# Patient Record
Sex: MALE | Race: WHITE | NOT HISPANIC OR LATINO | Employment: OTHER | URBAN - METROPOLITAN AREA
[De-identification: names, ages, dates, MRNs, and addresses within clinical notes are randomized per-mention and may not be internally consistent; named-entity substitution may affect disease eponyms.]

---

## 2017-03-31 ENCOUNTER — CONVERSION ENCOUNTER (OUTPATIENT)
Dept: FAMILY MEDICINE CLINIC | Facility: CLINIC | Age: 72
End: 2017-03-31

## 2017-11-01 ENCOUNTER — CONVERSION ENCOUNTER (OUTPATIENT)
Dept: FAMILY MEDICINE CLINIC | Facility: CLINIC | Age: 72
End: 2017-11-01

## 2017-11-01 ENCOUNTER — HOSPITAL ENCOUNTER (OUTPATIENT)
Dept: GENERAL RADIOLOGY | Facility: HOSPITAL | Age: 72
Discharge: HOME | End: 2017-11-01
Attending: FAMILY MEDICINE | Admitting: FAMILY MEDICINE

## 2017-11-01 ENCOUNTER — HOSPITAL ENCOUNTER (OUTPATIENT)
Dept: FAMILY MEDICINE CLINIC | Facility: CLINIC | Age: 72
Setting detail: SPECIMEN
Discharge: HOME | End: 2017-11-01
Attending: FAMILY MEDICINE | Admitting: FAMILY MEDICINE

## 2017-11-01 LAB
ALBUMIN SERPL-MCNC: 4.5 G/DL (ref 3.5–4.8)
ALBUMIN/GLOB SERPL: 1.7 {RATIO} (ref 1–1.7)
ALP SERPL-CCNC: 66 IU/L (ref 32–91)
ALT SERPL-CCNC: 20 IU/L (ref 17–63)
ANION GAP SERPL CALC-SCNC: 11.8 MMOL/L (ref 10–20)
AST SERPL-CCNC: 24 IU/L (ref 15–41)
BASOPHILS # BLD AUTO: 0 10*3/UL (ref 0–0.2)
BASOPHILS NFR BLD AUTO: 0 % (ref 0–2)
BILIRUB SERPL-MCNC: 0.8 MG/DL (ref 0.3–1.2)
BILIRUB UR QL STRIP: NEGATIVE MG/DL
BUN SERPL-MCNC: 21 MG/DL (ref 8–20)
BUN/CREAT SERPL: 17.5 (ref 6.2–20.3)
CALCIUM SERPL-MCNC: 9.2 MG/DL (ref 8.9–10.3)
CASTS URNS QL MICRO: ABNORMAL /[LPF]
CHLORIDE SERPL-SCNC: 103 MMOL/L (ref 101–111)
CHOLEST SERPL-MCNC: 185 MG/DL
CHOLEST/HDLC SERPL: 4.3 {RATIO}
COLOR UR: YELLOW
CONV BACTERIA IN URINE MICRO: NEGATIVE
CONV CLARITY OF URINE: ABNORMAL
CONV CO2: 27 MMOL/L (ref 22–32)
CONV HYALINE CASTS IN URINE MICRO: 2 /[LPF] (ref 0–5)
CONV LDL CHOLESTEROL DIRECT: 123 MG/DL (ref 0–100)
CONV PROTEIN IN URINE BY AUTOMATED TEST STRIP: NEGATIVE MG/DL
CONV SMALL ROUND CELLS: ABNORMAL /[HPF]
CONV TOTAL PROTEIN: 7.1 G/DL (ref 6.1–7.9)
CONV UROBILINOGEN IN URINE BY AUTOMATED TEST STRIP: 1 MG/DL
CREAT UR-MCNC: 1.2 MG/DL (ref 0.7–1.2)
CRP SERPL-MCNC: 0.24 MG/DL (ref 0–0.7)
CULTURE INDICATED?: ABNORMAL
DIFFERENTIAL METHOD BLD: (no result)
EOSINOPHIL # BLD AUTO: 0.1 10*3/UL (ref 0–0.3)
EOSINOPHIL # BLD AUTO: 2 % (ref 0–3)
ERYTHROCYTE [DISTWIDTH] IN BLOOD BY AUTOMATED COUNT: 13.9 % (ref 11.5–14.5)
ERYTHROCYTE [SEDIMENTATION RATE] IN BLOOD BY WESTERGREN METHOD: 6 MM/HR (ref 0–20)
GLOBULIN UR ELPH-MCNC: 2.6 G/DL (ref 2.5–3.8)
GLUCOSE SERPL-MCNC: 106 MG/DL (ref 65–99)
GLUCOSE UR QL: NEGATIVE MG/DL
HCT VFR BLD AUTO: 49.5 % (ref 40–54)
HDLC SERPL-MCNC: 43 MG/DL
HGB BLD-MCNC: 17.1 G/DL (ref 14–18)
HGB UR QL STRIP: NEGATIVE
KETONES UR QL STRIP: NEGATIVE MG/DL
LDLC/HDLC SERPL: 2.8 {RATIO}
LEUKOCYTE ESTERASE UR QL STRIP: NEGATIVE
LIPID INTERPRETATION: ABNORMAL
LYMPHOCYTES # BLD AUTO: 1.4 10*3/UL (ref 0.8–4.8)
LYMPHOCYTES NFR BLD AUTO: 21 % (ref 18–42)
MCH RBC QN AUTO: 30.9 PG (ref 26–32)
MCHC RBC AUTO-ENTMCNC: 34.6 G/DL (ref 32–36)
MCV RBC AUTO: 89.2 FL (ref 80–94)
MONOCYTES # BLD AUTO: 0.5 10*3/UL (ref 0.1–1.3)
MONOCYTES NFR BLD AUTO: 7 % (ref 2–11)
NEUTROPHILS # BLD AUTO: 4.7 10*3/UL (ref 2.3–8.6)
NEUTROPHILS NFR BLD AUTO: 70 % (ref 50–75)
NITRITE UR QL STRIP: NEGATIVE
NRBC BLD AUTO-RTO: 0 /100{WBCS}
NRBC/RBC NFR BLD MANUAL: 0 10*3/UL
PH UR STRIP.AUTO: 6.5 [PH] (ref 4.5–8)
PLATELET # BLD AUTO: 139 10*3/UL (ref 150–450)
PMV BLD AUTO: 7 FL (ref 7.4–10.4)
POTASSIUM SERPL-SCNC: 4.8 MMOL/L (ref 3.6–5.1)
PSA SERPL-MCNC: 2.11 NG/ML (ref 0–4)
RBC # BLD AUTO: 5.56 10*6/UL (ref 4.6–6)
RBC #/AREA URNS HPF: 2 /[HPF] (ref 0–3)
SODIUM SERPL-SCNC: 137 MMOL/L (ref 136–144)
SP GR UR: 1.02 (ref 1–1.03)
SPERM URNS QL MICRO: ABNORMAL /[HPF]
SQUAMOUS SPT QL MICRO: 1 /[HPF] (ref 0–5)
T4 FREE SERPL-MCNC: 0.9 NG/DL (ref 0.58–1.64)
TESTOST SERPL-MCNC: 4.83 NG/ML (ref 1.7–7.8)
TRIGL SERPL-MCNC: 174 MG/DL
TSH SERPL-ACNC: 1.87 UIU/ML (ref 0.34–5.6)
UNIDENT CRYS URNS QL MICRO: ABNORMAL /[HPF]
VIT B12 SERPL-MCNC: 436 PG/ML (ref 180–914)
VLDLC SERPL CALC-MCNC: 18.8 MG/DL
WBC # BLD AUTO: 6.7 10*3/UL (ref 4.5–11.5)
WBC #/AREA URNS HPF: 1 /[HPF] (ref 0–5)
YEAST SPEC QL WET PREP: ABNORMAL /[HPF]

## 2017-11-03 LAB
ANA SER QL IA: NORMAL

## 2017-11-13 ENCOUNTER — HOSPITAL ENCOUNTER (OUTPATIENT)
Dept: PHYSICAL THERAPY | Facility: HOSPITAL | Age: 72
Setting detail: RECURRING SERIES
Discharge: HOME | End: 2018-02-09
Attending: FAMILY MEDICINE | Admitting: FAMILY MEDICINE

## 2017-11-17 ENCOUNTER — HOSPITAL ENCOUNTER (OUTPATIENT)
Dept: GENERAL RADIOLOGY | Facility: HOSPITAL | Age: 72
Discharge: HOME | End: 2017-11-17
Attending: FAMILY MEDICINE | Admitting: FAMILY MEDICINE

## 2017-11-17 ENCOUNTER — CONVERSION ENCOUNTER (OUTPATIENT)
Dept: FAMILY MEDICINE CLINIC | Facility: CLINIC | Age: 72
End: 2017-11-17

## 2018-03-07 ENCOUNTER — OFFICE (AMBULATORY)
Dept: URBAN - METROPOLITAN AREA PATHOLOGY 4 | Facility: PATHOLOGY | Age: 73
End: 2018-03-07
Payer: COMMERCIAL

## 2018-03-07 ENCOUNTER — ON CAMPUS - OUTPATIENT (AMBULATORY)
Dept: URBAN - METROPOLITAN AREA HOSPITAL 2 | Facility: HOSPITAL | Age: 73
End: 2018-03-07
Payer: COMMERCIAL

## 2018-03-07 ENCOUNTER — HOSPITAL ENCOUNTER (OUTPATIENT)
Dept: OTHER | Facility: HOSPITAL | Age: 73
Setting detail: SPECIMEN
Discharge: HOME | End: 2018-03-07
Attending: INTERNAL MEDICINE | Admitting: INTERNAL MEDICINE

## 2018-03-07 VITALS
RESPIRATION RATE: 18 BRPM | HEART RATE: 67 BPM | WEIGHT: 228 LBS | DIASTOLIC BLOOD PRESSURE: 74 MMHG | DIASTOLIC BLOOD PRESSURE: 77 MMHG | OXYGEN SATURATION: 94 % | HEART RATE: 72 BPM | OXYGEN SATURATION: 97 % | SYSTOLIC BLOOD PRESSURE: 122 MMHG | RESPIRATION RATE: 16 BRPM | SYSTOLIC BLOOD PRESSURE: 139 MMHG | HEART RATE: 61 BPM | OXYGEN SATURATION: 99 % | DIASTOLIC BLOOD PRESSURE: 69 MMHG | DIASTOLIC BLOOD PRESSURE: 88 MMHG | HEART RATE: 78 BPM | TEMPERATURE: 97.1 F | SYSTOLIC BLOOD PRESSURE: 109 MMHG | SYSTOLIC BLOOD PRESSURE: 130 MMHG | DIASTOLIC BLOOD PRESSURE: 87 MMHG | HEART RATE: 66 BPM | OXYGEN SATURATION: 98 % | SYSTOLIC BLOOD PRESSURE: 151 MMHG | SYSTOLIC BLOOD PRESSURE: 124 MMHG | HEART RATE: 69 BPM | DIASTOLIC BLOOD PRESSURE: 67 MMHG | HEART RATE: 65 BPM | OXYGEN SATURATION: 95 % | SYSTOLIC BLOOD PRESSURE: 106 MMHG | SYSTOLIC BLOOD PRESSURE: 112 MMHG | DIASTOLIC BLOOD PRESSURE: 70 MMHG | HEART RATE: 73 BPM | RESPIRATION RATE: 15 BRPM | DIASTOLIC BLOOD PRESSURE: 68 MMHG | DIASTOLIC BLOOD PRESSURE: 63 MMHG | HEIGHT: 73 IN | SYSTOLIC BLOOD PRESSURE: 114 MMHG

## 2018-03-07 DIAGNOSIS — D12.3 BENIGN NEOPLASM OF TRANSVERSE COLON: ICD-10-CM

## 2018-03-07 DIAGNOSIS — K64.1 SECOND DEGREE HEMORRHOIDS: ICD-10-CM

## 2018-03-07 DIAGNOSIS — Z86.010 PERSONAL HISTORY OF COLONIC POLYPS: ICD-10-CM

## 2018-03-07 DIAGNOSIS — D12.5 BENIGN NEOPLASM OF SIGMOID COLON: ICD-10-CM

## 2018-03-07 LAB
GI HISTOLOGY: A. UNSPECIFIED: (no result)
GI HISTOLOGY: B. UNSPECIFIED: (no result)
GI HISTOLOGY: PDF REPORT: (no result)

## 2018-03-07 PROCEDURE — 45385 COLONOSCOPY W/LESION REMOVAL: CPT | Mod: PT | Performed by: INTERNAL MEDICINE

## 2018-03-07 PROCEDURE — 88305 TISSUE EXAM BY PATHOLOGIST: CPT | Mod: 26 | Performed by: INTERNAL MEDICINE

## 2018-03-07 RX ADMIN — PROPOFOL: 10 INJECTION, EMULSION INTRAVENOUS at 13:17

## 2018-05-23 ENCOUNTER — HOSPITAL ENCOUNTER (OUTPATIENT)
Dept: FAMILY MEDICINE CLINIC | Facility: CLINIC | Age: 73
Setting detail: SPECIMEN
Discharge: HOME | End: 2018-05-23
Attending: FAMILY MEDICINE | Admitting: FAMILY MEDICINE

## 2018-06-14 ENCOUNTER — CONVERSION ENCOUNTER (OUTPATIENT)
Dept: FAMILY MEDICINE CLINIC | Facility: CLINIC | Age: 73
End: 2018-06-14

## 2018-06-21 ENCOUNTER — CONVERSION ENCOUNTER (OUTPATIENT)
Dept: FAMILY MEDICINE CLINIC | Facility: CLINIC | Age: 73
End: 2018-06-21

## 2018-11-14 ENCOUNTER — HOSPITAL ENCOUNTER (OUTPATIENT)
Dept: FAMILY MEDICINE CLINIC | Facility: CLINIC | Age: 73
Setting detail: SPECIMEN
Discharge: HOME | End: 2018-11-14
Attending: FAMILY MEDICINE | Admitting: FAMILY MEDICINE

## 2018-11-14 LAB
ALBUMIN SERPL-MCNC: 3.7 G/DL (ref 3.5–4.8)
ALBUMIN/GLOB SERPL: 1.5 {RATIO} (ref 1–1.7)
ALP SERPL-CCNC: 70 IU/L (ref 32–91)
ALT SERPL-CCNC: 15 IU/L (ref 17–63)
ANION GAP SERPL CALC-SCNC: 9.9 MMOL/L (ref 10–20)
AST SERPL-CCNC: 20 IU/L (ref 15–41)
BASOPHILS # BLD AUTO: 0 10*3/UL (ref 0–0.2)
BASOPHILS NFR BLD AUTO: 0 % (ref 0–2)
BILIRUB SERPL-MCNC: 0.6 MG/DL (ref 0.3–1.2)
BILIRUB UR QL STRIP: NEGATIVE MG/DL
BUN SERPL-MCNC: 19 MG/DL (ref 8–20)
BUN/CREAT SERPL: 15.8 (ref 6.2–20.3)
CALCIUM SERPL-MCNC: 8.9 MG/DL (ref 8.9–10.3)
CASTS URNS QL MICRO: NORMAL /[LPF]
CHLORIDE SERPL-SCNC: 104 MMOL/L (ref 101–111)
CHOLEST SERPL-MCNC: 153 MG/DL
CHOLEST/HDLC SERPL: 3.9 {RATIO}
COLOR UR: YELLOW
CONV BACTERIA IN URINE MICRO: NEGATIVE
CONV CLARITY OF URINE: CLEAR
CONV CO2: 27 MMOL/L (ref 22–32)
CONV HYALINE CASTS IN URINE MICRO: 2 /[LPF] (ref 0–5)
CONV LDL CHOLESTEROL DIRECT: 93 MG/DL (ref 0–100)
CONV PROTEIN IN URINE BY AUTOMATED TEST STRIP: NEGATIVE MG/DL
CONV SMALL ROUND CELLS: NORMAL /[HPF]
CONV TOTAL PROTEIN: 6.1 G/DL (ref 6.1–7.9)
CONV UROBILINOGEN IN URINE BY AUTOMATED TEST STRIP: 0.2 MG/DL
CREAT UR-MCNC: 1.2 MG/DL (ref 0.7–1.2)
CULTURE INDICATED?: NORMAL
DIFFERENTIAL METHOD BLD: (no result)
EOSINOPHIL # BLD AUTO: 0.1 10*3/UL (ref 0–0.3)
EOSINOPHIL # BLD AUTO: 3 % (ref 0–3)
ERYTHROCYTE [DISTWIDTH] IN BLOOD BY AUTOMATED COUNT: 13.5 % (ref 11.5–14.5)
GLOBULIN UR ELPH-MCNC: 2.4 G/DL (ref 2.5–3.8)
GLUCOSE SERPL-MCNC: 114 MG/DL (ref 65–99)
GLUCOSE UR QL: NEGATIVE MG/DL
HCT VFR BLD AUTO: 46.3 % (ref 40–54)
HDLC SERPL-MCNC: 39 MG/DL
HGB BLD-MCNC: 15.9 G/DL (ref 14–18)
HGB UR QL STRIP: NEGATIVE
KETONES UR QL STRIP: NEGATIVE MG/DL
LDLC/HDLC SERPL: 2.4 {RATIO}
LEUKOCYTE ESTERASE UR QL STRIP: NEGATIVE
LIPID INTERPRETATION: ABNORMAL
LYMPHOCYTES # BLD AUTO: 1.2 10*3/UL (ref 0.8–4.8)
LYMPHOCYTES NFR BLD AUTO: 22 % (ref 18–42)
MCH RBC QN AUTO: 30.6 PG (ref 26–32)
MCHC RBC AUTO-ENTMCNC: 34.5 G/DL (ref 32–36)
MCV RBC AUTO: 88.8 FL (ref 80–94)
MONOCYTES # BLD AUTO: 0.4 10*3/UL (ref 0.1–1.3)
MONOCYTES NFR BLD AUTO: 7 % (ref 2–11)
NEUTROPHILS # BLD AUTO: 3.7 10*3/UL (ref 2.3–8.6)
NEUTROPHILS NFR BLD AUTO: 68 % (ref 50–75)
NITRITE UR QL STRIP: NEGATIVE
NRBC BLD AUTO-RTO: 0 /100{WBCS}
NRBC/RBC NFR BLD MANUAL: 0 10*3/UL
PH UR STRIP.AUTO: 5.5 [PH] (ref 4.5–8)
PLATELET # BLD AUTO: 125 10*3/UL (ref 150–450)
PMV BLD AUTO: 7.2 FL (ref 7.4–10.4)
POTASSIUM SERPL-SCNC: 3.9 MMOL/L (ref 3.6–5.1)
RBC # BLD AUTO: 5.21 10*6/UL (ref 4.6–6)
RBC #/AREA URNS HPF: 1 /[HPF] (ref 0–3)
SODIUM SERPL-SCNC: 137 MMOL/L (ref 136–144)
SP GR UR: 1.02 (ref 1–1.03)
SPERM URNS QL MICRO: NORMAL /[HPF]
SQUAMOUS SPT QL MICRO: 0 /[HPF] (ref 0–5)
TRIGL SERPL-MCNC: 141 MG/DL
UNIDENT CRYS URNS QL MICRO: NORMAL /[HPF]
VLDLC SERPL CALC-MCNC: 20.6 MG/DL
WBC # BLD AUTO: 5.4 10*3/UL (ref 4.5–11.5)
WBC #/AREA URNS HPF: 1 /[HPF] (ref 0–5)
YEAST SPEC QL WET PREP: NORMAL /[HPF]

## 2018-11-21 ENCOUNTER — CONVERSION ENCOUNTER (OUTPATIENT)
Dept: FAMILY MEDICINE CLINIC | Facility: CLINIC | Age: 73
End: 2018-11-21

## 2019-06-04 VITALS
SYSTOLIC BLOOD PRESSURE: 120 MMHG | DIASTOLIC BLOOD PRESSURE: 70 MMHG | HEIGHT: 73 IN | WEIGHT: 230 LBS | BODY MASS INDEX: 29.03 KG/M2 | HEART RATE: 80 BPM | HEART RATE: 80 BPM | WEIGHT: 239 LBS | HEART RATE: 79 BPM | RESPIRATION RATE: 16 BRPM | SYSTOLIC BLOOD PRESSURE: 144 MMHG | DIASTOLIC BLOOD PRESSURE: 78 MMHG | WEIGHT: 227 LBS | BODY MASS INDEX: 30.09 KG/M2 | DIASTOLIC BLOOD PRESSURE: 82 MMHG | RESPIRATION RATE: 20 BRPM | RESPIRATION RATE: 18 BRPM | HEART RATE: 76 BPM | WEIGHT: 227 LBS | RESPIRATION RATE: 18 BRPM | HEART RATE: 64 BPM | SYSTOLIC BLOOD PRESSURE: 150 MMHG | WEIGHT: 220 LBS | SYSTOLIC BLOOD PRESSURE: 124 MMHG | DIASTOLIC BLOOD PRESSURE: 78 MMHG | HEIGHT: 73 IN | RESPIRATION RATE: 18 BRPM | DIASTOLIC BLOOD PRESSURE: 88 MMHG | SYSTOLIC BLOOD PRESSURE: 132 MMHG | DIASTOLIC BLOOD PRESSURE: 75 MMHG | SYSTOLIC BLOOD PRESSURE: 144 MMHG | HEART RATE: 96 BPM | RESPIRATION RATE: 20 BRPM | BODY MASS INDEX: 30.09 KG/M2 | WEIGHT: 242 LBS

## 2019-07-18 RX ORDER — AMLODIPINE BESYLATE 10 MG/1
TABLET ORAL
Qty: 90 TABLET | Refills: 0 | Status: SHIPPED | OUTPATIENT
Start: 2019-07-18 | End: 2019-10-13 | Stop reason: SDUPTHER

## 2019-08-22 RX ORDER — TRIAMTERENE AND HYDROCHLOROTHIAZIDE 75; 50 MG/1; MG/1
TABLET ORAL
Qty: 90 TABLET | Refills: 0 | Status: SHIPPED | OUTPATIENT
Start: 2019-08-22 | End: 2019-10-13 | Stop reason: SDUPTHER

## 2019-10-03 ENCOUNTER — TELEPHONE (OUTPATIENT)
Dept: FAMILY MEDICINE CLINIC | Facility: CLINIC | Age: 74
End: 2019-10-03

## 2019-10-03 DIAGNOSIS — F39 MOOD DISORDER (HCC): ICD-10-CM

## 2019-10-03 DIAGNOSIS — I10 ESSENTIAL HYPERTENSION: ICD-10-CM

## 2019-10-03 DIAGNOSIS — R73.09 ELEVATED GLUCOSE: ICD-10-CM

## 2019-10-03 DIAGNOSIS — Z12.5 SCREENING FOR PROSTATE CANCER: ICD-10-CM

## 2019-10-03 DIAGNOSIS — E55.9 VITAMIN D DEFICIENCY: Primary | ICD-10-CM

## 2019-10-03 PROBLEM — N40.0 BENIGN PROSTATIC HYPERPLASIA: Status: ACTIVE | Noted: 2018-11-14

## 2019-10-03 PROBLEM — M72.2 PLANTAR FASCIITIS: Status: ACTIVE | Noted: 2017-03-31

## 2019-10-03 NOTE — TELEPHONE ENCOUNTER
Patient is seeing [facility] on 11/22 for his annual Medicare Wellness Exam and coming in under [facility] on 11/15 for the fasting labwork.  Please put lab orders in.

## 2019-10-14 RX ORDER — DULOXETIN HYDROCHLORIDE 30 MG/1
CAPSULE, DELAYED RELEASE ORAL
Qty: 90 CAPSULE | Refills: 0 | Status: SHIPPED | OUTPATIENT
Start: 2019-10-14 | End: 2020-02-24

## 2019-10-14 RX ORDER — AMLODIPINE BESYLATE 10 MG/1
TABLET ORAL
Qty: 90 TABLET | Refills: 0 | Status: SHIPPED | OUTPATIENT
Start: 2019-10-14 | End: 2020-01-03

## 2019-10-14 RX ORDER — TRIAMTERENE AND HYDROCHLOROTHIAZIDE 75; 50 MG/1; MG/1
TABLET ORAL
Qty: 90 TABLET | Refills: 0 | Status: SHIPPED | OUTPATIENT
Start: 2019-10-14 | End: 2020-02-24

## 2020-01-03 RX ORDER — AMLODIPINE BESYLATE 10 MG/1
TABLET ORAL
Qty: 90 TABLET | Refills: 0 | Status: SHIPPED | OUTPATIENT
Start: 2020-01-03 | End: 2020-03-30

## 2020-01-08 ENCOUNTER — LAB (OUTPATIENT)
Dept: FAMILY MEDICINE CLINIC | Facility: CLINIC | Age: 75
End: 2020-01-08

## 2020-01-08 DIAGNOSIS — I10 ESSENTIAL HYPERTENSION: ICD-10-CM

## 2020-01-08 DIAGNOSIS — Z12.5 SCREENING FOR PROSTATE CANCER: ICD-10-CM

## 2020-01-08 DIAGNOSIS — R73.09 ELEVATED GLUCOSE: ICD-10-CM

## 2020-01-08 DIAGNOSIS — E55.9 VITAMIN D DEFICIENCY: ICD-10-CM

## 2020-01-08 LAB
25(OH)D3 SERPL-MCNC: 59.4 NG/ML (ref 30–100)
ALBUMIN SERPL-MCNC: 4.3 G/DL (ref 3.5–5.2)
ALBUMIN/GLOB SERPL: 1.7 G/DL
ALP SERPL-CCNC: 81 U/L (ref 39–117)
ALT SERPL W P-5'-P-CCNC: 16 U/L (ref 1–41)
ANION GAP SERPL CALCULATED.3IONS-SCNC: 12.6 MMOL/L (ref 5–15)
AST SERPL-CCNC: 14 U/L (ref 1–40)
BASOPHILS # BLD AUTO: 0.02 10*3/MM3 (ref 0–0.2)
BASOPHILS NFR BLD AUTO: 0.3 % (ref 0–1.5)
BILIRUB SERPL-MCNC: 0.3 MG/DL (ref 0.2–1.2)
BILIRUB UR QL STRIP: NEGATIVE
BUN BLD-MCNC: 23 MG/DL (ref 8–23)
BUN/CREAT SERPL: 21.1 (ref 7–25)
CALCIUM SPEC-SCNC: 9 MG/DL (ref 8.6–10.5)
CHLORIDE SERPL-SCNC: 102 MMOL/L (ref 98–107)
CHOLEST SERPL-MCNC: 163 MG/DL (ref 0–200)
CLARITY UR: CLEAR
CO2 SERPL-SCNC: 24.4 MMOL/L (ref 22–29)
COLOR UR: YELLOW
CREAT BLD-MCNC: 1.09 MG/DL (ref 0.76–1.27)
DEPRECATED RDW RBC AUTO: 43.6 FL (ref 37–54)
EOSINOPHIL # BLD AUTO: 0.28 10*3/MM3 (ref 0–0.4)
EOSINOPHIL NFR BLD AUTO: 4.6 % (ref 0.3–6.2)
ERYTHROCYTE [DISTWIDTH] IN BLOOD BY AUTOMATED COUNT: 13.4 % (ref 12.3–15.4)
GFR SERPL CREATININE-BSD FRML MDRD: 66 ML/MIN/1.73
GLOBULIN UR ELPH-MCNC: 2.6 GM/DL
GLUCOSE BLD-MCNC: 107 MG/DL (ref 65–99)
GLUCOSE UR STRIP-MCNC: NEGATIVE MG/DL
HBA1C MFR BLD: 4.8 % (ref 3.5–5.6)
HCT VFR BLD AUTO: 47.9 % (ref 37.5–51)
HDLC SERPL-MCNC: 37 MG/DL (ref 40–60)
HGB BLD-MCNC: 16.2 G/DL (ref 13–17.7)
HGB UR QL STRIP.AUTO: NEGATIVE
IMM GRANULOCYTES # BLD AUTO: 0.02 10*3/MM3 (ref 0–0.05)
IMM GRANULOCYTES NFR BLD AUTO: 0.3 % (ref 0–0.5)
KETONES UR QL STRIP: NEGATIVE
LDLC SERPL CALC-MCNC: 99 MG/DL (ref 0–100)
LDLC/HDLC SERPL: 2.66 {RATIO}
LEUKOCYTE ESTERASE UR QL STRIP.AUTO: NEGATIVE
LYMPHOCYTES # BLD AUTO: 1.32 10*3/MM3 (ref 0.7–3.1)
LYMPHOCYTES NFR BLD AUTO: 21.7 % (ref 19.6–45.3)
MCH RBC QN AUTO: 30.2 PG (ref 26.6–33)
MCHC RBC AUTO-ENTMCNC: 33.8 G/DL (ref 31.5–35.7)
MCV RBC AUTO: 89.2 FL (ref 79–97)
MONOCYTES # BLD AUTO: 0.48 10*3/MM3 (ref 0.1–0.9)
MONOCYTES NFR BLD AUTO: 7.9 % (ref 5–12)
NEUTROPHILS # BLD AUTO: 3.96 10*3/MM3 (ref 1.7–7)
NEUTROPHILS NFR BLD AUTO: 65.2 % (ref 42.7–76)
NITRITE UR QL STRIP: NEGATIVE
NRBC BLD AUTO-RTO: 0 /100 WBC (ref 0–0.2)
PH UR STRIP.AUTO: 7 [PH] (ref 5–8)
PLATELET # BLD AUTO: 136 10*3/MM3 (ref 140–450)
PMV BLD AUTO: 9.8 FL (ref 6–12)
POTASSIUM BLD-SCNC: 4.4 MMOL/L (ref 3.5–5.2)
PROT SERPL-MCNC: 6.9 G/DL (ref 6–8.5)
PROT UR QL STRIP: NEGATIVE
PSA SERPL-MCNC: 2.5 NG/ML (ref 0–4)
RBC # BLD AUTO: 5.37 10*6/MM3 (ref 4.14–5.8)
SODIUM BLD-SCNC: 139 MMOL/L (ref 136–145)
SP GR UR STRIP: 1.02 (ref 1–1.03)
T4 FREE SERPL-MCNC: 1.26 NG/DL (ref 0.93–1.7)
TRIGL SERPL-MCNC: 137 MG/DL (ref 0–150)
TSH SERPL DL<=0.05 MIU/L-ACNC: 1.86 UIU/ML (ref 0.27–4.2)
UROBILINOGEN UR QL STRIP: NORMAL
VLDLC SERPL-MCNC: 27.4 MG/DL (ref 5–40)
WBC NRBC COR # BLD: 6.08 10*3/MM3 (ref 3.4–10.8)

## 2020-01-08 PROCEDURE — 80053 COMPREHEN METABOLIC PANEL: CPT | Performed by: FAMILY MEDICINE

## 2020-01-08 PROCEDURE — 83036 HEMOGLOBIN GLYCOSYLATED A1C: CPT | Performed by: FAMILY MEDICINE

## 2020-01-08 PROCEDURE — 82306 VITAMIN D 25 HYDROXY: CPT | Performed by: FAMILY MEDICINE

## 2020-01-08 PROCEDURE — 84443 ASSAY THYROID STIM HORMONE: CPT | Performed by: FAMILY MEDICINE

## 2020-01-08 PROCEDURE — 85025 COMPLETE CBC W/AUTO DIFF WBC: CPT | Performed by: FAMILY MEDICINE

## 2020-01-08 PROCEDURE — 81003 URINALYSIS AUTO W/O SCOPE: CPT | Performed by: FAMILY MEDICINE

## 2020-01-08 PROCEDURE — 80061 LIPID PANEL: CPT | Performed by: FAMILY MEDICINE

## 2020-01-08 PROCEDURE — G0103 PSA SCREENING: HCPCS | Performed by: FAMILY MEDICINE

## 2020-01-08 PROCEDURE — 84439 ASSAY OF FREE THYROXINE: CPT | Performed by: FAMILY MEDICINE

## 2020-01-15 ENCOUNTER — TELEPHONE (OUTPATIENT)
Dept: FAMILY MEDICINE CLINIC | Facility: CLINIC | Age: 75
End: 2020-01-15

## 2020-01-15 ENCOUNTER — OFFICE VISIT (OUTPATIENT)
Dept: FAMILY MEDICINE CLINIC | Facility: CLINIC | Age: 75
End: 2020-01-15

## 2020-01-15 VITALS
HEIGHT: 73 IN | DIASTOLIC BLOOD PRESSURE: 82 MMHG | HEART RATE: 100 BPM | SYSTOLIC BLOOD PRESSURE: 152 MMHG | BODY MASS INDEX: 31.28 KG/M2 | TEMPERATURE: 98.4 F | RESPIRATION RATE: 16 BRPM | WEIGHT: 236 LBS

## 2020-01-15 DIAGNOSIS — Z00.00 MEDICARE ANNUAL WELLNESS VISIT, SUBSEQUENT: Primary | ICD-10-CM

## 2020-01-15 DIAGNOSIS — M54.41 CHRONIC BILATERAL LOW BACK PAIN WITH RIGHT-SIDED SCIATICA: ICD-10-CM

## 2020-01-15 DIAGNOSIS — M25.551 HIP PAIN, BILATERAL: ICD-10-CM

## 2020-01-15 DIAGNOSIS — N40.1 BENIGN PROSTATIC HYPERPLASIA WITH URINARY FREQUENCY: ICD-10-CM

## 2020-01-15 DIAGNOSIS — I10 ESSENTIAL HYPERTENSION: ICD-10-CM

## 2020-01-15 DIAGNOSIS — K21.9 GASTROESOPHAGEAL REFLUX DISEASE WITHOUT ESOPHAGITIS: Primary | ICD-10-CM

## 2020-01-15 DIAGNOSIS — G89.29 CHRONIC BILATERAL LOW BACK PAIN WITH RIGHT-SIDED SCIATICA: ICD-10-CM

## 2020-01-15 DIAGNOSIS — H33.011 RETINAL DETACHMENT OF RIGHT EYE WITH SINGLE RETINAL TEAR: ICD-10-CM

## 2020-01-15 DIAGNOSIS — R35.0 BENIGN PROSTATIC HYPERPLASIA WITH URINARY FREQUENCY: ICD-10-CM

## 2020-01-15 DIAGNOSIS — K21.9 GASTROESOPHAGEAL REFLUX DISEASE WITHOUT ESOPHAGITIS: ICD-10-CM

## 2020-01-15 DIAGNOSIS — M25.552 HIP PAIN, BILATERAL: ICD-10-CM

## 2020-01-15 DIAGNOSIS — M17.0 PRIMARY OSTEOARTHRITIS OF BOTH KNEES: ICD-10-CM

## 2020-01-15 PROBLEM — J45.909 BRONCHITIS, ALLERGIC: Status: ACTIVE | Noted: 2018-06-14

## 2020-01-15 PROBLEM — M25.559 HIP PAIN: Status: ACTIVE | Noted: 2017-11-17

## 2020-01-15 PROBLEM — S43.421A SPRAIN OF RIGHT ROTATOR CUFF CAPSULE: Status: ACTIVE | Noted: 2020-01-15

## 2020-01-15 PROBLEM — M17.9 OSTEOARTHRITIS OF KNEE: Status: ACTIVE | Noted: 2020-01-15

## 2020-01-15 PROCEDURE — G0439 PPPS, SUBSEQ VISIT: HCPCS | Performed by: FAMILY MEDICINE

## 2020-01-15 PROCEDURE — 99214 OFFICE O/P EST MOD 30 MIN: CPT | Performed by: FAMILY MEDICINE

## 2020-01-15 RX ORDER — FEXOFENADINE HCL 180 MG/1
180 TABLET ORAL 2 TIMES DAILY PRN
COMMUNITY

## 2020-01-15 RX ORDER — ZOLPIDEM TARTRATE 10 MG/1
TABLET ORAL
COMMUNITY
Start: 2019-12-03 | End: 2020-02-24

## 2020-01-23 ENCOUNTER — HOSPITAL ENCOUNTER (OUTPATIENT)
Dept: GENERAL RADIOLOGY | Facility: HOSPITAL | Age: 75
Discharge: HOME | End: 2020-01-23
Admitting: FAMILY MEDICINE

## 2020-01-23 DIAGNOSIS — Z00.00 MEDICARE ANNUAL WELLNESS VISIT, SUBSEQUENT: ICD-10-CM

## 2020-01-23 DIAGNOSIS — M25.552 HIP PAIN, BILATERAL: ICD-10-CM

## 2020-01-23 DIAGNOSIS — M25.551 HIP PAIN, BILATERAL: ICD-10-CM

## 2020-01-23 PROCEDURE — 73521 X-RAY EXAM HIPS BI 2 VIEWS: CPT

## 2020-02-14 ENCOUNTER — TELEPHONE (OUTPATIENT)
Dept: FAMILY MEDICINE CLINIC | Facility: CLINIC | Age: 75
End: 2020-02-14

## 2020-02-23 DIAGNOSIS — F51.01 PRIMARY INSOMNIA: Primary | ICD-10-CM

## 2020-02-24 RX ORDER — TRIAMTERENE AND HYDROCHLOROTHIAZIDE 75; 50 MG/1; MG/1
TABLET ORAL
Qty: 90 TABLET | Refills: 0 | Status: SHIPPED | OUTPATIENT
Start: 2020-02-24 | End: 2020-05-22

## 2020-02-24 RX ORDER — DULOXETIN HYDROCHLORIDE 30 MG/1
CAPSULE, DELAYED RELEASE ORAL
Qty: 90 CAPSULE | Refills: 0 | Status: SHIPPED | OUTPATIENT
Start: 2020-02-24 | End: 2020-05-22

## 2020-02-25 RX ORDER — ZOLPIDEM TARTRATE 10 MG/1
TABLET ORAL
Qty: 60 TABLET | Refills: 1 | Status: SHIPPED | OUTPATIENT
Start: 2020-02-25 | End: 2020-09-16

## 2020-03-16 NOTE — PATIENT INSTRUCTIONS
Medicare Wellness  Personal Prevention Plan of Service     Date of Office Visit:  01/15/2020  Encounter Provider:  Jaret Castellanos MD  Place of Service:  [facility]  Patient Name: Clint Cee  :  1945    As part of the Medicare Wellness portion of your visit today, we are providing you with this personalized preventive plan of services (PPPS). This plan is based upon recommendations of the [place] Preventive Services Task Force (USPSTF) and the Advisory Committee on Immunization Practices (ACIP).    This lists the preventive care services that should be considered, and provides dates of when you are due. Items listed as completed are up-to-date and do not require any further intervention.    Health Maintenance   Topic Date Due   • TDAP/TD VACCINES (1 - Tdap) 1958   • ZOSTER VACCINE (1 of 2) 1997   • Pneumococcal Vaccine Once at 65 Years Old  2012   • HEPATITIS C SCREENING  10/03/2019   • MEDICARE ANNUAL WELLNESS  01/15/2021   • COLONOSCOPY  2028   • INFLUENZA VACCINE  Completed       Orders Placed This Encounter   Procedures   • XR Hips Bilateral With or Without Pelvis 2 View     Standing Status:   Future     Number of Occurrences:   1     Standing Expiration Date:   1/15/2021     Order Specific Question:   Reason for Exam:     Answer:   bilateral hip ROM deficit       Return in about 6 months (around 7/15/2020) for Annual physical.

## 2020-03-30 RX ORDER — AMLODIPINE BESYLATE 10 MG/1
TABLET ORAL
Qty: 90 TABLET | Refills: 0 | Status: SHIPPED | OUTPATIENT
Start: 2020-03-30 | End: 2020-05-22

## 2020-05-22 RX ORDER — TRIAMTERENE AND HYDROCHLOROTHIAZIDE 75; 50 MG/1; MG/1
TABLET ORAL
Qty: 90 TABLET | Refills: 0 | Status: SHIPPED | OUTPATIENT
Start: 2020-05-22 | End: 2020-08-31

## 2020-05-22 RX ORDER — AMLODIPINE BESYLATE 10 MG/1
TABLET ORAL
Qty: 90 TABLET | Refills: 0 | Status: SHIPPED | OUTPATIENT
Start: 2020-05-22 | End: 2020-08-31

## 2020-05-22 RX ORDER — DULOXETIN HYDROCHLORIDE 30 MG/1
CAPSULE, DELAYED RELEASE ORAL
Qty: 90 CAPSULE | Refills: 0 | Status: SHIPPED | OUTPATIENT
Start: 2020-05-22 | End: 2020-08-31

## 2020-07-15 ENCOUNTER — OFFICE VISIT (OUTPATIENT)
Dept: FAMILY MEDICINE CLINIC | Facility: CLINIC | Age: 75
End: 2020-07-15

## 2020-07-15 VITALS
SYSTOLIC BLOOD PRESSURE: 140 MMHG | HEART RATE: 75 BPM | HEIGHT: 73 IN | RESPIRATION RATE: 20 BRPM | TEMPERATURE: 96.9 F | BODY MASS INDEX: 31.01 KG/M2 | DIASTOLIC BLOOD PRESSURE: 70 MMHG | WEIGHT: 234 LBS

## 2020-07-15 DIAGNOSIS — M15.9 PRIMARY OSTEOARTHRITIS INVOLVING MULTIPLE JOINTS: ICD-10-CM

## 2020-07-15 DIAGNOSIS — I10 ESSENTIAL HYPERTENSION: Primary | ICD-10-CM

## 2020-07-15 PROCEDURE — 99213 OFFICE O/P EST LOW 20 MIN: CPT | Performed by: FAMILY MEDICINE

## 2020-08-31 RX ORDER — TRIAMTERENE AND HYDROCHLOROTHIAZIDE 75; 50 MG/1; MG/1
TABLET ORAL
Qty: 90 TABLET | Refills: 0 | Status: SHIPPED | OUTPATIENT
Start: 2020-08-31 | End: 2020-12-01

## 2020-08-31 RX ORDER — AMLODIPINE BESYLATE 10 MG/1
TABLET ORAL
Qty: 90 TABLET | Refills: 0 | Status: SHIPPED | OUTPATIENT
Start: 2020-08-31 | End: 2020-12-01

## 2020-08-31 RX ORDER — DULOXETIN HYDROCHLORIDE 30 MG/1
CAPSULE, DELAYED RELEASE ORAL
Qty: 90 CAPSULE | Refills: 0 | Status: SHIPPED | OUTPATIENT
Start: 2020-08-31 | End: 2020-12-01

## 2020-09-16 DIAGNOSIS — F51.01 PRIMARY INSOMNIA: ICD-10-CM

## 2020-09-16 RX ORDER — ZOLPIDEM TARTRATE 10 MG/1
TABLET ORAL
Qty: 60 TABLET | Refills: 1 | Status: SHIPPED | OUTPATIENT
Start: 2020-09-16 | End: 2021-04-29

## 2020-11-16 ENCOUNTER — OFFICE VISIT (OUTPATIENT)
Dept: FAMILY MEDICINE CLINIC | Facility: CLINIC | Age: 75
End: 2020-11-16

## 2020-11-16 VITALS
BODY MASS INDEX: 31.01 KG/M2 | TEMPERATURE: 97.1 F | HEIGHT: 73 IN | SYSTOLIC BLOOD PRESSURE: 120 MMHG | HEART RATE: 81 BPM | WEIGHT: 234 LBS | DIASTOLIC BLOOD PRESSURE: 76 MMHG | OXYGEN SATURATION: 97 % | RESPIRATION RATE: 16 BRPM

## 2020-11-16 DIAGNOSIS — I10 ESSENTIAL HYPERTENSION: Primary | ICD-10-CM

## 2020-11-16 PROCEDURE — 99213 OFFICE O/P EST LOW 20 MIN: CPT | Performed by: FAMILY MEDICINE

## 2020-12-01 RX ORDER — TRIAMTERENE AND HYDROCHLOROTHIAZIDE 75; 50 MG/1; MG/1
TABLET ORAL
Qty: 90 TABLET | Refills: 0 | Status: SHIPPED | OUTPATIENT
Start: 2020-12-01 | End: 2021-02-25

## 2020-12-01 RX ORDER — DULOXETIN HYDROCHLORIDE 30 MG/1
CAPSULE, DELAYED RELEASE ORAL
Qty: 90 CAPSULE | Refills: 0 | Status: SHIPPED | OUTPATIENT
Start: 2020-12-01 | End: 2021-02-25

## 2020-12-01 RX ORDER — AMLODIPINE BESYLATE 10 MG/1
TABLET ORAL
Qty: 90 TABLET | Refills: 0 | Status: SHIPPED | OUTPATIENT
Start: 2020-12-01 | End: 2021-02-25

## 2020-12-13 NOTE — PATIENT INSTRUCTIONS
"Hypertension, Adult  High blood pressure (hypertension) is when the force of blood pumping through the arteries is too strong. The arteries are the blood vessels that carry blood from the heart throughout the body. Hypertension forces the heart to work harder to pump blood and may cause arteries to become narrow or stiff. Untreated or uncontrolled hypertension can cause a heart attack, heart failure, a stroke, kidney disease, and other problems.  A blood pressure reading consists of a higher number over a lower number. Ideally, your blood pressure should be below 120/80. The first (\"top\") number is called the systolic pressure. It is a measure of the pressure in your arteries as your heart beats. The second (\"bottom\") number is called the diastolic pressure. It is a measure of the pressure in your arteries as the heart relaxes.  What are the causes?  The exact cause of this condition is not known. There are some conditions that result in or are related to high blood pressure.  What increases the risk?  Some risk factors for high blood pressure are under your control. The following factors may make you more likely to develop this condition:  · Smoking.  · Having type 2 diabetes mellitus, high cholesterol, or both.  · Not getting enough exercise or physical activity.  · Being overweight.  · Having too much fat, sugar, calories, or salt (sodium) in your diet.  · Drinking too much alcohol.  Some risk factors for high blood pressure may be difficult or impossible to change. Some of these factors include:  · Having chronic kidney disease.  · Having a family history of high blood pressure.  · Age. Risk increases with age.  · Race. You may be at higher risk if you are .  · Gender. Men are at higher risk than women before age 45. After age 65, women are at higher risk than men.  · Having obstructive sleep apnea.  · Stress.  What are the signs or symptoms?  High blood pressure may not cause symptoms. Very high " blood pressure (hypertensive crisis) may cause:  · Headache.  · Anxiety.  · Shortness of breath.  · Nosebleed.  · Nausea and vomiting.  · Vision changes.  · Severe chest pain.  · Seizures.  How is this diagnosed?  This condition is diagnosed by measuring your blood pressure while you are seated, with your arm resting on a flat surface, your legs uncrossed, and your feet flat on the floor. The cuff of the blood pressure monitor will be placed directly against the skin of your upper arm at the level of your heart. It should be measured at least twice using the same arm. Certain conditions can cause a difference in blood pressure between your right and left arms.  Certain factors can cause blood pressure readings to be lower or higher than normal for a short period of time:  · When your blood pressure is higher when you are in a health care provider's office than when you are at home, this is called white coat hypertension. Most people with this condition do not need medicines.  · When your blood pressure is higher at home than when you are in a health care provider's office, this is called masked hypertension. Most people with this condition may need medicines to control blood pressure.  If you have a high blood pressure reading during one visit or you have normal blood pressure with other risk factors, you may be asked to:  · Return on a different day to have your blood pressure checked again.  · Monitor your blood pressure at home for 1 week or longer.  If you are diagnosed with hypertension, you may have other blood or imaging tests to help your health care provider understand your overall risk for other conditions.  How is this treated?  This condition is treated by making healthy lifestyle changes, such as eating healthy foods, exercising more, and reducing your alcohol intake. Your health care provider may prescribe medicine if lifestyle changes are not enough to get your blood pressure under control, and  if:  · Your systolic blood pressure is above 130.  · Your diastolic blood pressure is above 80.  Your personal target blood pressure may vary depending on your medical conditions, your age, and other factors.  Follow these instructions at home:  Eating and drinking    · Eat a diet that is high in fiber and potassium, and low in sodium, added sugar, and fat. An example eating plan is called the DASH (Dietary Approaches to Stop Hypertension) diet. To eat this way:  ? Eat plenty of fresh fruits and vegetables. Try to fill one half of your plate at each meal with fruits and vegetables.  ? Eat whole grains, such as whole-wheat pasta, brown rice, or whole-grain bread. Fill about one fourth of your plate with whole grains.  ? Eat or drink low-fat dairy products, such as skim milk or low-fat yogurt.  ? Avoid fatty cuts of meat, processed or cured meats, and poultry with skin. Fill about one fourth of your plate with lean proteins, such as fish, chicken without skin, beans, eggs, or tofu.  ? Avoid pre-made and processed foods. These tend to be higher in sodium, added sugar, and fat.  · Reduce your daily sodium intake. Most people with hypertension should eat less than 1,500 mg of sodium a day.  · Do not drink alcohol if:  ? Your health care provider tells you not to drink.  ? You are pregnant, may be pregnant, or are planning to become pregnant.  · If you drink alcohol:  ? Limit how much you use to:  § 0-1 drink a day for women.  § 0-2 drinks a day for men.  ? Be aware of how much alcohol is in your drink. In the [place] one drink equals one 12 oz bottle of beer (355 mL), one 5 oz glass of wine (148 mL), or one 1½ oz glass of hard liquor (44 mL).  Lifestyle    · Work with your health care provider to maintain a healthy body weight or to lose weight. Ask what an ideal weight is for you.  · Get at least 30 minutes of exercise most days of the week. Activities may include walking, swimming, or biking.  · Include exercise to  strengthen your muscles (resistance exercise), such as Pilates or lifting weights, as part of your weekly exercise routine. Try to do these types of exercises for 30 minutes at least 3 days a week.  · Do not use any products that contain nicotine or tobacco, such as cigarettes, e-cigarettes, and chewing tobacco. If you need help quitting, ask your health care provider.  · Monitor your blood pressure at home as told by your health care provider.  · Keep all follow-up visits as told by your health care provider. This is important.  Medicines  · Take over-the-counter and prescription medicines only as told by your health care provider. Follow directions carefully. Blood pressure medicines must be taken as prescribed.  · Do not skip doses of blood pressure medicine. Doing this puts you at risk for problems and can make the medicine less effective.  · Ask your health care provider about side effects or reactions to medicines that you should watch for.  Contact a health care provider if you:  · Think you are having a reaction to a medicine you are taking.  · Have headaches that keep coming back (recurring).  · Feel dizzy.  · Have swelling in your ankles.  · Have trouble with your vision.  Get help right away if you:  · Develop a severe headache or confusion.  · Have unusual weakness or numbness.  · Feel faint.  · Have severe pain in your chest or abdomen.  · Vomit repeatedly.  · Have trouble breathing.  Summary  · Hypertension is when the force of blood pumping through your arteries is too strong. If this condition is not controlled, it may put you at risk for serious complications.  · Your personal target blood pressure may vary depending on your medical conditions, your age, and other factors. For most people, a normal blood pressure is less than 120/80.  · Hypertension is treated with lifestyle changes, medicines, or a combination of both. Lifestyle changes include losing weight, eating a healthy, low-sodium diet,  "exercising more, and limiting alcohol.  This information is not intended to replace advice given to you by your health care provider. Make sure you discuss any questions you have with your health care provider.  Document Revised: 08/28/2019 Document Reviewed: 08/28/2019  [employer] Patient Education © 2020 [employer]      DASH Eating Plan  DASH stands for \"Dietary Approaches to Stop Hypertension.\" The DASH eating plan is a healthy eating plan that has been shown to reduce high blood pressure (hypertension). It may also reduce your risk for type 2 diabetes, heart disease, and stroke. The DASH eating plan may also help with weight loss.  What are tips for following this plan?    General guidelines  · Avoid eating more than 2,300 mg (milligrams) of salt (sodium) a day. If you have hypertension, you may need to reduce your sodium intake to 1,500 mg a day.  · Limit alcohol intake to no more than 1 drink a day for nonpregnant women and 2 drinks a day for men. One drink equals 12 oz of beer, 5 oz of wine, or 1½ oz of hard liquor.  · Work with your health care provider to maintain a healthy body weight or to lose weight. Ask what an ideal weight is for you.  · Get at least 30 minutes of exercise that causes your heart to beat faster (aerobic exercise) most days of the week. Activities may include walking, swimming, or biking.  · Work with your health care provider or diet and nutrition specialist (dietitian) to adjust your eating plan to your individual calorie needs.  Reading food labels    · Check food labels for the amount of sodium per serving. Choose foods with less than 5 percent of the Daily Value of sodium. Generally, foods with less than 300 mg of sodium per serving fit into this eating plan.  · To find whole grains, look for the word \"whole\" as the first word in the ingredient list.  Shopping  · Buy products labeled as \"low-sodium\" or \"no salt added.\"  · Buy fresh foods. Avoid canned foods and premade or frozen " meals.  Cooking  · Avoid adding salt when cooking. Use salt-free seasonings or herbs instead of table salt or sea salt. Check with your health care provider or pharmacist before using salt substitutes.  · Do not baltazar foods. Cook foods using healthy methods such as baking, boiling, grilling, and broiling instead.  · Cook with heart-healthy oils, such as olive, canola, soybean, or sunflower oil.  Meal planning  · Eat a balanced diet that includes:  ? 5 or more servings of fruits and vegetables each day. At each meal, try to fill half of your plate with fruits and vegetables.  ? Up to 6-8 servings of whole grains each day.  ? Less than 6 oz of lean meat, poultry, or fish each day. A 3-oz serving of meat is about the same size as a deck of cards. One egg equals 1 oz.  ? 2 servings of low-fat dairy each day.  ? A serving of nuts, seeds, or beans 5 times each week.  ? Heart-healthy fats. Healthy fats called Omega-3 fatty acids are found in foods such as flaxseeds and coldwater fish, like sardines, salmon, and mackerel.  · Limit how much you eat of the following:  ? Canned or prepackaged foods.  ? Food that is high in trans fat, such as fried foods.  ? Food that is high in saturated fat, such as fatty meat.  ? Sweets, desserts, sugary drinks, and other foods with added sugar.  ? Full-fat dairy products.  · Do not salt foods before eating.  · Try to eat at least 2 vegetarian meals each week.  · Eat more home-cooked food and less restaurant, buffet, and fast food.  · When eating at a restaurant, ask that your food be prepared with less salt or no salt, if possible.  What foods are recommended?  The items listed may not be a complete list. Talk with your dietitian about what dietary choices are best for you.  Grains  Whole-grain or whole-wheat bread. Whole-grain or whole-wheat pasta. Brown rice. Oatmeal. Quinoa. Bulgur. Whole-grain and low-sodium cereals. Adrianne bread. Low-fat, low-sodium crackers. Whole-wheat flour  tortillas.  Vegetables  Fresh or frozen vegetables (raw, steamed, roasted, or grilled). Low-sodium or reduced-sodium tomato and vegetable juice. Low-sodium or reduced-sodium tomato sauce and tomato paste. Low-sodium or reduced-sodium canned vegetables.  Fruits  All fresh, dried, or frozen fruit. Canned fruit in natural juice (without added sugar).  Meat and other protein foods  Skinless chicken or turkey. Ground chicken or turkey. Pork with fat trimmed off. Fish and seafood. Egg whites. Dried beans, peas, or lentils. Unsalted nuts, nut butters, and seeds. Unsalted canned beans. Lean cuts of beef with fat trimmed off. Low-sodium, lean deli meat.  Dairy  Low-fat (1%) or fat-free (skim) milk. Fat-free, low-fat, or reduced-fat cheeses. Nonfat, low-sodium ricotta or cottage cheese. Low-fat or nonfat yogurt. Low-fat, low-sodium cheese.  Fats and oils  Soft margarine without trans fats. Vegetable oil. Low-fat, reduced-fat, or light mayonnaise and salad dressings (reduced-sodium). Canola, safflower, olive, soybean, and sunflower oils. Avocado.  Seasoning and other foods  Herbs. Spices. Seasoning mixes without salt. Unsalted popcorn and pretzels. Fat-free sweets.  What foods are not recommended?  The items listed may not be a complete list. Talk with your dietitian about what dietary choices are best for you.  Grains  Baked goods made with fat, such as croissants, muffins, or some breads. Dry pasta or rice meal packs.  Vegetables  Creamed or fried vegetables. Vegetables in a cheese sauce. Regular canned vegetables (not low-sodium or reduced-sodium). Regular canned tomato sauce and paste (not low-sodium or reduced-sodium). Regular tomato and vegetable juice (not low-sodium or reduced-sodium). Pickles. Olives.  Fruits  Canned fruit in a light or heavy syrup. Fried fruit. Fruit in cream or butter sauce.  Meat and other protein foods  Fatty cuts of meat. Ribs. Fried meat. Abebe. Sausage. Bologna and other processed lunch meats.  Salami. Fatback. Hotdogs. Bratwurst. Salted nuts and seeds. Canned beans with added salt. Canned or smoked fish. Whole eggs or egg yolks. Chicken or turkey with skin.  Dairy  Whole or 2% milk, cream, and half-and-half. Whole or full-fat cream cheese. Whole-fat or sweetened yogurt. Full-fat cheese. Nondairy creamers. Whipped toppings. Processed cheese and cheese spreads.  Fats and oils  Butter. Stick margarine. Lard. Shortening. Ghee. Abebe fat. Tropical oils, such as coconut, palm kernel, or palm oil.  Seasoning and other foods  Salted popcorn and pretzels. Onion salt, garlic salt, seasoned salt, table salt, and sea salt. Worcestershire sauce. Tartar sauce. Barbecue sauce. Teriyaki sauce. Soy sauce, including reduced-sodium. Steak sauce. Canned and packaged gravies. Fish sauce. Oyster sauce. Cocktail sauce. Horseradish that you find on the shelf. Ketchup. Mustard. Meat flavorings and tenderizers. Bouillon cubes. Hot sauce and Tabasco sauce. Premade or packaged marinades. Premade or packaged taco seasonings. Relishes. Regular salad dressings.  Where to find more information:  · National Heart, Lung, and [employer] [place]: www.nhlbi.nih.gov  · American Heart Association: www.heart.org  Summary  · The DASH eating plan is a healthy eating plan that has been shown to reduce high blood pressure (hypertension). It may also reduce your risk for type 2 diabetes, heart disease, and stroke.  · With the DASH eating plan, you should limit salt (sodium) intake to 2,300 mg a day. If you have hypertension, you may need to reduce your sodium intake to 1,500 mg a day.  · When on the DASH eating plan, aim to eat more fresh fruits and vegetables, whole grains, lean proteins, low-fat dairy, and heart-healthy fats.  · Work with your health care provider or diet and nutrition specialist (dietitian) to adjust your eating plan to your individual calorie needs.  This information is not intended to replace advice given to you by your health  care provider. Make sure you discuss any questions you have with your health care provider.  Document Revised: 11/30/2018 Document Reviewed: 12/11/2017  [employer] Patient Education © 2020 [employer]

## 2021-01-14 ENCOUNTER — TELEPHONE (OUTPATIENT)
Dept: FAMILY MEDICINE CLINIC | Facility: CLINIC | Age: 76
End: 2021-01-14

## 2021-01-14 NOTE — TELEPHONE ENCOUNTER
Patient is seeing [facility] on 5/10 for his Medicare Wellness Exam and coming in under [facility] on 5/3 for the fasting lab work.  Please put lab orders in.

## 2021-02-25 RX ORDER — TRIAMTERENE AND HYDROCHLOROTHIAZIDE 75; 50 MG/1; MG/1
TABLET ORAL
Qty: 90 TABLET | Refills: 0 | Status: SHIPPED | OUTPATIENT
Start: 2021-02-25 | End: 2021-06-03

## 2021-02-25 RX ORDER — AMLODIPINE BESYLATE 10 MG/1
TABLET ORAL
Qty: 90 TABLET | Refills: 0 | Status: SHIPPED | OUTPATIENT
Start: 2021-02-25 | End: 2021-06-03

## 2021-02-25 RX ORDER — DULOXETIN HYDROCHLORIDE 30 MG/1
CAPSULE, DELAYED RELEASE ORAL
Qty: 90 CAPSULE | Refills: 0 | Status: SHIPPED | OUTPATIENT
Start: 2021-02-25 | End: 2021-05-10

## 2021-04-23 DIAGNOSIS — I10 ESSENTIAL HYPERTENSION: ICD-10-CM

## 2021-04-23 DIAGNOSIS — Z13.220 SCREENING FOR HYPERLIPIDEMIA: ICD-10-CM

## 2021-04-23 DIAGNOSIS — Z12.5 SCREENING FOR PROSTATE CANCER: Primary | ICD-10-CM

## 2021-04-28 ENCOUNTER — TELEPHONE (OUTPATIENT)
Dept: FAMILY MEDICINE CLINIC | Facility: CLINIC | Age: 76
End: 2021-04-28

## 2021-04-28 DIAGNOSIS — F51.01 PRIMARY INSOMNIA: ICD-10-CM

## 2021-04-28 NOTE — TELEPHONE ENCOUNTER
Caller: Clint Cee    Relationship to patient: Self    Best call back number: [phone]     Type of visit :LABS    If rescheduling, when is the original appointment: 5/3    Additional notes NEEDS 5/4 OR 5/5  ATTEMPTED TO WARM TRANSFER

## 2021-04-29 RX ORDER — ZOLPIDEM TARTRATE 10 MG/1
TABLET ORAL
Qty: 90 TABLET | Refills: 1 | Status: SHIPPED | OUTPATIENT
Start: 2021-04-29 | End: 2022-03-15

## 2021-05-04 ENCOUNTER — LAB (OUTPATIENT)
Dept: FAMILY MEDICINE CLINIC | Facility: CLINIC | Age: 76
End: 2021-05-04

## 2021-05-04 DIAGNOSIS — Z13.220 SCREENING FOR HYPERLIPIDEMIA: ICD-10-CM

## 2021-05-04 DIAGNOSIS — I10 ESSENTIAL HYPERTENSION: ICD-10-CM

## 2021-05-04 DIAGNOSIS — Z12.5 SCREENING FOR PROSTATE CANCER: ICD-10-CM

## 2021-05-04 LAB
ALBUMIN SERPL-MCNC: 4.1 G/DL (ref 3.5–5.2)
ALBUMIN/GLOB SERPL: 1.8 G/DL
ALP SERPL-CCNC: 71 U/L (ref 39–117)
ALT SERPL W P-5'-P-CCNC: 16 U/L (ref 1–41)
ANION GAP SERPL CALCULATED.3IONS-SCNC: 9.2 MMOL/L (ref 5–15)
AST SERPL-CCNC: 15 U/L (ref 1–40)
BASOPHILS # BLD AUTO: 0.03 10*3/MM3 (ref 0–0.2)
BASOPHILS NFR BLD AUTO: 0.5 % (ref 0–1.5)
BILIRUB SERPL-MCNC: 0.5 MG/DL (ref 0–1.2)
BILIRUB UR QL STRIP: NEGATIVE
BUN SERPL-MCNC: 26 MG/DL (ref 8–23)
BUN/CREAT SERPL: 22.8 (ref 7–25)
CALCIUM SPEC-SCNC: 9.2 MG/DL (ref 8.6–10.5)
CHLORIDE SERPL-SCNC: 98 MMOL/L (ref 98–107)
CHOLEST SERPL-MCNC: 138 MG/DL (ref 0–200)
CLARITY UR: CLEAR
CO2 SERPL-SCNC: 27.8 MMOL/L (ref 22–29)
COLOR UR: YELLOW
CREAT SERPL-MCNC: 1.14 MG/DL (ref 0.76–1.27)
DEPRECATED RDW RBC AUTO: 47.6 FL (ref 37–54)
EOSINOPHIL # BLD AUTO: 0.29 10*3/MM3 (ref 0–0.4)
EOSINOPHIL NFR BLD AUTO: 4.8 % (ref 0.3–6.2)
ERYTHROCYTE [DISTWIDTH] IN BLOOD BY AUTOMATED COUNT: 13.9 % (ref 12.3–15.4)
GFR SERPL CREATININE-BSD FRML MDRD: 63 ML/MIN/1.73
GLOBULIN UR ELPH-MCNC: 2.3 GM/DL
GLUCOSE SERPL-MCNC: 112 MG/DL (ref 65–99)
GLUCOSE UR STRIP-MCNC: NEGATIVE MG/DL
HCT VFR BLD AUTO: 50 % (ref 37.5–51)
HDLC SERPL-MCNC: 38 MG/DL (ref 40–60)
HGB BLD-MCNC: 16.8 G/DL (ref 13–17.7)
HGB UR QL STRIP.AUTO: NEGATIVE
IMM GRANULOCYTES # BLD AUTO: 0.01 10*3/MM3 (ref 0–0.05)
IMM GRANULOCYTES NFR BLD AUTO: 0.2 % (ref 0–0.5)
KETONES UR QL STRIP: NEGATIVE
LDLC SERPL CALC-MCNC: 78 MG/DL (ref 0–100)
LDLC/HDLC SERPL: 2.01 {RATIO}
LEUKOCYTE ESTERASE UR QL STRIP.AUTO: NEGATIVE
LYMPHOCYTES # BLD AUTO: 1.2 10*3/MM3 (ref 0.7–3.1)
LYMPHOCYTES NFR BLD AUTO: 19.9 % (ref 19.6–45.3)
MCH RBC QN AUTO: 31.2 PG (ref 26.6–33)
MCHC RBC AUTO-ENTMCNC: 33.6 G/DL (ref 31.5–35.7)
MCV RBC AUTO: 92.9 FL (ref 79–97)
MONOCYTES # BLD AUTO: 0.4 10*3/MM3 (ref 0.1–0.9)
MONOCYTES NFR BLD AUTO: 6.6 % (ref 5–12)
NEUTROPHILS NFR BLD AUTO: 4.11 10*3/MM3 (ref 1.7–7)
NEUTROPHILS NFR BLD AUTO: 68 % (ref 42.7–76)
NITRITE UR QL STRIP: NEGATIVE
NRBC BLD AUTO-RTO: 0 /100 WBC (ref 0–0.2)
PH UR STRIP.AUTO: 6.5 [PH] (ref 5–8)
PLATELET # BLD AUTO: 114 10*3/MM3 (ref 140–450)
PMV BLD AUTO: 9.6 FL (ref 6–12)
POTASSIUM SERPL-SCNC: 4.2 MMOL/L (ref 3.5–5.2)
PROT SERPL-MCNC: 6.4 G/DL (ref 6–8.5)
PROT UR QL STRIP: ABNORMAL
PSA SERPL-MCNC: 4.57 NG/ML (ref 0–4)
RBC # BLD AUTO: 5.38 10*6/MM3 (ref 4.14–5.8)
SODIUM SERPL-SCNC: 135 MMOL/L (ref 136–145)
SP GR UR STRIP: 1.02 (ref 1–1.03)
TRIGL SERPL-MCNC: 119 MG/DL (ref 0–150)
TSH SERPL DL<=0.05 MIU/L-ACNC: 1.63 UIU/ML (ref 0.27–4.2)
UROBILINOGEN UR QL STRIP: ABNORMAL
VLDLC SERPL-MCNC: 22 MG/DL (ref 5–40)
WBC # BLD AUTO: 6.04 10*3/MM3 (ref 3.4–10.8)

## 2021-05-04 PROCEDURE — 85025 COMPLETE CBC W/AUTO DIFF WBC: CPT | Performed by: FAMILY MEDICINE

## 2021-05-04 PROCEDURE — 80061 LIPID PANEL: CPT | Performed by: FAMILY MEDICINE

## 2021-05-04 PROCEDURE — 84443 ASSAY THYROID STIM HORMONE: CPT | Performed by: FAMILY MEDICINE

## 2021-05-04 PROCEDURE — 80053 COMPREHEN METABOLIC PANEL: CPT | Performed by: FAMILY MEDICINE

## 2021-05-04 PROCEDURE — G0103 PSA SCREENING: HCPCS | Performed by: FAMILY MEDICINE

## 2021-05-04 PROCEDURE — 81003 URINALYSIS AUTO W/O SCOPE: CPT | Performed by: FAMILY MEDICINE

## 2021-05-04 PROCEDURE — 36415 COLL VENOUS BLD VENIPUNCTURE: CPT

## 2021-05-05 ENCOUNTER — TELEPHONE (OUTPATIENT)
Dept: FAMILY MEDICINE CLINIC | Facility: CLINIC | Age: 76
End: 2021-05-05

## 2021-05-05 NOTE — TELEPHONE ENCOUNTER
----- Message from Jaret Castellanos MD sent at 5/5/2021  7:22 AM EDT -----  Tell Dennis that his testing looks good.  His PSA has gone up a little bit to 4.5.  I want to repeat this in 3 to 4 months and if it goes up again we may have to see a urologist.

## 2021-05-10 ENCOUNTER — OFFICE VISIT (OUTPATIENT)
Dept: FAMILY MEDICINE CLINIC | Facility: CLINIC | Age: 76
End: 2021-05-10

## 2021-05-10 VITALS
SYSTOLIC BLOOD PRESSURE: 160 MMHG | HEART RATE: 78 BPM | DIASTOLIC BLOOD PRESSURE: 80 MMHG | TEMPERATURE: 96.9 F | HEIGHT: 73 IN | BODY MASS INDEX: 31.28 KG/M2 | RESPIRATION RATE: 16 BRPM | WEIGHT: 236 LBS | OXYGEN SATURATION: 97 %

## 2021-05-10 DIAGNOSIS — I10 ESSENTIAL HYPERTENSION: ICD-10-CM

## 2021-05-10 DIAGNOSIS — M54.41 CHRONIC BILATERAL LOW BACK PAIN WITH RIGHT-SIDED SCIATICA: ICD-10-CM

## 2021-05-10 DIAGNOSIS — N40.1 BENIGN PROSTATIC HYPERPLASIA WITH URINARY FREQUENCY: ICD-10-CM

## 2021-05-10 DIAGNOSIS — E55.9 VITAMIN D DEFICIENCY: ICD-10-CM

## 2021-05-10 DIAGNOSIS — G89.29 CHRONIC BILATERAL LOW BACK PAIN WITH RIGHT-SIDED SCIATICA: ICD-10-CM

## 2021-05-10 DIAGNOSIS — K21.9 GASTROESOPHAGEAL REFLUX DISEASE WITHOUT ESOPHAGITIS: ICD-10-CM

## 2021-05-10 DIAGNOSIS — M15.9 PRIMARY OSTEOARTHRITIS INVOLVING MULTIPLE JOINTS: ICD-10-CM

## 2021-05-10 DIAGNOSIS — M17.0 PRIMARY OSTEOARTHRITIS OF BOTH KNEES: ICD-10-CM

## 2021-05-10 DIAGNOSIS — Z00.00 MEDICARE ANNUAL WELLNESS VISIT, SUBSEQUENT: Primary | ICD-10-CM

## 2021-05-10 DIAGNOSIS — H33.011 RETINAL DETACHMENT OF RIGHT EYE WITH SINGLE RETINAL TEAR: ICD-10-CM

## 2021-05-10 DIAGNOSIS — R35.0 BENIGN PROSTATIC HYPERPLASIA WITH URINARY FREQUENCY: ICD-10-CM

## 2021-05-10 PROCEDURE — G0439 PPPS, SUBSEQ VISIT: HCPCS | Performed by: FAMILY MEDICINE

## 2021-05-10 PROCEDURE — 99214 OFFICE O/P EST MOD 30 MIN: CPT | Performed by: FAMILY MEDICINE

## 2021-05-10 RX ORDER — DULOXETIN HYDROCHLORIDE 60 MG/1
60 CAPSULE, DELAYED RELEASE ORAL DAILY
Qty: 90 CAPSULE | Refills: 3
Start: 2021-05-10 | End: 2021-08-17 | Stop reason: SDUPTHER

## 2021-05-10 RX ORDER — DOXYCYCLINE HYCLATE 100 MG/1
100 CAPSULE ORAL 2 TIMES DAILY
Qty: 30 CAPSULE | Refills: 1 | Status: SHIPPED | OUTPATIENT
Start: 2021-05-10 | End: 2021-05-25

## 2021-05-10 RX ORDER — ASPIRIN 81 MG/1
81 TABLET ORAL DAILY
COMMUNITY
End: 2021-12-09

## 2021-05-10 NOTE — PATIENT INSTRUCTIONS
"Hypertension, Adult  High blood pressure (hypertension) is when the force of blood pumping through the arteries is too strong. The arteries are the blood vessels that carry blood from the heart throughout the body. Hypertension forces the heart to work harder to pump blood and may cause arteries to become narrow or stiff. Untreated or uncontrolled hypertension can cause a heart attack, heart failure, a stroke, kidney disease, and other problems.  A blood pressure reading consists of a higher number over a lower number. Ideally, your blood pressure should be below 120/80. The first (\"top\") number is called the systolic pressure. It is a measure of the pressure in your arteries as your heart beats. The second (\"bottom\") number is called the diastolic pressure. It is a measure of the pressure in your arteries as the heart relaxes.  What are the causes?  The exact cause of this condition is not known. There are some conditions that result in or are related to high blood pressure.  What increases the risk?  Some risk factors for high blood pressure are under your control. The following factors may make you more likely to develop this condition:  · Smoking.  · Having type 2 diabetes mellitus, high cholesterol, or both.  · Not getting enough exercise or physical activity.  · Being overweight.  · Having too much fat, sugar, calories, or salt (sodium) in your diet.  · Drinking too much alcohol.  Some risk factors for high blood pressure may be difficult or impossible to change. Some of these factors include:  · Having chronic kidney disease.  · Having a family history of high blood pressure.  · Age. Risk increases with age.  · Race. You may be at higher risk if you are .  · Gender. Men are at higher risk than women before age 45. After age 65, women are at higher risk than men.  · Having obstructive sleep apnea.  · Stress.  What are the signs or symptoms?  High blood pressure may not cause symptoms. Very high " blood pressure (hypertensive crisis) may cause:  · Headache.  · Anxiety.  · Shortness of breath.  · Nosebleed.  · Nausea and vomiting.  · Vision changes.  · Severe chest pain.  · Seizures.  How is this diagnosed?  This condition is diagnosed by measuring your blood pressure while you are seated, with your arm resting on a flat surface, your legs uncrossed, and your feet flat on the floor. The cuff of the blood pressure monitor will be placed directly against the skin of your upper arm at the level of your heart. It should be measured at least twice using the same arm. Certain conditions can cause a difference in blood pressure between your right and left arms.  Certain factors can cause blood pressure readings to be lower or higher than normal for a short period of time:  · When your blood pressure is higher when you are in a health care provider's office than when you are at home, this is called white coat hypertension. Most people with this condition do not need medicines.  · When your blood pressure is higher at home than when you are in a health care provider's office, this is called masked hypertension. Most people with this condition may need medicines to control blood pressure.  If you have a high blood pressure reading during one visit or you have normal blood pressure with other risk factors, you may be asked to:  · Return on a different day to have your blood pressure checked again.  · Monitor your blood pressure at home for 1 week or longer.  If you are diagnosed with hypertension, you may have other blood or imaging tests to help your health care provider understand your overall risk for other conditions.  How is this treated?  This condition is treated by making healthy lifestyle changes, such as eating healthy foods, exercising more, and reducing your alcohol intake. Your health care provider may prescribe medicine if lifestyle changes are not enough to get your blood pressure under control, and  if:  · Your systolic blood pressure is above 130.  · Your diastolic blood pressure is above 80.  Your personal target blood pressure may vary depending on your medical conditions, your age, and other factors.  Follow these instructions at home:  Eating and drinking    · Eat a diet that is high in fiber and potassium, and low in sodium, added sugar, and fat. An example eating plan is called the DASH (Dietary Approaches to Stop Hypertension) diet. To eat this way:  ? Eat plenty of fresh fruits and vegetables. Try to fill one half of your plate at each meal with fruits and vegetables.  ? Eat whole grains, such as whole-wheat pasta, brown rice, or whole-grain bread. Fill about one fourth of your plate with whole grains.  ? Eat or drink low-fat dairy products, such as skim milk or low-fat yogurt.  ? Avoid fatty cuts of meat, processed or cured meats, and poultry with skin. Fill about one fourth of your plate with lean proteins, such as fish, chicken without skin, beans, eggs, or tofu.  ? Avoid pre-made and processed foods. These tend to be higher in sodium, added sugar, and fat.  · Reduce your daily sodium intake. Most people with hypertension should eat less than 1,500 mg of sodium a day.  · Do not drink alcohol if:  ? Your health care provider tells you not to drink.  ? You are pregnant, may be pregnant, or are planning to become pregnant.  · If you drink alcohol:  ? Limit how much you use to:  § 0-1 drink a day for women.  § 0-2 drinks a day for men.  ? Be aware of how much alcohol is in your drink. In the [place] one drink equals one 12 oz bottle of beer (355 mL), one 5 oz glass of wine (148 mL), or one 1½ oz glass of hard liquor (44 mL).  Lifestyle    · Work with your health care provider to maintain a healthy body weight or to lose weight. Ask what an ideal weight is for you.  · Get at least 30 minutes of exercise most days of the week. Activities may include walking, swimming, or biking.  · Include exercise to  strengthen your muscles (resistance exercise), such as Pilates or lifting weights, as part of your weekly exercise routine. Try to do these types of exercises for 30 minutes at least 3 days a week.  · Do not use any products that contain nicotine or tobacco, such as cigarettes, e-cigarettes, and chewing tobacco. If you need help quitting, ask your health care provider.  · Monitor your blood pressure at home as told by your health care provider.  · Keep all follow-up visits as told by your health care provider. This is important.  Medicines  · Take over-the-counter and prescription medicines only as told by your health care provider. Follow directions carefully. Blood pressure medicines must be taken as prescribed.  · Do not skip doses of blood pressure medicine. Doing this puts you at risk for problems and can make the medicine less effective.  · Ask your health care provider about side effects or reactions to medicines that you should watch for.  Contact a health care provider if you:  · Think you are having a reaction to a medicine you are taking.  · Have headaches that keep coming back (recurring).  · Feel dizzy.  · Have swelling in your ankles.  · Have trouble with your vision.  Get help right away if you:  · Develop a severe headache or confusion.  · Have unusual weakness or numbness.  · Feel faint.  · Have severe pain in your chest or abdomen.  · Vomit repeatedly.  · Have trouble breathing.  Summary  · Hypertension is when the force of blood pumping through your arteries is too strong. If this condition is not controlled, it may put you at risk for serious complications.  · Your personal target blood pressure may vary depending on your medical conditions, your age, and other factors. For most people, a normal blood pressure is less than 120/80.  · Hypertension is treated with lifestyle changes, medicines, or a combination of both. Lifestyle changes include losing weight, eating a healthy, low-sodium diet,  "exercising more, and limiting alcohol.  This information is not intended to replace advice given to you by your health care provider. Make sure you discuss any questions you have with your health care provider.  Document Revised: 08/28/2019 Document Reviewed: 08/28/2019  [employer] Patient Education © 2021 [employer]      https://www.nhlbi.nih.gov/files/docs/public/heart/dash_brief.pdf\">   DASH Eating Plan  DASH stands for Dietary Approaches to Stop Hypertension. The DASH eating plan is a healthy eating plan that has been shown to:  · Reduce high blood pressure (hypertension).  · Reduce your risk for type 2 diabetes, heart disease, and stroke.  · Help with weight loss.  What are tips for following this plan?  Reading food labels  · Check food labels for the amount of salt (sodium) per serving. Choose foods with less than 5 percent of the Daily Value of sodium. Generally, foods with less than 300 milligrams (mg) of sodium per serving fit into this eating plan.  · To find whole grains, look for the word \"whole\" as the first word in the ingredient list.  Shopping  · Buy products labeled as \"low-sodium\" or \"no salt added.\"  · Buy fresh foods. Avoid canned foods and pre-made or frozen meals.  Cooking  · Avoid adding salt when cooking. Use salt-free seasonings or herbs instead of table salt or sea salt. Check with your health care provider or pharmacist before using salt substitutes.  · Do not baltazar foods. Cook foods using healthy methods such as baking, boiling, grilling, roasting, and broiling instead.  · Cook with heart-healthy oils, such as olive, canola, avocado, soybean, or sunflower oil.  Meal planning    · Eat a balanced diet that includes:  ? 4 or more servings of fruits and 4 or more servings of vegetables each day. Try to fill one-half of your plate with fruits and vegetables.  ? 6-8 servings of whole grains each day.  ? Less than 6 oz (170 g) of lean meat, poultry, or fish each day. A 3-oz (85-g) serving of " meat is about the same size as a deck of cards. One egg equals 1 oz (28 g).  ? 2-3 servings of low-fat dairy each day. One serving is 1 cup (237 mL).  ? 1 serving of nuts, seeds, or beans 5 times each week.  ? 2-3 servings of heart-healthy fats. Healthy fats called omega-3 fatty acids are found in foods such as walnuts, flaxseeds, fortified milks, and eggs. These fats are also found in cold-water fish, such as sardines, salmon, and mackerel.  · Limit how much you eat of:  ? Canned or prepackaged foods.  ? Food that is high in trans fat, such as some fried foods.  ? Food that is high in saturated fat, such as fatty meat.  ? Desserts and other sweets, sugary drinks, and other foods with added sugar.  ? Full-fat dairy products.  · Do not salt foods before eating.  · Do not eat more than 4 egg yolks a week.  · Try to eat at least 2 vegetarian meals a week.  · Eat more home-cooked food and less restaurant, buffet, and fast food.  Lifestyle  · When eating at a restaurant, ask that your food be prepared with less salt or no salt, if possible.  · If you drink alcohol:  ? Limit how much you use to:  § 0-1 drink a day for women who are not pregnant.  § 0-2 drinks a day for men.  ? Be aware of how much alcohol is in your drink. In the [place] one drink equals one 12 oz bottle of beer (355 mL), one 5 oz glass of wine (148 mL), or one 1½ oz glass of hard liquor (44 mL).  General information  · Avoid eating more than 2,300 mg of salt a day. If you have hypertension, you may need to reduce your sodium intake to 1,500 mg a day.  · Work with your health care provider to maintain a healthy body weight or to lose weight. Ask what an ideal weight is for you.  · Get at least 30 minutes of exercise that causes your heart to beat faster (aerobic exercise) most days of the week. Activities may include walking, swimming, or biking.  · Work with your health care provider or dietitian to adjust your eating plan to your individual calorie  needs.  What foods should I eat?  Fruits  All fresh, dried, or frozen fruit. Canned fruit in natural juice (without added sugar).  Vegetables  Fresh or frozen vegetables (raw, steamed, roasted, or grilled). Low-sodium or reduced-sodium tomato and vegetable juice. Low-sodium or reduced-sodium tomato sauce and tomato paste. Low-sodium or reduced-sodium canned vegetables.  Grains  Whole-grain or whole-wheat bread. Whole-grain or whole-wheat pasta. Brown rice. Oatmeal. Quinoa. Bulgur. Whole-grain and low-sodium cereals. Adrianne bread. Low-fat, low-sodium crackers. Whole-wheat flour tortillas.  Meats and other proteins  Skinless chicken or turkey. Ground chicken or turkey. Pork with fat trimmed off. Fish and seafood. Egg whites. Dried beans, peas, or lentils. Unsalted nuts, nut butters, and seeds. Unsalted canned beans. Lean cuts of beef with fat trimmed off. Low-sodium, lean precooked or cured meat, such as sausages or meat loaves.  Dairy  Low-fat (1%) or fat-free (skim) milk. Reduced-fat, low-fat, or fat-free cheeses. Nonfat, low-sodium ricotta or cottage cheese. Low-fat or nonfat yogurt. Low-fat, low-sodium cheese.  Fats and oils  Soft margarine without trans fats. Vegetable oil. Reduced-fat, low-fat, or light mayonnaise and salad dressings (reduced-sodium). Canola, safflower, olive, avocado, soybean, and sunflower oils. Avocado.  Seasonings and condiments  Herbs. Spices. Seasoning mixes without salt.  Other foods  Unsalted popcorn and pretzels. Fat-free sweets.  The items listed above may not be a complete list of foods and beverages you can eat. Contact a dietitian for more information.  What foods should I avoid?  Fruits  Canned fruit in a light or heavy syrup. Fried fruit. Fruit in cream or butter sauce.  Vegetables  Creamed or fried vegetables. Vegetables in a cheese sauce. Regular canned vegetables (not low-sodium or reduced-sodium). Regular canned tomato sauce and paste (not low-sodium or reduced-sodium). Regular  tomato and vegetable juice (not low-sodium or reduced-sodium). Pickles. Olives.  Grains  Baked goods made with fat, such as croissants, muffins, or some breads. Dry pasta or rice meal packs.  Meats and other proteins  Fatty cuts of meat. Ribs. Fried meat. [place]. Bologna, salami, and other precooked or cured meats, such as sausages or meat loaves. Fat from the back of a pig (fatback). Bratwurst. Salted nuts and seeds. Canned beans with added salt. Canned or smoked fish. Whole eggs or egg yolks. Chicken or turkey with skin.  Dairy  Whole or 2% milk, cream, and half-and-half. Whole or full-fat cream cheese. Whole-fat or sweetened yogurt. Full-fat cheese. Nondairy creamers. Whipped toppings. Processed cheese and cheese spreads.  Fats and oils  Butter. Stick margarine. Lard. Shortening. Ghee. Abebe fat. Tropical oils, such as coconut, palm kernel, or palm oil.  Seasonings and condiments  Onion salt, garlic salt, seasoned salt, table salt, and sea salt. Worcestershire sauce. Tartar sauce. Barbecue sauce. Teriyaki sauce. Soy sauce, including reduced-sodium. Steak sauce. Canned and packaged gravies. Fish sauce. Oyster sauce. Cocktail sauce. Store-bought horseradish. Ketchup. Mustard. Meat flavorings and tenderizers. Bouillon cubes. Hot sauces. Pre-made or packaged marinades. Pre-made or packaged taco seasonings. Relishes. Regular salad dressings.  Other foods  Salted popcorn and pretzels.  The items listed above may not be a complete list of foods and beverages you should avoid. Contact a dietitian for more information.  Where to find more information  · National Heart, Lung, and Blood [place]: www.nhlbi.nih.gov  · American Heart Association: www.heart.org  · Academy of Nutrition and Dietetics: www.eatright.org  · National Kidney Foundation: www.kidney.org  Summary  · The DASH eating plan is a healthy eating plan that has been shown to reduce high blood pressure (hypertension). It may also reduce your risk for type 2  diabetes, heart disease, and stroke.  · When on the DASH eating plan, aim to eat more fresh fruits and vegetables, whole grains, lean proteins, low-fat dairy, and heart-healthy fats.  · With the DASH eating plan, you should limit salt (sodium) intake to 2,300 mg a day. If you have hypertension, you may need to reduce your sodium intake to 1,500 mg a day.  · Work with your health care provider or dietitian to adjust your eating plan to your individual calorie needs.  This information is not intended to replace advice given to you by your health care provider. Make sure you discuss any questions you have with your health care provider.  Document Revised: 11/20/2020 Document Reviewed: 11/20/2020  [employer] Patient Education © 2021 [employer]

## 2021-05-10 NOTE — ASSESSMENT & PLAN NOTE
Patient has hypertension which is reasonably well controlled based on his readings from home.  He still gets some 160s at times and some readings in the diastolic area of 90 the 100.  Most part though on average of his readings are reasonable.  Before we have third medicine and I want him to start exercising and try to get a little weight off for some.  We will decide this fall after we see some weight off as whether or not he needs a third medicine.

## 2021-05-10 NOTE — ASSESSMENT & PLAN NOTE
Clint Cee is a 76 y.o. male who presents for a Subsequent Medicare Wellness Visit.  Upon arrival to the room the patient underwent the Medicare health risk assessment.  Neither the questions themselves or the answers that were given prompted any major concern on the part of the patient or by the medical staff that gave the assessment.  As far as the preventative care examinations and the preventative care immunizations that this patient requires they are as listed below.   Screening tests recommended:    Colonoscopy--up-to-date   [id number]-uk-fi-date      Immunization:  Influenza-up-to-date  Prevnar-up-to-date  Pneumovax-up-to-date  Tetanus-up-to-date  Shingles vaccine-up-to-date  Hepatitis   Covid-up-to-date

## 2021-05-10 NOTE — PROGRESS NOTES
The ABCs of the Annual Wellness Visit  Subsequent Medicare Wellness Visit    Chief Complaint   Patient presents with   • Medicare Wellness-subsequent       Subjective   History of Present Illness:  Clint Cee is a 76 y.o. male who presents for a Subsequent Medicare Wellness Visit.  Upon arrival to the room the patient underwent the Medicare health risk assessment.  Neither the questions themselves or the answers that were given prompted any major concern on the part of the patient or by the medical staff that gave the assessment.  As far as the preventative care examinations and the preventative care immunizations that this patient requires they are as listed below.   Screening tests recommended:    Colonoscopy--up-to-date   OXD-id-dl-date      Immunization:  Influenza-up-to-date  Prevnar-up-to-date  Pneumovax-up-to-date  Tetanus-up-to-date  Shingles vaccine-up-to-date  Hepatitis   Covid-up-to-date                              Patient is also here today for a physical exam and to review blood test that he had done last week.  Dennis is followed in the office for hypertension, previous vitamin D deficiency, history of retinal detachment in the right eye, gastroesophageal reflux, BPH, recent elevation of his PSA, chronic back pain with lumbago and previous sciatica, scoliosis, spinal stenosis,                                    HEALTH RISK ASSESSMENT    Recent Hospitalizations:  No hospitalization(s) within the last year.    Current Medical Providers:  Patient Care Team:  Jaret Castellanos MD as PCP - General (Family Medicine)    Smoking Status:  Social History     Tobacco Use   Smoking Status Never Smoker   Smokeless Tobacco Never Used       Alcohol Consumption:  Social History     Substance and Sexual Activity   Alcohol Use Yes       Depression Screen:   PHQ-2/PHQ-9 Depression Screening 5/10/2021   Little interest or pleasure in doing things 0   Feeling down, depressed, or hopeless 0   Total Score 0       Fall  Risk Screen:  YOLANDAADI Fall Risk Assessment was completed, and patient is at LOW risk for falls.Assessment completed on:5/10/2021    Health Habits and Functional and Cognitive Screening:  Functional & Cognitive Status 5/10/2021   Do you have difficulty preparing food and eating? No   Do you have difficulty bathing yourself, getting dressed or grooming yourself? No   Do you have difficulty using the toilet? No   Do you have difficulty moving around from place to place? No   Do you have trouble with steps or getting out of a bed or a chair? No   Current Diet Well Balanced Diet   Dental Exam Up to date   Eye Exam Up to date   Exercise (times per week) 2 times per week   Current Exercise Activities Include Walking   Do you need help using the phone?  No   Are you deaf or do you have serious difficulty hearing?  Yes   Do you need help with transportation? No   Do you need help shopping? No   Do you need help preparing meals?  No   Do you need help with housework?  No   Do you need help with laundry? No   Do you need help taking your medications? No   Do you need help managing money? No   Do you ever drive or ride in a car without wearing a seat belt? No   Have you felt unusual stress, anger or loneliness in the last month? No   Who do you live with? Spouse   If you need help, do you have trouble finding someone available to you? No   Have you been bothered in the last four weeks by sexual problems? -   Do you have difficulty concentrating, remembering or making decisions? Yes         Does the patient have evidence of cognitive impairment? No    Asprin use counseling:Taking ASA appropriately as indicated    Age-appropriate Screening Schedule:  Refer to the list below for future screening recommendations based on patient's age, sex and/or medical conditions. Orders for these recommended tests are listed in the plan section. The patient has been provided with a written plan.    Health Maintenance   Topic Date Due   • ZOSTER  VACCINE (2 of 2) 01/26/2021   • INFLUENZA VACCINE  08/01/2021   • TDAP/TD VACCINES (2 - Td) 12/01/2030          The following portions of the patient's history were reviewed and updated as appropriate: allergies, current medications, past family history, past medical history, past social history, past surgical history and problem list.    Outpatient Medications Prior to Visit   Medication Sig Dispense Refill   • amLODIPine (NORVASC) 10 MG tablet TAKE 1 TABLET BY MOUTH EVERY DAY 90 tablet 0   • aspirin 81 MG EC tablet Take 81 mg by mouth Daily.     • DULoxetine (CYMBALTA) 30 MG capsule TAKE 1 CAPSULE BY MOUTH EVERY DAY 90 capsule 0   • fexofenadine (ALLEGRA) 180 MG tablet Take 180 mg by mouth Daily.     • triamterene-hydrochlorothiazide (MAXZIDE) 75-50 MG per tablet TAKE 1 TABLET BY MOUTH EVERY DAY 90 tablet 0   • zolpidem (AMBIEN) 10 MG tablet TAKE 1 TABLET BY MOUTH EVERY NIGHT AT BEDTIME 90 tablet 1     No facility-administered medications prior to visit.       Patient Active Problem List   Diagnosis   • Benign prostatic hyperplasia   • Gastroesophageal reflux disease   • Hypertension   • Mood disorder (CMS/[facility])   • Other specified dorsopathies, site unspecified   • Scoliosis   • Spinal stenosis   • Plantar fasciitis   • Vitamin D deficiency   • Polyosteoarthritis   • Insomnia   • Retinal detachment of right eye with single retinal tear   • Bronchitis, allergic   • Encounter for general adult medical examination without abnormal findings   • Hip pain   • Lumbago with sciatica, right side   • Sprain of right rotator cuff capsule   • Osteoarthritis of knee   • Hip pain, bilateral   • Medicare annual wellness visit, subsequent       Advanced Care Planning:  ACP discussion was held with the patient during this visit. Patient has an advance directive in EMR which is still valid.     Review of Systems   Constitutional: Negative.  Negative for diaphoresis, fatigue and fever.   HENT: Negative.  Negative for congestion, ear  "pain, hearing loss, postnasal drip, sinus pressure, sore throat, trouble swallowing and voice change.    Eyes: Negative.  Negative for pain, redness and visual disturbance.   Respiratory: Negative.  Negative for cough, chest tightness and shortness of breath.    Cardiovascular: Negative.  Negative for chest pain, palpitations and leg swelling.   Gastrointestinal: Negative.  Negative for abdominal pain, blood in stool, constipation and diarrhea.   Endocrine: Negative.  Negative for cold intolerance and heat intolerance.   Genitourinary: Negative.  Negative for difficulty urinating, enuresis, flank pain, frequency and urgency.   Musculoskeletal: Negative.  Negative for arthralgias, back pain, myalgias, neck pain and neck stiffness.   Skin: Negative.  Negative for color change and rash.   Allergic/Immunologic: Negative.  Negative for environmental allergies.   Neurological: Negative.  Negative for syncope, weakness and headaches.   Hematological: Negative.  Does not bruise/bleed easily.   Psychiatric/Behavioral: Negative.  Negative for behavioral problems, decreased concentration, dysphoric mood and suicidal ideas. The patient is not nervous/anxious.        Compared to one year ago, the patient feels his physical health is the same.  Compared to one year ago, the patient feels his mental health is the same.    Reviewed chart for potential of high risk medication in the elderly: yes  Reviewed chart for potential of harmful drug interactions in the elderly:yes    Objective         Vitals:    05/10/21 1538   BP: 160/80   BP Location: Left arm   Pulse: 78   Resp: 16   Temp: 96.9 °F (36.1 °C)   TempSrc: Infrared   SpO2: 97%   Weight: 107 kg (236 lb)   Height: 185.4 cm (73\")       Body mass index is 31.14 kg/m².  Discussed the patient's BMI with him. The BMI is in the acceptable range.    Physical Exam  Constitutional:       Appearance: Normal appearance. He is well-developed and normal weight.   HENT:      Head: " Normocephalic and atraumatic.      Right Ear: Tympanic membrane, ear canal and external ear normal.      Left Ear: Tympanic membrane, ear canal and external ear normal.      Nose: Nose normal.      Mouth/Throat:      Mouth: Mucous membranes are moist.      Pharynx: Oropharynx is clear. No oropharyngeal exudate.   Eyes:      Extraocular Movements: Extraocular movements intact.      Conjunctiva/sclera: Conjunctivae normal.      Pupils: Pupils are equal, round, and reactive to light.   Cardiovascular:      Rate and Rhythm: Normal rate and regular rhythm.      Pulses: Normal pulses.      Heart sounds: Normal heart sounds.   Pulmonary:      Effort: Pulmonary effort is normal.      Breath sounds: Normal breath sounds.   Abdominal:      General: Bowel sounds are normal.      Palpations: Abdomen is soft.   Musculoskeletal:         General: Normal range of motion.      Cervical back: Normal range of motion and neck supple.   Skin:     General: Skin is warm and dry.   Neurological:      General: No focal deficit present.      Mental Status: He is alert and oriented to person, place, and time. Mental status is at baseline.   Psychiatric:         Mood and Affect: Mood normal.         Behavior: Behavior normal.         Thought Content: Thought content normal.         Judgment: Judgment normal.         Lab Results   Component Value Date    TRIG 119 05/04/2021    HDL 38 (L) 05/04/2021    LDL 78 05/04/2021    VLDL 22 05/04/2021        Assessment/Plan   Medicare Risks and Personalized Health Plan  CMS Preventative Services Quick Reference  Advance Directive Discussion  Alcohol Misuse  Cardiovascular risk  Chronic Pain   Colon Cancer Screening  Depression/Dysphoria  Diabetic Lab Screening   Immunizations Discussed/Encouraged (specific immunizations; Shingrix )  Inactivity/Sedentary  Polypharmacy  Prostate Cancer Screening     The above risks/problems have been discussed with the patient.  Pertinent information has been shared with  the patient in the After Visit Summary.  Follow up plans and orders are seen below in the Assessment/Plan Section.    Diagnoses and all orders for this visit:    1. Medicare annual wellness visit, subsequent (Primary)  Assessment & Plan:  Clint Cee is a 76 y.o. male who presents for a Subsequent Medicare Wellness Visit.  Upon arrival to the room the patient underwent the Medicare health risk assessment.  Neither the questions themselves or the answers that were given prompted any major concern on the part of the patient or by the medical staff that gave the assessment.  As far as the preventative care examinations and the preventative care immunizations that this patient requires they are as listed below.   Screening tests recommended:    Colonoscopy--up-to-date   HVX-qm-zw-date      Immunization:  Influenza-up-to-date  Prevnar-up-to-date  Pneumovax-up-to-date  Tetanus-up-to-date  Shingles vaccine-up-to-date  Hepatitis   Covid-up-to-date                            2. Essential hypertension  Assessment & Plan:  Patient has hypertension which is reasonably well controlled based on his readings from home.  He still gets some 160s at times and some readings in the diastolic area of 90 the 100.  Most part though on average of his readings are reasonable.  Before we have third medicine and I want him to start exercising and try to get a little weight off for some.  We will decide this fall after we see some weight off as whether or not he needs a third medicine.      3. Vitamin D deficiency  Assessment & Plan:  Continue replacement      4. Retinal detachment of right eye with single retinal tear  Assessment & Plan:  Doing well.  Retinal specialist is following      5. Gastroesophageal reflux disease without esophagitis  Assessment & Plan:  OTC tums.    Eats at night and that aggravates him.   AlkaSeltzer helps      6. Benign prostatic hyperplasia with urinary frequency  Assessment & Plan:  Slow stream.   Starts and  goes.  Nocturia x 2      7. Primary osteoarthritis involving multiple joints  Assessment & Plan:  Doing well.    NSAIDs prn      8. Primary osteoarthritis of both knees  Assessment & Plan:  NSAIDS      9. Chronic bilateral low back pain with right-sided sciatica  Assessment & Plan:  Patient is doing well.   Inflammation comes and goes.       Follow Up:  Return in about 6 months (around 11/10/2021), or if symptoms worsen or fail to improve, for Recheck.     An After Visit Summary and PPPS were given to the patient.

## 2021-06-03 RX ORDER — DULOXETIN HYDROCHLORIDE 30 MG/1
CAPSULE, DELAYED RELEASE ORAL
Qty: 90 CAPSULE | Refills: 0 | Status: SHIPPED | OUTPATIENT
Start: 2021-06-03 | End: 2021-08-25

## 2021-06-03 RX ORDER — AMLODIPINE BESYLATE 10 MG/1
TABLET ORAL
Qty: 90 TABLET | Refills: 0 | Status: SHIPPED | OUTPATIENT
Start: 2021-06-03 | End: 2021-08-31

## 2021-06-03 RX ORDER — TRIAMTERENE AND HYDROCHLOROTHIAZIDE 75; 50 MG/1; MG/1
TABLET ORAL
Qty: 90 TABLET | Refills: 0 | Status: SHIPPED | OUTPATIENT
Start: 2021-06-03 | End: 2021-08-31

## 2021-08-17 ENCOUNTER — LAB (OUTPATIENT)
Dept: FAMILY MEDICINE CLINIC | Facility: CLINIC | Age: 76
End: 2021-08-17

## 2021-08-17 ENCOUNTER — OFFICE VISIT (OUTPATIENT)
Dept: FAMILY MEDICINE CLINIC | Facility: CLINIC | Age: 76
End: 2021-08-17

## 2021-08-17 VITALS
WEIGHT: 235 LBS | BODY MASS INDEX: 31.14 KG/M2 | DIASTOLIC BLOOD PRESSURE: 74 MMHG | HEIGHT: 73 IN | SYSTOLIC BLOOD PRESSURE: 140 MMHG | HEART RATE: 75 BPM | RESPIRATION RATE: 16 BRPM | TEMPERATURE: 96.9 F | OXYGEN SATURATION: 96 %

## 2021-08-17 DIAGNOSIS — R97.20 ELEVATED PSA: ICD-10-CM

## 2021-08-17 DIAGNOSIS — M10.9 ACUTE GOUT INVOLVING TOE OF LEFT FOOT, UNSPECIFIED CAUSE: Primary | ICD-10-CM

## 2021-08-17 PROBLEM — M1A.0720 CHRONIC GOUT OF LEFT FOOT: Status: ACTIVE | Noted: 2021-08-17

## 2021-08-17 LAB — PSA SERPL-MCNC: 4.98 NG/ML (ref 0–4)

## 2021-08-17 PROCEDURE — 84153 ASSAY OF PSA TOTAL: CPT | Performed by: PHYSICIAN ASSISTANT

## 2021-08-17 PROCEDURE — 99213 OFFICE O/P EST LOW 20 MIN: CPT | Performed by: PHYSICIAN ASSISTANT

## 2021-08-17 PROCEDURE — 36415 COLL VENOUS BLD VENIPUNCTURE: CPT

## 2021-08-17 RX ORDER — COLCHICINE 0.6 MG/1
0.6 TABLET ORAL DAILY
Qty: 3 TABLET | Refills: 0 | Status: SHIPPED | OUTPATIENT
Start: 2021-08-17 | End: 2021-12-09

## 2021-08-17 RX ORDER — DULOXETIN HYDROCHLORIDE 60 MG/1
60 CAPSULE, DELAYED RELEASE ORAL DAILY
Qty: 90 CAPSULE | Refills: 3
Start: 2021-08-17 | End: 2021-08-25 | Stop reason: SDUPTHER

## 2021-08-17 NOTE — PROGRESS NOTES
"Subjective   Clint Cee is a 76 y.o. male.     Chief Complaint   Patient presents with   • Joint Pain     left       /74 (BP Location: Left arm)   Pulse 75   Temp 96.9 °F (36.1 °C) (Infrared)   Resp 16   Ht 185.4 cm (73\")   Wt 107 kg (235 lb)   SpO2 96%   BMI 31.00 kg/m²     BP Readings from Last 3 Encounters:   08/17/21 140/74   05/10/21 160/80   11/16/20 120/76       Wt Readings from Last 3 Encounters:   08/17/21 107 kg (235 lb)   05/10/21 107 kg (236 lb)   11/16/20 106 kg (234 lb)       HPI Patient presents to the clinic with left great toe pain that has been present for the past 1 week. Had one episode of this last year. He is on HCTZ. Has pain. No fever. Had elevated psa around 3.5 months ago and  wanted to repeat this.     The following portions of the patient's history were reviewed and updated as appropriate: allergies, current medications, past family history, past medical history, past social history, past surgical history and problem list.    Review of Systems    Objective   Physical Exam  Constitutional:       Appearance: Normal appearance.   Eyes:      Extraocular Movements: Extraocular movements intact.      Pupils: Pupils are equal, round, and reactive to light.   Cardiovascular:      Rate and Rhythm: Normal rate.      Heart sounds: No murmur heard.     Musculoskeletal:         General: Swelling and tenderness (pain in the left MTP joint) present.   Neurological:      General: No focal deficit present.      Mental Status: He is alert and oriented to person, place, and time.   Psychiatric:         Mood and Affect: Mood normal.         Behavior: Behavior normal.           Diagnoses and all orders for this visit:    1. Acute gout involving toe of left foot, unspecified cause (Primary)  Comments:  Colchicine 1.2mg followed by 0.6mg. He is on HCTZ and this may be causing this. He will touch base with  to see if he wants to switch.     2. Elevated PSA  -     PSA DIAGNOSTIC " ONLY; Future    Other orders  -     DULoxetine (CYMBALTA) 60 MG capsule; Take 1 capsule by mouth Daily for 90 days.  Dispense: 90 capsule; Refill: 3  -     colchicine 0.6 MG tablet; Take 1 tablet by mouth Daily. 1.2mg po x1 dose and then .6mg 1 hour later.  Dispense: 3 tablet; Refill: 0    Refill his chronic cymbalta. Check his PSA today as it has been around 3.5 months since the elevation.     Return if symptoms worsen or fail to improve.

## 2021-08-19 DIAGNOSIS — R97.20 ELEVATED PSA: Primary | ICD-10-CM

## 2021-08-24 NOTE — TELEPHONE ENCOUNTER
Caller: Clint Cee    Relationship: Self    Best call back number: [phone] OR  [phone]    Medication needed:   Requested Prescriptions     Pending Prescriptions Disp Refills   • DULoxetine (CYMBALTA) 60 MG capsule 90 capsule 3     Sig: Take 1 capsule by mouth Daily for 90 days.       When do you need the refill by: TODAY     What additional details did the patient provide when requesting the medication:   1 DAY OF MEDICATION REMAINING     90 DAY SUPPLY NEEDED     Does the patient have less than a 3 day supply:  [x] Yes  [] No    What is the patient's preferred pharmacy: [id number], [place] - [address] [facility] [address] - [phone] [facility] [phone]

## 2021-08-25 RX ORDER — DULOXETIN HYDROCHLORIDE 60 MG/1
60 CAPSULE, DELAYED RELEASE ORAL DAILY
Qty: 90 CAPSULE | Refills: 3 | Status: CANCELLED
Start: 2021-08-25 | End: 2021-11-23

## 2021-08-25 RX ORDER — DULOXETIN HYDROCHLORIDE 30 MG/1
CAPSULE, DELAYED RELEASE ORAL
Qty: 90 CAPSULE | Refills: 0 | Status: SHIPPED | OUTPATIENT
Start: 2021-08-25 | End: 2021-10-18

## 2021-08-30 ENCOUNTER — OFFICE VISIT (OUTPATIENT)
Dept: FAMILY MEDICINE CLINIC | Facility: CLINIC | Age: 76
End: 2021-08-30

## 2021-08-30 ENCOUNTER — LAB (OUTPATIENT)
Dept: FAMILY MEDICINE CLINIC | Facility: CLINIC | Age: 76
End: 2021-08-30

## 2021-08-30 VITALS
HEIGHT: 73 IN | RESPIRATION RATE: 12 BRPM | SYSTOLIC BLOOD PRESSURE: 107 MMHG | WEIGHT: 229.2 LBS | DIASTOLIC BLOOD PRESSURE: 59 MMHG | BODY MASS INDEX: 30.38 KG/M2 | HEART RATE: 97 BPM

## 2021-08-30 DIAGNOSIS — M10.00 ACUTE IDIOPATHIC GOUT, UNSPECIFIED SITE: ICD-10-CM

## 2021-08-30 DIAGNOSIS — M10.00 ACUTE IDIOPATHIC GOUT, UNSPECIFIED SITE: Primary | ICD-10-CM

## 2021-08-30 PROCEDURE — 85025 COMPLETE CBC W/AUTO DIFF WBC: CPT | Performed by: HOSPITALIST

## 2021-08-30 PROCEDURE — 99213 OFFICE O/P EST LOW 20 MIN: CPT | Performed by: HOSPITALIST

## 2021-08-30 PROCEDURE — 36415 COLL VENOUS BLD VENIPUNCTURE: CPT

## 2021-08-30 PROCEDURE — 84550 ASSAY OF BLOOD/URIC ACID: CPT | Performed by: HOSPITALIST

## 2021-08-30 NOTE — PROGRESS NOTES
"Subjective   Clint Cee is a 76 y.o. male.     Subjective / HPI  Patient redness and swelling involving the left greater toe is improved significantly now with colchicine. Pt reluctant for medrol pack. Pt advise to take naproxen for few days. No fever or chills.     Review of Systems    Objective     /59   Pulse 97   Resp 12   Ht 185.4 cm (73\")   Wt 104 kg (229 lb 3.2 oz)   BMI 30.24 kg/m²      Physical Exam  Significantly improved redness and swelling great toe left foot  Chest ... bilateral entry, NVB  CVS ..... s1s2 no murmur    Procedures       Assessment/Plan   Diagnoses and all orders for this visit:    1. Acute idiopathic gout, unspecified site (Primary)  -     Uric Acid  -     CBC w AUTO Differential; Future                "

## 2021-08-31 LAB
BASOPHILS # BLD AUTO: 0.02 10*3/MM3 (ref 0–0.2)
BASOPHILS NFR BLD AUTO: 0.2 % (ref 0–1.5)
DEPRECATED RDW RBC AUTO: 44.5 FL (ref 37–54)
EOSINOPHIL # BLD AUTO: 0.17 10*3/MM3 (ref 0–0.4)
EOSINOPHIL NFR BLD AUTO: 2 % (ref 0.3–6.2)
ERYTHROCYTE [DISTWIDTH] IN BLOOD BY AUTOMATED COUNT: 13.5 % (ref 12.3–15.4)
HCT VFR BLD AUTO: 46.1 % (ref 37.5–51)
HGB BLD-MCNC: 15.9 G/DL (ref 13–17.7)
IMM GRANULOCYTES # BLD AUTO: 0.03 10*3/MM3 (ref 0–0.05)
IMM GRANULOCYTES NFR BLD AUTO: 0.4 % (ref 0–0.5)
LYMPHOCYTES # BLD AUTO: 1.12 10*3/MM3 (ref 0.7–3.1)
LYMPHOCYTES NFR BLD AUTO: 13.3 % (ref 19.6–45.3)
MCH RBC QN AUTO: 31 PG (ref 26.6–33)
MCHC RBC AUTO-ENTMCNC: 34.5 G/DL (ref 31.5–35.7)
MCV RBC AUTO: 89.9 FL (ref 79–97)
MONOCYTES # BLD AUTO: 0.52 10*3/MM3 (ref 0.1–0.9)
MONOCYTES NFR BLD AUTO: 6.2 % (ref 5–12)
NEUTROPHILS NFR BLD AUTO: 6.58 10*3/MM3 (ref 1.7–7)
NEUTROPHILS NFR BLD AUTO: 77.9 % (ref 42.7–76)
NRBC BLD AUTO-RTO: 0 /100 WBC (ref 0–0.2)
PLATELET # BLD AUTO: 113 10*3/MM3 (ref 140–450)
PMV BLD AUTO: 9.5 FL (ref 6–12)
RBC # BLD AUTO: 5.13 10*6/MM3 (ref 4.14–5.8)
URATE SERPL-MCNC: 7.6 MG/DL (ref 3.4–7)
WBC # BLD AUTO: 8.44 10*3/MM3 (ref 3.4–10.8)

## 2021-08-31 RX ORDER — ALLOPURINOL 100 MG/1
100 TABLET ORAL DAILY
Qty: 30 TABLET | Refills: 3 | Status: SHIPPED | OUTPATIENT
Start: 2021-08-31 | End: 2021-11-22 | Stop reason: SDUPTHER

## 2021-08-31 RX ORDER — TRIAMTERENE AND HYDROCHLOROTHIAZIDE 75; 50 MG/1; MG/1
TABLET ORAL
Qty: 90 TABLET | Refills: 0 | Status: SHIPPED | OUTPATIENT
Start: 2021-08-31 | End: 2021-11-29

## 2021-08-31 RX ORDER — AMLODIPINE BESYLATE 10 MG/1
TABLET ORAL
Qty: 90 TABLET | Refills: 0 | Status: SHIPPED | OUTPATIENT
Start: 2021-08-31 | End: 2021-11-29

## 2021-10-05 NOTE — TELEPHONE ENCOUNTER
Incoming Refill Request      Medication requested (name and dose):    Pharmacy where request should be sent: [id number] - [place], [place] - [address] AT [address] - [phone]  - [phone] FX  [phone]    Additional details provided by patient: PATIENT STATED HE SHOULD BE TAKING 60 MG CAPSULES BUT 30 WAS SENT IN 8/25. [place]AN WILL BE GOING OUT OF TOWN AND WOULD LIKE A 90 DAY SUPPLY    Best call back number:[phone]    Does the patient have less than a 3 day supply:  [] Yes  [x] No    Blane Hester Rep  10/05/21, 15:02 EDT

## 2021-10-06 RX ORDER — DULOXETIN HYDROCHLORIDE 60 MG/1
60 CAPSULE, DELAYED RELEASE ORAL DAILY
Qty: 90 CAPSULE | Refills: 3
Start: 2021-10-06 | End: 2021-10-18 | Stop reason: SDUPTHER

## 2021-10-18 ENCOUNTER — TELEPHONE (OUTPATIENT)
Dept: FAMILY MEDICINE CLINIC | Facility: CLINIC | Age: 76
End: 2021-10-18

## 2021-10-18 RX ORDER — DULOXETIN HYDROCHLORIDE 30 MG/1
CAPSULE, DELAYED RELEASE ORAL
Qty: 90 CAPSULE | Refills: 0 | Status: SHIPPED | OUTPATIENT
Start: 2021-10-18 | End: 2021-10-18

## 2021-10-18 RX ORDER — DULOXETIN HYDROCHLORIDE 60 MG/1
60 CAPSULE, DELAYED RELEASE ORAL DAILY
Qty: 90 CAPSULE | Refills: 3 | Status: SHIPPED | OUTPATIENT
Start: 2021-10-18 | End: 2022-11-23

## 2021-10-18 NOTE — TELEPHONE ENCOUNTER
Incoming Refill Request      Medication requested (name and dose):    DULoxetine (CYMBALTA) 60 MG capsule  60 mg, Daily         Pharmacy where request should be sent:[facility] #[id number] - [place], [place] - [address] - [phone]  - [phone] FX  [phone]     Additional details provided by patient: PHARMACY CALLED AND SAID THAT THE PATIENT IS CURRENTLY OUT OF MEDICATION    SHE SAID THAT THE PATIENT SAID THAT DR. BRITO INCREASED HIS DOSAGE OF CYMBALTA TO 60 MG ONCE A DAY, BUT THEY KEEP GETTING THE PRESCRIPTION FOR 30 MG ONCE A DAY AND THE PATIENT SAID DR. BRITO TELLS HIM TO JUST DOUBLE UP, WHICH CAUSES HIM TO RUN OUT BEFORE THE PRESCRIPTION SI DUE FOR REFILL    PHARMACY IS WANTING TO GET A PRESCRIPTION FOR 60 MG ONCE A DAY SENT IN AS SOON AS POSSIBLE     Best call back number: [phone]    Does the patient have less than a 3 day supply:  [x] Yes  [] No    Blane Wang Rep  10/18/21, 09:12 EDT

## 2021-10-25 ENCOUNTER — LAB (OUTPATIENT)
Dept: LAB | Facility: HOSPITAL | Age: 76
End: 2021-10-25

## 2021-10-25 ENCOUNTER — TRANSCRIBE ORDERS (OUTPATIENT)
Dept: ADMINISTRATIVE | Facility: HOSPITAL | Age: 76
End: 2021-10-25

## 2021-10-25 DIAGNOSIS — R97.20 ELEVATED PROSTATE SPECIFIC ANTIGEN (PSA): Primary | ICD-10-CM

## 2021-10-25 DIAGNOSIS — R97.20 ELEVATED PROSTATE SPECIFIC ANTIGEN (PSA): ICD-10-CM

## 2021-10-25 LAB — PSA SERPL-MCNC: 5.56 NG/ML (ref 0–4)

## 2021-10-25 PROCEDURE — 84153 ASSAY OF PSA TOTAL: CPT

## 2021-10-25 PROCEDURE — 36415 COLL VENOUS BLD VENIPUNCTURE: CPT

## 2021-10-25 PROCEDURE — G0103 PSA SCREENING: HCPCS

## 2021-11-22 ENCOUNTER — TELEPHONE (OUTPATIENT)
Dept: FAMILY MEDICINE CLINIC | Facility: CLINIC | Age: 76
End: 2021-11-22

## 2021-11-22 RX ORDER — ALLOPURINOL 100 MG/1
100 TABLET ORAL DAILY
Qty: 30 TABLET | Refills: 3 | Status: SHIPPED | OUTPATIENT
Start: 2021-11-22 | End: 2022-03-15

## 2021-11-22 NOTE — TELEPHONE ENCOUNTER
----- Message from Clint Cee sent at 11/21/2021  3:16 AM EST -----  Regarding: Gout  I will finish my Alapurnol meds on about 12/01. I see you on the 9th. Would you like me to take a uric acid blood test on or about 12/02 before my appointment? I also have other issues I need to talk to you about on the 9th. THANKS!

## 2021-11-22 NOTE — TELEPHONE ENCOUNTER
Gave message to patient at 2:03pm.  [place] [facility] [place] at 10:20am.    Please send an rx for Allopurinol to [facility] [address].  He is almost out.  Would like a 90 day supply.

## 2021-11-29 RX ORDER — AMLODIPINE BESYLATE 10 MG/1
TABLET ORAL
Qty: 90 TABLET | Refills: 0 | Status: SHIPPED | OUTPATIENT
Start: 2021-11-29 | End: 2022-02-27

## 2021-11-29 RX ORDER — DULOXETIN HYDROCHLORIDE 30 MG/1
CAPSULE, DELAYED RELEASE ORAL
Qty: 90 CAPSULE | Refills: 0 | Status: SHIPPED | OUTPATIENT
Start: 2021-11-29 | End: 2021-12-09

## 2021-11-29 RX ORDER — TRIAMTERENE AND HYDROCHLOROTHIAZIDE 75; 50 MG/1; MG/1
TABLET ORAL
Qty: 90 TABLET | Refills: 0 | Status: SHIPPED | OUTPATIENT
Start: 2021-11-29 | End: 2021-12-09

## 2021-12-02 ENCOUNTER — LAB (OUTPATIENT)
Dept: FAMILY MEDICINE CLINIC | Facility: CLINIC | Age: 76
End: 2021-12-02

## 2021-12-02 DIAGNOSIS — M10.00 ACUTE IDIOPATHIC GOUT, UNSPECIFIED SITE: Primary | ICD-10-CM

## 2021-12-02 DIAGNOSIS — M10.00 ACUTE IDIOPATHIC GOUT, UNSPECIFIED SITE: ICD-10-CM

## 2021-12-02 LAB — URATE SERPL-MCNC: 5.8 MG/DL (ref 3.4–7)

## 2021-12-02 PROCEDURE — 36415 COLL VENOUS BLD VENIPUNCTURE: CPT

## 2021-12-02 PROCEDURE — 84550 ASSAY OF BLOOD/URIC ACID: CPT | Performed by: FAMILY MEDICINE

## 2021-12-09 ENCOUNTER — OFFICE VISIT (OUTPATIENT)
Dept: FAMILY MEDICINE CLINIC | Facility: CLINIC | Age: 76
End: 2021-12-09

## 2021-12-09 VITALS
BODY MASS INDEX: 30.22 KG/M2 | RESPIRATION RATE: 16 BRPM | HEART RATE: 88 BPM | WEIGHT: 228 LBS | OXYGEN SATURATION: 98 % | DIASTOLIC BLOOD PRESSURE: 74 MMHG | SYSTOLIC BLOOD PRESSURE: 150 MMHG | TEMPERATURE: 96.6 F | HEIGHT: 73 IN

## 2021-12-09 DIAGNOSIS — F51.01 PRIMARY INSOMNIA: Primary | ICD-10-CM

## 2021-12-09 PROBLEM — M54.41 LUMBAGO WITH SCIATICA, RIGHT SIDE: Status: RESOLVED | Noted: 2017-11-01 | Resolved: 2021-12-09

## 2021-12-09 PROBLEM — M10.9 ACUTE GOUT INVOLVING TOE OF LEFT FOOT: Status: RESOLVED | Noted: 2021-08-17 | Resolved: 2021-12-09

## 2021-12-09 PROBLEM — S43.421A SPRAIN OF RIGHT ROTATOR CUFF CAPSULE: Status: RESOLVED | Noted: 2020-01-15 | Resolved: 2021-12-09

## 2021-12-09 PROBLEM — Z00.00 MEDICARE ANNUAL WELLNESS VISIT, SUBSEQUENT: Status: RESOLVED | Noted: 2020-01-15 | Resolved: 2021-12-09

## 2021-12-09 PROBLEM — M25.551 HIP PAIN, BILATERAL: Status: RESOLVED | Noted: 2020-01-15 | Resolved: 2021-12-09

## 2021-12-09 PROBLEM — J45.909 BRONCHITIS, ALLERGIC: Status: RESOLVED | Noted: 2018-06-14 | Resolved: 2021-12-09

## 2021-12-09 PROBLEM — M25.552 HIP PAIN, BILATERAL: Status: RESOLVED | Noted: 2020-01-15 | Resolved: 2021-12-09

## 2021-12-09 PROBLEM — M72.2 PLANTAR FASCIITIS: Status: RESOLVED | Noted: 2017-03-31 | Resolved: 2021-12-09

## 2021-12-09 PROBLEM — M25.559 HIP PAIN: Status: RESOLVED | Noted: 2017-11-17 | Resolved: 2021-12-09

## 2021-12-09 PROCEDURE — 99214 OFFICE O/P EST MOD 30 MIN: CPT | Performed by: FAMILY MEDICINE

## 2021-12-09 RX ORDER — ZOLPIDEM TARTRATE 10 MG/1
10 TABLET ORAL
COMMUNITY
Start: 2021-09-17 | End: 2021-12-09

## 2021-12-09 RX ORDER — ZOLPIDEM TARTRATE 12.5 MG/1
12.5 TABLET, FILM COATED, EXTENDED RELEASE ORAL NIGHTLY PRN
Qty: 30 TABLET | Refills: 1 | Status: SHIPPED | OUTPATIENT
Start: 2021-12-09 | End: 2022-01-08

## 2021-12-09 RX ORDER — LOSARTAN POTASSIUM 50 MG/1
50 TABLET ORAL DAILY
Qty: 90 TABLET | Refills: 0 | Status: SHIPPED | OUTPATIENT
Start: 2021-12-09 | End: 2022-03-09

## 2021-12-09 RX ORDER — LOSARTAN POTASSIUM 50 MG/1
50 TABLET ORAL DAILY
Qty: 30 TABLET | Refills: 3 | Status: SHIPPED | OUTPATIENT
Start: 2021-12-09 | End: 2021-12-09

## 2021-12-09 NOTE — PROGRESS NOTES
"Answers for HPI/ROS submitted by the patient on 12/7/2021  What is the primary reason for your visit?: Neurological Problem    Chief Complaint  Eye Problem    Subjective          Clint Cee presents to [facility]  For visual changes. The eye DrJhonysaid nothing is wrong with vision. He has been having auras and flashes in left eye.    Eye Problem   The left eye is affected. This is a new problem. The current episode started more than 1 month ago. The problem has been gradually worsening. There was no injury mechanism. The patient is experiencing no pain. There is no known exposure to pink eye. He does not wear contacts. Associated symptoms include itching. Pertinent negatives include no blurred vision, eye discharge, double vision, eye redness, foreign body sensation, photophobia or recent URI. He has tried nothing for the symptoms.   Neurologic Problem  The patient's primary symptoms include memory loss and a visual change. This is a recurrent problem. The current episode started more than 1 month ago. The neurological problem developed gradually. The problem is unchanged. There was left-sided focality noted. Associated symptoms include an aura. Pertinent negatives include no bladder incontinence, bowel incontinence or diaphoresis. Past treatments include walking. The treatment provided no relief.     The patient consents to be recorded using INDIGO.     The patient presents today for a follow up. The patient reports memory issues and states that he occasionally forgets why  he walked into a room.     His blood pressure in the office today is 150/74 mm/Hg.     The patient has inquiries regarding his allopurinol and how long he should continue the medication. He states that his most recent labs were in the normal range. The patient has a history of gout flare up in his great toe. He states that it \"did not hurt too bad\"; however he notes that his toe was erythematous and swollen. The " "patient states that a physician's assistant once told him that antihypertensive medications could cause gout.    He is currently on triamterene and states that he has been on this medication for a long time. He denies a history of Meniere's disease. He is also on amlodipine. The patient admits that he has not been monitoring his blood pressures at home lately. He states that he recently had a blood pressure reading of 120/74 mm/Hg. The patient states that he has been trying to lose weight. He has lost approximately 8 pounds since 05/52021. His current weight is 228 pounds. The patient states that he develops a cough with ACE inhibitors.     The patient states that he had a prostate biopsy on 12/7/2021. He notes that he will not receive the results until 12/29/2021. He reports that that the hematuria has improved. He states that he is bleeding \"a little bit, not too bad\" from his rectum. He notes that he was previously placed on doxycycline for the 3 weeks due to a bladder infection. He reports being diagnosed with a UTI shortly thereafter and was placed on a sulfa antibiotic. He states that he was placed on another antibiotic prior to his prostate biopsy and notes that he was taking 2 antibiotics at the same time for approximately 2 days. He is interested in taking a probiotic.     He states that he is having issues with his puborectalis muscle. The patient reports that when he is urinating \"it does not seem to close off the colon completely\" and he excretes \"biological waste\" occasionally.     The patient states that he takes 2 AlkaSeltzer before going to bed at night. He states that this helps \"take the edge off\" and allows him to relax.  He is wondering if it would be safer to take 2 Tylenol before bed rather than AlkaSeltzer. He reports waking frequently at night to urinate. He notes that he should decrease his fluid intake at night.  He takes Ambien; however, he states that he would like to try Ambien extended " "release to see if that will help him stay asleep at night.     Objective   Vital Signs:   /74 (BP Location: Left arm)   Pulse 88   Temp 96.6 °F (35.9 °C) (Infrared)   Resp 16   Ht 185.4 cm (73\")   Wt 103 kg (228 lb)   SpO2 98%   BMI 30.08 kg/m²     Physical Exam  Constitutional:       Appearance: Normal appearance. He is well-developed and normal weight.   HENT:      Head: Normocephalic and atraumatic.      Right Ear: Tympanic membrane, ear canal and external ear normal.      Left Ear: Tympanic membrane, ear canal and external ear normal.      Nose: Nose normal.      Mouth/Throat:      Mouth: Mucous membranes are moist.      Pharynx: Oropharynx is clear. No oropharyngeal exudate.   Eyes:      Extraocular Movements: Extraocular movements intact.      Conjunctiva/sclera: Conjunctivae normal.      Pupils: Pupils are equal, round, and reactive to light.   Cardiovascular:      Rate and Rhythm: Normal rate and regular rhythm.      Pulses: Normal pulses.      Heart sounds: Normal heart sounds.   Pulmonary:      Effort: Pulmonary effort is normal.      Breath sounds: Normal breath sounds.   Abdominal:      General: Bowel sounds are normal.      Palpations: Abdomen is soft.   Musculoskeletal:         General: Normal range of motion.      Cervical back: Normal range of motion and neck supple.   Skin:     General: Skin is warm and dry.   Neurological:      General: No focal deficit present.      Mental Status: He is alert and oriented to person, place, and time. Mental status is at baseline.   Psychiatric:         Mood and Affect: Mood normal.         Behavior: Behavior normal.         Thought Content: Thought content normal.         Judgment: Judgment normal.        Result Review :                 Assessment and Plan    Diagnoses and all orders for this visit:    1. Primary insomnia (Primary)  -     zolpidem CR (AMBIEN CR) 12.5 MG CR tablet; Take 1 tablet by mouth At Night As Needed for Sleep for up to 30 days.  " Dispense: 30 tablet; Refill: 1  - The patient would like to try Ambien extended release. A prescription for Ambien CR 12.5 MG was sent to his pharmacy.  - I advised the patient to take Tylenol rather than AlkaSeltzer before bed.     2. Hypertension        - I advised the patient to discontinue triamterene-hydrochlorothiazide. He will start losartan 50 mg once daily. We will discuss possible discontinuation of his allopurinol in the future.     - In regards to his incontinence, I advised the patient to perform Kegel exercises to strengthen the pelvic floor muscles.     3. Gout     -Follow a low-carb diet, weight loss, drink plenty of fluids, and take allopurinol but increase the dose to 200 mg a day.  We will check uric acid in a few months.     4. Fecal Smearing       --- In regards to his incontinence, I advised the patient to perform Kegel exercises to strengthen the pelvic floor muscles    5. Visual Aura with ?? migraine     -    Patient experiencing visual aura like changes not necessarily followed by headache.  These could be migraine events he is experiencing but difficult to say at this point time.  We will have to watch what kind of pattern these take over the next year or so.  It is a little late in life to be experiencing migraines like this so if these keep up we may need to do a CNS work-up.  This is more than likely has to do with his retinal problems rather than migraines  Other orders  -     Discontinue: losartan (COZAAR) 50 MG tablet; Take 1 tablet by mouth Daily.  Dispense: 30 tablet; Refill: 3        Follow Up   Return in about 6 months (around 6/9/2022) for Recheck.  Patient was given instructions and counseling regarding his condition or for health maintenance advice. Please see specific information pulled into the AVS if appropriate.     Transcribed from ambient dictation for Jaret Castellanos MD by Kelsey Meehan.  12/09/21   16:57 EST    Patient verbalized consent to the visit recording.  I  have personally performed the services described in this document as transcribed by the above individual, and it is both accurate and complete.  Jaret Castellanos MD  12/12/2021  11:24 EST

## 2021-12-12 PROBLEM — R15.1 FECAL SMEARING: Status: ACTIVE | Noted: 2021-12-12

## 2021-12-12 NOTE — PATIENT INSTRUCTIONS
Gout    Gout is a condition that causes painful swelling of the joints. Gout is a type of inflammation of the joints (arthritis). This condition is caused by having too much uric acid in the body. Uric acid is a chemical that forms when the body breaks down substances called purines. Purines are important for building body proteins.  When the body has too much uric acid, sharp crystals can form and build up inside the joints. This causes pain and swelling. Gout attacks can happen quickly and may be very painful (acute gout). Over time, the attacks can affect more joints and become more frequent (chronic gout). Gout can also cause uric acid to build up under the skin and inside the kidneys.  What are the causes?  This condition is caused by too much uric acid in your blood. This can happen because:  · Your kidneys do not remove enough uric acid from your blood. This is the most common cause.  · Your body makes too much uric acid. This can happen with some cancers and cancer treatments. It can also occur if your body is breaking down too many red blood cells (hemolytic anemia).  · You eat too many foods that are high in purines. These foods include organ meats and some seafood. Alcohol, especially beer, is also high in purines.  A gout attack may be triggered by trauma or stress.  What increases the risk?  You are more likely to develop this condition if you:  · Have a family history of gout.  · Are male and middle-aged.  · Are female and have gone through menopause.  · Are obese.  · Frequently drink alcohol, especially beer.  · Are dehydrated.  · Lose weight too quickly.  · Have an organ transplant.  · Have lead poisoning.  · Take certain medicines, including aspirin, cyclosporine, diuretics, levodopa, and niacin.  · Have kidney disease.  · Have a skin condition called psoriasis.  What are the signs or symptoms?  An attack of acute gout happens quickly. It usually occurs in just one joint. The most common place is  the big toe. Attacks often start at night. Other joints that may be affected include joints of the feet, ankle, knee, fingers, wrist, or elbow. Symptoms of this condition may include:  · Severe pain.  · Warmth.  · Swelling.  · Stiffness.  · Tenderness. The affected joint may be very painful to touch.  · Shiny, red, or purple skin.  · Chills and fever.  Chronic gout may cause symptoms more frequently. More joints may be involved. You may also have white or yellow lumps (tophi) on your hands or feet or in other areas near your joints.  How is this diagnosed?  This condition is diagnosed based on your symptoms, medical history, and physical exam. You may have tests, such as:  · Blood tests to measure uric acid levels.  · Removal of joint fluid with a thin needle (aspiration) to look for uric acid crystals.  · X-rays to look for joint damage.  How is this treated?  Treatment for this condition has two phases: treating an acute attack and preventing future attacks. Acute gout treatment may include medicines to reduce pain and swelling, including:  · NSAIDs.  · Steroids. These are strong anti-inflammatory medicines that can be taken by mouth (orally) or injected into a joint.  · Colchicine. This medicine relieves pain and swelling when it is taken soon after an attack. It can be given by mouth or through an IV.  Preventive treatment may include:  · Daily use of smaller doses of NSAIDs or colchicine.  · Use of a medicine that reduces uric acid levels in your blood.  · Changes to your diet. You may need to see a dietitian about what to eat and drink to prevent gout.  Follow these instructions at home:  During a gout attack    · If directed, put ice on the affected area:  ? Put ice in a plastic bag.  ? Place a towel between your skin and the bag.  ? Leave the ice on for 20 minutes, 2-3 times a day.  · Raise (elevate) the affected joint above the level of your heart as often as possible.  · Rest the joint as much as possible.  If the affected joint is in your leg, you may be given crutches to use.  · Follow instructions from your health care provider about eating or drinking restrictions.    Avoiding future gout attacks  · Follow a low-purine diet as told by your dietitian or health care provider. Avoid foods and drinks that are high in purines, including liver, kidney, anchovies, asparagus, herring, mushrooms, mussels, and beer.  · Maintain a healthy weight or lose weight if you are overweight. If you want to lose weight, talk with your health care provider. It is important that you do not lose weight too quickly.  · Start or maintain an exercise program as told by your health care provider.  Eating and drinking  · Drink enough fluids to keep your urine pale yellow.  · If you drink alcohol:  ? Limit how much you use to:  § 0-1 drink a day for women.  § 0-2 drinks a day for men.  ? Be aware of how much alcohol is in your drink. In the [place] one drink equals one 12 oz bottle of beer (355 mL) one 5 oz glass of wine (148 mL), or one 1½ oz glass of hard liquor (44 mL).  General instructions  · Take over-the-counter and prescription medicines only as told by your health care provider.  · Do not drive or use heavy machinery while taking prescription pain medicine.  · Return to your normal activities as told by your health care provider. Ask your health care provider what activities are safe for you.  · Keep all follow-up visits as told by your health care provider. This is important.  Contact a health care provider if you have:  · Another gout attack.  · Continuing symptoms of a gout attack after 10 days of treatment.  · Side effects from your medicines.  · Chills or a fever.  · Burning pain when you urinate.  · Pain in your lower back or belly.  Get help right away if you:  · Have severe or uncontrolled pain.  · Cannot urinate.  Summary  · Gout is painful swelling of the joints caused by inflammation.  · The most common site of pain is the big  toe, but it can affect other joints in the body.  · Medicines and dietary changes can help to prevent and treat gout attacks.  This information is not intended to replace advice given to you by your health care provider. Make sure you discuss any questions you have with your health care provider.  Document Revised: 07/10/2019 Document Reviewed: 07/10/2019  [employer] Patient Education © 2021 [employer]

## 2021-12-30 ENCOUNTER — TRANSCRIBE ORDERS (OUTPATIENT)
Dept: ADMINISTRATIVE | Facility: HOSPITAL | Age: 76
End: 2021-12-30

## 2021-12-30 DIAGNOSIS — C61 PROSTATE CANCER: Primary | ICD-10-CM

## 2022-01-07 ENCOUNTER — HOSPITAL ENCOUNTER (OUTPATIENT)
Dept: NUCLEAR MEDICINE | Facility: HOSPITAL | Age: 77
Discharge: HOME | End: 2022-01-07

## 2022-01-07 ENCOUNTER — HOSPITAL ENCOUNTER (OUTPATIENT)
Dept: GENERAL RADIOLOGY | Facility: HOSPITAL | Age: 77
Discharge: HOME | End: 2022-01-07

## 2022-01-07 DIAGNOSIS — R97.20 PSA ELEVATION: ICD-10-CM

## 2022-01-07 DIAGNOSIS — C61 PROSTATE CANCER: ICD-10-CM

## 2022-01-07 PROCEDURE — 73560 X-RAY EXAM OF KNEE 1 OR 2: CPT

## 2022-01-07 PROCEDURE — 0 TECHNETIUM MEDRONATE KIT: Performed by: UROLOGY

## 2022-01-07 PROCEDURE — A9503 TC99M MEDRONATE: HCPCS | Performed by: UROLOGY

## 2022-01-07 PROCEDURE — 73620 X-RAY EXAM OF FOOT: CPT

## 2022-01-07 PROCEDURE — 72070 X-RAY EXAM THORAC SPINE 2VWS: CPT

## 2022-01-07 PROCEDURE — 78306 BONE IMAGING WHOLE BODY: CPT

## 2022-01-07 RX ORDER — TC 99M MEDRONATE 20 MG/10ML
28.9 INJECTION, POWDER, LYOPHILIZED, FOR SOLUTION INTRAVENOUS
Status: COMPLETED | OUTPATIENT
Start: 2022-01-07 | End: 2022-01-07

## 2022-01-07 RX ADMIN — TC 99M MEDRONATE 28.9 MILLICURIE: 20 INJECTION, POWDER, LYOPHILIZED, FOR SOLUTION INTRAVENOUS at 08:45

## 2022-02-27 RX ORDER — TRIAMTERENE AND HYDROCHLOROTHIAZIDE 75; 50 MG/1; MG/1
TABLET ORAL
Qty: 90 TABLET | Refills: 0 | Status: SHIPPED | OUTPATIENT
Start: 2022-02-27 | End: 2022-03-31

## 2022-02-27 RX ORDER — AMLODIPINE BESYLATE 10 MG/1
TABLET ORAL
Qty: 90 TABLET | Refills: 0 | Status: SHIPPED | OUTPATIENT
Start: 2022-02-27 | End: 2022-05-30

## 2022-03-09 RX ORDER — LOSARTAN POTASSIUM 50 MG/1
50 TABLET ORAL DAILY
Qty: 90 TABLET | Refills: 0 | Status: SHIPPED | OUTPATIENT
Start: 2022-03-09 | End: 2022-06-13

## 2022-03-14 DIAGNOSIS — F51.01 PRIMARY INSOMNIA: ICD-10-CM

## 2022-03-15 DIAGNOSIS — F51.01 PRIMARY INSOMNIA: Primary | ICD-10-CM

## 2022-03-15 RX ORDER — ZOLPIDEM TARTRATE 10 MG/1
TABLET ORAL
Qty: 90 TABLET | Refills: 1 | Status: SHIPPED | OUTPATIENT
Start: 2022-03-15 | End: 2022-03-15

## 2022-03-15 RX ORDER — ALLOPURINOL 100 MG/1
100 TABLET ORAL DAILY
Qty: 30 TABLET | Refills: 3 | Status: SHIPPED | OUTPATIENT
Start: 2022-03-15 | End: 2022-07-19

## 2022-03-15 RX ORDER — ZOLPIDEM TARTRATE 10 MG/1
10 TABLET ORAL NIGHTLY PRN
Qty: 30 TABLET | Refills: 2 | Status: SHIPPED | OUTPATIENT
Start: 2022-03-15 | End: 2022-04-14

## 2022-03-31 ENCOUNTER — OFFICE VISIT (OUTPATIENT)
Dept: FAMILY MEDICINE CLINIC | Facility: CLINIC | Age: 77
End: 2022-03-31

## 2022-03-31 VITALS
HEIGHT: 73 IN | OXYGEN SATURATION: 98 % | RESPIRATION RATE: 12 BRPM | SYSTOLIC BLOOD PRESSURE: 148 MMHG | DIASTOLIC BLOOD PRESSURE: 72 MMHG | WEIGHT: 231 LBS | HEART RATE: 73 BPM | BODY MASS INDEX: 30.62 KG/M2

## 2022-03-31 DIAGNOSIS — L03.211 CELLULITIS OF FACE: Primary | ICD-10-CM

## 2022-03-31 PROCEDURE — 99213 OFFICE O/P EST LOW 20 MIN: CPT | Performed by: PHYSICIAN ASSISTANT

## 2022-03-31 RX ORDER — CEPHALEXIN 500 MG/1
500 CAPSULE ORAL 2 TIMES DAILY
Qty: 10 CAPSULE | Refills: 0 | Status: SHIPPED | OUTPATIENT
Start: 2022-03-31 | End: 2022-04-05

## 2022-03-31 NOTE — PROGRESS NOTES
"  Answers for HPI/ROS submitted by the patient on 3/30/2022  What is the primary reason for your visit?: Other  Please describe your symptoms.: Red itchy sore spots on nose and hands.  Have you had these symptoms before?: No  How long have you been having these symptoms?: 1-2 weeks  Please list any medications you are currently taking for this condition.: Alcohol,  tripple antibotic ointment and hydrocortozone cream  Please describe any probable cause for these symptoms. : Cat and dog bits and scratches. I'm reciving radiation treatments for prostate cancer.    Subjective   Clint Cee is a 77 y.o. male.     Chief Complaint   Patient presents with   • Skin Problem       /72   Pulse 73   Resp 12   Ht 185.4 cm (73\")   Wt 105 kg (231 lb)   SpO2 98%   BMI 30.48 kg/m²     BP Readings from Last 3 Encounters:   03/31/22 148/72   12/09/21 150/74   08/30/21 107/59       Wt Readings from Last 3 Encounters:   03/31/22 105 kg (231 lb)   12/09/21 103 kg (228 lb)   08/30/21 104 kg (229 lb 3.2 oz)       NATASHA Presents to the clinic with a skin lesion on the nose and dry itchy hands. He has tried a variety of otc treatments with some improvement. No fever or chills. No drainage or abscess.     The following portions of the patient's history were reviewed and updated as appropriate: allergies, current medications, past family history, past medical history, past social history, past surgical history and problem list.    Review of Systems    Objective   Physical Exam  Constitutional:       Appearance: Normal appearance.   Eyes:      Extraocular Movements: Extraocular movements intact.      Pupils: Pupils are equal, round, and reactive to light.   Skin:     Findings: Rash (redness to the tip of the nose with no obvious fluctuance or induration. mild redness to the dorsal aspect of the hands) present.   Neurological:      General: No focal deficit present.      Mental Status: He is alert and oriented to person, place, and " time.   Psychiatric:         Mood and Affect: Mood normal.         Behavior: Behavior normal.           Diagnoses and all orders for this visit:    1. Cellulitis of face (Primary)    Other orders  -     cephalexin (Keflex) 500 MG capsule; Take 1 capsule by mouth 2 (Two) Times a Day for 5 days.  Dispense: 10 capsule; Refill: 0        Return if symptoms worsen or fail to improve.

## 2022-05-06 ENCOUNTER — TELEPHONE (OUTPATIENT)
Dept: FAMILY MEDICINE CLINIC | Facility: CLINIC | Age: 77
End: 2022-05-06

## 2022-05-06 NOTE — TELEPHONE ENCOUNTER
Caller: Clint Cee    Relationship: Self    Best call back number: [phone]    What orders are you requesting (i.e. lab or imaging): LABS BEFORE MEDICARE WELLNESS     In what timeframe would the patient need to come in: BEFORE 05/13    Where will you receive your lab/imaging services: OFFICE     Additional notes: PATIENT IS WANTING TO SEE IF DR. BRITO WANTS TO ORDER LABS BEFORE HIS MEDICARE WELLNESS VISIT

## 2022-05-09 ENCOUNTER — LAB (OUTPATIENT)
Dept: FAMILY MEDICINE CLINIC | Facility: CLINIC | Age: 77
End: 2022-05-09

## 2022-05-09 DIAGNOSIS — M10.00 ACUTE IDIOPATHIC GOUT, UNSPECIFIED SITE: ICD-10-CM

## 2022-05-09 DIAGNOSIS — R97.20 ELEVATED PSA: ICD-10-CM

## 2022-05-09 DIAGNOSIS — R35.0 BENIGN PROSTATIC HYPERPLASIA WITH URINARY FREQUENCY: ICD-10-CM

## 2022-05-09 DIAGNOSIS — N40.1 BENIGN PROSTATIC HYPERPLASIA WITH URINARY FREQUENCY: ICD-10-CM

## 2022-05-09 DIAGNOSIS — Z00.00 MEDICARE ANNUAL WELLNESS VISIT, SUBSEQUENT: Primary | ICD-10-CM

## 2022-05-09 LAB
ALBUMIN SERPL-MCNC: 4 G/DL (ref 3.5–5.2)
ALBUMIN/GLOB SERPL: 1.7 G/DL
ALP SERPL-CCNC: 67 U/L (ref 39–117)
ALT SERPL W P-5'-P-CCNC: 22 U/L (ref 1–41)
ANION GAP SERPL CALCULATED.3IONS-SCNC: 10.1 MMOL/L (ref 5–15)
AST SERPL-CCNC: 18 U/L (ref 1–40)
BASOPHILS # BLD AUTO: 0.02 10*3/MM3 (ref 0–0.2)
BASOPHILS NFR BLD AUTO: 0.4 % (ref 0–1.5)
BILIRUB SERPL-MCNC: 0.5 MG/DL (ref 0–1.2)
BUN SERPL-MCNC: 23 MG/DL (ref 8–23)
BUN/CREAT SERPL: 23.2 (ref 7–25)
CALCIUM SPEC-SCNC: 8.9 MG/DL (ref 8.6–10.5)
CHLORIDE SERPL-SCNC: 104 MMOL/L (ref 98–107)
CHOLEST SERPL-MCNC: 145 MG/DL (ref 0–200)
CO2 SERPL-SCNC: 24.9 MMOL/L (ref 22–29)
CREAT SERPL-MCNC: 0.99 MG/DL (ref 0.76–1.27)
DEPRECATED RDW RBC AUTO: 51.4 FL (ref 37–54)
EGFRCR SERPLBLD CKD-EPI 2021: 79.4 ML/MIN/1.73
EOSINOPHIL # BLD AUTO: 0.14 10*3/MM3 (ref 0–0.4)
EOSINOPHIL NFR BLD AUTO: 3 % (ref 0.3–6.2)
ERYTHROCYTE [DISTWIDTH] IN BLOOD BY AUTOMATED COUNT: 14.9 % (ref 12.3–15.4)
GLOBULIN UR ELPH-MCNC: 2.4 GM/DL
GLUCOSE SERPL-MCNC: 107 MG/DL (ref 65–99)
HBA1C MFR BLD: 4.7 % (ref 3.5–5.6)
HCT VFR BLD AUTO: 39.4 % (ref 37.5–51)
HDLC SERPL-MCNC: 41 MG/DL (ref 40–60)
HGB BLD-MCNC: 13.3 G/DL (ref 13–17.7)
IMM GRANULOCYTES # BLD AUTO: 0.03 10*3/MM3 (ref 0–0.05)
IMM GRANULOCYTES NFR BLD AUTO: 0.6 % (ref 0–0.5)
LDLC SERPL CALC-MCNC: 74 MG/DL (ref 0–100)
LDLC/HDLC SERPL: 1.67 {RATIO}
LYMPHOCYTES # BLD AUTO: 0.47 10*3/MM3 (ref 0.7–3.1)
LYMPHOCYTES NFR BLD AUTO: 10 % (ref 19.6–45.3)
MCH RBC QN AUTO: 31.7 PG (ref 26.6–33)
MCHC RBC AUTO-ENTMCNC: 33.8 G/DL (ref 31.5–35.7)
MCV RBC AUTO: 93.8 FL (ref 79–97)
MONOCYTES # BLD AUTO: 0.37 10*3/MM3 (ref 0.1–0.9)
MONOCYTES NFR BLD AUTO: 7.9 % (ref 5–12)
NEUTROPHILS NFR BLD AUTO: 3.67 10*3/MM3 (ref 1.7–7)
NEUTROPHILS NFR BLD AUTO: 78.1 % (ref 42.7–76)
NRBC BLD AUTO-RTO: 0 /100 WBC (ref 0–0.2)
PLATELET # BLD AUTO: 88 10*3/MM3 (ref 140–450)
PMV BLD AUTO: 9.1 FL (ref 6–12)
POTASSIUM SERPL-SCNC: 4.5 MMOL/L (ref 3.5–5.2)
PROT SERPL-MCNC: 6.4 G/DL (ref 6–8.5)
PSA SERPL-MCNC: 0.02 NG/ML (ref 0–4)
RBC # BLD AUTO: 4.2 10*6/MM3 (ref 4.14–5.8)
SODIUM SERPL-SCNC: 139 MMOL/L (ref 136–145)
TRIGL SERPL-MCNC: 177 MG/DL (ref 0–150)
TSH SERPL DL<=0.05 MIU/L-ACNC: 1.7 UIU/ML (ref 0.27–4.2)
URATE SERPL-MCNC: 4.5 MG/DL (ref 3.4–7)
VLDLC SERPL-MCNC: 30 MG/DL (ref 5–40)
WBC NRBC COR # BLD: 4.7 10*3/MM3 (ref 3.4–10.8)

## 2022-05-09 PROCEDURE — 84153 ASSAY OF PSA TOTAL: CPT | Performed by: FAMILY MEDICINE

## 2022-05-09 PROCEDURE — 85025 COMPLETE CBC W/AUTO DIFF WBC: CPT | Performed by: FAMILY MEDICINE

## 2022-05-09 PROCEDURE — 84550 ASSAY OF BLOOD/URIC ACID: CPT | Performed by: FAMILY MEDICINE

## 2022-05-09 PROCEDURE — 36415 COLL VENOUS BLD VENIPUNCTURE: CPT

## 2022-05-09 PROCEDURE — 83036 HEMOGLOBIN GLYCOSYLATED A1C: CPT | Performed by: FAMILY MEDICINE

## 2022-05-09 PROCEDURE — 80061 LIPID PANEL: CPT | Performed by: FAMILY MEDICINE

## 2022-05-09 PROCEDURE — 80053 COMPREHEN METABOLIC PANEL: CPT | Performed by: FAMILY MEDICINE

## 2022-05-09 PROCEDURE — 84443 ASSAY THYROID STIM HORMONE: CPT | Performed by: FAMILY MEDICINE

## 2022-05-13 ENCOUNTER — OFFICE VISIT (OUTPATIENT)
Dept: FAMILY MEDICINE CLINIC | Facility: CLINIC | Age: 77
End: 2022-05-13

## 2022-05-13 VITALS
OXYGEN SATURATION: 98 % | HEART RATE: 88 BPM | WEIGHT: 233 LBS | BODY MASS INDEX: 30.88 KG/M2 | SYSTOLIC BLOOD PRESSURE: 146 MMHG | RESPIRATION RATE: 18 BRPM | HEIGHT: 73 IN | DIASTOLIC BLOOD PRESSURE: 70 MMHG | TEMPERATURE: 97.4 F

## 2022-05-13 DIAGNOSIS — M17.0 PRIMARY OSTEOARTHRITIS OF BOTH KNEES: ICD-10-CM

## 2022-05-13 DIAGNOSIS — E55.9 VITAMIN D DEFICIENCY: ICD-10-CM

## 2022-05-13 DIAGNOSIS — Z00.00 MEDICARE ANNUAL WELLNESS VISIT, SUBSEQUENT: Primary | ICD-10-CM

## 2022-05-13 DIAGNOSIS — G43.109 OCULAR MIGRAINE: ICD-10-CM

## 2022-05-13 DIAGNOSIS — K21.9 GASTROESOPHAGEAL REFLUX DISEASE WITHOUT ESOPHAGITIS: ICD-10-CM

## 2022-05-13 DIAGNOSIS — C61 PROSTATE CANCER: ICD-10-CM

## 2022-05-13 DIAGNOSIS — F51.01 PRIMARY INSOMNIA: ICD-10-CM

## 2022-05-13 DIAGNOSIS — M48.061 SPINAL STENOSIS OF LUMBAR REGION WITHOUT NEUROGENIC CLAUDICATION: ICD-10-CM

## 2022-05-13 DIAGNOSIS — I10 PRIMARY HYPERTENSION: ICD-10-CM

## 2022-05-13 DIAGNOSIS — R01.1 HEART MURMUR ON PHYSICAL EXAMINATION: ICD-10-CM

## 2022-05-13 PROBLEM — N40.0 BENIGN PROSTATIC HYPERPLASIA: Status: RESOLVED | Noted: 2018-11-14 | Resolved: 2022-05-13

## 2022-05-13 PROBLEM — R97.20 ELEVATED PSA: Status: RESOLVED | Noted: 2021-08-17 | Resolved: 2022-05-13

## 2022-05-13 PROBLEM — R15.1 FECAL SMEARING: Status: RESOLVED | Noted: 2021-12-12 | Resolved: 2022-05-13

## 2022-05-13 PROCEDURE — 1160F RVW MEDS BY RX/DR IN RCRD: CPT | Performed by: FAMILY MEDICINE

## 2022-05-13 PROCEDURE — 1170F FXNL STATUS ASSESSED: CPT | Performed by: FAMILY MEDICINE

## 2022-05-13 PROCEDURE — G0439 PPPS, SUBSEQ VISIT: HCPCS | Performed by: FAMILY MEDICINE

## 2022-05-13 PROCEDURE — 99214 OFFICE O/P EST MOD 30 MIN: CPT | Performed by: FAMILY MEDICINE

## 2022-05-13 NOTE — PROGRESS NOTES
The ABCs of the Annual Wellness Visit  Subsequent Medicare Wellness Visit    Chief Complaint   Patient presents with   • Medicare Wellness-subsequent      Subjective    History of Present Illness:  Clint Cee is a 77 y.o. male who presents for a Subsequent Medicare Wellness Visit.    The patient presents today for a Medicare wellness visit.    The patient has a history of prostate cancer. He notes since his last visit, he has had 45 external beam radiation treatments, Nelson score was 9 out of 10, and received a hormone injection and has 2 more of those to go. The patient states that his PSA was 5.6 at its highest and his is currently 0.023. He denies having any surgery for treatment of his prostate cancer. The patient also notes he had a bone scan done and no cancers were found anywhere else. He notes he has an appointment this month to recheck his PSA levels and to follow up with radiation oncology.    The patient notes he has had recent blood work done and he has been taking his vitamin D supplements regularly. The patient states that he has been taking allopurinol. He states that he has cut down his use of aspirin, but he still takes it now and then. The patient notes he has been having some mild memory issues, like having trouble remembering certain words.    The patient complains of an intermittent white blob appearing in his field of fission in his left eye. He notes this has been occurring for the past 6 months, usually happens at least once per day, and does not seem to have a trigger. The patient notes he has had occasional shimmering circles in his right eye but this is not similar to his left eye symptoms. The patient notes he has seen his eye doctor about this but was told his eyes look fine. He denies having a history of migraines or any associated headache or migraine with his eye symptom. The patient notes a history of low tension glaucoma diagnosed about 5 years ago.    The patient also  complains of a bump to the back of his left knee. He notes a history of knee arthritis and that his gets cortisone injections in his right knee by Dr. Cary, orthopedic surgery.    The patient notes his colonoscopy is up to date. Polyps were found and removed during his last colonoscopy and he plans to go for his next one next year. The patient notes he sees his dentist regularly, recently had some dental work done and is taking amoxicillin. He denies seeing podiatry regularly but states he does see dermatology due to his history of melanoma on his neck that he had removed. The patient notes he is up to date on all his vaccines, including his COVID-19 vaccine. He eats a relatively low carb diet and walks for exercise. The patient notes he used to see Dr. Youngblood, cardiology, but he retired so he has not been seen by cardiology in about 5 years.     The patient notes a family history of prostate cancer, including his father.    Upon arrival to the room the patient underwent the Medicare health risk assessment.  Neither the questions themselves or the answers that were given prompted any major concern on the part of the patient or by the medical staff that gave the assessment.  As far as the preventative care examinations and the preventative care immunizations that this patient requires they are as listed below.   Screening tests recommended:    Colonoscopy--UTD  PSA UTD  Eye- yearly  Dentist- 2 x / year  Derm- yearly for melanoma        Immunization:  Influenza UTD  Prevnar UTD  Pneumovax UTD  Tetanus UTD  Shingles vaccine UTD  Covid -UTD               The following portions of the patient's history were reviewed and   updated as appropriate: allergies, current medications, past family history, past medical history, past social history, past surgical history and problem list.    Compared to one year ago, the patient feels his physical   health is better.    Compared to one year ago, the patient feels his mental    health is the same.    Recent Hospitalizations:  He was not admitted to the hospital during the last year.       Current Medical Providers:  Patient Care Team:  Jaret Castellanos MD as PCP - General (Family Medicine)    Outpatient Medications Prior to Visit   Medication Sig Dispense Refill   • allopurinol (ZYLOPRIM) 100 MG tablet TAKE 1 TABLET BY MOUTH DAILY 30 tablet 3   • amLODIPine (NORVASC) 10 MG tablet TAKE 1 TABLET BY MOUTH EVERY DAY 90 tablet 0   • DULoxetine (CYMBALTA) 60 MG capsule Take 1 capsule by mouth Daily for 90 days. 90 capsule 3   • fexofenadine (ALLEGRA) 180 MG tablet Take 180 mg by mouth Daily.     • losartan (COZAAR) 50 MG tablet TAKE 1 TABLET BY MOUTH DAILY 90 tablet 0     No facility-administered medications prior to visit.       No opioid medication identified on active medication list. I have reviewed chart for other potential  high risk medication/s and harmful drug interactions in the elderly.          Aspirin is not on active medication list.  Aspirin use is not indicated based on review of current medical condition/s. Risk of harm outweighs potential benefits.  .    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  ACP discussion was held with the patient during this visit. Patient has an advance directive (not in EMR), copy requested.    Review of Systems   Constitutional: Negative.  Negative for fatigue and fever.   HENT: Negative.  Negative for congestion, sinus pressure, sore throat, tinnitus and trouble swallowing.    Eyes: Positive for visual disturbance. Negative for  "redness.   Respiratory: Negative for cough, chest tightness, shortness of breath and wheezing.    Cardiovascular: Negative.  Negative for chest pain and leg swelling.   Gastrointestinal: Negative.  Negative for abdominal distention, abdominal pain, diarrhea and nausea.   Endocrine: Negative.  Negative for cold intolerance and heat intolerance.   Genitourinary: Negative.  Negative for difficulty urinating, dysuria and urgency.   Musculoskeletal: Negative.  Negative for arthralgias, back pain, gait problem and joint swelling.   Skin: Negative.  Negative for rash.   Allergic/Immunologic: Negative.  Negative for environmental allergies and food allergies.   Neurological: Negative.  Negative for dizziness, weakness, light-headedness and confusion.   Hematological: Negative.  Negative for adenopathy.   Psychiatric/Behavioral: Positive for decreased concentration. Negative for agitation and dysphoric mood. The patient is not nervous/anxious.         Short-term memory problems occasionally with occasional word finding difficulty        Objective    Vitals:    05/13/22 1028   BP: 146/70   BP Location: Right arm   Pulse: 88   Resp: 18   Temp: 97.4 °F (36.3 °C)   TempSrc: Infrared   SpO2: 98%   Weight: 106 kg (233 lb)   Height: 185.4 cm (73\")     BMI Readings from Last 1 Encounters:   05/13/22 30.74 kg/m²   BMI is above normal parameters. Recommendations include: educational material, exercise counseling and nutrition counseling    Does the patient have evidence of cognitive impairment? No    Physical Exam  Constitutional:       Appearance: Normal appearance. He is well-developed. He is obese.   HENT:      Head: Normocephalic and atraumatic.      Right Ear: Tympanic membrane, ear canal and external ear normal.      Left Ear: Tympanic membrane, ear canal and external ear normal.      Nose: Nose normal.      Mouth/Throat:      Mouth: Mucous membranes are moist.      Pharynx: Oropharynx is clear. No oropharyngeal exudate.   Eyes: "      Extraocular Movements: Extraocular movements intact.      Conjunctiva/sclera: Conjunctivae normal.      Pupils: Pupils are equal, round, and reactive to light.   Cardiovascular:      Rate and Rhythm: Normal rate and regular rhythm.      Pulses: Normal pulses.      Heart sounds: Normal heart sounds.   Pulmonary:      Effort: Pulmonary effort is normal.      Breath sounds: Normal breath sounds.   Abdominal:      General: Bowel sounds are normal.      Palpations: Abdomen is soft.      Comments: Slightly overweight.  Abdomen soft   Musculoskeletal:         General: Swelling and deformity present.      Cervical back: Normal range of motion and neck supple.      Comments: Osteoarthritic changes in his knees ankles hands and shoulders.  Also some limited range of motion of his lumbar spine and cervical spine with stiffness.   Skin:     General: Skin is warm and dry.   Neurological:      General: No focal deficit present.      Mental Status: He is alert and oriented to person, place, and time. Mental status is at baseline.   Psychiatric:         Mood and Affect: Mood normal.         Behavior: Behavior normal.         Thought Content: Thought content normal.         Judgment: Judgment normal.       Lab Results   Component Value Date    TRIG 177 (H) 05/09/2022    HDL 41 05/09/2022    LDL 74 05/09/2022    VLDL 30 05/09/2022    HGBA1C 4.7 05/09/2022            HEALTH RISK ASSESSMENT    Smoking Status:  Social History     Tobacco Use   Smoking Status Former Smoker   • Packs/day: 0.00   • Years: 0.50   • Pack years: 0.00   • Types: Cigarettes, Pipe, Cigars   Smokeless Tobacco Never Used   Tobacco Comment    Tried tobacco as child     Alcohol Consumption:  Social History     Substance and Sexual Activity   Alcohol Use Not Currently   • Alcohol/week: 2.0 standard drinks   • Types: 1 Glasses of wine, 1 Cans of beer per week    Comment: Not a regular drinker. Occasionly have a glass of wine or be     Fall Risk Screen:    TRACEY  Fall Risk Assessment was completed, and patient is at LOW risk for falls.Assessment completed on:3/31/2022    Depression Screening:  PHQ-2/PHQ-9 Depression Screening 3/31/2022   Retired PHQ-9 Total Score -   Retired Total Score -   Little Interest or Pleasure in Doing Things 0-->not at all   Feeling Down, Depressed or Hopeless 1-->several days   PHQ-9: Brief Depression Severity Measure Score 1       Health Habits and Functional and Cognitive Screening:  Functional & Cognitive Status 5/13/2022   Do you have difficulty preparing food and eating? No   Do you have difficulty bathing yourself, getting dressed or grooming yourself? No   Do you have difficulty using the toilet? No   Do you have difficulty moving around from place to place? No   Do you have trouble with steps or getting out of a bed or a chair? No   Current Diet Well Balanced Diet   Dental Exam Up to date   Eye Exam Up to date   Exercise (times per week) 2 times per week   Current Exercises Include Walking   Current Exercise Activities Include -   Do you need help using the phone?  No   Are you deaf or do you have serious difficulty hearing?  Yes   Do you need help with transportation? No   Do you need help shopping? No   Do you need help preparing meals?  No   Do you need help with housework?  No   Do you need help with laundry? No   Do you need help taking your medications? No   Do you need help managing money? No   Do you ever drive or ride in a car without wearing a seat belt? No   Have you felt unusual stress, anger or loneliness in the last month? No   Who do you live with? Spouse   If you need help, do you have trouble finding someone available to you? No   Have you been bothered in the last four weeks by sexual problems? -   Do you have difficulty concentrating, remembering or making decisions? Yes       Age-appropriate Screening Schedule:  Refer to the list below for future screening recommendations based on patient's age, sex and/or medical  conditions. Orders for these recommended tests are listed in the plan section. The patient has been provided with a written plan.    Health Maintenance   Topic Date Due   • INFLUENZA VACCINE  08/01/2022   • TDAP/TD VACCINES (2 - Td or Tdap) 12/01/2030   • ZOSTER VACCINE  Completed              Assessment & Plan   CMS Preventative Services Quick Reference  Risk Factors Identified During Encounter  Immunizations Discussed/Encouraged (specific Immunizations; Up to date  The above risks/problems have been discussed with the patient.  Follow up actions/plans if indicated are seen below in the Assessment/Plan Section.  Pertinent information has been shared with the patient in the After Visit Summary.    Diagnoses and all orders for this visit:      1. Medicare annual wellness visit  - Upon arrival to the room the patient underwent the Medicare health risk assessment.  Neither the questions themselves or the answers that were given prompted any major concern on the part of the patient or by the medical staff that gave the assessment.  As far as the preventative care examinations and the preventative care immunizations that this patient requires they are as listed below.   Screening tests recommended:    Colonoscopy--UTD  PSA UTD  Eye- yearly  Dentist- 2 x / year  Derm- yearly for melanoma        Immunization:  Influenza UTD  Prevnar UTD  Pneumovax UTD  Tetanus UTD  Shingles vaccine UTD  Covid -UTD                 2. Prostate cancer  - Dennis was diagnosed with prostate cancer after his visit last year. He has undergone radiation and now he is on antiandrogen treatment. He is feeling well. His PSA is under 1 and the urologist are following closely.    3. Hypertension  - Dennis's blood pressure today was 146/70 mmHg and he is on two medicines. His home readings are even better, so we are not going to change anything with his medicines.    4. Vitamin D deficiency  - He had a normal vitamin D when last checked, so this deficiency is  no longer a problem.    5. Gastroesophageal reflux  - The patient has reflux at times and usually just uses over the counter Prilosec when needed. He uses some antacids at times. It is not anything that is so consistent he needs a daily treatment.    6. Osteoarthritis of both knees  - He has marked arthritis of both knees and his left knee has a large Baker's cyst protruding out the back. He is getting injections by Dr. Cary in the right knee and I want Dr. Cary to check the left knee next time he sees him.    7. Spinal stenosis of the lumbar region without neurogenic claudication  - The patient has back pain several times a week that bothers him, but not bad enough that it stops him from doing anything. We will just follow this over the years.    8. Primary insomnia  - The patient does have trouble sleeping, usually he just uses some over the counter medications and that helps.    9. Heart murmur on physical exam  - This is the first time I remember hearing a loud heart murmur on Dennis. It is a systolic murmur and it might be mitral regurgitation. In any case, I am going to do an echocardiogram and I am going to get him to a cardiologist. He seems to be having some ectopic beats with it and I need to determine exactly what is going on.    10.Ocular Migraine  Patient has been having some visual globes of light that move up from the left high up into his visual field at the top of his eye and then disappear.  These globes of light come and go and he has had these checked out by his eye doctor and no explanation was ever found.  I think he is having ocular migraines.  He has had migraines in the past and I think this is a variation of those even though he does not get a severe pain associated with it.    Follow Up:   Return in about 6 months (around 11/13/2022) for Recheck.     An After Visit Summary and PPPS were made available to the patient.              Transcribed from ambient dictation for Jaret Castellanos  MD by NELDA GIL.  05/13/22   13:31 EDT    Patient verbalized consent to the visit recording.

## 2022-05-13 NOTE — PROGRESS NOTES
The ABCs of the Annual Wellness Visit  Subsequent Medicare Wellness Visit    Chief Complaint   Patient presents with   • Medicare Wellness-subsequent      Subjective    History of Present Illness:  Clint Cee is a 77 y.o. male who presents for a Subsequent Medicare Wellness Visit.  Upon arrival to the room the patient underwent the Medicare health risk assessment.  Neither the questions themselves or the answers that were given prompted any major concern on the part of the patient or by the medical staff that gave the assessment.  As far as the preventative care examinations and the preventative care immunizations that this patient requires they are as listed below.   Screening tests recommended:    Colonoscopy--UTD  PSA UTD  Eye- yearly  Dentist- 2 x / year  Derm- yearly for melanoma        Immunization:  Influenza UTD  Prevnar UTD  Pneumovax UTD  Tetanus UTD  Shingles vaccine UTD  Covid -UTD               The following portions of the patient's history were reviewed and   updated as appropriate: allergies, current medications, past family history, past medical history, past social history, past surgical history and problem list.    Compared to one year ago, the patient feels his physical   health is better.    Compared to one year ago, the patient feels his mental   health is the same.    Recent Hospitalizations:  He was not admitted to the hospital during the last year.       Current Medical Providers:  Patient Care Team:  Jaret Castellanos MD as PCP - General (Family Medicine)    Outpatient Medications Prior to Visit   Medication Sig Dispense Refill   • allopurinol (ZYLOPRIM) 100 MG tablet TAKE 1 TABLET BY MOUTH DAILY 30 tablet 3   • amLODIPine (NORVASC) 10 MG tablet TAKE 1 TABLET BY MOUTH EVERY DAY 90 tablet 0   • DULoxetine (CYMBALTA) 60 MG capsule Take 1 capsule by mouth Daily for 90 days. 90 capsule 3   • fexofenadine (ALLEGRA) 180 MG tablet Take 180 mg by mouth Daily.     • losartan (COZAAR) 50 MG  tablet TAKE 1 TABLET BY MOUTH DAILY 90 tablet 0     No facility-administered medications prior to visit.       No opioid medication identified on active medication list. I have reviewed chart for other potential  high risk medication/s and harmful drug interactions in the elderly.          Aspirin is not on active medication list.  Aspirin use is not indicated based on review of current medical condition/s. Risk of harm outweighs potential benefits.  .    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  ACP discussion was held with the patient during this visit. Patient has an advance directive (not in EMR), copy requested.          Objective    There were no vitals filed for this visit.  BMI Readings from Last 1 Encounters:   03/31/22 30.48 kg/m²   BMI is above normal parameters. Recommendations include: educational material, exercise counseling and nutrition counseling    Does the patient have evidence of cognitive impairment? No    Physical Exam  Constitutional:       Appearance: Normal appearance. He is well-developed and normal weight.   HENT:      Head: Normocephalic and atraumatic.      Right Ear: Tympanic membrane, ear canal and external ear normal.      Left Ear: Tympanic membrane, ear canal and external ear normal.      Nose: Nose normal.      Mouth/Throat:      Mouth: Mucous membranes are moist.      Pharynx: Oropharynx is clear. No oropharyngeal exudate.   Eyes:      Extraocular Movements: Extraocular movements intact.      Conjunctiva/sclera: Conjunctivae normal.       Pupils: Pupils are equal, round, and reactive to light.   Cardiovascular:      Rate and Rhythm: Normal rate and regular rhythm.      Pulses: Normal pulses.      Heart sounds: Normal heart sounds.   Pulmonary:      Effort: Pulmonary effort is normal.      Breath sounds: Normal breath sounds.   Abdominal:      General: Bowel sounds are normal.      Palpations: Abdomen is soft.   Musculoskeletal:         General: Normal range of motion.      Cervical back: Normal range of motion and neck supple.   Skin:     General: Skin is warm and dry.   Neurological:      General: No focal deficit present.      Mental Status: He is alert and oriented to person, place, and time. Mental status is at baseline.   Psychiatric:         Mood and Affect: Mood normal.         Behavior: Behavior normal.         Thought Content: Thought content normal.         Judgment: Judgment normal.       Lab Results   Component Value Date    TRIG 177 (H) 05/09/2022    HDL 41 05/09/2022    LDL 74 05/09/2022    VLDL 30 05/09/2022    HGBA1C 4.7 05/09/2022            HEALTH RISK ASSESSMENT    Smoking Status:  Social History     Tobacco Use   Smoking Status Former Smoker   • Packs/day: 0.00   • Years: 0.50   • Pack years: 0.00   • Types: Cigarettes, Pipe, Cigars   Smokeless Tobacco Never Used   Tobacco Comment    Tried tobacco as child     Alcohol Consumption:  Social History     Substance and Sexual Activity   Alcohol Use Not Currently   • Alcohol/week: 2.0 standard drinks   • Types: 1 Glasses of wine, 1 Cans of beer per week    Comment: Not a regular drinker. Occasionly have a glass of wine or be     Fall Risk Screen:    [facility], and patient is at LOW risk for falls.Assessment completed on:3/31/2022    Depression Screening:  PHQ-2/PHQ-9 Depression Screening 3/31/2022   Retired PHQ-9 Total Score -   Retired Total Score -   Little Interest or Pleasure in Doing Things 0-->not at all   Feeling Down, Depressed or Hopeless  1-->several days   PHQ-9: Brief Depression Severity Measure Score 1       Health Habits and Functional and Cognitive Screening:  Functional & Cognitive Status 5/10/2021   Do you have difficulty preparing food and eating? No   Do you have difficulty bathing yourself, getting dressed or grooming yourself? No   Do you have difficulty using the toilet? No   Do you have difficulty moving around from place to place? No   Do you have trouble with steps or getting out of a bed or a chair? No   Current Diet Well Balanced Diet   Dental Exam Up to date   Eye Exam Up to date   Exercise (times per week) 2 times per week   Current Exercise Activities Include Walking   Do you need help using the phone?  No   Are you deaf or do you have serious difficulty hearing?  Yes   Do you need help with transportation? No   Do you need help shopping? No   Do you need help preparing meals?  No   Do you need help with housework?  No   Do you need help with laundry? No   Do you need help taking your medications? No   Do you need help managing money? No   Do you ever drive or ride in a car without wearing a seat belt? No   Have you felt unusual stress, anger or loneliness in the last month? No   Who do you live with? Spouse   If you need help, do you have trouble finding someone available to you? No   Have you been bothered in the last four weeks by sexual problems? -   Do you have difficulty concentrating, remembering or making decisions? Yes       Age-appropriate Screening Schedule:  Refer to the list below for future screening recommendations based on patient's age, sex and/or medical conditions. Orders for these recommended tests are listed in the plan section. The patient has been provided with a written plan.    Health Maintenance   Topic Date Due   • INFLUENZA VACCINE  08/01/2022   • TDAP/TD VACCINES (2 - Td or Tdap) 12/01/2030   • ZOSTER VACCINE  Completed              Assessment & Plan   CMS Preventative Services Quick Reference  Risk  Factors Identified During Encounter  Immunizations Discussed/Encouraged (specific Immunizations; Up to date  The above risks/problems have been discussed with the patient.  Follow up actions/plans if indicated are seen below in the Assessment/Plan Section.  Pertinent information has been shared with the patient in the After Visit Summary.    Diagnoses and all orders for this visit:    1. Medicare annual wellness visit, subsequent (Primary)        Follow Up:   No follow-ups on file.     An After Visit Summary and PPPS were made available to the patient.

## 2022-05-21 PROBLEM — G43.109 OCULAR MIGRAINE: Status: ACTIVE | Noted: 2022-05-21

## 2022-05-21 PROBLEM — Z00.00 MEDICARE ANNUAL WELLNESS VISIT, SUBSEQUENT: Status: ACTIVE | Noted: 2022-05-21

## 2022-05-21 PROBLEM — K63.5 COLON POLYP: Status: ACTIVE | Noted: 2018-01-01

## 2022-05-30 RX ORDER — TRIAMTERENE AND HYDROCHLOROTHIAZIDE 75; 50 MG/1; MG/1
TABLET ORAL
Qty: 90 TABLET | Refills: 0 | OUTPATIENT
Start: 2022-05-30

## 2022-05-30 RX ORDER — AMLODIPINE BESYLATE 10 MG/1
TABLET ORAL
Qty: 90 TABLET | Refills: 0 | Status: SHIPPED | OUTPATIENT
Start: 2022-05-30 | End: 2022-08-26

## 2022-06-13 ENCOUNTER — TELEPHONE (OUTPATIENT)
Dept: FAMILY MEDICINE CLINIC | Facility: CLINIC | Age: 77
End: 2022-06-13

## 2022-06-13 DIAGNOSIS — Z13.6 SCREENING FOR AAA (AORTIC ABDOMINAL ANEURYSM): ICD-10-CM

## 2022-06-13 DIAGNOSIS — Z11.59 ENCOUNTER FOR HEPATITIS C SCREENING TEST FOR LOW RISK PATIENT: Primary | ICD-10-CM

## 2022-06-13 RX ORDER — LOSARTAN POTASSIUM 50 MG/1
50 TABLET ORAL DAILY
Qty: 90 TABLET | Refills: 0 | Status: SHIPPED | OUTPATIENT
Start: 2022-06-13 | End: 2022-09-02

## 2022-06-13 NOTE — TELEPHONE ENCOUNTER
I have ordered both tests.   Just have him do them sometime.   PEDIATRIC ILLNESS VISIT 2/6/2019   Roomed by: Ginny Matias MA 9:01 AM   Rooming notes   Accompanied by: mother/Junie Blanchard is a 9 year old female who is complaining of hives since Friday afternoon off and on, better when has Benadryl. She had 10 ml on Monday afternoon.  Wants letter for school, saying can take Benadryl when needed.  She had peanut M&M's on Friday at school and hives set in after, also return on Monday at school, none n weekend.   She woke up this am and says she feels warm, stomach hurts, nausea, and headache, she also said last night legs felt wobbly.  Triage note-. Mom states daughter had scratch test and negative peanuts which is what they thought  Been having some breakouts with \"hives\" slight raised bumps and itch,do go away and then\" days\" later come back  In a different spot,.  Hive happened last week on one day(mom thinking Friday).Then today now back today Are itchy and mom gave benadryl just given.No sob, no facial  Unknown cause. Hives are on legs, stomach ,arms,forehead  Not ill, no other symptoms        Social history:2nd grade     Allergies:     ALLERGIES:  Peanut - dietary use only   Current Outpatient Medications   Medication Sig Dispense Refill   • EPINEPHrine (EPIPEN JR 2-KASHMIR) 0.15 MG/0.3ML Solution Auto-injector Inject 0.3 mLs into the muscle as needed for Anaphylaxis. Use in case of allergic reaction. 2 each 1     No current facility-administered medications for this visit.          The following portions of the patient's history were reviewed including the  nurses notes and the following were updated as appropriate: allergies, current medications, past family history, past medical history, past social history and past surgical history.    Physician notes:     Pee Blanchard is a 9 year old female who is complaining of hives.   Has had random episodes of hives for the past few years - at first thought might be food related but did see allergist and work up  was negative- since then every 4-6 weeks will just develop hives-  Mom gives a dose of benadryl and they resolved.  Never any respiratory symptoms, no swelling just hives  Does not seem to have any triggers-  Will occur at home, school , random places,   Does not seem associated with illness, fever, temperatures, stress     Mom is concerned because in the past 5 days has had two episodes of hives -  Both occurred at school -  Both resolved with benadryl   No recent illness, no fever  No complaints of joint pain, no other rash  No new foods, detergents,   No travel             OBJECTIVE:     Physical Exam   Visit Vitals  BP 98/68 (BP Location: [facility], Patient Position: Sitting, Cuff Size: Pediatric)   Pulse 88   Temp 97.7 °F (36.5 °C) (Temporal)   Resp 18   Ht 4' 1\" (1.245 m)   Wt 35.1 kg   BMI 22.66 kg/m²          Examination of the patient reveals:     GENERAL: appears stated age, well developed and well nourished, in no distress and normal affect  LYMPH NODES: no cervical adenopathy, no supraclavicular adenopathy, no axillary adenopathy and no inguinal adenopathy  SKIN normal color, normal texture, normal turgor, no skin rashes, no atypical appearing skin lesions and no bruises  EYES: pupils are equal and reactive to light and accommodation extraocular movements are full sclerae and conjunctivae are normal lids and lashes are normal  EARS: pinna and external ear is normal bilaterally, external auditory canals are normal, tympanic membranes are normal, middle ear space is normal bilaterally and there is no discomfort on traction of the pinna or palpation of the tragus  NOSE: external nose is normal to inspection and anterior nares are normal  MOUTH/THROAT: tongue is midline and appears normal, oropharynx appears normal, soft palate and uvula are normal, oral mucosa is normal, gums and teeth appear normal and oral mucosa moist and intact without thrush or mucositis  NECK: neck is supple, no thyromegaly, no anterior  cervical adenopathy, no posterior cervical adenopathy and no supraclavicular adenopathy  CHEST: contour is normal with normal AP diameter, normal respiratory excursion and respiratory effort is not labored  LUNGS: lungs are clear to auscultation with normal inspiratory/expiratory sounds, no rales, no rhonchi and no wheezes  HEART: normal PMI, normal rate and rhythm, S1 and S2 normal, no S3 or S4, no murmurs and no extra heart sounds  ABDOMEN: abdomen is soft, normal active bowel sounds, nontender, without masses, without hepatomegaly and without splenomegaly  NEUROLOGIC: motor strength normal, gait and station normal, coordination normal, DTR's normal and symmetric and no tremor noted  EXTREMITIES: no clubbing, no edema and normal muscle tone and development bilaterally            ASSESSMENT/PLAN     Recurring hives-  Will check screening labs for chronic recurring urtiticia,  Dicussed seeing allergy/immunnology again -  Mom is comfortable at this time if lab work is within normal limits to monitor  closely           Immunizations given today?No Patient WIR reviewed.      Was the flu shot offered?  NO  Was the flu shot discussed with the provider? No    See Orders:   Instructed to call if the problem worsens or does not improve within the next 24 to 48 hours.   Schedule follow-up:PRN

## 2022-06-13 NOTE — TELEPHONE ENCOUNTER
Received the following TWO MyChart messages from patient:    With Provider: Jaret Castellanos MD [-Primary Care Physician-]     Preferred Date Range: Any date 6/13/2022 or later     Preferred Times: Any     Reason: To address the following health maintenance concerns.  Hepatitis C Screening     Comments:  Im not sure if i need this. Im not at highy risk for hep C.      AND    With Provider: Jaret Castellanos MD [-Primary Care Physician-]     Preferred Date Range: Any date 6/13/2022 or later     Preferred Times: Any     Reason: To address the following health maintenance concerns.  Aaa Screen (One-Time)         What do I tell patient?  He does not have an appt scheduled with [facility] for the future.

## 2022-06-14 NOTE — TELEPHONE ENCOUNTER
Left detailed message on patient's voice mail at 10:23am.  Someone from [facility] will contact him to schedule the AAA ultrasound and he can have the lab work done at [facility] or here.

## 2022-06-15 ENCOUNTER — HOSPITAL ENCOUNTER (OUTPATIENT)
Dept: CARDIOLOGY | Facility: HOSPITAL | Age: 77
Discharge: HOME | End: 2022-06-15
Admitting: FAMILY MEDICINE

## 2022-06-15 VITALS
BODY MASS INDEX: 30.88 KG/M2 | HEIGHT: 73 IN | DIASTOLIC BLOOD PRESSURE: 74 MMHG | SYSTOLIC BLOOD PRESSURE: 151 MMHG | WEIGHT: 233 LBS

## 2022-06-15 DIAGNOSIS — M17.0 PRIMARY OSTEOARTHRITIS OF BOTH KNEES: ICD-10-CM

## 2022-06-15 DIAGNOSIS — F51.01 PRIMARY INSOMNIA: ICD-10-CM

## 2022-06-15 DIAGNOSIS — I10 PRIMARY HYPERTENSION: ICD-10-CM

## 2022-06-15 DIAGNOSIS — C61 PROSTATE CANCER: ICD-10-CM

## 2022-06-15 DIAGNOSIS — R01.1 HEART MURMUR ON PHYSICAL EXAMINATION: ICD-10-CM

## 2022-06-15 DIAGNOSIS — K21.9 GASTROESOPHAGEAL REFLUX DISEASE WITHOUT ESOPHAGITIS: ICD-10-CM

## 2022-06-15 DIAGNOSIS — M48.061 SPINAL STENOSIS OF LUMBAR REGION WITHOUT NEUROGENIC CLAUDICATION: ICD-10-CM

## 2022-06-15 DIAGNOSIS — Z00.00 MEDICARE ANNUAL WELLNESS VISIT, SUBSEQUENT: ICD-10-CM

## 2022-06-15 DIAGNOSIS — E55.9 VITAMIN D DEFICIENCY: ICD-10-CM

## 2022-06-15 PROCEDURE — 93306 TTE W/DOPPLER COMPLETE: CPT | Performed by: INTERNAL MEDICINE

## 2022-06-15 PROCEDURE — 93306 TTE W/DOPPLER COMPLETE: CPT

## 2022-06-16 LAB
BH CV ECHO MEAS - ACS: 2.6 CM
BH CV ECHO MEAS - AO MAX PG: 4.1 MMHG
BH CV ECHO MEAS - AO MEAN PG: 2.6 MMHG
BH CV ECHO MEAS - AO ROOT DIAM: 4.3 CM
BH CV ECHO MEAS - AO V2 MAX: 101.6 CM/SEC
BH CV ECHO MEAS - AO V2 VTI: 19.9 CM
BH CV ECHO MEAS - AVA(I,D): 3.6 CM2
BH CV ECHO MEAS - EDV(CUBED): 101 ML
BH CV ECHO MEAS - EDV(MOD-SP4): 180.1 ML
BH CV ECHO MEAS - EF(MOD-BP): 59 %
BH CV ECHO MEAS - EF(MOD-SP4): 58.5 %
BH CV ECHO MEAS - ESV(CUBED): 37.6 ML
BH CV ECHO MEAS - ESV(MOD-SP4): 74.7 ML
BH CV ECHO MEAS - FS: 28.1 %
BH CV ECHO MEAS - IVS/LVPW: 1.01 CM
BH CV ECHO MEAS - IVSD: 1.19 CM
BH CV ECHO MEAS - LA A2CS (ATRIAL LENGTH): 5.2 CM
BH CV ECHO MEAS - LV DIASTOLIC VOL/BSA (35-75): 78.4 CM2
BH CV ECHO MEAS - LV MASS(C)D: 204.6 GRAMS
BH CV ECHO MEAS - LV MAX PG: 2.7 MMHG
BH CV ECHO MEAS - LV MEAN PG: 1.53 MMHG
BH CV ECHO MEAS - LV SYSTOLIC VOL/BSA (12-30): 32.5 CM2
BH CV ECHO MEAS - LV V1 MAX: 82.5 CM/SEC
BH CV ECHO MEAS - LV V1 VTI: 15.4 CM
BH CV ECHO MEAS - LVIDD: 4.7 CM
BH CV ECHO MEAS - LVIDS: 3.3 CM
BH CV ECHO MEAS - LVOT AREA: 4.6 CM2
BH CV ECHO MEAS - LVOT DIAM: 2.41 CM
BH CV ECHO MEAS - LVPWD: 1.18 CM
BH CV ECHO MEAS - MV A MAX VEL: 114.1 CM/SEC
BH CV ECHO MEAS - MV DEC SLOPE: 533.3 CM/SEC2
BH CV ECHO MEAS - MV DEC TIME: 0.25 MSEC
BH CV ECHO MEAS - MV E MAX VEL: 132.7 CM/SEC
BH CV ECHO MEAS - MV E/A: 1.16
BH CV ECHO MEAS - MV MAX PG: 7.6 MMHG
BH CV ECHO MEAS - MV MEAN PG: 2.5 MMHG
BH CV ECHO MEAS - MV V2 VTI: 39.1 CM
BH CV ECHO MEAS - MVA(VTI): 1.8 CM2
BH CV ECHO MEAS - PA ACC TIME: 0.06 SEC
BH CV ECHO MEAS - PA PR(ACCEL): 51.6 MMHG
BH CV ECHO MEAS - PA V2 MAX: 87.4 CM/SEC
BH CV ECHO MEAS - PI END-D VEL: 121.1 CM/SEC
BH CV ECHO MEAS - RAP SYSTOLE: 3 MMHG
BH CV ECHO MEAS - RV MAX PG: 1.58 MMHG
BH CV ECHO MEAS - RV V1 MAX: 62.9 CM/SEC
BH CV ECHO MEAS - RV V1 VTI: 13.3 CM
BH CV ECHO MEAS - RVDD: 3.7 CM
BH CV ECHO MEAS - RVSP: 35.7 MMHG
BH CV ECHO MEAS - SI(MOD-SP4): 45.9 ML/M2
BH CV ECHO MEAS - SV(LVOT): 70.6 ML
BH CV ECHO MEAS - SV(MOD-SP4): 105.4 ML
BH CV ECHO MEAS - TR MAX PG: 32.7 MMHG
BH CV ECHO MEAS - TR MAX VEL: 279.9 CM/SEC
LV EF 2D ECHO EST: 60 %
MAXIMAL PREDICTED HEART RATE: 145 BPM
STRESS TARGET HR: 123 BPM

## 2022-06-19 ENCOUNTER — HOSPITAL ENCOUNTER (EMERGENCY)
Facility: HOSPITAL | Age: 77
Discharge: HOME | End: 2022-06-19
Attending: EMERGENCY MEDICINE | Admitting: EMERGENCY MEDICINE

## 2022-06-19 VITALS
DIASTOLIC BLOOD PRESSURE: 70 MMHG | RESPIRATION RATE: 16 BRPM | SYSTOLIC BLOOD PRESSURE: 132 MMHG | BODY MASS INDEX: 31.32 KG/M2 | HEART RATE: 66 BPM | OXYGEN SATURATION: 98 % | HEIGHT: 73 IN | TEMPERATURE: 97.8 F | WEIGHT: 236.33 LBS

## 2022-06-19 DIAGNOSIS — S01.81XA FACIAL LACERATION, INITIAL ENCOUNTER: Primary | ICD-10-CM
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
## 2022-06-19 PROCEDURE — 99283 EMERGENCY DEPT VISIT LOW MDM: CPT

## 2022-06-19 NOTE — DISCHARGE INSTRUCTIONS
Follow-up with your primary doctor as needed.  Return to the emergency room for any new or worsening symptoms or if you have any other questions or concerns.  Apply bacitracin twice daily until healed.  Sutures are to be removed in 5 to 7 days.

## 2022-06-19 NOTE — ED PROVIDER NOTES
Subjective   Chief complaint: Facial laceration    77-year-old male presents with a facial laceration.  Patient states he rolled out of bed and hit his face on a side table at around 5 AM this morning.  He sustained a laceration to his left cheek.  He denies any loss of consciousness.  He reports mild pain.  He does not take any blood thinners.  He states his tetanus shot is up-to-date.      History provided by:  Patient      Review of Systems   Constitutional: Negative for fever.   HENT: Negative for congestion.    Respiratory: Negative for shortness of breath.    Cardiovascular: Negative for chest pain.   Skin: Positive for wound.   Neurological: Negative for headaches.   Psychiatric/Behavioral: Negative for confusion.       Past Medical History:   Diagnosis Date   • Allergic 01/01/1952    Nasal alergies   • Benign prostatic hyperplasia 11/14/2018   • Cataract 01/01/2014    Surgery. Caused detached retina of right eye 20/60 repair   • Colon polyp 01/01/2018    Found and removed last colon eca.q   • Gastroesophageal reflux disease 3/20/2014   • Headache 01/01/2015    Have shimmering in both eyes intermittantly. No headaches   • HL (hearing loss) 01/01/2012    Have hearing aids   • Hypertension 12/2/2011   • Insomnia 12/2/2011   • Mood disorder (HCC) 9/19/2013   • Polyosteoarthritis 12/2/2011   • Scoliosis 5/9/2014   • Spinal stenosis 12/2/2011   • Visual impairment 01/01/1955    Nearsighted corrected glasses   • Vitamin D deficiency 5/23/2018       No Known Allergies    Past Surgical History:   Procedure Laterality Date   • ADENOIDECTOMY  1954    As child   • APPENDECTOMY  1956    Surgery   • COLONOSCOPY  01/01/1997    Regularly scheduled   • EYE SURGERY  01/01/2010    Reattached right retina   • SPINE SURGERY  01/01/1971    2 lumbar 1 cervical   • TONSILLECTOMY  01/01/1950    Childhood       Family History   Problem Relation Age of Onset   • Arthritis Father    • Cancer Father         Prostate   • Diabetes Mother   "       Adult diabetes   • Early death Mother         Kidneys failed after giving birth-diabetes complications       Social History     Socioeconomic History   • Marital status:    Tobacco Use   • Smoking status: Former Smoker     Packs/day: 0.00     Years: 0.50     Pack years: 0.00     Types: Cigarettes, Pipe, Cigars   • Smokeless tobacco: Never Used   • Tobacco comment: Tried tobacco as child   Vaping Use   • Vaping Use: Never used   Substance and Sexual Activity   • Alcohol use: Not Currently     Alcohol/week: 2.0 standard drinks     Types: 1 Glasses of wine, 1 Cans of beer per week     Comment: Not a regular drinker. Occasionly have a glass of wine or be   • Drug use: Never   • Sexual activity: Not Currently     Partners: Female     Birth control/protection: None     Comment: Chemically castrated. Prostate cancer       /71   Pulse 65   Temp 97.9 °F (36.6 °C) (Oral)   Resp 16   Ht 185.4 cm (73\")   Wt 107 kg (236 lb 5.3 oz)   SpO2 98%   BMI 31.18 kg/m²       Objective   Physical Exam  Vitals and nursing note reviewed.   Constitutional:       Appearance: Normal appearance.   HENT:      Head: Normocephalic.      Comments: 4.5 cm curved laceration to the left cheek.  Bleeding controlled.  There is a small amount of ecchymosis below the left eye.  Minimal tenderness to the left cheek on palpation.     Mouth/Throat:      Mouth: Mucous membranes are moist.   Cardiovascular:      Rate and Rhythm: Normal rate and regular rhythm.   Pulmonary:      Effort: Pulmonary effort is normal. No respiratory distress.   Skin:     General: Skin is warm and dry.   Neurological:      General: No focal deficit present.      Mental Status: He is alert and oriented to person, place, and time.         Laceration Repair    Date/Time: 6/19/2022 3:10 PM  Performed by: Praful Cuba MD  Authorized by: Praful Cuba MD     Consent:     Consent obtained:  Verbal    Consent given by:  Patient  Anesthesia:     Anesthesia " method:  Local infiltration    Local anesthetic:  Lidocaine 2% WITH epi (3 cc)  Laceration details:     Location:  Face    Face location:  L cheek    Length (cm):  4.5  Pre-procedure details:     Preparation:  Patient was prepped and draped in usual sterile fashion  Exploration:     Wound exploration: entire depth of wound visualized    Treatment:     Area cleansed with:  Saline    Amount of cleaning:  Extensive    Irrigation solution:  Sterile saline    Irrigation method:  Pressure wash  Skin repair:     Repair method:  Sutures    Suture size:  5-0    Suture material:  Nylon    Suture technique:  Simple interrupted    Number of sutures:  9  Approximation:     Approximation:  Close  Repair type:     Repair type:  Simple  Post-procedure details:     Dressing:  Antibiotic ointment    Procedure completion:  Tolerated well, no immediate complications               ED Course                                                 MDM   Laceration was repaired as above.  Tetanus shot is up-to-date.  Patient is stable for discharge.      Final diagnoses:   Facial laceration, initial encounter   Fall, initial encounter       ED Disposition  ED Disposition     ED Disposition   Discharge    Condition   Stable    Comment   --             Jaret Castellanos MD  [address] [facility]   [facility] [address]  [facility] [place] [facility]  [phone]    Call in 2 days           Medication List      No changes were made to your prescriptions during this visit.          Praful Cuba MD  06/19/22 [id number]

## 2022-06-25 ENCOUNTER — HOSPITAL ENCOUNTER (OUTPATIENT)
Dept: ULTRASOUND IMAGING | Facility: HOSPITAL | Age: 77
Discharge: HOME | End: 2022-06-25
Admitting: FAMILY MEDICINE

## 2022-06-25 DIAGNOSIS — Z13.6 SCREENING FOR AAA (AORTIC ABDOMINAL ANEURYSM): ICD-10-CM

## 2022-06-25 PROCEDURE — 76706 US ABDL AORTA SCREEN AAA: CPT

## 2022-06-28 ENCOUNTER — OFFICE VISIT (OUTPATIENT)
Dept: CARDIOLOGY | Facility: CLINIC | Age: 77
End: 2022-06-28

## 2022-06-28 VITALS
SYSTOLIC BLOOD PRESSURE: 146 MMHG | HEIGHT: 73 IN | WEIGHT: 237 LBS | BODY MASS INDEX: 31.41 KG/M2 | HEART RATE: 56 BPM | OXYGEN SATURATION: 98 % | DIASTOLIC BLOOD PRESSURE: 69 MMHG

## 2022-06-28 DIAGNOSIS — R01.1 HEART MURMUR ON PHYSICAL EXAMINATION: ICD-10-CM

## 2022-06-28 DIAGNOSIS — I34.0 NONRHEUMATIC MITRAL VALVE REGURGITATION: ICD-10-CM

## 2022-06-28 DIAGNOSIS — I49.3 PREMATURE VENTRICULAR CONTRACTIONS: ICD-10-CM

## 2022-06-28 DIAGNOSIS — I10 PRIMARY HYPERTENSION: Primary | ICD-10-CM

## 2022-06-28 DIAGNOSIS — I34.1 MITRAL VALVE PROLAPSE: ICD-10-CM

## 2022-06-28 DIAGNOSIS — I10 ESSENTIAL HYPERTENSION: ICD-10-CM

## 2022-06-28 PROCEDURE — 93000 ELECTROCARDIOGRAM COMPLETE: CPT | Performed by: INTERNAL MEDICINE

## 2022-06-28 PROCEDURE — 99204 OFFICE O/P NEW MOD 45 MIN: CPT | Performed by: INTERNAL MEDICINE

## 2022-06-28 RX ORDER — ZOLPIDEM TARTRATE 10 MG/1
0.5 TABLET ORAL NIGHTLY PRN
COMMUNITY
Start: 2015-11-18 | End: 2023-02-13

## 2022-06-28 NOTE — PROGRESS NOTES
Encounter Date:06/28/2022      Patient ID: Clint Cee is a 77 y.o. male.    Chief Complaint:  Mitral valve prolapse  Mitral regurgitation  Systolic murmur  Hypertension      History of Present Illness  The patient is a pleasant 77-year-old white male is here for evaluation of above problems.  Patient is essentially asymptomatic from cardiovascular standpoint except for occasional palpitations and mild shortness of breath.  Patient has tiredness.  Patient recently was found to have heart murmur and subsequently had an echocardiogram that was abnormal and patient was referred here for further evaluation.  Patient used to see Dr. Youngblood in the past.    Patient is not having any dizziness or syncope.  Denies having any headache ,abdominal pain ,nausea, vomiting , diarrhea constipation, loss of weight or loss of appetite.  Denies having any excessive bruising ,hematuria or blood in the stool.    Review of all systems negative except as indicated.    Reviewed ROS.    Assessment and Plan     ]]]]]]]]]]]]]]]]]]]]]]  Impression  ===========  - Mild shortness of breath    -Moderate to significant mitral regurgitation.  Prominent posterior mitral leaflet prolapse    Echocardiogram 6/15/2022   prominent posterior mitral leaflet prolapse.  Moderate to significant mitral regurgitation.  Thickened aortic valve with adequate opening motion.  Left atrial enlargement.  Normal left ventricular size and contractility with ejection fraction of 60%.    - Premature ventricular contractions.  Occasional palpitations    - Hypertension vitamin D deficiency GERD ocular migraine.    - History of prostate cancer.  Status post radiation therapy.  Bone scan was negative for any spread.    - Status post appendectomy spine surgery adenoidectomy tonsillectomy    - Family history of prostate cancer    - Former smoker    - No known allergies  ============  Plan  ===========  Patient has moderate to significant mitral regurgitation and prominent  mitral valve prolapse.  Patient is relatively asymptomatic except for occasional palpitations and mild shortness of breath.  EKG showed sinus rhythm premature ventricular contractions.  Patient is taking baby aspirin.  I had a lengthy discussion with him about mitral valve prolapse and mitral regurgitation.  Patient was educated regarding the symptoms to watch for.  Follow-up in the office in 3 months with an echocardiogram.  Consideration for further evaluation and mitral valve intervention depending on patient's progress and symptoms.  Further plan will depend on patient's progress.  ]]]]]]]]]]]]]]]]                              Diagnosis Plan   1. Primary hypertension  Adult Transthoracic Echo Complete W/ Cont if Necessary Per Protocol    ECG 12 Lead   2. Heart murmur on physical examination  Adult Transthoracic Echo Complete W/ Cont if Necessary Per Protocol    ECG 12 Lead   3. Mitral valve prolapse  Adult Transthoracic Echo Complete W/ Cont if Necessary Per Protocol    ECG 12 Lead   4. Nonrheumatic mitral valve regurgitation  Adult Transthoracic Echo Complete W/ Cont if Necessary Per Protocol    ECG 12 Lead   5. Essential hypertension  Adult Transthoracic Echo Complete W/ Cont if Necessary Per Protocol    ECG 12 Lead   6. Premature ventricular contractions  Adult Transthoracic Echo Complete W/ Cont if Necessary Per Protocol    ECG 12 Lead   LAB RESULTS (LAST 7 DAYS)    CBC        BMP        CMP         BNP        TROPONIN        CoAg        Creatinine Clearance  CrCl cannot be calculated (Patient's most recent lab result is older than the maximum 30 days allowed.).    ABG        Radiology  No radiology results for the last day                The following portions of the patient's history were reviewed and updated as appropriate: allergies, current medications, past family history, past medical history, past social history, past surgical history and problem list.    Review of Systems   Constitutional: Negative  for fever and malaise/fatigue.   Cardiovascular: Positive for palpitations. Negative for chest pain and dyspnea on exertion.   Respiratory: Negative for cough and shortness of breath.    Skin: Negative for rash.   Gastrointestinal: Negative for abdominal pain, nausea and vomiting.   Neurological: Negative for focal weakness and headaches.   All other systems reviewed and are negative.        Current Outpatient Medications:   •  allopurinol (ZYLOPRIM) 100 MG tablet, TAKE 1 TABLET BY MOUTH DAILY, Disp: 30 tablet, Rfl: 3  •  amLODIPine (NORVASC) 10 MG tablet, TAKE 1 TABLET BY MOUTH EVERY DAY, Disp: 90 tablet, Rfl: 0  •  fexofenadine (ALLEGRA) 180 MG tablet, Take 180 mg by mouth Daily., Disp: , Rfl:   •  losartan (COZAAR) 50 MG tablet, TAKE 1 TABLET BY MOUTH DAILY, Disp: 90 tablet, Rfl: 0  •  zolpidem (AMBIEN) 10 MG tablet, Take 0.5 tablets by mouth At Night As Needed., Disp: , Rfl:   •  DULoxetine (CYMBALTA) 60 MG capsule, Take 1 capsule by mouth Daily for 90 days., Disp: 90 capsule, Rfl: 3    No Known Allergies    Family History   Problem Relation Age of Onset   • Arthritis Father    • Cancer Father         Prostate   • Arrhythmia Father    • Diabetes Mother         Adult diabetes   • Early death Mother         Kidneys failed after giving birth-diabetes complications       Past Surgical History:   Procedure Laterality Date   • ADENOIDECTOMY  1954    As child   • APPENDECTOMY  1956    Surgery   • COLONOSCOPY  01/01/1997    Regularly scheduled   • EYE SURGERY  01/01/2010    Reattached right retina   • SPINE SURGERY  01/01/1971    2 lumbar 1 cervical   • TONSILLECTOMY  01/01/1950    Childhood       Past Medical History:   Diagnosis Date   • Abnormal ECG June 2022   • Allergic 01/01/1954    Nasal alergies   • Arrhythmia 2004    Dr Youngblood examined me and said i was ok.   • Benign prostatic hyperplasia 11/14/2018   • Cancer (HCC) Jan22 January 2022   • Cataract 01/01/2014    Surgery. Caused detached retina of right eye  20/60 repair   • Colon polyp 01/01/2018    Found and removed last colon [email or web address]   • Gastroesophageal reflux disease 03/20/2014   • Headache 01/01/2015    Have shimmering in both eyes intermittantly. No headaches   • Heart murmur May 2022    During wellness exam   • Heart valve disease July 2022    During Echo   • HL (hearing loss) 01/01/2012    Have hearing aids   • Hypertension 12/02/2011   • Insomnia 12/02/2011   • Mitral valve prolapse June 2022   • Mood disorder (HCC) 09/19/2013   • Polyosteoarthritis 12/02/2011   • Scoliosis 05/09/2014   • Spinal stenosis 12/02/2011   • Visual impairment 01/01/1955    Nearsighted corrected glasses   • Vitamin D deficiency 05/23/2018       Family History   Problem Relation Age of Onset   • Arthritis Father    • Cancer Father         Prostate   • Arrhythmia Father    • Diabetes Mother         Adult diabetes   • Early death Mother         Kidneys failed after giving birth-diabetes complications       Social History     Socioeconomic History   • Marital status:    Tobacco Use   • Smoking status: Former Smoker     Packs/day: 0.00     Years: 0.50     Pack years: 0.00     Types: Cigarettes, Pipe, Cigars   • Smokeless tobacco: Never Used   • Tobacco comment: Tried tobacco as child   Vaping Use   • Vaping Use: Never used   Substance and Sexual Activity   • Alcohol use: Not Currently     Alcohol/week: 1.0 standard drink     Types: 1 Cans of beer per week     Comment: Not a regular drinker. Occasionly have a glass of wine or be   • Drug use: Never   • Sexual activity: Not Currently     Partners: Female     Birth control/protection: Post-menopausal, None     Comment: Chemically castrated. Prostate cancer           ECG 12 Lead    Date/Time: 6/28/2022 4:26 PM  Performed by: Felipe Garcia MD  Authorized by: Felipe Garcia MD   Comparison: compared with previous ECG   Comparison to previous ECG: Normal sinus rhythm premature ventricular contractions 65/min nonspecific ST-T wave  "changes normal axis normal intervals.  No prior tracing for comparison.                Objective:       Physical Exam    /69   Pulse 56   Ht 185.4 cm (73\")   Wt 108 kg (237 lb)   SpO2 98%   BMI 31.27 kg/m²   The patient is alert, oriented and in no distress.    Vital signs as noted above.    Head and neck revealed no carotid bruits or jugular venous distension.  No thyromegaly or lymphadenopathy is present.    Lungs clear.  No wheezing.  Breath sounds are normal bilaterally.    Heart normal first and second heart sounds.  Grade 3/6 to 4/6 systolic murmur..  No pericardial rub is present.  No gallop is present.    Abdomen soft and nontender.  No organomegaly is present.    Extremities revealed good peripheral pulses without any pedal edema.    Skin warm and dry.    Musculoskeletal system is grossly normal.    CNS grossly normal.    Reviewed and updated.        "

## 2022-06-29 ENCOUNTER — PATIENT ROUNDING (BHMG ONLY) (OUTPATIENT)
Dept: CARDIOLOGY | Facility: CLINIC | Age: 77
End: 2022-06-29

## 2022-06-29 NOTE — PROGRESS NOTES
A My-Chart message has been sent to the patient for PATIENT ROUNDING with [facility] [place]

## 2022-07-19 RX ORDER — ALLOPURINOL 100 MG/1
100 TABLET ORAL DAILY
Qty: 30 TABLET | Refills: 3 | Status: SHIPPED | OUTPATIENT
Start: 2022-07-19 | End: 2022-11-29

## 2022-08-15 ENCOUNTER — LAB (OUTPATIENT)
Dept: FAMILY MEDICINE CLINIC | Facility: CLINIC | Age: 77
End: 2022-08-15

## 2022-08-15 LAB — HCV AB SER DONR QL: NORMAL

## 2022-08-15 PROCEDURE — 36415 COLL VENOUS BLD VENIPUNCTURE: CPT | Performed by: FAMILY MEDICINE

## 2022-08-15 PROCEDURE — 86803 HEPATITIS C AB TEST: CPT | Performed by: FAMILY MEDICINE

## 2022-08-26 RX ORDER — AMLODIPINE BESYLATE 10 MG/1
TABLET ORAL
Qty: 90 TABLET | Refills: 0 | Status: SHIPPED | OUTPATIENT
Start: 2022-08-26 | End: 2022-11-21 | Stop reason: HOSPADM

## 2022-09-02 RX ORDER — LOSARTAN POTASSIUM 50 MG/1
50 TABLET ORAL DAILY
Qty: 90 TABLET | Refills: 0 | Status: SHIPPED | OUTPATIENT
Start: 2022-09-02 | End: 2022-11-21 | Stop reason: HOSPADM

## 2022-09-07 ENCOUNTER — OFFICE VISIT (OUTPATIENT)
Dept: PODIATRY | Facility: CLINIC | Age: 77
End: 2022-09-07

## 2022-09-07 VITALS — HEIGHT: 73 IN | BODY MASS INDEX: 31.41 KG/M2 | WEIGHT: 237 LBS | HEART RATE: 83 BPM | OXYGEN SATURATION: 97 %

## 2022-09-07 DIAGNOSIS — L84 CALLUS BETWEEN TOES: ICD-10-CM

## 2022-09-07 DIAGNOSIS — M77.42 METATARSALGIA OF LEFT FOOT: ICD-10-CM

## 2022-09-07 DIAGNOSIS — G89.29 CHRONIC FOOT PAIN, LEFT: Primary | ICD-10-CM

## 2022-09-07 DIAGNOSIS — M79.672 CHRONIC FOOT PAIN, LEFT: Primary | ICD-10-CM

## 2022-09-07 DIAGNOSIS — L60.0 INGROWN NAIL OF FIFTH TOE OF LEFT FOOT: ICD-10-CM

## 2022-09-07 PROCEDURE — 99203 OFFICE O/P NEW LOW 30 MIN: CPT | Performed by: PODIATRIST

## 2022-09-07 NOTE — PROGRESS NOTES
"09/07/2022  Foot and Ankle Surgery - Established Patient/Follow-up  Provider: Dr. Arvind Robles DPM  Location: [facility]    Subjective:  Clint Cee is a 77 y.o. male.     Chief Complaint   Patient presents with   • Left Foot - Pain, Peripheral Neuropathy   • Initial Evaluation     Jeremy bazan  8/15/2022       HPI: Clint Cee is a 77-year-old male who presents to the office today for evaluation of left foot pain.    Left foot pain  The patient reports that his left fifth toe is very sensitive and there is a callus in between the fourth and fifth toes of the left foot. The patient did not have a referral to visit clinic, he did research. He states that he had similar problems in the past, but is unsure if it is related to neuropathy. The patient denies having diabetes mellitus. He notes that his left fifth toe is sensitive, but not to the point where it is numb. At night, he reports that his left fifth toe \"gets tingly and hot\". He also adds that he ambulated with an overpronated foot for a while. He states that he has tried Epsom salt soaks, but it did not help.    He completed an x-ray today.      No Known Allergies    Current Outpatient Medications on File Prior to Visit   Medication Sig Dispense Refill   • allopurinol (ZYLOPRIM) 100 MG tablet TAKE 1 TABLET BY MOUTH DAILY 30 tablet 3   • amLODIPine (NORVASC) 10 MG tablet TAKE 1 TABLET BY MOUTH EVERY DAY 90 tablet 0   • fexofenadine (ALLEGRA) 180 MG tablet Take 180 mg by mouth Daily.     • losartan (COZAAR) 50 MG tablet TAKE 1 TABLET BY MOUTH DAILY 90 tablet 0   • zolpidem (AMBIEN) 10 MG tablet Take 0.5 tablets by mouth At Night As Needed.     • DULoxetine (CYMBALTA) 60 MG capsule Take 1 capsule by mouth Daily for 90 days. 90 capsule 3     No current facility-administered medications on file prior to visit.       Objective   Pulse 83   Ht 185.4 cm (73\")   Wt 108 kg (237 lb)   SpO2 97%   BMI 31.27 kg/m²     Foot/Ankle Exam:       General: "   Appearance: appears stated age and healthy    Orientation: AAOx3    Affect: appropriate      VASCULAR      Right Foot Vascularity   Normal vascular exam    Dorsalis pedis:  2+  Posterior tibial:  2+  Skin Temperature: warm    Edema Grading:  None  CFT:  < 3 seconds  Pedal Hair Growth:  Present  Varicosities: none       Left Foot Vascularity   Normal vascular exam    Dorsalis pedis:  2+  Posterior tibial:  2+  Skin Temperature: warm    Edema Grading:  None  CFT:  < 3 seconds  Pedal Hair Growth:  Present  Varicosities: none        NEUROLOGIC     Right Foot Neurologic   Light touch sensation:  Normal  Hot/Cold sensation: normal    Achilles reflex:  2+     Left Foot Neurologic   Light touch sensation:  Normal  Hot/cold sensation: normal    Achilles reflex:  2+     MUSCULOSKELETAL      Right Foot Musculoskeletal   Ecchymosis:  None  Arch:  Normal     Left Foot Musculoskeletal   Ecchymosis:  None  Arch:  Normal     MUSCLE STRENGTH     Right Foot Muscle Strength   Normal strength    Foot dorsiflexion:  5  Foot plantar flexion:  5  Foot inversion:  5  Foot eversion:  5     Left Foot Muscle Strength   Normal strength    Foot dorsiflexion:  5  Foot plantar flexion:  5  Foot inversion:  5  Foot eversion:  5     DERMATOLOGIC     Right Foot Dermatologic   Skin: skin intact    Nails comment:  Nails 1-5     Left Foot Dermatologic   Skin: skin intact    Nails comment:  Nails 1-5     TESTS     Right Foot Tests   Anterior drawer: negative    Varus tilt: negative       Left Foot Tests   Anterior drawer: negative    Varus tilt: negative        Right Foot Additional Comments   09/07/2022: Moderate soft tissue rigidity involving the left lower extremity with equinus contracture.  Semirigid hammer digit deformities with adductovarus rotation of the fifth toe.  Hyperkeratotic lesion noted to the lateral aspect of the fourth interphalangeal joint region.  Small blister formation involving the dorsal medial aspect of the fifth toe at the  nail plate.  No jett open wounds or signs of infection.  Discomfort with palpation to the fourth and fifth toes.      Assessment & Plan   Diagnoses and all orders for this visit:    1. Chronic foot pain, left (Primary)  -     XR Foot 3+ View Left    2. Callus between toes    3. Ingrown nail of fifth toe of left foot    4. Metatarsalgia of left foot      Patient presents with issues involving his fourth and fifth toes on the left foot.  He has noticed longstanding callus involving the lateral aspect of the fourth toe and recent redness and blistering involving the nail region of the fifth toe.  After evaluation, he does have moderate soft tissue rigidity involving the left foot which is likely causing these issues.  I did discuss the findings with him at length along with treatment options.  I have suggested that we proceed with offloading with a toe spacer between the fourth and fifth toes.  I would like him to apply Betadine to the fifth digit.  I have dispensed a postop shoe and asked that he wear this over the course of the next 2 weeks.  I do feel that he would likely benefit from over-the-counter arch supports and proper shoes to help with some of the numbness and tingling to his feet.  I have asked that he call with any progressive signs of infection or further concern.  I would like to see him in 2 weeks for reevaluation and further discussion.  Greater than 30 minutes was spent before, during, and after evaluation for patient care.    Orders Placed This Encounter   Procedures   • XR Foot 3+ View Left     Order Specific Question:   Reason for Exam:     Answer:   left foot plantar pain  peripheal nueropathy rm 15 wb     Order Specific Question:   Does this patient have a diabetic monitoring/medication delivering device on?     Answer:   No     Order Specific Question:   Release to patient     Answer:   Routine Release          Note is dictated utilizing voice recognition software. Unfortunately this leads to  occasional typographical errors. I apologize in advance if the situation occurs. If questions occur please do not hesitate to call our office.    Transcribed from ambient dictation for DUNCAN Robles DPM by Jazmín Segal.  09/07/22   18:14 EDT    Patient verbalized consent to the visit recording.

## 2022-09-08 ENCOUNTER — PATIENT ROUNDING (BHMG ONLY) (OUTPATIENT)
Dept: PODIATRY | Facility: CLINIC | Age: 77
End: 2022-09-08

## 2022-09-22 ENCOUNTER — OFFICE VISIT (OUTPATIENT)
Dept: PODIATRY | Facility: CLINIC | Age: 77
End: 2022-09-22

## 2022-09-22 VITALS — RESPIRATION RATE: 20 BRPM | HEIGHT: 73 IN | WEIGHT: 237 LBS | BODY MASS INDEX: 31.41 KG/M2

## 2022-09-22 DIAGNOSIS — L60.0 INGROWN NAIL OF FIFTH TOE OF LEFT FOOT: ICD-10-CM

## 2022-09-22 DIAGNOSIS — M77.42 METATARSALGIA OF LEFT FOOT: ICD-10-CM

## 2022-09-22 DIAGNOSIS — G89.29 CHRONIC FOOT PAIN, LEFT: Primary | ICD-10-CM

## 2022-09-22 DIAGNOSIS — L84 CALLUS BETWEEN TOES: ICD-10-CM

## 2022-09-22 DIAGNOSIS — M79.672 CHRONIC FOOT PAIN, LEFT: Primary | ICD-10-CM

## 2022-09-22 PROCEDURE — 99213 OFFICE O/P EST LOW 20 MIN: CPT | Performed by: PODIATRIST

## 2022-09-22 NOTE — PROGRESS NOTES
"09/22/2022  Foot and Ankle Surgery - Established Patient/Follow-up  Provider: Dr. Arvind Robles DPM  Location: [facility]    Subjective:  Clint Cee is a 77 y.o. male.     Chief Complaint   Patient presents with   • Left Foot - Follow-up, Pain   • Follow-up     LUIS ALBERTO Castellanos md 8/15/2022       HPI:   Patient is a 77-year-old male who presents to the office today for a follow-up regarding his left foot.    The patient states that his left foot has improved. He reports that he has been wearing a toe spacer. The patient notes he has been using Betadine in between his toes. He states that he has had gout in one of his toes and has been taking allopurinol.    The patient mentions that his toes are stiff and his heels feel hot and tingly on his left foot.    No Known Allergies    Current Outpatient Medications on File Prior to Visit   Medication Sig Dispense Refill   • allopurinol (ZYLOPRIM) 100 MG tablet TAKE 1 TABLET BY MOUTH DAILY 30 tablet 3   • amLODIPine (NORVASC) 10 MG tablet TAKE 1 TABLET BY MOUTH EVERY DAY 90 tablet 0   • fexofenadine (ALLEGRA) 180 MG tablet Take 180 mg by mouth Daily.     • Leuprolide Mesylate, 6 Month, (CAMCEVI SC)      • losartan (COZAAR) 50 MG tablet TAKE 1 TABLET BY MOUTH DAILY 90 tablet 0   • zolpidem (AMBIEN) 10 MG tablet Take 0.5 tablets by mouth At Night As Needed.     • DULoxetine (CYMBALTA) 60 MG capsule Take 1 capsule by mouth Daily for 90 days. 90 capsule 3     No current facility-administered medications on file prior to visit.       Objective   Resp 20   Ht 185.4 cm (73\")   Wt 108 kg (237 lb)   BMI 31.27 kg/m²     Foot/Ankle Exam:       General:   Appearance: appears stated age and healthy    Orientation: AAOx3    Affect: appropriate      VASCULAR      Right Foot Vascularity   Normal vascular exam    Dorsalis pedis:  2+  Posterior tibial:  2+  Skin Temperature: warm    Edema Grading:  None  CFT:  < 3 seconds  Pedal Hair Growth:  Present  Varicosities: none       " Left Foot Vascularity   Normal vascular exam    Dorsalis pedis:  2+  Posterior tibial:  2+  Skin Temperature: warm    Edema Grading:  None  CFT:  < 3 seconds  Pedal Hair Growth:  Present  Varicosities: none        NEUROLOGIC     Right Foot Neurologic   Light touch sensation:  Normal  Hot/Cold sensation: normal    Achilles reflex:  2+     Left Foot Neurologic   Light touch sensation:  Normal  Hot/cold sensation: normal    Achilles reflex:  2+     MUSCULOSKELETAL      Right Foot Musculoskeletal   Ecchymosis:  None  Arch:  Normal     Left Foot Musculoskeletal   Ecchymosis:  None  Arch:  Normal     MUSCLE STRENGTH     Right Foot Muscle Strength   Normal strength    Foot dorsiflexion:  5  Foot plantar flexion:  5  Foot inversion:  5  Foot eversion:  5     Left Foot Muscle Strength   Normal strength    Foot dorsiflexion:  5  Foot plantar flexion:  5  Foot inversion:  5  Foot eversion:  5     DERMATOLOGIC     Right Foot Dermatologic   Skin: skin intact    Nails comment:  Nails 1-5     Left Foot Dermatologic   Skin: skin intact    Nails comment:  Nails 1-5     TESTS     Right Foot Tests   Anterior drawer: negative    Varus tilt: negative       Left Foot Tests   Anterior drawer: negative    Varus tilt: negative        Right Foot Additional Comments   09/07/2022: Moderate soft tissue rigidity involving the left lower extremity with equinus contracture.  Semirigid hammer digit deformities with adductovarus rotation of the fifth toe.  Hyperkeratotic lesion noted to the lateral aspect of the fourth interphalangeal joint region.  Small blister formation involving the dorsal medial aspect of the fifth toe at the nail plate.  No jett open wounds or signs of infection.  Discomfort with palpation to the fourth and fifth toes.    9/22/2022  Wounds to the fourth and fifth toe have resolved. No erythema or signs of infection. No progressive deformity or instability.      Assessment & Plan   Diagnoses and all orders for this  visit:    1. Chronic foot pain, left (Primary)    2. Callus between toes    3. Ingrown nail of fifth toe of left foot    4. Metatarsalgia of left foot      Patient returns and has noticed significant improvement since last exam.  His erythema and maceration has improved with Betadine applications.  I have now asked that he start moisturizing on a daily basis and continue to offload the fourth and fifth toes.  Patient is to monitor closely and call with any additional issues or concerns.  We did discuss appropriate shoes and activity.  We also reviewed signs of infection.  Patient is to return in 6 weeks for reevaluation.  Greater than 20 minutes spent before, during, and after evaluation for patient care.    No orders of the defined types were placed in this encounter.         Note is dictated utilizing voice recognition software. Unfortunately this leads to occasional typographical errors. I apologize in advance if the situation occurs. If questions occur please do not hesitate to call our office.    Transcribed from ambient dictation for DUNCAN Robles DPM by Terra Buckley.  09/22/22   12:18 EDT    Patient verbalized consent to the visit recording.  I have personally performed the services described in this document as transcribed by the above individual, and it is both accurate and complete.  DUNCAN Robles DPM  9/23/2022  09:14 EDT

## 2022-09-23 NOTE — PROGRESS NOTES
Encounter Date:09/28/2022  Last seen 6/28/2022      Patient ID: Clint Cee is a 77 y.o. male.    Chief Complaint:    Mitral valve prolapse  Mitral regurgitation  Systolic murmur  Hypertension        History of Present Illness  Patient recently was seen with following history.  Reviewed and updated.  Since last seen patient is having increased shortness of breath and significantly increased fatigue.    The patient is a pleasant 77-year-old white male is here for evaluation of above problems.  Patient is essentially asymptomatic from cardiovascular standpoint except for occasional palpitations and mild shortness of breath.  Patient has tiredness.  Patient recently was found to have heart murmur and subsequently had an echocardiogram that was abnormal and patient was referred here for further evaluation.  Patient used to see Dr. Youngblood in the past.     Patient is not having any dizziness or syncope.  Denies having any headache ,abdominal pain ,nausea, vomiting , diarrhea constipation, loss of weight or loss of appetite.  Denies having any excessive bruising ,hematuria or blood in the stool.     Review of all systems negative except as indicated.     Reviewed ROS.     Assessment and Plan      ]]]]]]]]]]]]]]]]]]]]]]  Impression  ===========  -  Increased shortness of breath and significantly increased fatigue.     -Moderate to significant mitral regurgitation.  Prominent posterior mitral leaflet prolapse    Echocardiogram 9/28/2022.  Mild mitral regurgitation (regurgitation jet may be not in the field)     Echocardiogram 6/15/2022   prominent posterior mitral leaflet prolapse.  Moderate to significant mitral regurgitation.  Thickened aortic valve with adequate opening motion.  Left atrial enlargement.  Normal left ventricular size and contractility with ejection fraction of 60%.     - Premature ventricular contractions.  Occasional palpitations     - Hypertension vitamin D deficiency GERD ocular migraine.     -  History of prostate cancer.  Status post radiation therapy.  Bone scan was negative for any spread.     - Status post appendectomy spine surgery adenoidectomy tonsillectomy     - Family history of prostate cancer     - Former smoker     - No known allergies  ============  Plan  ===========  Patient has moderate to significant mitral regurgitation and prominent mitral valve prolapse.  Patient is having increased fatigue and shortness of breath.  Continue taking baby aspirin.  I had a lengthy discussion with him about mitral valve prolapse and mitral regurgitation.  Patient was advised transesophageal echocardiogram and right and left heart catheterization.  Echocardiogram today showed normal left ventricular function.  Mild mitral regurgitation is present.  However the jet may be not in the..  Risks and benefits pros and cons of the procedure including infection bleeding blood clot heart attack stroke esophageal trauma anesthetic risk etc. were discussed with patient.  Further plan will depend on patient's progress.  ]]]]]]]]]]]]]]]]                         Diagnosis Plan   1. Primary hypertension     2. Heart murmur on physical examination     3. Mitral valve prolapse     4. Premature ventricular contractions     5. Essential hypertension     6. Nonrheumatic mitral valve regurgitation     LAB RESULTS (LAST 7 DAYS)    CBC        BMP        CMP         BNP        TROPONIN        CoAg        Creatinine Clearance  CrCl cannot be calculated (Patient's most recent lab result is older than the maximum 30 days allowed.).    ABG        Radiology  No radiology results for the last day                The following portions of the patient's history were reviewed and updated as appropriate: allergies, current medications, past family history, past medical history, past social history, past surgical history and problem list.    Review of Systems   Constitutional: Positive for malaise/fatigue.   Cardiovascular: Negative for chest  pain, leg swelling, palpitations and syncope.   Respiratory: Negative for shortness of breath.    Skin: Negative for rash.   Gastrointestinal: Negative for nausea and vomiting.   Neurological: Negative for dizziness, light-headedness and numbness.   All other systems reviewed and are negative.        Current Outpatient Medications:   •  allopurinol (ZYLOPRIM) 100 MG tablet, TAKE 1 TABLET BY MOUTH DAILY, Disp: 30 tablet, Rfl: 3  •  amLODIPine (NORVASC) 10 MG tablet, TAKE 1 TABLET BY MOUTH EVERY DAY, Disp: 90 tablet, Rfl: 0  •  DULoxetine (CYMBALTA) 60 MG capsule, Take 1 capsule by mouth Daily for 90 days., Disp: 90 capsule, Rfl: 3  •  fexofenadine (ALLEGRA) 180 MG tablet, Take 180 mg by mouth Daily., Disp: , Rfl:   •  Leuprolide Mesylate, 6 Month, (CAMCEVI SC), , Disp: , Rfl:   •  losartan (COZAAR) 50 MG tablet, TAKE 1 TABLET BY MOUTH DAILY, Disp: 90 tablet, Rfl: 0  •  zolpidem (AMBIEN) 10 MG tablet, Take 0.5 tablets by mouth At Night As Needed., Disp: , Rfl:     No Known Allergies    Family History   Problem Relation Age of Onset   • Arthritis Father    • Cancer Father         Prostate   • Arrhythmia Father         Congentive heart failure   • Diabetes Mother         Adult diabetes   • Early death Mother         Kidneys failed after giving birth-diabetes complications   • Heart disease Mother         Adult Diabetes       Past Surgical History:   Procedure Laterality Date   • ADENOIDECTOMY  1954    As child   • APPENDECTOMY  1956    Surgery   • COLONOSCOPY  01/01/1997    Regularly scheduled   • EYE SURGERY  01/01/2010    Reattached right retina   • SPINE SURGERY  01/01/1971    2 lumbar 1 cervical   • TONSILLECTOMY  01/01/1950    Childhood       Past Medical History:   Diagnosis Date   • Abnormal ECG June 2022   • Allergic 01/01/1954    Nasal alergies   • Arrhythmia 2004    Dr Youngblood examined me and said i was ok.   • Benign prostatic hyperplasia 11/14/2018   • Cancer (HCC) Jan22 January 2022   • Cataract  01/01/2014    Surgery. Caused detached retina of right eye 20/60 repair   • Colon polyp 01/01/2018    Found and removed last colon eca.q   • Gastroesophageal reflux disease 03/20/2014   • Headache 01/01/2015    Have shimmering in both eyes intermittantly. No headaches   • Heart murmur May 2022    During wellness exam   • Heart valve disease July 2022    During Echo   • HL (hearing loss) 01/01/2012    Have hearing aids   • Hypertension 12/02/2011   • Insomnia 12/02/2011   • Mitral valve prolapse June 2022   • Mood disorder (HCC) 09/19/2013   • Polyosteoarthritis 12/02/2011   • Scoliosis 05/09/2014   • Spinal stenosis 12/02/2011   • Visual impairment 01/01/1955    Nearsighted corrected glasses   • Vitamin D deficiency 05/23/2018       Family History   Problem Relation Age of Onset   • Arthritis Father    • Cancer Father         Prostate   • Arrhythmia Father         Congentive heart failure   • Diabetes Mother         Adult diabetes   • Early death Mother         Kidneys failed after giving birth-diabetes complications   • Heart disease Mother         Adult Diabetes       Social History     Socioeconomic History   • Marital status:    Tobacco Use   • Smoking status: Former Smoker     Packs/day: 0.00     Years: 0.50     Pack years: 0.00     Types: Cigarettes, Pipe, Cigars   • Smokeless tobacco: Never Used   • Tobacco comment: Tried tobacco as child   Vaping Use   • Vaping Use: Never used   Substance and Sexual Activity   • Alcohol use: Not Currently     Comment: Not a regular drinker. Occasionly have a glass of wine or be   • Drug use: Never   • Sexual activity: Not Currently     Partners: Female     Birth control/protection: Condom, Other, Post-menopausal, None, Birth control pill, Same-sex partner     Comment: Chemically castrated. Prostate cancer         Procedures      Objective:       Physical Exam    /75 (BP Location: Right arm, Patient Position: Sitting, Cuff Size: Large Adult)   Pulse 75   Ht  "185.4 cm (73\")   Wt 108 kg (237 lb)   SpO2 99%   BMI 31.27 kg/m²   The patient is alert, oriented and in no distress.    Vital signs as noted above.    Head and neck revealed no carotid bruits or jugular venous distension.  No thyromegaly or lymphadenopathy is present.    Lungs clear.  No wheezing.  Breath sounds are normal bilaterally.    Heart normal first and second heart sounds.  Grade 3/6 to 4/6 murmur..  No pericardial rub is present.  No gallop is present.    Abdomen soft and nontender.  No organomegaly is present.    Extremities revealed good peripheral pulses without any pedal edema.    Skin warm and dry.    Musculoskeletal system is grossly normal.    CNS grossly normal.    Reviewed and updated.      "

## 2022-09-28 ENCOUNTER — HOSPITAL ENCOUNTER (OUTPATIENT)
Dept: CARDIOLOGY | Facility: HOSPITAL | Age: 77
Discharge: HOME | End: 2022-09-28

## 2022-09-28 ENCOUNTER — HOSPITAL ENCOUNTER (OUTPATIENT)
Dept: GENERAL RADIOLOGY | Facility: HOSPITAL | Age: 77
Discharge: HOME | End: 2022-09-28

## 2022-09-28 ENCOUNTER — OFFICE VISIT (OUTPATIENT)
Dept: CARDIOLOGY | Facility: CLINIC | Age: 77
End: 2022-09-28

## 2022-09-28 ENCOUNTER — LAB (OUTPATIENT)
Dept: LAB | Facility: HOSPITAL | Age: 77
End: 2022-09-28

## 2022-09-28 VITALS
HEIGHT: 73 IN | OXYGEN SATURATION: 99 % | DIASTOLIC BLOOD PRESSURE: 75 MMHG | HEART RATE: 75 BPM | SYSTOLIC BLOOD PRESSURE: 138 MMHG | WEIGHT: 237 LBS | BODY MASS INDEX: 31.41 KG/M2

## 2022-09-28 VITALS — HEIGHT: 73 IN | BODY MASS INDEX: 31.41 KG/M2 | WEIGHT: 237 LBS

## 2022-09-28 DIAGNOSIS — R01.1 HEART MURMUR ON PHYSICAL EXAMINATION: ICD-10-CM

## 2022-09-28 DIAGNOSIS — I34.1 MITRAL VALVE PROLAPSE: ICD-10-CM

## 2022-09-28 DIAGNOSIS — I49.3 PREMATURE VENTRICULAR CONTRACTIONS: ICD-10-CM

## 2022-09-28 DIAGNOSIS — I34.0 NONRHEUMATIC MITRAL VALVE REGURGITATION: ICD-10-CM

## 2022-09-28 DIAGNOSIS — I10 ESSENTIAL HYPERTENSION: ICD-10-CM

## 2022-09-28 DIAGNOSIS — I10 PRIMARY HYPERTENSION: ICD-10-CM

## 2022-09-28 DIAGNOSIS — I34.0 NONRHEUMATIC MITRAL VALVE REGURGITATION: Primary | ICD-10-CM

## 2022-09-28 LAB
BH CV ECHO MEAS - ACS: 2.07 CM
BH CV ECHO MEAS - AO MAX PG: 7 MMHG
BH CV ECHO MEAS - AO MEAN PG: 3.3 MMHG
BH CV ECHO MEAS - AO ROOT DIAM: 3.6 CM
BH CV ECHO MEAS - AO V2 MAX: 131.8 CM/SEC
BH CV ECHO MEAS - AO V2 VTI: 22.4 CM
BH CV ECHO MEAS - AVA(I,D): 3.5 CM2
BH CV ECHO MEAS - EDV(CUBED): 192.8 ML
BH CV ECHO MEAS - EDV(MOD-SP4): 109.4 ML
BH CV ECHO MEAS - EF(MOD-SP4): 59.7 %
BH CV ECHO MEAS - ESV(CUBED): 49.1 ML
BH CV ECHO MEAS - ESV(MOD-SP4): 44.1 ML
BH CV ECHO MEAS - FS: 36.6 %
BH CV ECHO MEAS - IVS/LVPW: 1.05 CM
BH CV ECHO MEAS - IVSD: 1.04 CM
BH CV ECHO MEAS - LA DIMENSION: 4.7 CM
BH CV ECHO MEAS - LV MASS(C)D: 236.1 GRAMS
BH CV ECHO MEAS - LV MAX PG: 7.7 MMHG
BH CV ECHO MEAS - LV MEAN PG: 3.4 MMHG
BH CV ECHO MEAS - LV V1 MAX: 138.4 CM/SEC
BH CV ECHO MEAS - LV V1 VTI: 23.8 CM
BH CV ECHO MEAS - LVIDD: 5.8 CM
BH CV ECHO MEAS - LVIDS: 3.7 CM
BH CV ECHO MEAS - LVOT AREA: 3.3 CM2
BH CV ECHO MEAS - LVOT DIAM: 2.04 CM
BH CV ECHO MEAS - LVPWD: 0.99 CM
BH CV ECHO MEAS - MR MAX PG: 137.9 MMHG
BH CV ECHO MEAS - MR MAX VEL: 586.2 CM/SEC
BH CV ECHO MEAS - MV A MAX VEL: 114.2 CM/SEC
BH CV ECHO MEAS - MV DEC SLOPE: 458.4 CM/SEC2
BH CV ECHO MEAS - MV DEC TIME: 0.24 MSEC
BH CV ECHO MEAS - MV E MAX VEL: 110 CM/SEC
BH CV ECHO MEAS - MV E/A: 0.96
BH CV ECHO MEAS - MV MAX PG: 7.8 MMHG
BH CV ECHO MEAS - MV MEAN PG: 3 MMHG
BH CV ECHO MEAS - MV V2 VTI: 33.7 CM
BH CV ECHO MEAS - MVA(VTI): 2.3 CM2
BH CV ECHO MEAS - PA ACC TIME: 0.07 SEC
BH CV ECHO MEAS - PA PR(ACCEL): 46.3 MMHG
BH CV ECHO MEAS - PA V2 MAX: 74 CM/SEC
BH CV ECHO MEAS - RAP SYSTOLE: 3 MMHG
BH CV ECHO MEAS - RV MAX PG: 1.43 MMHG
BH CV ECHO MEAS - RV V1 MAX: 59.7 CM/SEC
BH CV ECHO MEAS - RV V1 VTI: 11.4 CM
BH CV ECHO MEAS - RVDD: 2.8 CM
BH CV ECHO MEAS - RVSP: 26.2 MMHG
BH CV ECHO MEAS - SV(LVOT): 77.5 ML
BH CV ECHO MEAS - SV(MOD-SP4): 65.3 ML
BH CV ECHO MEAS - TR MAX PG: 23.2 MMHG
BH CV ECHO MEAS - TR MAX VEL: 240.6 CM/SEC
INR PPP: 1.04 (ref 0.93–1.1)
LV EF 2D ECHO EST: 60 %
MAXIMAL PREDICTED HEART RATE: 145 BPM
PROTHROMBIN TIME: 10.7 SECONDS (ref 9.6–11.7)
STRESS TARGET HR: 123 BPM

## 2022-09-28 PROCEDURE — 80048 BASIC METABOLIC PNL TOTAL CA: CPT

## 2022-09-28 PROCEDURE — 71046 X-RAY EXAM CHEST 2 VIEWS: CPT

## 2022-09-28 PROCEDURE — 80061 LIPID PANEL: CPT

## 2022-09-28 PROCEDURE — 93306 TTE W/DOPPLER COMPLETE: CPT

## 2022-09-28 PROCEDURE — 85027 COMPLETE CBC AUTOMATED: CPT

## 2022-09-28 PROCEDURE — 99214 OFFICE O/P EST MOD 30 MIN: CPT | Performed by: INTERNAL MEDICINE

## 2022-09-28 PROCEDURE — 85610 PROTHROMBIN TIME: CPT

## 2022-09-28 PROCEDURE — 36415 COLL VENOUS BLD VENIPUNCTURE: CPT

## 2022-09-28 PROCEDURE — 93306 TTE W/DOPPLER COMPLETE: CPT | Performed by: INTERNAL MEDICINE

## 2022-09-28 PROCEDURE — 93010 ELECTROCARDIOGRAM REPORT: CPT | Performed by: INTERNAL MEDICINE

## 2022-09-28 PROCEDURE — 93005 ELECTROCARDIOGRAM TRACING: CPT | Performed by: INTERNAL MEDICINE

## 2022-09-29 LAB
ANION GAP SERPL CALCULATED.3IONS-SCNC: 10.7 MMOL/L (ref 5–15)
BUN SERPL-MCNC: 21 MG/DL (ref 8–23)
BUN/CREAT SERPL: 19.4 (ref 7–25)
CALCIUM SPEC-SCNC: 9.1 MG/DL (ref 8.6–10.5)
CHLORIDE SERPL-SCNC: 103 MMOL/L (ref 98–107)
CHOLEST SERPL-MCNC: 180 MG/DL (ref 0–200)
CO2 SERPL-SCNC: 25.3 MMOL/L (ref 22–29)
CREAT SERPL-MCNC: 1.08 MG/DL (ref 0.76–1.27)
DEPRECATED RDW RBC AUTO: 45.9 FL (ref 37–54)
EGFRCR SERPLBLD CKD-EPI 2021: 71.6 ML/MIN/1.73
ERYTHROCYTE [DISTWIDTH] IN BLOOD BY AUTOMATED COUNT: 13.6 % (ref 12.3–15.4)
GLUCOSE SERPL-MCNC: 87 MG/DL (ref 65–99)
HCT VFR BLD AUTO: 39.5 % (ref 37.5–51)
HDLC SERPL-MCNC: 50 MG/DL (ref 40–60)
HGB BLD-MCNC: 12.8 G/DL (ref 13–17.7)
LDLC SERPL CALC-MCNC: 102 MG/DL (ref 0–100)
LDLC/HDLC SERPL: 1.96 {RATIO}
MCH RBC QN AUTO: 30.2 PG (ref 26.6–33)
MCHC RBC AUTO-ENTMCNC: 32.4 G/DL (ref 31.5–35.7)
MCV RBC AUTO: 93.2 FL (ref 79–97)
PLATELET # BLD AUTO: 99 10*3/MM3 (ref 140–450)
PMV BLD AUTO: 9.1 FL (ref 6–12)
POTASSIUM SERPL-SCNC: 4.9 MMOL/L (ref 3.5–5.2)
QT INTERVAL: 398 MS
RBC # BLD AUTO: 4.24 10*6/MM3 (ref 4.14–5.8)
SODIUM SERPL-SCNC: 139 MMOL/L (ref 136–145)
TRIGL SERPL-MCNC: 161 MG/DL (ref 0–150)
VLDLC SERPL-MCNC: 28 MG/DL (ref 5–40)
WBC NRBC COR # BLD: 5.1 10*3/MM3 (ref 3.4–10.8)

## 2022-09-30 ENCOUNTER — ANESTHESIA (OUTPATIENT)
Dept: CARDIOLOGY | Facility: HOSPITAL | Age: 77
End: 2022-09-30

## 2022-09-30 ENCOUNTER — HOSPITAL ENCOUNTER (OUTPATIENT)
Facility: HOSPITAL | Age: 77
Setting detail: HOSPITAL OUTPATIENT SURGERY
Discharge: HOME | End: 2022-09-30
Attending: INTERNAL MEDICINE | Admitting: INTERNAL MEDICINE

## 2022-09-30 ENCOUNTER — ANESTHESIA EVENT (OUTPATIENT)
Dept: CARDIOLOGY | Facility: HOSPITAL | Age: 77
End: 2022-09-30

## 2022-09-30 ENCOUNTER — HOSPITAL ENCOUNTER (OUTPATIENT)
Dept: CARDIOLOGY | Facility: HOSPITAL | Age: 77
Discharge: HOME | End: 2022-09-30

## 2022-09-30 VITALS
OXYGEN SATURATION: 97 % | DIASTOLIC BLOOD PRESSURE: 81 MMHG | SYSTOLIC BLOOD PRESSURE: 158 MMHG | RESPIRATION RATE: 14 BRPM | WEIGHT: 239.42 LBS | TEMPERATURE: 97.9 F | BODY MASS INDEX: 32.43 KG/M2 | HEIGHT: 72 IN | HEART RATE: 74 BPM

## 2022-09-30 VITALS
DIASTOLIC BLOOD PRESSURE: 50 MMHG | SYSTOLIC BLOOD PRESSURE: 125 MMHG | OXYGEN SATURATION: 100 % | RESPIRATION RATE: 23 BRPM | HEART RATE: 72 BPM

## 2022-09-30 VITALS — DIASTOLIC BLOOD PRESSURE: 43 MMHG | SYSTOLIC BLOOD PRESSURE: 124 MMHG | OXYGEN SATURATION: 100 %

## 2022-09-30 DIAGNOSIS — I10 PRIMARY HYPERTENSION: ICD-10-CM

## 2022-09-30 DIAGNOSIS — I34.1 MITRAL VALVE PROLAPSE: ICD-10-CM

## 2022-09-30 DIAGNOSIS — I10 ESSENTIAL HYPERTENSION: ICD-10-CM

## 2022-09-30 DIAGNOSIS — I49.3 PREMATURE VENTRICULAR CONTRACTIONS: ICD-10-CM

## 2022-09-30 DIAGNOSIS — R01.1 HEART MURMUR ON PHYSICAL EXAMINATION: ICD-10-CM

## 2022-09-30 DIAGNOSIS — I34.0 NONRHEUMATIC MITRAL VALVE REGURGITATION: ICD-10-CM

## 2022-09-30 LAB
BH CV ECHO MEAS - RAP SYSTOLE: 10 MMHG
BH CV ECHO MEAS - RVSP: 69.5 MMHG
BH CV ECHO MEAS - TR MAX PG: 59.5 MMHG
BH CV ECHO MEAS - TR MAX VEL: 385.5 CM/SEC
MAXIMAL PREDICTED HEART RATE: 145 BPM
STRESS TARGET HR: 123 BPM

## 2022-09-30 PROCEDURE — 99152 MOD SED SAME PHYS/QHP 5/>YRS: CPT | Performed by: INTERNAL MEDICINE

## 2022-09-30 PROCEDURE — C1894 INTRO/SHEATH, NON-LASER: HCPCS | Performed by: INTERNAL MEDICINE

## 2022-09-30 PROCEDURE — C1769 GUIDE WIRE: HCPCS | Performed by: INTERNAL MEDICINE

## 2022-09-30 PROCEDURE — 93460 R&L HRT ART/VENTRICLE ANGIO: CPT | Performed by: INTERNAL MEDICINE

## 2022-09-30 PROCEDURE — 93325 DOPPLER ECHO COLOR FLOW MAPG: CPT

## 2022-09-30 PROCEDURE — 93325 DOPPLER ECHO COLOR FLOW MAPG: CPT | Performed by: INTERNAL MEDICINE

## 2022-09-30 PROCEDURE — 93312 ECHO TRANSESOPHAGEAL: CPT | Performed by: INTERNAL MEDICINE

## 2022-09-30 PROCEDURE — 93312 ECHO TRANSESOPHAGEAL: CPT

## 2022-09-30 PROCEDURE — 25010000002 PROPOFOL 200 MG/20ML EMULSION: Performed by: ANESTHESIOLOGY

## 2022-09-30 PROCEDURE — 0 IOPAMIDOL PER 1 ML: Performed by: INTERNAL MEDICINE

## 2022-09-30 PROCEDURE — 93320 DOPPLER ECHO COMPLETE: CPT | Performed by: INTERNAL MEDICINE

## 2022-09-30 PROCEDURE — 99024 POSTOP FOLLOW-UP VISIT: CPT | Performed by: THORACIC SURGERY (CARDIOTHORACIC VASCULAR SURGERY)

## 2022-09-30 PROCEDURE — 93320 DOPPLER ECHO COMPLETE: CPT

## 2022-09-30 RX ORDER — DIPHENHYDRAMINE HCL 25 MG
25 CAPSULE ORAL EVERY 6 HOURS PRN
Status: DISCONTINUED | OUTPATIENT
Start: 2022-09-30 | End: 2022-09-30 | Stop reason: HOSPADM

## 2022-09-30 RX ORDER — PROPOFOL 10 MG/ML
INJECTION, EMULSION INTRAVENOUS AS NEEDED
Status: DISCONTINUED | OUTPATIENT
Start: 2022-09-30 | End: 2022-09-30 | Stop reason: SURG

## 2022-09-30 RX ORDER — SODIUM CHLORIDE 9 MG/ML
30 INJECTION, SOLUTION INTRAVENOUS CONTINUOUS
Status: DISCONTINUED | OUTPATIENT
Start: 2022-09-30 | End: 2022-09-30 | Stop reason: HOSPADM

## 2022-09-30 RX ORDER — ONDANSETRON 4 MG/1
4 TABLET, FILM COATED ORAL EVERY 6 HOURS PRN
Status: DISCONTINUED | OUTPATIENT
Start: 2022-09-30 | End: 2022-09-30 | Stop reason: HOSPADM

## 2022-09-30 RX ORDER — ASPIRIN 325 MG
325 TABLET ORAL EVERY 6 HOURS PRN
COMMUNITY
End: 2022-11-21 | Stop reason: HOSPADM

## 2022-09-30 RX ORDER — SODIUM CHLORIDE 9 MG/ML
250 INJECTION, SOLUTION INTRAVENOUS ONCE AS NEEDED
Status: DISCONTINUED | OUTPATIENT
Start: 2022-09-30 | End: 2022-09-30 | Stop reason: HOSPADM

## 2022-09-30 RX ORDER — ACETAMINOPHEN, ASPIRIN AND CAFFEINE 250; 250; 65 MG/1; MG/1; MG/1
2 TABLET, FILM COATED ORAL EVERY 6 HOURS PRN
COMMUNITY
End: 2022-11-21 | Stop reason: HOSPADM

## 2022-09-30 RX ORDER — LIDOCAINE HYDROCHLORIDE 20 MG/ML
INJECTION, SOLUTION INFILTRATION; PERINEURAL AS NEEDED
Status: DISCONTINUED | OUTPATIENT
Start: 2022-09-30 | End: 2022-09-30 | Stop reason: HOSPADM

## 2022-09-30 RX ORDER — IBUPROFEN 400 MG/1
400 TABLET ORAL EVERY 6 HOURS PRN
COMMUNITY
End: 2022-11-21 | Stop reason: HOSPADM

## 2022-09-30 RX ORDER — ACETAMINOPHEN 325 MG/1
650 TABLET ORAL EVERY 4 HOURS PRN
Status: DISCONTINUED | OUTPATIENT
Start: 2022-09-30 | End: 2022-09-30 | Stop reason: HOSPADM

## 2022-09-30 RX ORDER — ACETAMINOPHEN 500 MG
1 TABLET ORAL EVERY 6 HOURS PRN
COMMUNITY

## 2022-09-30 RX ORDER — ONDANSETRON 2 MG/ML
4 INJECTION INTRAMUSCULAR; INTRAVENOUS EVERY 6 HOURS PRN
Status: DISCONTINUED | OUTPATIENT
Start: 2022-09-30 | End: 2022-09-30 | Stop reason: HOSPADM

## 2022-09-30 RX ADMIN — SODIUM CHLORIDE 30 ML/HR: 9 INJECTION, SOLUTION INTRAVENOUS at 11:23

## 2022-09-30 RX ADMIN — PROPOFOL 210 MG: 10 INJECTION, EMULSION INTRAVENOUS at 13:18

## 2022-09-30 NOTE — ANESTHESIA PREPROCEDURE EVALUATION
Anesthesia Evaluation     Patient summary reviewed and Nursing notes reviewed   no history of anesthetic complications:  NPO Solid Status: > 8 hours  NPO Liquid Status: > 8 hours           Airway   Mallampati: II  TM distance: >3 FB  Neck ROM: full  No difficulty expected  Dental - normal exam     Pulmonary - normal exam   (+) a smoker Current Abstained day of surgery,   Cardiovascular - normal exam    (+) hypertension, valvular problems/murmurs MVP and MR,       Neuro/Psych  (+) headaches, psychiatric history,    GI/Hepatic/Renal/Endo    (+)  GERD,      Musculoskeletal     Abdominal  - normal exam   Substance History - negative use     OB/GYN negative ob/gyn ROS         Other   arthritis,    history of cancer                    Anesthesia Plan    ASA 4     MAC     intravenous induction     Anesthetic plan, risks, benefits, and alternatives have been provided, discussed and informed consent has been obtained with: patient.        CODE STATUS:

## 2022-09-30 NOTE — ANESTHESIA POSTPROCEDURE EVALUATION
Patient: Clint Cee    Procedure Summary     Date: 09/30/22 Room / Location: [facility]    Anesthesia Start: 1315 Anesthesia Stop: 1328    Procedure: ADULT TRANSESOPHAGEAL ECHO (TERRA) W/ CONT IF NECESSARY PER PROTOCOL Diagnosis:       Primary hypertension      Heart murmur on physical examination      Mitral valve prolapse      Premature ventricular contractions      Essential hypertension      Nonrheumatic mitral valve regurgitation      (Valvular Disease)    Scheduled Providers: Felipe Garcia MD Provider: Michel Garcia MD    Anesthesia Type: MAC ASA Status: 4          Anesthesia Type: MAC    Vitals  Vitals Value Taken Time   /50 09/30/22 1329   Temp     Pulse 72 09/30/22 1329   Resp 23 09/30/22 1329   SpO2 100 % 09/30/22 1329           Post Anesthesia Care and Evaluation    Patient location during evaluation: PACU  Patient participation: complete - patient participated  Level of consciousness: awake  Pain scale: See nurse's notes for pain score.  Pain management: adequate    Airway patency: patent  Anesthetic complications: No anesthetic complications  PONV Status: none  Cardiovascular status: acceptable  Respiratory status: acceptable  Hydration status: acceptable    Comments: Patient seen and examined postoperatively; vital signs stable; SpO2 greater than or equal to 90%; cardiopulmonary status stable; nausea/vomiting adequately controlled; pain adequately controlled; no apparent anesthesia complications; patient discharged from anesthesia care when discharge criteria were met

## 2022-10-05 ENCOUNTER — OFFICE VISIT (OUTPATIENT)
Dept: CARDIAC SURGERY | Facility: CLINIC | Age: 77
End: 2022-10-05

## 2022-10-05 VITALS
HEIGHT: 72 IN | SYSTOLIC BLOOD PRESSURE: 146 MMHG | BODY MASS INDEX: 32.17 KG/M2 | WEIGHT: 237.5 LBS | OXYGEN SATURATION: 97 % | DIASTOLIC BLOOD PRESSURE: 78 MMHG | HEART RATE: 78 BPM | TEMPERATURE: 97.1 F | RESPIRATION RATE: 20 BRPM

## 2022-10-05 DIAGNOSIS — I34.0 SEVERE MITRAL REGURGITATION: ICD-10-CM

## 2022-10-05 DIAGNOSIS — K21.9 GASTROESOPHAGEAL REFLUX DISEASE WITHOUT ESOPHAGITIS: Primary | ICD-10-CM

## 2022-10-05 DIAGNOSIS — I07.1 MODERATE TRICUSPID REGURGITATION: ICD-10-CM

## 2022-10-05 DIAGNOSIS — I34.0 NONRHEUMATIC MITRAL VALVE REGURGITATION: ICD-10-CM

## 2022-10-05 DIAGNOSIS — M15.9 PRIMARY OSTEOARTHRITIS INVOLVING MULTIPLE JOINTS: ICD-10-CM

## 2022-10-05 DIAGNOSIS — R06.09 DYSPNEA ON EXERTION: ICD-10-CM

## 2022-10-05 DIAGNOSIS — I34.1 MITRAL VALVE PROLAPSE: ICD-10-CM

## 2022-10-05 DIAGNOSIS — I10 PRIMARY HYPERTENSION: ICD-10-CM

## 2022-10-05 PROCEDURE — 99205 OFFICE O/P NEW HI 60 MIN: CPT | Performed by: THORACIC SURGERY (CARDIOTHORACIC VASCULAR SURGERY)

## 2022-10-09 PROBLEM — I34.0 SEVERE MITRAL REGURGITATION: Status: ACTIVE | Noted: 2022-10-09

## 2022-10-09 PROBLEM — I07.1 MODERATE TRICUSPID REGURGITATION: Status: ACTIVE | Noted: 2022-10-09

## 2022-10-09 PROBLEM — R06.09 DYSPNEA ON EXERTION: Status: ACTIVE | Noted: 2022-10-09

## 2022-10-09 PROBLEM — I10 PRIMARY HYPERTENSION: Status: ACTIVE | Noted: 2022-10-09

## 2022-10-09 NOTE — PROGRESS NOTES
10/9/2022    Seen on 10/5/2022    Chief Complaint     Dyspnea of exertion, evaluation of mitral regurgitation    History of Present Illness:       Dear Nicole and Colleagues,  It was nice to see Clint Cee in consultation at your request. He is a 77 y.o. male with hypertension, BPH, GERD, osteoarthritis and spinal problems and heart murmur who has developed progressive dyspnea with exertion and fatigue.  He also has occasional palpitations.  He still short of breath on walking long inclines or climbing stairs. He denies syncope, TIA, orthopnea, PND or lower extremity edema.  Evaluation of the heart murmur included a 2D echocardiogram that showed severe mitral regurgitation followed by a TERRA that showed an ejection fraction of 60%, severe eccentric mitral regurgitation with P2 prolapse, left atrial enlargement, and moderate tricuspid regurgitation.  He had a cardiac cath that showed no significant coronary artery disease, pulmonary pressures of 46 over 60 mmHg with a mean of 26 m of mercury.  No gradient was noticed across the aortic valve.  He has no family history of aneurysms, dissections or connective tissue disorders.  He denies a history of scarlet or rheumatic fever, endocarditis or IV drug use.    Patient Active Problem List   Diagnosis   • Benign prostatic hyperplasia   • Gastroesophageal reflux disease   • Hypertension   • Mood disorder (HCC)   • Scoliosis   • Spinal stenosis   • Vitamin D deficiency   • Polyosteoarthritis   • Insomnia   • Retinal detachment of right eye with single retinal tear   • Osteoarthritis of knee   • Cellulitis of face   • Prostate cancer (HCC)   • Ocular migraine   • Headache   • Gout of multiple sites   • Medicare annual wellness visit, subsequent   • Allergic   • HL (hearing loss)   • Visual impairment   • Cataract   • Colon polyp   • Heart murmur on physical examination   • Mitral valve prolapse   • Premature ventricular contractions   • Nonrheumatic mitral valve  regurgitation   • Severe mitral regurgitation   • Moderate tricuspid regurgitation   • Primary hypertension   • Dyspnea on exertion       Past Medical History:   Diagnosis Date   • Abnormal ECG June 2022   • Allergic 01/01/1954    Nasal alergies   • Arrhythmia 2004    Dr Youngblood examined me and said i was ok.   • Benign prostatic hyperplasia 11/14/2018   • Cancer (HCC) Jan22 January 2022   • Cataract 01/01/2014    Surgery. Caused detached retina of right eye 20/60 repair   • Colon polyp 01/01/2018    Found and removed last colon eca.q   • Gastroesophageal reflux disease 03/20/2014   • Headache 01/01/2015    Have shimmering in both eyes intermittantly. No headaches   • Heart murmur May 2022    During wellness exam   • Heart valve disease July 2022    During Echo   • HL (hearing loss) 01/01/2012    Have hearing aids   • Hypertension 12/02/2011   • Insomnia 12/02/2011   • Mitral valve prolapse June 2022   • Mood disorder (HCC) 09/19/2013   • Polyosteoarthritis 12/02/2011   • Scoliosis 05/09/2014   • Spinal stenosis 12/02/2011   • Visual impairment 01/01/1955    Nearsighted corrected glasses   • Vitamin D deficiency 05/23/2018       Past Surgical History:   Procedure Laterality Date   • ADENOIDECTOMY  1954    As child   • APPENDECTOMY  1956    Surgery   • CARDIAC CATHETERIZATION N/A 9/30/2022    Procedure: Right and Left Heart Cath with Coronar Angiography;  Surgeon: Felipe Garcia MD;  Location: [facility] INVASIVE LOC[facility];  Service: Cardiovascular;  Laterality: N/A;   • COLONOSCOPY  01/01/1997    Regularly scheduled   • EYE SURGERY  01/01/2010    Reattached right retina   • SPINE SURGERY  01/01/1971    2 lumbar 1 cervical   • TONSILLECTOMY  01/01/1950    Childhood       No Known Allergies      Current Outpatient Medications:   •  acetaminophen (TYLENOL) 500 MG tablet, Take 500 mg by mouth Every 6 (Six) Hours As Needed for Mild Pain., Disp: , Rfl:   •  allopurinol (ZYLOPRIM) 100 MG tablet, TAKE 1 TABLET BY MOUTH  DAILY, Disp: 30 tablet, Rfl: 3  •  amLODIPine (NORVASC) 10 MG tablet, TAKE 1 TABLET BY MOUTH EVERY DAY, Disp: 90 tablet, Rfl: 0  •  aspirin 325 MG tablet, Take 325 mg by mouth Every 6 (Six) Hours As Needed for Mild Pain., Disp: , Rfl:   •  aspirin-acetaminophen-caffeine (EXCEDRIN MIGRAINE) 250-250-65 MG per tablet, Take 2 tablets by mouth Every 6 (Six) Hours As Needed for Headache., Disp: , Rfl:   •  aspirin-sod bicarb-citric acid (STEFANIE-SELTZER) 325 MG effervescent tablet, Take 325 mg by mouth Every 6 (Six) Hours As Needed for Headache or Mild Pain., Disp: , Rfl:   •  fexofenadine (ALLEGRA) 180 MG tablet, Take 180 mg by mouth Daily., Disp: , Rfl:   •  ibuprofen (ADVIL,MOTRIN) 400 MG tablet, Take 400 mg by mouth Every 6 (Six) Hours As Needed for Mild Pain., Disp: , Rfl:   •  Leuprolide Mesylate, 6 Month, (CAMCEVI SC), , Disp: , Rfl:   •  losartan (COZAAR) 50 MG tablet, TAKE 1 TABLET BY MOUTH DAILY, Disp: 90 tablet, Rfl: 0  •  zolpidem (AMBIEN) 10 MG tablet, Take 0.5 tablets by mouth At Night As Needed., Disp: , Rfl:   •  DULoxetine (CYMBALTA) 60 MG capsule, Take 1 capsule by mouth Daily for 90 days., Disp: 90 capsule, Rfl: 3    Social History     Socioeconomic History   • Marital status:    Tobacco Use   • Smoking status: Former     Packs/day: 0.00     Years: 0.50     Pack years: 0.00     Types: Cigarettes   • Smokeless tobacco: Never   • Tobacco comments:     Tried tobacco as child   Vaping Use   • Vaping Use: Never used   Substance and Sexual Activity   • Alcohol use: Not Currently     Comment: Not a regular drinker. Occasionly have a glass of wine or be   • Drug use: Never   • Sexual activity: Not Currently     Partners: Female     Birth control/protection: Condom, Other, Post-menopausal, None, Birth control pill, Same-sex partner     Comment: Chemically castrated. Prostate cancer       Family History   Problem Relation Age of Onset   • Arthritis Father    • Cancer Father         Prostate   • Arrhythmia  Father         Congentive heart failure   • Diabetes Mother         Adult diabetes   • Early death Mother         Kidneys failed after giving birth-diabetes complications   • Heart disease Mother         Adult Diabetes     Review of Systems   Respiratory: Positive for shortness of breath. Negative for chest tightness.    Cardiovascular: Positive for palpitations. Negative for chest pain and leg swelling.   Musculoskeletal: Positive for arthralgias and back pain.   Neurological: Negative for syncope and weakness.   All other systems reviewed and are negative.      Physical Exam:    Vital Signs:  Weight: 108 kg (237 lb 8 oz)   Body mass index is 32.21 kg/m².  Temp: 97.1 °F (36.2 °C)   Heart Rate: 78   BP: 146/78     Constitutional:       Appearance: Well-developed.   Eyes:      Conjunctiva/sclera: Conjunctivae normal.      Pupils: Pupils are equal, round, and reactive to light.   HENT:      Head: Normocephalic and atraumatic.      Nose: Nose normal.   Neck:      Thyroid: No thyromegaly.      Vascular: No JVD.      Lymphadenopathy: No cervical adenopathy.   Pulmonary:      Effort: Pulmonary effort is normal.      Breath sounds: Normal breath sounds. No rales.   Cardiovascular:      PMI at left midclavicular line. Normal rate. Regular rhythm.      Murmurs: There is a high frequency blowing holosystolic murmur at the apex.      No gallop.   Pulses:     Intact distal pulses. No decreased pulses.   Edema:     Peripheral edema absent.   Abdominal:      General: Bowel sounds are normal. There is no distension.      Palpations: Abdomen is soft. There is no abdominal mass.      Tenderness: There is no abdominal tenderness.   Musculoskeletal: Normal range of motion.         General: No tenderness or deformity.      Cervical back: Normal range of motion and neck supple. Skin:     General: Skin is warm and dry.      Findings: No erythema or rash.   Neurological:      Mental Status: Alert and oriented to person, place, and time.       Deep Tendon Reflexes: Reflexes are normal and symmetric.   Psychiatric:         Behavior: Behavior normal.          Assessment:     Problems Addressed this Visit        Cardiac and Vasculature    Mitral valve prolapse    Nonrheumatic mitral valve regurgitation    Severe mitral regurgitation    Moderate tricuspid regurgitation    Primary hypertension    Dyspnea on exertion       Gastrointestinal Abdominal     Gastroesophageal reflux disease - Primary       Musculoskeletal and Injuries    Polyosteoarthritis   Diagnoses       Codes Comments    Gastroesophageal reflux disease without esophagitis    -  Primary ICD-10-CM: K21.9  ICD-9-CM: 530.81     Primary osteoarthritis involving multiple joints     ICD-10-CM: M15.9  ICD-9-CM: 715.98     Mitral valve prolapse     ICD-10-CM: I34.1  ICD-9-CM: 424.0     Nonrheumatic mitral valve regurgitation     ICD-10-CM: I34.0  ICD-9-CM: 424.0     Dyspnea on exertion     ICD-10-CM: R06.09  ICD-9-CM: 786.09     Primary hypertension     ICD-10-CM: I10  ICD-9-CM: 401.9     Moderate tricuspid regurgitation     ICD-10-CM: I07.1  ICD-9-CM: 397.0     Severe mitral regurgitation     ICD-10-CM: I34.0  ICD-9-CM: 424.0         Assessment/recommendation:     1. Severe nonrheumatic mitral regurgitation with new onset of symptoms.  His symptoms are moderate in the form of dyspnea and fatigue and he is in heart failure class II.  He has severe mitral regurgitation with degenerative mitral valve disease and prolapse.  He has extensive prolapse.  I recommend mitral valve repair and I quoted a repairability over 95-98%.  I discussed the risks (STS calculated), benefits and terms of surgery and he wishes to proceed  2. He has moderate tricuspid regurgitation which most likely is functional and he has moderate pulm hypertension probably secondary to left heart valve disease.  I discussed with the patient the recommendation of tricuspid valve repair with an annuloplasty.  3. Has a history of  palpitations but no documented atrial fibrillation.  Plan is to endocardial closure of the left atrial appendage  4. Hypertension, well controlled.  The blood pressure is slightly elevated in my office but is attributed to his apprehension anxiety.  I recommend the patient to check more frequently the blood pressure and if it is over 140 systolic permanently then to call the cardiology team to adjustment of the medications.  I counseled the patient about low-salt diet use    Thank you for allowing me to participate in his care.    Regards,    Femi Henderson M.D.  I spent over 65 minutes before, during and after the office visit in reviewing the records, examining the patient, reviewing and interpreting myself the echocardiogram, TTE on the cardiac cath, discussing the findings and options with the patient, discussing my recommendation of mitral valve ventricular valve repairs, discussing the guidelines for intervention in her valve disease, counseling about low-salt diet and documenting in the electronic record

## 2022-10-10 ENCOUNTER — LAB (OUTPATIENT)
Dept: LAB | Facility: HOSPITAL | Age: 77
End: 2022-10-10

## 2022-10-10 ENCOUNTER — PREP FOR SURGERY (OUTPATIENT)
Dept: OTHER | Facility: HOSPITAL | Age: 77
End: 2022-10-10

## 2022-10-10 ENCOUNTER — OFFICE VISIT (OUTPATIENT)
Dept: FAMILY MEDICINE CLINIC | Facility: CLINIC | Age: 77
End: 2022-10-10

## 2022-10-10 VITALS
WEIGHT: 235 LBS | DIASTOLIC BLOOD PRESSURE: 80 MMHG | HEIGHT: 72 IN | OXYGEN SATURATION: 97 % | HEART RATE: 90 BPM | SYSTOLIC BLOOD PRESSURE: 140 MMHG | RESPIRATION RATE: 18 BRPM | BODY MASS INDEX: 31.83 KG/M2

## 2022-10-10 DIAGNOSIS — I36.1 NONRHEUMATIC TRICUSPID VALVE REGURGITATION: ICD-10-CM

## 2022-10-10 DIAGNOSIS — I34.0 NONRHEUMATIC MITRAL VALVE REGURGITATION: ICD-10-CM

## 2022-10-10 DIAGNOSIS — I34.0 NONRHEUMATIC MITRAL VALVE REGURGITATION: Primary | ICD-10-CM

## 2022-10-10 DIAGNOSIS — H53.123 SCINTILLATING SCOTOMA OF BOTH EYES: Primary | ICD-10-CM

## 2022-10-10 DIAGNOSIS — M17.0 PRIMARY OSTEOARTHRITIS OF BOTH KNEES: ICD-10-CM

## 2022-10-10 DIAGNOSIS — R79.1 ABNORMAL COAGULATION PROFILE: ICD-10-CM

## 2022-10-10 DIAGNOSIS — G44.221 CHRONIC TENSION-TYPE HEADACHE, INTRACTABLE: ICD-10-CM

## 2022-10-10 DIAGNOSIS — Z01.810 PREOP CARDIOVASCULAR EXAM: ICD-10-CM

## 2022-10-10 DIAGNOSIS — C61 PROSTATE CANCER: ICD-10-CM

## 2022-10-10 DIAGNOSIS — R79.9 ABNORMAL FINDING OF BLOOD CHEMISTRY, UNSPECIFIED: ICD-10-CM

## 2022-10-10 PROCEDURE — 99214 OFFICE O/P EST MOD 30 MIN: CPT | Performed by: FAMILY MEDICINE

## 2022-10-10 RX ORDER — DEXTROSE MONOHYDRATE 25 G/50ML
10-50 INJECTION, SOLUTION INTRAVENOUS
Status: CANCELLED | OUTPATIENT
Start: 2022-10-10

## 2022-10-10 RX ORDER — SODIUM CHLORIDE 0.9 % (FLUSH) 0.9 %
30 SYRINGE (ML) INJECTION ONCE AS NEEDED
Status: CANCELLED | OUTPATIENT
Start: 2022-10-10

## 2022-10-10 RX ORDER — SODIUM CHLORIDE 9 MG/ML
30 INJECTION, SOLUTION INTRAVENOUS CONTINUOUS PRN
Status: CANCELLED | OUTPATIENT
Start: 2022-10-10

## 2022-10-10 RX ORDER — ALPRAZOLAM 0.25 MG/1
0.25 TABLET ORAL ONCE
Status: CANCELLED | OUTPATIENT
Start: 2022-10-10 | End: 2022-10-10

## 2022-10-10 RX ORDER — SODIUM CHLORIDE 0.9 % (FLUSH) 0.9 %
3 SYRINGE (ML) INJECTION EVERY 12 HOURS SCHEDULED
Status: CANCELLED | OUTPATIENT
Start: 2022-10-10

## 2022-10-10 RX ORDER — CHLORHEXIDINE GLUCONATE 500 MG/1
1 CLOTH TOPICAL EVERY 12 HOURS PRN
Status: CANCELLED | OUTPATIENT
Start: 2022-10-10

## 2022-10-10 RX ORDER — ACETAMINOPHEN 325 MG/1
650 TABLET ORAL EVERY 4 HOURS PRN
Status: CANCELLED | OUTPATIENT
Start: 2022-10-10

## 2022-10-10 RX ORDER — CHLORHEXIDINE GLUCONATE 0.12 MG/ML
15 RINSE ORAL ONCE
Status: CANCELLED | OUTPATIENT
Start: 2022-10-10 | End: 2022-10-10

## 2022-10-10 RX ORDER — NITROGLYCERIN 0.4 MG/1
0.4 TABLET SUBLINGUAL
Status: CANCELLED | OUTPATIENT
Start: 2022-10-10

## 2022-10-10 RX ORDER — BUTALBITAL, ACETAMINOPHEN AND CAFFEINE 50; 325; 40 MG/1; MG/1; MG/1
1 TABLET ORAL EVERY 4 HOURS PRN
Qty: 60 TABLET | Refills: 2 | Status: SHIPPED | OUTPATIENT
Start: 2022-10-10 | End: 2022-11-09

## 2022-10-10 RX ORDER — NICOTINE POLACRILEX 4 MG
15 LOZENGE BUCCAL
Status: CANCELLED | OUTPATIENT
Start: 2022-10-10

## 2022-10-10 RX ORDER — CHLORHEXIDINE GLUCONATE 0.12 MG/ML
15 RINSE ORAL EVERY 12 HOURS
Status: CANCELLED | OUTPATIENT
Start: 2022-10-10 | End: 2022-10-11

## 2022-10-10 RX ORDER — SODIUM CHLORIDE 0.9 % (FLUSH) 0.9 %
3-10 SYRINGE (ML) INJECTION AS NEEDED
Status: CANCELLED | OUTPATIENT
Start: 2022-10-10

## 2022-10-10 NOTE — PROGRESS NOTES
Chief Complaint  Hypertension, Back Pain, and Flank Pain    Subjective      Clint Cee presents to [facility]  History of Present Illness  For follow up on BP and on going back pain.  Hypertension    Back Pain    Flank Pain      The patient presents today for a follow-up.    The patient states that he has had 45 radiation treatments for prostate cancer. He states that he was diagnosed in 01/2022.    He states that his prostate-specific antigen went from 5.4 to 0.01 ng/mL. He notes that he was offered different treatments.. The patient states that he has no energy. He notes that he had a transesophageal echocardiogram and a heart catheterization. He states that he has a leaking mitral valve and tricuspid valve. He notes that he is scheduled to have open heart surgery on 11/18/2022.    He notes that he walks around the track and does yoga twice per week. He states that he is trying to lose weight. He notes that he takes 1 to 2 naps per day. He states that he is not sleeping well. He notes that he is getting up 3 to 4 times per night to urinate. He states that his bladder is full and he drinks at least 6 ounces or more. He notes that he has Ambien, but it does not seem to help. He states that he takes a couple of aspirin per night or an analgesic. He notes that Tylenol does not seem to help him very much. He states that he takes [place]. He notes that his bowels are working well. He states that he had difficulty holding his bowels back for a while after radiation, but this has improved.    The patient states that he is unable to keep his knees together. He notes that he has pain in his knees. He states that he would like to have cortisone injections in his bilateral knees today. He notes that he has back pain in the middle of his back as well.    The patient states that he was seen by an ophthalmologist approximately 1 week ago and he was unable to find anything that would be  "causing the flashes he is currently experiencing. He notes that he is starting to have headaches. He states that he has them intermittently. He adds that it is more of a dull pain.   Objective   Vital Signs:  /80 (BP Location: Right arm)   Pulse 90   Resp 18   Ht 182.9 cm (72\")   Wt 107 kg (235 lb)   SpO2 97%   BMI 31.87 kg/m²   Estimated body mass index is 31.87 kg/m² as calculated from the following:    Height as of this encounter: 182.9 cm (72\").    Weight as of this encounter: 107 kg (235 lb).    BMI is >= 30 and <35. (Class 1 Obesity). The following options were offered after discussion;: weight loss educational material (shared in after visit summary), exercise counseling/recommendations, nutrition counseling/recommendations and pharmacological intervention options      Physical Exam  Vitals reviewed.   Constitutional:       Appearance: Normal appearance. He is well-developed and normal weight.   HENT:      Head: Normocephalic and atraumatic.      Right Ear: Tympanic membrane, ear canal and external ear normal.      Left Ear: Tympanic membrane, ear canal and external ear normal.      Nose: Nose normal.      Mouth/Throat:      Mouth: Mucous membranes are moist.      Pharynx: Oropharynx is clear. No oropharyngeal exudate.   Eyes:      Extraocular Movements: Extraocular movements intact.      Conjunctiva/sclera: Conjunctivae normal.      Pupils: Pupils are equal, round, and reactive to light.   Cardiovascular:      Rate and Rhythm: Normal rate and regular rhythm.      Pulses: Normal pulses.      Heart sounds: Normal heart sounds.   Pulmonary:      Effort: Pulmonary effort is normal.      Breath sounds: Normal breath sounds.   Abdominal:      General: Bowel sounds are normal.      Palpations: Abdomen is soft.   Musculoskeletal:         General: Normal range of motion.      Cervical back: Normal range of motion and neck supple.   Skin:     General: Skin is warm and dry.   Neurological:      General: No " focal deficit present.      Mental Status: He is alert and oriented to person, place, and time. Mental status is at baseline.   Psychiatric:         Mood and Affect: Mood normal.         Behavior: Behavior normal.         Thought Content: Thought content normal.         Judgment: Judgment normal.          Assessment and Plan     1. Prostate cancer (HCC) (Primary)  Dennis is a 77-year-old white male who is followed in my office for his general medical care.  Dennis has prostate cancer and is under treatment with leuprolide mesilate 6 mg a month.  He was diagnosed in January 2022 and elected to have radiation treatment.  He underwent 45 radiation treatments and is now on the antiandrogen leuprolide.  He is exhausted and tired all the time and does not feel well.  He still pushes himself though and tries to walk daily does yoga.  He is slowly but surely regaining some strength but he has a long way to go.    2. Nonrheumatic mitral valve regurgitation  Because of the extreme fatigue he underwent extensive work-up including a cardiac evaluation.  He had a transesophageal echocardiogram along with a regular echocardiogram and it revealed that he has dysfunctioning mitral and aortic valve.  He is scheduled for valve replacement in November if he is strong enough to do that by Dr. Henderson.  We are waiting to see how he does the rest of October and 1 November before decision will be made.    3. Nonrheumatic tricuspid valve regurgitation  The patient apparently has mitral valve regurgitation and tricuspid valve regurgitation.  The mitral valve is scheduled to be replaced in November but it will depend a little bit on how he is doing clinically.  He has a Gibble stronger than he has now I think to get through something like that but we will see.    4. Primary osteoarthritis of both knees  The patient had more and more trouble with his knee arthritis recently.  Whether or not it was due to the radiation or inactivity during that time  but he has more pain and is limited in what he can do.  He would really like to get injections in the knee and I told him he could come back sometime and we can certainly do cortisone in the knees but we cannot put the gel anymore.  Once he is done with the heart valve surgery we could consider looking at a knee replacement if his knees qualify.  We did not do x-rays and take a course look at them as an MRI.  He thinks he would like to start with the injections and I told him to make appointment anytime he wants I can inject cortisol for him but not the gel.       I spent 38 minutes caring for Clint on this date of service. This time includes time spent by me in the following activities:preparing for the visit, reviewing tests, obtaining and/or reviewing a separately obtained history, performing a medically appropriate examination and/or evaluation , counseling and educating the patient/family/caregiver, ordering medications, tests, or procedures, referring and communicating with other health care professionals , documenting information in the medical record, independently interpreting results and communicating that information with the patient/family/caregiver and care coordination     Follow Up       No follow-ups on file.  Patient was given instructions and counseling regarding his condition or for health maintenance advice. Please see specific information pulled into the AVS if appropriate.     Transcribed from ambient dictation for Jaret Castellanos MD by Joseline Chris.  10/10/22   15:37 EDT    Patient or patient representative verbalized consent to the visit recording.  I have personally performed the services described in this document as transcribed by the above individual, and it is both accurate and complete.  Jaret Castellanos MD  10/23/2022  17:35 EDT  Joseline Chris    Answers for HPI/ROS submitted by the patient on 10/10/2022  What is the primary reason for your visit?: Other  Please describe your  symptoms.: Knee pain. Sleeplessness. Back pain. Previous issues in last 10 months  Have you had these symptoms before?: Yes  How long have you been having these symptoms?: Greater than 2 weeks  Please list any medications you are currently taking for this condition.: Overcounter pain meds  Please describe any probable cause for these symptoms. : Degenerative disk disease. Cancer. Heart valve issues

## 2022-10-11 ENCOUNTER — TELEPHONE (OUTPATIENT)
Dept: CARDIAC SURGERY | Facility: CLINIC | Age: 77
End: 2022-10-11

## 2022-10-11 NOTE — TELEPHONE ENCOUNTER
Spoke to patient with surgery instructions. Surgery scheduled for 11- at 0730 with an arrival time of 0500.  Bryan will contact him with PAT instructions. Expressed verbal understanding.

## 2022-10-12 ENCOUNTER — PATIENT ROUNDING (BHMG ONLY) (OUTPATIENT)
Dept: CARDIAC SURGERY | Facility: CLINIC | Age: 77
End: 2022-10-12

## 2022-10-12 ENCOUNTER — TELEPHONE (OUTPATIENT)
Dept: FAMILY MEDICINE CLINIC | Facility: CLINIC | Age: 77
End: 2022-10-12

## 2022-10-12 NOTE — PROGRESS NOTES
A My Chart message has been sent to the patient for PATIENT ROUNDING with [facility]

## 2022-10-12 NOTE — TELEPHONE ENCOUNTER
Caller: LUIS - [facility] PRIOR AUTH DEPT    Relationship: Payer    Mark call back number: [phone]    REFERENCE NUMBER: [id number]    What was the call regarding: LUIS WITH THE [facility] PRIOR AUTHORIZATION DEPARTMENT IS CALLING CLARIFY THE DIAGNOSIS FOR BUTALBITAL (FIORICET) THAT THEY RECEIVED A PRIOR AUTHORIZATION FOR. LUIS STATED THAT THE DIAGNOSIS WAS SENT FOR MIGRAINE AND HEADACHE AND IT CANNOT BE BOTH BECAUSE THEY TECHNICALLY ARE NOT THE SAME. LUIS STATED THAT THE MEDICATION IS NON FORMULARY SO THEY DO REQUIRE TWO FORMULARIES THAT HAVE BEEN TRIED OR FAILED. LUIS STATED THAT SHE WILL FAX THE SAME INFORMATION OVER AND THAT CAN BE FILLED OUT AND SENT BACK TO THE NUMBER ON THE FORM. PLEASE ADVISE.    Do you require a callback: IF NEEDED

## 2022-10-27 ENCOUNTER — TELEPHONE (OUTPATIENT)
Dept: CARDIAC SURGERY | Facility: CLINIC | Age: 77
End: 2022-10-27

## 2022-10-27 NOTE — TELEPHONE ENCOUNTER
Spoke to Mr. Cee to confirm new surgery date. Surgery was moved from 11- to 11-. Arrival time will now be 0600. Bryan will call to schedule his PAT. Expressed verbal understanding of these changes.

## 2022-10-27 NOTE — TELEPHONE ENCOUNTER
Caller: Clint Cee    Relationship: Self    Best call back number: [phone]    What is the best time to reach you: ANY     Who are you requesting to speak with (clinical staff, provider,  specific staff member): ANY    Do you know the name of the person who called:PT     What was the call regarding:   PT HAS MRI SCHEDULED FOR 11.10.22 AND WOULD LIKE TO KNOW IF DYE WAS BEING USED. PT WOULD LIKE TO MAKE SURE IT WOULD NOT INFLICT WITH SURGERY WITH .     Do you require a callback: YES

## 2022-11-10 ENCOUNTER — HOSPITAL ENCOUNTER (OUTPATIENT)
Dept: MRI IMAGING | Facility: HOSPITAL | Age: 77
Discharge: HOME | End: 2022-11-10
Admitting: FAMILY MEDICINE

## 2022-11-10 DIAGNOSIS — H53.123 SCINTILLATING SCOTOMA OF BOTH EYES: ICD-10-CM

## 2022-11-10 DIAGNOSIS — G44.221 CHRONIC TENSION-TYPE HEADACHE, INTRACTABLE: ICD-10-CM

## 2022-11-10 LAB — CREAT BLDA-MCNC: 1.1 MG/DL (ref 0.6–1.3)

## 2022-11-10 PROCEDURE — 82565 ASSAY OF CREATININE: CPT

## 2022-11-10 PROCEDURE — 0 GADOBENATE DIMEGLUMINE 529 MG/ML SOLUTION: Performed by: FAMILY MEDICINE

## 2022-11-10 PROCEDURE — A9577 INJ MULTIHANCE: HCPCS | Performed by: FAMILY MEDICINE

## 2022-11-10 PROCEDURE — 70553 MRI BRAIN STEM W/O & W/DYE: CPT

## 2022-11-10 RX ADMIN — GADOBENATE DIMEGLUMINE 20 ML: 529 INJECTION, SOLUTION INTRAVENOUS at 13:22

## 2022-11-16 ENCOUNTER — ANESTHESIA EVENT (OUTPATIENT)
Dept: PERIOP | Facility: HOSPITAL | Age: 77
End: 2022-11-16

## 2022-11-16 ENCOUNTER — LAB (OUTPATIENT)
Dept: LAB | Facility: HOSPITAL | Age: 77
End: 2022-11-16

## 2022-11-16 ENCOUNTER — PRE-ADMISSION TESTING (OUTPATIENT)
Dept: PREADMISSION TESTING | Facility: HOSPITAL | Age: 77
End: 2022-11-16

## 2022-11-16 ENCOUNTER — HOSPITAL ENCOUNTER (OUTPATIENT)
Dept: CARDIOLOGY | Facility: HOSPITAL | Age: 77
Discharge: HOME | End: 2022-11-16

## 2022-11-16 ENCOUNTER — HOSPITAL ENCOUNTER (OUTPATIENT)
Dept: GENERAL RADIOLOGY | Facility: HOSPITAL | Age: 77
Discharge: HOME | End: 2022-11-16

## 2022-11-16 VITALS
TEMPERATURE: 98.4 F | BODY MASS INDEX: 32.7 KG/M2 | SYSTOLIC BLOOD PRESSURE: 169 MMHG | RESPIRATION RATE: 18 BRPM | DIASTOLIC BLOOD PRESSURE: 84 MMHG | WEIGHT: 241.4 LBS | HEART RATE: 77 BPM | OXYGEN SATURATION: 98 % | HEIGHT: 72 IN

## 2022-11-16 DIAGNOSIS — I36.1 NONRHEUMATIC TRICUSPID VALVE REGURGITATION: ICD-10-CM

## 2022-11-16 DIAGNOSIS — I34.0 NONRHEUMATIC MITRAL VALVE REGURGITATION: ICD-10-CM

## 2022-11-16 LAB
ABO GROUP BLD: NORMAL
ALBUMIN SERPL-MCNC: 4.4 G/DL (ref 3.5–5.2)
ALBUMIN/GLOB SERPL: 1.8 G/DL
ALP SERPL-CCNC: 100 U/L (ref 39–117)
ALT SERPL W P-5'-P-CCNC: 18 U/L (ref 1–41)
ANION GAP SERPL CALCULATED.3IONS-SCNC: 10 MMOL/L (ref 5–15)
APTT PPP: 27.6 SECONDS (ref 24–31)
ARTERIAL PATENCY WRIST A: POSITIVE
AST SERPL-CCNC: 17 U/L (ref 1–40)
ATMOSPHERIC PRESS: ABNORMAL MM[HG]
BASE EXCESS BLDA CALC-SCNC: -1 MMOL/L (ref 0–3)
BASOPHILS # BLD AUTO: 0 10*3/MM3 (ref 0–0.2)
BASOPHILS NFR BLD AUTO: 0.5 % (ref 0–1.5)
BDY SITE: ABNORMAL
BH CV XLRA MEAS LEFT DIST CCA EDV: -13.7 CM/SEC
BH CV XLRA MEAS LEFT DIST CCA PSV: -54.7 CM/SEC
BH CV XLRA MEAS LEFT DIST ICA EDV: -31.7 CM/SEC
BH CV XLRA MEAS LEFT DIST ICA PSV: -91.3 CM/SEC
BH CV XLRA MEAS LEFT ICA/CCA RATIO: -1.03
BH CV XLRA MEAS LEFT PROX CCA EDV: 15.5 CM/SEC
BH CV XLRA MEAS LEFT PROX CCA PSV: 88.8 CM/SEC
BH CV XLRA MEAS LEFT PROX ECA PSV: -74.6 CM/SEC
BH CV XLRA MEAS LEFT PROX ICA EDV: -26.1 CM/SEC
BH CV XLRA MEAS LEFT PROX ICA PSV: -84.5 CM/SEC
BH CV XLRA MEAS LEFT PROX SCLA PSV: 145 CM/SEC
BH CV XLRA MEAS LEFT VERTEBRAL A EDV: 15.4 CM/SEC
BH CV XLRA MEAS LEFT VERTEBRAL A PSV: 68.9 CM/SEC
BH CV XLRA MEAS RIGHT DIST CCA EDV: -12.4 CM/SEC
BH CV XLRA MEAS RIGHT DIST CCA PSV: -74.6 CM/SEC
BH CV XLRA MEAS RIGHT DIST ICA EDV: -20.5 CM/SEC
BH CV XLRA MEAS RIGHT DIST ICA PSV: -95.7 CM/SEC
BH CV XLRA MEAS RIGHT ICA/CCA RATIO: -0.95
BH CV XLRA MEAS RIGHT PROX CCA EDV: 14.3 CM/SEC
BH CV XLRA MEAS RIGHT PROX CCA PSV: 101 CM/SEC
BH CV XLRA MEAS RIGHT PROX ECA PSV: -95.7 CM/SEC
BH CV XLRA MEAS RIGHT PROX ICA EDV: -14.3 CM/SEC
BH CV XLRA MEAS RIGHT PROX ICA PSV: -62.1 CM/SEC
BH CV XLRA MEAS RIGHT PROX SCLA PSV: 126 CM/SEC
BH CV XLRA MEAS RIGHT VERTEBRAL A EDV: -15.4 CM/SEC
BH CV XLRA MEAS RIGHT VERTEBRAL A PSV: -79 CM/SEC
BILIRUB SERPL-MCNC: 0.3 MG/DL (ref 0–1.2)
BILIRUB UR QL STRIP: NEGATIVE
BLD GP AB SCN SERPL QL: NEGATIVE
BUN SERPL-MCNC: 25 MG/DL (ref 8–23)
BUN/CREAT SERPL: 29.4 (ref 7–25)
CALCIUM SPEC-SCNC: 9 MG/DL (ref 8.6–10.5)
CHLORIDE SERPL-SCNC: 101 MMOL/L (ref 98–107)
CHOLEST SERPL-MCNC: 198 MG/DL (ref 0–200)
CLARITY UR: CLEAR
CLOSE TME COLL+ADP + EPINEP PNL BLD: 92 % (ref 86–100)
CO2 BLDA-SCNC: 24.1 MMOL/L (ref 22–29)
CO2 SERPL-SCNC: 27 MMOL/L (ref 22–29)
COLOR UR: YELLOW
CREAT SERPL-MCNC: 0.85 MG/DL (ref 0.76–1.27)
DEPRECATED RDW RBC AUTO: 47.3 FL (ref 37–54)
EGFRCR SERPLBLD CKD-EPI 2021: 90.6 ML/MIN/1.73
EOSINOPHIL # BLD AUTO: 0.2 10*3/MM3 (ref 0–0.4)
EOSINOPHIL NFR BLD AUTO: 3.8 % (ref 0.3–6.2)
ERYTHROCYTE [DISTWIDTH] IN BLOOD BY AUTOMATED COUNT: 14.2 % (ref 12.3–15.4)
GLOBULIN UR ELPH-MCNC: 2.4 GM/DL
GLUCOSE SERPL-MCNC: 120 MG/DL (ref 65–99)
GLUCOSE UR STRIP-MCNC: NEGATIVE MG/DL
HBA1C MFR BLD: 4.9 % (ref 3.5–5.6)
HCO3 BLDA-SCNC: 23 MMOL/L (ref 21–28)
HCT VFR BLD AUTO: 41.9 % (ref 37.5–51)
HDLC SERPL-MCNC: 54 MG/DL (ref 40–60)
HEMODILUTION: NO
HGB BLD-MCNC: 14.3 G/DL (ref 13–17.7)
HGB UR QL STRIP.AUTO: NEGATIVE
INHALED O2 CONCENTRATION: 21 %
INR PPP: 1.01 (ref 0.93–1.1)
KETONES UR QL STRIP: NEGATIVE
LDLC SERPL CALC-MCNC: 116 MG/DL (ref 0–100)
LDLC/HDLC SERPL: 2.07 {RATIO}
LEUKOCYTE ESTERASE UR QL STRIP.AUTO: NEGATIVE
LYMPHOCYTES # BLD AUTO: 0.7 10*3/MM3 (ref 0.7–3.1)
LYMPHOCYTES NFR BLD AUTO: 13.6 % (ref 19.6–45.3)
MAXIMAL PREDICTED HEART RATE: 145 BPM
MCH RBC QN AUTO: 30.6 PG (ref 26.6–33)
MCHC RBC AUTO-ENTMCNC: 34.2 G/DL (ref 31.5–35.7)
MCV RBC AUTO: 89.4 FL (ref 79–97)
MODALITY: ABNORMAL
MONOCYTES # BLD AUTO: 0.3 10*3/MM3 (ref 0.1–0.9)
MONOCYTES NFR BLD AUTO: 6.2 % (ref 5–12)
MRSA DNA SPEC QL NAA+PROBE: NORMAL
NEUTROPHILS NFR BLD AUTO: 3.6 10*3/MM3 (ref 1.7–7)
NEUTROPHILS NFR BLD AUTO: 75.9 % (ref 42.7–76)
NITRITE UR QL STRIP: NEGATIVE
NRBC BLD AUTO-RTO: 0.1 /100 WBC (ref 0–0.2)
PCO2 BLDA: 35.2 MM HG (ref 35–48)
PH BLDA: 7.42 PH UNITS (ref 7.35–7.45)
PH UR STRIP.AUTO: <=5 [PH] (ref 5–8)
PLATELET # BLD AUTO: 92 10*3/MM3 (ref 140–450)
PMV BLD AUTO: 6.2 FL (ref 6–12)
PO2 BLDA: 93.7 MM HG (ref 83–108)
POTASSIUM SERPL-SCNC: 4.4 MMOL/L (ref 3.5–5.2)
PROT SERPL-MCNC: 6.8 G/DL (ref 6–8.5)
PROT UR QL STRIP: NEGATIVE
PROTHROMBIN TIME: 10.4 SECONDS (ref 9.6–11.7)
RBC # BLD AUTO: 4.69 10*6/MM3 (ref 4.14–5.8)
RH BLD: POSITIVE
SAO2 % BLDCOA: 97.5 % (ref 94–98)
SARS-COV-2 RNA PNL SPEC NAA+PROBE: NOT DETECTED
SODIUM SERPL-SCNC: 138 MMOL/L (ref 136–145)
SP GR UR STRIP: 1.02 (ref 1–1.03)
STRESS TARGET HR: 123 BPM
T&S EXPIRATION DATE: NORMAL
TRIGL SERPL-MCNC: 161 MG/DL (ref 0–150)
UROBILINOGEN UR QL STRIP: NORMAL
VLDLC SERPL-MCNC: 28 MG/DL (ref 5–40)
WBC NRBC COR # BLD: 4.8 10*3/MM3 (ref 3.4–10.8)

## 2022-11-16 PROCEDURE — 86901 BLOOD TYPING SEROLOGIC RH(D): CPT

## 2022-11-16 PROCEDURE — 82803 BLOOD GASES ANY COMBINATION: CPT | Performed by: THORACIC SURGERY (CARDIOTHORACIC VASCULAR SURGERY)

## 2022-11-16 PROCEDURE — 85576 BLOOD PLATELET AGGREGATION: CPT

## 2022-11-16 PROCEDURE — 85610 PROTHROMBIN TIME: CPT

## 2022-11-16 PROCEDURE — 80053 COMPREHEN METABOLIC PANEL: CPT

## 2022-11-16 PROCEDURE — 86900 BLOOD TYPING SEROLOGIC ABO: CPT

## 2022-11-16 PROCEDURE — 86850 RBC ANTIBODY SCREEN: CPT

## 2022-11-16 PROCEDURE — 87635 SARS-COV-2 COVID-19 AMP PRB: CPT | Performed by: NURSE PRACTITIONER

## 2022-11-16 PROCEDURE — 93010 ELECTROCARDIOGRAM REPORT: CPT | Performed by: INTERNAL MEDICINE

## 2022-11-16 PROCEDURE — 80061 LIPID PANEL: CPT

## 2022-11-16 PROCEDURE — 87641 MR-STAPH DNA AMP PROBE: CPT | Performed by: NURSE PRACTITIONER

## 2022-11-16 PROCEDURE — 85730 THROMBOPLASTIN TIME PARTIAL: CPT

## 2022-11-16 PROCEDURE — 71046 X-RAY EXAM CHEST 2 VIEWS: CPT

## 2022-11-16 PROCEDURE — 81003 URINALYSIS AUTO W/O SCOPE: CPT

## 2022-11-16 PROCEDURE — 86923 COMPATIBILITY TEST ELECTRIC: CPT

## 2022-11-16 PROCEDURE — 85025 COMPLETE CBC W/AUTO DIFF WBC: CPT

## 2022-11-16 PROCEDURE — 83036 HEMOGLOBIN GLYCOSYLATED A1C: CPT

## 2022-11-16 PROCEDURE — 93005 ELECTROCARDIOGRAM TRACING: CPT | Performed by: NURSE PRACTITIONER

## 2022-11-16 PROCEDURE — 93880 EXTRACRANIAL BILAT STUDY: CPT

## 2022-11-16 PROCEDURE — 36600 WITHDRAWAL OF ARTERIAL BLOOD: CPT | Performed by: THORACIC SURGERY (CARDIOTHORACIC VASCULAR SURGERY)

## 2022-11-16 PROCEDURE — 36415 COLL VENOUS BLD VENIPUNCTURE: CPT

## 2022-11-16 PROCEDURE — C9803 HOPD COVID-19 SPEC COLLECT: HCPCS

## 2022-11-16 RX ORDER — CHLORHEXIDINE GLUCONATE 0.12 MG/ML
15 RINSE ORAL EVERY 12 HOURS
Status: ACTIVE | OUTPATIENT
Start: 2022-11-16 | End: 2022-11-17

## 2022-11-16 RX ORDER — CHLORHEXIDINE GLUCONATE 500 MG/1
1 CLOTH TOPICAL EVERY 12 HOURS PRN
Status: DISCONTINUED | OUTPATIENT
Start: 2022-11-16 | End: 2022-12-22

## 2022-11-16 RX ORDER — CHOLECALCIFEROL (VITAMIN D3) 125 MCG
5 CAPSULE ORAL NIGHTLY PRN
COMMUNITY

## 2022-11-16 NOTE — PAT
"PASSWORD: \"\"    5 meter walk test:  1. 4.21  2. 4.17  3. 3.80    Pre-op conditions relevant to recovery:  1. Prostate cancer-completed radiation April 2022.   2. Morphine causes hallucinations.  3. Chronic back pain but takes tylenol to treat at home. Back surgery lumbar and cervical.    4. Patient complains of seeing flashes of light several times a day.  Patient had MRI  Of Head with contrast done on November 10, 2022.   5. Patient has cervical plate.  6. Patient is hard of hearing and wears hearing aide.   "

## 2022-11-16 NOTE — ANESTHESIA PREPROCEDURE EVALUATION
Anesthesia Evaluation     Patient summary reviewed and Nursing notes reviewed   NPO Solid Status: > 8 hours  NPO Liquid Status: > 8 hours           Airway   Mallampati: I  TM distance: >3 FB  Neck ROM: full  No difficulty expected  Dental - normal exam     Pulmonary - normal exam   (+) asthma,shortness of breath,   Cardiovascular - normal exam    (+) hypertension, valvular problems/murmurs MR and TI,     ROS comment: Impression  Myxomatous mitral and tricuspid valve degeneration.  Severe mitral valve prolapse involving both anterior and posterior mitral leaflets with severe mitral regurgitation with multiple jets.  Probable ruptured chordae.  In some views coaptation of the mitral valve is present.  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  Significant pulmonary hypertension.  Left atrial and left atrial appendage enlargement without clot.  Normal left ventricular size and contractility with ejection fraction of 60%.      Neuro/Psych  (+) headaches, psychiatric history Anxiety,    GI/Hepatic/Renal/Endo    (+)   hepatitis, liver disease,     Musculoskeletal (-) negative ROS    Abdominal  - normal exam    Bowel sounds: normal.   Substance History - negative use     OB/GYN negative ob/gyn ROS         Other      history of cancer    ROS/Med Hx Other: PROSTATE CANCER  RETINAL DETACHMENT POST CATARACT DATE?  MILD CAROTID STENOSIS  NORMAL CORONARIES  THROMBOCYTOPENIA PLT 92 PLT AGG 92  SEVERE MR P1 PROLAPSE ?P2 AS WELL. ?FLAIL SEGMENT  MOD/SEVER TR  MYXOMATOUS CHANGES                Anesthesia Plan    ASA 4     general, Ineves, CVL and PAC     intravenous induction   Postoperative Plan: Expected vent after surgery  Anesthetic plan, risks, benefits, and alternatives have been provided, discussed and informed consent has been obtained with: patient.        CODE STATUS:

## 2022-11-16 NOTE — H&P
"CARDIOTHORACIC H&P NOTE      Attending: Femi Henderson MD    Subjective .     History of present illness:  Clint Cee is a 77 y.o. male who presented for PAT today.  Patient was last evaluated by Dr. Henderson in the office on 10/5/2022 at which time he was to be scheduled for TVR and MVR surgery.  Patient states there have been no changes to his health history since he was last evaluated.  He does state that he underwent an MRI of the brain last Thursday as he continues to have intermittent \"flashes\" in his left eye, MRI was negative, he states that he was told that they were some type of ocular migraine.     Review of Systems   Constitutional: Negative for fever.   Skin: Negative for rash.       History  Past Medical History:   Diagnosis Date   • Abnormal ECG June 2022   • Allergic 01/01/1954    Nasal alergies   • Arrhythmia 2004    Dr Youngblood examined me and said i was ok.   • Asthma    • Benign prostatic hyperplasia 11/14/2018   • Cataract 01/01/2014    Surgery. Caused detached retina of right eye 20/60 repair   • Colon polyp 01/01/2018    Found and removed last colon eca.q   • Gastroesophageal reflux disease 03/20/2014   • Glaucoma 2003    Normal pressures. Have low pressure glaucoma   • Headache 01/01/2015    Have shimmering in both eyes intermittantly. No headaches   • Heart murmur May 2022    During wellness exam   • Heart valve disease July 2022    During Echo   • HL (hearing loss) 01/01/2012    Have hearing aids   • Hypertension 12/02/2011   • Infectious viral hepatitis 1954    Yellow jaundis as child   • Insomnia 12/02/2011   • Mitral valve prolapse June 2022   • Mood disorder (HCC) 09/19/2013   • Polyosteoarthritis 12/02/2011   • Prostate Cancer Jan22 January 2022   • Scoliosis 05/09/2014   • Spinal stenosis 12/02/2011   • Visual impairment 01/01/1955    Nearsighted corrected glasses   • Vitamin D deficiency 05/23/2018       Past Surgical History:   Procedure Laterality Date   • ADENOIDECTOMY  1954 "    As child   • APPENDECTOMY  1956    Surgery   • CARDIAC CATHETERIZATION N/A 9/30/2022    Procedure: Right and Left Heart Cath with Coronar Angiography;  Surgeon: Felipe Garcia MD;  Location: [facility] CATH INVASIVE LOC[facility];  Service: Cardiovascular;  Laterality: N/A;   • COLONOSCOPY  01/01/1997    Regularly scheduled   • EYE SURGERY  01/01/2010    Reattached right retina   • SPINE SURGERY  01/01/1971    2 lumbar 1 cervical   • TONSILLECTOMY  01/01/1950    Childhood       Family History   Problem Relation Age of Onset   • Arthritis Father    • Cancer Father         Prostate   • Arrhythmia Father         Congentive heart failure   • Diabetes Mother         Adult diabetes   • Early death Mother         Kidneys failed after giving birth-diabetes complications   • Heart disease Mother         Adult Diabetes       Social History     Tobacco Use   • Smoking status: Former     Packs/day: 0.00     Years: 0.50     Pack years: 0.00     Types: Cigarettes   • Smokeless tobacco: Never   • Tobacco comments:     Tried tobacco as child   Vaping Use   • Vaping Use: Never used   Substance Use Topics   • Alcohol use: Not Currently     Alcohol/week: 1.0 standard drink     Types: 1 Cans of beer per week     Comment: Not a regular drinker. Occasionly have a glass of wine or be   • Drug use: Never        No medications prior to admission.         Morphine    Scheduled Meds:  Continuous Infusions:No current facility-administered medications for this encounter.    PRN Meds:.    Objective     VITAL SIGNS  There were no vitals filed for this visit.         TELEMETRY:     Physical Exam:  Constitutional:       Appearance: Healthy appearance.   Pulmonary:      Effort: Pulmonary effort is normal.   Neurological:      Mental Status: Alert and oriented to person, place and time.          Results Review:        WBC WBC   Date Value Ref Range Status   11/16/2022 4.80 3.40 - 10.80 10*3/mm3 Final      HGB Hemoglobin   Date Value Ref Range Status    11/16/2022 14.3 13.0 - 17.7 g/dL Final      HCT Hematocrit   Date Value Ref Range Status   11/16/2022 41.9 37.5 - 51.0 % Final      Platelets Platelets   Date Value Ref Range Status   11/16/2022 92 (L) 140 - 450 10*3/mm3 Final        PT/INR:    Protime   Date Value Ref Range Status   11/16/2022 10.4 9.6 - 11.7 Seconds Final   /  INR   Date Value Ref Range Status   11/16/2022 1.01 0.93 - 1.10 Final       Sodium Sodium   Date Value Ref Range Status   11/16/2022 138 136 - 145 mmol/L Final      Potassium Potassium   Date Value Ref Range Status   11/16/2022 4.4 3.5 - 5.2 mmol/L Final      Chloride Chloride   Date Value Ref Range Status   11/16/2022 101 98 - 107 mmol/L Final      Bicarbonate CO2   Date Value Ref Range Status   11/16/2022 27.0 22.0 - 29.0 mmol/L Final      BUN BUN   Date Value Ref Range Status   11/16/2022 25 (H) 8 - 23 mg/dL Final      Creatinine Creatinine   Date Value Ref Range Status   11/16/2022 0.85 0.76 - 1.27 mg/dL Final      Calcium Calcium   Date Value Ref Range Status   11/16/2022 9.0 8.6 - 10.5 mg/dL Final      Magnesium No results found for: MG     Troponin No results found for: TROPONIN   BNP No results found for: BNP           Assessment & Plan       Nonrheumatic mitral valve regurgitation    Nonrheumatic tricuspid valve regurgitation      All of his preoperative studies are normal with the exception of mild stenosis of the left internal carotid.  I spent a significant amount of time discussing the expected perioperative course and answering any questions that the patient or his spouse may have had.  He is scheduled for MVR/TVR on 11/18/2022 with Dr. Henderson.    Yue Britton, APRN  11/16/22  17:03 ALHAJI Britton, Cardiothoracic Surgery

## 2022-11-17 ENCOUNTER — ANESTHESIA (OUTPATIENT)
Dept: PERIOP | Facility: HOSPITAL | Age: 77
End: 2022-11-17

## 2022-11-17 ENCOUNTER — HOSPITAL ENCOUNTER (INPATIENT)
Facility: HOSPITAL | Age: 77
LOS: 4 days | Discharge: HOME HEALTH | End: 2022-11-21
Attending: THORACIC SURGERY (CARDIOTHORACIC VASCULAR SURGERY) | Admitting: THORACIC SURGERY (CARDIOTHORACIC VASCULAR SURGERY)

## 2022-11-17 ENCOUNTER — APPOINTMENT (OUTPATIENT)
Dept: GENERAL RADIOLOGY | Facility: HOSPITAL | Age: 77
End: 2022-11-17

## 2022-11-17 DIAGNOSIS — Z98.890 S/P TVR (TRICUSPID VALVE REPAIR): ICD-10-CM

## 2022-11-17 DIAGNOSIS — Z98.890 S/P MVR (MITRAL VALVE REPAIR): Primary | ICD-10-CM

## 2022-11-17 DIAGNOSIS — Z98.890 S/P MVR (MITRAL VALVE REPAIR): ICD-10-CM

## 2022-11-17 DIAGNOSIS — R13.10 DYSPHAGIA, UNSPECIFIED TYPE: ICD-10-CM

## 2022-11-17 DIAGNOSIS — I34.0 NONRHEUMATIC MITRAL VALVE REGURGITATION: ICD-10-CM

## 2022-11-17 DIAGNOSIS — I36.1 NONRHEUMATIC TRICUSPID VALVE REGURGITATION: ICD-10-CM

## 2022-11-17 LAB
ACT BLD: 109 SECONDS (ref 89–137)
ACT BLD: 126 SECONDS (ref 89–137)
ACT BLD: 393 SECONDS (ref 89–137)
ACT BLD: 410 SECONDS (ref 89–137)
ACT BLD: 434 SECONDS (ref 89–137)
ACT BLD: 468 SECONDS (ref 89–137)
ACT BLD: 474 SECONDS (ref 89–137)
ALBUMIN SERPL-MCNC: 4.1 G/DL (ref 3.5–5.2)
ANION GAP SERPL CALCULATED.3IONS-SCNC: 11 MMOL/L (ref 5–15)
APTT PPP: 23.6 SECONDS (ref 24–31)
APTT PPP: 23.9 SECONDS (ref 24–31)
APTT PPP: 25.6 SECONDS (ref 24–31)
ARTERIAL PATENCY WRIST A: ABNORMAL
ATMOSPHERIC PRESS: ABNORMAL MM[HG]
BASE DEFICIT: ABNORMAL
BASE EXCESS BLDA CALC-SCNC: -3.8 MMOL/L (ref 0–3)
BASE EXCESS BLDA CALC-SCNC: -4.5 MMOL/L (ref 0–3)
BASE EXCESS BLDA CALC-SCNC: -4.6 MMOL/L (ref 0–3)
BASE EXCESS BLDA CALC-SCNC: -5.1 MMOL/L (ref 0–3)
BASE EXCESS BLDA CALC-SCNC: 0 MMOL/L (ref 0–3)
BASE EXCESS BLDA CALC-SCNC: 1 MMOL/L (ref 0–3)
BASE EXCESS BLDA CALC-SCNC: 1 MMOL/L (ref 0–3)
BASE EXCESS BLDA CALC-SCNC: 2 MMOL/L (ref 0–3)
BASE EXCESS BLDA CALC-SCNC: 2 MMOL/L (ref 0–3)
BASE EXCESS BLDA CALC-SCNC: 4 MMOL/L (ref 0–3)
BASE EXCESS BLDA CALC-SCNC: <0 MMOL/L (ref 0–3)
BASOPHILS # BLD AUTO: 0 10*3/MM3 (ref 0–0.2)
BASOPHILS NFR BLD AUTO: 0.1 % (ref 0–1.5)
BDY SITE: ABNORMAL
BH BB BLOOD EXPIRATION DATE: NORMAL
BH BB BLOOD EXPIRATION DATE: NORMAL
BH BB BLOOD TYPE BARCODE: 7300
BH BB BLOOD TYPE BARCODE: 7300
BH BB DISPENSE STATUS: NORMAL
BH BB DISPENSE STATUS: NORMAL
BH BB PRODUCT CODE: NORMAL
BH BB PRODUCT CODE: NORMAL
BH BB UNIT NUMBER: NORMAL
BH BB UNIT NUMBER: NORMAL
BUN SERPL-MCNC: 24 MG/DL (ref 8–23)
BUN/CREAT SERPL: 22.4 (ref 7–25)
CA-I BLDA-SCNC: 1.09 MMOL/L (ref 1.12–1.32)
CA-I BLDA-SCNC: 1.11 MMOL/L (ref 1.12–1.32)
CA-I BLDA-SCNC: 1.13 MMOL/L (ref 1.12–1.32)
CA-I BLDA-SCNC: 1.15 MMOL/L (ref 1.12–1.32)
CA-I BLDA-SCNC: 1.15 MMOL/L (ref 1.12–1.32)
CA-I BLDA-SCNC: 1.26 MMOL/L (ref 1.12–1.32)
CA-I BLDA-SCNC: 1.27 MMOL/L (ref 1.15–1.33)
CA-I BLDA-SCNC: 1.28 MMOL/L (ref 1.15–1.33)
CA-I BLDA-SCNC: 1.29 MMOL/L (ref 1.15–1.33)
CA-I BLDA-SCNC: 1.3 MMOL/L (ref 1.15–1.33)
CA-I BLDA-SCNC: 1.49 MMOL/L (ref 1.12–1.32)
CA-I SERPL ISE-MCNC: 1.3 MMOL/L (ref 1.2–1.3)
CALCIUM SPEC-SCNC: 9.5 MG/DL (ref 8.6–10.5)
CHLORIDE SERPL-SCNC: 107 MMOL/L (ref 98–107)
CLOSE TME COLL+ADP + EPINEP PNL BLD: 95 % (ref 86–100)
CO2 BLDA-SCNC: 28 MMOL/L (ref 23–27)
CO2 BLDA-SCNC: 28 MMOL/L (ref 23–27)
CO2 BLDA-SCNC: 29 MMOL/L (ref 23–27)
CO2 BLDA-SCNC: 29 MMOL/L (ref 23–27)
CO2 BLDA-SCNC: 30 MMOL/L (ref 23–27)
CO2 BLDA-SCNC: 30 MMOL/L (ref 23–27)
CO2 BLDA-SCNC: 31 MMOL/L (ref 23–27)
CO2 SERPL-SCNC: 22 MMOL/L (ref 22–29)
CREAT SERPL-MCNC: 1.07 MG/DL (ref 0.76–1.27)
CROSSMATCH INTERPRETATION: NORMAL
CROSSMATCH INTERPRETATION: NORMAL
DEPRECATED RDW RBC AUTO: 45.9 FL (ref 37–54)
DEPRECATED RDW RBC AUTO: 45.9 FL (ref 37–54)
EGFRCR SERPLBLD CKD-EPI 2021: 72.4 ML/MIN/1.73
EOSINOPHIL # BLD AUTO: 0 10*3/MM3 (ref 0–0.4)
EOSINOPHIL NFR BLD AUTO: 0.7 % (ref 0.3–6.2)
ERYTHROCYTE [DISTWIDTH] IN BLOOD BY AUTOMATED COUNT: 14 % (ref 12.3–15.4)
ERYTHROCYTE [DISTWIDTH] IN BLOOD BY AUTOMATED COUNT: 14.4 % (ref 12.3–15.4)
FIBRINOGEN PPP-MCNC: 172 MG/DL (ref 210–450)
GLUCOSE BLDC GLUCOMTR-MCNC: 107 MG/DL (ref 70–105)
GLUCOSE BLDC GLUCOMTR-MCNC: 109 MG/DL (ref 70–105)
GLUCOSE BLDC GLUCOMTR-MCNC: 115 MG/DL (ref 70–105)
GLUCOSE BLDC GLUCOMTR-MCNC: 117 MG/DL (ref 74–100)
GLUCOSE BLDC GLUCOMTR-MCNC: 117 MG/DL (ref 74–100)
GLUCOSE BLDC GLUCOMTR-MCNC: 126 MG/DL (ref 74–100)
GLUCOSE BLDC GLUCOMTR-MCNC: 126 MG/DL (ref 74–100)
GLUCOSE BLDC GLUCOMTR-MCNC: 133 MG/DL (ref 70–105)
GLUCOSE BLDC GLUCOMTR-MCNC: 142 MG/DL (ref 70–105)
GLUCOSE BLDC GLUCOMTR-MCNC: 142 MG/DL (ref 74–100)
GLUCOSE BLDC GLUCOMTR-MCNC: 142 MG/DL (ref 74–100)
GLUCOSE BLDC GLUCOMTR-MCNC: 144 MG/DL (ref 70–105)
GLUCOSE BLDC GLUCOMTR-MCNC: 150 MG/DL (ref 70–105)
GLUCOSE BLDC GLUCOMTR-MCNC: 166 MG/DL (ref 70–105)
GLUCOSE BLDC GLUCOMTR-MCNC: 183 MG/DL (ref 74–100)
GLUCOSE BLDC GLUCOMTR-MCNC: 183 MG/DL (ref 74–100)
GLUCOSE BLDC GLUCOMTR-MCNC: 202 MG/DL (ref 70–105)
GLUCOSE SERPL-MCNC: 106 MG/DL (ref 65–99)
HCO3 BLDA-SCNC: 21 MMOL/L (ref 21–28)
HCO3 BLDA-SCNC: 21.1 MMOL/L (ref 21–28)
HCO3 BLDA-SCNC: 21.8 MMOL/L (ref 21–28)
HCO3 BLDA-SCNC: 22.4 MMOL/L (ref 21–28)
HCO3 BLDA-SCNC: 25.9 MMOL/L (ref 22–26)
HCO3 BLDA-SCNC: 26.8 MMOL/L (ref 22–26)
HCO3 BLDA-SCNC: 26.8 MMOL/L (ref 22–26)
HCO3 BLDA-SCNC: 27.2 MMOL/L (ref 22–26)
HCO3 BLDA-SCNC: 28 MMOL/L (ref 22–26)
HCO3 BLDA-SCNC: 28 MMOL/L (ref 22–26)
HCO3 BLDA-SCNC: 29.2 MMOL/L (ref 22–26)
HCT VFR BLD AUTO: 29.8 % (ref 37.5–51)
HCT VFR BLD AUTO: 31.5 % (ref 37.5–51)
HCT VFR BLDA CALC: 27 % (ref 38–51)
HCT VFR BLDA CALC: 28 % (ref 38–51)
HCT VFR BLDA CALC: 28 % (ref 38–51)
HCT VFR BLDA CALC: 29 % (ref 38–51)
HCT VFR BLDA CALC: 30 % (ref 38–51)
HCT VFR BLDA CALC: 30 % (ref 38–51)
HCT VFR BLDA CALC: 32 % (ref 38–51)
HCT VFR BLDA CALC: 33 % (ref 38–51)
HCT VFR BLDA CALC: 33 % (ref 38–51)
HEMODILUTION: NO
HEMODILUTION: YES
HGB BLD-MCNC: 10.2 G/DL (ref 13–17.7)
HGB BLD-MCNC: 10.7 G/DL (ref 13–17.7)
HGB BLDA-MCNC: 10.1 G/DL (ref 12–17)
HGB BLDA-MCNC: 10.2 G/DL (ref 12–17)
HGB BLDA-MCNC: 10.9 G/DL (ref 12–17)
HGB BLDA-MCNC: 11.2 G/DL (ref 12–17)
HGB BLDA-MCNC: 11.2 G/DL (ref 12–17)
HGB BLDA-MCNC: 9 G/DL (ref 12–17)
HGB BLDA-MCNC: 9.2 G/DL (ref 12–17)
HGB BLDA-MCNC: 9.2 G/DL (ref 12–17)
HGB BLDA-MCNC: 9.4 G/DL (ref 12–17)
HGB BLDA-MCNC: 9.4 G/DL (ref 12–17)
HGB BLDA-MCNC: 9.9 G/DL (ref 12–17)
INHALED O2 CONCENTRATION: 40 %
INHALED O2 CONCENTRATION: 40 %
INHALED O2 CONCENTRATION: 50 %
INHALED O2 CONCENTRATION: 70 %
INR PPP: 1.17 (ref 0.93–1.1)
INR PPP: 1.17 (ref 0.93–1.1)
INR PPP: 1.25 (ref 0.93–1.1)
LYMPHOCYTES # BLD AUTO: 0.1 10*3/MM3 (ref 0.7–3.1)
LYMPHOCYTES NFR BLD AUTO: 4.2 % (ref 19.6–45.3)
MCH RBC QN AUTO: 30.3 PG (ref 26.6–33)
MCH RBC QN AUTO: 31.3 PG (ref 26.6–33)
MCHC RBC AUTO-ENTMCNC: 34 G/DL (ref 31.5–35.7)
MCHC RBC AUTO-ENTMCNC: 34.2 G/DL (ref 31.5–35.7)
MCV RBC AUTO: 89.1 FL (ref 79–97)
MCV RBC AUTO: 91.7 FL (ref 79–97)
MODALITY: ABNORMAL
MONOCYTES # BLD AUTO: 0 10*3/MM3 (ref 0.1–0.9)
MONOCYTES NFR BLD AUTO: 1.4 % (ref 5–12)
NEUTROPHILS NFR BLD AUTO: 2.7 10*3/MM3 (ref 1.7–7)
NEUTROPHILS NFR BLD AUTO: 93.6 % (ref 42.7–76)
NRBC BLD AUTO-RTO: 0.2 /100 WBC (ref 0–0.2)
PCO2 BLDA: 39.3 MM HG (ref 35–48)
PCO2 BLDA: 40.4 MM HG (ref 35–48)
PCO2 BLDA: 45.2 MM HG (ref 35–48)
PCO2 BLDA: 48.3 MM HG (ref 35–48)
PCO2 BLDA: 50.2 MM HG (ref 35–45)
PCO2 BLDA: 51.4 MM HG (ref 35–45)
PCO2 BLDA: 51.6 MM HG (ref 35–45)
PCO2 BLDA: 53.3 MM HG (ref 35–45)
PCO2 BLDA: 53.5 MM HG (ref 35–45)
PCO2 BLDA: 55.3 MM HG (ref 35–45)
PCO2 BLDA: 57.8 MM HG (ref 35–45)
PEEP RESPIRATORY: 5 CM[H2O]
PEEP RESPIRATORY: 8 CM[H2O]
PH BLDA: 7.26 PH UNITS (ref 7.35–7.45)
PH BLDA: 7.27 PH UNITS (ref 7.35–7.45)
PH BLDA: 7.3 PH UNITS (ref 7.35–7.45)
PH BLDA: 7.3 PH UNITS (ref 7.35–7.45)
PH BLDA: 7.31 PH UNITS (ref 7.35–7.45)
PH BLDA: 7.32 PH UNITS (ref 7.35–7.45)
PH BLDA: 7.32 PH UNITS (ref 7.35–7.45)
PH BLDA: 7.33 PH UNITS (ref 7.35–7.45)
PH BLDA: 7.33 PH UNITS (ref 7.35–7.45)
PH BLDA: 7.34 PH UNITS (ref 7.35–7.45)
PH BLDA: 7.37 PH UNITS (ref 7.35–7.45)
PHOSPHATE SERPL-MCNC: 5.4 MG/DL (ref 2.5–4.5)
PLATELET # BLD AUTO: 84 10*3/MM3 (ref 140–450)
PLATELET # BLD AUTO: 89 10*3/MM3 (ref 140–450)
PMV BLD AUTO: 6.1 FL (ref 6–12)
PMV BLD AUTO: 6.5 FL (ref 6–12)
PO2 BLDA: 107.6 MM HG (ref 83–108)
PO2 BLDA: 111.9 MM HG (ref 83–108)
PO2 BLDA: 133.4 MM HG (ref 83–108)
PO2 BLDA: 153.7 MM HG (ref 83–108)
PO2 BLDA: 372 MM HG (ref 80–105)
PO2 BLDA: 39 MM HG (ref 80–105)
PO2 BLDA: 426 MM HG (ref 80–105)
PO2 BLDA: 437 MM HG (ref 80–105)
PO2 BLDA: 458 MM HG (ref 80–105)
PO2 BLDA: 466 MM HG (ref 80–105)
PO2 BLDA: 521 MM HG (ref 80–105)
POTASSIUM BLDA-SCNC: 3.2 MMOL/L (ref 3.5–4.5)
POTASSIUM BLDA-SCNC: 3.4 MMOL/L (ref 3.5–4.5)
POTASSIUM BLDA-SCNC: 3.6 MMOL/L (ref 3.5–4.5)
POTASSIUM BLDA-SCNC: 3.8 MMOL/L (ref 3.5–4.5)
POTASSIUM BLDA-SCNC: 3.8 MMOL/L (ref 3.5–4.9)
POTASSIUM BLDA-SCNC: 4.1 MMOL/L (ref 3.5–4.9)
POTASSIUM BLDA-SCNC: 4.3 MMOL/L (ref 3.5–4.9)
POTASSIUM BLDA-SCNC: 4.4 MMOL/L (ref 3.5–4.9)
POTASSIUM BLDA-SCNC: 4.5 MMOL/L (ref 3.5–4.9)
POTASSIUM BLDA-SCNC: 4.5 MMOL/L (ref 3.5–4.9)
POTASSIUM BLDA-SCNC: 4.7 MMOL/L (ref 3.5–4.9)
POTASSIUM SERPL-SCNC: 4.1 MMOL/L (ref 3.5–5.2)
PROTHROMBIN TIME: 11.9 SECONDS (ref 9.6–11.7)
PROTHROMBIN TIME: 11.9 SECONDS (ref 9.6–11.7)
PROTHROMBIN TIME: 12.7 SECONDS (ref 9.6–11.7)
PSV: 10 CMH2O
RBC # BLD AUTO: 3.25 10*6/MM3 (ref 4.14–5.8)
RBC # BLD AUTO: 3.53 10*6/MM3 (ref 4.14–5.8)
RESPIRATORY RATE: 14
RESPIRATORY RATE: 14
RESPIRATORY RATE: 18
SAO2 % BLDCOA: 100 % (ref 95–98)
SAO2 % BLDCOA: 65 % (ref 95–98)
SAO2 % BLDCOA: 97.5 % (ref 94–98)
SAO2 % BLDCOA: 97.8 % (ref 94–98)
SAO2 % BLDCOA: 98.7 % (ref 94–98)
SAO2 % BLDCOA: 99.2 % (ref 94–98)
SODIUM BLD-SCNC: 137 MMOL/L (ref 138–146)
SODIUM BLD-SCNC: 138 MMOL/L (ref 138–146)
SODIUM BLD-SCNC: 139 MMOL/L (ref 138–146)
SODIUM BLD-SCNC: 140 MMOL/L (ref 138–146)
SODIUM BLD-SCNC: 141 MMOL/L (ref 138–146)
SODIUM BLD-SCNC: 141 MMOL/L (ref 138–146)
SODIUM SERPL-SCNC: 140 MMOL/L (ref 136–145)
UNIT  ABO: NORMAL
UNIT  ABO: NORMAL
UNIT  RH: NORMAL
UNIT  RH: NORMAL
VENTILATOR MODE: ABNORMAL
VT ON VENT VENT: 700 ML
VT ON VENT VENT: 700 ML
VT ON VENT VENT: 800 ML
WBC NRBC COR # BLD: 2.8 10*3/MM3 (ref 3.4–10.8)
WBC NRBC COR # BLD: 4.7 10*3/MM3 (ref 3.4–10.8)

## 2022-11-17 PROCEDURE — 25010000002 HYDROMORPHONE 1 MG/ML SOLUTION: Performed by: NURSE PRACTITIONER

## 2022-11-17 PROCEDURE — B24BZZ4 ULTRASONOGRAPHY OF HEART WITH AORTA, TRANSESOPHAGEAL: ICD-10-PCS | Performed by: THORACIC SURGERY (CARDIOTHORACIC VASCULAR SURGERY)

## 2022-11-17 PROCEDURE — C1713 ANCHOR/SCREW BN/BN,TIS/BN: HCPCS | Performed by: THORACIC SURGERY (CARDIOTHORACIC VASCULAR SURGERY)

## 2022-11-17 PROCEDURE — 82330 ASSAY OF CALCIUM: CPT | Performed by: THORACIC SURGERY (CARDIOTHORACIC VASCULAR SURGERY)

## 2022-11-17 PROCEDURE — 84132 ASSAY OF SERUM POTASSIUM: CPT | Performed by: THORACIC SURGERY (CARDIOTHORACIC VASCULAR SURGERY)

## 2022-11-17 PROCEDURE — 33464 VALVULOPLASTY TRICUSPID: CPT

## 2022-11-17 PROCEDURE — 0 POTASSIUM CHLORIDE 10 MEQ/100ML SOLUTION

## 2022-11-17 PROCEDURE — 0 MILRINONE LACTATE IN DEXTROSE 20-5 MG/100ML-% SOLUTION: Performed by: ANESTHESIOLOGY

## 2022-11-17 PROCEDURE — 33464 VALVULOPLASTY TRICUSPID: CPT | Performed by: THORACIC SURGERY (CARDIOTHORACIC VASCULAR SURGERY)

## 2022-11-17 PROCEDURE — 94799 UNLISTED PULMONARY SVC/PX: CPT

## 2022-11-17 PROCEDURE — 63710000001 INSULIN REGULAR HUMAN PER 5 UNITS: Performed by: ANESTHESIOLOGY

## 2022-11-17 PROCEDURE — 25010000002 EPINEPHRINE 1 MG/ML SOLUTION 30 ML VIAL: Performed by: ANESTHESIOLOGY

## 2022-11-17 PROCEDURE — 25010000002 MAGNESIUM SULFATE PER 500 MG OF MAGNESIUM: Performed by: ANESTHESIOLOGY

## 2022-11-17 PROCEDURE — C1729 CATH, DRAINAGE: HCPCS | Performed by: THORACIC SURGERY (CARDIOTHORACIC VASCULAR SURGERY)

## 2022-11-17 PROCEDURE — 82330 ASSAY OF CALCIUM: CPT

## 2022-11-17 PROCEDURE — 02UG0JZ SUPPLEMENT MITRAL VALVE WITH SYNTHETIC SUBSTITUTE, OPEN APPROACH: ICD-10-PCS | Performed by: THORACIC SURGERY (CARDIOTHORACIC VASCULAR SURGERY)

## 2022-11-17 PROCEDURE — C1889 IMPLANT/INSERT DEVICE, NOC: HCPCS | Performed by: THORACIC SURGERY (CARDIOTHORACIC VASCULAR SURGERY)

## 2022-11-17 PROCEDURE — 25010000002 PROTAMINE SULFATE PER 10 MG: Performed by: ANESTHESIOLOGY

## 2022-11-17 PROCEDURE — 25010000002 ALBUMIN HUMAN 5% PER 50 ML: Performed by: NURSE PRACTITIONER

## 2022-11-17 PROCEDURE — 82803 BLOOD GASES ANY COMBINATION: CPT | Performed by: THORACIC SURGERY (CARDIOTHORACIC VASCULAR SURGERY)

## 2022-11-17 PROCEDURE — 25010000002 MIDAZOLAM PER 1 MG: Performed by: ANESTHESIOLOGY

## 2022-11-17 PROCEDURE — 82803 BLOOD GASES ANY COMBINATION: CPT

## 2022-11-17 PROCEDURE — 94002 VENT MGMT INPAT INIT DAY: CPT

## 2022-11-17 PROCEDURE — 25010000002 MAGNESIUM SULFATE IN D5W 1G/100ML (PREMIX) 1-5 GM/100ML-% SOLUTION: Performed by: NURSE PRACTITIONER

## 2022-11-17 PROCEDURE — 85730 THROMBOPLASTIN TIME PARTIAL: CPT | Performed by: NURSE PRACTITIONER

## 2022-11-17 PROCEDURE — 84295 ASSAY OF SERUM SODIUM: CPT | Performed by: THORACIC SURGERY (CARDIOTHORACIC VASCULAR SURGERY)

## 2022-11-17 PROCEDURE — 25010000002 HEPARIN (PORCINE) PER 1000 UNITS: Performed by: ANESTHESIOLOGY

## 2022-11-17 PROCEDURE — 85576 BLOOD PLATELET AGGREGATION: CPT | Performed by: THORACIC SURGERY (CARDIOTHORACIC VASCULAR SURGERY)

## 2022-11-17 PROCEDURE — 25010000002 CEFAZOLIN PER 500 MG: Performed by: NURSE PRACTITIONER

## 2022-11-17 PROCEDURE — 25010000002 CALCIUM GLUCONATE PER 10 ML: Performed by: ANESTHESIOLOGY

## 2022-11-17 PROCEDURE — 85014 HEMATOCRIT: CPT | Performed by: THORACIC SURGERY (CARDIOTHORACIC VASCULAR SURGERY)

## 2022-11-17 PROCEDURE — C1900 LEAD, CORONARY VENOUS: HCPCS | Performed by: THORACIC SURGERY (CARDIOTHORACIC VASCULAR SURGERY)

## 2022-11-17 PROCEDURE — 33427 REPAIR OF MITRAL VALVE: CPT | Performed by: THORACIC SURGERY (CARDIOTHORACIC VASCULAR SURGERY)

## 2022-11-17 PROCEDURE — 82947 ASSAY GLUCOSE BLOOD QUANT: CPT | Performed by: THORACIC SURGERY (CARDIOTHORACIC VASCULAR SURGERY)

## 2022-11-17 PROCEDURE — 82330 ASSAY OF CALCIUM: CPT | Performed by: NURSE PRACTITIONER

## 2022-11-17 PROCEDURE — 71045 X-RAY EXAM CHEST 1 VIEW: CPT

## 2022-11-17 PROCEDURE — P9100 PATHOGEN TEST FOR PLATELETS: HCPCS

## 2022-11-17 PROCEDURE — 36430 TRANSFUSION BLD/BLD COMPNT: CPT

## 2022-11-17 PROCEDURE — 85347 COAGULATION TIME ACTIVATED: CPT | Performed by: THORACIC SURGERY (CARDIOTHORACIC VASCULAR SURGERY)

## 2022-11-17 PROCEDURE — 02UJ0JZ SUPPLEMENT TRICUSPID VALVE WITH SYNTHETIC SUBSTITUTE, OPEN APPROACH: ICD-10-PCS | Performed by: THORACIC SURGERY (CARDIOTHORACIC VASCULAR SURGERY)

## 2022-11-17 PROCEDURE — 02L70ZK OCCLUSION OF LEFT ATRIAL APPENDAGE, OPEN APPROACH: ICD-10-PCS | Performed by: THORACIC SURGERY (CARDIOTHORACIC VASCULAR SURGERY)

## 2022-11-17 PROCEDURE — P9041 ALBUMIN (HUMAN),5%, 50ML: HCPCS | Performed by: NURSE PRACTITIONER

## 2022-11-17 PROCEDURE — C1751 CATH, INF, PER/CENT/MIDLINE: HCPCS | Performed by: ANESTHESIOLOGY

## 2022-11-17 PROCEDURE — P9041 ALBUMIN (HUMAN),5%, 50ML: HCPCS

## 2022-11-17 PROCEDURE — 85025 COMPLETE CBC W/AUTO DIFF WBC: CPT | Performed by: NURSE PRACTITIONER

## 2022-11-17 PROCEDURE — 80051 ELECTROLYTE PANEL: CPT

## 2022-11-17 PROCEDURE — 25010000002 CEFAZOLIN PER 500 MG: Performed by: ANESTHESIOLOGY

## 2022-11-17 PROCEDURE — 25010000002 ONDANSETRON PER 1 MG: Performed by: ANESTHESIOLOGY

## 2022-11-17 PROCEDURE — 86927 PLASMA FRESH FROZEN: CPT

## 2022-11-17 PROCEDURE — 85027 COMPLETE CBC AUTOMATED: CPT | Performed by: THORACIC SURGERY (CARDIOTHORACIC VASCULAR SURGERY)

## 2022-11-17 PROCEDURE — P9035 PLATELET PHERES LEUKOREDUCED: HCPCS

## 2022-11-17 PROCEDURE — P9012 CRYOPRECIPITATE EACH UNIT: HCPCS

## 2022-11-17 PROCEDURE — 25010000002 FENTANYL CITRATE (PF) 250 MCG/5ML SOLUTION: Performed by: ANESTHESIOLOGY

## 2022-11-17 PROCEDURE — 33427 REPAIR OF MITRAL VALVE: CPT

## 2022-11-17 PROCEDURE — 82962 GLUCOSE BLOOD TEST: CPT

## 2022-11-17 PROCEDURE — 80069 RENAL FUNCTION PANEL: CPT | Performed by: NURSE PRACTITIONER

## 2022-11-17 PROCEDURE — 85610 PROTHROMBIN TIME: CPT | Performed by: THORACIC SURGERY (CARDIOTHORACIC VASCULAR SURGERY)

## 2022-11-17 PROCEDURE — 85018 HEMOGLOBIN: CPT

## 2022-11-17 PROCEDURE — 25010000002 ALBUMIN HUMAN 5% PER 50 ML

## 2022-11-17 PROCEDURE — 85610 PROTHROMBIN TIME: CPT | Performed by: NURSE PRACTITIONER

## 2022-11-17 PROCEDURE — 85730 THROMBOPLASTIN TIME PARTIAL: CPT | Performed by: THORACIC SURGERY (CARDIOTHORACIC VASCULAR SURGERY)

## 2022-11-17 PROCEDURE — 02QG0ZZ REPAIR MITRAL VALVE, OPEN APPROACH: ICD-10-PCS | Performed by: THORACIC SURGERY (CARDIOTHORACIC VASCULAR SURGERY)

## 2022-11-17 PROCEDURE — 99223 1ST HOSP IP/OBS HIGH 75: CPT | Performed by: INTERNAL MEDICINE

## 2022-11-17 PROCEDURE — 85384 FIBRINOGEN ACTIVITY: CPT | Performed by: THORACIC SURGERY (CARDIOTHORACIC VASCULAR SURGERY)

## 2022-11-17 PROCEDURE — 5A1221Z PERFORMANCE OF CARDIAC OUTPUT, CONTINUOUS: ICD-10-PCS | Performed by: THORACIC SURGERY (CARDIOTHORACIC VASCULAR SURGERY)

## 2022-11-17 DEVICE — INCISIONLINE PLEDGET TFLN SFT LG: Type: IMPLANTABLE DEVICE | Site: CHEST | Status: FUNCTIONAL

## 2022-11-17 DEVICE — WAX,BONE,NATURAL
Type: IMPLANTABLE DEVICE | Site: CHEST | Status: FUNCTIONAL
Brand: MEDLINE INDUSTRIES

## 2022-11-17 DEVICE — IMPLANTABLE DEVICE: Type: IMPLANTABLE DEVICE | Site: HEART | Status: FUNCTIONAL

## 2022-11-17 DEVICE — SS SUTURE, 3 PER SLEEVE
Type: IMPLANTABLE DEVICE | Site: STERNUM | Status: FUNCTIONAL
Brand: MYO/WIRE II

## 2022-11-17 DEVICE — SS SUTURE, 6 PER SLEEVE
Type: IMPLANTABLE DEVICE | Site: STERNUM | Status: FUNCTIONAL
Brand: MYO/WIRE II

## 2022-11-17 DEVICE — DEV CONTRL TISS STRATAFIX SPIRAL MNCRYL UD 3/0 PLS 30CM: Type: IMPLANTABLE DEVICE | Site: CHEST | Status: FUNCTIONAL

## 2022-11-17 DEVICE — COR-KNOT MINI® COMBO KITBASE PACKAGE TYPE - KITEACH STERILE PACKAGE KIT CONTAINS (2) SINGLE PATIENT USE COR-KNOT MINI® DEVICES AND (12) COR-KNOT® QUICK LOADS®.
Type: IMPLANTABLE DEVICE | Site: HEART | Status: FUNCTIONAL
Brand: COR-KNOT MINI®

## 2022-11-17 DEVICE — ABSORBABLE HEMOSTAT (OXIDIZED REGENERATED CELLULOSE, U.S.P.)
Type: IMPLANTABLE DEVICE | Site: CHEST | Status: FUNCTIONAL
Brand: SURGICEL

## 2022-11-17 RX ORDER — ALPRAZOLAM 0.25 MG/1
0.25 TABLET ORAL ONCE
Status: COMPLETED | OUTPATIENT
Start: 2022-11-17 | End: 2022-11-17

## 2022-11-17 RX ORDER — POTASSIUM CHLORIDE 1.5 G/1.77G
20 POWDER, FOR SOLUTION ORAL AS NEEDED
Status: DISCONTINUED | OUTPATIENT
Start: 2022-11-18 | End: 2022-11-21 | Stop reason: HOSPADM

## 2022-11-17 RX ORDER — MAGNESIUM HYDROXIDE 1200 MG/15ML
LIQUID ORAL AS NEEDED
Status: DISCONTINUED | OUTPATIENT
Start: 2022-11-17 | End: 2022-11-17 | Stop reason: HOSPADM

## 2022-11-17 RX ORDER — ONDANSETRON 2 MG/ML
4 INJECTION INTRAMUSCULAR; INTRAVENOUS EVERY 6 HOURS PRN
Status: DISCONTINUED | OUTPATIENT
Start: 2022-11-17 | End: 2022-11-21 | Stop reason: HOSPADM

## 2022-11-17 RX ORDER — FLUTICASONE PROPIONATE 50 MCG
2 SPRAY, SUSPENSION (ML) NASAL DAILY
COMMUNITY

## 2022-11-17 RX ORDER — POLYETHYLENE GLYCOL 3350 17 G/17G
17 POWDER, FOR SOLUTION ORAL 2 TIMES DAILY
Status: DISCONTINUED | OUTPATIENT
Start: 2022-11-18 | End: 2022-11-21 | Stop reason: HOSPADM

## 2022-11-17 RX ORDER — HYDROCODONE BITARTRATE AND ACETAMINOPHEN 5; 325 MG/1; MG/1
1 TABLET ORAL EVERY 4 HOURS PRN
Status: DISCONTINUED | OUTPATIENT
Start: 2022-11-17 | End: 2022-11-21 | Stop reason: HOSPADM

## 2022-11-17 RX ORDER — CHLORHEXIDINE GLUCONATE 0.12 MG/ML
15 RINSE ORAL EVERY 12 HOURS
Status: DISCONTINUED | OUTPATIENT
Start: 2022-11-17 | End: 2022-11-21 | Stop reason: HOSPADM

## 2022-11-17 RX ORDER — SODIUM CHLORIDE 0.9 % (FLUSH) 0.9 %
10 SYRINGE (ML) INJECTION AS NEEDED
Status: DISCONTINUED | OUTPATIENT
Start: 2022-11-17 | End: 2022-11-17 | Stop reason: HOSPADM

## 2022-11-17 RX ORDER — NOREPINEPHRINE BIT/0.9 % NACL 8 MG/250ML
.02-.06 INFUSION BOTTLE (ML) INTRAVENOUS CONTINUOUS PRN
Status: DISCONTINUED | OUTPATIENT
Start: 2022-11-17 | End: 2022-11-18

## 2022-11-17 RX ORDER — MEPERIDINE HYDROCHLORIDE 25 MG/ML
25 INJECTION INTRAMUSCULAR; INTRAVENOUS; SUBCUTANEOUS ONCE AS NEEDED
Status: DISCONTINUED | OUTPATIENT
Start: 2022-11-17 | End: 2022-11-18

## 2022-11-17 RX ORDER — POTASSIUM CHLORIDE 7.45 MG/ML
10 INJECTION INTRAVENOUS
Status: DISCONTINUED | OUTPATIENT
Start: 2022-11-17 | End: 2022-11-18

## 2022-11-17 RX ORDER — SODIUM CHLORIDE 0.9 % (FLUSH) 0.9 %
10 SYRINGE (ML) INJECTION EVERY 12 HOURS SCHEDULED
Status: DISCONTINUED | OUTPATIENT
Start: 2022-11-17 | End: 2022-11-17 | Stop reason: HOSPADM

## 2022-11-17 RX ORDER — NICOTINE POLACRILEX 4 MG
15 LOZENGE BUCCAL
Status: DISCONTINUED | OUTPATIENT
Start: 2022-11-17 | End: 2022-11-19

## 2022-11-17 RX ORDER — AMINOCAPROIC ACID 250 MG/ML
INJECTION, SOLUTION INTRAVENOUS AS NEEDED
Status: DISCONTINUED | OUTPATIENT
Start: 2022-11-17 | End: 2022-11-17 | Stop reason: SURG

## 2022-11-17 RX ORDER — SODIUM CHLORIDE 9 MG/ML
9 INJECTION, SOLUTION INTRAVENOUS CONTINUOUS PRN
Status: DISCONTINUED | OUTPATIENT
Start: 2022-11-17 | End: 2022-11-17

## 2022-11-17 RX ORDER — SODIUM CHLORIDE 0.9 % (FLUSH) 0.9 %
30 SYRINGE (ML) INJECTION ONCE AS NEEDED
Status: DISCONTINUED | OUTPATIENT
Start: 2022-11-17 | End: 2022-11-17 | Stop reason: HOSPADM

## 2022-11-17 RX ORDER — KETAMINE HCL IN NACL, ISO-OSM 100MG/10ML
SYRINGE (ML) INJECTION AS NEEDED
Status: DISCONTINUED | OUTPATIENT
Start: 2022-11-17 | End: 2022-11-17 | Stop reason: SURG

## 2022-11-17 RX ORDER — ACETAMINOPHEN 10 MG/ML
1000 INJECTION, SOLUTION INTRAVENOUS EVERY 8 HOURS
Status: COMPLETED | OUTPATIENT
Start: 2022-11-17 | End: 2022-11-18

## 2022-11-17 RX ORDER — ACETAMINOPHEN 650 MG
TABLET, EXTENDED RELEASE ORAL AS NEEDED
Status: DISCONTINUED | OUTPATIENT
Start: 2022-11-17 | End: 2022-11-17 | Stop reason: HOSPADM

## 2022-11-17 RX ORDER — SODIUM CHLORIDE 9 MG/ML
INJECTION, SOLUTION INTRAVENOUS AS NEEDED
Status: DISCONTINUED | OUTPATIENT
Start: 2022-11-17 | End: 2022-11-17 | Stop reason: HOSPADM

## 2022-11-17 RX ORDER — ACETAMINOPHEN 160 MG/5ML
650 SOLUTION ORAL EVERY 4 HOURS PRN
Status: DISCONTINUED | OUTPATIENT
Start: 2022-11-18 | End: 2022-11-21 | Stop reason: HOSPADM

## 2022-11-17 RX ORDER — MILRINONE LACTATE 0.2 MG/ML
.25-.75 INJECTION, SOLUTION INTRAVENOUS
Status: DISCONTINUED | OUTPATIENT
Start: 2022-11-17 | End: 2022-11-18

## 2022-11-17 RX ORDER — HEPARIN SODIUM 1000 [USP'U]/ML
INJECTION, SOLUTION INTRAVENOUS; SUBCUTANEOUS AS NEEDED
Status: DISCONTINUED | OUTPATIENT
Start: 2022-11-17 | End: 2022-11-17 | Stop reason: SURG

## 2022-11-17 RX ORDER — ONDANSETRON 2 MG/ML
INJECTION INTRAMUSCULAR; INTRAVENOUS AS NEEDED
Status: DISCONTINUED | OUTPATIENT
Start: 2022-11-17 | End: 2022-11-17 | Stop reason: SURG

## 2022-11-17 RX ORDER — ENOXAPARIN SODIUM 100 MG/ML
40 INJECTION SUBCUTANEOUS DAILY
Status: DISCONTINUED | OUTPATIENT
Start: 2022-11-18 | End: 2022-11-18

## 2022-11-17 RX ORDER — VECURONIUM BROMIDE 1 MG/ML
INJECTION, POWDER, LYOPHILIZED, FOR SOLUTION INTRAVENOUS AS NEEDED
Status: DISCONTINUED | OUTPATIENT
Start: 2022-11-17 | End: 2022-11-17 | Stop reason: SURG

## 2022-11-17 RX ORDER — POTASSIUM CHLORIDE 7.45 MG/ML
INJECTION INTRAVENOUS
Status: COMPLETED
Start: 2022-11-17 | End: 2022-11-17

## 2022-11-17 RX ORDER — SODIUM CHLORIDE 9 MG/ML
INJECTION, SOLUTION INTRAVENOUS CONTINUOUS PRN
Status: DISCONTINUED | OUTPATIENT
Start: 2022-11-17 | End: 2022-11-17 | Stop reason: SURG

## 2022-11-17 RX ORDER — CEFAZOLIN SODIUM 1 G/3ML
INJECTION, POWDER, FOR SOLUTION INTRAMUSCULAR; INTRAVENOUS AS NEEDED
Status: DISCONTINUED | OUTPATIENT
Start: 2022-11-17 | End: 2022-11-17 | Stop reason: SURG

## 2022-11-17 RX ORDER — NITROGLYCERIN 20 MG/100ML
5-50 INJECTION INTRAVENOUS CONTINUOUS PRN
Status: DISCONTINUED | OUTPATIENT
Start: 2022-11-17 | End: 2022-11-18

## 2022-11-17 RX ORDER — ASPIRIN 81 MG/1
81 TABLET ORAL DAILY
Status: DISCONTINUED | OUTPATIENT
Start: 2022-11-18 | End: 2022-11-21 | Stop reason: HOSPADM

## 2022-11-17 RX ORDER — SODIUM CHLORIDE 0.9 % (FLUSH) 0.9 %
3 SYRINGE (ML) INJECTION EVERY 12 HOURS SCHEDULED
Status: DISCONTINUED | OUTPATIENT
Start: 2022-11-17 | End: 2022-11-17 | Stop reason: HOSPADM

## 2022-11-17 RX ORDER — POTASSIUM CHLORIDE 20 MEQ/1
20 TABLET, EXTENDED RELEASE ORAL AS NEEDED
Status: DISCONTINUED | OUTPATIENT
Start: 2022-11-18 | End: 2022-11-21 | Stop reason: HOSPADM

## 2022-11-17 RX ORDER — NITROGLYCERIN 0.4 MG/1
0.4 TABLET SUBLINGUAL
Status: DISCONTINUED | OUTPATIENT
Start: 2022-11-17 | End: 2022-11-17 | Stop reason: HOSPADM

## 2022-11-17 RX ORDER — ACETAMINOPHEN 325 MG/1
650 TABLET ORAL EVERY 4 HOURS PRN
Status: DISCONTINUED | OUTPATIENT
Start: 2022-11-17 | End: 2022-11-17 | Stop reason: HOSPADM

## 2022-11-17 RX ORDER — FENTANYL CITRATE 50 UG/ML
INJECTION, SOLUTION INTRAMUSCULAR; INTRAVENOUS AS NEEDED
Status: DISCONTINUED | OUTPATIENT
Start: 2022-11-17 | End: 2022-11-17 | Stop reason: SURG

## 2022-11-17 RX ORDER — NOREPINEPHRINE BIT/0.9 % NACL 8 MG/250ML
INFUSION BOTTLE (ML) INTRAVENOUS CONTINUOUS PRN
Status: DISCONTINUED | OUTPATIENT
Start: 2022-11-17 | End: 2022-11-17 | Stop reason: SURG

## 2022-11-17 RX ORDER — NALOXONE HCL 0.4 MG/ML
0.4 VIAL (ML) INJECTION
Status: DISCONTINUED | OUTPATIENT
Start: 2022-11-17 | End: 2022-11-21 | Stop reason: HOSPADM

## 2022-11-17 RX ORDER — MILRINONE LACTATE 0.2 MG/ML
INJECTION, SOLUTION INTRAVENOUS CONTINUOUS PRN
Status: DISCONTINUED | OUTPATIENT
Start: 2022-11-17 | End: 2022-11-17 | Stop reason: SURG

## 2022-11-17 RX ORDER — DEXTROSE MONOHYDRATE 25 G/50ML
10-50 INJECTION, SOLUTION INTRAVENOUS
Status: DISCONTINUED | OUTPATIENT
Start: 2022-11-17 | End: 2022-11-19

## 2022-11-17 RX ORDER — ALBUMIN, HUMAN INJ 5% 5 %
SOLUTION INTRAVENOUS
Status: COMPLETED
Start: 2022-11-17 | End: 2022-11-17

## 2022-11-17 RX ORDER — DEXTROSE MONOHYDRATE 25 G/50ML
10-50 INJECTION, SOLUTION INTRAVENOUS
Status: DISCONTINUED | OUTPATIENT
Start: 2022-11-17 | End: 2022-11-17 | Stop reason: HOSPADM

## 2022-11-17 RX ORDER — SODIUM CHLORIDE 9 MG/ML
30 INJECTION, SOLUTION INTRAVENOUS CONTINUOUS
Status: DISPENSED | OUTPATIENT
Start: 2022-11-17 | End: 2022-11-20

## 2022-11-17 RX ORDER — MAGNESIUM SULFATE 1 G/100ML
1 INJECTION INTRAVENOUS EVERY 8 HOURS
Status: COMPLETED | OUTPATIENT
Start: 2022-11-17 | End: 2022-11-18

## 2022-11-17 RX ORDER — CHLORHEXIDINE GLUCONATE 0.12 MG/ML
15 RINSE ORAL ONCE
Status: COMPLETED | OUTPATIENT
Start: 2022-11-17 | End: 2022-11-17

## 2022-11-17 RX ORDER — OLANZAPINE 10 MG/2ML
1 INJECTION, POWDER, LYOPHILIZED, FOR SOLUTION INTRAMUSCULAR
Status: DISCONTINUED | OUTPATIENT
Start: 2022-11-17 | End: 2022-11-19

## 2022-11-17 RX ORDER — DEXMEDETOMIDINE HYDROCHLORIDE 4 UG/ML
.2-1.5 INJECTION, SOLUTION INTRAVENOUS
Status: DISCONTINUED | OUTPATIENT
Start: 2022-11-17 | End: 2022-11-18

## 2022-11-17 RX ORDER — MIDAZOLAM HYDROCHLORIDE 1 MG/ML
INJECTION INTRAMUSCULAR; INTRAVENOUS AS NEEDED
Status: DISCONTINUED | OUTPATIENT
Start: 2022-11-17 | End: 2022-11-17 | Stop reason: SURG

## 2022-11-17 RX ORDER — PANTOPRAZOLE SODIUM 40 MG/1
40 TABLET, DELAYED RELEASE ORAL
Status: DISCONTINUED | OUTPATIENT
Start: 2022-11-18 | End: 2022-11-18

## 2022-11-17 RX ORDER — ALBUMIN, HUMAN INJ 5% 5 %
12.5 SOLUTION INTRAVENOUS AS NEEDED
Status: COMPLETED | OUTPATIENT
Start: 2022-11-17 | End: 2022-11-17

## 2022-11-17 RX ORDER — PANTOPRAZOLE SODIUM 40 MG/10ML
40 INJECTION, POWDER, LYOPHILIZED, FOR SOLUTION INTRAVENOUS ONCE
Status: COMPLETED | OUTPATIENT
Start: 2022-11-17 | End: 2022-11-17

## 2022-11-17 RX ORDER — ATORVASTATIN CALCIUM 40 MG/1
40 TABLET, FILM COATED ORAL NIGHTLY
Status: DISCONTINUED | OUTPATIENT
Start: 2022-11-18 | End: 2022-11-21 | Stop reason: HOSPADM

## 2022-11-17 RX ORDER — CALCIUM GLUCONATE 94 MG/ML
INJECTION, SOLUTION INTRAVENOUS AS NEEDED
Status: DISCONTINUED | OUTPATIENT
Start: 2022-11-17 | End: 2022-11-17 | Stop reason: SURG

## 2022-11-17 RX ORDER — SODIUM CHLORIDE 9 MG/ML
30 INJECTION, SOLUTION INTRAVENOUS CONTINUOUS PRN
Status: DISCONTINUED | OUTPATIENT
Start: 2022-11-17 | End: 2022-11-17

## 2022-11-17 RX ORDER — SODIUM CHLORIDE 0.9 % (FLUSH) 0.9 %
3-10 SYRINGE (ML) INJECTION AS NEEDED
Status: DISCONTINUED | OUTPATIENT
Start: 2022-11-17 | End: 2022-11-17 | Stop reason: HOSPADM

## 2022-11-17 RX ORDER — ACETAMINOPHEN 325 MG/1
650 TABLET ORAL EVERY 4 HOURS PRN
Status: DISCONTINUED | OUTPATIENT
Start: 2022-11-18 | End: 2022-11-21 | Stop reason: HOSPADM

## 2022-11-17 RX ORDER — ASPIRIN 325 MG
325 TABLET ORAL ONCE
Status: COMPLETED | OUTPATIENT
Start: 2022-11-17 | End: 2022-11-17

## 2022-11-17 RX ORDER — FAMOTIDINE 20 MG/1
20 TABLET, FILM COATED ORAL 2 TIMES DAILY PRN
COMMUNITY
End: 2022-12-19 | Stop reason: DRUGHIGH

## 2022-11-17 RX ORDER — ACETAMINOPHEN 650 MG/1
650 SUPPOSITORY RECTAL EVERY 4 HOURS PRN
Status: DISCONTINUED | OUTPATIENT
Start: 2022-11-18 | End: 2022-11-21 | Stop reason: HOSPADM

## 2022-11-17 RX ORDER — CHLORHEXIDINE GLUCONATE 500 MG/1
1 CLOTH TOPICAL EVERY 12 HOURS PRN
Status: DISCONTINUED | OUTPATIENT
Start: 2022-11-17 | End: 2022-11-17 | Stop reason: HOSPADM

## 2022-11-17 RX ORDER — OLANZAPINE 10 MG/2ML
1 INJECTION, POWDER, LYOPHILIZED, FOR SOLUTION INTRAMUSCULAR
Status: DISCONTINUED | OUTPATIENT
Start: 2022-11-17 | End: 2022-11-17 | Stop reason: HOSPADM

## 2022-11-17 RX ORDER — ETOMIDATE 2 MG/ML
INJECTION INTRAVENOUS AS NEEDED
Status: DISCONTINUED | OUTPATIENT
Start: 2022-11-17 | End: 2022-11-17 | Stop reason: SURG

## 2022-11-17 RX ORDER — AMOXICILLIN 250 MG
2 CAPSULE ORAL 2 TIMES DAILY
Status: DISCONTINUED | OUTPATIENT
Start: 2022-11-18 | End: 2022-11-21 | Stop reason: HOSPADM

## 2022-11-17 RX ORDER — NICOTINE POLACRILEX 4 MG
15 LOZENGE BUCCAL
Status: DISCONTINUED | OUTPATIENT
Start: 2022-11-17 | End: 2022-11-17 | Stop reason: HOSPADM

## 2022-11-17 RX ADMIN — POTASSIUM CHLORIDE 10 MEQ: 7.46 INJECTION, SOLUTION INTRAVENOUS at 18:29

## 2022-11-17 RX ADMIN — MIDAZOLAM HYDROCHLORIDE 2 MG: 1 INJECTION, SOLUTION INTRAMUSCULAR; INTRAVENOUS at 12:25

## 2022-11-17 RX ADMIN — VECURONIUM BROMIDE 10 MG: 10 INJECTION, POWDER, LYOPHILIZED, FOR SOLUTION INTRAVENOUS at 12:25

## 2022-11-17 RX ADMIN — METOPROLOL TARTRATE 12.5 MG: 25 TABLET, FILM COATED ORAL at 08:36

## 2022-11-17 RX ADMIN — MIDAZOLAM HYDROCHLORIDE 2 MG: 1 INJECTION, SOLUTION INTRAMUSCULAR; INTRAVENOUS at 14:42

## 2022-11-17 RX ADMIN — Medication 30 MG: at 12:21

## 2022-11-17 RX ADMIN — ACETAMINOPHEN 1000 MG: 10 INJECTION, SOLUTION INTRAVENOUS at 18:39

## 2022-11-17 RX ADMIN — HYDROMORPHONE HYDROCHLORIDE 0.25 MG: 1 INJECTION, SOLUTION INTRAMUSCULAR; INTRAVENOUS; SUBCUTANEOUS at 21:02

## 2022-11-17 RX ADMIN — ALPRAZOLAM 0.25 MG: 0.25 TABLET ORAL at 08:36

## 2022-11-17 RX ADMIN — MUPIROCIN 1 APPLICATION: 20 OINTMENT TOPICAL at 08:36

## 2022-11-17 RX ADMIN — MAGNESIUM SULFATE HEPTAHYDRATE 2 G: 500 INJECTION, SOLUTION INTRAMUSCULAR; INTRAVENOUS at 15:36

## 2022-11-17 RX ADMIN — HYDROCODONE BITARTRATE AND ACETAMINOPHEN 1 TABLET: 5; 325 TABLET ORAL at 22:15

## 2022-11-17 RX ADMIN — DEXMEDETOMIDINE HYDROCHLORIDE 0.7 MCG/KG/HR: 4 INJECTION, SOLUTION INTRAVENOUS at 18:26

## 2022-11-17 RX ADMIN — CHLORHEXIDINE GLUCONATE 15 ML: 1.2 RINSE ORAL at 19:28

## 2022-11-17 RX ADMIN — SODIUM CHLORIDE: 0.9 INJECTION, SOLUTION INTRAVENOUS at 16:24

## 2022-11-17 RX ADMIN — MIDAZOLAM HYDROCHLORIDE 2 MG: 1 INJECTION, SOLUTION INTRAMUSCULAR; INTRAVENOUS at 13:42

## 2022-11-17 RX ADMIN — INSULIN HUMAN 2.4 UNITS/HR: 1 INJECTION, SOLUTION INTRAVENOUS at 22:09

## 2022-11-17 RX ADMIN — SODIUM BICARBONATE 50 MEQ: 84 INJECTION, SOLUTION INTRAVENOUS at 22:14

## 2022-11-17 RX ADMIN — FENTANYL CITRATE 250 MCG: 0.05 INJECTION, SOLUTION INTRAMUSCULAR; INTRAVENOUS at 12:33

## 2022-11-17 RX ADMIN — Medication 0.05 MCG/KG/MIN: at 15:11

## 2022-11-17 RX ADMIN — FENTANYL CITRATE 250 MCG: 0.05 INJECTION, SOLUTION INTRAMUSCULAR; INTRAVENOUS at 14:14

## 2022-11-17 RX ADMIN — AMINOCAPROIC ACID 10 G: 250 INJECTION, SOLUTION INTRAVENOUS at 16:23

## 2022-11-17 RX ADMIN — MAGNESIUM SULFATE IN DEXTROSE 1 G: 10 INJECTION, SOLUTION INTRAVENOUS at 21:58

## 2022-11-17 RX ADMIN — MIDAZOLAM HYDROCHLORIDE 2 MG: 1 INJECTION, SOLUTION INTRAMUSCULAR; INTRAVENOUS at 12:20

## 2022-11-17 RX ADMIN — ALBUMIN (HUMAN) 12.5 G: 12.5 INJECTION, SOLUTION INTRAVENOUS at 18:03

## 2022-11-17 RX ADMIN — ALBUMIN (HUMAN) 12.5 G: 12.5 INJECTION, SOLUTION INTRAVENOUS at 18:32

## 2022-11-17 RX ADMIN — SODIUM CHLORIDE 4 UNITS/HR: 9 INJECTION, SOLUTION INTRAVENOUS at 13:42

## 2022-11-17 RX ADMIN — FENTANYL CITRATE 250 MCG: 0.05 INJECTION, SOLUTION INTRAMUSCULAR; INTRAVENOUS at 14:42

## 2022-11-17 RX ADMIN — Medication 20 MG: at 12:25

## 2022-11-17 RX ADMIN — ALBUMIN (HUMAN) 12.5 G: 12.5 INJECTION, SOLUTION INTRAVENOUS at 19:47

## 2022-11-17 RX ADMIN — PANTOPRAZOLE SODIUM 40 MG: 40 INJECTION, POWDER, FOR SOLUTION INTRAVENOUS at 19:27

## 2022-11-17 RX ADMIN — CEFAZOLIN 2 G: 2 INJECTION, POWDER, FOR SOLUTION INTRAMUSCULAR; INTRAVENOUS at 16:11

## 2022-11-17 RX ADMIN — ALBUMIN (HUMAN) 12.5 G: 12.5 INJECTION, SOLUTION INTRAVENOUS at 18:56

## 2022-11-17 RX ADMIN — HYDROMORPHONE HYDROCHLORIDE 0.25 MG: 1 INJECTION, SOLUTION INTRAMUSCULAR; INTRAVENOUS; SUBCUTANEOUS at 17:58

## 2022-11-17 RX ADMIN — ACETAMINOPHEN 650 MG: 325 TABLET, FILM COATED ORAL at 08:36

## 2022-11-17 RX ADMIN — VECURONIUM BROMIDE 5 MG: 10 INJECTION, POWDER, LYOPHILIZED, FOR SOLUTION INTRAVENOUS at 15:13

## 2022-11-17 RX ADMIN — SODIUM CHLORIDE: 0.9 INJECTION, SOLUTION INTRAVENOUS at 12:17

## 2022-11-17 RX ADMIN — MILRINONE LACTATE 0.25 MCG/KG/MIN: 0.2 INJECTION, SOLUTION INTRAVENOUS at 15:37

## 2022-11-17 RX ADMIN — AMINOCAPROIC ACID 10 G: 250 INJECTION, SOLUTION INTRAVENOUS at 12:48

## 2022-11-17 RX ADMIN — MUPIROCIN 1 APPLICATION: 20 OINTMENT TOPICAL at 21:16

## 2022-11-17 RX ADMIN — EPINEPHRINE 0.02 MCG/KG/MIN: 1 INJECTION INTRAMUSCULAR; INTRAVENOUS; SUBCUTANEOUS at 15:37

## 2022-11-17 RX ADMIN — ETOMIDATE 20 MG: 2 INJECTION, SOLUTION INTRAVENOUS at 12:25

## 2022-11-17 RX ADMIN — HEPARIN SODIUM 30000 UNITS: 1000 INJECTION INTRAVENOUS; SUBCUTANEOUS at 13:26

## 2022-11-17 RX ADMIN — HYDROMORPHONE HYDROCHLORIDE 0.25 MG: 1 INJECTION, SOLUTION INTRAMUSCULAR; INTRAVENOUS; SUBCUTANEOUS at 23:19

## 2022-11-17 RX ADMIN — CEFAZOLIN 2 G: 2 INJECTION, POWDER, FOR SOLUTION INTRAMUSCULAR; INTRAVENOUS at 23:36

## 2022-11-17 RX ADMIN — DEXMEDETOMIDINE HYDROCHLORIDE 0.5 MCG/KG/HR: 100 INJECTION, SOLUTION INTRAVENOUS at 12:47

## 2022-11-17 RX ADMIN — ONDANSETRON 4 MG: 2 INJECTION INTRAMUSCULAR; INTRAVENOUS at 17:04

## 2022-11-17 RX ADMIN — CALCIUM GLUCONATE 1 G: 98 INJECTION, SOLUTION INTRAVENOUS at 15:57

## 2022-11-17 RX ADMIN — MIDAZOLAM HYDROCHLORIDE 2 MG: 1 INJECTION, SOLUTION INTRAMUSCULAR; INTRAVENOUS at 12:22

## 2022-11-17 RX ADMIN — ASPIRIN 325 MG ORAL TABLET 325 MG: 325 PILL ORAL at 19:27

## 2022-11-17 RX ADMIN — MIDAZOLAM HYDROCHLORIDE 2 MG: 1 INJECTION, SOLUTION INTRAMUSCULAR; INTRAVENOUS at 12:51

## 2022-11-17 RX ADMIN — CHLORHEXIDINE GLUCONATE 15 ML: 1.2 SOLUTION ORAL at 08:36

## 2022-11-17 RX ADMIN — CEFAZOLIN SODIUM 2 G: 1 INJECTION, POWDER, FOR SOLUTION INTRAMUSCULAR; INTRAVENOUS at 12:48

## 2022-11-17 RX ADMIN — PROTAMINE SULFATE 350 MG: 10 INJECTION, SOLUTION INTRAVENOUS at 15:57

## 2022-11-17 RX ADMIN — FENTANYL CITRATE 250 MCG: 0.05 INJECTION, SOLUTION INTRAMUSCULAR; INTRAVENOUS at 12:25

## 2022-11-17 RX ADMIN — FENTANYL CITRATE 250 MCG: 0.05 INJECTION, SOLUTION INTRAMUSCULAR; INTRAVENOUS at 12:53

## 2022-11-17 RX ADMIN — VECURONIUM BROMIDE 5 MG: 10 INJECTION, POWDER, LYOPHILIZED, FOR SOLUTION INTRAVENOUS at 13:45

## 2022-11-17 RX ADMIN — FENTANYL CITRATE 250 MCG: 0.05 INJECTION, SOLUTION INTRAMUSCULAR; INTRAVENOUS at 15:59

## 2022-11-17 RX ADMIN — VECURONIUM BROMIDE 5 MG: 10 INJECTION, POWDER, LYOPHILIZED, FOR SOLUTION INTRAVENOUS at 13:04

## 2022-11-17 NOTE — OP NOTE
Operative Note    Date of Dictation: 11/17/22    Date of Procedure: Same    Referring Physician: Prerna Garcia MD    Preoperative diagnosis:  1.  Severe mitral regurgitation  2.  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  Progressive dyspnea with exertion  4.  Multiple PVCs  5.  Moderate tricuspid regurgitation  6.  Idiopathic thrombocytopenia    Postoperative diagnosis:   Same    Procedure:   1.  Elective mitral valve repair with a 40 mm Medtronic flexible band posterior annuloplasty and chordal repair of P2 (4-0 [place]Book[place] x2) and P3 (4-0 [place]Book[place] x1).  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  2.  Tricuspid valve repair with a 32 mm fissure tricuspid ring annuloplasty  3.  Left atrial appendage endocardial closure    Surgeon: Femi Henderson MD     Assistants: KRISTY Ivory and Gorge Ac MD (main) Assistant: Ellen Castellanos CSA was responsible for performing the following activities: Retraction, Suction, Irrigation, Suturing, Closing, Placing Dressing and all aspects of the surgical case and their skilled assistance was necessary for the success of this case.   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 The antibiotics and b blockers were given in the STS required window.  Counseling was given about diet, alcohol and tobacco use as needed          Description of the procedure:     The patient was placed supine on the operative table. Anesthesia was given and lines placed. The patient was prepped and draped using the  usual sterile technique. A median sternotomy was performed with a scalpel and the layers carried down to the sternum using the electrocautery. The sternum was split in the midline using a vertical oscillating saw. Hemostasis was achieved. A Mir retractor was placed and the anterior mediastinum was exposed. The pericardium was opened and edges tacked to the wound.  300 units of IV heparin was given to maintain the ACT over 400.  4-0 Prolene cannulation sutures were placed in the ascending aorta and both superior and inferior vena cava. Small tip cannulas were placed and aorto bicaval cardiopulmonary bypass was started. Cardioplegia cannulas were placed and then the ascending aorta was clamped. One liter of cold blood cardioplegia was given in an antegrade fashion to achieve diastolic arrest and further doses every 15 minutes thereafter also using retrograde infusion.  The left atrium was entering the right interatrial groove and incision extended below each cava.  Self retracting these were placed for exposure.  The left atrial appendage was closed with a double layer 3-0 Prolene continuous suture.  The mitral valve was examined thoroughly and in my judgment I thought it was repairable.    MV REPAIR:  The mitral valve was systematically evaluated to identify the etiology using valve hooks. The problem was Prolapse of the lateral aspect of P2 and the P3 with a cleft.  The cleft was closed with 2 different 5-0 Prolene interrupted sutures.  I used 4-0 [place] pursestrings with a small pledget which were anchored to the posterior belly of the papillary muscles.  Of note, the papillary muscles have multiple Bellis which were soft therefore the sutures were anchored with a pledget.  2 sutures were anchored to each side of the prolapse in P2 and the third suture was anchored to P3 in the area of the closure.  the sutures were adjusted in length and 10 kn were placed to each suture.  I performed the water test and  there was good continence... Then, 2.0 Ethibond suture were placed in a plicating fashion From trigone to trigone posteriorly around the annulus. A ring was selected by measuring the inter-trigonal distance and the AP distance of the anterior leaflet. The sutures were passed through the40 mm Medtronic flexible band and the knots secured. The valve was tested by injecting saline and no residual leak was seen. Then the atrium was closed using a 3.0 prolene running sutures, the chamber de-aired and suture tied down.  Note, the core knot device was used to secure the knots.  The water test showed no leakage whatsoever.    Tricuspid Performed: Yes:  The superior and inferior vena cava were snared. The right atrium was opened and the edges retracted. The tricuspid valve was exposed. There was annular dilatation. I placed 3.0 Ethibond non pledgeted sutures in a plication fashion around the annulus, avoiding the area of the conduction bundle. I selected a 32 mm physio- tricuspid ring as measured to the septal leaflet and septal inter- trigonal area measured. The sutures were passed through the ring and then the ring seated. The knots were secured and strings cut. The valve was tested and no leak was seen. The right atrium was closed using a 4.0 prolene suture in 2 layers, with good hemostasis. The valve was repaired while the patient was rewarmed. Patient was systemically rewarmed, the warm dose of cardioplegia was given and a completion the aortic root clamp was removed with tip suction on the aortic vent. The left pleural space was suctioned and the lungs ventilated. The heart was paced till regular atrial rhythm resumed. I allowed the heart to eject  and I de-aired the left heart chambers under TERRA guidance and once hemodynamics were acceptable, then the CPB was discontinued and the venous and cardioplegia cannulas removed. The matching dose of protamine was given and the aortic cannula removed as well.  Low-dose  inotropic support was used to enhance the hemodynamics.  AV temporary wires and pleural and mediastinal chest tubes were placed and the wound sprayed with platelet rich plasma.  The perioperative TERRA showed the valve well seated without significant perivalvular leaks. The sternum was closed with single and double wires and soft tissue in layers of reabsorbable material. The wounds were covered with sterile dressings.       Specimen removed: None    CPB time: 134 minutes    Aortic clamp time: 106 minute    Complications:  none           Disposition: Cardiovascular recovery room           Condition: Critical but stable.

## 2022-11-17 NOTE — ANESTHESIA PROCEDURE NOTES
Central Line      Patient location during procedure: OR  Start time: 11/17/2022 12:41 PM  Stop Time:11/17/2022 12:43 PM  Indications: central pressure monitoring, vascular access and MD/Surgeon request  Staff  Anesthesiologist: Arnold Brown MD  Preanesthetic Checklist  Completed: patient identified, IV checked, site marked, risks and benefits discussed, surgical consent, monitors and equipment checked, pre-op evaluation and timeout performed  Central Line Prep  Sterile Tech:cap, gloves, gown, mask and sterile barriers  Prep: chloraprep  Patient monitoring: blood pressure monitoring, continuous pulse oximetry and EKG  Central Line Procedure  Laterality:right  Location:internal jugular  Catheter Type:[place]  Assessment  Complications:no  Patient Tolerance:patient tolerated the procedure well with no apparent complications  Additional Notes  THRU PREVIOUSLY PLACED MAC INTRODUCER. NEW GLOVES/DRAPE. TO PA X1 ATTEMPT 51CM NO WEDGE ATTEMPTED.

## 2022-11-17 NOTE — CONSULTS
Referring Provider: Femi Henderson MD  Reason for Consultation:  Status post mitral valve repair  Postoperative follow-up.  Hypertension    Patient Care Team:  Jaret Castellanos MD as PCP - General (Family Medicine)  Felipe Garcia MD as Consulting Physician (Cardiology)    Chief complaint  Shortness of breath    Subjective .     History of present illness:  Clint Cee is a 77 y.o. male who presents with history of progressively worsening shortness of breath.  Patient had cardiac catheterization and TERRA revealed severe mitral regurgitation.  Cardiovascular surgery team was consulted and patient is brought in for surgery and this was performed this afternoon.  Patient is intubated not able to get further information...     ROS      Intubated and is on the respirator.    History  Past Medical History:   Diagnosis Date   • Abnormal ECG June 2022   • Allergic 01/01/1954    Nasal alergies   • Arrhythmia 2004    Dr Youngblood examined me and said i was ok.   • Asthma    • Benign prostatic hyperplasia 11/14/2018   • Cataract 01/01/2014    Surgery. Caused detached retina of right eye 20/60 repair   • Colon polyp 01/01/2018    Found and removed last colon eca.q   • Gastroesophageal reflux disease 03/20/2014   • Glaucoma 2003    Normal pressures. Have low pressure glaucoma   • Headache 01/01/2015    Have shimmering in both eyes intermittantly. No headaches   • Heart murmur May 2022    During wellness exam   • Heart valve disease July 2022    During Echo   • HL (hearing loss) 01/01/2012    Have hearing aids   • Hypertension 12/02/2011   • Infectious viral hepatitis 1954    Yellow jaundis as child   • Insomnia 12/02/2011   • Mitral valve prolapse June 2022   • Mood disorder (HCC) 09/19/2013   • Polyosteoarthritis 12/02/2011   • Prostate Cancer Jan22 January 2022   • Spinal stenosis 12/02/2011   • Visual impairment 01/01/1955    Nearsighted corrected glasses   • Vitamin D deficiency 05/23/2018       Past Surgical  History:   Procedure Laterality Date   • ADENOIDECTOMY  1954    As child   • APPENDECTOMY  1956    Surgery   • CARDIAC CATHETERIZATION N/A 9/30/2022    Procedure: Right and Left Heart Cath with Coronar Angiography;  Surgeon: Felipe Garcia MD;  Location: [facility] CATH INVASIVE LOCATION;  Service: Cardiovascular;  Laterality: N/A;   • COLONOSCOPY  01/01/1997    Regularly scheduled   • EYE SURGERY  01/01/2010    Reattached right retina   • SPINE SURGERY  01/01/1971    2 lumbar 1 cervical   • TONSILLECTOMY  01/01/1950    Childhood       Family History   Problem Relation Age of Onset   • Arthritis Father    • Cancer Father         Prostate   • Arrhythmia Father         Congentive heart failure   • Diabetes Mother         Adult diabetes   • Early death Mother         Kidneys failed after giving birth-diabetes complications   • Heart disease Mother         Adult Diabetes       Social History     Tobacco Use   • Smoking status: Former     Packs/day: 0.00     Years: 0.50     Pack years: 0.00     Types: Cigarettes   • Smokeless tobacco: Never   • Tobacco comments:     Tried tobacco as child   Vaping Use   • Vaping Use: Never used   Substance Use Topics   • Alcohol use: Not Currently     Alcohol/week: 1.0 standard drink     Types: 1 Cans of beer per week     Comment: Not a regular drinker. Occasionly have a glass of wine or be   • Drug use: Never        Medications Prior to Admission   Medication Sig Dispense Refill Last Dose   • allopurinol (ZYLOPRIM) 100 MG tablet TAKE 1 TABLET BY MOUTH DAILY 30 tablet 3 11/16/2022   • amLODIPine (NORVASC) 10 MG tablet TAKE 1 TABLET BY MOUTH EVERY DAY (Patient taking differently: Take 10 mg by mouth every night at bedtime.) 90 tablet 0 11/16/2022 at 2100   • DULoxetine (CYMBALTA) 60 MG capsule Take 1 capsule by mouth Daily for 90 days. 90 capsule 3 11/16/2022   • famotidine (PEPCID) 20 MG tablet Take 20 mg by mouth 2 (Two) Times a Day As Needed for Heartburn.   11/16/2022   • fluticasone  (FLONASE) 50 MCG/ACT nasal spray 2 sprays into the nostril(s) as directed by provider Daily.   11/16/2022   • melatonin 5 MG tablet tablet Take 5 mg by mouth At Night As Needed (sleep).   11/16/2022   • mupirocin (BACTROBAN) 2 % ointment apply inside each nostril twice daily as directed prior to procedure 22 g 0 11/16/2022   • acetaminophen (TYLENOL) 500 MG tablet Take 500 mg by mouth Every 6 (Six) Hours As Needed for Mild Pain.   11/15/2022   • aspirin 325 MG tablet Take 325 mg by mouth Every 6 (Six) Hours As Needed for Mild Pain.   11/3/2022   • aspirin-acetaminophen-caffeine (EXCEDRIN MIGRAINE) 250-250-65 MG per tablet Take 2 tablets by mouth Every 6 (Six) Hours As Needed for Headache.   11/10/2022   • aspirin-sod bicarb-citric acid (STEFANIE-SELTZER) 325 MG effervescent tablet Take 325 mg by mouth Every 6 (Six) Hours As Needed for Headache or Mild Pain.   11/3/2022   • fexofenadine (ALLEGRA) 180 MG tablet Take 180 mg by mouth Daily As Needed.   11/14/2022   • ibuprofen (ADVIL,MOTRIN) 400 MG tablet Take 400 mg by mouth Every 6 (Six) Hours As Needed for Mild Pain.   11/3/2022   • Leuprolide Mesylate, 6 Month, (CAMCEVI SC) Every 6 months injection, due in December      • losartan (COZAAR) 50 MG tablet TAKE 1 TABLET BY MOUTH DAILY 90 tablet 0 11/15/2022   • zolpidem (AMBIEN) 10 MG tablet Take 0.5 tablets by mouth At Night As Needed.   10/17/2022         Morphine    Scheduled Meds:acetaminophen, 1,000 mg, Intravenous, Q8H  [START ON 11/18/2022] aspirin, 81 mg, Oral, Daily  aspirin, 325 mg, Oral, Once  [START ON 11/18/2022] atorvastatin, 40 mg, Oral, Nightly  [START ON 11/18/2022] ceFAZolin, 2 g, Intravenous, Q8H  chlorhexidine, 15 mL, Mouth/Throat, Q12H  [START ON 11/18/2022] enoxaparin, 40 mg, Subcutaneous, Daily  magnesium sulfate, 1 g, Intravenous, Q8H  mupirocin, 1 application, Each Nare, BID  [START ON 11/18/2022] pantoprazole, 40 mg, Oral, Q AM  pantoprazole, 40 mg, Intravenous, Once  [START ON 11/18/2022]  "polyethylene glycol, 17 g, Oral, BID  [START ON 11/18/2022] senna-docusate sodium, 2 tablet, Oral, BID      Continuous Infusions:dexmedetomidine, 0.2-1.5 mcg/kg/hr, Last Rate: 0.7 mcg/kg/hr (11/17/22 1826)  insulin, 0-100 Units/hr  niCARdipine, 5-15 mg/hr  nitroglycerin, 5-50 mcg/min  norepinephrine, 0.02-0.06 mcg/kg/min  sodium chloride, 30 mL/hr, Last Rate: 30 mL/hr (11/17/22 1822)      PRN Meds:.•  [START ON 11/18/2022] acetaminophen **OR** [START ON 11/18/2022] acetaminophen **OR** [START ON 11/18/2022] acetaminophen  •  albumin human  •  dextrose  •  dextrose  •  glucagon (human recombinant)  •  HYDROcodone-acetaminophen  •  HYDROmorphone **AND** naloxone  •  meperidine  •  niCARdipine  •  nitroglycerin  •  norepinephrine  •  ondansetron  •  [START ON 11/18/2022] potassium chloride **OR** [START ON 11/18/2022] potassium chloride  •  potassium chloride **OR** potassium chloride    Objective     VITAL SIGNS  Vitals:    11/17/22 0739 11/17/22 1730 11/17/22 1745 11/17/22 1800   BP: 128/72      BP Location: Left arm      Patient Position: Lying      Pulse: 75 80 74 88   Resp: 12      Temp: 98.2 °F (36.8 °C)  98.1 °F (36.7 °C) 98.1 °F (36.7 °C)   TempSrc: Oral      SpO2: 97% 100% 100% 100%   Weight: 107 kg (236 lb)      Height: 182.9 cm (72\")          Flowsheet Rows    Flowsheet Row First Filed Value   Admission Height 182.9 cm (72\") Documented at 11/17/2022 0739   Admission Weight 107 kg (236 lb) Documented at 11/17/2022 0739            Intake/Output Summary (Last 24 hours) at 11/17/2022 1836  Last data filed at 11/17/2022 1723  Gross per 24 hour   Intake 2580 ml   Output 3675 ml   Net -1095 ml        TELEMETRY: Paced ventricular sensed rhythm.    Physical Exam:  The patient is intubated and is on the respirator.  Vital signs as noted above.  Head and neck revealed no carotid bruits or jugular venous distention.  No thyromegaly or lymph adenopathy is present  Lungs clear.  No wheezing.  Breath sounds are normal " bilaterally.  Heart normal first and second heart sounds.No murmur.  No precordial rub is present.  No gallop is present.  Abdomen soft and nontender.  No organomegaly is present.  Extremities with good peripheral pulses without any pedal edema.  Skin warm and dry.  Visible incisions are looking well.  Musculoskeletal system is grossly normal  CNS grossly normal      Results Review:   I reviewed the patient's new clinical results.  Lab Results (last 24 hours)     Procedure Component Value Units Date/Time    Calcium, Ionized [[id number]]  (Normal) Collected: 11/17/22 1748    Specimen: Blood Updated: 11/17/22 1833     Ionized Calcium 1.30 mmol/L     POC Glucose Once [[id number]]  (Abnormal) Collected: 11/17/22 1822    Specimen: Blood Updated: 11/17/22 1828     Glucose 117 mg/dL      Comment: Serial Number: [id number]:  [id number]       POCT Electrolytes +HGB +HCT [[id number]]  (Abnormal) Collected: 11/17/22 1822    Specimen: Blood Updated: 11/17/22 1828     Sodium 140 mmol/L      POC Potassium 3.8 mmol/L      Ionized Calcium 1.27 mmol/L      Comment: Serial Number: [id number]:  [id number]        Glucose 117 mg/dL      Hematocrit 30 %      Hemoglobin 10.1 g/dL     Blood Gas, Arterial - [[id number]]  (Abnormal) Collected: 11/17/22 1822    Specimen: Arterial Blood Updated: 11/17/22 1828     Site Arterial Line     Blaise's Test N/A     pH, Arterial 7.326 pH units      pCO2, Arterial 40.4 mm Hg      pO2, Arterial 153.7 mm Hg      HCO3, Arterial 21.1 mmol/L      Base Excess, Arterial -4.6 mmol/L      Comment: Serial Number: [id number]:  [id number]        O2 Saturation, Arterial 99.2 %      Barometric Pressure for Blood Gas --     Comment: N/A        Modality Adult Vent     FIO2 70 %      Ventilator Mode ;AC     Set Tidal Volume 800     PEEP 8     Hemodilution Yes     Respiratory Rate 14    Renal Function Panel [[id number]]  (Abnormal) Collected: 11/17/22 1748    Specimen: Blood Updated: 11/17/22 1816     Glucose 106 mg/dL      BUN  24 mg/dL      Creatinine 1.07 mg/dL      Sodium 140 mmol/L      Potassium 4.1 mmol/L      Comment: Slight hemolysis detected by analyzer. Results may be affected.        Chloride 107 mmol/L      CO2 22.0 mmol/L      Calcium 9.5 mg/dL      Albumin 4.10 g/dL      Phosphorus 5.4 mg/dL      Anion Gap 11.0 mmol/L      BUN/Creatinine Ratio 22.4     eGFR 72.4 mL/min/1.73      Comment: National Kidney Foundation and American Society of Nephrology (ASN) Task Force recommended calculation based on the Chronic Kidney Disease Epidemiology Collaboration (CKD-EPI) equation refit without adjustment for race.       Narrative:      GFR Normal >60  Chronic Kidney Disease <60  Kidney Failure <15    The GFR formula is only valid for adults with stable renal function between ages 18 and 70.    aPTT [[id number]]  (Abnormal) Collected: 11/17/22 1750    Specimen: Blood Updated: 11/17/22 1808     PTT 23.6 seconds     Protime-INR [[id number]]  (Abnormal) Collected: 11/17/22 1750    Specimen: Blood Updated: 11/17/22 1808     Protime 11.9 Seconds      INR 1.17    CBC & Differential [[id number]]  (Abnormal) Collected: 11/17/22 1748    Specimen: Blood Updated: 11/17/22 1758    Narrative:      The following orders were created for panel order CBC & Differential.  Procedure                               Abnormality         Status                     ---------                               -----------         ------                     CBC Auto Differential[[id number]]        Abnormal            Final result                 Please view results for these tests on the individual orders.    CBC Auto Differential [[id number]]  (Abnormal) Collected: 11/17/22 1748    Specimen: Blood Updated: 11/17/22 1758     WBC 2.80 10*3/mm3      RBC 3.53 10*6/mm3      Hemoglobin 10.7 g/dL      Hematocrit 31.5 %      MCV 89.1 fL      MCH 30.3 pg      MCHC 34.0 g/dL      RDW 14.0 %      RDW-SD 45.9 fl      MPV 6.5 fL      Platelets 84 10*3/mm3      Neutrophil % 93.6 %       Lymphocyte % 4.2 %      Monocyte % 1.4 %      Eosinophil % 0.7 %      Basophil % 0.1 %      Neutrophils, Absolute 2.70 10*3/mm3      Lymphocytes, Absolute 0.10 10*3/mm3      Monocytes, Absolute 0.00 10*3/mm3      Eosinophils, Absolute 0.00 10*3/mm3      Basophils, Absolute 0.00 10*3/mm3      nRBC 0.2 /100 WBC     aPTT [[id number]]  (Abnormal) Collected: 11/17/22 1447    Specimen: Blood Updated: 11/17/22 1745     PTT 23.9 seconds     Protime-INR [[id number]]  (Abnormal) Collected: 11/17/22 1447    Specimen: Blood Updated: 11/17/22 1745     Protime 11.9 Seconds      INR 1.17    aPTT [[id number]]  (Normal) Collected: 11/17/22 1624    Specimen: Blood, Arterial Line Updated: 11/17/22 1648     PTT 25.6 seconds     Fibrinogen [[id number]]  (Abnormal) Collected: 11/17/22 1624    Specimen: Blood, Arterial Line Updated: 11/17/22 1648     Fibrinogen 172 mg/dL     Protime-INR [[id number]]  (Abnormal) Collected: 11/17/22 1624    Specimen: Blood, Arterial Line Updated: 11/17/22 1648     Protime 12.7 Seconds      INR 1.25    Platelet Function ADP [[id number]]  (Normal) Collected: 11/17/22 1624    Specimen: Blood, Arterial Line Updated: 11/17/22 1635     ADP Aggregation, % Platelet 95 %     CBC (No Diff) [[id number]]  (Abnormal) Collected: 11/17/22 1624    Specimen: Blood, Arterial Line Updated: 11/17/22 1635     WBC 4.70 10*3/mm3      RBC 3.25 10*6/mm3      Hemoglobin 10.2 g/dL      Hematocrit 29.8 %      MCV 91.7 fL      MCH 31.3 pg      MCHC 34.2 g/dL      RDW 14.4 %      RDW-SD 45.9 fl      MPV 6.1 fL      Platelets 89 10*3/mm3     POC Glucose Once [[id number]]  (Abnormal) Collected: 11/17/22 0806    Specimen: Blood Updated: 11/17/22 0807     Glucose 109 mg/dL      Comment: Serial Number: [id number]:  [id number]             Imaging Results (Last 24 Hours)     Procedure Component Value Units Date/Time    XR Chest 1 View [[id number]] Collected: 11/17/22 1811     Updated: 11/17/22 1816    Narrative:         DATE OF EXAM:   11/17/2022  5:30 PM     PROCEDURE:   XR CHEST 1 VW-     INDICATIONS:   Post-Op Check Line & Tube Placement; I36.1-Nonrheumatic tricuspid  (valve) insufficiency; I34.0-Nonrheumatic mitral (valve) insufficiency     COMPARISON:  11/16/2022 and prior     TECHNIQUE:   Portable Chest     FINDINGS:      Study is limited by overlying support and monitoring apparatus.     Endotracheal tube is approximately 5 cm above the alex. NG tube  extends below the diaphragm. The tip is not visualized.     Right-sided vascular sheath with a Parlin-Estelle catheter extending to the  expected position of the proximal right main pulmonary artery noted.     Patient is status post median sternotomy and CABG.     There appears be a mediastinal drain in place. Heart size is stable.  Pulmonary vascularity is mildly congested.     No significant pneumothorax noted. Trace amount of pneumomediastinum is  questioned. Lung volumes are low with vascular crowding and dependent  atelectasis.        Impression:      IMPRESSION :      1. Lines and tubes as above  2. Mild pulmonary vascular congestion and dependent opacities  accentuated by low lung volumes.  3. Trace amount of pneumomediastinum compatible with recent  postoperative state[     Electronically Signed By-Sadiq Jiménez On:11/17/2022 6:14 PM  This report was finalized on [id number] by  Sadiq Jiménez, .      LAB RESULTS (LAST 7 DAYS)    CBC  Results from last 7 days   Lab Units 11/17/22  1822 11/17/22  1748 11/17/22  1624 11/17/22  1609 11/17/22  1521 11/17/22  1503 11/17/22  1430 11/17/22  1300 11/16/22  0828   WBC 10*3/mm3  --  2.80* 4.70  --   --   --   --   --  4.80   RBC 10*6/mm3  --  3.53* 3.25*  --   --   --   --   --  4.69   HEMOGLOBIN g/dL  --  10.7* 10.2*  --   --   --   --   --  14.3   HEMOGLOBIN, POC g/dL 10.1*  --   --  9.9* 10.9* 11.2* 10.2*   < >  --    HEMATOCRIT %  --  31.5* 29.8*  --   --   --   --   --  41.9   HEMATOCRIT POC % 30*  --   --  29* 32* 33* 30*   < >  --    MCV fL  --   89.1 91.7  --   --   --   --   --  89.4   PLATELETS 10*3/mm3  --  84* 89*  --   --   --   --   --  92*    < > = values in this interval not displayed.       BMP  Results from last 7 days   Lab Units 11/17/22  1748 11/16/22  0828   SODIUM mmol/L 140 138   POTASSIUM mmol/L 4.1 4.4   CHLORIDE mmol/L 107 101   CO2 mmol/L 22.0 27.0   BUN mg/dL 24* 25*   CREATININE mg/dL 1.07 0.85   GLUCOSE mg/dL 106* 120*   PHOSPHORUS mg/dL 5.4*  --        CMP   Results from last 7 days   Lab Units 11/17/22  1748 11/16/22  0828   SODIUM mmol/L 140 138   POTASSIUM mmol/L 4.1 4.4   CHLORIDE mmol/L 107 101   CO2 mmol/L 22.0 27.0   BUN mg/dL 24* 25*   CREATININE mg/dL 1.07 0.85   GLUCOSE mg/dL 106* 120*   ALBUMIN g/dL 4.10 4.40   BILIRUBIN mg/dL  --  0.3   ALK PHOS U/L  --  100   AST (SGOT) U/L  --  17   ALT (SGPT) U/L  --  18         BNP        TROPONIN        CoAg  Results from last 7 days   Lab Units 11/17/22  1750 11/17/22  1624 11/17/22  1447 11/16/22  0828   INR  1.17* 1.25* 1.17* 1.01   APTT seconds 23.6* 25.6 23.9* 27.6       Creatinine Clearance  Estimated Creatinine Clearance: 75.4 mL/min (by C-G formula based on SCr of 1.07 mg/dL).    ABG  Results from last 7 days   Lab Units 11/17/22  1822 11/17/22  1609 11/17/22  1521 11/17/22  1503 11/17/22  1430 11/17/22  1403 11/17/22  1359   PH, ARTERIAL pH units 7.326* 7.300* 7.310* 7.340* 7.370 7.270* 7.320*   PCO2, ARTERIAL mm Hg 40.4 53.3* 55.3* 51.4* 50.2* 57.8* 53.5*   PO2 ART mm Hg 153.7* 521.0* 426.0* 437.0* 458.0* 39.0* 466.0*   O2 SATURATION ART % 99.2* 100.0* 100.0* 100.0* 100.0* 65.0* 100.0*   BASE EXCESS ART mmol/L -4.6* <0.0* 2.0 2.0 4.0* 0.0 1.0       Radiology  XR Chest 1 View    Result Date: 11/17/2022  IMPRESSION :  1. Lines and tubes as above 2. Mild pulmonary vascular congestion and dependent opacities accentuated by low lung volumes. 3. Trace amount of pneumomediastinum compatible with recent postoperative state[  Electronically Signed By-Sadiq Jiménez On:11/17/2022  6:14 PM This report was finalized on [id number] by  Sadiq Jiménez, .    XR Chest PA & Lateral    Result Date: 11/16/2022  Stable mild cardiac enlargement. No acute chest finding.  Electronically Signed By-Deann Damon MD On:11/16/2022 9:11 AM This report was finalized on [id number] by  Deann Damon MD.        EKG          I personally viewed and interpreted the patient's EKG/Telemetry data:    ECHOCARDIOGRAM:    Results for orders placed during the hospital encounter of 09/30/22    Adult Transesophageal Echo (TERRA) W/ Cont if Necessary Per Protocol    Interpretation Summary  Date of study  9/30/2022    Procedure performed  Transesophageal echocardiogram and Doppler study.    Indications  Significant mitral regurgitation  Shortness of breath    Procedure  Anesthesia was provided by anesthesiologist with intravenous Diprivan.  TERRA probe could be passed without difficulty.  Patient tolerated the procedure well.  No complications were noted.    Results  Technically satisfactory study.  Mitral valve has severe myxomatous degeneration with multiple areas of severe prolapse and known coaptation of the mitral leaflet as well as probable ruptured chordae.  Severe mitral regurgitation is seen with multiple jets with mitral regurgitation jet occupying the entire left atrium.  Tricuspid valve is also showing myxomatous degeneration with moderate tricuspid regurgitation.  Calculated pulmonary artery pressure is 60 mmHg.  Aortic valve is thickened with adequate opening motion.  Left atrium is enlarged.  Left atrial appendage is enlarged without clot.  Left ventricle is normal in size and contractility with ejection fraction of 60%.  Right ventricle is normal in size.  Right atrium is normal in size.  Atrial septum is intact without PFO.  Aortic intimal thickening is present without clot.  No pericardial effusion is seen.    Impression  Myxomatous mitral and tricuspid valve degeneration.  Severe mitral valve prolapse  involving both anterior and posterior mitral leaflets with severe mitral regurgitation with multiple jets.  Probable ruptured chordae.  In some views coaptation of the mitral valve is present.  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  Significant pulmonary hypertension.  Left atrial and left atrial appendage enlargement without clot.  Normal left ventricular size and contractility with ejection fraction of 60%.              Cardiolite (Tc-99m Sestamibi) stress test      OTHER:     Assessment & Plan     Principal Problem:    Nonrheumatic tricuspid valve regurgitation  Active Problems:    Nonrheumatic mitral valve regurgitation       Assessment and Plan      ]]]]]]]]]]]]]]]]]]]]]]  Impression  ===========  -Status post mitral and tricuspid valve repair and left atrial appendage endocardial closure--Dr. Henderson 11/17/2022    -   Preoperative increased shortness of breath and significantly increased fatigue.     -Preoperative significant mitral regurgitation.  Prominent posterior mitral leaflet prolapse    TERRA 9/30/2022  Myxomatous mitral and tricuspid valve degeneration.  Severe mitral valve prolapse involving both anterior and posterior mitral leaflets with severe mitral regurgitation with multiple jets.  Probable ruptured chordae.  In some views coaptation of the mitral valve is present.  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  Significant pulmonary hypertension.  Left atrial and left atrial appendage enlargement without clot.  Normal left ventricular size and contractility with ejection fraction of 60%.    Cardiac catheterization 9/30/2022   Moderate to significant pulmonary hypertension.  Severe mitral valve prolapse and mitral regurgitation and probable ruptured chordae (TERRA and cardiac cath)  Tricuspid valve prolapse.  Normal left ventricular function.  Normal coronary arteries.    Echocardiogram 9/28/2022.  Mild mitral regurgitation (regurgitation jet may be not in  the field)     Echocardiogram 6/15/2022   prominent posterior mitral leaflet prolapse.  Moderate to significant mitral regurgitation.  Thickened aortic valve with adequate opening motion.  Left atrial enlargement.  Normal left ventricular size and contractility with ejection fraction of 60%.     - Premature ventricular contractions.  Occasional palpitations     - Hypertension vitamin D deficiency GERD ocular migraine.     - History of prostate cancer.  Status post radiation therapy.  Bone scan was negative for any spread.     - Status post appendectomy spine surgery adenoidectomy tonsillectomy     - Family history of prostate cancer     - Former smoker     - No known allergies  ============  Plan  ===========  Status post mitral valve repair tricuspid valve repair and left atrial appendage closure--Dr. Henderson-11/17/2022    Respiratory status-patient is on the respirator.    Rhythm-atrial paced ventricular sensed rhythm.    Hypertension-stable.    Further plan will depend on patient's progress.  ]]]]]]]]]]]]]]]]      Felipe Garcia MD  11/17/22  18:36 EST

## 2022-11-17 NOTE — ANESTHESIA PROCEDURE NOTES
Central Line      Patient location during procedure: OR  Start time: 11/17/2022 12:30 PM  Stop Time:11/17/2022 12:40 PM  Indications: central pressure monitoring, vascular access and MD/Surgeon request  Staff  Anesthesiologist: Arnold Brown MD  Preanesthetic Checklist  Completed: patient identified, IV checked, site marked, risks and benefits discussed, surgical consent, monitors and equipment checked, pre-op evaluation and timeout performed  Central Line Prep  Sterile Tech:gloves, cap, gown, mask and sterile barriers  Prep: chloraprep  Patient monitoring: blood pressure monitoring, continuous pulse oximetry and EKG  Central Line Procedure  Laterality:right  Location:internal jugular  Catheter Type:double lumen and MAC  Catheter Size:9 Fr  Guidance:ultrasound guidedImages: still images obtained, printed/placed on chart  Assessment  Post procedure:biopatch applied, line sutured and occlusive dressing applied  Assessement:blood return through all ports, free fluid flow, chest x-ray ordered and Jose Roberto Test  Complications:no  Patient Tolerance:patient tolerated the procedure well with no apparent complications  Additional Notes  R IJ MAC VIA ASEPTIC SELDINGER TECH THRU ANTERIOR APPROACH US GUIDED X1.

## 2022-11-17 NOTE — ANESTHESIA POSTPROCEDURE EVALUATION
Patient: Clint Cee    Procedure Summary     Date: 11/17/22 Room / Location: [facility] CVOR 01 / [facility]    Anesthesia Start: 1217 Anesthesia Stop: 1723    Procedures:       MITRAL VALVE REPAIR/REPLACEMENT with left atrial appendage closure (Chest)      TRICUSPID VALVE REPAIR/REPLACEMENT (Chest) Diagnosis:       Nonrheumatic mitral valve regurgitation      Nonrheumatic tricuspid valve regurgitation      (Nonrheumatic mitral valve regurgitation [I34.0])      (Nonrheumatic tricuspid valve regurgitation [I36.1])    Surgeons: Femi Henderson MD Provider: Arnold Brown MD    Anesthesia Type: general, [place], CVL, PAC ASA Status: 4          Anesthesia Type: general, [place], CVL, PAC    Vitals  Vitals Value Taken Time   BP     Temp     Pulse 80 11/17/22 1729   Resp     SpO2 100 % 11/17/22 1729   Vitals shown include unvalidated device data.        Post Anesthesia Care and Evaluation    Patient location during evaluation: ICU  Patient participation: complete - patient cannot participate  Level of consciousness: obtunded/minimal responses  Pain scale: See nurse's notes for pain score.  Pain management: adequate    Airway patency: patent  Anesthetic complications: No anesthetic complications  PONV Status: none  Cardiovascular status: acceptable  Respiratory status: acceptable  Hydration status: acceptable    Comments: Patient seen and examined postoperatively; vital signs stable; SpO2 greater than or equal to 90%; cardiopulmonary status stable; nausea/vomiting adequately controlled; pain adequately controlled; no apparent anesthesia complications; patient discharged from anesthesia care when discharge criteria were met. Sedated on vent. Hemodynamically stable primacor .375/epi .02

## 2022-11-17 NOTE — ANESTHESIA PROCEDURE NOTES
Airway  Urgency: elective    Date/Time: 11/17/2022 12:27 PM  End Time:11/17/2022 12:27 PM  Airway not difficult    General Information and Staff    Patient location during procedure: OR  Anesthesiologist: Arnold Brown MD    Indications and Patient Condition  Indications for airway management: airway protection    Preoxygenated: yes  MILS maintained throughout  Mask difficulty assessment: 1 - vent by mask    Final Airway Details  Final airway type: endotracheal airway      Successful airway: ETT  Cuffed: yes   Successful intubation technique: direct laryngoscopy  Facilitating devices/methods: Bougie  Endotracheal tube insertion site: oral  Blade: Justina  Blade size: 4  ETT size (mm): 7.5  Cormack-Lehane Classification: grade IIb - view of arytenoids or posterior of glottis only  Placement verified by: chest auscultation and capnometry   Measured from: teeth  ETT/EBT  to teeth (cm): 24  Number of attempts at approach: 1  Assessment: lips, teeth, and gum same as pre-op and atraumatic intubation    Additional Comments  ATRAUMATIC OVER ESCHMANN X1.  TEETH IN PREOP CONDITION.  CUFF TO MINIMUM OCCLUSIVE CUFF PRESSURE. POS ETCO2. BS=BS. ENDO BITE BLOCK

## 2022-11-17 NOTE — CASE MANAGEMENT/SOCIAL WORK
Discharge Planning Assessment  [facility]     Patient Name: Clint Cee  MRN: [id number]  Today's Date: 11/17/2022    Admit Date: 11/17/2022    Plan: DC Plan: Pending Clinical Course and PT/OT evaluations. IP Rehab, HH, OP Rehab, and DME lists provided and awaiting choices.   Discharge Needs Assessment     Row Name 11/17/22 1556       Living Environment    People in Home spouse    Name(s) of People in Home Mary Beth Cee    Current Living Arrangements home    Primary Care Provided by self    Provides Primary Care For no one    Family Caregiver if Needed spouse    Family Caregiver Names Mary Beth Cee    Quality of Family Relationships helpful;involved;supportive    Able to Return to Prior Arrangements yes       Resource/Environmental Concerns    Resource/Environmental Concerns home accessibility    Home Accessibility Concerns stairs to access bedroom or bathroom;stairs to enter home    Transportation Concerns none       Transition Planning    Patient/Family Anticipates Transition to home with help/services    Patient/Family Anticipated Services at Transition home health care    Transportation Anticipated family or friend will provide       Discharge Needs Assessment    Readmission Within the Last 30 Days no previous admission in last 30 days    Equipment Currently Used at Home none    Concerns to be Addressed discharge planning    Anticipated Changes Related to Illness none    Equipment Needed After Discharge other (see comments)  CM will continue to monitor needs    Provided Post Acute Provider List? Yes    Post Acute Provider List DME Supplier;Home Health;Inpatient Rehab;Outpatient Therapy    Provided Post Acute Provider Quality & Resource List? Yes    Post Acute Provider Quality and Resource List Home Health;Inpatient Rehab    Delivered To Support Person    Support Person Charla Cee    Method of Delivery In person               Discharge Plan     Row Name 11/17/22 1558       Plan    Plan DC Plan: Pending  Clinical Course and PT/OT evaluations. IP Rehab, HH, OP Rehab, and DME lists provided and awaiting choices.    Provided Post Acute Provider List? Yes    Post Acute Provider List DME Supplier;Home Health;Inpatient Rehab;Outpatient Therapy    Provided Post Acute Provider Quality & Resource List? Yes    Post Acute Provider Quality and Resource List Home Health;Inpatient Rehab    Delivered To Support Person    Method of Delivery In person    Plan Comments CM spoke with patient roman Clinton in conference room to discuss admission assessment and discharge planning. Mary Beth confirms PCP and pharmacy. Mary Beth denies any difficulty affording medications at this time. Mary Beth denies any additional needs for services or DME at this time. Mary Beth states she will be available to drive patient home when ready to discharge.CM provided Home Health, IP Rehab, OP Rehab and DME lists for potential discharge needs pending PT/OT evaluations. CM requested patient/family to select 2-3 choices from each list and report back to CM when possible. CM provided contact information and Medicare.gov website for any questions and guidance. Patient/Family verbalized understanding.CM will continue to follow for any additional needs that may develop and adjust discharge plan accordingly. DC Barriers: POD 0 OHS           Expected Discharge Date and Time     Expected Discharge Date Expected Discharge Time    Nov 23, 2022          Demographic Summary     Row Name 11/17/22 8640       General Information    Admission Type inpatient    Arrived From home;operating room    Required Notices Provided Important Message from Medicare    Referral Source admission list;hospital clinician/department    Reason for Consult discharge planning    Preferred Language English       Contact Information    Permission Granted to Share Info With                Functional Status     Row Name 11/17/22 1554       Functional Status    Usual Activity Tolerance excellent     Current Activity Tolerance --  REBA    Functional Status Comments All admission information obtained from patient spouse Mary Beth. Patient in OR at time of assessment.       Functional Status, IADL    Medications independent    Meal Preparation independent    Housekeeping independent    Laundry independent    Shopping independent       Mental Status Summary    Recent Changes in Mental Status/Cognitive Functioning no changes       Employment/    Employment Status retired    Current or Previous Occupation not applicable              Met with patient spouse in conference room wearing PPE: mask,      Maintain distance greater than six feet and spent less than fifteen minutes in the room.      Smitha Pollock RN     Office Phone: [phone]  Office Cell:     [phone]          Smitha Pollock RN

## 2022-11-17 NOTE — ANESTHESIA PROCEDURE NOTES
Arterial Line      Patient location during procedure: OR  Start time: 11/17/2022 12:20 PM  Stop Time:11/17/2022 12:24 PM       Line placed for hemodynamic monitoring, ABGs/Labs/ISTAT and MD/Surgeon request.  Performed By   Anesthesiologist: Arnold Brown MD   Preanesthetic Checklist  Completed: patient identified, IV checked, site marked, risks and benefits discussed, surgical consent, monitors and equipment checked, pre-op evaluation and timeout performed  Arterial Line Prep    Sterile Tech: cap, gloves, sterile barriers and mask  Prep: ChloraPrep  Patient monitoring: EKG, continuous pulse oximetry and blood pressure monitoring  Arterial Line Procedure   Laterality:left  Location:  radial artery  Catheter size: 20 G   Guidance: landmark technique  Number of attempts: 1  Successful placement: yes   Post Assessment   Dressing Type: wrist guard applied, secured with tape and occlusive dressing applied.   Complications no  Circ/Move/Sens Assessment: normal and unchanged.   Patient Tolerance: patient tolerated the procedure well with no apparent complications  Additional Notes  L RADIAL ART LINE VIA ASEPTIC SELDINGER TECH RNMD ASSIST. GOOD BETTY

## 2022-11-18 ENCOUNTER — APPOINTMENT (OUTPATIENT)
Dept: GENERAL RADIOLOGY | Facility: HOSPITAL | Age: 77
End: 2022-11-18

## 2022-11-18 LAB
ALBUMIN SERPL-MCNC: 3.6 G/DL (ref 3.5–5.2)
ANION GAP SERPL CALCULATED.3IONS-SCNC: 10 MMOL/L (ref 5–15)
ARTERIAL PATENCY WRIST A: ABNORMAL
ARTERIAL PATENCY WRIST A: POSITIVE
ATMOSPHERIC PRESS: ABNORMAL MM[HG]
BASE EXCESS BLDA CALC-SCNC: -0.8 MMOL/L (ref 0–3)
BASE EXCESS BLDA CALC-SCNC: -0.9 MMOL/L (ref 0–3)
BASE EXCESS BLDA CALC-SCNC: -1.5 MMOL/L (ref 0–3)
BASE EXCESS BLDA CALC-SCNC: -3.7 MMOL/L (ref 0–3)
BASE EXCESS BLDA CALC-SCNC: -4 MMOL/L (ref 0–3)
BASE EXCESS BLDA CALC-SCNC: -4.1 MMOL/L (ref 0–3)
BASE EXCESS BLDA CALC-SCNC: -4.4 MMOL/L (ref 0–3)
BASE EXCESS BLDA CALC-SCNC: 0.9 MMOL/L (ref 0–3)
BASOPHILS # BLD AUTO: 0 10*3/MM3 (ref 0–0.2)
BASOPHILS NFR BLD AUTO: 0.2 % (ref 0–1.5)
BDY SITE: ABNORMAL
BUN SERPL-MCNC: 21 MG/DL (ref 8–23)
BUN/CREAT SERPL: 21.6 (ref 7–25)
CA-I BLDA-SCNC: 1.12 MMOL/L (ref 1.15–1.33)
CA-I BLDA-SCNC: 1.25 MMOL/L (ref 1.15–1.33)
CA-I BLDA-SCNC: 1.25 MMOL/L (ref 1.15–1.33)
CA-I BLDA-SCNC: 1.27 MMOL/L (ref 1.15–1.33)
CA-I BLDA-SCNC: 1.3 MMOL/L (ref 1.15–1.33)
CA-I SERPL ISE-MCNC: 1.09 MMOL/L (ref 1.2–1.3)
CALCIUM SPEC-SCNC: 7.1 MG/DL (ref 8.6–10.5)
CHLORIDE SERPL-SCNC: 116 MMOL/L (ref 98–107)
CO2 BLDA-SCNC: 23.2 MMOL/L (ref 22–29)
CO2 BLDA-SCNC: 25.4 MMOL/L (ref 22–29)
CO2 BLDA-SCNC: 28.1 MMOL/L (ref 22–29)
CO2 SERPL-SCNC: 19 MMOL/L (ref 22–29)
CREAT SERPL-MCNC: 0.97 MG/DL (ref 0.76–1.27)
DEPRECATED RDW RBC AUTO: 46.8 FL (ref 37–54)
EGFRCR SERPLBLD CKD-EPI 2021: 81.4 ML/MIN/1.73
EOSINOPHIL # BLD AUTO: 0 10*3/MM3 (ref 0–0.4)
EOSINOPHIL NFR BLD AUTO: 0 % (ref 0.3–6.2)
ERYTHROCYTE [DISTWIDTH] IN BLOOD BY AUTOMATED COUNT: 14.2 % (ref 12.3–15.4)
GLUCOSE BLDC GLUCOMTR-MCNC: 102 MG/DL (ref 70–105)
GLUCOSE BLDC GLUCOMTR-MCNC: 112 MG/DL (ref 70–105)
GLUCOSE BLDC GLUCOMTR-MCNC: 115 MG/DL (ref 70–105)
GLUCOSE BLDC GLUCOMTR-MCNC: 117 MG/DL (ref 70–105)
GLUCOSE BLDC GLUCOMTR-MCNC: 118 MG/DL (ref 70–105)
GLUCOSE BLDC GLUCOMTR-MCNC: 123 MG/DL (ref 70–105)
GLUCOSE BLDC GLUCOMTR-MCNC: 124 MG/DL (ref 70–105)
GLUCOSE BLDC GLUCOMTR-MCNC: 127 MG/DL (ref 70–105)
GLUCOSE BLDC GLUCOMTR-MCNC: 128 MG/DL (ref 70–105)
GLUCOSE BLDC GLUCOMTR-MCNC: 128 MG/DL (ref 70–105)
GLUCOSE BLDC GLUCOMTR-MCNC: 136 MG/DL (ref 70–105)
GLUCOSE BLDC GLUCOMTR-MCNC: 138 MG/DL (ref 70–105)
GLUCOSE BLDC GLUCOMTR-MCNC: 138 MG/DL (ref 70–105)
GLUCOSE BLDC GLUCOMTR-MCNC: 139 MG/DL (ref 70–105)
GLUCOSE BLDC GLUCOMTR-MCNC: 147 MG/DL (ref 74–100)
GLUCOSE BLDC GLUCOMTR-MCNC: 147 MG/DL (ref 74–100)
GLUCOSE BLDC GLUCOMTR-MCNC: 149 MG/DL (ref 74–100)
GLUCOSE BLDC GLUCOMTR-MCNC: 149 MG/DL (ref 74–100)
GLUCOSE BLDC GLUCOMTR-MCNC: 151 MG/DL (ref 74–100)
GLUCOSE BLDC GLUCOMTR-MCNC: 153 MG/DL (ref 70–105)
GLUCOSE BLDC GLUCOMTR-MCNC: 163 MG/DL (ref 70–105)
GLUCOSE BLDC GLUCOMTR-MCNC: 164 MG/DL (ref 74–100)
GLUCOSE BLDC GLUCOMTR-MCNC: 164 MG/DL (ref 74–100)
GLUCOSE BLDC GLUCOMTR-MCNC: 181 MG/DL (ref 74–100)
GLUCOSE BLDC GLUCOMTR-MCNC: 181 MG/DL (ref 74–100)
GLUCOSE BLDC GLUCOMTR-MCNC: 199 MG/DL (ref 74–100)
GLUCOSE BLDC GLUCOMTR-MCNC: 199 MG/DL (ref 74–100)
GLUCOSE SERPL-MCNC: 126 MG/DL (ref 65–99)
HCO3 BLDA-SCNC: 18.5 MMOL/L (ref 21–28)
HCO3 BLDA-SCNC: 20.8 MMOL/L (ref 21–28)
HCO3 BLDA-SCNC: 21 MMOL/L (ref 21–28)
HCO3 BLDA-SCNC: 22.2 MMOL/L (ref 21–28)
HCO3 BLDA-SCNC: 22.7 MMOL/L (ref 21–28)
HCO3 BLDA-SCNC: 24.2 MMOL/L (ref 21–28)
HCO3 BLDA-SCNC: 24.8 MMOL/L (ref 21–28)
HCO3 BLDA-SCNC: 26.7 MMOL/L (ref 21–28)
HCT VFR BLD AUTO: 24.8 % (ref 37.5–51)
HCT VFR BLDA CALC: 27 % (ref 38–51)
HCT VFR BLDA CALC: 27 % (ref 38–51)
HCT VFR BLDA CALC: 28 % (ref 38–51)
HCT VFR BLDA CALC: 29 % (ref 38–51)
HCT VFR BLDA CALC: 30 % (ref 38–51)
HEMODILUTION: NO
HEMODILUTION: YES
HGB BLD-MCNC: 8.5 G/DL (ref 13–17.7)
HGB BLDA-MCNC: 10 G/DL (ref 12–17)
HGB BLDA-MCNC: 9.1 G/DL (ref 12–17)
HGB BLDA-MCNC: 9.1 G/DL (ref 12–17)
HGB BLDA-MCNC: 9.4 G/DL (ref 12–17)
HGB BLDA-MCNC: 9.9 G/DL (ref 12–17)
INHALED O2 CONCENTRATION: 30 %
INHALED O2 CONCENTRATION: 30 %
INHALED O2 CONCENTRATION: 36 %
INHALED O2 CONCENTRATION: 40 %
INR PPP: 1.17 (ref 0.93–1.1)
LYMPHOCYTES # BLD AUTO: 0.1 10*3/MM3 (ref 0.7–3.1)
LYMPHOCYTES NFR BLD AUTO: 1.5 % (ref 19.6–45.3)
MAGNESIUM SERPL-MCNC: 1.9 MG/DL (ref 1.6–2.4)
MCH RBC QN AUTO: 30.7 PG (ref 26.6–33)
MCHC RBC AUTO-ENTMCNC: 34.2 G/DL (ref 31.5–35.7)
MCV RBC AUTO: 89.9 FL (ref 79–97)
MODALITY: ABNORMAL
MONOCYTES # BLD AUTO: 0.4 10*3/MM3 (ref 0.1–0.9)
MONOCYTES NFR BLD AUTO: 5.2 % (ref 5–12)
NEUTROPHILS NFR BLD AUTO: 6.6 10*3/MM3 (ref 1.7–7)
NEUTROPHILS NFR BLD AUTO: 93.1 % (ref 42.7–76)
NRBC BLD AUTO-RTO: 0 /100 WBC (ref 0–0.2)
PCO2 BLDA: 26 MM HG (ref 35–48)
PCO2 BLDA: 30.6 MM HG (ref 35–48)
PCO2 BLDA: 36.2 MM HG (ref 35–48)
PCO2 BLDA: 36.8 MM HG (ref 35–48)
PCO2 BLDA: 40.1 MM HG (ref 35–48)
PCO2 BLDA: 46.8 MM HG (ref 35–48)
PCO2 BLDA: 47 MM HG (ref 35–48)
PCO2 BLDA: 47.6 MM HG (ref 35–48)
PEEP RESPIRATORY: 5 CM[H2O]
PH BLDA: 7.29 PH UNITS (ref 7.35–7.45)
PH BLDA: 7.32 PH UNITS (ref 7.35–7.45)
PH BLDA: 7.36 PH UNITS (ref 7.35–7.45)
PH BLDA: 7.36 PH UNITS (ref 7.35–7.45)
PH BLDA: 7.37 PH UNITS (ref 7.35–7.45)
PH BLDA: 7.39 PH UNITS (ref 7.35–7.45)
PH BLDA: 7.46 PH UNITS (ref 7.35–7.45)
PH BLDA: 7.47 PH UNITS (ref 7.35–7.45)
PHOSPHATE SERPL-MCNC: 4.4 MG/DL (ref 2.5–4.5)
PLATELET # BLD AUTO: 62 10*3/MM3 (ref 140–450)
PMV BLD AUTO: 7 FL (ref 6–12)
PO2 BLDA: 113 MM HG (ref 83–108)
PO2 BLDA: 118.3 MM HG (ref 83–108)
PO2 BLDA: 126.4 MM HG (ref 83–108)
PO2 BLDA: 144.2 MM HG (ref 83–108)
PO2 BLDA: 152.6 MM HG (ref 83–108)
PO2 BLDA: 159.5 MM HG (ref 83–108)
PO2 BLDA: 159.9 MM HG (ref 83–108)
PO2 BLDA: 172.6 MM HG (ref 83–108)
POTASSIUM BLDA-SCNC: 2.9 MMOL/L (ref 3.5–4.5)
POTASSIUM BLDA-SCNC: 2.9 MMOL/L (ref 3.5–4.5)
POTASSIUM BLDA-SCNC: 3.4 MMOL/L (ref 3.5–4.5)
POTASSIUM BLDA-SCNC: 3.5 MMOL/L (ref 3.5–4.5)
POTASSIUM BLDA-SCNC: 3.9 MMOL/L (ref 3.5–4.5)
POTASSIUM SERPL-SCNC: 3 MMOL/L (ref 3.5–5.2)
POTASSIUM SERPL-SCNC: 3 MMOL/L (ref 3.5–5.2)
PROTHROMBIN TIME: 11.9 SECONDS (ref 9.6–11.7)
PSV: 10 CMH2O
RBC # BLD AUTO: 2.76 10*6/MM3 (ref 4.14–5.8)
RESPIRATORY RATE: 16
RESPIRATORY RATE: 18
SAO2 % BLDCOA: 98.1 % (ref 94–98)
SAO2 % BLDCOA: 98.2 % (ref 94–98)
SAO2 % BLDCOA: 99.1 % (ref 94–98)
SAO2 % BLDCOA: 99.2 % (ref 94–98)
SAO2 % BLDCOA: 99.3 % (ref 94–98)
SAO2 % BLDCOA: 99.4 % (ref 94–98)
SAO2 % BLDCOA: 99.5 % (ref 94–98)
SAO2 % BLDCOA: 99.5 % (ref 94–98)
SODIUM BLD-SCNC: 141 MMOL/L (ref 138–146)
SODIUM BLD-SCNC: 142 MMOL/L (ref 138–146)
SODIUM BLD-SCNC: 143 MMOL/L (ref 138–146)
SODIUM SERPL-SCNC: 145 MMOL/L (ref 136–145)
VENTILATOR MODE: ABNORMAL
VT ON VENT VENT: 700 ML
WBC NRBC COR # BLD: 7.1 10*3/MM3 (ref 3.4–10.8)

## 2022-11-18 PROCEDURE — 97163 PT EVAL HIGH COMPLEX 45 MIN: CPT

## 2022-11-18 PROCEDURE — 85018 HEMOGLOBIN: CPT

## 2022-11-18 PROCEDURE — 93005 ELECTROCARDIOGRAM TRACING: CPT | Performed by: NURSE PRACTITIONER

## 2022-11-18 PROCEDURE — 92610 EVALUATE SWALLOWING FUNCTION: CPT

## 2022-11-18 PROCEDURE — 25010000002 HYDROMORPHONE 1 MG/ML SOLUTION: Performed by: NURSE PRACTITIONER

## 2022-11-18 PROCEDURE — 82330 ASSAY OF CALCIUM: CPT

## 2022-11-18 PROCEDURE — 94003 VENT MGMT INPAT SUBQ DAY: CPT

## 2022-11-18 PROCEDURE — 99024 POSTOP FOLLOW-UP VISIT: CPT | Performed by: NURSE PRACTITIONER

## 2022-11-18 PROCEDURE — 74018 RADEX ABDOMEN 1 VIEW: CPT

## 2022-11-18 PROCEDURE — 85025 COMPLETE CBC W/AUTO DIFF WBC: CPT | Performed by: NURSE PRACTITIONER

## 2022-11-18 PROCEDURE — 94799 UNLISTED PULMONARY SVC/PX: CPT

## 2022-11-18 PROCEDURE — 94761 N-INVAS EAR/PLS OXIMETRY MLT: CPT

## 2022-11-18 PROCEDURE — 83735 ASSAY OF MAGNESIUM: CPT | Performed by: NURSE PRACTITIONER

## 2022-11-18 PROCEDURE — 36600 WITHDRAWAL OF ARTERIAL BLOOD: CPT

## 2022-11-18 PROCEDURE — 82962 GLUCOSE BLOOD TEST: CPT

## 2022-11-18 PROCEDURE — 80069 RENAL FUNCTION PANEL: CPT | Performed by: NURSE PRACTITIONER

## 2022-11-18 PROCEDURE — 84132 ASSAY OF SERUM POTASSIUM: CPT | Performed by: THORACIC SURGERY (CARDIOTHORACIC VASCULAR SURGERY)

## 2022-11-18 PROCEDURE — 25010000002 MAGNESIUM SULFATE IN D5W 1G/100ML (PREMIX) 1-5 GM/100ML-% SOLUTION: Performed by: NURSE PRACTITIONER

## 2022-11-18 PROCEDURE — 82330 ASSAY OF CALCIUM: CPT | Performed by: NURSE PRACTITIONER

## 2022-11-18 PROCEDURE — 71045 X-RAY EXAM CHEST 1 VIEW: CPT

## 2022-11-18 PROCEDURE — 25010000002 ONDANSETRON PER 1 MG: Performed by: NURSE PRACTITIONER

## 2022-11-18 PROCEDURE — 97530 THERAPEUTIC ACTIVITIES: CPT

## 2022-11-18 PROCEDURE — 25010000002 CALCIUM GLUCONATE 2-0.675 GM/100ML-% SOLUTION: Performed by: NURSE PRACTITIONER

## 2022-11-18 PROCEDURE — 0 MILRINONE LACTATE IN DEXTROSE 20-5 MG/100ML-% SOLUTION: Performed by: THORACIC SURGERY (CARDIOTHORACIC VASCULAR SURGERY)

## 2022-11-18 PROCEDURE — 85610 PROTHROMBIN TIME: CPT | Performed by: NURSE PRACTITIONER

## 2022-11-18 PROCEDURE — 97167 OT EVAL HIGH COMPLEX 60 MIN: CPT

## 2022-11-18 PROCEDURE — 25010000002 CEFAZOLIN PER 500 MG: Performed by: NURSE PRACTITIONER

## 2022-11-18 PROCEDURE — 93010 ELECTROCARDIOGRAM REPORT: CPT | Performed by: INTERNAL MEDICINE

## 2022-11-18 PROCEDURE — 80051 ELECTROLYTE PANEL: CPT

## 2022-11-18 PROCEDURE — 82803 BLOOD GASES ANY COMBINATION: CPT

## 2022-11-18 PROCEDURE — 99233 SBSQ HOSP IP/OBS HIGH 50: CPT | Performed by: INTERNAL MEDICINE

## 2022-11-18 PROCEDURE — 0 POTASSIUM CHLORIDE 10 MEQ/100ML SOLUTION: Performed by: NURSE PRACTITIONER

## 2022-11-18 RX ORDER — PANTOPRAZOLE SODIUM 40 MG/10ML
40 INJECTION, POWDER, LYOPHILIZED, FOR SOLUTION INTRAVENOUS
Status: DISCONTINUED | OUTPATIENT
Start: 2022-11-19 | End: 2022-11-21 | Stop reason: HOSPADM

## 2022-11-18 RX ORDER — DULOXETIN HYDROCHLORIDE 30 MG/1
60 CAPSULE, DELAYED RELEASE ORAL DAILY
Status: DISCONTINUED | OUTPATIENT
Start: 2022-11-18 | End: 2022-11-21 | Stop reason: HOSPADM

## 2022-11-18 RX ORDER — ENOXAPARIN SODIUM 100 MG/ML
40 INJECTION SUBCUTANEOUS DAILY
Status: DISCONTINUED | OUTPATIENT
Start: 2022-11-18 | End: 2022-11-18

## 2022-11-18 RX ORDER — ENOXAPARIN SODIUM 100 MG/ML
40 INJECTION SUBCUTANEOUS DAILY
Status: DISCONTINUED | OUTPATIENT
Start: 2022-11-19 | End: 2022-11-19

## 2022-11-18 RX ORDER — CALCIUM GLUCONATE 20 MG/ML
2 INJECTION, SOLUTION INTRAVENOUS ONCE
Status: COMPLETED | OUTPATIENT
Start: 2022-11-18 | End: 2022-11-18

## 2022-11-18 RX ADMIN — ONDANSETRON 4 MG: 2 INJECTION INTRAMUSCULAR; INTRAVENOUS at 01:37

## 2022-11-18 RX ADMIN — HYDROCODONE BITARTRATE AND ACETAMINOPHEN 1 TABLET: 5; 325 TABLET ORAL at 05:30

## 2022-11-18 RX ADMIN — POTASSIUM CHLORIDE 10 MEQ: 7.46 INJECTION, SOLUTION INTRAVENOUS at 05:50

## 2022-11-18 RX ADMIN — CHLORHEXIDINE GLUCONATE 15 ML: 1.2 RINSE ORAL at 05:30

## 2022-11-18 RX ADMIN — HYDROMORPHONE HYDROCHLORIDE 0.25 MG: 1 INJECTION, SOLUTION INTRAMUSCULAR; INTRAVENOUS; SUBCUTANEOUS at 23:24

## 2022-11-18 RX ADMIN — HYDROMORPHONE HYDROCHLORIDE 0.25 MG: 1 INJECTION, SOLUTION INTRAMUSCULAR; INTRAVENOUS; SUBCUTANEOUS at 15:42

## 2022-11-18 RX ADMIN — HYDROCODONE BITARTRATE AND ACETAMINOPHEN 1 TABLET: 5; 325 TABLET ORAL at 17:49

## 2022-11-18 RX ADMIN — SODIUM BICARBONATE 50 MEQ: 84 INJECTION, SOLUTION INTRAVENOUS at 04:20

## 2022-11-18 RX ADMIN — CEFAZOLIN 2 G: 2 INJECTION, POWDER, FOR SOLUTION INTRAMUSCULAR; INTRAVENOUS at 23:26

## 2022-11-18 RX ADMIN — HYDROMORPHONE HYDROCHLORIDE 0.25 MG: 1 INJECTION, SOLUTION INTRAMUSCULAR; INTRAVENOUS; SUBCUTANEOUS at 01:38

## 2022-11-18 RX ADMIN — HYDROMORPHONE HYDROCHLORIDE 0.25 MG: 1 INJECTION, SOLUTION INTRAMUSCULAR; INTRAVENOUS; SUBCUTANEOUS at 20:02

## 2022-11-18 RX ADMIN — CHLORHEXIDINE GLUCONATE 15 ML: 1.2 RINSE ORAL at 17:49

## 2022-11-18 RX ADMIN — MAGNESIUM SULFATE IN DEXTROSE 1 G: 10 INJECTION, SOLUTION INTRAVENOUS at 15:42

## 2022-11-18 RX ADMIN — ATORVASTATIN CALCIUM 40 MG: 40 TABLET, FILM COATED ORAL at 20:02

## 2022-11-18 RX ADMIN — ACETAMINOPHEN 1000 MG: 10 INJECTION, SOLUTION INTRAVENOUS at 01:38

## 2022-11-18 RX ADMIN — MUPIROCIN 1 APPLICATION: 20 OINTMENT TOPICAL at 10:13

## 2022-11-18 RX ADMIN — CEFAZOLIN 2 G: 2 INJECTION, POWDER, FOR SOLUTION INTRAMUSCULAR; INTRAVENOUS at 10:13

## 2022-11-18 RX ADMIN — HYDROCODONE BITARTRATE AND ACETAMINOPHEN 1 TABLET: 5; 325 TABLET ORAL at 21:31

## 2022-11-18 RX ADMIN — HYDROMORPHONE HYDROCHLORIDE 0.25 MG: 1 INJECTION, SOLUTION INTRAMUSCULAR; INTRAVENOUS; SUBCUTANEOUS at 10:13

## 2022-11-18 RX ADMIN — POTASSIUM CHLORIDE 10 MEQ: 7.46 INJECTION, SOLUTION INTRAVENOUS at 03:58

## 2022-11-18 RX ADMIN — CALCIUM GLUCONATE 2 G: 20 INJECTION, SOLUTION INTRAVENOUS at 10:13

## 2022-11-18 RX ADMIN — HYDROMORPHONE HYDROCHLORIDE 0.25 MG: 1 INJECTION, SOLUTION INTRAMUSCULAR; INTRAVENOUS; SUBCUTANEOUS at 06:17

## 2022-11-18 RX ADMIN — PANTOPRAZOLE SODIUM 40 MG: 40 TABLET, DELAYED RELEASE ORAL at 05:30

## 2022-11-18 RX ADMIN — MAGNESIUM SULFATE IN DEXTROSE 1 G: 10 INJECTION, SOLUTION INTRAVENOUS at 05:30

## 2022-11-18 RX ADMIN — Medication 81 MG: at 17:49

## 2022-11-18 RX ADMIN — HYDROMORPHONE HYDROCHLORIDE 0.25 MG: 1 INJECTION, SOLUTION INTRAMUSCULAR; INTRAVENOUS; SUBCUTANEOUS at 04:20

## 2022-11-18 RX ADMIN — MILRINONE LACTATE 0.12 MCG/KG/MIN: 0.2 INJECTION, SOLUTION INTRAVENOUS at 03:06

## 2022-11-18 RX ADMIN — SENNOSIDES AND DOCUSATE SODIUM 2 TABLET: 50; 8.6 TABLET ORAL at 20:02

## 2022-11-18 RX ADMIN — MUPIROCIN 1 APPLICATION: 20 OINTMENT TOPICAL at 20:02

## 2022-11-18 RX ADMIN — CEFAZOLIN 2 G: 2 INJECTION, POWDER, FOR SOLUTION INTRAMUSCULAR; INTRAVENOUS at 16:00

## 2022-11-18 RX ADMIN — POTASSIUM CHLORIDE 10 MEQ: 7.46 INJECTION, SOLUTION INTRAVENOUS at 02:16

## 2022-11-18 NOTE — PROGRESS NOTES
Referring Provider: Femi Henderson MD    Reason for follow-up:  Status post mitral and tricuspid valve repair and left atrial appendage closure  Preoperative  Significant mitral and tricuspid regurgitation.     Patient Care Team:  Jaret Castellanos MD as PCP - General (Family Medicine)  Felipe Garcia MD as Consulting Physician (Cardiology)    Subjective .      ROS    Patient is intubated but awake and comfortable on the respirator.  Patient is being weaned off of respirator    History  Past Medical History:   Diagnosis Date   • Abnormal ECG June 2022   • Allergic 01[birth year]01/1952    Nasal alergies   • Arrhythmia 2004    Dr Youngblood examined me and said i was ok.   • Asthma    • Benign prostatic hyperplasia 11/14/2018   • Cataract 01/01/2014    Surgery. Caused detached retina of right eye 20/60 repair   • Colon polyp 01/01/2018    Found and removed last colon eca.q   • Gastroesophageal reflux disease 03/20/2014   • Glaucoma 2003    Normal pressures. Have low pressure glaucoma   • Headache 01/01/2015    Have shimmering in both eyes intermittantly. No headaches   • Heart murmur May 2022    During wellness exam   • Heart valve disease July 2022    During Echo   • HL (hearing loss) 01/01/2012    Have hearing aids   • Hypertension 12/02/2011   • Infectious viral hepatitis 1954    Yellow jaundis as child   • Insomnia 12/02/2011   • Mitral valve prolapse June 2022   • Mood disorder (HCC) 09/19/2013   • Polyosteoarthritis 12/02/2011   • Prostate Cancer Jan22 January 2022   • Spinal stenosis 12/02/2011   • Visual impairment 01/01/1955    Nearsighted corrected glasses   • Vitamin D deficiency 05/23/2018       Past Surgical History:   Procedure Laterality Date   • ADENOIDECTOMY  1954    As child   • APPENDECTOMY  1956    Surgery   • CARDIAC CATHETERIZATION N/A 9/30/2022    Procedure: Right and Left Heart Cath with Coronar Angiography;  Surgeon: Felipe Garcia MD;  Location: [facility] CATH INVASIVE LOCATION;  Service:  Cardiovascular;  Laterality: N/A;   • COLONOSCOPY  01/01/1997    Regularly scheduled   • EYE SURGERY  01/01/2010    Reattached right retina   • SPINE SURGERY  01/01/1971    2 lumbar 1 cervical   • TONSILLECTOMY  01/01/1950    Childhood       Family History   Problem Relation Age of Onset   • Arthritis Father    • Cancer Father         Prostate   • Arrhythmia Father         Congentive heart failure   • Diabetes Mother         Adult diabetes   • Early death Mother         Kidneys failed after giving birth-diabetes complications   • Heart disease Mother         Adult Diabetes       Social History     Tobacco Use   • Smoking status: Former     Packs/day: 0.00     Years: 0.50     Pack years: 0.00     Types: Cigarettes   • Smokeless tobacco: Never   • Tobacco comments:     Tried tobacco as child   Vaping Use   • Vaping Use: Never used   Substance Use Topics   • Alcohol use: Not Currently     Alcohol/week: 1.0 standard drink     Types: 1 Cans of beer per week     Comment: Not a regular drinker. Occasionly have a glass of wine or be   • Drug use: Never        Medications Prior to Admission   Medication Sig Dispense Refill Last Dose   • allopurinol (ZYLOPRIM) 100 MG tablet TAKE 1 TABLET BY MOUTH DAILY 30 tablet 3 11/16/2022   • amLODIPine (NORVASC) 10 MG tablet TAKE 1 TABLET BY MOUTH EVERY DAY (Patient taking differently: Take 10 mg by mouth every night at bedtime.) 90 tablet 0 11/16/2022 at 2100   • DULoxetine (CYMBALTA) 60 MG capsule Take 1 capsule by mouth Daily for 90 days. 90 capsule 3 11/16/2022   • famotidine (PEPCID) 20 MG tablet Take 20 mg by mouth 2 (Two) Times a Day As Needed for Heartburn.   11/16/2022   • fluticasone (FLONASE) 50 MCG/ACT nasal spray 2 sprays into the nostril(s) as directed by provider Daily.   11/16/2022   • melatonin 5 MG tablet tablet Take 5 mg by mouth At Night As Needed (sleep).   11/16/2022   • mupirocin (BACTROBAN) 2 % ointment apply inside each nostril twice daily as directed prior to  procedure 22 g 0 11/16/2022   • acetaminophen (TYLENOL) 500 MG tablet Take 500 mg by mouth Every 6 (Six) Hours As Needed for Mild Pain.   11/15/2022   • aspirin 325 MG tablet Take 325 mg by mouth Every 6 (Six) Hours As Needed for Mild Pain.   11/3/2022   • aspirin-acetaminophen-caffeine (EXCEDRIN MIGRAINE) 250-250-65 MG per tablet Take 2 tablets by mouth Every 6 (Six) Hours As Needed for Headache.   11/10/2022   • aspirin-sod bicarb-citric acid (STEFANIE-SELTZER) 325 MG effervescent tablet Take 325 mg by mouth Every 6 (Six) Hours As Needed for Headache or Mild Pain.   11/3/2022   • fexofenadine (ALLEGRA) 180 MG tablet Take 180 mg by mouth Daily As Needed.   11/14/2022   • ibuprofen (ADVIL,MOTRIN) 400 MG tablet Take 400 mg by mouth Every 6 (Six) Hours As Needed for Mild Pain.   11/3/2022   • Leuprolide Mesylate, 6 Month, (CAMCEVI SC) Every 6 months injection, due in December      • losartan (COZAAR) 50 MG tablet TAKE 1 TABLET BY MOUTH DAILY 90 tablet 0 11/15/2022   • zolpidem (AMBIEN) 10 MG tablet Take 0.5 tablets by mouth At Night As Needed.   10/17/2022       Allergies  Morphine    Scheduled Meds:aspirin, 81 mg, Oral, Daily  atorvastatin, 40 mg, Oral, Nightly  ceFAZolin, 2 g, Intravenous, Q8H  chlorhexidine, 15 mL, Mouth/Throat, Q12H  enoxaparin, 40 mg, Subcutaneous, Daily  magnesium sulfate, 1 g, Intravenous, Q8H  mupirocin, 1 application, Each Nare, BID  pantoprazole, 40 mg, Oral, Q AM  polyethylene glycol, 17 g, Oral, BID  senna-docusate sodium, 2 tablet, Oral, BID      Continuous Infusions:dexmedetomidine, 0.2-1.5 mcg/kg/hr, Last Rate: Stopped (11/17/22 2110)  EPINEPHrine, 0.02 mcg/kg/min, Last Rate: Stopped (11/18/22 0230)  insulin, 0-100 Units/hr, Last Rate: 1.6 Units/hr (11/18/22 0418)  milrinone, 0.25-0.75 mcg/kg/min, Last Rate: 0.125 mcg/kg/min (11/18/22 0306)  niCARdipine, 5-15 mg/hr  nitroglycerin, 5-50 mcg/min  norepinephrine, 0.02-0.06 mcg/kg/min, Last Rate: Stopped (11/18/22 0100)  sodium chloride, 30  "mL/hr, Last Rate: 30 mL/hr (11/17/22 1822)      PRN Meds:.•  acetaminophen **OR** acetaminophen **OR** acetaminophen  •  dextrose  •  dextrose  •  glucagon (human recombinant)  •  HYDROcodone-acetaminophen  •  HYDROmorphone **AND** naloxone  •  meperidine  •  niCARdipine  •  nitroglycerin  •  norepinephrine  •  ondansetron  •  potassium chloride **OR** potassium chloride  •  potassium chloride **OR** potassium chloride    Objective     VITAL SIGNS  Vitals:    11/18/22 0400 11/18/22 0437 11/18/22 0500 11/18/22 0600   BP:       BP Location:       Patient Position:       Pulse: 80 80 80 80   Resp:       Temp: 98.1 °F (36.7 °C) 98.1 °F (36.7 °C) 98.2 °F (36.8 °C) 99 °F (37.2 °C)   TempSrc:       SpO2: 100% 100% 100% 100%   Weight:       Height:           Flowsheet Rows    Flowsheet Row First Filed Value   Admission Height 182.9 cm (72\") Documented at 11/17/2022 0739   Admission Weight 107 kg (236 lb) Documented at 11/17/2022 0739            Intake/Output Summary (Last 24 hours) at 11/18/2022 0625  Last data filed at 11/18/2022 0600  Gross per 24 hour   Intake 4880 ml   Output 4955 ml   Net -75 ml        TELEMETRY:  Atrial paced ventricular sensed rhythm (epicardial)    Physical Exam:  The patient is intubated and is on the respirator.  Awake and alert and not in distress.  Vital signs as noted above.  Head and neck revealed no carotid bruits or jugular venous distention.  No thyromegaly or lymphadenopathy is present  Lungs clear.  No wheezing.  Breath sounds are normal bilaterally.  Heart normal first and second heart sounds.  No murmur. No precordial rub is present.  No gallop is present.  Abdomen soft and nontender.  No organomegaly is present.  Extremities with good peripheral pulses without any pedal edema.  Skin warm and dry.  Visible incisions are looking well.  Musculoskeletal system is grossly normal  CNS grossly normal      Results Review:   I reviewed the patient's new clinical results.  Lab Results (last 24 " hours)     Procedure Component Value Units Date/Time    POC Glucose Once [[id number]]  (Abnormal) Collected: 11/18/22 0612    Specimen: Blood Updated: 11/18/22 0613     Glucose 138 mg/dL      Comment: Serial Number: [id number]:  [id number]       POC Glucose Once [[id number]]  (Abnormal) Collected: 11/18/22 0536    Specimen: Blood Updated: 11/18/22 0541     Glucose 147 mg/dL      Comment: Serial Number: [id number]:  [id number]       POCT Electrolytes +HGB +HCT [[id number]]  (Abnormal) Collected: 11/18/22 0536    Specimen: Blood Updated: 11/18/22 0541     Sodium 142 mmol/L      POC Potassium 3.9 mmol/L      Ionized Calcium 1.12 mmol/L      Comment: Serial Number: [id number]:  [id number]        Glucose 147 mg/dL      Hematocrit 27 %      Hemoglobin 9.1 g/dL     Blood Gas, Arterial - [[id number]]  (Abnormal) Collected: 11/18/22 0536    Specimen: Arterial Blood Updated: 11/18/22 0541     Site Arterial Line     Blaise's Test N/A     pH, Arterial 7.460 pH units      pCO2, Arterial 26.0 mm Hg      pO2, Arterial 126.4 mm Hg      HCO3, Arterial 18.5 mmol/L      Base Excess, Arterial -4.4 mmol/L      Comment: Serial Number: [id number]:  [id number]        O2 Saturation, Arterial 99.1 %      Barometric Pressure for Blood Gas --     Comment: N/A        Modality Adult Vent     FIO2 30 %      Ventilator Mode ;AC     Set Tidal Volume 700     PEEP 5     Hemodilution No     Respiratory Rate 18    Calcium, Ionized [[id number]]  (Abnormal) Collected: 11/18/22 0350    Specimen: Blood Updated: 11/18/22 0426     Ionized Calcium 1.09 mmol/L     Renal Function Panel [[id number]]  (Abnormal) Collected: 11/18/22 0350    Specimen: Blood Updated: 11/18/22 0424     Glucose 126 mg/dL      BUN 21 mg/dL      Creatinine 0.97 mg/dL      Sodium 145 mmol/L      Potassium 3.0 mmol/L      Chloride 116 mmol/L      CO2 19.0 mmol/L      Calcium 7.1 mg/dL      Albumin 3.60 g/dL      Phosphorus 4.4 mg/dL      Anion Gap 10.0 mmol/L      BUN/Creatinine Ratio 21.6      eGFR 81.4 mL/min/1.73      Comment: National Kidney Foundation and American Society of Nephrology (ASN) Task Force recommended calculation based on the Chronic Kidney Disease Epidemiology Collaboration (CKD-EPI) equation refit without adjustment for race.       Narrative:      GFR Normal >60  Chronic Kidney Disease <60  Kidney Failure <15    The GFR formula is only valid for adults with stable renal function between ages 18 and 70.    Magnesium [[id number]]  (Normal) Collected: 11/18/22 0350    Specimen: Blood Updated: 11/18/22 0421     Magnesium 1.9 mg/dL     POC Glucose Once [[id number]]  (Abnormal) Collected: 11/18/22 0417    Specimen: Blood Updated: 11/18/22 0419     Glucose 124 mg/dL      Comment: Serial Number: [id number]:  [id number]       Protime-INR [[id number]]  (Abnormal) Collected: 11/18/22 0350    Specimen: Blood Updated: 11/18/22 0414     Protime 11.9 Seconds      INR 1.17    CBC & Differential [[id number]]  (Abnormal) Collected: 11/18/22 0350    Specimen: Blood Updated: 11/18/22 0412    Narrative:      The following orders were created for panel order CBC & Differential.  Procedure                               Abnormality         Status                     ---------                               -----------         ------                     CBC Auto Differential[[id number]]        Abnormal            Final result                 Please view results for these tests on the individual orders.    CBC Auto Differential [[id number]]  (Abnormal) Collected: 11/18/22 0350    Specimen: Blood Updated: 11/18/22 0412     WBC 7.10 10*3/mm3      RBC 2.76 10*6/mm3      Hemoglobin 8.5 g/dL      Comment: Result checked          Hematocrit 24.8 %      MCV 89.9 fL      MCH 30.7 pg      MCHC 34.2 g/dL      RDW 14.2 %      RDW-SD 46.8 fl      MPV 7.0 fL      Platelets 62 10*3/mm3      Neutrophil % 93.1 %      Lymphocyte % 1.5 %      Monocyte % 5.2 %      Eosinophil % 0.0 %      Basophil % 0.2 %      Neutrophils,  Absolute 6.60 10*3/mm3      Lymphocytes, Absolute 0.10 10*3/mm3      Monocytes, Absolute 0.40 10*3/mm3      Eosinophils, Absolute 0.00 10*3/mm3      Basophils, Absolute 0.00 10*3/mm3      nRBC 0.0 /100 WBC     POC Glucose Once [[id number]]  (Abnormal) Collected: 11/18/22 0348    Specimen: Blood Updated: 11/18/22 0354     Glucose 149 mg/dL      Comment: Serial Number: [id number]:  [id number]       POCT Electrolytes +HGB +HCT [[id number]]  (Abnormal) Collected: 11/18/22 0348    Specimen: Blood Updated: 11/18/22 0354     Sodium 142 mmol/L      POC Potassium 3.5 mmol/L      Ionized Calcium 1.25 mmol/L      Comment: Serial Number: [id number]:  [id number]        Glucose 149 mg/dL      Hematocrit 27 %      Hemoglobin 9.1 g/dL     Blood Gas, Arterial - [[id number]]  (Abnormal) Collected: 11/18/22 0348    Specimen: Arterial Blood Updated: 11/18/22 0354     Site Arterial Line     Blaise's Test N/A     pH, Arterial 7.294 pH units      pCO2, Arterial 46.8 mm Hg      pO2, Arterial 118.3 mm Hg      HCO3, Arterial 22.7 mmol/L      Base Excess, Arterial -3.7 mmol/L      Comment: Serial Number: [id number]:  [id number]        O2 Saturation, Arterial 98.1 %      Barometric Pressure for Blood Gas --     Comment: N/A        Modality Adult Vent     FIO2 30 %      Ventilator Mode ;CPAP/PS     PEEP 5     PSV 10 cmH2O      Hemodilution No    POC Glucose Once [[id number]]  (Abnormal) Collected: 11/18/22 0257    Specimen: Blood Updated: 11/18/22 0301     Glucose 164 mg/dL      Comment: Serial Number: [id number]:  [id number]       POCT Electrolytes +HGB +HCT [[id number]]  (Abnormal) Collected: 11/18/22 0257    Specimen: Blood Updated: 11/18/22 0301     Sodium 141 mmol/L      POC Potassium 3.4 mmol/L      Ionized Calcium 1.25 mmol/L      Comment: Serial Number: [id number]:  [id number]        Glucose 164 mg/dL      Hematocrit 28 %      Hemoglobin 9.4 g/dL     Blood Gas, Arterial - [[id number]]  (Abnormal) Collected: 11/18/22 0257    Specimen: Arterial Blood  Updated: 11/18/22 0301     Site Arterial Line     Blaise's Test N/A     pH, Arterial 7.364 pH units      pCO2, Arterial 36.8 mm Hg      pO2, Arterial 172.6 mm Hg      HCO3, Arterial 21.0 mmol/L      Base Excess, Arterial -4.0 mmol/L      Comment: Serial Number: [id number]:  [id number]        O2 Saturation, Arterial 99.5 %      Barometric Pressure for Blood Gas --     Comment: N/A        Modality Adult Vent     FIO2 40 %      Ventilator Mode ;AC     Set Tidal Volume 700     PEEP 5     Hemodilution No     Respiratory Rate 18    POC Glucose Once [[id number]]  (Abnormal) Collected: 11/18/22 0225    Specimen: Blood Updated: 11/18/22 0235     Glucose 153 mg/dL      Comment: Serial Number: [id number]:  [id number]       Potassium [[id number]]  (Abnormal) Collected: 11/18/22 0057    Specimen: Blood Updated: 11/18/22 0145     Potassium 3.0 mmol/L      Comment: Result checked         POC Glucose Once [[id number]]  (Abnormal) Collected: 11/18/22 0106    Specimen: Blood Updated: 11/18/22 0110     Glucose 163 mg/dL      Comment: Serial Number: [id number]:  [id number]       Blood Gas, Arterial - [[id number]]  (Abnormal) Collected: 11/18/22 0046    Specimen: Arterial Blood Updated: 11/18/22 0102     Site Arterial Line     Blaise's Test N/A     pH, Arterial 7.324 pH units      pCO2, Arterial 47.6 mm Hg      pO2, Arterial 159.5 mm Hg      HCO3, Arterial 24.8 mmol/L      Base Excess, Arterial -1.5 mmol/L      Comment: Serial Number: [id number]:  [id number]        O2 Saturation, Arterial 99.3 %      Barometric Pressure for Blood Gas --     Comment: N/A        Modality Adult Vent     FIO2 40 %      PEEP 5     PSV 10 cmH2O      Hemodilution No    POC Glucose Once [[id number]]  (Abnormal) Collected: 11/18/22 0046    Specimen: Blood Updated: 11/18/22 0054     Glucose 181 mg/dL      Comment: Serial Number: [id number]:  [id number]       POCT Electrolytes +HGB +HCT [[id number]]  (Abnormal) Collected: 11/18/22 0046    Specimen: Blood Updated:  11/18/22 0054     Sodium 143 mmol/L      POC Potassium 2.9 mmol/L      Ionized Calcium 1.30 mmol/L      Comment: Serial Number: [id number]:  [id number]        Glucose 181 mg/dL      Hematocrit 29 %      Hemoglobin 9.9 g/dL     Blood Gas, Arterial - [[id number]]  (Abnormal) Collected: 11/18/22 0005    Specimen: Arterial Blood Updated: 11/18/22 0038     Site Arterial Line     Blaise's Test N/A     pH, Arterial 7.367 pH units      pCO2, Arterial 36.2 mm Hg      pO2, Arterial 144.2 mm Hg      HCO3, Arterial 20.8 mmol/L      Base Excess, Arterial -4.1 mmol/L      Comment: Serial Number: [id number]:  [id number]        O2 Saturation, Arterial 99.2 %      Barometric Pressure for Blood Gas --     Comment: N/A        Modality Adult Vent     FIO2 40 %      Set Tidal Volume 700     PEEP 5     Hemodilution Yes     Respiratory Rate 18    POC Glucose Once [[id number]]  (Abnormal) Collected: 11/18/22 0005    Specimen: Blood Updated: 11/18/22 0010     Glucose 199 mg/dL      Comment: Serial Number: [id number]:  [id number]       POCT Electrolytes +HGB +HCT [[id number]]  (Abnormal) Collected: 11/18/22 0005    Specimen: Blood Updated: 11/18/22 0010     Sodium 142 mmol/L      POC Potassium 2.9 mmol/L      Ionized Calcium 1.27 mmol/L      Comment: Serial Number: [id number]:  [id number]        Glucose 199 mg/dL      Hematocrit 30 %      Hemoglobin 10.0 g/dL     POC Glucose Once [[id number]]  (Abnormal) Collected: 11/17/22 2327    Specimen: Blood Updated: 11/17/22 2329     Glucose 202 mg/dL      Comment: Serial Number: [id number]:  [id number]       POC Glucose Once [[id number]]  (Abnormal) Collected: 11/17/22 2148    Specimen: Blood Updated: 11/17/22 2154     Glucose 183 mg/dL      Comment: Serial Number: [id number]:  [id number]       POCT Electrolytes +HGB +HCT [[id number]]  (Abnormal) Collected: 11/17/22 2148    Specimen: Blood Updated: 11/17/22 2154     Sodium 140 mmol/L      POC Potassium 3.2 mmol/L      Ionized Calcium 1.30 mmol/L       Comment: Serial Number: [id number]:  [id number]        Glucose 183 mg/dL      Hematocrit 28 %      Hemoglobin 9.4 g/dL     Blood Gas, Arterial - [[id number]]  (Abnormal) Collected: 11/17/22 2148    Specimen: Arterial Blood Updated: 11/17/22 2154     Site Arterial Line     Blaise's Test N/A     pH, Arterial 7.263 pH units      pCO2, Arterial 48.3 mm Hg      pO2, Arterial 111.9 mm Hg      HCO3, Arterial 21.8 mmol/L      Base Excess, Arterial -5.1 mmol/L      Comment: Serial Number: [id number]:  [id number]        O2 Saturation, Arterial 97.5 %      Barometric Pressure for Blood Gas --     Comment: N/A        Modality Adult Vent     FIO2 40 %      Ventilator Mode ;CPAP/PS     PEEP 5     PSV 10 cmH2O      Hemodilution No    POC Glucose Once [[id number]]  (Abnormal) Collected: 11/17/22 2002    Specimen: Blood Updated: 11/17/22 2006     Glucose 142 mg/dL      Comment: Serial Number: [id number]:  [id number]       POCT Electrolytes +HGB +HCT [[id number]]  (Abnormal) Collected: 11/17/22 2002    Specimen: Blood Updated: 11/17/22 2006     Sodium 141 mmol/L      POC Potassium 3.4 mmol/L      Ionized Calcium 1.28 mmol/L      Comment: Serial Number: [id number]:  [id number]        Glucose 142 mg/dL      Hematocrit 27 %      Hemoglobin 9.0 g/dL     Blood Gas, Arterial - [[id number]]  (Abnormal) Collected: 11/17/22 2002    Specimen: Arterial Blood Updated: 11/17/22 2006     Site Arterial Line     Blaise's Test N/A     pH, Arterial 7.335 pH units      pCO2, Arterial 39.3 mm Hg      pO2, Arterial 107.6 mm Hg      HCO3, Arterial 21.0 mmol/L      Base Excess, Arterial -4.5 mmol/L      Comment: Serial Number: [id number]:  [id number]        O2 Saturation, Arterial 97.8 %      Barometric Pressure for Blood Gas --     Comment: N/A        Modality Adult Vent     FIO2 40 %      Ventilator Mode ;AC     Set Tidal Volume 700     PEEP 5     Hemodilution No     Respiratory Rate 18    POC Glucose Once [[id number]]  (Abnormal) Collected: 11/17/22 1916     Specimen: Blood Updated: 11/17/22 1921     Glucose 126 mg/dL      Comment: Serial Number: [id number]:  [id number]       POCT Electrolytes +HGB +HCT [[id number]]  (Abnormal) Collected: 11/17/22 1916    Specimen: Blood Updated: 11/17/22 1921     Sodium 141 mmol/L      POC Potassium 3.6 mmol/L      Ionized Calcium 1.29 mmol/L      Comment: Serial Number: [id number]:  [id number]        Glucose 126 mg/dL      Hematocrit 28 %      Hemoglobin 9.4 g/dL     Blood Gas, Arterial - [[id number]]  (Abnormal) Collected: 11/17/22 1916    Specimen: Arterial Blood Updated: 11/17/22 1921     Site Arterial Line     Blaise's Test N/A     pH, Arterial 7.303 pH units      pCO2, Arterial 45.2 mm Hg      pO2, Arterial 133.4 mm Hg      HCO3, Arterial 22.4 mmol/L      Base Excess, Arterial -3.8 mmol/L      Comment: Serial Number: [id number]:  [id number]        O2 Saturation, Arterial 98.7 %      Barometric Pressure for Blood Gas --     Comment: N/A        Modality Adult Vent     FIO2 50 %      Ventilator Mode ;AC     Set Tidal Volume 700     PEEP 5     Hemodilution No     Respiratory Rate 14    Calcium, Ionized [[id number]]  (Normal) Collected: 11/17/22 1748    Specimen: Blood Updated: 11/17/22 1833     Ionized Calcium 1.30 mmol/L     POC Glucose Once [[id number]]  (Abnormal) Collected: 11/17/22 1822    Specimen: Blood Updated: 11/17/22 1828     Glucose 117 mg/dL      Comment: Serial Number: [id number]:  [id number]       POCT Electrolytes +HGB +HCT [[id number]]  (Abnormal) Collected: 11/17/22 1822    Specimen: Blood Updated: 11/17/22 1828     Sodium 140 mmol/L      POC Potassium 3.8 mmol/L      Ionized Calcium 1.27 mmol/L      Comment: Serial Number: [id number]:  [id number]        Glucose 117 mg/dL      Hematocrit 30 %      Hemoglobin 10.1 g/dL     Blood Gas, Arterial - [[id number]]  (Abnormal) Collected: 11/17/22 1822    Specimen: Arterial Blood Updated: 11/17/22 1828     Site Arterial Line     Blaise's Test N/A     pH, Arterial 7.326 pH units      pCO2,  Arterial 40.4 mm Hg      pO2, Arterial 153.7 mm Hg      HCO3, Arterial 21.1 mmol/L      Base Excess, Arterial -4.6 mmol/L      Comment: Serial Number: [id number]:  [id number]        O2 Saturation, Arterial 99.2 %      Barometric Pressure for Blood Gas --     Comment: N/A        Modality Adult Vent     FIO2 70 %      Ventilator Mode ;AC     Set Tidal Volume 800     PEEP 8     Hemodilution Yes     Respiratory Rate 14    Renal Function Panel [[id number]]  (Abnormal) Collected: 11/17/22 1748    Specimen: Blood Updated: 11/17/22 1816     Glucose 106 mg/dL      BUN 24 mg/dL      Creatinine 1.07 mg/dL      Sodium 140 mmol/L      Potassium 4.1 mmol/L      Comment: Slight hemolysis detected by analyzer. Results may be affected.        Chloride 107 mmol/L      CO2 22.0 mmol/L      Calcium 9.5 mg/dL      Albumin 4.10 g/dL      Phosphorus 5.4 mg/dL      Anion Gap 11.0 mmol/L      BUN/Creatinine Ratio 22.4     eGFR 72.4 mL/min/1.73      Comment: National Kidney Foundation and American Society of Nephrology (ASN) Task Force recommended calculation based on the Chronic Kidney Disease Epidemiology Collaboration (CKD-EPI) equation refit without adjustment for race.       Narrative:      GFR Normal >60  Chronic Kidney Disease <60  Kidney Failure <15    The GFR formula is only valid for adults with stable renal function between ages 18 and 70.    aPTT [[id number]]  (Abnormal) Collected: 11/17/22 1750    Specimen: Blood Updated: 11/17/22 1808     PTT 23.6 seconds     Protime-INR [[id number]]  (Abnormal) Collected: 11/17/22 1750    Specimen: Blood Updated: 11/17/22 1808     Protime 11.9 Seconds      INR 1.17    CBC & Differential [[id number]]  (Abnormal) Collected: 11/17/22 1748    Specimen: Blood Updated: 11/17/22 1758    Narrative:      The following orders were created for panel order CBC & Differential.  Procedure                               Abnormality         Status                     ---------                                -----------         ------                     CBC Auto Differential[[id number]]        Abnormal            Final result                 Please view results for these tests on the individual orders.    CBC Auto Differential [[id number]]  (Abnormal) Collected: 11/17/22 1748    Specimen: Blood Updated: 11/17/22 1758     WBC 2.80 10*3/mm3      RBC 3.53 10*6/mm3      Hemoglobin 10.7 g/dL      Hematocrit 31.5 %      MCV 89.1 fL      MCH 30.3 pg      MCHC 34.0 g/dL      RDW 14.0 %      RDW-SD 45.9 fl      MPV 6.5 fL      Platelets 84 10*3/mm3      Neutrophil % 93.6 %      Lymphocyte % 4.2 %      Monocyte % 1.4 %      Eosinophil % 0.7 %      Basophil % 0.1 %      Neutrophils, Absolute 2.70 10*3/mm3      Lymphocytes, Absolute 0.10 10*3/mm3      Monocytes, Absolute 0.00 10*3/mm3      Eosinophils, Absolute 0.00 10*3/mm3      Basophils, Absolute 0.00 10*3/mm3      nRBC 0.2 /100 WBC     aPTT [[id number]]  (Abnormal) Collected: 11/17/22 1447    Specimen: Blood Updated: 11/17/22 1745     PTT 23.9 seconds     Protime-INR [[id number]]  (Abnormal) Collected: 11/17/22 1447    Specimen: Blood Updated: 11/17/22 1745     Protime 11.9 Seconds      INR 1.17    aPTT [[id number]]  (Normal) Collected: 11/17/22 1624    Specimen: Blood, Arterial Line Updated: 11/17/22 1648     PTT 25.6 seconds     Fibrinogen [[id number]]  (Abnormal) Collected: 11/17/22 1624    Specimen: Blood, Arterial Line Updated: 11/17/22 1648     Fibrinogen 172 mg/dL     Protime-INR [[id number]]  (Abnormal) Collected: 11/17/22 1624    Specimen: Blood, Arterial Line Updated: 11/17/22 1648     Protime 12.7 Seconds      INR 1.25    Platelet Function ADP [[id number]]  (Normal) Collected: 11/17/22 1624    Specimen: Blood, Arterial Line Updated: 11/17/22 1635     ADP Aggregation, % Platelet 95 %     CBC (No Diff) [[id number]]  (Abnormal) Collected: 11/17/22 1624    Specimen: Blood, Arterial Line Updated: 11/17/22 1635     WBC 4.70 10*3/mm3      RBC 3.25 10*6/mm3      Hemoglobin 10.2 g/dL       Hematocrit 29.8 %      MCV 91.7 fL      MCH 31.3 pg      MCHC 34.2 g/dL      RDW 14.4 %      RDW-SD 45.9 fl      MPV 6.1 fL      Platelets 89 10*3/mm3     POC Glucose Once [[id number]]  (Abnormal) Collected: 11/17/22 0806    Specimen: Blood Updated: 11/17/22 0807     Glucose 109 mg/dL      Comment: Serial Number: [id number]:  [id number]             Imaging Results (Last 24 Hours)     Procedure Component Value Units Date/Time    XR Chest 1 View [[id number]] Resulted: 11/18/22 0531     Updated: 11/18/22 0532    XR Chest 1 View [[id number]] Collected: 11/17/22 1811     Updated: 11/17/22 1816    Narrative:         DATE OF EXAM:   11/17/2022 5:30 PM     PROCEDURE:   XR CHEST 1 VW-     INDICATIONS:   Post-Op Check Line & Tube Placement; I36.1-Nonrheumatic tricuspid  (valve) insufficiency; I34.0-Nonrheumatic mitral (valve) insufficiency     COMPARISON:  11/16/2022 and prior     TECHNIQUE:   Portable Chest     FINDINGS:      Study is limited by overlying support and monitoring apparatus.     Endotracheal tube is approximately 5 cm above the alex. NG tube  extends below the diaphragm. The tip is not visualized.     Right-sided vascular sheath with a Una-Estelle catheter extending to the  expected position of the proximal right main pulmonary artery noted.     Patient is status post median sternotomy and CABG.     There appears be a mediastinal drain in place. Heart size is stable.  Pulmonary vascularity is mildly congested.     No significant pneumothorax noted. Trace amount of pneumomediastinum is  questioned. Lung volumes are low with vascular crowding and dependent  atelectasis.        Impression:      IMPRESSION :      1. Lines and tubes as above  2. Mild pulmonary vascular congestion and dependent opacities  accentuated by low lung volumes.  3. Trace amount of pneumomediastinum compatible with recent  postoperative state[     Electronically Signed By-Sadiq Jiménez On:11/17/2022 6:14 PM  This report was  finalized on [id number] by  Sadiq Jiménez, .      LAB RESULTS (LAST 7 DAYS)    CBC  Results from last 7 days   Lab Units 11/18/22  0536 11/18/22  0350 11/18/22  0348 11/18/22  0257 11/18/22  0046 11/18/22  0005 11/17/22  2148 11/17/22  1822 11/17/22  1748 11/17/22  1624 11/17/22  1300 11/16/22  0828   WBC 10*3/mm3  --  7.10  --   --   --   --   --   --  2.80* 4.70  --  4.80   RBC 10*6/mm3  --  2.76*  --   --   --   --   --   --  3.53* 3.25*  --  4.69   HEMOGLOBIN g/dL  --  8.5*  --   --   --   --   --   --  10.7* 10.2*  --  14.3   HEMOGLOBIN, POC g/dL 9.1*  --  9.1* 9.4* 9.9* 10.0* 9.4*   < >  --   --    < >  --    HEMATOCRIT %  --  24.8*  --   --   --   --   --   --  31.5* 29.8*  --  41.9   HEMATOCRIT POC % 27*  --  27* 28* 29* 30* 28*   < >  --   --    < >  --    MCV fL  --  89.9  --   --   --   --   --   --  89.1 91.7  --  89.4   PLATELETS 10*3/mm3  --  62*  --   --   --   --   --   --  84* 89*  --  92*    < > = values in this interval not displayed.       BMP  Results from last 7 days   Lab Units 11/18/22  0350 11/18/22  0057 11/17/22 1748 11/16/22 0828   SODIUM mmol/L 145  --  140 138   POTASSIUM mmol/L 3.0* 3.0* 4.1 4.4   CHLORIDE mmol/L 116*  --  107 101   CO2 mmol/L 19.0*  --  22.0 27.0   BUN mg/dL 21  --  24* 25*   CREATININE mg/dL 0.97  --  1.07 0.85   GLUCOSE mg/dL 126*  --  106* 120*   MAGNESIUM mg/dL 1.9  --   --   --    PHOSPHORUS mg/dL 4.4  --  5.4*  --        CMP   Results from last 7 days   Lab Units 11/18/22  0350 11/18/22  0057 11/17/22  1748 11/16/22  0828   SODIUM mmol/L 145  --  140 138   POTASSIUM mmol/L 3.0* 3.0* 4.1 4.4   CHLORIDE mmol/L 116*  --  107 101   CO2 mmol/L 19.0*  --  22.0 27.0   BUN mg/dL 21  --  24* 25*   CREATININE mg/dL 0.97  --  1.07 0.85   GLUCOSE mg/dL 126*  --  106* 120*   ALBUMIN g/dL 3.60  --  4.10 4.40   BILIRUBIN mg/dL  --   --   --  0.3   ALK PHOS U/L  --   --   --  100   AST (SGOT) U/L  --   --   --  17   ALT (SGPT) U/L  --   --   --  18         BNP         TROPONIN        CoAg  Results from last 7 days   Lab Units 11/18/22  0350 11/17/22  1750 11/17/22  1624 11/17/22  1447 11/16/22  0828   INR  1.17* 1.17* 1.25* 1.17* 1.01   APTT seconds  --  23.6* 25.6 23.9* 27.6       Creatinine Clearance  Estimated Creatinine Clearance: 83.2 mL/min (by C-G formula based on SCr of 0.97 mg/dL).    ABG  Results from last 7 days   Lab Units 11/18/22  0536 11/18/22  0348 11/18/22  0257 11/18/22  0046 11/18/22  0005 11/17/22  2148 11/17/22 2002   PH, ARTERIAL pH units 7.460* 7.294* 7.364 7.324* 7.367 7.263* 7.335*   PCO2, ARTERIAL mm Hg 26.0* 46.8 36.8 47.6 36.2 48.3* 39.3   PO2 ART mm Hg 126.4* 118.3* 172.6* 159.5* 144.2* 111.9* 107.6   O2 SATURATION ART % 99.1* 98.1* 99.5* 99.3* 99.2* 97.5 97.8   BASE EXCESS ART mmol/L -4.4* -3.7* -4.0* -1.5* -4.1* -5.1* -4.5*       Radiology  XR Chest 1 View    Result Date: 11/17/2022  IMPRESSION :  1. Lines and tubes as above 2. Mild pulmonary vascular congestion and dependent opacities accentuated by low lung volumes. 3. Trace amount of pneumomediastinum compatible with recent postoperative state[  Electronically Signed By-Sadiq Jiménez On:11/17/2022 6:14 PM This report was finalized on [id number] by  Sadiq Jiménez, .    XR Chest PA & Lateral    Result Date: 11/16/2022  Stable mild cardiac enlargement. No acute chest finding.  Electronically Signed By-Deann Damon MD On:11/16/2022 9:11 AM This report was finalized on [id number] by  Deann Damon MD.          EKG      I personally viewed and interpreted the patient's EKG/Telemetry data:    ECHOCARDIOGRAM:    Results for orders placed during the hospital encounter of 09/30/22    Adult Transesophageal Echo (TERRA) W/ Cont if Necessary Per Protocol    Interpretation Summary  Date of study  9/30/2022    Procedure performed  Transesophageal echocardiogram and Doppler study.    Indications  Significant mitral regurgitation  Shortness of breath    Procedure  Anesthesia was provided by  anesthesiologist with intravenous Diprivan.  TERRA probe could be passed without difficulty.  Patient tolerated the procedure well.  No complications were noted.    Results  Technically satisfactory study.  Mitral valve has severe myxomatous degeneration with multiple areas of severe prolapse and known coaptation of the mitral leaflet as well as probable ruptured chordae.  Severe mitral regurgitation is seen with multiple jets with mitral regurgitation jet occupying the entire left atrium.  Tricuspid valve is also showing myxomatous degeneration with moderate tricuspid regurgitation.  Calculated pulmonary artery pressure is 60 mmHg.  Aortic valve is thickened with adequate opening motion.  Left atrium is enlarged.  Left atrial appendage is enlarged without clot.  Left ventricle is normal in size and contractility with ejection fraction of 60%.  Right ventricle is normal in size.  Right atrium is normal in size.  Atrial septum is intact without PFO.  Aortic intimal thickening is present without clot.  No pericardial effusion is seen.    Impression  Myxomatous mitral and tricuspid valve degeneration.  Severe mitral valve prolapse involving both anterior and posterior mitral leaflets with severe mitral regurgitation with multiple jets.  Probable ruptured chordae.  In some views coaptation of the mitral valve is present.  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  Significant pulmonary hypertension.  Left atrial and left atrial appendage enlargement without clot.  Normal left ventricular size and contractility with ejection fraction of 60%.          STRESS MYOVIEW:    Cardiolite (Tc-99m Sestamibi) stress test    CARDIAC CATHETERIZATION:            OTHER:         Assessment & Plan     Principal Problem:    Nonrheumatic tricuspid valve regurgitation  Active Problems:    Nonrheumatic mitral valve regurgitation      ]]]]]]]]]]]]]]]]]]]]  Impression  ===========  -Status post mitral and tricuspid valve  repair and left atrial appendage endocardial closure--Dr. Henderson 11/17/2022    -   Preoperative increased shortness of breath and significantly increased fatigue.     -Preoperative significant mitral and tricuspid regurgitation.  Prominent posterior mitral leaflet prolapse    TERRA 9/30/2022  Myxomatous mitral and tricuspid valve degeneration.  Severe mitral valve prolapse involving both anterior and posterior mitral leaflets with severe mitral regurgitation with multiple jets.  Probable ruptured chordae.  In some views coaptation of the mitral valve is present.  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  Significant pulmonary hypertension.  Left atrial and left atrial appendage enlargement without clot.  Normal left ventricular size and contractility with ejection fraction of 60%.    Cardiac catheterization 9/30/2022   Moderate to significant pulmonary hypertension.  Severe mitral valve prolapse and mitral regurgitation and probable ruptured chordae (TERRA and cardiac cath)  Tricuspid valve prolapse.  Normal left ventricular function.  Normal coronary arteries.    Echocardiogram 9/28/2022.  Mild mitral regurgitation (regurgitation jet may be not in the field)     Echocardiogram 6/15/2022   prominent posterior mitral leaflet prolapse.  Moderate to significant mitral regurgitation.  Thickened aortic valve with adequate opening motion.  Left atrial enlargement.  Normal left ventricular size and contractility with ejection fraction of 60%.     - Premature ventricular contractions.  Occasional palpitations     - Hypertension vitamin D deficiency GERD ocular migraine.     - History of prostate cancer.  Status post radiation therapy.  Bone scan was negative for any spread.     - Status post appendectomy spine surgery adenoidectomy tonsillectomy     - Family history of prostate cancer     - Former smoker     - Allergic to morphine.  ============  Plan  ===========  Status post mitral valve repair tricuspid  valve repair and left atrial appendage closure--Dr. Henderson-11/17/2022    Respiratory status-patient is alert and is on the respirator.  Weaning off the respirator.    Rhythm-atrial paced ventricular sensed rhythm.    Hypertension-stable.    Have discussed with attending nurse for coordination of care.    Further plan will depend on patient's progress.  ]]]]]]]]]]]]]]]]              Felipe Garcia MD  11/18/22  06:25 EST

## 2022-11-18 NOTE — THERAPY EVALUATION
Acute Care - Speech Language Pathology   Swallow Initial Evaluation  [facility]     Patient Name: Clint Cee  : 1945  MRN: [id number]  Today's Date: 2022               Admit Date: 2022    Visit Dx:     ICD-10-CM ICD-9-CM   1. Nonrheumatic tricuspid valve regurgitation  I36.1 424.2   2. Nonrheumatic mitral valve regurgitation  I34.0 424.0     Patient Active Problem List   Diagnosis   • Benign prostatic hyperplasia   • Gastroesophageal reflux disease   • Hypertension   • Mood disorder (HCC)   • Scoliosis   • Spinal stenosis   • Vitamin D deficiency   • Polyosteoarthritis   • Insomnia   • Retinal detachment of right eye with single retinal tear   • Osteoarthritis of knee   • Cellulitis of face   • Prostate cancer (HCC)   • Ocular migraine   • Headache   • Gout of multiple sites   • Medicare annual wellness visit, subsequent   • Allergic   • HL (hearing loss)   • Visual impairment   • Cataract   • Colon polyp   • Heart murmur on physical examination   • Mitral valve prolapse   • Premature ventricular contractions   • Nonrheumatic mitral valve regurgitation   • Severe mitral regurgitation   • Moderate tricuspid regurgitation   • Primary hypertension   • Dyspnea on exertion   • Nonrheumatic tricuspid valve regurgitation     Past Medical History:   Diagnosis Date   • Abnormal ECG 2022   • Allergic 1954    Nasal alergies   • Arrhythmia     Dr Youngblood examined me and said i was ok.   • Asthma    • Benign prostatic hyperplasia 2018   • Cataract 2014    Surgery. Caused detached retina of right eye 60 repair   • Colon polyp 2018    Found and removed last colon [email or web address].q   • Gastroesophageal reflux disease 2014   • Glaucoma 2003    Normal pressures. Have low pressure glaucoma   • Headache 2015    Have shimmering in both eyes intermittantly. No headaches   • Heart murmur May 2022    During wellness exam   • Heart valve disease 2022    During Echo   • HL (hearing  loss) 01/01/2012    Have hearing aids   • Hypertension 12/02/2011   • Infectious viral hepatitis 1954    Yellow jaundis as child   • Insomnia 12/02/2011   • Mitral valve prolapse June 2022   • Mood disorder (HCC) 09/19/2013   • Polyosteoarthritis 12/02/2011   • Prostate Cancer Jan22 January 2022   • Spinal stenosis 12/02/2011   • Visual impairment 01/01/1955    Nearsighted corrected glasses   • Vitamin D deficiency 05/23/2018     Past Surgical History:   Procedure Laterality Date   • ADENOIDECTOMY  1954    As child   • APPENDECTOMY  1956    Surgery   • CARDIAC CATHETERIZATION N/A 9/30/2022    Procedure: Right and Left Heart Cath with Coronar Angiography;  Surgeon: Felipe Garcia MD;  Location: [facility] CATH INVASIVE LOCATION;  Service: Cardiovascular;  Laterality: N/A;   • COLONOSCOPY  01/01/1997    Regularly scheduled   • EYE SURGERY  01/01/2010    Reattached right retina   • SPINE SURGERY  01/01/1971    2 lumbar 1 cervical   • TONSILLECTOMY  01/01/1950    Childhood       SLP Recommendation and Plan  SLP Swallowing Diagnosis: functional oral phase, suspected pharyngeal dysphagia, suspected esophageal dysphagia (11/18/22 1600)  SLP Diet Recommendation: NPO, temporary alternate methods of nutrition/hydration (11/18/22 1600)     SLP Rec. for Method of Medication Administration: meds via alternate route (11/18/22 1600)        Recommended Diagnostics: reassess via clinical swallow evaluation, reassess via VFSS (MBS) (11/18/22 1600)  Swallow Criteria for Skilled Therapeutic Interventions Met: demonstrates skilled criteria (11/18/22 1600)     Rehab Potential/Prognosis, Swallowing: good, to achieve stated therapy goals (11/18/22 1600)  Therapy Frequency (Swallow): PRN (11/18/22 1600)  Predicted Duration Therapy Intervention (Days): until discharge (11/18/22 1600)         SWALLOW EVALUATION (last 72 hours)     SLP Adult Swallow Evaluation     Row Name 11/18/22 1600       Rehab Evaluation    Document Type evaluation  -LF     Subjective Information no complaints  -LF    Patient Observations alert;cooperative;agree to therapy  -LF    Patient Effort good  -LF    Symptoms Noted During/After Treatment none  -LF       General Information    Patient Profile Reviewed yes  -LF    Pertinent History Of Current Problem Pt is a 76 y/o male who is s/p MVR and TVR 11/17/22. PMHx significant for hearing loss, mood disorder, prostate cancer, and scoliosis. ST orders placed for swallowing d/t difficulty swallowing s/p extubation. Pt's family reports he is a chronic throat clearer and has severe allergies. CXR on 11/18 revealed mild right basilar airspace disease likely atelectasis.    -LF    Current Method of Nutrition NPO  -LF    Precautions/Limitations, Vision WFL;for purposes of eval  -LF    Precautions/Limitations, Hearing WFL;for purposes of eval  -LF    Prior Level of Function-Swallowing no diet consistency restrictions;esophageal concerns  -LF    Plans/Goals Discussed with patient;family;agreed upon  -LF    Barriers to Rehab medically complex  -LF       Oral Motor Structure and Function    Dentition Assessment natural, present and adequate;missing teeth  -LF    Secretion Management WNL/WFL  -LF    Mucosal Quality moist, healthy  -LF       Oral Musculature and Cranial Nerve Assessment    Oral Motor General Assessment WFL  -LF       General Eating/Swallowing Observations    Respiratory Support Currently in Use nasal cannula  -LF    O2 Liters 4L  -LF    Eating/Swallowing Skills self-fed;fed by SLP;impulsive self-feeding behaviors;rate is too fast  -LF    Positioning During Eating upright 90 degree;upright in chair  -LF    Utensils Used spoon;cup;straw  -LF    Consistencies Trialed pureed;ice chips;thin liquids;nectar/syrup-thick liquids  -LF       Clinical Swallow Eval    Oral Prep Phase WFL  -LF    Oral Transit WFL  -LF    Oral Residue WFL  -LF    Pharyngeal Phase suspected pharyngeal impairment  -LF    Esophageal Phase suspected esophageal  impairment  -LF    Clinical Swallow Evaluation Summary Pt observed w/ trials of ice chips, water by cup and straw, NTL by straw, and puree. Pt self fed majority of trials w/ intermittent feeding assistance from SLP. Oral phase characterized by adequate labial seal w/ no anterior loss. Adequate manipulation of ice chips. Oral transit WFL. Pharyngeal phase characterized by consistent throat clearing and/or coughing w/ all consistencies. Multiple swallows w/ trials of thin liquids by straw per palpation. Frequent belching w/ all trials of liquids. Intermittent wet vocal quality w/ all consistencies. O2 sats remained at  w/ PO. D/t s/s of aspiration observed at bedside, recommend pt remain NPO w/ temporary alt means for nutrition/medication until pt deemed medically stable/appropriate for instrumental assessment. Pt may have ice chips after oral care for oral gratification. Discussed results/recs w/ pt and pt's family who all expressed agreement and understanding.  -       Pharyngeal Phase Concerns    Pharyngeal Phase Concerns wet vocal quality;multiple swallows;cough;throat clear  -    Wet Vocal Quality thin  -LF    Multiple Swallows thin;nectar  -LF    Cough thin  -LF    Throat Clear thin;nectar;pudding  -LF       Esophageal Phase Concerns    Esophageal Phase Concerns belching;sensation of material sticking  -    Belching all consistencies  -       SLP Evaluation Clinical Impression    SLP Swallowing Diagnosis functional oral phase;suspected pharyngeal dysphagia;suspected esophageal dysphagia  -LF    Functional Impact risk of aspiration/pneumonia  -    Rehab Potential/Prognosis, Swallowing good, to achieve stated therapy goals  -    Swallow Criteria for Skilled Therapeutic Interventions Met demonstrates skilled criteria  -       Recommendations    Therapy Frequency (Swallow) PRN  -LF    Predicted Duration Therapy Intervention (Days) until discharge  -    SLP Diet Recommendation NPO;temporary  alternate methods of nutrition/hydration  -    Recommended Diagnostics reassess via clinical swallow evaluation;reassess via VFSS ([facility] [place])  -    Oral Care Recommendations Oral Care BID/PRN  -    SLP Rec. for Method of Medication Administration meds via alternate route  -LF       Swallow Goals (SLP)    Swallow LTGs Swallow Long Term Goal (free text)  -LF    Swallow STGs diet tolerance goal selection (SLP)  -LF    Diet Tolerance Goal Selection (SLP) Swallow Short Term Goal 1  -LF       (LTG) Swallow    (LTG) Swallow The patient will maximize swallow function for least restrictive po diet, exhibiting no complications associated with dysphagia, adequate po intake, and demonstrating independent use of safe swallow compensations.  -LF    Time Frame (Swallow Long Term Goal) by discharge  -LF       (STG) Swallow 1    (STG) Swallow 1 The patient will participate in ongoing assessment of swallow, including re-evaluation clinically and/or including instrumental assessment of swallow if indicated, to further assess swallow function in anticipation to initiate a po diet  -LF    Time Frame (Swallow Short Term Goal 1) 1 week  -LF          User Key  (r) = Recorded By, (t) = Taken By, (c) = Cosigned By    Initials Name Effective Dates     Nata Mendoza SLP 06/16/21 -                 EDUCATION  The patient has been educated in the following areas:   Dysphagia (Swallowing Impairment) Oral Care/Hydration NPO rationale.              Time Calculation:                EDWIGE Berkowitz  11/18/2022

## 2022-11-18 NOTE — PLAN OF CARE
Goal Outcome Evaluation:  Plan of Care Reviewed With: patient, spouse        Progress: no change  Outcome Evaluation: Pt is a 76 y/o male who presented to [facility] who is s/p MVR and TVR 11/17/22. PMHx significant for hearing loss, mood disorder, prostate cancer, and scoliosis. At baseline pt is independent with ADLs and does not use AD for mobility. Pt lives in multi level home with 0 [address] and 13 stairs within home. Pt educated on sternal cardiac precautions this date. Pt educated and demo HEP x1 set x10 reps in supine. Pt completes bed mobility with max A x2 secondary to BUE sternal precautions. Pt required min A for sitting balance, requiring mod verbal cues to adhere to precautions. Pt completes STS with max A this date. Pt likely to require mod-max A with ADLs at this time, requiring less assitance a pt progresses. Pt is below baseline, indicating further need for skilled OT intervention. Pt likely to return home safe with assistance and with HHOT. OT to follow while admitted [facility].

## 2022-11-18 NOTE — PLAN OF CARE
Goal Outcome Evaluation:  Plan of Care Reviewed With: patient, spouse           Outcome Evaluation: Pt is a 76 YO M POD 1 MVR. Pt states he lives wtih spouse, typically is independent wtih all ADLs, ambulation without AD and no recent falls. Pt has a flight of steps to bedroom, but can stay on first floor of home if necessary. Pt educated on sternal precautions and pt demonstrates fair understanding, pt still pretty lethargic this date but participatory. Pt spouse educated as well and demonstrates understanding. Pt stood with multiple minor instances of buckling and transferred to bedside chair. Pt likely to progress well and recommendaiton is return home with spouse following d/c.

## 2022-11-18 NOTE — THERAPY EVALUATION
Patient Name: Clint Cee  : 1945    MRN: [id number]                              Today's Date: 2022       Admit Date: 2022    Visit Dx:     ICD-10-CM ICD-9-CM   1. Nonrheumatic tricuspid valve regurgitation  I36.1 424.2   2. Nonrheumatic mitral valve regurgitation  I34.0 424.0     Patient Active Problem List   Diagnosis   • Benign prostatic hyperplasia   • Gastroesophageal reflux disease   • Hypertension   • Mood disorder (HCC)   • Scoliosis   • Spinal stenosis   • Vitamin D deficiency   • Polyosteoarthritis   • Insomnia   • Retinal detachment of right eye with single retinal tear   • Osteoarthritis of knee   • Cellulitis of face   • Prostate cancer (HCC)   • Ocular migraine   • Headache   • Gout of multiple sites   • Medicare annual wellness visit, subsequent   • Allergic   • HL (hearing loss)   • Visual impairment   • Cataract   • Colon polyp   • Heart murmur on physical examination   • Mitral valve prolapse   • Premature ventricular contractions   • Nonrheumatic mitral valve regurgitation   • Severe mitral regurgitation   • Moderate tricuspid regurgitation   • Primary hypertension   • Dyspnea on exertion   • Nonrheumatic tricuspid valve regurgitation     Past Medical History:   Diagnosis Date   • Abnormal ECG 2022   • Allergic 1954    Nasal alergies   • Arrhythmia     Dr Youngblood examined me and said i was ok.   • Asthma    • Benign prostatic hyperplasia 2018   • Cataract 2014    Surgery. Caused detached retina of right eye 20/60 repair   • Colon polyp 2018    Found and removed last colon [email or web address]   • Gastroesophageal reflux disease 2014   • Glaucoma 2003    Normal pressures. Have low pressure glaucoma   • Headache 2015    Have shimmering in both eyes intermittantly. No headaches   • Heart murmur May 2022    During wellness exam   • Heart valve disease 2022    During Echo   • HL (hearing loss) 2012    Have hearing aids   • Hypertension  12/02/2011   • Infectious viral hepatitis 1954    Yellow jaundis as child   • Insomnia 12/02/2011   • Mitral valve prolapse June 2022   • Mood disorder (HCC) 09/19/2013   • Polyosteoarthritis 12/02/2011   • Prostate Cancer Jan22 January 2022   • Spinal stenosis 12/02/2011   • Visual impairment 01/01/1955    Nearsighted corrected glasses   • Vitamin D deficiency 05/23/2018     Past Surgical History:   Procedure Laterality Date   • ADENOIDECTOMY  1954    As child   • APPENDECTOMY  1956    Surgery   • CARDIAC CATHETERIZATION N/A 9/30/2022    Procedure: Right and Left Heart Cath with Coronar Angiography;  Surgeon: Felipe Garcia MD;  Location: [facility] CATH INVASIVE LOCATION;  Service: Cardiovascular;  Laterality: N/A;   • COLONOSCOPY  01/01/1997    Regularly scheduled   • EYE SURGERY  01/01/2010    Reattached right retina   • SPINE SURGERY  01/01/1971    2 lumbar 1 cervical   • TONSILLECTOMY  01/01/1950    Childhood      General Information     Row Name 11/18/22 1627          Physical Therapy Time and Intention    Document Type evaluation  -     Mode of Treatment occupational therapy;physical therapy;co-treatment  -     Row Name 11/18/22 1627          General Information    Patient Profile Reviewed yes  -EL     Prior Level of Function independent:;all household mobility;ADL's;driving  -EL     Existing Precautions/Restrictions sternal;cardiac;fall;oxygen therapy device and L/min  -EL     Row Name 11/18/22 1627          Living Environment    People in Home spouse  -EL     Row Name 11/18/22 1627          Home Main Entrance    Number of Stairs, Main Entrance none  -EL     Row Name 11/18/22 1627          Stairs Within Home, Primary    Number of Stairs, Within Home, Primary twelve;other (see comments)  Bedroom upstairs but can on first floor  -EL     Row Name 11/18/22 1627          Cognition    Orientation Status (Cognition) oriented x 4  -EL     Row Name 11/18/22 1627          Safety Issues, Functional Mobility     Impairments Affecting Function (Mobility) balance;endurance/activity tolerance;pain  -EL           User Key  (r) = Recorded By, (t) = Taken By, (c) = Cosigned By    Initials Name Provider Type    Waldemar Blair PT Physical Therapist               Mobility     Row Name 11/18/22 1628          Bed Mobility    Bed Mobility bed mobility (all) activities  -EL     All Activities, [place] (Bed Mobility) maximum assist (25% patient effort);2 person assist;moderate assist (50% patient effort)  -     Row Name 11/18/22 1628          Bed-Chair Transfer    Bed-Chair [place] (Transfers) moderate assist (50% patient effort)  -     Row Name 11/18/22 1628          Sit-Stand Transfer    Sit-Stand [place] (Transfers) maximum assist (25% patient effort)  -EL     Comment, (Sit-Stand Transfer) Transfers only performed this date, recently extubated.  -     Row Name 11/18/22 1628          Mobility    Extremity Weight-bearing Status left upper extremity;right upper extremity  -EL     Left Upper Extremity (Weight-bearing Status) non weight-bearing (NWB)  -EL     Right Upper Extremity (Weight-bearing Status) non weight-bearing (NWB)  -           User Key  (r) = Recorded By, (t) = Taken By, (c) = Cosigned By    Initials Name Provider Type    Waldemar Blair PT Physical Therapist               Obj/Interventions     Row Name 11/18/22 1629          Range of Motion Comprehensive    General Range of Motion bilateral lower extremity ROM WFL  -     Row Name 11/18/22 1629          Strength Comprehensive (MMT)    General Manual Muscle Testing (MMT) Assessment lower extremity strength deficits identified  -EL     Comment, General Manual Muscle Testing (MMT) Assessment BLE 3/5 this date  -     Row Name 11/18/22 1629          Balance    Balance Assessment sitting static balance;standing dynamic balance;standing static balance  -EL     Static Sitting Balance minimal assist  -EL     Static Standing Balance minimal assist  -EL      Dynamic Standing Balance moderate assist  -EL     Row Name 11/18/22 1629          Sensory Assessment (Somatosensory)    Sensory Assessment (Somatosensory) sensation intact  -EL           User Key  (r) = Recorded By, (t) = Taken By, (c) = Cosigned By    Initials Name Provider Type    Waldemar Bliar, PT Physical Therapist               Goals/Plan     Row Name 11/18/22 1634          Bed Mobility Goal 1 (PT)    Activity/Assistive Device (Bed Mobility Goal 1, PT) bed mobility activities, all  -EL     [place] Level/Cues Needed (Bed Mobility Goal 1, PT) modified independence  -EL     Time Frame (Bed Mobility Goal 1, PT) long term goal (LTG);2 weeks  -EL     Row Name 11/18/22 1634          Transfer Goal 1 (PT)    Activity/Assistive Device (Transfer Goal 1, PT) transfers, all;walker, rolling  -EL     [place] Level/Cues Needed (Transfer Goal 1, PT) modified independence  -EL     Time Frame (Transfer Goal 1, PT) long term goal (LTG);2 weeks  -EL     Row Name 11/18/22 1634          Gait Training Goal 1 (PT)    Activity/Assistive Device (Gait Training Goal 1, PT) gait (walking locomotion);walker, rolling  -EL     [place] Level (Gait Training Goal 1, PT) modified independence  -EL     Distance (Gait Training Goal 1, PT) 250  -EL     Time Frame (Gait Training Goal 1, PT) long term goal (LTG);2 weeks  -EL     Row Name 11/18/22 1634          Stairs Goal 1 (PT)    Activity/Assistive Device (Stairs Goal 1, PT) stairs, all skills  -EL     [place] Level/Cues Needed (Stairs Goal 1, PT) modified independence  -EL     Number of Stairs (Stairs Goal 1, PT) 14  -EL     Time Frame (Stairs Goal 1, PT) long term goal (LTG);2 weeks  -EL     Row Name 11/18/22 1634          Therapy Assessment/Plan (PT)    Planned Therapy Interventions (PT) balance training;neuromuscular re-education;bed mobility training;gait training;transfer training;patient/family education;stair training;strengthening  -EL           User Key  (r) = Recorded  By, (t) = Taken By, (c) = Cosigned By    Initials Name Provider Type    Waldemar Blair, PT Physical Therapist               Clinical Impression     Row Name 11/18/22 1631          Pain    Additional Documentation Pain Scale: FACES Pre/Post-Treatment (Group)  -EL     Row Name 11/18/22 1631          Pain Scale: FACES Pre/Post-Treatment    Pain: FACES Scale, Pretreatment 6-->hurts even more  -EL     Posttreatment Pain Rating 6-->hurts even more  -EL     Pain Location incisional  -EL     Pain Location - chest  -EL     Row Name 11/18/22 1631          Plan of Care Review    Plan of Care Reviewed With patient;spouse  -EL     Outcome Evaluation Pt is a 76 YO M POD 1 MVR. Pt states he lives wtih spouse, typically is independent wtih all ADLs, ambulation without AD and no recent falls. Pt has a flight of steps to bedroom, but can stay on first floor of home if necessary. Pt educated on sternal precautions and pt demonstrates fair understanding, pt still pretty lethargic this date but participatory. Pt spouse educated as well and demonstrates understanding. Pt stood with multiple minor instances of buckling and transferred to bedside chair. Pt likely to progress well and recommendaiton is return home with spouse following d/c.  -EL     Row Name 11/18/22 1631          Therapy Assessment/Plan (PT)    Rehab Potential (PT) good, to achieve stated therapy goals  -EL     Criteria for Skilled Interventions Met (PT) yes  -EL     Therapy Frequency (PT) 5 times/wk  -EL     Predicted Duration of Therapy Intervention (PT) Until d/c  -EL     Row Name 11/18/22 1631          Vital Signs    O2 Delivery Pre Treatment supplemental O2  -EL     O2 Delivery Intra Treatment supplemental O2  -EL     O2 Delivery Post Treatment supplemental O2  -EL     Pre Patient Position Supine  -EL     Intra Patient Position Standing  -EL     Post Patient Position Sitting  -EL     Row Name 11/18/22 1631          Positioning and Restraints    Pre-Treatment Position  in bed  -EL     Post Treatment Position chair  -EL     In Chair notified nsg;reclined;call light within reach;encouraged to call for assist;exit alarm on  -EL           User Key  (r) = Recorded By, (t) = Taken By, (c) = Cosigned By    Initials Name Provider Type    Waldemar Blair, PT Physical Therapist               Outcome Measures     Row Name 11/18/22 1634          How much help from another person do you currently need...    Turning from your back to your side while in flat bed without using bedrails? 3  -EL     Moving from lying on back to sitting on the side of a flat bed without bedrails? 2  -EL     Moving to and from a bed to a chair (including a wheelchair)? 2  -EL     Standing up from a chair using your arms (e.g., wheelchair, bedside chair)? 2  -EL     Climbing 3-5 steps with a railing? 1  -EL     To walk in hospital room? 1  -EL     AM-PAC 6 Clicks Score (PT) 11  -EL     Highest level of mobility 4 --> Transferred to chair/commode  -EL     Row Name 11/18/22 1634 11/18/22 1606       Functional Assessment    Outcome Measure Options AM-PAC 6 Clicks Basic Mobility (PT)  -EL AM-PAC 6 Clicks Daily Activity (OT)  -MS          User Key  (r) = Recorded By, (t) = Taken By, (c) = Cosigned By    Initials Name Provider Type    Waldemar Blair, PT Physical Therapist    MS Rae Rosi, OT Occupational Therapist                             Physical Therapy Education     Title: PT OT SLP Therapies (Done)     Topic: Physical Therapy (Done)     Point: Mobility training (Done)     Learning Progress Summary           Patient Acceptance, E,TB, VU by  at 11/18/2022 1635                   Point: Precautions (Done)     Learning Progress Summary           Patient Acceptance, E,TB, VU by  at 11/18/2022 1635                               User Key     Initials Effective Dates Name Provider Type Discipline     06/23/20 -  Waldemar Hunt, PT Physical Therapist PT              PT Recommendation and Plan  Planned Therapy Interventions  (PT): balance training, neuromuscular re-education, bed mobility training, gait training, transfer training, patient/family education, stair training, strengthening  Plan of Care Reviewed With: patient, spouse  Outcome Evaluation: Pt is a 76 YO M POD 1 MVR. Pt states he lives wtih spouse, typically is independent wtih all ADLs, ambulation without AD and no recent falls. Pt has a flight of steps to bedroom, but can stay on first floor of home if necessary. Pt educated on sternal precautions and pt demonstrates fair understanding, pt still pretty lethargic this date but participatory. Pt spouse educated as well and demonstrates understanding. Pt stood with multiple minor instances of buckling and transferred to bedside chair. Pt likely to progress well and recommendaiton is return home with spouse following d/c.     Time Calculation:    PT Charges     Row Name 11/18/22 1635             Time Calculation    Start Time 1053  -EL      Stop Time 1121  -EL      Time Calculation (min) 28 min  -EL      PT Received On 11/18/22  -EL      PT - Next Appointment 11/20/22  -EL      PT Goal Re-Cert Due Date 12/02/22  -EL            User Key  (r) = Recorded By, (t) = Taken By, (c) = Cosigned By    Initials Name Provider Type    Waldemar Blair PT Physical Therapist              Therapy Charges for Today     Code Description Service Date Service Provider Modifiers Qty    78220408424 HC PT EVAL HIGH COMPLEXITY 4 11/18/2022 Waldemar Hunt PT GP 1          PT G-Codes  Outcome Measure Options: AM-PAC 6 Clicks Basic Mobility (PT)  AM-PAC 6 Clicks Score (PT): 11  AM-PAC 6 Clicks Score (OT): 14  PT Discharge Summary  Anticipated Discharge Disposition (PT): home with assist    Waldemar Hunt PT  11/18/2022

## 2022-11-18 NOTE — PROGRESS NOTES
S/P POD# 1 elective complex MVr/ TVr/ CASSIE ligation--[place]  EF 60% (TERRA)    Subjective:  Reports minor surgical discomfort    Extubated 0752 this morning  UF 2.6L intraop  Drips:  milr .125, insulin  CI 3, CVP 14,   Wt is up 3 kgs from preop  CXR:  atel      Intake/Output Summary (Last 24 hours) at 11/18/2022 0846  Last data filed at 11/18/2022 0630  Gross per 24 hour   Intake 4880 ml   Output 5035 ml   Net -155 ml     Temp:  [97.4 °F (36.3 °C)-99.1 °F (37.3 °C)] 97.4 °F (36.3 °C)  Heart Rate:  [74-89] 80  Resp:  [18-23] 23  BP: ()/(45-62) 113/62  Arterial Line BP: ()/(43-60) 132/60  FiO2 (%):  [30 %-70 %] 40 %  /8    Results from last 7 days   Lab Units 11/18/22  0536 11/18/22  0350 11/17/22  1822 11/17/22  1750 11/17/22  1748 11/17/22  1624   WBC 10*3/mm3  --  7.10  --   --  2.80* 4.70   HEMOGLOBIN g/dL  --  8.5*  --   --  10.7* 10.2*   HEMOGLOBIN, POC g/dL 9.1*  --    < >  --   --   --    HEMATOCRIT %  --  24.8*  --   --  31.5* 29.8*   HEMATOCRIT POC % 27*  --    < >  --   --   --    PLATELETS 10*3/mm3  --  62*  --   --  84* 89*   INR   --  1.17*  --  1.17*  --  1.25*    < > = values in this interval not displayed.     Results from last 7 days   Lab Units 11/18/22  0350   CREATININE mg/dL 0.97   POTASSIUM mmol/L 3.0*   SODIUM mmol/L 145   MAGNESIUM mg/dL 1.9   PHOSPHORUS mg/dL 4.4     ica 1.09    Physical Exam:  Neuro intact, nad, resting in bed   Tele:  SR 70s  Diminished bases, 100% 3L  dsg c/d/i, no drainage  Benign abd, no BM  No edema    Assessment/Plan:  Principal Problem:    Nonrheumatic tricuspid valve regurgitation  Active Problems:    Nonrheumatic mitral valve regurgitation    - Severe mitral regurgitation, moderate tricuspid regurgitation, EF 60% (TERRA)--s/p elective complex MVr/ TVr/ CASSIE endocardial closure ([place])  - HTN--stable  - Dyslipidemia--statin  - Hx prostate CA, s/p XRT  - Vitamin D deficiency  - Chronic TCP--recd platelets intraop  - Intraop coagulopathy likely d/t  chr TCP  - Postop ABLA, expected--watch closely    POD# 1.  Extubated this morning.  On minimal inotropic support.  DC milrinone, swan, and willie.  Mobilize.  On asa/statin, add bb.  Watch plt ct closely.  Pt reports he has tolerated Vicodin in the past, so will use hydrocodone prn pain.  Re-eval chest tubes later today.    Routine care--as above  De-escal  D/w pt/family, nsg, Dr. Henderson  Anticipate home at discharge    Nata Verma, APRN  11/18/2022  08:46 EST

## 2022-11-18 NOTE — THERAPY EVALUATION
Patient Name: Clint Cee  : 1945    MRN: [id number]                              Today's Date: 2022       Admit Date: 2022    Visit Dx:     ICD-10-CM ICD-9-CM   1. Nonrheumatic tricuspid valve regurgitation  I36.1 424.2   2. Nonrheumatic mitral valve regurgitation  I34.0 424.0     Patient Active Problem List   Diagnosis   • Benign prostatic hyperplasia   • Gastroesophageal reflux disease   • Hypertension   • Mood disorder (HCC)   • Scoliosis   • Spinal stenosis   • Vitamin D deficiency   • Polyosteoarthritis   • Insomnia   • Retinal detachment of right eye with single retinal tear   • Osteoarthritis of knee   • Cellulitis of face   • Prostate cancer (HCC)   • Ocular migraine   • Headache   • Gout of multiple sites   • Medicare annual wellness visit, subsequent   • Allergic   • HL (hearing loss)   • Visual impairment   • Cataract   • Colon polyp   • Heart murmur on physical examination   • Mitral valve prolapse   • Premature ventricular contractions   • Nonrheumatic mitral valve regurgitation   • Severe mitral regurgitation   • Moderate tricuspid regurgitation   • Primary hypertension   • Dyspnea on exertion   • Nonrheumatic tricuspid valve regurgitation     Past Medical History:   Diagnosis Date   • Abnormal ECG 2022   • Allergic 1954    Nasal alergies   • Arrhythmia     Dr Youngblood examined me and said i was ok.   • Asthma    • Benign prostatic hyperplasia 2018   • Cataract 2014    Surgery. Caused detached retina of right eye 20/60 repair   • Colon polyp 2018    Found and removed last colon [email or web address]   • Gastroesophageal reflux disease 2014   • Glaucoma 2003    Normal pressures. Have low pressure glaucoma   • Headache 2015    Have shimmering in both eyes intermittantly. No headaches   • Heart murmur May 2022    During wellness exam   • Heart valve disease 2022    During Echo   • HL (hearing loss) 2012    Have hearing aids   • Hypertension  12/02/2011   • Infectious viral hepatitis 1954    Yellow jaundis as child   • Insomnia 12/02/2011   • Mitral valve prolapse June 2022   • Mood disorder (HCC) 09/19/2013   • Polyosteoarthritis 12/02/2011   • Prostate Cancer Jan22 January 2022   • Spinal stenosis 12/02/2011   • Visual impairment 01/01/1955    Nearsighted corrected glasses   • Vitamin D deficiency 05/23/2018     Past Surgical History:   Procedure Laterality Date   • ADENOIDECTOMY  1954    As child   • APPENDECTOMY  1956    Surgery   • CARDIAC CATHETERIZATION N/A 9/30/2022    Procedure: Right and Left Heart Cath with Coronar Angiography;  Surgeon: Felipe Garcia MD;  Location: [facility] CATH INVASIVE LOCATION;  Service: Cardiovascular;  Laterality: N/A;   • COLONOSCOPY  01/01/1997    Regularly scheduled   • EYE SURGERY  01/01/2010    Reattached right retina   • SPINE SURGERY  01/01/1971    2 lumbar 1 cervical   • TONSILLECTOMY  01/01/1950    Childhood      General Information     Row Name 11/18/22 1553          OT Time and Intention    Document Type evaluation  -MS     Mode of Treatment occupational therapy;physical therapy;co-treatment  -MS     Row Name 11/18/22 1553          General Information    Patient Profile Reviewed yes  -MS     Prior Level of Function independent:;ADL's;driving  -MS     Existing Precautions/Restrictions sternal;cardiac;fall;oxygen therapy device and L/min  -MS     Barriers to Rehab none identified  -MS     Row Name 11/18/22 1553          Occupational Profile    Reason for Services/Referral (Occupational Profile) Pt is a 78 y/o male who presented to [facility] who is s/p MVR and TVR 11/17/22. PMHx significant for hearing loss, mood disorder, prostate cancer, and scoliosis. At baseline pt is independent with ADLs and does not use AD for mobility.  -MS     Successful Occupations (Occupational Profile) former   -MS     Environmental Supports and Barriers (Occupational Profile) supportive family  -MS     Patient Goals  (Occupational Profile) return home with spouse  -MS     Row Name 11/18/22 1553          Living Environment    People in Home spouse  -MS     Name(s) of People in Home Mary Beth  -MS     Row Name 11/18/22 1553          Home Main Entrance    Number of Stairs, Main Entrance none  -MS     Stair Railings, Main Entrance none  -MS     Row Name 11/18/22 1553          Stairs Within Home, Primary    Number of Stairs, Within Home, Primary other (see comments)  13 steps to main level  -MS     Stair Railings, Within Home, Primary railings safe and in good condition  -MS     Row Name 11/18/22 1553          Cognition    Orientation Status (Cognition) oriented x 4  -MS     Row Name 11/18/22 1553          Safety Issues, Functional Mobility    Safety Issues Affecting Function (Mobility) safety precautions follow-through/compliance  Pt required mod verbal cues to adhere to sternal precautions  -MS     Impairments Affecting Function (Mobility) balance;endurance/activity tolerance;pain  -MS           User Key  (r) = Recorded By, (t) = Taken By, (c) = Cosigned By    Initials Name Provider Type    Rosi Hartman OT Occupational Therapist                 Mobility/ADL's     Row Name 11/18/22 1555          Bed Mobility    Bed Mobility bed mobility (all) activities  -MS     All Activities, [place] (Bed Mobility) maximum assist (25% patient effort);2 person assist  -MS     Comment, (Bed Mobility) Increased assistance d/t sternal precautions  -MS     Row Name 11/18/22 1555          Transfers    Transfers sit-stand transfer  -MS     Row Name 11/18/22 1555          Sit-Stand Transfer    Sit-Stand [place] (Transfers) maximum assist (25% patient effort)  -MS     Assistive Device (Sit-Stand Transfers) walker, front-wheeled  -MS           User Key  (r) = Recorded By, (t) = Taken By, (c) = Cosigned By    Initials Name Provider Type    Rosi Hartman OT Occupational Therapist               Obj/Interventions     Row Name 11/18/22 2703           Sensory Assessment (Somatosensory)    Sensory Assessment (Somatosensory) UE sensation intact  -MS     Row Name 11/18/22 1556          Vision Assessment/Intervention    Visual Impairment/Limitations WFL  -MS     Row Name 11/18/22 1556          Range of Motion Comprehensive    Comment, General Range of Motion BUE ROM WFL within sternal precautions  -MS     Row Name 11/18/22 1556          Strength Comprehensive (MMT)    Comment, General Manual Muscle Testing (MMT) Assessment BUE grossly 4/5 within sternal precautions  -MS     Row Name 11/18/22 1556          Balance    Balance Assessment sitting static balance;sitting dynamic balance;standing static balance  -MS     Static Sitting Balance minimal assist  -MS     Dynamic Sitting Balance minimal assist  -MS     Position, Sitting Balance sitting edge of bed  -MS     Static Standing Balance minimal assist  -MS     Position/Device Used, Standing Balance walker, front-wheeled  -MS           User Key  (r) = Recorded By, (t) = Taken By, (c) = Cosigned By    Initials Name Provider Type    MS Rosi Rae, OT Occupational Therapist               Goals/Plan     [facility] Name 11/18/22 1604          Bathing Goal 1 (OT)    Activity/Device (Bathing Goal 1, OT) bathing skills, all  -MS     [place] Level/Cues Needed (Bathing Goal 1, OT) minimum assist (75% or more patient effort)  -MS     Time Frame (Bathing Goal 1, OT) long term goal (LTG);2 weeks  -MS     Progress/Outcomes (Bathing Goal 1, OT) new goal  -MS     [facility] Name 11/18/22 1604          Dressing Goal 1 (OT)    Activity/Device (Dressing Goal 1, OT) dressing skills, all  -MS     [place]/Cues Needed (Dressing Goal 1, OT) minimum assist (75% or more patient effort)  -MS     Time Frame (Dressing Goal 1, OT) long term goal (LTG);2 weeks  -MS     Progress/Outcome (Dressing Goal 1, OT) new goal  -MS     Row Name 11/18/22 1604          Toileting Goal 1 (OT)    Activity/Device (Toileting Goal 1, OT) toileting skills, all  -MS      [place] Level/Cues Needed (Toileting Goal 1, OT) contact guard required  -MS     Time Frame (Toileting Goal 1, OT) long term goal (LTG);2 weeks  -MS     Progress/Outcome (Toileting Goal 1, OT) new goal  -MS     Row Name 11/18/22 1603          Grooming Goal 1 (OT)    Activity/Device (Grooming Goal 1, OT) grooming skills, all  -MS     [place] (Grooming Goal 1, OT) set-up required  -MS     Time Frame (Grooming Goal 1, OT) long term goal (LTG);2 weeks  -MS     Progress/Outcome (Grooming Goal 1, OT) new goal  -MS     Row Name 11/18/22 1602          Therapy Assessment/Plan (OT)    Planned Therapy Interventions (OT) activity tolerance training;BADL retraining;functional balance retraining;IADL retraining;patient/caregiver education/training;transfer/mobility retraining;occupation/activity based interventions  -MS           User Key  (r) = Recorded By, (t) = Taken By, (c) = Cosigned By    Initials Name Provider Type    MS Rosi Rae, ALFRED Occupational Therapist               Clinical Impression     Row Name 11/18/22 3954          Pain Assessment    Additional Documentation Pain Scale: FACES Pre/Post-Treatment (Group)  -MS     Row Name 11/18/22 2657          Pain Scale: FACES Pre/Post-Treatment    Pain: FACES Scale, Pretreatment 6-->hurts even more  -MS     Posttreatment Pain Rating 6-->hurts even more  -MS     Pain Location incisional  -MS     Pain Location - chest  -MS     Row Name 11/18/22 1100          Plan of Care Review    Plan of Care Reviewed With patient;spouse  -MS     Progress no change  -MS     Outcome Evaluation Pt is a 78 y/o male who presented to [facility] who is s/p MVR and TVR 11/17/22. PMHx significant for hearing loss, mood disorder, prostate cancer, and scoliosis. At baseline pt is independent with ADLs and does not use AD for mobility. Pt lives in multi level home with 0 [address]OLANDA and 13 stairs within home. Pt educated on sternal cardiac precautions this date. Pt educated and demo HEP x1 set x10 reps  in supine. Pt completes bed mobility with max A x2 secondary to BUE sternal precautions. Pt required min A for sitting balance, requiring mod verbal cues to adhere to precautions. Pt completes STS with max A this date. Pt likely to require mod-max A with ADLs at this time, requiring less assitance a pt progresses. Pt is below baseline, indicating further need for skilled OT intervention. Pt likely to return home safe with assistance and with HHOT. OT to follow while admitted [facility].  -MS     Row Name 11/18/22 1272          Therapy Assessment/Plan (OT)    Patient/Family Therapy Goal Statement (OT) return home  -MS     Rehab Potential (OT) good, to achieve stated therapy goals  -MS     Criteria for Skilled Therapeutic Interventions Met (OT) yes;meets criteria;skilled treatment is necessary  -MS     Therapy Frequency (OT) 5 times/wk  -MS     Predicted Duration of Therapy Intervention (OT) until d/c  -MS     Row Name 11/18/22 4896          Therapy Plan Review/Discharge Plan (OT)    Anticipated Discharge Disposition (OT) home with assist;home with home health  -MS     Row Name 11/18/22 7048          Vital Signs    Pre Systolic BP Rehab 114  -MS     Pre Treatment Diastolic BP 55  -MS     Post Systolic BP Rehab 117  -MS     Post Treatment Diastolic BP 62  -MS     Pretreatment Heart Rate (beats/min) 76  -MS     Pre SpO2 (%) 99  -MS     O2 Delivery Pre Treatment nasal cannula  -MS     Row Name 11/18/22 6893          Positioning and Restraints    Pre-Treatment Position in bed  -MS     Post Treatment Position bed  -MS     In Bed notified nsg;supine;call light within reach;encouraged to call for assist;exit alarm on;patient within staff view  -MS           User Key  (r) = Recorded By, (t) = Taken By, (c) = Cosigned By    Initials Name Provider Type    Rosi Hartman, OT Occupational Therapist               Outcome Measures     Row Name 11/18/22 5992          How much help from another is currently needed...    Putting on and  taking off regular lower body clothing? 2  -MS     Bathing (including washing, rinsing, and drying) 2  -MS     Toileting (which includes using toilet bed pan or urinal) 2  -MS     Putting on and taking off regular upper body clothing 2  -MS     Taking care of personal grooming (such as brushing teeth) 3  -MS     Eating meals 3  -MS     AM-PAC 6 Clicks Score (OT) 14  -MS     Row Name 11/18/22 1606          Functional Assessment    Outcome Measure Options AM-PAC 6 Clicks Daily Activity (OT)  -MS           User Key  (r) = Recorded By, (t) = Taken By, (c) = Cosigned By    Initials Name Provider Type    MS Rae Rosi, OT Occupational Therapist                Occupational Therapy Education     Title: PT OT SLP Therapies (Done)     Topic: Occupational Therapy (Done)     Point: ADL training (Done)     Description:   Instruct learner(s) on proper safety adaptation and remediation techniques during self care or transfers.   Instruct in proper use of assistive devices.              Learning Progress Summary           Patient Acceptance, E,TB, VU by MS at 11/18/2022 1606                   Point: Home exercise program (Done)     Description:   Instruct learner(s) on appropriate technique for monitoring, assisting and/or progressing therapeutic exercises/activities.              Learning Progress Summary           Patient Acceptance, E,TB, VU by MS at 11/18/2022 1606                   Point: Precautions (Done)     Description:   Instruct learner(s) on prescribed precautions during self-care and functional transfers.              Learning Progress Summary           Patient Acceptance, E,TB, VU by MS at 11/18/2022 1606                   Point: Body mechanics (Done)     Description:   Instruct learner(s) on proper positioning and spine alignment during self-care, functional mobility activities and/or exercises.              Learning Progress Summary           Patient Acceptance, E,TB, VU by MS at 11/18/2022 1606                                User Key     Initials Effective Dates Name Provider Type Discipline    MS 07/13/22 -  Rosi Rae OT Occupational Therapist OT              OT Recommendation and Plan  Planned Therapy Interventions (OT): activity tolerance training, BADL retraining, functional balance retraining, IADL retraining, patient/caregiver education/training, transfer/mobility retraining, occupation/activity based interventions  Therapy Frequency (OT): 5 times/wk  Plan of Care Review  Plan of Care Reviewed With: patient, spouse  Progress: no change  Outcome Evaluation: Pt is a 78 y/o male who presented to [facility] who is s/p MVR and TVR 11/17/22. PMHx significant for hearing loss, mood disorder, prostate cancer, and scoliosis. At baseline pt is independent with ADLs and does not use AD for mobility. Pt lives in multi level home with 0 YOLANDA and 13 stairs within home. Pt educated on sternal cardiac precautions this date. Pt educated and demo HEP x1 set x10 reps in supine. Pt completes bed mobility with max A x2 secondary to BUE sternal precautions. Pt required min A for sitting balance, requiring mod verbal cues to adhere to precautions. Pt completes STS with max A this date. Pt likely to require mod-max A with ADLs at this time, requiring less assitance a pt progresses. Pt is below baseline, indicating further need for skilled OT intervention. Pt likely to return home safe with assistance and with HHOT. OT to follow while admitted [facility].     Time Calculation:              Rosi Rae OT  11/18/2022

## 2022-11-18 NOTE — PLAN OF CARE
Goal Outcome Evaluation:  Pt observed w/ trials of ice chips, water by cup and straw, NTL by straw, and puree. Pt self fed majority of trials w/ intermittent feeding assistance from SLP. Oral phase characterized by adequate labial seal w/ no anterior loss. Adequate manipulation of ice chips. Oral transit WFL. Pharyngeal phase characterized by consistent throat clearing and/or coughing w/ all consistencies. Multiple swallows w/ trials of thin liquids by straw per palpation. Frequent belching w/ all trials of liquids. Intermittent wet vocal quality w/ all consistencies. O2 sats remained at  w/ PO. D/t s/s of aspiration observed at bedside, recommend pt remain NPO w/ temporary alt means for nutrition/medication until pt deemed medically stable/appropriate for instrumental assessment. Pt may have ice chips after oral care for oral gratification. Discussed results/recs w/ pt and pt's family who all expressed agreement and understanding.

## 2022-11-19 ENCOUNTER — APPOINTMENT (OUTPATIENT)
Dept: GENERAL RADIOLOGY | Facility: HOSPITAL | Age: 77
End: 2022-11-19

## 2022-11-19 LAB
ANION GAP SERPL CALCULATED.3IONS-SCNC: 11 MMOL/L (ref 5–15)
BH BB BLOOD EXPIRATION DATE: NORMAL
BH BB BLOOD TYPE BARCODE: 5100
BH BB BLOOD TYPE BARCODE: 6200
BH BB DISPENSE STATUS: NORMAL
BH BB PRODUCT CODE: NORMAL
BH BB UNIT NUMBER: NORMAL
BUN SERPL-MCNC: 20 MG/DL (ref 8–23)
BUN/CREAT SERPL: 27 (ref 7–25)
CALCIUM SPEC-SCNC: 8.7 MG/DL (ref 8.6–10.5)
CHLORIDE SERPL-SCNC: 105 MMOL/L (ref 98–107)
CO2 SERPL-SCNC: 24 MMOL/L (ref 22–29)
CREAT SERPL-MCNC: 0.74 MG/DL (ref 0.76–1.27)
DEPRECATED RDW RBC AUTO: 43.8 FL (ref 37–54)
EGFRCR SERPLBLD CKD-EPI 2021: 94.5 ML/MIN/1.73
ERYTHROCYTE [DISTWIDTH] IN BLOOD BY AUTOMATED COUNT: 13.7 % (ref 12.3–15.4)
GLUCOSE BLDC GLUCOMTR-MCNC: 118 MG/DL (ref 70–105)
GLUCOSE BLDC GLUCOMTR-MCNC: 119 MG/DL (ref 70–105)
GLUCOSE BLDC GLUCOMTR-MCNC: 129 MG/DL (ref 70–105)
GLUCOSE BLDC GLUCOMTR-MCNC: 130 MG/DL (ref 70–105)
GLUCOSE BLDC GLUCOMTR-MCNC: 133 MG/DL (ref 70–105)
GLUCOSE BLDC GLUCOMTR-MCNC: 136 MG/DL (ref 70–105)
GLUCOSE BLDC GLUCOMTR-MCNC: 138 MG/DL (ref 70–105)
GLUCOSE BLDC GLUCOMTR-MCNC: 146 MG/DL (ref 70–105)
GLUCOSE BLDC GLUCOMTR-MCNC: 172 MG/DL (ref 70–105)
GLUCOSE SERPL-MCNC: 142 MG/DL (ref 65–99)
HCT VFR BLD AUTO: 32.3 % (ref 37.5–51)
HGB BLD-MCNC: 10.8 G/DL (ref 13–17.7)
MCH RBC QN AUTO: 30.5 PG (ref 26.6–33)
MCHC RBC AUTO-ENTMCNC: 33.3 G/DL (ref 31.5–35.7)
MCV RBC AUTO: 91.4 FL (ref 79–97)
PLATELET # BLD AUTO: 56 10*3/MM3 (ref 140–450)
PMV BLD AUTO: 7.7 FL (ref 6–12)
POTASSIUM SERPL-SCNC: 3.9 MMOL/L (ref 3.5–5.2)
QT INTERVAL: 312 MS
QT INTERVAL: 455 MS
RBC # BLD AUTO: 3.53 10*6/MM3 (ref 4.14–5.8)
SODIUM SERPL-SCNC: 140 MMOL/L (ref 136–145)
UNIT  ABO: NORMAL
UNIT  RH: NORMAL
WBC NRBC COR # BLD: 7.3 10*3/MM3 (ref 3.4–10.8)

## 2022-11-19 PROCEDURE — 71045 X-RAY EXAM CHEST 1 VIEW: CPT

## 2022-11-19 PROCEDURE — 63710000001 INSULIN LISPRO (HUMAN) PER 5 UNITS: Performed by: NURSE PRACTITIONER

## 2022-11-19 PROCEDURE — 25010000002 CEFAZOLIN PER 500 MG: Performed by: NURSE PRACTITIONER

## 2022-11-19 PROCEDURE — 74230 X-RAY XM SWLNG FUNCJ C+: CPT

## 2022-11-19 PROCEDURE — 93005 ELECTROCARDIOGRAM TRACING: CPT | Performed by: NURSE PRACTITIONER

## 2022-11-19 PROCEDURE — 25010000002 FUROSEMIDE PER 20 MG

## 2022-11-19 PROCEDURE — 82962 GLUCOSE BLOOD TEST: CPT

## 2022-11-19 PROCEDURE — 99233 SBSQ HOSP IP/OBS HIGH 50: CPT | Performed by: INTERNAL MEDICINE

## 2022-11-19 PROCEDURE — 93005 ELECTROCARDIOGRAM TRACING: CPT | Performed by: THORACIC SURGERY (CARDIOTHORACIC VASCULAR SURGERY)

## 2022-11-19 PROCEDURE — 80048 BASIC METABOLIC PNL TOTAL CA: CPT | Performed by: NURSE PRACTITIONER

## 2022-11-19 PROCEDURE — 92611 MOTION FLUOROSCOPY/SWALLOW: CPT

## 2022-11-19 PROCEDURE — 25010000002 HYDROMORPHONE 1 MG/ML SOLUTION: Performed by: NURSE PRACTITIONER

## 2022-11-19 PROCEDURE — 85027 COMPLETE CBC AUTOMATED: CPT | Performed by: NURSE PRACTITIONER

## 2022-11-19 PROCEDURE — 93010 ELECTROCARDIOGRAM REPORT: CPT | Performed by: INTERNAL MEDICINE

## 2022-11-19 RX ORDER — ENOXAPARIN SODIUM 100 MG/ML
40 INJECTION SUBCUTANEOUS DAILY
Status: DISCONTINUED | OUTPATIENT
Start: 2022-11-20 | End: 2022-11-20

## 2022-11-19 RX ORDER — FUROSEMIDE 10 MG/ML
40 INJECTION INTRAMUSCULAR; INTRAVENOUS ONCE
Status: COMPLETED | OUTPATIENT
Start: 2022-11-19 | End: 2022-11-19

## 2022-11-19 RX ORDER — INSULIN LISPRO 100 [IU]/ML
2-7 INJECTION, SOLUTION INTRAVENOUS; SUBCUTANEOUS EVERY 6 HOURS SCHEDULED
Status: DISCONTINUED | OUTPATIENT
Start: 2022-11-19 | End: 2022-11-20

## 2022-11-19 RX ORDER — OLANZAPINE 10 MG/2ML
1 INJECTION, POWDER, LYOPHILIZED, FOR SOLUTION INTRAMUSCULAR
Status: DISCONTINUED | OUTPATIENT
Start: 2022-11-19 | End: 2022-11-21 | Stop reason: HOSPADM

## 2022-11-19 RX ORDER — NICOTINE POLACRILEX 4 MG
15 LOZENGE BUCCAL
Status: DISCONTINUED | OUTPATIENT
Start: 2022-11-19 | End: 2022-11-21 | Stop reason: HOSPADM

## 2022-11-19 RX ORDER — DEXTROSE MONOHYDRATE 25 G/50ML
25 INJECTION, SOLUTION INTRAVENOUS
Status: DISCONTINUED | OUTPATIENT
Start: 2022-11-19 | End: 2022-11-21 | Stop reason: HOSPADM

## 2022-11-19 RX ADMIN — HYDROMORPHONE HYDROCHLORIDE 0.25 MG: 1 INJECTION, SOLUTION INTRAMUSCULAR; INTRAVENOUS; SUBCUTANEOUS at 11:08

## 2022-11-19 RX ADMIN — HYDROCODONE BITARTRATE AND ACETAMINOPHEN 1 TABLET: 5; 325 TABLET ORAL at 17:04

## 2022-11-19 RX ADMIN — PANTOPRAZOLE SODIUM 40 MG: 40 INJECTION, POWDER, FOR SOLUTION INTRAVENOUS at 06:21

## 2022-11-19 RX ADMIN — CHLORHEXIDINE GLUCONATE 15 ML: 1.2 RINSE ORAL at 06:28

## 2022-11-19 RX ADMIN — ATORVASTATIN CALCIUM 40 MG: 40 TABLET, FILM COATED ORAL at 20:34

## 2022-11-19 RX ADMIN — POLYETHYLENE GLYCOL 3350 17 G: 17 POWDER, FOR SOLUTION ORAL at 08:24

## 2022-11-19 RX ADMIN — HYDROCODONE BITARTRATE AND ACETAMINOPHEN 1 TABLET: 5; 325 TABLET ORAL at 08:24

## 2022-11-19 RX ADMIN — SENNOSIDES AND DOCUSATE SODIUM 2 TABLET: 50; 8.6 TABLET ORAL at 08:23

## 2022-11-19 RX ADMIN — BARIUM SULFATE 50 ML: 400 SUSPENSION ORAL at 10:53

## 2022-11-19 RX ADMIN — MUPIROCIN 1 APPLICATION: 20 OINTMENT TOPICAL at 20:34

## 2022-11-19 RX ADMIN — SENNOSIDES AND DOCUSATE SODIUM 2 TABLET: 50; 8.6 TABLET ORAL at 20:34

## 2022-11-19 RX ADMIN — Medication 81 MG: at 08:23

## 2022-11-19 RX ADMIN — HYDROCODONE BITARTRATE AND ACETAMINOPHEN 1 TABLET: 5; 325 TABLET ORAL at 13:14

## 2022-11-19 RX ADMIN — BARIUM SULFATE 50 ML: 400 SUSPENSION ORAL at 10:54

## 2022-11-19 RX ADMIN — DULOXETINE HYDROCHLORIDE 60 MG: 30 CAPSULE, DELAYED RELEASE ORAL at 08:23

## 2022-11-19 RX ADMIN — FUROSEMIDE 40 MG: 10 INJECTION, SOLUTION INTRAMUSCULAR; INTRAVENOUS at 08:23

## 2022-11-19 RX ADMIN — INSULIN LISPRO 2 UNITS: 100 INJECTION, SOLUTION INTRAVENOUS; SUBCUTANEOUS at 13:14

## 2022-11-19 RX ADMIN — CEFAZOLIN 2 G: 2 INJECTION, POWDER, FOR SOLUTION INTRAMUSCULAR; INTRAVENOUS at 08:24

## 2022-11-19 RX ADMIN — HYDROCODONE BITARTRATE AND ACETAMINOPHEN 1 TABLET: 5; 325 TABLET ORAL at 03:34

## 2022-11-19 RX ADMIN — MUPIROCIN 1 APPLICATION: 20 OINTMENT TOPICAL at 08:26

## 2022-11-19 RX ADMIN — HYDROMORPHONE HYDROCHLORIDE 0.25 MG: 1 INJECTION, SOLUTION INTRAMUSCULAR; INTRAVENOUS; SUBCUTANEOUS at 20:34

## 2022-11-19 NOTE — MBS/VFSS/FEES
Acute Care - Speech Language Pathology   Swallow Initial Evaluation  [facility]     Patient Name: Clint Cee  : 1945  MRN: [id number]  Today's Date: 2022               Admit Date: 2022    Visit Dx:     ICD-10-CM ICD-9-CM   1. Nonrheumatic tricuspid valve regurgitation  I36.1 424.2   2. Nonrheumatic mitral valve regurgitation  I34.0 424.0     Patient Active Problem List   Diagnosis   • Benign prostatic hyperplasia   • Gastroesophageal reflux disease   • Hypertension   • Mood disorder (HCC)   • Scoliosis   • Spinal stenosis   • Vitamin D deficiency   • Polyosteoarthritis   • Insomnia   • Retinal detachment of right eye with single retinal tear   • Osteoarthritis of knee   • Cellulitis of face   • Prostate cancer (HCC)   • Ocular migraine   • Headache   • Gout of multiple sites   • Medicare annual wellness visit, subsequent   • Allergic   • HL (hearing loss)   • Visual impairment   • Cataract   • Colon polyp   • Heart murmur on physical examination   • Mitral valve prolapse   • Premature ventricular contractions   • Nonrheumatic mitral valve regurgitation   • Severe mitral regurgitation   • Moderate tricuspid regurgitation   • Primary hypertension   • Dyspnea on exertion   • Nonrheumatic tricuspid valve regurgitation     Past Medical History:   Diagnosis Date   • Abnormal ECG 2022   • Allergic 1954    Nasal alergies   • Arrhythmia     Dr Youngblood examined me and said i was ok.   • Asthma    • Benign prostatic hyperplasia 2018   • Cataract 2014    Surgery. Caused detached retina of right eye 60 repair   • Colon polyp 2018    Found and removed last colon [email or web address].[id number]   • Gastroesophageal reflux disease 2014   • Glaucoma 2003    Normal pressures. Have low pressure glaucoma   • Headache 2015    Have shimmering in both eyes intermittantly. No headaches   • Heart murmur May 2022    During wellness exam   • Heart valve disease 2022    During Echo   • HL (hearing  loss) 01/01/2012    Have hearing aids   • Hypertension 12/02/2011   • Infectious viral hepatitis 1954    Yellow jaundis as child   • Insomnia 12/02/2011   • Mitral valve prolapse June 2022   • Mood disorder (HCC) 09/19/2013   • Polyosteoarthritis 12/02/2011   • Prostate Cancer Jan22 January 2022   • Spinal stenosis 12/02/2011   • Visual impairment 01/01/1955    Nearsighted corrected glasses   • Vitamin D deficiency 05/23/2018     Past Surgical History:   Procedure Laterality Date   • ADENOIDECTOMY  1954    As child   • APPENDECTOMY  1956    Surgery   • CARDIAC CATHETERIZATION N/A 9/30/2022    Procedure: Right and Left Heart Cath with Coronar Angiography;  Surgeon: Felipe Garcia MD;  Location: [facility] CATH INVASIVE LOC[facility];  Service: Cardiovascular;  Laterality: N/A;   • COLONOSCOPY  01/01/1997    Regularly scheduled   • EYE SURGERY  01/01/2010    Reattached right retina   • SPINE SURGERY  01/01/1971    2 lumbar 1 cervical   • TONSILLECTOMY  01/01/1950    Childhood       SLP Recommendation and Plan  Video swallow study completed, Dr. Ac requesting re-evaluation. See “MBS/VFSS” report for thorough details. Penetration occurring with multiple trials - laryngeal vestibular closure limited by cervical spurring, additional contributing factor of mechanical stimulation from NGT presence and overall narrowing of cervical esophagus. Varying degrees of penetration/aspiration and ejection. Minimal residuals sensed by pt and eliminated or reduced to trace with second swallow. Aspiration occurring with thin liquids, question trace aspiration with NTL definitive deep penetration to level of vocal folds, not ejected from the airway.       Recommend: HTL and mechanical soft solids. Medications: 1 at a time with HTL or puree. Compensations: additional swallow s/p solids, liquids; small bites/sips, upright for all PO, volitional throat clear every 1-2 bites.       Ongoing aspiration risk present given multiple  incidents of penetration and poor laryngeal vestibular closure with anatomical variations.     Pt endorses no recent pneumonia. L and R basilar densities likely atelectasis reported with recent CXR imaging. Monitor pulmonary changes closely given results of instrumental.       SLP will follow for dysphagia therapy. If s/s aspiration noted (pt senses aspiration with delayed weak cough), consider NPO and contacting this department. Will likely repeat instrumental for advancement of liquids.      SWALLOW EVALUATION (last 72 hours)     SLP Adult Swallow Evaluation     Row Name 11/19/22 1500       Rehab Evaluation    Document Type evaluation  -AB    Subjective Information no complaints  -AB    Patient Observations alert;cooperative;agree to therapy  -AB    Patient/Family/Caregiver Comments/Observations seen in fluoro suite, VFSS completed per . pt accompanied by SHANEL Brito  -AB    Patient Effort good  -AB    Symptoms Noted During/After Treatment none  -AB       General Information    Patient Profile Reviewed yes  -AB    Pertinent History Of Current Problem POD#2 MVr/ TVr/ CASSIE endocardial closure  -AB    Current Method of Nutrition NPO;nasogastric feedings  -AB    Precautions/Limitations, Vision WFL;for purposes of eval  -AB    Precautions/Limitations, Hearing WFL;for purposes of eval  -AB    Prior Level of Function-Communication WFL  -AB    Prior Level of Function-Swallowing regular textures;thin liquids  -AB    Plans/Goals Discussed with patient;family;agreed upon  -AB    Barriers to Rehab medically complex  -AB       Pain    Additional Documentation --  does not rate  -AB          MBS/VFSS Interpretation    VFSS Summary Video fluoroscopic swallow study completed with pt seated at a 90 degree angle, visualized in a lateral position. NGT present, R internal jugular double lumen catheter partially obscure visualization of all structures. Trials assessed included: thins by cup, NTL by tsp/cup/straw, HTL by  cup, puree, mechanical soft/mixed consistency. Significant cervical spurring at C2-C3, C5, C6-C7 vertebral level. Lingual movements brisk. Mastication mildly extended with increased time required in bolus recollection. Base of tongue retraction adequate, hyolaryngeal elevation mildly reduced. Spurring and overall cervical esophageal narrowing result in incomplete epiglottic inversion, approximating PPW at C3 vertebral level, result in incomplete laryngeal vestibular closure. Poor epiglottic deflection additional results in intermittent buildup of valleculae residue, PPW at C3-C4 - NTL, mechanical soft, puree, thins-with additional pyriform residue requiring x2 swallows to decrease residuals from minimal to trace. Additional PPW residue above UES C6-C7, with increased time, x2 swallows to clear with puree solids. Airway entry occurs with nearly all consistencies related to incomplete epiglottic inversion - varying depths and ejection as listed. NTL by tsp: no penetration/aspiration. NTL by cup initial trial: penetration during the swallow, above the vocal folds, ejected from the airway post pradially. Mech soft/mixed consistency 1/2 trials with penetration, above the vocal folds, during the swallow, ejected from the airway.  NTL by straw: deep penetration near level of VF, not entirely ejected from the airway. Of note, barium coating upper vestibule from NTL by straw complicating remaining views throughout. Puree: 2/2 trials penetration during the swallow and second additional swallow for residue clearance, above vocal folds, majority, not all material ejected spontaneously, aided by throat clear. NTL by cup: penetration above vocal folds, not ejected from the airway, continues to deepen near level of VF post prandially. Thins by cup: penetration during the swallow to vocal folds, posterior aspiration of pyriform residuals, continued aspiration from poor vestibular closure during the swallow. Material not ejected  with spontaneous throat clear or additional delayed cough. Attempted chin tuck positioning, worsening aspiration occurring before the swallow, aspiration during the swallow. Question additional trace aspiration with final NTL by cup although appears to be residual aspirate material from thin liquids, new penetration during the swallow is not ejected from the airway. Final instance of HTL by cup with penetration above vocal folds, question minute posterior aspiration versus residual aspirate from thin liquids, all material ejected from the airway post prandially.   -AB       SLP Evaluation Clinical Impression    SLP Swallowing Diagnosis moderate;pharyngeal dysphagia  -AB    Functional Impact risk of aspiration/pneumonia  -AB    Rehab Potential/Prognosis, Swallowing good, to achieve stated therapy goals  -AB    Swallow Criteria for Skilled Therapeutic Interventions Met demonstrates skilled criteria  -AB       SLP Treatment Clinical Impressions    Care Plan Review evaluation/treatment results reviewed;care plan/treatment goals reviewed;risks/benefits reviewed;current/potential barriers reviewed;patient/other agree to care plan  -AB       Recommendations    Therapy Frequency (Swallow) PRN  -AB    Predicted Duration Therapy Intervention (Days) until discharge  -AB    SLP Diet Recommendation honey thick liquids;ground  -AB    Recommended Diagnostics VFSS (MBS)  -AB    Recommended Precautions and Strategies upright posture during/after eating;small bites of food and sips of liquid;multiple swallows per bite of food;multiple swallows per sip of liquid;alternate between small bites of food and sips of liquid  -AB    Oral Care Recommendations Oral Care before breakfast, after meals and PRN  -AB    SLP Rec. for Method of Medication Administration with thick liquids;with puree  -AB    Monitor for Signs of Aspiration yes;notify SLP if any concerns  -AB    Anticipated Discharge Disposition (SLP) anticipate therapy at next level  of care  -AB       Swallow Goals (SLP)    Swallow LTGs Swallow Long Term Goal (free text)  -AB    Swallow STGs diet tolerance goal selection (SLP);pharyngeal strengthening exercise goal selection (SLP)  -AB    Diet Tolerance Goal Selection (SLP) Patient will tolerate trials of  -AB    Pharyngeal Strengthening Exercise Goal Selection (SLP) pharyngeal strengthening exercise, SLP goal 1  -AB       (LTG) Swallow    (LTG) Swallow The patient will maximize swallow function for least restrictive po diet, exhibiting no complications associated with dysphagia, adequate po intake, and demonstrating independent use of safe swallow compensations.  -AB    [place] (Swallow Long Term Goal) independently (over 90% accuracy)  -AB    Time Frame (Swallow Long Term Goal) by discharge  -AB    Progress/Outcomes (Swallow Long Term Goal) goal ongoing  -AB       (STG) Patient will tolerate trials of    Consistencies Trialed (Tolerate trials) honey/ moderately thick liquids;soft to chew (chopped) textures  -AB    Desired Outcome (Tolerate trials) without signs/symptoms of aspiration;with adequate oral prep/transit/clearance  -AB    [place] (Tolerate trials) independently (over 90% accuracy)  -AB    Progress/Outcomes (Tolerate trials) new goal  -AB       (STG) Swallow 1    (STG) Swallow 1 The patient will participate in ongoing assessment of swallow, including re-evaluation clinically and/or including instrumental assessment of swallow if indicated, to further assess swallow function in anticipation to initiate a po diet  -AB    Time Frame (Swallow Short Term Goal 1) 1 week  -AB    Progress/Outcomes (Swallow Short Term Goal 1) goal met  -AB       (STG) Pharyngeal Strengthening Exercise Goal 1 (SLP)    Activity (Pharyngeal Strengthening Goal 1, SLP) increase closure at entrance to airway/closure of airway at glottis  -AB    Increase Closure at Entrance to Airway/Closure of Airway at Glottis supraglottic swallow;falsetto;hard  effortful swallow;breath hold exercises;effortful pitch glide (falsetto + pharyngeal squeeze)  -AB    [place]/Accuracy (Pharyngeal Strengthening Goal 1, SLP) independently (over 90% accuracy)  -AB    Time Frame (Pharyngeal Strengthening Goal 1, SLP) short term goal (STG)  -AB    Progress/Outcomes (Pharyngeal Strengthening Goal 1, SLP) new goal  -AB          User Key  (r) = Recorded By, (t) = Taken By, (c) = Cosigned By    Initials Name Effective Dates    Sharon Pedersen, MS CCC-SLP 08/01/21 -                 EDUCATION  The patient has been educated in the following areas:   Dysphagia (Swallowing Impairment) Modified Diet Instruction.     Additionally followed up with pt and pt spouse this PM regarding all results of VFSS and plan of care. All in agreement.          SLP GOALS     Row Name 11/19/22 1500       (LTG) Swallow    (LTG) Swallow The patient will maximize swallow function for least restrictive po diet, exhibiting no complications associated with dysphagia, adequate po intake, and demonstrating independent use of safe swallow compensations.  -AB    [place] (Swallow Long Term Goal) independently (over 90% accuracy)  -AB    Time Frame (Swallow Long Term Goal) by discharge  -AB    Progress/Outcomes (Swallow Long Term Goal) goal ongoing  -AB       (STG) Patient will tolerate trials of    Consistencies Trialed (Tolerate trials) honey/ moderately thick liquids;soft to chew (chopped) textures  -AB    Desired Outcome (Tolerate trials) without signs/symptoms of aspiration;with adequate oral prep/transit/clearance  -AB    [place] (Tolerate trials) independently (over 90% accuracy)  -AB    Progress/Outcomes (Tolerate trials) new goal  -AB       (STG) Swallow 1    (STG) Swallow 1 The patient will participate in ongoing assessment of swallow, including re-evaluation clinically and/or including instrumental assessment of swallow if indicated, to further assess swallow function in anticipation to initiate  a po diet  -AB    Time Frame (Swallow Short Term Goal 1) 1 week  -AB    Progress/Outcomes (Swallow Short Term Goal 1) goal met  -AB       (STG) Pharyngeal Strengthening Exercise Goal 1 (SLP)    Activity (Pharyngeal Strengthening Goal 1, SLP) increase closure at entrance to airway/closure of airway at glottis  -AB    Increase Closure at Entrance to Airway/Closure of Airway at Glottis supraglottic swallow;falsetto;hard effortful swallow;breath hold exercises;effortful pitch glide (falsetto + pharyngeal squeeze)  -AB    [place]/Accuracy (Pharyngeal Strengthening Goal 1, SLP) independently (over 90% accuracy)  -AB    Time Frame (Pharyngeal Strengthening Goal 1, SLP) short term goal (STG)  -AB    Progress/Outcomes (Pharyngeal Strengthening Goal 1, SLP) new goal  -AB          User Key  (r) = Recorded By, (t) = Taken By, (c) = Cosigned By    Initials Name Provider Type    Sharon Pedersen, MS CCC-SLP Speech and Language Pathologist                   Time Calculation:       Therapy Charges for Today     Code Description Service Date Service Provider Modifiers Qty    [id number] HC ST MOTION FLUORO EVAL SWALLOW 8 11/19/2022 Sharon Holloway, MS CCC-SLP GN 1             PPE  Patient was not wearing a face mask during this therapy encounter. Therapist used appropriate personal protective equipment including mask, eye protection and gloves.  Mask used was standard procedure mask. Appropriate PPE was worn during the entire therapy session. The patient coughed during this evaluation. Therapist was within 6 feet for 15 minutes or more during the evaluation. Hand hygiene was completed before and after therapy session. Patient is not in enhanced droplet precautions.       Sharon Holloway MS CCC-SLP  11/19/2022

## 2022-11-19 NOTE — PLAN OF CARE
Goal Outcome Evaluation:  Plan of Care Reviewed With: patient        Progress: no change  Outcome Evaluation: Video swallow study completed, Dr. Ac requesting re-evaluation. See “MBS/VFSS” report for thorough details. Penetration occurring with multiple trials - laryngeal vestibular closure limited by cervical spurring, additional contributing factor of mechanical stimulation from NGT presence and overall narrowing of cervical esophagus. Varying degrees of penetration/aspiration and ejection. Minimal residuals sensed by pt and eliminated or reduced to trace with second swallow. Aspiration occurring with thin liquids, question trace aspiration with NTL definitive deep penetration to level of vocal folds, not ejected from the airway.     Recommend: HTL and mechanical soft solids. Medications: 1 at a time with HTL or puree. Compensations: additional swallow s/p solids, liquids; small bites/sips, upright for all PO, volitional throat clear every 1-2 bites..     Ongoing aspiration risk present given multiple incidents of penetration and poor laryngeal vestibular closure with anatomical variations.  Pt endorses no recent pneumonia. L and R basilar densities likely atelectasis reported with recent CXR imaging. Monitor pulmonary changes closely given results of instrumental.     SLP will follow for dysphagia therapy. If s/s aspiration noted (pt senses aspiration with delayed weak cough), consider NPO and contacting this department. Will likely repeat instrumental for advancement of liquids.

## 2022-11-19 NOTE — PROGRESS NOTES
"    Reason for follow-up: Post mitral valve and tricuspid valve repair in left atrial appendage ligation     Patient Care Team:  Jaret Castellanos MD as PCP - General (Family Medicine)  Felipe Garcia MD as Consulting Physician (Cardiology)    Subjective .   Clint Cee is doing well today     ROS    Morphine    Scheduled Meds:aspirin, 81 mg, Oral, Daily  atorvastatin, 40 mg, Oral, Nightly  Barium Sulfate, 1 teaspoon(s), Oral, Once in imaging  chlorhexidine, 15 mL, Mouth/Throat, Q12H  DULoxetine, 60 mg, Oral, Daily  [START ON 11/20/2022] enoxaparin, 40 mg, Subcutaneous, Daily  insulin lispro, 2-7 Units, Subcutaneous, Q6H  mupirocin, 1 application, Each Nare, BID  pantoprazole, 40 mg, Intravenous, Q AM  polyethylene glycol, 17 g, Oral, BID  senna-docusate sodium, 2 tablet, Oral, BID      Continuous Infusions:sodium chloride, 30 mL/hr, Last Rate: 30 mL/hr (11/17/22 1822)      PRN Meds:.•  acetaminophen **OR** acetaminophen **OR** acetaminophen  •  dextrose  •  dextrose  •  glucagon (human recombinant)  •  HYDROcodone-acetaminophen  •  HYDROmorphone **AND** naloxone  •  ondansetron  •  potassium chloride **OR** potassium chloride      VITAL SIGNS  Vitals:    11/19/22 0730 11/19/22 0800 11/19/22 0900 11/19/22 1124   BP: 117/65 134/72 121/65 124/69   BP Location:    Left arm   Patient Position:    Lying   Pulse: 75 82 80 81   Resp:    (!) 30   Temp:    97.6 °F (36.4 °C)   TempSrc:    Oral   SpO2: 98% 93% 97% 91%   Weight:       Height:           Flowsheet Rows    Flowsheet Row First Filed Value   Admission Height 182.9 cm (72\") Documented at 11/17/2022 0739   Admission Weight 107 kg (236 lb) Documented at 11/17/2022 0739             Physical Exam  VITALS REVIEWED    General:      well developed, in no acute distress.    Head:      normocephalic and atraumatic.    Eyes:      PERRL/EOM intact, conjunctiva and sclera clear with out nystagmus.    Neck:      no masses, thyromegaly,  trachea central with normal " respiratory effort and PMI displaced laterally  Lungs:      Clear  Heart:       Regular rate and rhythm  Msk:      no deformity or scoliosis noted of thoracic or lumbar spine.    Pulses:      pulses normal in all 4 extremities.    Extremities:       No lower extremity edema  Neurologic:      no focal deficits.   alert oriented x3  Skin:      intact without lesions or rashes.    Psych:      alert and cooperative; normal mood and affect; normal attention span and concentration.          LAB RESULTS (LAST 7 DAYS)    CBC  Results from last 7 days   Lab Units 11/19/22  0331 11/18/22  0536 11/18/22  0350 11/18/22  0348 11/18/22  0257 11/18/22  0046 11/18/22  0005 11/17/22  1822 11/17/22  1748 11/17/22  1624 11/17/22  1300 11/16/22  0828   WBC 10*3/mm3 7.30  --  7.10  --   --   --   --   --  2.80* 4.70  --  4.80   RBC 10*6/mm3 3.53*  --  2.76*  --   --   --   --   --  3.53* 3.25*  --  4.69   HEMOGLOBIN g/dL 10.8*  --  8.5*  --   --   --   --   --  10.7* 10.2*  --  14.3   HEMOGLOBIN, POC g/dL  --  9.1*  --  9.1* 9.4* 9.9* 10.0*   < >  --   --    < >  --    HEMATOCRIT % 32.3*  --  24.8*  --   --   --   --   --  31.5* 29.8*  --  41.9   HEMATOCRIT POC %  --  27*  --  27* 28* 29* 30*   < >  --   --    < >  --    MCV fL 91.4  --  89.9  --   --   --   --   --  89.1 91.7  --  89.4   PLATELETS 10*3/mm3 56*  --  62*  --   --   --   --   --  84* 89*  --  92*    < > = values in this interval not displayed.       BMP  Results from last 7 days   Lab Units 11/19/22  0331 11/18/22  0350 11/18/22  0057 11/17/22  1748 11/16/22  0828   SODIUM mmol/L 140 145  --  140 138   POTASSIUM mmol/L 3.9 3.0* 3.0* 4.1 4.4   CHLORIDE mmol/L 105 116*  --  107 101   CO2 mmol/L 24.0 19.0*  --  22.0 27.0   BUN mg/dL 20 21  --  24* 25*   CREATININE mg/dL 0.74* 0.97  --  1.07 0.85   GLUCOSE mg/dL 142* 126*  --  106* 120*   MAGNESIUM mg/dL  --  1.9  --   --   --    PHOSPHORUS mg/dL  --  4.4  --  5.4*  --        CMP   Results from last 7 days   Lab Units  11/19/22  0331 11/18/22  0350 11/18/22  0057 11/17/22  1748 11/16/22  0828   SODIUM mmol/L 140 145  --  140 138   POTASSIUM mmol/L 3.9 3.0* 3.0* 4.1 4.4   CHLORIDE mmol/L 105 116*  --  107 101   CO2 mmol/L 24.0 19.0*  --  22.0 27.0   BUN mg/dL 20 21  --  24* 25*   CREATININE mg/dL 0.74* 0.97  --  1.07 0.85   GLUCOSE mg/dL 142* 126*  --  106* 120*   ALBUMIN g/dL  --  3.60  --  4.10 4.40   BILIRUBIN mg/dL  --   --   --   --  0.3   ALK PHOS U/L  --   --   --   --  100   AST (SGOT) U/L  --   --   --   --  17   ALT (SGPT) U/L  --   --   --   --  18         BNP        TROPONIN        CoAg  Results from last 7 days   Lab Units 11/18/22  0350 11/17/22  1750 11/17/22  1624 11/17/22  1447 11/16/22  0828   INR  1.17* 1.17* 1.25* 1.17* 1.01   APTT seconds  --  23.6* 25.6 23.9* 27.6       Creatinine Clearance  Estimated Creatinine Clearance: 110 mL/min (A) (by C-G formula based on SCr of 0.74 mg/dL (L)).    ABG  Results from last 7 days   Lab Units 11/18/22  1011 11/18/22  0744 11/18/22  0711 11/18/22  0536 11/18/22  0348 11/18/22  0257 11/18/22  0046   PH, ARTERIAL pH units 7.362 7.388 7.469* 7.460* 7.294* 7.364 7.324*   PCO2, ARTERIAL mm Hg 47.0 40.1 30.6* 26.0* 46.8 36.8 47.6   PO2 ART mm Hg 113.0* 159.9* 152.6* 126.4* 118.3* 172.6* 159.5*   O2 SATURATION ART % 98.2* 99.4* 99.5* 99.1* 98.1* 99.5* 99.3*   BASE EXCESS ART mmol/L 0.9 -0.8* -0.9* -4.4* -3.7* -4.0* -1.5*         EKG    I personally reviewed the patient's EKG/Telemetry data: Ventricular paced      Assessment & Plan       Nonrheumatic tricuspid valve regurgitation    Nonrheumatic mitral valve regurgitation      Clint Cee underwent mitral valve and tricuspid valve repair and left atrial appendage ligation.  Patient remains in a ventricular paced rhythm, his underlying is A. fib in the 30s.  If his heart rate does not recover, then he might need a pacemaker.  Also consider rhythm restoration since he was in sinus rhythm prior to surgery.      I discussed the  patients findings and my recommendations with patient and agrees with outlined plan.    Henok Leon MD  11/19/22  12:20 EST

## 2022-11-19 NOTE — CONSULTS
Nutrition Services    Patient Name: Clint Cee  YOB: 1945  MRN: [id number]  Admission date: 11/17/2022    Comment:  -- Will monitor for diet advancement and ability add liquid supplement with appropriate consistency      PPE Documentation        PPE Worn By Provider mask, gloves and eye protection   PPE Worn By Patient  None      CLINICAL NUTRITION ASSESSMENT      Reason for Assessment 11/19: Consult for tube feeding      H&P      Past Medical History:   Diagnosis Date   • Abnormal ECG June 2022   • Allergic 01/01/1954    Nasal alergies   • Arrhythmia 2004    Dr Youngblood examined me and said i was ok.   • Asthma    • Benign prostatic hyperplasia 11/14/2018   • Cataract 01/01/2014    Surgery. Caused detached retina of right eye 20/60 repair   • Colon polyp 01/01/2018    Found and removed last colon eca.q   • Gastroesophageal reflux disease 03/20/2014   • Glaucoma 2003    Normal pressures. Have low pressure glaucoma   • Headache 01/01/2015    Have shimmering in both eyes intermittantly. No headaches   • Heart murmur May 2022    During wellness exam   • Heart valve disease July 2022    During Echo   • HL (hearing loss) 01/01/2012    Have hearing aids   • Hypertension 12/02/2011   • Infectious viral hepatitis 1954    Yellow jaundis as child   • Insomnia 12/02/2011   • Mitral valve prolapse June 2022   • Mood disorder (HCC) 09/19/2013   • Polyosteoarthritis 12/02/2011   • Prostate Cancer Jan22 January 2022   • Spinal stenosis 12/02/2011   • Visual impairment 01/01/1955    Nearsighted corrected glasses   • Vitamin D deficiency 05/23/2018       Past Surgical History:   Procedure Laterality Date   • ADENOIDECTOMY  1954    As child   • APPENDECTOMY  1956    Surgery   • CARDIAC CATHETERIZATION N/A 9/30/2022    Procedure: Right and Left Heart Cath with Coronar Angiography;  Surgeon: Felipe Garcia MD;  Location: [facility] CATH INVASIVE LOCATION;  Service: Cardiovascular;  Laterality: N/A;   • COLONOSCOPY   "01/01/1997    Regularly scheduled   • EYE SURGERY  01/01/2010    Reattached right retina   • SPINE SURGERY  01/01/1971    2 lumbar 1 cervical   • TONSILLECTOMY  01/01/1950    Childhood        Current Problems   S/P MVR, TVR 11/17  - extubated 11/18  - SLP recommended NPO       Encounter Information        Trending Narrative     11/19: Patient S/P surgery, SLP has recommended NPO yesterday, therefore RD was consulted to place tube feeding orders.  SLP revisited again today and was able to advance diet.  NG tube removed.  No longer planning to begin tube feeding.  RD visited patient at bedside.  Spoke mostly to wife at bedside, patient just received pain medication and is sleeping.  Willing to get supplement with liquids advanced to appropriate consistency.       Anthropometrics        Current Height, Weight Height: 182.9 cm (72\")  Weight: 109 kg (239 lb 6.4 oz) (11/19/22 0600)       Ideal Body Weight (IBW) 178#   Usual Body Weight (UBW) Unable to obtain from patient        Trending Weight Hx     This admission: 11/19: 236-241# range             PTA: Very stable weight history     Wt Readings from Last 30 Encounters:   11/19/22 0600 109 kg (239 lb 6.4 oz)   11/18/22 0630 110 kg (241 lb 10 oz)   11/17/22 0739 107 kg (236 lb)   11/16/22 1042 109 kg (241 lb 6.4 oz)   11/16/22 1034 109 kg (241 lb 6.4 oz)   10/10/22 1331 107 kg (235 lb)   10/05/22 1425 108 kg (237 lb 8 oz)   09/30/22 1100 109 kg (239 lb 6.7 oz)   09/28/22 1435 108 kg (237 lb)   09/28/22 1357 108 kg (237 lb)   09/22/22 1015 108 kg (237 lb)   09/07/22 1007 108 kg (237 lb)   06/28/22 1053 108 kg (237 lb)   06/19/22 1150 107 kg (236 lb 5.3 oz)   06/19/22 1059 104 kg (229 lb)   06/15/22 1638 106 kg (233 lb)   05/13/22 1028 106 kg (233 lb)   03/31/22 0901 105 kg (231 lb)   12/09/21 1139 103 kg (228 lb)   08/30/21 1616 104 kg (229 lb 3.2 oz)   08/17/21 0846 107 kg (235 lb)   05/10/21 1538 107 kg (236 lb)   11/16/20 1111 106 kg (234 lb)   07/15/20 1415 106 kg (234 " lb)   01/15/20 1339 107 kg (236 lb)   11/21/18 1441 99.8 kg (220 lb)   06/21/18 1618 103 kg (227 lb)   06/14/18 1110 103 kg (227 lb)   11/17/17 1328 108 kg (239 lb)   11/01/17 1119 104 kg (230 lb)   03/31/17 1145 110 kg (242 lb)   11/16/16 1325 111 kg (244 lb)      BMI kg/m2 Body mass index is 32.47 kg/m².       Labs        Pertinent Labs    Results from last 7 days   Lab Units 11/19/22  0331 11/18/22  0350 11/18/22  0057 11/17/22  1748 11/16/22  0828   SODIUM mmol/L 140 145  --  140 138   POTASSIUM mmol/L 3.9 3.0* 3.0* 4.1 4.4   CHLORIDE mmol/L 105 116*  --  107 101   CO2 mmol/L 24.0 19.0*  --  22.0 27.0   BUN mg/dL 20 21  --  24* 25*   CREATININE mg/dL 0.74* 0.97  --  1.07 0.85   CALCIUM mg/dL 8.7 7.1*  --  9.5 9.0   BILIRUBIN mg/dL  --   --   --   --  0.3   ALK PHOS U/L  --   --   --   --  100   ALT (SGPT) U/L  --   --   --   --  18   AST (SGOT) U/L  --   --   --   --  17   GLUCOSE mg/dL 142* 126*  --  106* 120*     Results from last 7 days   Lab Units 11/19/22  0331 11/18/22  0536 11/18/22  0350 11/17/22  1300 11/16/22  0828   MAGNESIUM mg/dL  --   --  1.9  --   --    PHOSPHORUS mg/dL  --   --  4.4   < >  --    HEMOGLOBIN g/dL 10.8*  --  8.5*   < > 14.3   HEMOGLOBIN, POC   --    < >  --    < >  --    HEMATOCRIT % 32.3*  --  24.8*   < > 41.9   HEMATOCRIT POC   --    < >  --    < >  --    TRIGLYCERIDES mg/dL  --   --   --   --  161*    < > = values in this interval not displayed.     COVID19   Date Value Ref Range Status   11/16/2022 Not Detected Not Detected - Ref. Range Final     Lab Results   Component Value Date    HGBA1C 4.9 11/16/2022        Medications    Scheduled Medications aspirin, 81 mg, Oral, Daily  atorvastatin, 40 mg, Oral, Nightly  ceFAZolin, 2 g, Intravenous, Q8H  chlorhexidine, 15 mL, Mouth/Throat, Q12H  DULoxetine, 60 mg, Oral, Daily  enoxaparin, 40 mg, Subcutaneous, Daily  mupirocin, 1 application, Each Nare, BID  pantoprazole, 40 mg, Intravenous, Q AM  polyethylene glycol, 17 g, Oral,  "BID  senna-docusate sodium, 2 tablet, Oral, BID        Infusions insulin, 0-100 Units/hr, Last Rate: 0.3 Units/hr (11/18/22 1843)  sodium chloride, 30 mL/hr, Last Rate: 30 mL/hr (11/17/22 1822)        PRN Medications •  acetaminophen **OR** acetaminophen **OR** acetaminophen  •  dextrose  •  dextrose  •  glucagon (human recombinant)  •  HYDROcodone-acetaminophen  •  HYDROmorphone **AND** naloxone  •  ondansetron  •  potassium chloride **OR** potassium chloride     Physical Findings        Trending Physical   Appearance, NFPE 11/19: NFPE completed and not consistent with nutrition diagnosis of malnutrition at this time using AND/ASPEN criteria      --  Edema  No edema documented       Bowel Function No BM since admission (x 2 days)     Tubes NG tube removed this AM after SLP advanced die      Chewing/Swallowing SLP recommends NPO     Skin Midline sternal incision        Estimated/Assessed Needs    Estimated 11/19/22   Energy Requirements    Height for Calculation  Height: 182.9 cm (72\")   Weight for Calculation 109 kg CBW   Method for Estimation  [place] Jeor   EST Needs (kcal/day) 1864 kcal       Protein Requirements    Weight for Calculation 80.9 kg IBW   EST Protein Needs (g/kg) 2.0 g/kg    EST Daily Needs (g/day) 162 grams/day        Fluid Requirements     Estimated Needs (mL/day) 1 mL/kcal, will monitor per clinical picture        Fluid Deficit    Current Na Level (mEq/L)    Desired Na Level (mEq/L)    Estimated Fluid Deficit (L)       Current Nutrition Orders & Evaluation of Intake       Oral Nutrition     Food Allergies NKFA   Current PO Diet NPO Diet NPO Type: Sips with Meds   Supplement None ordered    PO Evaluation     Trending % PO Intake 11/19: NPO      Enteral Nutrition    Enteral Route    TF Modular    TF Delivery Method    Current TF Order    Current Water Flush     TF Residual/Tolerance     TF Observation         Parenteral Nutrition     TPN Route    Current TPN Order    Lipids (mL/%/frequency)   "   MVI Frequency     Trace Element Frequency     Total # Days on TPN    TPN Observation         Nutrition Course Details    PO Diets: NPO since admission   Diet advanced to mechanical ground, HTL 11/19   Nutrition Support:      Nutritional Risk Screening        NRS-2002 Score          Nutrition Diagnosis         Nutrition Dx Problem 1 Inadequate energy intake related to clinical course as evidenced by NPO.      Nutrition Dx Problem 2        Intervention Goal         Intervention Goal(s) PO intakes at least 50%     Nutrition Intervention        RD Action Monitor for PO intakes     Nutrition Prescription          Diet Prescription Mechanical Ground, Heart Healthy, HTL   Supplement Prescription      Enteral Prescription If TF needed:   Initial Goal:  *initial goal conservative d/t risk of RFS     Peptamen Intense VHP at 40mL/hr + 20mL/hr water flush      End Goal:    Peptamen Intense VHP at 75 mL/hr + water flush per clinical picture      Calories  1650 kcals (89%)   Protein  166 g (102%)   Free water  1386 mL   Flushes  Will monitor per clinical picture      The above end goal rate is for 22 hrs/day to assume interruptions for ADLs. Water flushes adjusted based on clinical picture + Rx flushes/IV fluids     Specialized formula chosen r/t high protein needs in the critical care setting.          TPN Prescription      Monitor/Evaluation        Monitor Per protocol, I&O, Pertinent labs, EN delivery/tolerance, Weight, Skin status, GI status, Symptoms, POC/GOC, Swallow function       Electronically signed by:  Eloina Holland RD  11/19/22 07:03 EST

## 2022-11-19 NOTE — CASE MANAGEMENT/SOCIAL WORK
Continued Stay Note   [facility]     Patient Name: Clint Cee  MRN: [id number]  Today's Date: 11/19/2022    Admit Date: 11/17/2022     LATE ENTRY    Plan: DC Plan: Pending Clinical Course and PT/OT evaluations. IP Rehab, HH, OP Rehab choices selected. See Notes. MVR/TVR 11/17/22   Discharge Plan     Row Name 11/18/22 1700       Plan    Plan DC Plan: Pending Clinical Course and PT/OT evaluations. IP Rehab, HH, OP Rehab choices selected. See Notes. MVR/TVR 11/17/22    Provided Post Acute Provider List? N/A    Provided Post Acute Provider Quality & Resource List? N/A    Plan Comments CM went to patient room to obtain choices for discharge planning. Patient spouse at bedside and gave requested information. Patient extubated at this time. Patient and spouse have made the following selections. IP rehab 1) [facility], and 2) [facility]. SNF 1) [facility], 2) [facility], and 3) [facility]. HH 1) [facility], 2) [facility], and 3) [facility]. OP therapy not selected. DME 1) [facility], 2)[facility] and 3) [facility]. CM will conitinue to follow for additional needs. DC Barriers: POD 1 O[facility].              Expected Discharge Date and Time     Expected Discharge Date Expected Discharge Time    Nov 23, 2022         Met with patient in room wearing PPE: mask    Maintain distance greater than six feet and spent less than fifteen minutes in the room.      Smitha Pollock RN     Office Phone: [phone]  Office Cell:     [phone]

## 2022-11-19 NOTE — PROGRESS NOTES
LOS: 2 days   Patient Care Team:  Jaret Castellanos MD as PCP - General (Family Medicine)  Felipe Garcia MD as Consulting Physician (Cardiology)    Chief Complaint:       Subjective      Vital Signs  Temp:  [97.9 °F (36.6 °C)-100.1 °F (37.8 °C)] 98.9 °F (37.2 °C)  Heart Rate:  [53-87] 80  Resp:  [16-26] 26  BP: (108-140)/(53-72) 121/65  Body mass index is 32.47 kg/m².    Intake/Output Summary (Last 24 hours) at 11/19/2022 0956  Last data filed at 11/19/2022 0600  Gross per 24 hour   Intake 1286 ml   Output 1430 ml   Net -144 ml     No intake/output data recorded.      Wt Readings from Last 3 Encounters:   11/19/22 109 kg (239 lb 6.4 oz)   11/16/22 109 kg (241 lb 6.4 oz)   10/10/22 107 kg (235 lb)         Objective    Results Review:        WBC No results found for: WBCS   HGB Hemoglobin   Date Value Ref Range Status   11/19/2022 10.8 (L) 13.0 - 17.7 g/dL Final     Comment:     Result checked     11/18/2022 9.1 (L) 12.0 - 17.0 g/dL Final   11/18/2022 8.5 (L) 13.0 - 17.7 g/dL Final     Comment:     Result checked     11/18/2022 9.1 (L) 12.0 - 17.0 g/dL Final   11/18/2022 9.4 (L) 12.0 - 17.0 g/dL Final   11/18/2022 9.9 (L) 12.0 - 17.0 g/dL Final   11/18/2022 10.0 (L) 12.0 - 17.0 g/dL Final   11/17/2022 9.4 (L) 12.0 - 17.0 g/dL Final   11/17/2022 9.0 (L) 12.0 - 17.0 g/dL Final   11/17/2022 9.4 (L) 12.0 - 17.0 g/dL Final   11/17/2022 10.1 (L) 12.0 - 17.0 g/dL Final   11/17/2022 10.7 (L) 13.0 - 17.7 g/dL Final   11/17/2022 10.2 (L) 13.0 - 17.7 g/dL Final   11/17/2022 9.9 (L) 12.0 - 17.0 g/dL Final   11/17/2022 10.9 (L) 12.0 - 17.0 g/dL Final   11/17/2022 11.2 (L) 12.0 - 17.0 g/dL Final   11/17/2022 10.2 (L) 12.0 - 17.0 g/dL Final   11/17/2022 9.2 (L) 12.0 - 17.0 g/dL Final   11/17/2022 9.2 (L) 12.0 - 17.0 g/dL Final   11/17/2022 11.2 (L) 12.0 - 17.0 g/dL Final      HCT Hematocrit   Date Value Ref Range Status   11/19/2022 32.3 (L) 37.5 - 51.0 % Final     Comment:     Result checked     11/18/2022 27 (L) 38 - 51 %  Final   11/18/2022 24.8 (L) 37.5 - 51.0 % Final   11/18/2022 27 (L) 38 - 51 % Final   11/18/2022 28 (L) 38 - 51 % Final   11/18/2022 29 (L) 38 - 51 % Final   11/18/2022 30 (L) 38 - 51 % Final   11/17/2022 28 (L) 38 - 51 % Final   11/17/2022 27 (L) 38 - 51 % Final   11/17/2022 28 (L) 38 - 51 % Final   11/17/2022 30 (L) 38 - 51 % Final   11/17/2022 31.5 (L) 37.5 - 51.0 % Final   11/17/2022 29.8 (L) 37.5 - 51.0 % Final   11/17/2022 29 (L) 38 - 51 % Final   11/17/2022 32 (L) 38 - 51 % Final   11/17/2022 33 (L) 38 - 51 % Final   11/17/2022 30 (L) 38 - 51 % Final   11/17/2022 27 (L) 38 - 51 % Final   11/17/2022 27 (L) 38 - 51 % Final   11/17/2022 33 (L) 38 - 51 % Final      Platelets No results found for: LABPLAT     PT/INR:    Protime   Date Value Ref Range Status   11/18/2022 11.9 (H) 9.6 - 11.7 Seconds Final   11/17/2022 11.9 (H) 9.6 - 11.7 Seconds Final   11/17/2022 12.7 (H) 9.6 - 11.7 Seconds Final   11/17/2022 11.9 (H) 9.6 - 11.7 Seconds Final   /  INR   Date Value Ref Range Status   11/18/2022 1.17 (H) 0.93 - 1.10 Final   11/17/2022 1.17 (H) 0.93 - 1.10 Final   11/17/2022 1.25 (H) 0.93 - 1.10 Final   11/17/2022 1.17 (H) 0.93 - 1.10 Final       Sodium Sodium   Date Value Ref Range Status   11/19/2022 140 136 - 145 mmol/L Final   11/18/2022 145 136 - 145 mmol/L Final   11/17/2022 140 136 - 145 mmol/L Final      Potassium Potassium   Date Value Ref Range Status   11/19/2022 3.9 3.5 - 5.2 mmol/L Final   11/18/2022 3.0 (L) 3.5 - 5.2 mmol/L Final   11/18/2022 3.0 (L) 3.5 - 5.2 mmol/L Final     Comment:     Result checked     11/17/2022 4.1 3.5 - 5.2 mmol/L Final     Comment:     Slight hemolysis detected by analyzer. Results may be affected.      Chloride Chloride   Date Value Ref Range Status   11/19/2022 105 98 - 107 mmol/L Final   11/18/2022 116 (H) 98 - 107 mmol/L Final   11/17/2022 107 98 - 107 mmol/L Final      Bicarbonate No results found for: PLASMABICARB   BUN BUN   Date Value Ref Range Status   11/19/2022 20 8  - 23 mg/dL Final   11/18/2022 21 8 - 23 mg/dL Final   11/17/2022 24 (H) 8 - 23 mg/dL Final      Creatinine Creatinine   Date Value Ref Range Status   11/19/2022 0.74 (L) 0.76 - 1.27 mg/dL Final   11/18/2022 0.97 0.76 - 1.27 mg/dL Final   11/17/2022 1.07 0.76 - 1.27 mg/dL Final      Calcium Calcium   Date Value Ref Range Status   11/19/2022 8.7 8.6 - 10.5 mg/dL Final   11/18/2022 7.1 (L) 8.6 - 10.5 mg/dL Final   11/17/2022 9.5 8.6 - 10.5 mg/dL Final      Magnesium Magnesium   Date Value Ref Range Status   11/18/2022 1.9 1.6 - 2.4 mg/dL Final         .imag    aspirin, 81 mg, Oral, Daily  atorvastatin, 40 mg, Oral, Nightly  chlorhexidine, 15 mL, Mouth/Throat, Q12H  DULoxetine, 60 mg, Oral, Daily  enoxaparin, 40 mg, Subcutaneous, Daily  insulin lispro, 2-7 Units, Subcutaneous, Q6H  mupirocin, 1 application, Each Nare, BID  pantoprazole, 40 mg, Intravenous, Q AM  polyethylene glycol, 17 g, Oral, BID  senna-docusate sodium, 2 tablet, Oral, BID      sodium chloride, 30 mL/hr, Last Rate: 30 mL/hr (11/17/22 1822)          Patient Active Problem List   Diagnosis Code   • Benign prostatic hyperplasia N40.0   • Gastroesophageal reflux disease K21.9   • Hypertension I10   • Mood disorder ([facility]) F39   • Scoliosis M41.9   • Spinal stenosis M48.00   • Vitamin D deficiency E55.9   • Polyosteoarthritis M15.9   • Insomnia G47.00   • Retinal detachment of right eye with single retinal tear H33.011   • Osteoarthritis of knee M17.9   • Cellulitis of face L03.211   • Prostate cancer ([facility]) C61   • Ocular migraine G43.109   • Headache R51.9   • Gout of multiple sites M10.9   • Medicare annual wellness visit, subsequent Z00.00   • Allergic T78.40XA   • HL (hearing loss) H91.90   • Visual impairment H54.7   • Cataract H26.9   • Colon polyp K63.5   • Heart murmur on physical examination R01.1   • Mitral valve prolapse I34.1   • Premature ventricular contractions I49.3   • Nonrheumatic mitral valve regurgitation I34.0   • Severe mitral  regurgitation I34.0   • Moderate tricuspid regurgitation I07.1   • Primary hypertension I10   • Dyspnea on exertion R06.09   • Nonrheumatic tricuspid valve regurgitation I36.1       Assessment & Plan    - Severe mitral regurgitation, moderate tricuspid regurgitation, EF 60% (TERRA)--s/p elective complex MVr/ TVr/ CASSIE endocardial closure (Pagni)  - HTN--stable  - Dyslipidemia--statin  - Hx prostate CA, s/p XRT  - Vitamin D deficiency  - Chronic TCP--recd platelets intraop  - Intraop coagulopathy likely d/t chr TCP  - Postop ABLA, expected--watch closely     POD# 2.  Doing well. Creatinine 0.74, order lasix. /8hr. Will keep them one more day. NPO, needs a swallow eval. Platelets 56m hold lovenox. HR in the 30s, pace DDD 80, hold BB.      Gorge Ac MD  11/19/22  09:56 EST

## 2022-11-20 ENCOUNTER — APPOINTMENT (OUTPATIENT)
Dept: GENERAL RADIOLOGY | Facility: HOSPITAL | Age: 77
End: 2022-11-20

## 2022-11-20 LAB
ANION GAP SERPL CALCULATED.3IONS-SCNC: 12 MMOL/L (ref 5–15)
BUN SERPL-MCNC: 28 MG/DL (ref 8–23)
BUN/CREAT SERPL: 31.5 (ref 7–25)
CALCIUM SPEC-SCNC: 8.7 MG/DL (ref 8.6–10.5)
CHLORIDE SERPL-SCNC: 101 MMOL/L (ref 98–107)
CO2 SERPL-SCNC: 26 MMOL/L (ref 22–29)
CREAT SERPL-MCNC: 0.89 MG/DL (ref 0.76–1.27)
DEPRECATED RDW RBC AUTO: 45.5 FL (ref 37–54)
EGFRCR SERPLBLD CKD-EPI 2021: 89.4 ML/MIN/1.73
ERYTHROCYTE [DISTWIDTH] IN BLOOD BY AUTOMATED COUNT: 14.3 % (ref 12.3–15.4)
GLUCOSE BLDC GLUCOMTR-MCNC: 111 MG/DL (ref 70–105)
GLUCOSE BLDC GLUCOMTR-MCNC: 115 MG/DL (ref 70–105)
GLUCOSE BLDC GLUCOMTR-MCNC: 136 MG/DL (ref 70–105)
GLUCOSE BLDC GLUCOMTR-MCNC: 138 MG/DL (ref 70–105)
GLUCOSE SERPL-MCNC: 141 MG/DL (ref 65–99)
HCT VFR BLD AUTO: 33.4 % (ref 37.5–51)
HGB BLD-MCNC: 11 G/DL (ref 13–17.7)
MCH RBC QN AUTO: 30.1 PG (ref 26.6–33)
MCHC RBC AUTO-ENTMCNC: 33 G/DL (ref 31.5–35.7)
MCV RBC AUTO: 91.1 FL (ref 79–97)
PLATELET # BLD AUTO: 71 10*3/MM3 (ref 140–450)
PMV BLD AUTO: 7.5 FL (ref 6–12)
POTASSIUM SERPL-SCNC: 4.2 MMOL/L (ref 3.5–5.2)
QT INTERVAL: 407 MS
QT INTERVAL: 420 MS
RBC # BLD AUTO: 3.66 10*6/MM3 (ref 4.14–5.8)
SODIUM SERPL-SCNC: 139 MMOL/L (ref 136–145)
WBC NRBC COR # BLD: 7.5 10*3/MM3 (ref 3.4–10.8)

## 2022-11-20 PROCEDURE — 85027 COMPLETE CBC AUTOMATED: CPT | Performed by: NURSE PRACTITIONER

## 2022-11-20 PROCEDURE — 25010000002 FUROSEMIDE PER 20 MG

## 2022-11-20 PROCEDURE — 80048 BASIC METABOLIC PNL TOTAL CA: CPT | Performed by: NURSE PRACTITIONER

## 2022-11-20 PROCEDURE — 25010000002 HYDROMORPHONE 1 MG/ML SOLUTION: Performed by: NURSE PRACTITIONER

## 2022-11-20 PROCEDURE — 71045 X-RAY EXAM CHEST 1 VIEW: CPT

## 2022-11-20 PROCEDURE — 99232 SBSQ HOSP IP/OBS MODERATE 35: CPT | Performed by: INTERNAL MEDICINE

## 2022-11-20 PROCEDURE — 82962 GLUCOSE BLOOD TEST: CPT

## 2022-11-20 RX ORDER — FUROSEMIDE 10 MG/ML
40 INJECTION INTRAMUSCULAR; INTRAVENOUS ONCE
Status: COMPLETED | OUTPATIENT
Start: 2022-11-20 | End: 2022-11-20

## 2022-11-20 RX ORDER — INSULIN LISPRO 100 [IU]/ML
2-7 INJECTION, SOLUTION INTRAVENOUS; SUBCUTANEOUS
Status: DISCONTINUED | OUTPATIENT
Start: 2022-11-20 | End: 2022-11-21 | Stop reason: HOSPADM

## 2022-11-20 RX ORDER — ENOXAPARIN SODIUM 100 MG/ML
40 INJECTION SUBCUTANEOUS DAILY
Status: DISCONTINUED | OUTPATIENT
Start: 2022-11-21 | End: 2022-11-21

## 2022-11-20 RX ADMIN — MUPIROCIN 1 APPLICATION: 20 OINTMENT TOPICAL at 08:03

## 2022-11-20 RX ADMIN — PANTOPRAZOLE SODIUM 40 MG: 40 INJECTION, POWDER, FOR SOLUTION INTRAVENOUS at 06:22

## 2022-11-20 RX ADMIN — HYDROCODONE BITARTRATE AND ACETAMINOPHEN 1 TABLET: 5; 325 TABLET ORAL at 00:50

## 2022-11-20 RX ADMIN — HYDROCODONE BITARTRATE AND ACETAMINOPHEN 1 TABLET: 5; 325 TABLET ORAL at 08:03

## 2022-11-20 RX ADMIN — SENNOSIDES AND DOCUSATE SODIUM 2 TABLET: 50; 8.6 TABLET ORAL at 20:05

## 2022-11-20 RX ADMIN — DULOXETINE HYDROCHLORIDE 60 MG: 30 CAPSULE, DELAYED RELEASE ORAL at 08:03

## 2022-11-20 RX ADMIN — Medication 81 MG: at 08:03

## 2022-11-20 RX ADMIN — SENNOSIDES AND DOCUSATE SODIUM 2 TABLET: 50; 8.6 TABLET ORAL at 08:03

## 2022-11-20 RX ADMIN — ATORVASTATIN CALCIUM 40 MG: 40 TABLET, FILM COATED ORAL at 20:05

## 2022-11-20 RX ADMIN — CHLORHEXIDINE GLUCONATE 15 ML: 1.2 RINSE ORAL at 17:05

## 2022-11-20 RX ADMIN — POLYETHYLENE GLYCOL 3350 17 G: 17 POWDER, FOR SOLUTION ORAL at 08:03

## 2022-11-20 RX ADMIN — MUPIROCIN 1 APPLICATION: 20 OINTMENT TOPICAL at 20:05

## 2022-11-20 RX ADMIN — HYDROCODONE BITARTRATE AND ACETAMINOPHEN 1 TABLET: 5; 325 TABLET ORAL at 20:05

## 2022-11-20 RX ADMIN — FUROSEMIDE 40 MG: 10 INJECTION, SOLUTION INTRAMUSCULAR; INTRAVENOUS at 09:51

## 2022-11-20 RX ADMIN — HYDROMORPHONE HYDROCHLORIDE 0.25 MG: 1 INJECTION, SOLUTION INTRAMUSCULAR; INTRAVENOUS; SUBCUTANEOUS at 21:18

## 2022-11-20 RX ADMIN — POLYETHYLENE GLYCOL 3350 17 G: 17 POWDER, FOR SOLUTION ORAL at 20:05

## 2022-11-20 RX ADMIN — HYDROCODONE BITARTRATE AND ACETAMINOPHEN 1 TABLET: 5; 325 TABLET ORAL at 14:23

## 2022-11-20 NOTE — PLAN OF CARE
Goal Outcome Evaluation:  Plan of Care Reviewed With: patient        Progress: improving     VSS, NSR with 1st degree AV block with HR in 80's. Denisha, MS CT's, IJ cordis d/c, pt tolerated well. Pt ambulated with RN 3 times on unit, slight SOA, O2 sats maintained greater than 95%. AV wires to remain.

## 2022-11-20 NOTE — PROGRESS NOTES
"    Reason for follow-up: Post mitral valve and tricuspid valve repair and left atrial appendage ligation     Patient Care Team:  Jaret Castellanos MD as PCP - General (Family Medicine)  Felipe Garcia MD as Consulting Physician (Cardiology)    Subjective .   Clint Cee is doing well today     ROS    Morphine    Scheduled Meds:aspirin, 81 mg, Oral, Daily  atorvastatin, 40 mg, Oral, Nightly  Barium Sulfate, 1 teaspoon(s), Oral, Once in imaging  chlorhexidine, 15 mL, Mouth/Throat, Q12H  DULoxetine, 60 mg, Oral, Daily  enoxaparin, 40 mg, Subcutaneous, Daily  insulin lispro, 2-7 Units, Subcutaneous, Q6H  mupirocin, 1 application, Each Nare, BID  pantoprazole, 40 mg, Intravenous, Q AM  polyethylene glycol, 17 g, Oral, BID  senna-docusate sodium, 2 tablet, Oral, BID      Continuous Infusions:sodium chloride, 30 mL/hr, Last Rate: 30 mL/hr (11/17/22 1822)      PRN Meds:.•  acetaminophen **OR** acetaminophen **OR** acetaminophen  •  dextrose  •  dextrose  •  glucagon (human recombinant)  •  HYDROcodone-acetaminophen  •  HYDROmorphone **AND** naloxone  •  ondansetron  •  potassium chloride **OR** potassium chloride      VITAL SIGNS  Vitals:    11/20/22 0500 11/20/22 0600 11/20/22 0700 11/20/22 0800   BP: 114/60 126/76 141/79 123/71   BP Location:    Left arm   Patient Position:    Lying   Pulse: 74 74 84 78   Resp:    24   Temp:    97.7 °F (36.5 °C)   TempSrc:    Oral   SpO2: 97% 99% 96% 97%   Weight:  111 kg (244 lb 7.8 oz)     Height:           Flowsheet Rows    Flowsheet Row First Filed Value   Admission Height 182.9 cm (72\") Documented at 11/17/2022 0739   Admission Weight 107 kg (236 lb) Documented at 11/17/2022 0739             Physical Exam  VITALS REVIEWED    General:      well developed, in no acute distress.    Head:      normocephalic and atraumatic.    Eyes:      PERRL/EOM intact, conjunctiva and sclera clear with out nystagmus.    Neck:      no masses, thyromegaly,  trachea central with normal " respiratory effort and PMI displaced laterally  Lungs:      Clear  Heart:       Regular rate and rhythm  Msk:      no deformity or scoliosis noted of thoracic or lumbar spine.    Pulses:      pulses normal in all 4 extremities.    Extremities:       No lower extremity edema  Neurologic:      no focal deficits.   alert oriented x3  Skin:      intact without lesions or rashes.    Psych:      alert and cooperative; normal mood and affect; normal attention span and concentration.          LAB RESULTS (LAST 7 DAYS)    CBC  Results from last 7 days   Lab Units 11/20/22  0404 11/19/22  0331 11/18/22  0536 11/18/22  0350 11/18/22  0348 11/18/22  0257 11/18/22  0046 11/17/22  1822 11/17/22  1748 11/17/22  1624 11/17/22  1300 11/16/22  0828   WBC 10*3/mm3 7.50 7.30  --  7.10  --   --   --   --  2.80* 4.70  --  4.80   RBC 10*6/mm3 3.66* 3.53*  --  2.76*  --   --   --   --  3.53* 3.25*  --  4.69   HEMOGLOBIN g/dL 11.0* 10.8*  --  8.5*  --   --   --   --  10.7* 10.2*  --  14.3   HEMOGLOBIN, POC g/dL  --   --  9.1*  --  9.1* 9.4* 9.9*   < >  --   --    < >  --    HEMATOCRIT % 33.4* 32.3*  --  24.8*  --   --   --   --  31.5* 29.8*  --  41.9   HEMATOCRIT POC %  --   --  27*  --  27* 28* 29*   < >  --   --    < >  --    MCV fL 91.1 91.4  --  89.9  --   --   --   --  89.1 91.7  --  89.4   PLATELETS 10*3/mm3 71* 56*  --  62*  --   --   --   --  84* 89*  --  92*    < > = values in this interval not displayed.       BMP  Results from last 7 days   Lab Units 11/20/22  0404 11/19/22  0331 11/18/22  0350 11/18/22  0057 11/17/22  1748 11/16/22  0828   SODIUM mmol/L 139 140 145  --  140 138   POTASSIUM mmol/L 4.2 3.9 3.0* 3.0* 4.1 4.4   CHLORIDE mmol/L 101 105 116*  --  107 101   CO2 mmol/L 26.0 24.0 19.0*  --  22.0 27.0   BUN mg/dL 28* 20 21  --  24* 25*   CREATININE mg/dL 0.89 0.74* 0.97  --  1.07 0.85   GLUCOSE mg/dL 141* 142* 126*  --  106* 120*   MAGNESIUM mg/dL  --   --  1.9  --   --   --    PHOSPHORUS mg/dL  --   --  4.4  --  5.4*  --         CMP   Results from last 7 days   Lab Units 11/20/22  0404 11/19/22  0331 11/18/22  0350 11/18/22  0057 11/17/22  1748 11/16/22  0828   SODIUM mmol/L 139 140 145  --  140 138   POTASSIUM mmol/L 4.2 3.9 3.0* 3.0* 4.1 4.4   CHLORIDE mmol/L 101 105 116*  --  107 101   CO2 mmol/L 26.0 24.0 19.0*  --  22.0 27.0   BUN mg/dL 28* 20 21  --  24* 25*   CREATININE mg/dL 0.89 0.74* 0.97  --  1.07 0.85   GLUCOSE mg/dL 141* 142* 126*  --  106* 120*   ALBUMIN g/dL  --   --  3.60  --  4.10 4.40   BILIRUBIN mg/dL  --   --   --   --   --  0.3   ALK PHOS U/L  --   --   --   --   --  100   AST (SGOT) U/L  --   --   --   --   --  17   ALT (SGPT) U/L  --   --   --   --   --  18         BNP        TROPONIN        CoAg  Results from last 7 days   Lab Units 11/18/22  0350 11/17/22  1750 11/17/22  1624 11/17/22  1447 11/16/22  0828   INR  1.17* 1.17* 1.25* 1.17* 1.01   APTT seconds  --  23.6* 25.6 23.9* 27.6       Creatinine Clearance  Estimated Creatinine Clearance: 92.3 mL/min (by C-G formula based on SCr of 0.89 mg/dL).    ABG  Results from last 7 days   Lab Units 11/18/22  1011 11/18/22  0744 11/18/22  0711 11/18/22  0536 11/18/22  0348 11/18/22  0257 11/18/22  0046   PH, ARTERIAL pH units 7.362 7.388 7.469* 7.460* 7.294* 7.364 7.324*   PCO2, ARTERIAL mm Hg 47.0 40.1 30.6* 26.0* 46.8 36.8 47.6   PO2 ART mm Hg 113.0* 159.9* 152.6* 126.4* 118.3* 172.6* 159.5*   O2 SATURATION ART % 98.2* 99.4* 99.5* 99.1* 98.1* 99.5* 99.3*   BASE EXCESS ART mmol/L 0.9 -0.8* -0.9* -4.4* -3.7* -4.0* -1.5*         EKG    I personally reviewed the patient's EKG/Telemetry data: Sinus rhythm      Assessment & Plan       Nonrheumatic tricuspid valve regurgitation    Nonrheumatic mitral valve regurgitation      Clint Cee is a 77-year-old male patient who underwent mitral and tricuspid valve repair and left atrial appendage ligation.  Initially in the postop period, he was in A. fib with a rate of 30 and he was paced through the epicardial wires.   However today his conduction has recovered and he is in sinus in the 80s not paced.  No need for permanent pacemaker at this time.  Dr. Garcia will resume care tomorrow.    I discussed the patients findings and my recommendations with patient and agrees with outlined plan.    Henok Leon MD  11/20/22  11:52 EST

## 2022-11-20 NOTE — PROGRESS NOTES
LOS: 3 days   Patient Care Team:  Jaret Castellanos MD as PCP - General (Family Medicine)  Felipe Garcia MD as Consulting Physician (Cardiology)    Chief Complaint:       Subjective      Vital Signs  Temp:  [97.6 °F (36.4 °C)-99.8 °F (37.7 °C)] 97.7 °F (36.5 °C)  Heart Rate:  [70-90] 78  Resp:  [24-32] 24  BP: (111-141)/(51-82) 123/71  Body mass index is 33.16 kg/m².    Intake/Output Summary (Last 24 hours) at 11/20/2022 0914  Last data filed at 11/20/2022 0800  Gross per 24 hour   Intake 1431 ml   Output 1880 ml   Net -449 ml     I/O this shift:  In: 240 [P.O.:240]  Out: -       Wt Readings from Last 3 Encounters:   11/20/22 111 kg (244 lb 7.8 oz)   11/16/22 109 kg (241 lb 6.4 oz)   10/10/22 107 kg (235 lb)         Objective    Results Review:        WBC No results found for: WBCS   HGB Hemoglobin   Date Value Ref Range Status   11/20/2022 11.0 (L) 13.0 - 17.7 g/dL Final   11/19/2022 10.8 (L) 13.0 - 17.7 g/dL Final     Comment:     Result checked     11/18/2022 9.1 (L) 12.0 - 17.0 g/dL Final   11/18/2022 8.5 (L) 13.0 - 17.7 g/dL Final     Comment:     Result checked     11/18/2022 9.1 (L) 12.0 - 17.0 g/dL Final   11/18/2022 9.4 (L) 12.0 - 17.0 g/dL Final   11/18/2022 9.9 (L) 12.0 - 17.0 g/dL Final   11/18/2022 10.0 (L) 12.0 - 17.0 g/dL Final   11/17/2022 9.4 (L) 12.0 - 17.0 g/dL Final   11/17/2022 9.0 (L) 12.0 - 17.0 g/dL Final   11/17/2022 9.4 (L) 12.0 - 17.0 g/dL Final   11/17/2022 10.1 (L) 12.0 - 17.0 g/dL Final   11/17/2022 10.7 (L) 13.0 - 17.7 g/dL Final   11/17/2022 10.2 (L) 13.0 - 17.7 g/dL Final   11/17/2022 9.9 (L) 12.0 - 17.0 g/dL Final   11/17/2022 10.9 (L) 12.0 - 17.0 g/dL Final   11/17/2022 11.2 (L) 12.0 - 17.0 g/dL Final   11/17/2022 10.2 (L) 12.0 - 17.0 g/dL Final   11/17/2022 9.2 (L) 12.0 - 17.0 g/dL Final   11/17/2022 9.2 (L) 12.0 - 17.0 g/dL Final   11/17/2022 11.2 (L) 12.0 - 17.0 g/dL Final      HCT Hematocrit   Date Value Ref Range Status   11/20/2022 33.4 (L) 37.5 - 51.0 % Final    11/19/2022 32.3 (L) 37.5 - 51.0 % Final     Comment:     Result checked     11/18/2022 27 (L) 38 - 51 % Final   11/18/2022 24.8 (L) 37.5 - 51.0 % Final   11/18/2022 27 (L) 38 - 51 % Final   11/18/2022 28 (L) 38 - 51 % Final   11/18/2022 29 (L) 38 - 51 % Final   11/18/2022 30 (L) 38 - 51 % Final   11/17/2022 28 (L) 38 - 51 % Final   11/17/2022 27 (L) 38 - 51 % Final   11/17/2022 28 (L) 38 - 51 % Final   11/17/2022 30 (L) 38 - 51 % Final   11/17/2022 31.5 (L) 37.5 - 51.0 % Final   11/17/2022 29.8 (L) 37.5 - 51.0 % Final   11/17/2022 29 (L) 38 - 51 % Final   11/17/2022 32 (L) 38 - 51 % Final   11/17/2022 33 (L) 38 - 51 % Final   11/17/2022 30 (L) 38 - 51 % Final   11/17/2022 27 (L) 38 - 51 % Final   11/17/2022 27 (L) 38 - 51 % Final   11/17/2022 33 (L) 38 - 51 % Final      Platelets No results found for: LABPLAT     PT/INR:    Protime   Date Value Ref Range Status   11/18/2022 11.9 (H) 9.6 - 11.7 Seconds Final   11/17/2022 11.9 (H) 9.6 - 11.7 Seconds Final   11/17/2022 12.7 (H) 9.6 - 11.7 Seconds Final   11/17/2022 11.9 (H) 9.6 - 11.7 Seconds Final   /  INR   Date Value Ref Range Status   11/18/2022 1.17 (H) 0.93 - 1.10 Final   11/17/2022 1.17 (H) 0.93 - 1.10 Final   11/17/2022 1.25 (H) 0.93 - 1.10 Final   11/17/2022 1.17 (H) 0.93 - 1.10 Final       Sodium Sodium   Date Value Ref Range Status   11/20/2022 139 136 - 145 mmol/L Final   11/19/2022 140 136 - 145 mmol/L Final   11/18/2022 145 136 - 145 mmol/L Final   11/17/2022 140 136 - 145 mmol/L Final      Potassium Potassium   Date Value Ref Range Status   11/20/2022 4.2 3.5 - 5.2 mmol/L Final   11/19/2022 3.9 3.5 - 5.2 mmol/L Final   11/18/2022 3.0 (L) 3.5 - 5.2 mmol/L Final   11/18/2022 3.0 (L) 3.5 - 5.2 mmol/L Final     Comment:     Result checked     11/17/2022 4.1 3.5 - 5.2 mmol/L Final     Comment:     Slight hemolysis detected by analyzer. Results may be affected.      Chloride Chloride   Date Value Ref Range Status   11/20/2022 101 98 - 107 mmol/L Final    11/19/2022 105 98 - 107 mmol/L Final   11/18/2022 116 (H) 98 - 107 mmol/L Final   11/17/2022 107 98 - 107 mmol/L Final      Bicarbonate No results found for: PLASMABICARB   BUN BUN   Date Value Ref Range Status   11/20/2022 28 (H) 8 - 23 mg/dL Final   11/19/2022 20 8 - 23 mg/dL Final   11/18/2022 21 8 - 23 mg/dL Final   11/17/2022 24 (H) 8 - 23 mg/dL Final      Creatinine Creatinine   Date Value Ref Range Status   11/20/2022 0.89 0.76 - 1.27 mg/dL Final   11/19/2022 0.74 (L) 0.76 - 1.27 mg/dL Final   11/18/2022 0.97 0.76 - 1.27 mg/dL Final   11/17/2022 1.07 0.76 - 1.27 mg/dL Final      Calcium Calcium   Date Value Ref Range Status   11/20/2022 8.7 8.6 - 10.5 mg/dL Final   11/19/2022 8.7 8.6 - 10.5 mg/dL Final   11/18/2022 7.1 (L) 8.6 - 10.5 mg/dL Final   11/17/2022 9.5 8.6 - 10.5 mg/dL Final      Magnesium Magnesium   Date Value Ref Range Status   11/18/2022 1.9 1.6 - 2.4 mg/dL Final         .imag    aspirin, 81 mg, Oral, Daily  atorvastatin, 40 mg, Oral, Nightly  Barium Sulfate, 1 teaspoon(s), Oral, Once in imaging  chlorhexidine, 15 mL, Mouth/Throat, Q12H  DULoxetine, 60 mg, Oral, Daily  enoxaparin, 40 mg, Subcutaneous, Daily  furosemide, 40 mg, Intravenous, Once  insulin lispro, 2-7 Units, Subcutaneous, Q6H  mupirocin, 1 application, Each Nare, BID  pantoprazole, 40 mg, Intravenous, Q AM  polyethylene glycol, 17 g, Oral, BID  senna-docusate sodium, 2 tablet, Oral, BID      sodium chloride, 30 mL/hr, Last Rate: 30 mL/hr (11/17/22 1822)          Patient Active Problem List   Diagnosis Code   • Benign prostatic hyperplasia N40.0   • Gastroesophageal reflux disease K21.9   • Hypertension I10   • Mood disorder ([facility]) F39   • Scoliosis M41.9   • Spinal stenosis M48.00   • Vitamin D deficiency E55.9   • Polyosteoarthritis M15.9   • Insomnia G47.00   • Retinal detachment of right eye with single retinal tear H33.011   • Osteoarthritis of knee M17.9   • Cellulitis of face L03.211   • Prostate cancer ([facility]) C61   • Ocular  migraine G43.109   • Headache R51.9   • Gout of multiple sites M10.9   • Medicare annual wellness visit, subsequent Z00.00   • Allergic T78.40XA   • HL (hearing loss) H91.90   • Visual impairment H54.7   • Cataract H26.9   • Colon polyp K63.5   • Heart murmur on physical examination R01.1   • Mitral valve prolapse I34.1   • Premature ventricular contractions I49.3   • Nonrheumatic mitral valve regurgitation I34.0   • Severe mitral regurgitation I34.0   • Moderate tricuspid regurgitation I07.1   • Primary hypertension I10   • Dyspnea on exertion R06.09   • Nonrheumatic tricuspid valve regurgitation I36.1       Assessment & Plan       - Severe mitral regurgitation, moderate tricuspid regurgitation, EF 60% (TERRA)--s/p elective complex MVr/ TVr/ CASSIE endocardial closure ([facility])  - HTN--stable  - Dyslipidemia--statin  - Hx prostate CA, s/p XRT  - Vitamin D deficiency  - Chronic TCP--recd platelets intraop  - Intraop coagulopathy likely d/t chr TCP  - Postop ABLA, expected--watch closely     POD#3.  Doing well. Creatinine 0.89, order lasix. CT 30, DC them. Swallow ok. Platelets 71, hold lovenox. HR recovered, NSR in the 80s. hold BB today. DC central line and tri Ac MD  11/20/22  09:14 EST

## 2022-11-21 ENCOUNTER — APPOINTMENT (OUTPATIENT)
Dept: GENERAL RADIOLOGY | Facility: HOSPITAL | Age: 77
End: 2022-11-21

## 2022-11-21 ENCOUNTER — READMISSION MANAGEMENT (OUTPATIENT)
Dept: CALL CENTER | Facility: HOSPITAL | Age: 77
End: 2022-11-21

## 2022-11-21 VITALS
HEIGHT: 72 IN | BODY MASS INDEX: 32.29 KG/M2 | RESPIRATION RATE: 17 BRPM | HEART RATE: 79 BPM | TEMPERATURE: 97.9 F | SYSTOLIC BLOOD PRESSURE: 120 MMHG | DIASTOLIC BLOOD PRESSURE: 73 MMHG | WEIGHT: 238.4 LBS | OXYGEN SATURATION: 96 %

## 2022-11-21 PROBLEM — Z98.890 S/P MVR (MITRAL VALVE REPAIR): Status: ACTIVE | Noted: 2022-11-21

## 2022-11-21 PROBLEM — Z98.890 S/P TVR (TRICUSPID VALVE REPAIR): Status: ACTIVE | Noted: 2022-11-21

## 2022-11-21 LAB
ANION GAP SERPL CALCULATED.3IONS-SCNC: 10 MMOL/L (ref 5–15)
BUN SERPL-MCNC: 28 MG/DL (ref 8–23)
BUN/CREAT SERPL: 33.3 (ref 7–25)
CALCIUM SPEC-SCNC: 8.6 MG/DL (ref 8.6–10.5)
CHLORIDE SERPL-SCNC: 102 MMOL/L (ref 98–107)
CO2 SERPL-SCNC: 26 MMOL/L (ref 22–29)
CREAT SERPL-MCNC: 0.84 MG/DL (ref 0.76–1.27)
DEPRECATED RDW RBC AUTO: 44.2 FL (ref 37–54)
EGFRCR SERPLBLD CKD-EPI 2021: 90.9 ML/MIN/1.73
ERYTHROCYTE [DISTWIDTH] IN BLOOD BY AUTOMATED COUNT: 14 % (ref 12.3–15.4)
GLUCOSE BLDC GLUCOMTR-MCNC: 102 MG/DL (ref 70–105)
GLUCOSE BLDC GLUCOMTR-MCNC: 89 MG/DL (ref 70–105)
GLUCOSE SERPL-MCNC: 136 MG/DL (ref 65–99)
HCT VFR BLD AUTO: 29.6 % (ref 37.5–51)
HGB BLD-MCNC: 10 G/DL (ref 13–17.7)
MCH RBC QN AUTO: 30.4 PG (ref 26.6–33)
MCHC RBC AUTO-ENTMCNC: 33.8 G/DL (ref 31.5–35.7)
MCV RBC AUTO: 90 FL (ref 79–97)
PLATELET # BLD AUTO: 71 10*3/MM3 (ref 140–450)
PMV BLD AUTO: 6.8 FL (ref 6–12)
POTASSIUM SERPL-SCNC: 3.5 MMOL/L (ref 3.5–5.2)
QT INTERVAL: 360 MS
RBC # BLD AUTO: 3.29 10*6/MM3 (ref 4.14–5.8)
SODIUM SERPL-SCNC: 138 MMOL/L (ref 136–145)
WBC NRBC COR # BLD: 5.5 10*3/MM3 (ref 3.4–10.8)

## 2022-11-21 PROCEDURE — 71045 X-RAY EXAM CHEST 1 VIEW: CPT

## 2022-11-21 PROCEDURE — 25010000002 HYDROMORPHONE 1 MG/ML SOLUTION: Performed by: NURSE PRACTITIONER

## 2022-11-21 PROCEDURE — 97535 SELF CARE MNGMENT TRAINING: CPT

## 2022-11-21 PROCEDURE — 97116 GAIT TRAINING THERAPY: CPT

## 2022-11-21 PROCEDURE — 97110 THERAPEUTIC EXERCISES: CPT

## 2022-11-21 PROCEDURE — 82962 GLUCOSE BLOOD TEST: CPT

## 2022-11-21 PROCEDURE — 36430 TRANSFUSION BLD/BLD COMPNT: CPT

## 2022-11-21 PROCEDURE — 99232 SBSQ HOSP IP/OBS MODERATE 35: CPT | Performed by: INTERNAL MEDICINE

## 2022-11-21 PROCEDURE — P9035 PLATELET PHERES LEUKOREDUCED: HCPCS

## 2022-11-21 PROCEDURE — 99024 POSTOP FOLLOW-UP VISIT: CPT | Performed by: THORACIC SURGERY (CARDIOTHORACIC VASCULAR SURGERY)

## 2022-11-21 PROCEDURE — 80048 BASIC METABOLIC PNL TOTAL CA: CPT | Performed by: NURSE PRACTITIONER

## 2022-11-21 PROCEDURE — 85027 COMPLETE CBC AUTOMATED: CPT | Performed by: NURSE PRACTITIONER

## 2022-11-21 PROCEDURE — 92526 ORAL FUNCTION THERAPY: CPT

## 2022-11-21 PROCEDURE — P9100 PATHOGEN TEST FOR PLATELETS: HCPCS

## 2022-11-21 RX ORDER — ASPIRIN 81 MG/1
81 TABLET ORAL DAILY
Start: 2022-11-22

## 2022-11-21 RX ORDER — ATORVASTATIN CALCIUM 40 MG/1
40 TABLET, FILM COATED ORAL NIGHTLY
Qty: 90 TABLET | Refills: 1 | Status: SHIPPED | OUTPATIENT
Start: 2022-11-21

## 2022-11-21 RX ORDER — HYDROCODONE BITARTRATE AND ACETAMINOPHEN 5; 325 MG/1; MG/1
1 TABLET ORAL EVERY 6 HOURS PRN
Qty: 15 TABLET | Refills: 0 | Status: SHIPPED | OUTPATIENT
Start: 2022-11-21 | End: 2022-11-27

## 2022-11-21 RX ORDER — POLYETHYLENE GLYCOL 3350 17 G/17G
17 POWDER, FOR SOLUTION ORAL 2 TIMES DAILY
Start: 2022-11-21 | End: 2022-11-30

## 2022-11-21 RX ORDER — ENOXAPARIN SODIUM 100 MG/ML
40 INJECTION SUBCUTANEOUS DAILY
Status: DISCONTINUED | OUTPATIENT
Start: 2022-11-22 | End: 2022-11-21 | Stop reason: HOSPADM

## 2022-11-21 RX ORDER — BISACODYL 10 MG
10 SUPPOSITORY, RECTAL RECTAL DAILY
Status: DISCONTINUED | OUTPATIENT
Start: 2022-11-21 | End: 2022-11-21 | Stop reason: HOSPADM

## 2022-11-21 RX ADMIN — Medication 81 MG: at 08:39

## 2022-11-21 RX ADMIN — HYDROMORPHONE HYDROCHLORIDE 0.25 MG: 1 INJECTION, SOLUTION INTRAMUSCULAR; INTRAVENOUS; SUBCUTANEOUS at 01:47

## 2022-11-21 RX ADMIN — HYDROCODONE BITARTRATE AND ACETAMINOPHEN 1 TABLET: 5; 325 TABLET ORAL at 03:05

## 2022-11-21 RX ADMIN — Medication 12.5 MG: at 11:29

## 2022-11-21 RX ADMIN — SENNOSIDES AND DOCUSATE SODIUM 2 TABLET: 50; 8.6 TABLET ORAL at 08:40

## 2022-11-21 RX ADMIN — HYDROCODONE BITARTRATE AND ACETAMINOPHEN 1 TABLET: 5; 325 TABLET ORAL at 11:30

## 2022-11-21 RX ADMIN — BISACODYL 10 MG: 10 SUPPOSITORY RECTAL at 16:38

## 2022-11-21 RX ADMIN — POTASSIUM CHLORIDE 20 MEQ: 1.5 POWDER, FOR SOLUTION ORAL at 11:36

## 2022-11-21 RX ADMIN — POLYETHYLENE GLYCOL 3350 17 G: 17 POWDER, FOR SOLUTION ORAL at 08:39

## 2022-11-21 RX ADMIN — PANTOPRAZOLE SODIUM 40 MG: 40 INJECTION, POWDER, FOR SOLUTION INTRAVENOUS at 06:32

## 2022-11-21 RX ADMIN — POTASSIUM CHLORIDE 20 MEQ: 1.5 POWDER, FOR SOLUTION ORAL at 08:39

## 2022-11-21 RX ADMIN — DULOXETINE HYDROCHLORIDE 60 MG: 30 CAPSULE, DELAYED RELEASE ORAL at 08:40

## 2022-11-21 RX ADMIN — MAGNESIUM HYDROXIDE 10 ML: 2400 SUSPENSION ORAL at 15:01

## 2022-11-21 RX ADMIN — MUPIROCIN 1 APPLICATION: 20 OINTMENT TOPICAL at 08:40

## 2022-11-21 NOTE — THERAPY TREATMENT NOTE
Subjective: Pt agreeable to therapeutic plan of care.    Phase 1 Cardiac Rehab Initiated    Sitting tolerance: >10min and supported  Standing tolerance: >10min and supported    Precautions:  Mid-sternal incision; avoid scapular retraction and depression.  Cardiovascular impairment post-sx; encourage energy conservation strategies.    Therapeutic Exercise: 10 reps UE and LE AROM in supported sitting    MET level equivalent: 2.0-3.0 (Unlimited sitting, ambulation on level ground <2mph, light housework)    Objective:     Bed mobility - N/A or Not attempted.  Transfers - Min-A and with rolling walker with VC for anterior lean  Ambulation - 220 feet CGA and with rolling walker Minor c/o SOA following VSS throughout    Vitals: WNL    Pain: 5 VAS   Location: Chest  Intervention for pain: RN provided medication    Education: Provided education on the importance of mobility in the acute care setting, Verbal/Tactile Cues, Transfer Training and Gait Training    Assessment: Clint Cee presents with functional mobility impairments which indicate the need for skilled intervention. Tolerating session today without incident. Pt demosntrates good mobility this date, ambulation with RWx for 220 feet with CGA. Pt requiring cueing for upright posture, and breathing techniques. Pt navigated steps safely with CGA for technique and VC for sternal precautions. Will continue to follow and progress as tolerated.     Plan/Recommendations:   No ongoing therapy recommended post-acute care. No therapy needs.. Pt requires rolling walker at discharge.     Pt desires Home with family assist at discharge. Pt cooperative; agreeable to therapeutic recommendations and plan of care.         Basic Mobility 6-click:  Rollin = Total, A lot = 2, A little = 3; 4 = None  Supine>Sit:   1 = Total, A lot = 2, A little = 3; 4 = None   Sit>Stand with arms:  1 = Total, A lot = 2, A little = 3; 4 = None  Bed>Chair:   1 = Total, A lot = 2, A little =  3; 4 = None  Ambulate in room:  1 = Total, A lot = 2, A little = 3; 4 = None  3-5 Steps with railin = Total, A lot = 2, A little = 3; 4 = None  Score: 20    Modified [place]: N/A = No pre-op stroke/TIA    Post-Tx Position: Up in Chair and Staff Present  PPE: gloves and surgical mask

## 2022-11-21 NOTE — PROGRESS NOTES
Referring Provider: Femi Henderson MD    Reason for follow-up:  Status post mitral and tricuspid valve repair and left atrial appendage closure  Preoperative  Significant mitral and tricuspid regurgitation.     Patient Care Team:  Jaret Castellanos MD as PCP - General (Family Medicine)  Felipe Garcia MD as Consulting Physician (Cardiology)    Subjective .      ROS    Since I have last seen, the patient has been without any chest discomfort ,shortness of breath, palpitations, dizziness or syncope.  Denies having any headache ,abdominal pain ,nausea, vomiting , diarrhea constipation, loss of weight or loss of appetite.  Denies having any excessive bruising ,hematuria or blood in the stool.    Review of all systems negative except as indicated.    Reviewed ROS.  History  Past Medical History:   Diagnosis Date   • Abnormal ECG June 2022   • Allergic 01/01/1954    Nasal alergies   • Arrhythmia 2004    Dr Youngblood examined me and said i was ok.   • Asthma    • Benign prostatic hyperplasia 11/14/2018   • Cataract 01/01/2014    Surgery. Caused detached retina of right eye 20/60 repair   • Colon polyp 01/01/2018    Found and removed last colon eca.q   • Gastroesophageal reflux disease 03/20/2014   • Glaucoma 2003    Normal pressures. Have low pressure glaucoma   • Headache 01/01/2015    Have shimmering in both eyes intermittantly. No headaches   • Heart murmur May 2022    During wellness exam   • Heart valve disease July 2022    During Echo   • HL (hearing loss) 01/01/2012    Have hearing aids   • Hypertension 12/02/2011   • Infectious viral hepatitis 1954    Yellow jaundis as child   • Insomnia 12/02/2011   • Mitral valve prolapse June 2022   • Mood disorder (HCC) 09/19/2013   • Polyosteoarthritis 12/02/2011   • Prostate Cancer Jan22 January 2022   • Spinal stenosis 12/02/2011   • Visual impairment 01/01/1955    Nearsighted corrected glasses   • Vitamin D deficiency 05/23/2018       Past Surgical History:    Procedure Laterality Date   • ADENOIDECTOMY  1954    As child   • APPENDECTOMY  1956    Surgery   • CARDIAC CATHETERIZATION N/A 9/30/2022    Procedure: Right and Left Heart Cath with Coronar Angiography;  Surgeon: Felipe Garcia MD;  Location: [facility] CATH INVASIVE [facility];  Service: Cardiovascular;  Laterality: N/A;   • COLONOSCOPY  01/01/1997    Regularly scheduled   • EYE SURGERY  01/01/2010    Reattached right retina   • SPINE SURGERY  01/01/1971    2 lumbar 1 cervical   • TONSILLECTOMY  01/01/1950    Childhood       Family History   Problem Relation Age of Onset   • Arthritis Father    • Cancer Father         Prostate   • Arrhythmia Father         Congentive heart failure   • Diabetes Mother         Adult diabetes   • Early death Mother         Kidneys failed after giving birth-diabetes complications   • Heart disease Mother         Adult Diabetes       Social History     Tobacco Use   • Smoking status: Former     Packs/day: 0.00     Years: 0.50     Pack years: 0.00     Types: Cigarettes   • Smokeless tobacco: Never   • Tobacco comments:     Tried tobacco as child   Vaping Use   • Vaping Use: Never used   Substance Use Topics   • Alcohol use: Not Currently     Alcohol/week: 1.0 standard drink     Types: 1 Cans of beer per week     Comment: Not a regular drinker. Occasionly have a glass of wine or be   • Drug use: Never        Medications Prior to Admission   Medication Sig Dispense Refill Last Dose   • allopurinol (ZYLOPRIM) 100 MG tablet TAKE 1 TABLET BY MOUTH DAILY 30 tablet 3 11/16/2022   • amLODIPine (NORVASC) 10 MG tablet TAKE 1 TABLET BY MOUTH EVERY DAY (Patient taking differently: Take 10 mg by mouth every night at bedtime.) 90 tablet 0 11/16/2022 at 2100   • DULoxetine (CYMBALTA) 60 MG capsule Take 1 capsule by mouth Daily for 90 days. 90 capsule 3 11/16/2022   • famotidine (PEPCID) 20 MG tablet Take 20 mg by mouth 2 (Two) Times a Day As Needed for Heartburn.   11/16/2022   • fluticasone (FLONASE)  50 MCG/ACT nasal spray 2 sprays into the nostril(s) as directed by provider Daily.   11/16/2022   • melatonin 5 MG tablet tablet Take 5 mg by mouth At Night As Needed (sleep).   11/16/2022   • mupirocin (BACTROBAN) 2 % ointment apply inside each nostril twice daily as directed prior to procedure 22 g 0 11/16/2022   • acetaminophen (TYLENOL) 500 MG tablet Take 500 mg by mouth Every 6 (Six) Hours As Needed for Mild Pain.   11/15/2022   • aspirin 325 MG tablet Take 325 mg by mouth Every 6 (Six) Hours As Needed for Mild Pain.   11/3/2022   • aspirin-acetaminophen-caffeine (EXCEDRIN MIGRAINE) 250-250-65 MG per tablet Take 2 tablets by mouth Every 6 (Six) Hours As Needed for Headache.   11/10/2022   • aspirin-sod bicarb-citric acid (STEFANIE-SELTZER) 325 MG effervescent tablet Take 325 mg by mouth Every 6 (Six) Hours As Needed for Headache or Mild Pain.   11/3/2022   • fexofenadine (ALLEGRA) 180 MG tablet Take 180 mg by mouth Daily As Needed.   11/14/2022   • ibuprofen (ADVIL,MOTRIN) 400 MG tablet Take 400 mg by mouth Every 6 (Six) Hours As Needed for Mild Pain.   11/3/2022   • Leuprolide Mesylate, 6 Month, (CAMCEVI SC) Every 6 months injection, due in December      • losartan (COZAAR) 50 MG tablet TAKE 1 TABLET BY MOUTH DAILY 90 tablet 0 11/15/2022   • zolpidem (AMBIEN) 10 MG tablet Take 0.5 tablets by mouth At Night As Needed.   10/17/2022       Allergies  Morphine    Scheduled Meds:aspirin, 81 mg, Oral, Daily  atorvastatin, 40 mg, Oral, Nightly  Barium Sulfate, 1 teaspoon(s), Oral, Once in imaging  chlorhexidine, 15 mL, Mouth/Throat, Q12H  DULoxetine, 60 mg, Oral, Daily  enoxaparin, 40 mg, Subcutaneous, Daily  insulin lispro, 2-7 Units, Subcutaneous, 4x Daily With Meals & Nightly  mupirocin, 1 application, Each Nare, BID  pantoprazole, 40 mg, Intravenous, Q AM  polyethylene glycol, 17 g, Oral, BID  senna-docusate sodium, 2 tablet, Oral, BID      Continuous Infusions:   PRN Meds:.•  acetaminophen **OR** acetaminophen **OR**  "acetaminophen  •  dextrose  •  dextrose  •  glucagon (human recombinant)  •  HYDROcodone-acetaminophen  •  HYDROmorphone **AND** naloxone  •  ondansetron  •  potassium chloride **OR** potassium chloride    Objective     VITAL SIGNS  Vitals:    11/21/22 0300 11/21/22 0309 11/21/22 0400 11/21/22 0500   BP: 133/74 133/74 132/76 130/78   BP Location:  Left arm     Patient Position:  Lying     Pulse: 79 79 76 76   Resp:  21     Temp:  98.2 °F (36.8 °C)     TempSrc:  Oral     SpO2: 90% 90% 92% 90%   Weight:       Height:           Flowsheet Rows    Flowsheet Row First Filed Value   Admission Height 182.9 cm (72\") Documented at 11/17/2022 0739   Admission Weight 107 kg (236 lb) Documented at 11/17/2022 0739            Intake/Output Summary (Last 24 hours) at 11/21/2022 0629  Last data filed at 11/20/2022 2300  Gross per 24 hour   Intake 1320 ml   Output 1580 ml   Net -260 ml        TELEMETRY: Sinus rhythm    Physical Exam:  The patient is intubated and is on the respirator.  Awake and alert and not in distress.  Vital signs as noted above.  Head and neck revealed no carotid bruits or jugular venous distention.  No thyromegaly or lymphadenopathy is present  Lungs clear.  No wheezing.  Breath sounds are normal bilaterally.  Heart normal first and second heart sounds.  No murmur. No precordial rub is present.  No gallop is present.  Abdomen soft and nontender.  No organomegaly is present.  Extremities with good peripheral pulses without any pedal edema.  Skin warm and dry.  Visible incisions are looking well.  Musculoskeletal system is grossly normal  CNS grossly normal      Results Review:   I reviewed the patient's new clinical results.  Lab Results (last 24 hours)     Procedure Component Value Units Date/Time    Basic Metabolic Panel [[id number]]  (Abnormal) Collected: 11/21/22 0511    Specimen: Blood Updated: 11/21/22 0538     Glucose 136 mg/dL      BUN 28 mg/dL      Creatinine 0.84 mg/dL      Sodium 138 mmol/L      " Potassium 3.5 mmol/L      Chloride 102 mmol/L      CO2 26.0 mmol/L      Calcium 8.6 mg/dL      BUN/Creatinine Ratio 33.3     Anion Gap 10.0 mmol/L      eGFR 90.9 mL/min/1.73      Comment: National Kidney Foundation and American Society of Nephrology (ASN) Task Force recommended calculation based on the Chronic Kidney Disease Epidemiology Collaboration (CKD-EPI) equation refit without adjustment for race.       Narrative:      GFR Normal >60  Chronic Kidney Disease <60  Kidney Failure <15    The GFR formula is only valid for adults with stable renal function between ages 18 and 70.    CBC (No Diff) [[id number]]  (Abnormal) Collected: 11/21/22 0511    Specimen: Blood Updated: 11/21/22 0518     WBC 5.50 10*3/mm3      RBC 3.29 10*6/mm3      Hemoglobin 10.0 g/dL      Hematocrit 29.6 %      MCV 90.0 fL      MCH 30.4 pg      MCHC 33.8 g/dL      RDW 14.0 %      RDW-SD 44.2 fl      MPV 6.8 fL      Platelets 71 10*3/mm3     POC Glucose Once [[id number]]  (Abnormal) Collected: 11/20/22 1701    Specimen: Blood Updated: 11/20/22 1702     Glucose 111 mg/dL      Comment: Serial Number: [id number]:  [id number]       POC Glucose Once [[id number]]  (Abnormal) Collected: 11/20/22 1159    Specimen: Blood Updated: 11/20/22 1234     Glucose 136 mg/dL      Comment: Serial Number: [id number]:  [id number]       POC Glucose Once [[id number]]  (Abnormal) Collected: 11/20/22 0756    Specimen: Blood Updated: 11/20/22 0757     Glucose 138 mg/dL      Comment: Serial Number: [id number]:  [id number]       POC Glucose Once [[id number]]  (Abnormal) Collected: 11/20/22 0637    Specimen: Blood Updated: 11/20/22 0638     Glucose 115 mg/dL      Comment: Serial Number: [id number]:  [id number]             Imaging Results (Last 24 Hours)     Procedure Component Value Units Date/Time    XR Chest 1 View [[id number]] Collected: 11/20/22 1315     Updated: 11/20/22 1319    Narrative:      DATE OF EXAM:  11/20/2022 1:00 PM     PROCEDURE:  XR  CHEST 1 VW-     INDICATIONS:  CT removal; I36.1-Nonrheumatic tricuspid (valve) insufficiency;  I34.0-Nonrheumatic mitral (valve) insufficiency     COMPARISON:  11/19/2022     TECHNIQUE:   Single radiographic AP view of the chest was obtained.     FINDINGS:  Interval removal of feeding tube, right IJ introducer, and mediastinal  drain. Heart size stable. Strandy opacity in the left base. Right lung  grossly clear. No pneumothorax. Some soft tissue emphysema in the base  of the neck        Impression:         1. Left basilar atelectasis  2. No evidence of pneumothorax     Electronically Signed By-Daniel De Paz On:11/20/2022 1:17 PM  This report was finalized on [id number] by  Daniel De Paz, .      LAB RESULTS (LAST 7 DAYS)    CBC  Results from last 7 days   Lab Units 11/21/22  0511 11/20/22  0404 11/19/22  0331 11/18/22  0536 11/18/22  0350 11/18/22  0348 11/18/22  0257 11/17/22  1822 11/17/22  1748 11/17/22  1624 11/17/22  1300 11/16/22  0828   WBC 10*3/mm3 5.50 7.50 7.30  --  7.10  --   --   --  2.80* 4.70  --  4.80   RBC 10*6/mm3 3.29* 3.66* 3.53*  --  2.76*  --   --   --  3.53* 3.25*  --  4.69   HEMOGLOBIN g/dL 10.0* 11.0* 10.8*  --  8.5*  --   --   --  10.7* 10.2*  --  14.3   HEMOGLOBIN, POC g/dL  --   --   --  9.1*  --  9.1* 9.4*   < >  --   --    < >  --    HEMATOCRIT % 29.6* 33.4* 32.3*  --  24.8*  --   --   --  31.5* 29.8*  --  41.9   HEMATOCRIT POC %  --   --   --  27*  --  27* 28*   < >  --   --    < >  --    MCV fL 90.0 91.1 91.4  --  89.9  --   --   --  89.1 91.7  --  89.4   PLATELETS 10*3/mm3 71* 71* 56*  --  62*  --   --   --  84* 89*  --  92*    < > = values in this interval not displayed.       BMP  Results from last 7 days   Lab Units 11/21/22  0511 11/20/22  0404 11/19/22  0331 11/18/22  0350 11/18/22  0057 11/17/22  1748 11/16/22  0828   SODIUM mmol/L 138 139 140 145  --  140 138   POTASSIUM mmol/L 3.5 4.2 3.9 3.0* 3.0* 4.1 4.4   CHLORIDE mmol/L 102 101 105 116*  --  107 101   CO2 mmol/L 26.0  26.0 24.0 19.0*  --  22.0 27.0   BUN mg/dL 28* 28* 20 21  --  24* 25*   CREATININE mg/dL 0.84 0.89 0.74* 0.97  --  1.07 0.85   GLUCOSE mg/dL 136* 141* 142* 126*  --  106* 120*   MAGNESIUM mg/dL  --   --   --  1.9  --   --   --    PHOSPHORUS mg/dL  --   --   --  4.4  --  5.4*  --        CMP   Results from last 7 days   Lab Units 11/21/22  0511 11/20/22  0404 11/19/22  0331 11/18/22  0350 11/18/22  0057 11/17/22  1748 11/16/22  0828   SODIUM mmol/L 138 139 140 145  --  140 138   POTASSIUM mmol/L 3.5 4.2 3.9 3.0* 3.0* 4.1 4.4   CHLORIDE mmol/L 102 101 105 116*  --  107 101   CO2 mmol/L 26.0 26.0 24.0 19.0*  --  22.0 27.0   BUN mg/dL 28* 28* 20 21  --  24* 25*   CREATININE mg/dL 0.84 0.89 0.74* 0.97  --  1.07 0.85   GLUCOSE mg/dL 136* 141* 142* 126*  --  106* 120*   ALBUMIN g/dL  --   --   --  3.60  --  4.10 4.40   BILIRUBIN mg/dL  --   --   --   --   --   --  0.3   ALK PHOS U/L  --   --   --   --   --   --  100   AST (SGOT) U/L  --   --   --   --   --   --  17   ALT (SGPT) U/L  --   --   --   --   --   --  18         BNP        TROPONIN        CoAg  Results from last 7 days   Lab Units 11/18/22  0350 11/17/22  1750 11/17/22  1624 11/17/22  1447 11/16/22  0828   INR  1.17* 1.17* 1.25* 1.17* 1.01   APTT seconds  --  23.6* 25.6 23.9* 27.6       Creatinine Clearance  Estimated Creatinine Clearance: 97.8 mL/min (by C-G formula based on SCr of 0.84 mg/dL).    ABG  Results from last 7 days   Lab Units 11/18/22  1011 11/18/22  0744 11/18/22  0711 11/18/22  0536 11/18/22  0348 11/18/22  0257 11/18/22  0046   PH, ARTERIAL pH units 7.362 7.388 7.469* 7.460* 7.294* 7.364 7.324*   PCO2, ARTERIAL mm Hg 47.0 40.1 30.6* 26.0* 46.8 36.8 47.6   PO2 ART mm Hg 113.0* 159.9* 152.6* 126.4* 118.3* 172.6* 159.5*   O2 SATURATION ART % 98.2* 99.4* 99.5* 99.1* 98.1* 99.5* 99.3*   BASE EXCESS ART mmol/L 0.9 -0.8* -0.9* -4.4* -3.7* -4.0* -1.5*       Radiology  FL Video Swallow With Speech Single Contrast    Result Date: 11/19/2022  Fluoroscopy  time was provided to assist speech pathology modified barium swallow. For further details and recommendations please refer to speech pathology report.  Electronically Signed By-Edward Flanagan DO On:11/19/2022 8:52 PM This report was finalized on [id number] by  Edward Flanagan DO.    XR Chest 1 View    Result Date: 11/20/2022   1. Left basilar atelectasis 2. No evidence of pneumothorax  Electronically Signed By-Daniel De Paz On:11/20/2022 1:17 PM This report was finalized on [id number] by  Daniel De Paz, .          EKG                          I personally viewed and interpreted the patient's EKG/Telemetry data:    ECHOCARDIOGRAM:    Results for orders placed during the hospital encounter of 09/30/22    Adult Transesophageal Echo (TERRA) W/ Cont if Necessary Per Protocol    Interpretation Summary  Date of study  9/30/2022    Procedure performed  Transesophageal echocardiogram and Doppler study.    Indications  Significant mitral regurgitation  Shortness of breath    Procedure  Anesthesia was provided by anesthesiologist with intravenous Diprivan.  TERRA probe could be passed without difficulty.  Patient tolerated the procedure well.  No complications were noted.    Results  Technically satisfactory study.  Mitral valve has severe myxomatous degeneration with multiple areas of severe prolapse and known coaptation of the mitral leaflet as well as probable ruptured chordae.  Severe mitral regurgitation is seen with multiple jets with mitral regurgitation jet occupying the entire left atrium.  Tricuspid valve is also showing myxomatous degeneration with moderate tricuspid regurgitation.  Calculated pulmonary artery pressure is 60 mmHg.  Aortic valve is thickened with adequate opening motion.  Left atrium is enlarged.  Left atrial appendage is enlarged without clot.  Left ventricle is normal in size and contractility with ejection fraction of 60%.  Right ventricle is normal in size.  Right atrium is normal in  size.  Atrial septum is intact without PFO.  Aortic intimal thickening is present without clot.  No pericardial effusion is seen.    Impression  Myxomatous mitral and tricuspid valve degeneration.  Severe mitral valve prolapse involving both anterior and posterior mitral leaflets with severe mitral regurgitation with multiple jets.  Probable ruptured chordae.  In some views coaptation of the mitral valve is present.  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  Significant pulmonary hypertension.  Left atrial and left atrial appendage enlargement without clot.  Normal left ventricular size and contractility with ejection fraction of 60%.          STRESS MYOVIEW:    Cardiolite (Tc-99m Sestamibi) stress test    CARDIAC CATHETERIZATION:            OTHER:         Assessment & Plan     Principal Problem:    Nonrheumatic tricuspid valve regurgitation  Active Problems:    Nonrheumatic mitral valve regurgitation      ]]]]]]]]]]]]]]]]]]]]  Impression  ===========  -Status post mitral and tricuspid valve repair and left atrial appendage endocardial closure--Dr. Henderson 11/17/2022    -   Preoperative increased shortness of breath and significantly increased fatigue.     -Preoperative significant mitral and tricuspid regurgitation.  Prominent posterior mitral leaflet prolapse    TERRA 9/30/2022  Myxomatous mitral and tricuspid valve degeneration.  Severe mitral valve prolapse involving both anterior and posterior mitral leaflets with severe mitral regurgitation with multiple jets.  Probable ruptured chordae.  In some views coaptation of the mitral valve is present.  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  Significant pulmonary hypertension.  Left atrial and left atrial appendage enlargement without clot.  Normal left ventricular size and contractility with ejection fraction of 60%.    Cardiac catheterization 9/30/2022   Moderate to significant pulmonary hypertension.  Severe mitral valve  prolapse and mitral regurgitation and probable ruptured chordae (TERRA and cardiac cath)  Tricuspid valve prolapse.  Normal left ventricular function.  Normal coronary arteries.    Echocardiogram 9/28/2022.  Mild mitral regurgitation (regurgitation jet may be not in the field)     Echocardiogram 6/15/2022   prominent posterior mitral leaflet prolapse.  Moderate to significant mitral regurgitation.  Thickened aortic valve with adequate opening motion.  Left atrial enlargement.  Normal left ventricular size and contractility with ejection fraction of 60%.     - Premature ventricular contractions.  Occasional palpitations     - Hypertension vitamin D deficiency GERD ocular migraine.     - History of prostate cancer.  Status post radiation therapy.  Bone scan was negative for any spread.     - Status post appendectomy spine surgery adenoidectomy tonsillectomy     - Family history of prostate cancer     - Former smoker     - Allergic to morphine.  ============  Plan  ===========  Status post mitral valve repair tricuspid valve repair and left atrial appendage closure--Dr. Henderson-11/17/2022    Respiratory status- extubated    Rhythm- postoperative bradycardia.  Patient is in sinus rhythm.  Bradycardia has improved.  No need for pacemaker at this time.    Hypertension-stable.    Medications were reviewed and updated.  Patient is on aspirin atorvastatin subcu Lovenox insulin pantoprazole.  Will start low-dose beta-blocker if rhythm continues to be stable.    Further plan will depend on patient's progress.  ]]]]]]]]]]]]]]]]              Felipe Garcia MD  11/21/22  06:29 EST

## 2022-11-21 NOTE — PROGRESS NOTES
Nutrition Services    Patient Name: Clint Cee  YOB: 1945  MRN: [id number]  Admission date: 11/17/2022    PPE Documentation        PPE Worn By Provider Did not enter room for this encounter   PPE Worn By Patient  N/A     PROGRESS NOTE      Encounter Information: Progress note to check on PO intake. Pt was to begin TF on 11/19 but SLP was able to advance diet and TF was not started. Per SLP pt receiving mechanical ground, honey thickened liquids.        PO Diet: Diet: Cardiac Diets; Healthy Heart (2-3 Na+); Texture: Mechanical Ground (NDD 2); Fluid Consistency: Honey Thick   PO Supplements: None    PO Intake:  ~75%        Current nutrition support: None    Nutrition support review: Tf never started since diet was advanced        Labs (reviewed below): Reviewed        GI Function:  Stool Output  Stool (mL): 0 mL (11/21/22 0600)  Stool Unmeasured Occurrence: 0 (11/20/22 1700)          Nutrition Intervention Updates: Will d/c TF orders    Will add magic cup QD       Results from last 7 days   Lab Units 11/21/22  0511 11/20/22  0404 11/19/22  0331 11/17/22  1748 11/16/22  0828   SODIUM mmol/L 138 139 140   < > 138   POTASSIUM mmol/L 3.5 4.2 3.9   < > 4.4   CHLORIDE mmol/L 102 101 105   < > 101   CO2 mmol/L 26.0 26.0 24.0   < > 27.0   BUN mg/dL 28* 28* 20   < > 25*   CREATININE mg/dL 0.84 0.89 0.74*   < > 0.85   CALCIUM mg/dL 8.6 8.7 8.7   < > 9.0   BILIRUBIN mg/dL  --   --   --   --  0.3   ALK PHOS U/L  --   --   --   --  100   ALT (SGPT) U/L  --   --   --   --  18   AST (SGOT) U/L  --   --   --   --  17   GLUCOSE mg/dL 136* 141* 142*   < > 120*    < > = values in this interval not displayed.     Results from last 7 days   Lab Units 11/21/22  0511 11/18/22  0536 11/18/22  0350 11/17/22  1300 11/16/22  0828   MAGNESIUM mg/dL  --   --  1.9  --   --    PHOSPHORUS mg/dL  --   --  4.4   < >  --    HEMOGLOBIN g/dL 10.0*   < > 8.5*   < > 14.3   HEMOGLOBIN, POC   --    < >  --    < >  --    HEMATOCRIT % 29.6*    < > 24.8*   < > 41.9   HEMATOCRIT POC   --    < >  --    < >  --    TRIGLYCERIDES mg/dL  --   --   --   --  161*    < > = values in this interval not displayed.     COVID19   Date Value Ref Range Status   11/16/2022 Not Detected Not Detected - Ref. Range Final     Lab Results   Component Value Date    HGBA1C 4.9 11/16/2022       RD to follow up per protocol.    Electronically signed by:  Adamaris Lovell RD  11/21/22 15:04 EST

## 2022-11-21 NOTE — PROGRESS NOTES
S/P POD# 4 elective complex MVr/ TVr/ CASSIE ligation--Jaisonni  EF 60% (TERRA)    Subjective:  No c/o's    Remains on modified diet d/t dysphagia--HTL, ground  Remains with wires      Intake/Output Summary (Last 24 hours) at 11/21/2022 1655  Last data filed at 11/21/2022 1355  Gross per 24 hour   Intake 1765 ml   Output 1375 ml   Net 390 ml     Temp:  [97.7 °F (36.5 °C)-99.4 °F (37.4 °C)] 97.9 °F (36.6 °C)  Heart Rate:  [74-86] 79  Resp:  [17-22] 17  BP: (103-144)/(48-83) 120/73      Results from last 7 days   Lab Units 11/21/22  0511 11/20/22  0404 11/18/22  0536 11/18/22  0350 11/17/22  1822 11/17/22  1750 11/17/22  1748 11/17/22  1624   WBC 10*3/mm3 5.50 7.50   < > 7.10  --   --    < > 4.70   HEMOGLOBIN g/dL 10.0* 11.0*   < > 8.5*  --   --    < > 10.2*   HEMOGLOBIN, POC   --   --    < >  --    < >  --   --   --    HEMATOCRIT % 29.6* 33.4*   < > 24.8*  --   --    < > 29.8*   HEMATOCRIT POC   --   --    < >  --    < >  --   --   --    PLATELETS 10*3/mm3 71* 71*   < > 62*  --   --    < > 89*   INR   --   --   --  1.17*  --  1.17*  --  1.25*    < > = values in this interval not displayed.     Results from last 7 days   Lab Units 11/21/22  0511 11/19/22  0331 11/18/22  0350   CREATININE mg/dL 0.84   < > 0.97   POTASSIUM mmol/L 3.5   < > 3.0*   SODIUM mmol/L 138   < > 145   MAGNESIUM mg/dL  --   --  1.9   PHOSPHORUS mg/dL  --   --  4.4    < > = values in this interval not displayed.       Physical Exam:  Neuro intact, nad, working with therapy  Tele:  SR 70s  Diminished bases, 98%  Sternotomy healing  Benign abd, no BM  No edema    Assessment/Plan:  Principal Problem:    Nonrheumatic tricuspid valve regurgitation  Active Problems:    Nonrheumatic mitral valve regurgitation    - Severe mitral regurgitation, moderate tricuspid regurgitation, EF 60% (TERRA)--s/p elective complex MVr/ TVr/ CASSIE endocardial closure ([place])  - HTN--stable  - Dyslipidemia--statin  - Hx prostate CA, s/p XRT  - Vitamin D deficiency  - Chronic TCP--recd  platelets intraop, plts given 11/21 for wire removal  - Intraop coagulopathy likely d/t chr TCP  - Postop ABLA, expected--watch closely  - Postop dysphagia--video swallow 11/19    POD# 4.  Doing well.  DC wires after platelets.  On asa/statin/bb.  Pt will need outpt follow up with ST and GI for possible esophageal stricture.    Routine care--as above  D/w pt/family, nsg, Dr. Henderson  Anticipate home at discharge    Nata Verma, STUART  11/21/2022  16:55 EST

## 2022-11-21 NOTE — DISCHARGE SUMMARY
[facility], [place], operated by [facility] Discharge Summary    Date of Admission: 11/17/2022  Date of Discharge:  11/21/2022    Discharge Diagnosis:   - Severe mitral regurgitation, moderate tricuspid regurgitation, EF 60% (TERRA)--s/p elective complex MVr/ TVr/ CASSIE endocardial closure ([place])  - HTN--stable  - Dyslipidemia--statin  - Hx prostate CA, s/p XRT  - Vitamin D deficiency  - Chronic TCP--recd platelets intraop, platelets given for wire removal 11/21  - Intraop coagulopathy likely d/t chr TCP  - Postop ABLA, expected--stable  - Dysphagia--video swallow study 11/19    Presenting Problem/History of Present Illness  Nonrheumatic mitral valve regurgitation [I34.0]  Nonrheumatic tricuspid valve regurgitation [I36.1]     Hospital Course  Patient is a 77 y.o. male presented with above medical history and symptoms from his valvular heart disease. After having appropriate pre-operative workup he was scheduled for surgery. He was admitted the morning on the procedure. On 11/17/22 he underwent mitral valve repair/tricuspid valve repair/left atrial appendage closure by Dr. Henderson. Ful details can be found in operative report. He tolerated the procedure and was transferred to [facility]. He remained stable and was able to be extubated early on POD1. Primacor was d/c'd on POD1. He had some dysphagia and speech was consulted. He had a video swallow and was placed on honey thick liquids and mechanical soft diet. On POD3 Chest tubes, bartlett, and central line were removed without issue. He did have some bradycardia so his epicardial wires were kept until POD4. At that time is rhythm had recovered.  He was transfused with platelets prior to epicardial wire removal.  He continued to progress with increased activity, weaning of supplemental oxygen, and titration of medications. On POD4 he was ready for discharge. Plan discharge home with home health--PT, ST, and nsg.  He was noted to have dysphagia and failed bedside eval prompting video swallow eval.   Video swallow results showed silent aspiration occurred with thin liquids via cup. Laryngeal penetration occurred with honey thickened, nectar thickened and pureed consistencies of barium. Large bridging osteophytes are present in the anterior cervical spine.   He was instructed to follow this ST recommendations:  Recommend pt continue a diet of HTL and mechanical soft solids. Medications: 1 at a time with HTL or puree. Compensations: additional swallow s/p solids, liquids; small bites/sips, upright for all PO, volitional throat clear every 1-2 bites. ST will continue to follow.   At time of discharge he is HD stable, ambulating without issue, and on room air. Patient was provided with appropriate discharge education. He was instructed to call office with any questions or concerns.           Procedures Performed  Per Dr. Henderson 11/17/2022    Operative Note     Date of Dictation: 11/17/22     Date of Procedure: Same     Referring Physician: Prerna Garcia MD     Preoperative diagnosis:  1.  Severe mitral regurgitation  2.  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  Progressive dyspnea with exertion  4.  Multiple PVCs  5.  Moderate tricuspid regurgitation  6.  Idiopathic thrombocytopenia     Postoperative diagnosis:   Same     Procedure:   1.  Elective mitral valve repair with a 40 mm Medtronic flexible band posterior annuloplasty and chordal repair of P2 (4-0 [place]-Booker x2) and P3 (4-0 [place]-Booker x1).  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  2.  Tricuspid valve repair with a 32 mm fissure tricuspid ring annuloplasty  3.  Left atrial appendage endocardial closure    Consults:   Consults     Date and Time Order Name Status Description    11/17/2022  5:23 PM Inpatient Cardiology Consult        Cardiology-- Dr. Garcia    Pertinent Test Results:    Lab Results   Component Value Date    WBC 5.50 11/21/2022    HGB 10.0 (L) 11/21/2022    HCT 29.6 (L) 11/21/2022    MCV 90.0  11/21/2022    PLT 71 (L) 11/21/2022      Lab Results   Component Value Date    GLUCOSE 136 (H) 11/21/2022    CALCIUM 8.6 11/21/2022     11/21/2022    K 3.5 11/21/2022    CO2 26.0 11/21/2022     11/21/2022    BUN 28 (H) 11/21/2022    CREATININE 0.84 11/21/2022    EGFRIFAFRI 76 11/17/2016    EGFRIFNONA 63 05/04/2021    BCR 33.3 (H) 11/21/2022    ANIONGAP 10.0 11/21/2022     Lab Results   Component Value Date    INR 1.17 (H) 11/18/2022    PROTIME 11.9 (H) 11/18/2022         Condition on Discharge:    stable    Vital Signs  Temp:  [97.7 °F (36.5 °C)-99.4 °F (37.4 °C)] 97.9 °F (36.6 °C)  Heart Rate:  [74-86] 79  Resp:  [17-22] 17  BP: (103-144)/(48-83) 120/73      Discharge Disposition      Discharge Medications     Discharge Medications      New Medications      Instructions Start Date   aspirin 81 MG EC tablet  Replaces: aspirin 325 MG tablet   81 mg, Oral, Daily   Start Date: November 22, 2022     atorvastatin 40 MG tablet  Commonly known as: LIPITOR   40 mg, Oral, Nightly      HYDROcodone-acetaminophen 5-325 MG per tablet  Commonly known as: NORCO   1 tablet, Oral, Every 6 Hours PRN      metoprolol tartrate 25 MG tablet  Commonly known as: LOPRESSOR   12.5 mg, Oral, Every 12 Hours Scheduled      polyethylene glycol 17 g packet  Commonly known as: MIRALAX   17 g, Oral, 2 Times Daily         Continue These Medications      Instructions Start Date   acetaminophen 500 MG tablet  Commonly known as: TYLENOL   500 mg, Oral, Every 6 Hours PRN      allopurinol 100 MG tablet  Commonly known as: ZYLOPRIM   100 mg, Oral, Daily      CAMCEVI SC   Every 6 months injection, due in December      DULoxetine 60 MG capsule  Commonly known as: CYMBALTA   60 mg, Oral, Daily      famotidine 20 MG tablet  Commonly known as: PEPCID   20 mg, Oral, 2 Times Daily PRN      fexofenadine 180 MG tablet  Commonly known as: ALLEGRA   180 mg, Oral, Daily PRN      fluticasone 50 MCG/ACT nasal spray  Commonly known as: FLONASE   2 sprays,  Nasal, Daily      melatonin 5 MG tablet tablet   5 mg, Oral, Nightly PRN      zolpidem 10 MG tablet  Commonly known as: AMBIEN   0.5 tablets, Oral, Nightly PRN         Stop These Medications    amLODIPine 10 MG tablet  Commonly known as: NORVASC     aspirin 325 MG tablet  Replaced by: aspirin 81 MG EC tablet     aspirin-acetaminophen-caffeine 250-250-65 MG per tablet  Commonly known as: EXCEDRIN MIGRAINE     aspirin-sod bicarb-citric acid 325 MG effervescent tablet  Commonly known as: STEFANIE-SELTZER     ibuprofen 400 MG tablet  Commonly known as: ADVIL,MOTRIN     losartan 50 MG tablet  Commonly known as: COZAAR     mupirocin 2 % ointment  Commonly known as: BACTROBAN            Discharge Diet:   Heart healthy  Mechanical soft diet, honey thick liquid     Activity at Discharge:   Activity Instructions     Activity as Tolerated      Bathing Restrictions      No tub baths, hot tubs/jacuzzi tubs, swimming pools, rivers, lakes, oceans for 6 weeks    Type of Restriction: Bathing    Bathing Restrictions: No Tub Bath    Driving Restrictions      Do not resume driving while taking narcotic pain medication    Type of Restriction: Driving    Driving Restrictions: No Driving (Time Limited)    Length: 2 Weeks    Lifting Restrictions      Type of Restriction: Lifting    Lifting Restrictions: Lifting Restriction (Indicate Limit)    Weight Limit (Pounds): 10    Length of Lifting Restriction: 6 weeks        1. No driving for 2 weeks and off narcotic pain medications.  2. Shower daily. Clean incisions with warm water and antibacterial soap only. Do not put any lotion or ointments on incisions.  3. Ambulate for 10 minutes at least 3 times a day.  4. No heavy lifting > 10lbs until seen in office.   5. Take all medications as prescribed.     Follow-up Appointments  Future Appointments   Date Time Provider Department Center   12/22/2022  9:45 AM Yue Britton APRN [facility]     Additional Instructions for the Follow-ups that You  Need to Schedule     Ambulatory Referral to Home Health (Hospital)   As directed      Face to Face Visit Date: 11/21/2022    Follow-up provider for Plan of Care?: I will be treating the patient on an ongoing basis.  Please send me the Plan of Care for signature.    Follow-up provider: JB VILLELA [[id number]]    Reason/Clinical Findings: s/p MV repair, TV repair    Describe mobility limitations that make leaving home difficult: s/p MV repair, TV repair    Nursing/Therapeutic Services Requested: Skilled Nursing (eval and treat) Physical Therapy Occupational Therapy Speech Therapy    Skilled nursing orders: Post CABG care (eval and treat)    PT orders: Strengthening (eval and treat)    Occupational orders: Strengthening (eval and treat)    SLP orders: Dysphagia therapy (eval and treat)    Frequency: 1 Week 1         Discharge Follow-up with PCP   As directed       Currently Documented PCP:    Jaret Castellanos MD    PCP Phone Number:    [phone]     Follow Up Details: in one month         Discharge Follow-up with Specialty: Cardiology; 2 Weeks   As directed      Specialty: Cardiology    Follow Up: 2 Weeks    Follow Up Details: call for an appt with Dr. Garcia for 2 week follow up         Discharge Follow-up with Specified Provider: Cardiac Surgery; 1 Month   As directed      To: Cardiac Surgery    Follow Up: 1 Month    Follow Up Details: NP follow up appt 12/22/2022 at 0945         Referral to Cardiac Rehab   As directed      Call MD With Problems / Concerns    Dec 21, 2022 (Approximate)      Instructions: Call for temp >101 or any surgical wound drainage    Order Comments: Instructions: Call for temp >101 or any surgical wound drainage              Test Results Pending at Discharge  none    STS information:  Pt was discharged on asa/bb/statin.

## 2022-11-21 NOTE — THERAPY TREATMENT NOTE
Subjective: Pt agreeable to therapeutic plan of care.   Cognition: oriented to Person, Place, Time and Situation    Objective:     Bed Mobility: Mod-A   Functional Transfers: Mod-A and with rolling walker hand placement on heart hugger during transfers   Functional Ambulation: CGA vc cues for safety     Bathing: Min-A  ADL Position: supported sitting  ADL Comments: min A with sternal precautions and demo     Lower Body Dressing: Mod-A  ADL Position: supported sitting  ADL Comments: donning doffing socks d/t limited trunk flexion     Vitals: WNL    Pain: 4 VAS  Location: chest, incisional   Interventions for pain: Repositioned, Increased Activity and Therapeutic Presence     Phase 1 Cardiac Rehab Initiated    Sitting tolerance: >10min and supported  Standing tolerance: 1-5min    Precautions:  Mid-sternal incision; avoid scapular retraction and depression.  Cardiovascular impairment post-sx; encourage energy conservation strategies.      MET level equivalent: 1.4-2.0 (Self care ADLs in sitting / slow ambulation in room, light intensity activities) and 2.0-3.0 (Unlimited sitting, ambulation on level ground <2mph, light housework)    Education: Provided education on the importance of mobility in the acute care setting, Verbal/Tactile Cues, ADL training, Transfer Training and Post-Op Precautions    Assessment: Clint Cee presents with ADL impairments below baseline abilities which indicate the need for continued skilled intervention while inpatient. Pt was educated on incisional care when bathing. Pt required mod verbal cues for sternal cardiac precautions when bathing. Tolerating session today without incident. Will continue to follow and progress as tolerated.     Plan/Recommendations:   Low Intensity Therapy recommended post-acute care - This is recommended as therapy feels this patient would require 2-3 visits per week. OP or HH would be the best option depending on patient's home bound status. Consider, if the  "patient has other  \"skilled\" needs such as wounds, IV antibiotics, etc. Combined with \"low intensity\" could also equate to a SNF. If patient is medically complex, consider LTAC.. Pt requires no DME at discharge.     Pt desires Home with family assist and and Home Health at discharge. Pt cooperative; agreeable to therapeutic recommendations and plan of care.     Modified Kalyan: N/A = No pre-op stroke/TIA    Post-Tx Position: Up in Chair, Alarms activated and Call light and personal items within reach  PPE: gloves and surgical mask    "

## 2022-11-21 NOTE — PLAN OF CARE
Assessment: Clint Cee presents with functional mobility impairments which indicate the need for skilled intervention. Tolerating session today without incident. Pt demosntrates good mobility this date, ambulation with RWx for 220 feet with CGA. Pt requiring cueing for upright posture, and breathing techniques. Pt navigated steps safely with CGA for technique and VC for sternal precautions. Will continue to follow and progress as tolerated.

## 2022-11-21 NOTE — DISCHARGE INSTR - APPOINTMENTS
Dr. Butler at [facility]  [address] [place] [address]  [phone]    Follow up on December 15th at 8:15 am.

## 2022-11-21 NOTE — THERAPY TREATMENT NOTE
Acute Care - Speech Language Pathology   Swallow Treatment Note  [facility]     Patient Name: Clint Cee  : 1945  MRN: [id number]  Today's Date: 2022               Admit Date: 2022    Visit Dx:     ICD-10-CM ICD-9-CM   1. Nonrheumatic tricuspid valve regurgitation  I36.1 424.2   2. Nonrheumatic mitral valve regurgitation  I34.0 424.0     Patient Active Problem List   Diagnosis   • Benign prostatic hyperplasia   • Gastroesophageal reflux disease   • Hypertension   • Mood disorder (HCC)   • Scoliosis   • Spinal stenosis   • Vitamin D deficiency   • Polyosteoarthritis   • Insomnia   • Retinal detachment of right eye with single retinal tear   • Osteoarthritis of knee   • Cellulitis of face   • Prostate cancer (HCC)   • Ocular migraine   • Headache   • Gout of multiple sites   • Medicare annual wellness visit, subsequent   • Allergic   • HL (hearing loss)   • Visual impairment   • Cataract   • Colon polyp   • Heart murmur on physical examination   • Mitral valve prolapse   • Premature ventricular contractions   • Nonrheumatic mitral valve regurgitation   • Severe mitral regurgitation   • Moderate tricuspid regurgitation   • Primary hypertension   • Dyspnea on exertion   • Nonrheumatic tricuspid valve regurgitation     Past Medical History:   Diagnosis Date   • Abnormal ECG 2022   • Allergic 1954    Nasal alergies   • Arrhythmia     Dr Youngblood examined me and said i was ok.   • Asthma    • Benign prostatic hyperplasia 2018   • Cataract 2014    Surgery. Caused detached retina of right eye 60 repair   • Colon polyp 2018    Found and removed last colon eca.q   • Gastroesophageal reflux disease 2014   • Glaucoma 2003    Normal pressures. Have low pressure glaucoma   • Headache 2015    Have shimmering in both eyes intermittantly. No headaches   • Heart murmur May 2022    During wellness exam   • Heart valve disease 2022    During Echo   • HL (hearing  loss) 01/01/2012    Have hearing aids   • Hypertension 12/02/2011   • Infectious viral hepatitis 1954    Yellow jaundis as child   • Insomnia 12/02/2011   • Mitral valve prolapse June 2022   • Mood disorder (HCC) 09/19/2013   • Polyosteoarthritis 12/02/2011   • Prostate Cancer Jan22 January 2022   • Spinal stenosis 12/02/2011   • Visual impairment 01/01/1955    Nearsighted corrected glasses   • Vitamin D deficiency 05/23/2018     Past Surgical History:   Procedure Laterality Date   • ADENOIDECTOMY  1954    As child   • APPENDECTOMY  1956    Surgery   • CARDIAC CATHETERIZATION N/A 9/30/2022    Procedure: Right and Left Heart Cath with Coronar Angiography;  Surgeon: Felipe Garcia MD;  Location: [facility] CATH INVASIVE LOCATION;  Service: Cardiovascular;  Laterality: N/A;   • COLONOSCOPY  01/01/1997    Regularly scheduled   • EYE SURGERY  01/01/2010    Reattached right retina   • SPINE SURGERY  01/01/1971    2 lumbar 1 cervical   • TONSILLECTOMY  01/01/1950    Childhood       SLP Recommendation and Plan  SLP Swallowing Diagnosis: moderate, pharyngeal dysphagia (11/21/22 1300)  SLP Diet Recommendation: honey thick liquids, ground (11/21/22 1300)  Recommended Precautions and Strategies: upright posture during/after eating, small bites of food and sips of liquid, multiple swallows per bite of food, multiple swallows per sip of liquid, alternate between small bites of food and sips of liquid (11/21/22 1300)  SLP Rec. for Method of Medication Administration: with thick liquids, with puree (11/21/22 1300)     Monitor for Signs of Aspiration: yes, notify SLP if any concerns (11/21/22 1300)     Swallow Criteria for Skilled Therapeutic Interventions Met: demonstrates skilled criteria (11/21/22 1300)  Anticipated Discharge Disposition (SLP): anticipate therapy at next level of care (11/21/22 1300)  Rehab Potential/Prognosis, Swallowing: good, to achieve stated therapy goals (11/21/22 1300)  Therapy Frequency (Swallow): PRN  (11/21/22 1300)  Predicted Duration Therapy Intervention (Days): until discharge (11/21/22 1300)            SWALLOW EVALUATION (last 72 hours)     SLP Adult Swallow Evaluation     Row Name 11/21/22 1300       Rehab Evaluation    Document Type therapy note (daily note)  -LF    Subjective Information no complaints  -LF    Patient Observations alert;cooperative;agree to therapy  -LF    Patient/Family/Caregiver Comments/Observations --    Patient Effort good  -LF    Comment Pt seen for skilled ST targeting dysphagia this date. Upon entry, pt awake, alert, and sitting upright in a chair w/ meal tray. Pt observed w/ trials of omelette, oatmeal, and HTL by spoon. Pt noted to have ice in HTL orange juice. Education provided regarding viscositiy change of liquids when ice is added. Pt expressed agreement and understanding. Oral phase WFL. Intermittent throat clear observed throughout meal assessment, which pt endorses were included w/ VFSS results/recs. Pt self fed and independently took small bites/sips. Discussed effortful swallow w/ pt w/ demonstration of exercise. Pt independently demonstrated a dry effortful swallow x5. Much education w/ pt regarding VFSS results and recs. Pt expressed agreement and understanding. Recommend pt continue a diet of HTL and mechanical soft solids. Medications: 1 at a time with HTL or puree. Compensations: additional swallow s/p solids, liquids; small bites/sips, upright for all PO, volitional throat clear every 1-2 bites. ST will continue to follow.   -LF    Symptoms Noted During/After Treatment none  -LF       General Information    Patient Profile Reviewed yes  -LF       SLP Evaluation Clinical Impression    SLP Swallowing Diagnosis moderate;pharyngeal dysphagia  -LF    Functional Impact risk of aspiration/pneumonia  -LF    Rehab Potential/Prognosis, Swallowing good, to achieve stated therapy goals  -LF    Swallow Criteria for Skilled Therapeutic Interventions Met demonstrates skilled  criteria  -LF          Recommendations    Therapy Frequency (Swallow) PRN  -LF    Predicted Duration Therapy Intervention (Days) until discharge  -LF    SLP Diet Recommendation honey thick liquids;ground  -    Recommended Diagnostics --    Recommended Precautions and Strategies upright posture during/after eating;small bites of food and sips of liquid;multiple swallows per bite of food;multiple swallows per sip of liquid;alternate between small bites of food and sips of liquid  -LF    Oral Care Recommendations Oral Care before breakfast, after meals and PRN  -LF    SLP Rec. for Method of Medication Administration with thick liquids;with puree  -    Monitor for Signs of Aspiration yes;notify SLP if any concerns  -LF    Anticipated Discharge Disposition (SLP) anticipate therapy at next level of care  -LF       Swallow Goals (SLP)    Swallow LTGs Swallow Long Term Goal (free text)  -    Swallow STGs diet tolerance goal selection (SLP);pharyngeal strengthening exercise goal selection (SLP)  -LF    Diet Tolerance Goal Selection (SLP) Patient will tolerate trials of  -LF    Pharyngeal Strengthening Exercise Goal Selection (SLP) pharyngeal strengthening exercise, SLP goal 1  -LF       (LTG) Swallow    (LTG) Swallow The patient will maximize swallow function for least restrictive po diet, exhibiting no complications associated with dysphagia, adequate po intake, and demonstrating independent use of safe swallow compensations.  -LF    [place] (Swallow Long Term Goal) independently (over 90% accuracy)  -    Time Frame (Swallow Long Term Goal) by discharge  -    Progress/Outcomes (Swallow Long Term Goal) goal ongoing  -LF       (STG) Patient will tolerate trials of    Consistencies Trialed (Tolerate trials) honey/ moderately thick liquids;soft to chew (chopped) textures  -LF    Desired Outcome (Tolerate trials) without signs/symptoms of aspiration;with adequate oral prep/transit/clearance  -LF    [place]  (Tolerate trials) independently (over 90% accuracy)  -LF    Progress/Outcomes (Tolerate trials) good progress toward goal  -LF       (STG) Swallow 1    (STG) Swallow 1 The patient will participate in ongoing assessment of swallow, including re-evaluation clinically and/or including instrumental assessment of swallow if indicated, to further assess swallow function in anticipation to initiate a po diet  -LF    Time Frame (Swallow Short Term Goal 1) 1 week  -LF    Progress/Outcomes (Swallow Short Term Goal 1) goal met  -LF       (STG) Pharyngeal Strengthening Exercise Goal 1 (SLP)    Activity (Pharyngeal Strengthening Goal 1, SLP) increase closure at entrance to airway/closure of airway at glottis  -LF    Increase Closure at Entrance to Airway/Closure of Airway at Glottis supraglottic swallow;falsetto;hard effortful swallow;breath hold exercises;effortful pitch glide (falsetto + pharyngeal squeeze)  -LF    [place]/Accuracy (Pharyngeal Strengthening Goal 1, SLP) independently (over 90% accuracy)  -LF    Time Frame (Pharyngeal Strengthening Goal 1, SLP) short term goal (STG)  -LF    Progress/Outcomes (Pharyngeal Strengthening Goal 1, SLP) good progress toward goal  -LF          User Key  (r) = Recorded By, (t) = Taken By, (c) = Cosigned By    Initials Name Effective Dates    AB Sharon Holloway, MS CCC-SLP 08/01/21 -     LF Nata Mendoza SLP 06/16/21 -                 EDUCATION  The patient has been educated in the following areas:   Dysphagia (Swallowing Impairment) Oral Care/Hydration Modified Diet Instruction.              Time Calculation:                EDWIGE Berkowitz  11/21/2022

## 2022-11-21 NOTE — SIGNIFICANT NOTE
11/21/22 1734   Discharge of Care   Discharge Mode wheel chair   Discharged Accompanied by spouse   Discharge Contact Information if Applicable Clint [phone]   Discharge Teaching Done  Yes   Learning Method Explanation;Teach Back;Demonstration;Written Materials

## 2022-11-21 NOTE — DISCHARGE PLACEMENT REQUEST
"Clint Cee (77 y.o. Male)     Date of Birth   1945    Social Security Number       Address   [address] [place] [address] [place] [address]    Home Phone   [phone]    MRN   [id number]       [place]ism   None    Marital Status                               Admission Date   11/17/22    Admission Type   Elective    Admitting Provider   Femi Henderson MD    Attending Provider   Femi Henderson MD    Department, Room/Bed   [facility] CARDIOVASCULAR CARE UNIT, 2216/1       Discharge Date       Discharge Disposition       Discharge Destination                               Attending Provider: Femi Henderson MD    Allergies: Morphine    Isolation: None   Infection: None   Code Status: CPR    Ht: 182.9 cm (72\")   Wt: 108 kg (238 lb 6.4 oz)    Admission Cmt: None   Principal Problem: Nonrheumatic tricuspid valve regurgitation [I36.1]                 Active Insurance as of 11/17/2022     Primary Coverage     Payor Plan Insurance Group Employer/Plan Group    MEDICARE MEDICARE A & B      Payor Plan Address Payor Plan Phone Number Payor Plan Fax Number Effective Dates    [address] [phone]  1/1/2012 - None Entered    [facility] [id number]       Subscriber Name Subscriber Birth Date Member ID       CLINT CEE 1945 [id number]           Secondary Coverage     Payor Plan Insurance Group Employer/Plan Group    [employer] SUP AAR HEALTH CARE OPTIONS PLAN F     Payor Plan Address Payor Plan Phone Number Payor Plan Fax Number Effective Dates    [facility] [phone]  1/1/2020 - None Entered    [address]       [facility] [address]       Subscriber Name Subscriber Birth Date Member ID       CLINT CEE 1945 [id number]                 Emergency Contacts      (Rel.) Home Phone Work Phone Mobile Phone    VIKA CEE (Spouse) [phone]    Ghassan Cee (Son) -- -- [phone]    ColemanJessy (Son) -- -- [phone]             " "  History & Physical      Yue BrittonSTUART at 11/16/22 1100     Attestation signed by Femi Henderson MD at 11/16/22 1801    I have reviewed this documentation and agree.                  CARDIOTHORACIC H&P NOTE      Attending: Femi Henderson MD    Subjective  .     History of present illness:  Clint Cee is a 77 y.o. male who presented for PAT today.  Patient was last evaluated by Dr. Henderson in the office on 10/5/2022 at which time he was to be scheduled for TVR and MVR surgery.  Patient states there have been no changes to his health history since he was last evaluated.  He does state that he underwent an MRI of the brain last Thursday as he continues to have intermittent \"flashes\" in his left eye, MRI was negative, he states that he was told that they were some type of ocular migraine.     Review of Systems   Constitutional: Negative for fever.   Skin: Negative for rash.       History  Past Medical History:   Diagnosis Date   • Abnormal ECG June 2022   • Allergic 01/01/1954    Nasal alergies   • Arrhythmia 2004    Dr Youngblood examined me and said i was ok.   • Asthma    • Benign prostatic hyperplasia 11/14/2018   • Cataract 01/01/2014    Surgery. Caused detached retina of right eye 20/60 repair   • Colon polyp 01/01/2018    Found and removed last colon eca.q   • Gastroesophageal reflux disease 03/20/2014   • Glaucoma 2003    Normal pressures. Have low pressure glaucoma   • Headache 01/01/2015    Have shimmering in both eyes intermittantly. No headaches   • Heart murmur May 2022    During wellness exam   • Heart valve disease July 2022    During Echo   • HL (hearing loss) 01/01/2012    Have hearing aids   • Hypertension 12/02/2011   • Infectious viral hepatitis 1954    Yellow jaundis as child   • Insomnia 12/02/2011   • Mitral valve prolapse June 2022   • Mood disorder (HCC) 09/19/2013   • Polyosteoarthritis 12/02/2011   • Prostate Cancer Jan22 January 2022   • Scoliosis 05/09/2014   • Spinal " stenosis 12/02/2011   • Visual impairment 01/01/1955    Nearsighted corrected glasses   • Vitamin D deficiency 05/23/2018       Past Surgical History:   Procedure Laterality Date   • ADENOIDECTOMY  1954    As child   • APPENDECTOMY  1956    Surgery   • CARDIAC CATHETERIZATION N/A 9/30/2022    Procedure: Right and Left Heart Cath with Coronar Angiography;  Surgeon: Felipe Garcia MD;  Location: [facility] CATH INVASIVE LOCATION;  Service: Cardiovascular;  Laterality: N/A;   • COLONOSCOPY  01/01/1997    Regularly scheduled   • EYE SURGERY  01/01/2010    Reattached right retina   • SPINE SURGERY  01/01/1971    2 lumbar 1 cervical   • TONSILLECTOMY  01/01/1950    Childhood       Family History   Problem Relation Age of Onset   • Arthritis Father    • Cancer Father         Prostate   • Arrhythmia Father         Congentive heart failure   • Diabetes Mother         Adult diabetes   • Early death Mother         Kidneys failed after giving birth-diabetes complications   • Heart disease Mother         Adult Diabetes       Social History     Tobacco Use   • Smoking status: Former     Packs/day: 0.00     Years: 0.50     Pack years: 0.00     Types: Cigarettes   • Smokeless tobacco: Never   • Tobacco comments:     Tried tobacco as child   Vaping Use   • Vaping Use: Never used   Substance Use Topics   • Alcohol use: Not Currently     Alcohol/week: 1.0 standard drink     Types: 1 Cans of beer per week     Comment: Not a regular drinker. Occasionly have a glass of wine or be   • Drug use: Never        No medications prior to admission.         Morphine    Scheduled Meds:  Continuous Infusions:No current facility-administered medications for this encounter.    PRN Meds:.    Objective      VITAL SIGNS  There were no vitals filed for this visit.         TELEMETRY:     Physical Exam:  Constitutional:       Appearance: Healthy appearance.   Pulmonary:      Effort: Pulmonary effort is normal.   Neurological:      Mental Status: Alert and  oriented to person, place and time.          Results Review:        WBC WBC   Date Value Ref Range Status   11/16/2022 4.80 3.40 - 10.80 10*3/mm3 Final      HGB Hemoglobin   Date Value Ref Range Status   11/16/2022 14.3 13.0 - 17.7 g/dL Final      HCT Hematocrit   Date Value Ref Range Status   11/16/2022 41.9 37.5 - 51.0 % Final      Platelets Platelets   Date Value Ref Range Status   11/16/2022 92 (L) 140 - 450 10*3/mm3 Final        PT/INR:    Protime   Date Value Ref Range Status   11/16/2022 10.4 9.6 - 11.7 Seconds Final   /  INR   Date Value Ref Range Status   11/16/2022 1.01 0.93 - 1.10 Final       Sodium Sodium   Date Value Ref Range Status   11/16/2022 138 136 - 145 mmol/L Final      Potassium Potassium   Date Value Ref Range Status   11/16/2022 4.4 3.5 - 5.2 mmol/L Final      Chloride Chloride   Date Value Ref Range Status   11/16/2022 101 98 - 107 mmol/L Final      Bicarbonate CO2   Date Value Ref Range Status   11/16/2022 27.0 22.0 - 29.0 mmol/L Final      BUN BUN   Date Value Ref Range Status   11/16/2022 25 (H) 8 - 23 mg/dL Final      Creatinine Creatinine   Date Value Ref Range Status   11/16/2022 0.85 0.76 - 1.27 mg/dL Final      Calcium Calcium   Date Value Ref Range Status   11/16/2022 9.0 8.6 - 10.5 mg/dL Final      Magnesium No results found for: MG     Troponin No results found for: TROPONIN   BNP No results found for: BNP           Assessment & Plan       Nonrheumatic mitral valve regurgitation    Nonrheumatic tricuspid valve regurgitation      All of his preoperative studies are normal with the exception of mild stenosis of the left internal carotid.  I spent a significant amount of time discussing the expected perioperative course and answering any questions that the patient or his spouse may have had.  He is scheduled for MVR/TVR on 11/18/2022 with Dr. Henderson.    Yue Britton, APRN  11/16/22  17:03 EST  Yue Britton, Cardiothoracic Surgery    Electronically signed by Femi Henderson MD  at 11/16/22 1805

## 2022-11-21 NOTE — CASE MANAGEMENT/SOCIAL WORK
Continued Stay Note  [facility]     Patient Name: Clint Cee  MRN: [id number]  Today's Date: 11/21/2022    Admit Date: 11/17/2022    Plan: DC Plan: Home with [facility] accepted and following. Order requested. MVR/TVR 11/17/22.   Discharge Plan     Row Name 11/21/22 1106       Plan    Plan DC Plan: Home with [facility] accepted and following. Order requested. MVR/TVR 11/17/22.    Patient/Family in Agreement with Plan yes    Provided Post Acute Provider List? N/A    Provided Post Acute Provider Quality & Resource List? N/A    Plan Comments CM reviewed documentation to obtain clinical updates. CM anticipates patient will be ready for discharge later today or tomorrow. PT/OT recommendation for Home with Home Health services. CM placed referral for first choice [facility] and liaison Ekaterina notified. Ekaterina has accepted and will continue to follow patient. CM will continue to follow for additional needs. DC Barriers: POD 4 OHS, Pacer Wires, Cardiac monitoring.           Expected Discharge Date and Time     Expected Discharge Date Expected Discharge Time    Nov 22, 2022         Phone communication or documentation only- no physical contact with patient or family.        Smitha Pollock    Office Phone: [phone]  Office Cell:     [phone]

## 2022-11-21 NOTE — PLAN OF CARE
"  Assessment: Clint Cee presents with ADL impairments below baseline abilities which indicate the need for continued skilled intervention while inpatient. Pt was educated on incisional care when bathing. Pt required mod verbal cues for sternal cardiac precautions when bathing. Tolerating session today without incident. Will continue to follow and progress as tolerated.     Plan/Recommendations:   Low Intensity Therapy recommended post-acute care - This is recommended as therapy feels this patient would require 2-3 visits per week. OP or HH would be the best option depending on patient's home bound status. Consider, if the patient has other  \"skilled\" needs such as wounds, IV antibiotics, etc. Combined with \"low intensity\" could also equate to a SNF. If patient is medically complex, consider LTAC.. Pt requires no DME at discharge.     Pt desires Home with family assist and and Home Health at discharge. Pt cooperative; agreeable to therapeutic recommendations and plan of care.   "

## 2022-11-22 ENCOUNTER — TRANSITIONAL CARE MANAGEMENT TELEPHONE ENCOUNTER (OUTPATIENT)
Dept: CALL CENTER | Facility: HOSPITAL | Age: 77
End: 2022-11-22

## 2022-11-22 ENCOUNTER — HOME HEALTH ADMISSION (OUTPATIENT)
Dept: HOME HEALTH SERVICES | Facility: HOME HEALTHCARE | Age: 77
End: 2022-11-22

## 2022-11-22 LAB
BH BB BLOOD EXPIRATION DATE: NORMAL
BH BB BLOOD TYPE BARCODE: 6200
BH BB DISPENSE STATUS: NORMAL
BH BB PRODUCT CODE: NORMAL
BH BB UNIT NUMBER: NORMAL
UNIT  ABO: NORMAL
UNIT  RH: NORMAL

## 2022-11-22 NOTE — OUTREACH NOTE
Call Center TCM Note    Flowsheet Row Responses   [facility] facility patient discharged from? [facility]   Does the patient have one of the following disease processes/diagnoses(primary or secondary)? Cardiothoracic surgery   TCM attempt successful? No   Unsuccessful attempts Attempt 1          Samantha Avila MA    11/22/2022, 11:10 EST

## 2022-11-22 NOTE — OUTREACH NOTE
Call Center TCM Note    Flowsheet Row Responses   [facility] patient discharged from? [facility]   Does the patient have one of the following disease processes/diagnoses(primary or secondary)? Cardiothoracic surgery   TCM attempt successful? Yes   Discharge diagnosis complex fitral & tricuspid valve repair, left atrial appendage endocardial closure   Meds reviewed with patient/caregiver? Yes   Is the patient having any side effects they believe may be caused by any medication additions or changes? No   Does the patient have all medications related to this admission filled (includes all antibiotics, pain medications, cardiac medications, etc.) Yes   Is the patient taking all medications as directed (includes completed medication regime)? Yes   Does the patient have an appointment with their PCP within 7 days of discharge? No appointments available   Nursing Interventions Patient desires to follow up with specialty only   What is the Home health agency?  [facility]   Has home health visited the patient within 72 hours of discharge? Yes   Home health comments [facility] RN sched to see pt tomorrow 11/23/2022   Psychosocial issues? No   Did the patient receive a copy of their discharge instructions? Yes   Nursing interventions Reviewed instructions with patient   What is the patient's perception of their health status since discharge? Improving   Nursing interventions Nurse provided patient education   Is the patient/caregiver able to teach back normal signs of recovery? Nausea and lack of appetite, Constipation, Depression or irritability, Pain or discomfort at incisional site   Is the patient /caregiver able to teach back basic post-op care? Continue use of incentive spirometry at least 1 week post discharge, Hold pillow to support chest when coughing, Shower daily, Use a clean wash cloth and antibacterial bar or liquid soap to clean incisions, Lifting as instructed by MD in discharge instructions, If the steri-strips are  falling off, it is okay to remove them. (If applicable), No tub bath, swimming, or hot tub until instructed by MD, Drive as instructed by MD in discharge instructions, Practice cough and deep breath every 4 hours while awake   Is the patient/caregiver able to teach back signs and symptoms of incisional infection? Increased redness, swelling or pain at the incisonal site, Pus or odor from incision, Fever, Increased drainage or bleeding, Incisional warmth   Is the patient/caregiver able to teach back steps to recovery at home? Set small, achievable goals for return to baseline health, Practice good oral hygiene, Eat a well-balance diet, Weigh daily, Make a list of questions for surgeon's appointment, Rest and rebuild strength, gradually increase activity   Is the patient /caregiver able to teach back the importance of cardiac rehab? Yes   Nursing interventions Provided education on importance of cardiac rehab   If the patient is a current smoker, are they able to teach back resources for cessation? Not a smoker   Is the patient/caregiver able to teach back the hierarchy of who to call/visit for symptoms/problems? PCP, Specialist, Home health nurse, Urgent Care, ED, 911 Yes   TCM call completed? Yes   Wrap up additional comments D/C DX: complex fitral & tricuspid valve repair, left atrial appendage endocardial closure - Pt tired but feeling pretty well. States understanding of all new medications/changes. [facility] RN sched for tomorrow. First POST OP is 12/22/2022. Pt has PCP ov on 12/19/2022. PCP Dr Jaret Castellanos has no appts within TCM time frame.           Samantha Avila MA    11/22/2022, 15:12 EST

## 2022-11-22 NOTE — OUTREACH NOTE
Prep Survey    Flowsheet Row Responses   [facility] patient discharged from? [facility]   Is LACE score < 7 ? No   Emergency Room discharge w/ pulse ox? No   Eligibility TCM   [facility]   Date of Admission 11/17/22   Date of Discharge 11/21/22   Discharge Disposition Home-Health Care [facility]   Discharge diagnosis complex fitral & tricuspid valve repair, left atrial appendage endocardial closure   Does the patient have one of the following disease processes/diagnoses(primary or secondary)? Cardiothoracic surgery   Does the patient have Home health ordered? Yes   What is the Home health agency?  [facility]   Is there a DME ordered? No   Prep survey completed? Yes          AMELIA MAGALLON - Registered Nurse

## 2022-11-22 NOTE — CASE MANAGEMENT/SOCIAL WORK
Case Management Discharge Note       Home Medical Care     Service Provider Selected Services Address Phone Fax Patient Preferred    [facility] [address] [facility] [place] [address] [phone] --           Transportation Services  Private: Car (with spouse)    Final Discharge Disposition Code: 06 - home with home health care

## 2022-11-23 ENCOUNTER — HOME CARE VISIT (OUTPATIENT)
Dept: HOME HEALTH SERVICES | Facility: HOME HEALTHCARE | Age: 77
End: 2022-11-23

## 2022-11-23 VITALS
OXYGEN SATURATION: 96 % | HEIGHT: 72 IN | SYSTOLIC BLOOD PRESSURE: 126 MMHG | WEIGHT: 235 LBS | TEMPERATURE: 96.8 F | RESPIRATION RATE: 18 BRPM | BODY MASS INDEX: 31.83 KG/M2 | DIASTOLIC BLOOD PRESSURE: 70 MMHG | HEART RATE: 64 BPM

## 2022-11-23 PROCEDURE — G0151 HHCP-SERV OF PT,EA 15 MIN: HCPCS

## 2022-11-24 NOTE — HOME HEALTH
"PT Admission Summary: The patient is a 77 year old male admitted to home health services by PT on this day (11/23/2022) following hospitalization for surgery on the circulatory system. Patient presented to hospital with Nonrheumatic tricuspid valve regurgitation and underwent mitral valve repair/tricuspid valve repair and left atrial appendage closure by Dr. Henderson on 11/17/2022. Following procedure patient exhibited dysphagia, had a video swallow and was placed on honey thick liquids and mechanical soft diet.    Primary reason for home health services: Aftercare following surgery on the circulatory system (mitral valve repair/tricuspid valve repair and left atrial appendage closure).    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    Prior Functional Level: Independent with all mobility, ADL.    Social History: Patient names his spouse, Mary Beth Cee as his healthcare representative.  Patient stated goal: \"I want to get back to having more energy than I did before.\"  Password: .    PT Assessment this day (11/23/2022) reveals the problems of general lower extremity weakness (strength rated 4-/5 with weakness noted to negatively impact balance, transfers and gait), impaired transfers (requires up to minimal assistance), imbalance (Tinetti Balance Assessment score 16/28 indicating high falls risk), abnormality of gait (short, discontinuous steps with intermittent deviation from straight path), limited ambulation " ability/tolerance (100 feet or 1 minute requiring up to minimal assistance for safety).    The patient will benefit from the PT interventions of therapeutic exercise, transfer training, gait training and patient education (including home safety and home exercise program (HEP) instruction) to address the above problems and return patient to prior functional level.    Rehab potential good due to prior functional level and availability of caregiver assistance.    Plan is to discharge to care of spouse with patient at a higher functional level.    Session Notes: Patient presents in the home, ambulating without device. Spouse present and participating throughout admission visit.    Plan for next visit: Assess response to exercise and gait. Advance exercise and ambulation distance/time as indicated/tolerated.
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
## 2022-11-24 NOTE — CASE COMMUNICATION
"HUD SOC – HUDDLE START OF CARE    Caregiver Status: Spouse  Availability: 24/7  Ability to meet patient's needs: good  Willingness: good    Risk for Hospitalization: High    Patient stated goal: \"I want to get back to having more energy than I did before.\"    Services required to achieve goals: PT, SN, OT, SLP    Potential Issues for goal attainment:   none noted    Problems identified:  The patient is a 77 year old male admitted to [facility] services by PT on this day (11/23/2022) following hospitalization for surgery on the circulatory system. Patient presented to hospital with Nonrheumatic tricuspid valve regurgitation and underwent mitral valve repair/tricuspid valve repair and left atrial appendage closure by Dr. Henderson on 11/17/2022. Following procedure patient exhibited dysphagia, had a video swallow and was placed on honey thick liquids and mechanical soft di et.    Primary reason for home health services: Aftercare following surgery on the circulatory system (mitral valve repair/tricuspid valve repair and left atrial appendage closure).    Knowledge deficits:  exercise progression.    Functional status and safety:  PT Assessment this day (11/23/2022) reveals the problems of general lower extremity weakness (strength rated 4-/5 with weakness noted to negatively impact balance, transfers and  gait), impaired transfers (requires up to minimal assistance), imbalance (Tinetti Balance Assessment score 16/28 indicating high falls risk), abnormality of gait (short, discontinuous steps with intermittent deviation from straight path), limited ambulation ability/tolerance (100 feet or 1 minute requiring up to minimal assistance for safety).    Any Duplication of Services: no    Environmental issues:  none noted    Equipment/Adjunct S ervices:  Devices for care present in the home: walker    Community resources involved/needed:  n/a    Any additional needs: none noted    Based upon record review and collaboration " conference, the recommended frequency for this patient is:     SN----- 1 WK 1    PT----- 1 WK 4    OT---- 1 WK 1    ST----- 1 WK 1     HHA---    MSW---         Please note that above is a recommendation only and that the plan of care should reflect the patie nt's clinical needs and goals. If in agreement, please complete note. If a different frequency is necessary, please explain in note box and proceed per patients clinical needs.

## 2022-11-26 ENCOUNTER — HOME CARE VISIT (OUTPATIENT)
Dept: HOME HEALTH SERVICES | Facility: HOME HEALTHCARE | Age: 77
End: 2022-11-26

## 2022-11-27 ENCOUNTER — HOME CARE VISIT (OUTPATIENT)
Dept: HOME HEALTH SERVICES | Facility: HOME HEALTHCARE | Age: 77
End: 2022-11-27

## 2022-11-27 VITALS
RESPIRATION RATE: 20 BRPM | TEMPERATURE: 97.4 F | OXYGEN SATURATION: 96 % | SYSTOLIC BLOOD PRESSURE: 130 MMHG | HEART RATE: 66 BPM | DIASTOLIC BLOOD PRESSURE: 70 MMHG

## 2022-11-27 PROCEDURE — G0299 HHS/HOSPICE OF RN EA 15 MIN: HCPCS

## 2022-11-27 RX ORDER — AMLODIPINE BESYLATE 10 MG/1
TABLET ORAL
Qty: 90 TABLET | Refills: 0 | Status: SHIPPED | OUTPATIENT
Start: 2022-11-27 | End: 2022-12-02

## 2022-11-27 NOTE — HOME HEALTH
Focus of Care: SN to assess patient due to postsurgical care    Current Functional status/mobility/DME: Patient ambulates independently    HB status/Living Arrangements: Lives at home    Skin Integrity/wound status: Midline sternum incision healing    Code Status: CPR    Fall Risk: Patient mild falls risk post surgery     Plan for next visit: SN to assess for falls, psychosocial, medications, safety, pain and postsurgical care    Home Health ordered for: disciplines SN and PT    Reason for Hosp/Primary Dx/Co-morbidities: Valvular heart disease; Mitral valve replacement, tricuspid valve repair and left atrial appendage closure

## 2022-11-28 PROCEDURE — G0180 MD CERTIFICATION HHA PATIENT: HCPCS | Performed by: THORACIC SURGERY (CARDIOTHORACIC VASCULAR SURGERY)

## 2022-11-29 ENCOUNTER — HOME CARE VISIT (OUTPATIENT)
Dept: HOME HEALTH SERVICES | Facility: HOME HEALTHCARE | Age: 77
End: 2022-11-29

## 2022-11-29 PROCEDURE — G0152 HHCP-SERV OF OT,EA 15 MIN: HCPCS

## 2022-11-29 RX ORDER — ALLOPURINOL 100 MG/1
100 TABLET ORAL DAILY
Qty: 30 TABLET | Refills: 3 | Status: SHIPPED | OUTPATIENT
Start: 2022-11-29

## 2022-11-30 ENCOUNTER — HOME CARE VISIT (OUTPATIENT)
Dept: HOME HEALTH SERVICES | Facility: HOME HEALTHCARE | Age: 77
End: 2022-11-30

## 2022-11-30 VITALS
HEART RATE: 70 BPM | RESPIRATION RATE: 17 BRPM | DIASTOLIC BLOOD PRESSURE: 76 MMHG | TEMPERATURE: 98.6 F | SYSTOLIC BLOOD PRESSURE: 126 MMHG | OXYGEN SATURATION: 95 %

## 2022-11-30 VITALS
HEART RATE: 65 BPM | DIASTOLIC BLOOD PRESSURE: 77 MMHG | OXYGEN SATURATION: 95 % | SYSTOLIC BLOOD PRESSURE: 140 MMHG | TEMPERATURE: 97.8 F

## 2022-11-30 PROCEDURE — G0153 HHCP-SVS OF S/L PATH,EA 15MN: HCPCS

## 2022-11-30 NOTE — HOME HEALTH
The patient is a 77 year old male admitted to home health services on  11/23/2022 following hospitalization for surgery on the circulatory system. Patient presented to hospital with nonrheumatic tricuspid valve regurgitation and underwent mitral valve repair/tricuspid valve repair and left atrial appendage closure. Following procedure patient exhibited dysphagia, had a video swallow study and was placed on honey thick liquids and mechanical soft diet with ground meats.  Primary reason speech therapy: Dysphagia.  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 Spouse present and participated during initial ST evaluation and treatment session.         Skill/education provided: MBSS was completed on 11-21-22 with the following results and recommendations:   SLP Swallowing Diagnosis: moderate, pharyngeal dysphagia   SLP Diet Recommendation: honey thick liquids, ground meats, mechanical soft diet  Recommended Precautions and Strategies: upright posture during/after eating, small bites of food and sips of liquid, multiple swallows per bite of food, multiple swallows per sip of liquid, alternate between small bites of food and sips of liquid   SLP Rec. for Method of Medication Administration: with thick liquids, with puree   Monitor for Signs of Aspiration: yes, notify SLP if any concerns     Patient/caregiver response: Patient is currently consuming a regular diet with thin liquids. SLP educated patient and wife regarding  MBSS results and recommendations and the possible consequences of aspiration, aspiration pneumonia and death of consuming a regular diet with thin liquids. Pt and wife verbalized understanding but both feel he is doing fine with current diet. B/S swallow assessment completed: Patient consumed thin liquids with ice with no clinical s/sx of aspiration. SLP educated pt and wife on silent aspiration possibility. Patient consumed regular food with 1 throat clear. Pt and wife report that patient only occasionally coughs or clears his throat while eating.   Patient educated in pharyngeal strengthening exercises (Effortful swallow, Falsetto, Tongue pull/retraction, Evangelina Maneuver). Patient completed all x 15 with exception of [place], in which pt completed 3. Provided pt with HEP. SLP provided detailed instruction in GERD precautions (verbal and written). Pt verbalized understanding.     Plan for next visit: Direct instruction in swallow ex program. Further education in swallowing precautions and strategies. B/S swallow eval. to determine if pt requires cues to complete safe swallowing strategies/techniques.     Other pertinent info: Patient has glasses and bilateral hearing aids, although he did not wear them this date. Pt presents with normal cognition. Appetite is slowly improving.   Medications reviewed. Patient reports no falls. Patient feels he is slowly getting back to normal.

## 2022-12-01 ENCOUNTER — HOME CARE VISIT (OUTPATIENT)
Dept: HOME HEALTH SERVICES | Facility: HOME HEALTHCARE | Age: 77
End: 2022-12-01

## 2022-12-01 VITALS
SYSTOLIC BLOOD PRESSURE: 136 MMHG | RESPIRATION RATE: 17 BRPM | OXYGEN SATURATION: 94 % | HEART RATE: 74 BPM | TEMPERATURE: 97.4 F | DIASTOLIC BLOOD PRESSURE: 68 MMHG

## 2022-12-01 PROCEDURE — G0299 HHS/HOSPICE OF RN EA 15 MIN: HCPCS

## 2022-12-01 PROCEDURE — G0157 HHC PT ASSISTANT EA 15: HCPCS

## 2022-12-01 NOTE — HOME HEALTH
Pt reports overall feeling well. Pt reports cough and SOA has improved and mucinex is helping. Incision is healing well and minimal drainage from tube sites noted. Pt reports trying to walk around more and doing his exercises. No med changes or issues. Vitals WNL.      CP assess  CABG assess  assess pain  assess med issues

## 2022-12-02 VITALS
OXYGEN SATURATION: 99 % | HEART RATE: 73 BPM | TEMPERATURE: 96.9 F | DIASTOLIC BLOOD PRESSURE: 82 MMHG | SYSTOLIC BLOOD PRESSURE: 136 MMHG

## 2022-12-06 ENCOUNTER — HOME CARE VISIT (OUTPATIENT)
Dept: HOME HEALTH SERVICES | Facility: HOME HEALTHCARE | Age: 77
End: 2022-12-06

## 2022-12-06 VITALS
OXYGEN SATURATION: 96 % | TEMPERATURE: 98.5 F | HEART RATE: 77 BPM | SYSTOLIC BLOOD PRESSURE: 124 MMHG | DIASTOLIC BLOOD PRESSURE: 69 MMHG

## 2022-12-06 PROCEDURE — G0153 HHCP-SVS OF S/L PATH,EA 15MN: HCPCS

## 2022-12-07 ENCOUNTER — READMISSION MANAGEMENT (OUTPATIENT)
Dept: CALL CENTER | Facility: HOSPITAL | Age: 77
End: 2022-12-07

## 2022-12-07 NOTE — HOME HEALTH
Primary Dx: Dysphagia  FOC: Dysphagia education; GERD education; swallowing ex (from last session and 2 new ex) to improve strength, function and coordination; swallowing and GERD compensatory techniques and strategies to decrease aspiration risk.  Plan for next session: Dysphagia and GERD ed; all swallowing ex and compensatory techniques and swallowing strategies to improve swallowing, reduce GERD and decrease aspiration risk.

## 2022-12-07 NOTE — OUTREACH NOTE
CT Surgery Week 3 Survey    Flowsheet Row Responses   [facility] patient discharged from? [facility]   Does the patient have one of the following disease processes/diagnoses(primary or secondary)? Cardiothoracic surgery   Week 3 attempt successful? Yes   Call start time 1337   Call end time 1338   Discharge diagnosis complex fitral & tricuspid valve repair, left atrial appendage endocardial closure   Meds reviewed with patient/caregiver? Yes   Is the patient taking all medications as directed (includes completed medication regime)? Yes   Does the patient have a primary care provider?  Yes   Does the patient have an appointment scheduled with their C/T surgeon? Yes   Comments regarding PCP 12/19/22   Has the patient kept scheduled appointments due by today? N/A   What is the Home health agency?  [facility]   Has home health visited the patient within 72 hours of discharge? Yes   Psychosocial issues? No   Did the patient receive a copy of their discharge instructions? Yes   What is the patient's perception of their health status since discharge? Improving   Nursing interventions Nurse provided patient education   Is the patient /caregiver able to teach back basic post-op care? Lifting as instructed by MD in discharge instructions, Drive as instructed by MD in discharge instructions   Is the patient/caregiver able to teach back signs and symptoms of incisional infection? Increased redness, swelling or pain at the incisonal site, Increased drainage or bleeding, Incisional warmth, Pus or odor from incision, Fever   Is the patient/caregiver able to teach back steps to recovery at home? Set small, achievable goals for return to baseline health, Rest and rebuild strength, gradually increase activity   Is the patient/caregiver able to teach back the hierarchy of who to call/visit for symptoms/problems? PCP, Specialist, Home health nurse, Urgent Care, ED, 911 Yes   Week 3 call completed? Yes   Wrap up additional comments Pt  doing well. No needs,            JEANNE COATES - Registered Nurse

## 2022-12-08 ENCOUNTER — HOME CARE VISIT (OUTPATIENT)
Dept: HOME HEALTH SERVICES | Facility: HOME HEALTHCARE | Age: 77
End: 2022-12-08

## 2022-12-08 VITALS
DIASTOLIC BLOOD PRESSURE: 68 MMHG | SYSTOLIC BLOOD PRESSURE: 138 MMHG | HEART RATE: 73 BPM | OXYGEN SATURATION: 96 % | TEMPERATURE: 97.9 F | RESPIRATION RATE: 16 BRPM

## 2022-12-08 PROCEDURE — G0157 HHC PT ASSISTANT EA 15: HCPCS

## 2022-12-09 ENCOUNTER — HOME CARE VISIT (OUTPATIENT)
Dept: HOME HEALTH SERVICES | Facility: HOME HEALTHCARE | Age: 77
End: 2022-12-09

## 2022-12-09 NOTE — HOME HEALTH
pt sitting up and prepared for PT session,   no new meds and no new c/o's.   pt is motivated to improve and return to plf.   pt has been attempting review as tolerated and with no mentioned c/o's.    pt is motivated to improve/return to plf    pt was challenged at times and needed rest periods with cues needed to properly deep plb for energy conservation.  pt was able to perform all the requested activities and with a work/rest ratio of 60/40.     pt was minimally unsteady at times and displayed L lateral trunk flexion but improved with cues given to stand upright.   o2 sats and hr wnl's throughout and pt was educated on cardiac adverse s/s

## 2022-12-12 ENCOUNTER — TELEPHONE (OUTPATIENT)
Dept: CARDIOLOGY | Facility: CLINIC | Age: 77
End: 2022-12-12

## 2022-12-12 NOTE — TELEPHONE ENCOUNTER
PATIENT HAD PROCEDURE 11/11, AND WAS TAKEN OFF AMLODIPINE AND LOSARTAN. PATIENT STARTED ON METOPROLOL 25 MG. HE DID NOT REALIZE THIS WAS A BETA BLOCKER. HAS ISSUE WITH COUGH ON BETA BLOCKERS. HE STOPPED TAKING THE METOPROLOL AND STARTED BACK ON AMLODIPINE AND LOSARTAN AND HAD NO ISSUES WITH COUGH.

## 2022-12-13 ENCOUNTER — TELEPHONE (OUTPATIENT)
Dept: CARDIAC REHAB | Facility: HOSPITAL | Age: 77
End: 2022-12-13

## 2022-12-13 ENCOUNTER — TRANSCRIBE ORDERS (OUTPATIENT)
Dept: CARDIAC REHAB | Facility: HOSPITAL | Age: 77
End: 2022-12-13

## 2022-12-13 DIAGNOSIS — Z95.2 S/P MVR (MITRAL VALVE REPLACEMENT): Primary | ICD-10-CM

## 2022-12-13 NOTE — TELEPHONE ENCOUNTER
Called pt to see if interested in Cardiac Rehab. Says he is, but he's still receiving PT, ST, and home health nurse at home. Informed patient that all home bound services need to be finished before he can join CR program. Will go ahead and get his insurance verified and order signed. Says he'll call us back when therapies to his home are done.

## 2022-12-14 ENCOUNTER — TELEPHONE (OUTPATIENT)
Dept: CARDIAC REHAB | Facility: HOSPITAL | Age: 77
End: 2022-12-14

## 2022-12-14 NOTE — TELEPHONE ENCOUNTER
Okay but have him check his blood pressure and heart rate for the next 2 weeks and call back with results

## 2022-12-15 ENCOUNTER — HOME CARE VISIT (OUTPATIENT)
Dept: HOME HEALTH SERVICES | Facility: HOME HEALTHCARE | Age: 77
End: 2022-12-15

## 2022-12-15 VITALS
HEART RATE: 78 BPM | RESPIRATION RATE: 15 BRPM | SYSTOLIC BLOOD PRESSURE: 124 MMHG | TEMPERATURE: 97.4 F | DIASTOLIC BLOOD PRESSURE: 64 MMHG | OXYGEN SATURATION: 95 %

## 2022-12-15 VITALS
SYSTOLIC BLOOD PRESSURE: 126 MMHG | RESPIRATION RATE: 18 BRPM | TEMPERATURE: 98 F | OXYGEN SATURATION: 98 % | HEART RATE: 96 BPM | DIASTOLIC BLOOD PRESSURE: 80 MMHG

## 2022-12-15 PROCEDURE — G0157 HHC PT ASSISTANT EA 15: HCPCS

## 2022-12-15 PROCEDURE — G0299 HHS/HOSPICE OF RN EA 15 MIN: HCPCS

## 2022-12-15 NOTE — HOME HEALTH
pt up and is prepared for PT session.   no new c/o's and pt is motivated to improve and return to plf.    pt has been attempting hep review as tolerated.    pt feels he is improving and returning to plf but admits  continued limitations.   no med changes reported this date.   incision is clean and dry with no noted issues.      pt was cued on proper posture and deep plb with review of standing hep.  pts work/rest ratio was 70/30.  pt was minimally unsteady upon standing but was able to self correct.   pt was cued to maintain proper form with hep review and to avoid compensatory movement, with proper hanna.    o2 sats and hr remained wnl's throughout and only mild dyspnea noted.

## 2022-12-16 ENCOUNTER — HOME CARE VISIT (OUTPATIENT)
Dept: HOME HEALTH SERVICES | Facility: HOME HEALTHCARE | Age: 77
End: 2022-12-16

## 2022-12-16 ENCOUNTER — OFFICE (AMBULATORY)
Dept: URBAN - METROPOLITAN AREA CLINIC 64 | Facility: CLINIC | Age: 77
End: 2022-12-16

## 2022-12-16 VITALS
DIASTOLIC BLOOD PRESSURE: 69 MMHG | WEIGHT: 238 LBS | SYSTOLIC BLOOD PRESSURE: 120 MMHG | HEIGHT: 73 IN | HEART RATE: 95 BPM

## 2022-12-16 VITALS
DIASTOLIC BLOOD PRESSURE: 77 MMHG | TEMPERATURE: 98.4 F | HEART RATE: 90 BPM | OXYGEN SATURATION: 96 % | SYSTOLIC BLOOD PRESSURE: 137 MMHG

## 2022-12-16 DIAGNOSIS — K21.00 GASTRO-ESOPHAGEAL REFLUX DISEASE WITH ESOPHAGITIS, WITHOUT B: ICD-10-CM

## 2022-12-16 DIAGNOSIS — R13.10 DYSPHAGIA, UNSPECIFIED: ICD-10-CM

## 2022-12-16 PROCEDURE — 99203 OFFICE O/P NEW LOW 30 MIN: CPT | Performed by: INTERNAL MEDICINE

## 2022-12-16 PROCEDURE — G0153 HHCP-SVS OF S/L PATH,EA 15MN: HCPCS

## 2022-12-16 RX ORDER — FAMOTIDINE 40 MG/1
TABLET, FILM COATED ORAL
Qty: 90 | Refills: 0 | Status: ACTIVE

## 2022-12-16 NOTE — HOME HEALTH
Routine Visit Note: SN to assess patient and evaluate medication knowledge. Patient verbalizes minor ache at postsurgical site after a day of activity.     Skill/education provided: Ensure to follow up with medical providers     Patient/caregiver response: Patient verbalizes understanding    Plan for next visit: SN to assess patient and discharge    Other pertinent info:

## 2022-12-19 ENCOUNTER — OFFICE VISIT (OUTPATIENT)
Dept: FAMILY MEDICINE CLINIC | Facility: CLINIC | Age: 77
End: 2022-12-19

## 2022-12-19 VITALS
OXYGEN SATURATION: 97 % | WEIGHT: 226 LBS | DIASTOLIC BLOOD PRESSURE: 70 MMHG | SYSTOLIC BLOOD PRESSURE: 112 MMHG | BODY MASS INDEX: 30.61 KG/M2 | HEIGHT: 72 IN | RESPIRATION RATE: 16 BRPM | HEART RATE: 105 BPM

## 2022-12-19 DIAGNOSIS — R06.09 DYSPNEA ON EXERTION: ICD-10-CM

## 2022-12-19 DIAGNOSIS — Z98.890 S/P MVR (MITRAL VALVE REPAIR): ICD-10-CM

## 2022-12-19 DIAGNOSIS — Z98.890 S/P TVR (TRICUSPID VALVE REPAIR): ICD-10-CM

## 2022-12-19 DIAGNOSIS — C61 PROSTATE CANCER: Primary | ICD-10-CM

## 2022-12-19 DIAGNOSIS — I10 PRIMARY HYPERTENSION: ICD-10-CM

## 2022-12-19 PROCEDURE — 99214 OFFICE O/P EST MOD 30 MIN: CPT | Performed by: FAMILY MEDICINE

## 2022-12-19 RX ORDER — FAMOTIDINE 40 MG/1
40 TABLET, FILM COATED ORAL NIGHTLY
COMMUNITY
Start: 2022-12-16

## 2022-12-19 RX ORDER — AMLODIPINE BESYLATE 10 MG/1
10 TABLET ORAL DAILY
Qty: 90 TABLET | Refills: 2 | Status: SHIPPED | OUTPATIENT
Start: 2022-12-19 | End: 2023-03-19

## 2022-12-19 RX ORDER — LOSARTAN POTASSIUM 50 MG/1
50 TABLET ORAL DAILY
Qty: 90 TABLET | Refills: 2 | Status: SHIPPED | OUTPATIENT
Start: 2022-12-19 | End: 2023-01-22 | Stop reason: SDUPTHER

## 2022-12-19 NOTE — PROGRESS NOTES
"Chief Complaint  Follow-up (heart)    Subjective    The patient presents today for a follow-up.    The patient relays that he has a mild cough. He notes he thinks he may have asthma or bronchitis; however, he notes that his lungs have been found to be clear by nurses at hospitals. The patient reports that he does not know whether or not his cough is worse at night. The patient reports he has tried using some 6-years- Advair. The patient reports that he was put on metoprolol tartrate, which caused him to cough. The patient denies being put on lisinopril or any other medication with a suffix of  \"pril\". The patient notes that taking metoprolol helped regulate his blood pressure. The patient notes that, when he takes a deep breath, he has a cough reflex.    The patient relays that he is here for a checkup today. The patient reports that, at the last visit to this office, his platelets and a potential hematologist referral was discussed. He notes he was given compact platelets in the hospital. He denies being seen by a hematologist. He reports that a nurse told him he was aspirating in the way he breathed or swallowed, and he notes that he has been referred to a gastroenterologist. The patient reports that he will receive an endoscopy by Dr. Keating on 2023.    The patient mentions his scar. The patient notes that it has been 5 weeks since his surgery. He notes he got in good shape in preparation for his heart surgery. The patient reports that he had heart valves repaired. The patient notes that he was told that the veins in his heart were in good condition. He notes that his tricuspid valve and mitral valve were repaired. He notes that he continues to have pain and soreness after the surgery. He notes that he had tubes placed in his chest that hurt and woke him up while they were being removed. He relays that he had a catheter removed. He notes that he did not have any stitches and instead had a bandage " "for approximately 2 weeks that would seep. He reports that he has been doing exercise at home. The patient reports that he will be seen by Ana, who is a nurse that works with Dr. Ordaz, on 12/22/2022. The patient reports that an x-ray was done, and he was told to sleep on his left side. He reports that he felt popping while sleeping on his left side. He notes that when he coughs or sneezes, he has to push on his chest.  The patient states that he was told to discontinue taking losartan and amlodipine after his surgery. He notes that he has a list of medications at his house that lets him know which medications he needs to discontinue at this time. The patient notes that he takes Lipitor. The patient reports that he called his doctor and he was told that they wanted 2 weeks worth of blood pressure readings. The patient notes that he took himself off of losartan and put himself back on losartan before he called his doctor. The patient confirms that he is now taking losartan and amlodipine again. The patient reports he is taking Allegra and \"berry stuff\". The patient notes that he has lost weight.    The patient confirms that he has had prostate cancer in the past. He notes that he will see one of his doctors in regards to his history of prostate cancer in 01/2023, and he notes that he had an appointment cancelled in 12/2022. The patient states that he had a gout attack in a toe on his left foot  He notes that it has gone away. The patient states that he could hardly move his toe.    Clint Cee presents to [facility]  History of Present Illness   Preoperative diagnosis:  1.  Severe mitral regurgitation  2.  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  Progressive dyspnea with exertion  4.  Multiple PVCs  5.  Moderate tricuspid regurgitation  6.  Idiopathic thrombocytopenia     Postoperative " "diagnosis:   Same     Procedure:   1.  Elective mitral valve repair with a 40 mm Medtronic flexible band posterior annuloplasty and chordal repair of P2 (4-0 Whittier-Booker x2) and P3 (4-0 Whittier-Booker x1).  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  2.  Tricuspid valve repair with a 32 mm fissure tricuspid ring annuloplasty  3.  Left atrial appendage endocardial closure       Objective   Vital Signs:  /70 (BP Location: Right arm, Cuff Size: Large Adult)   Pulse 105   Resp 16   Ht 182.9 cm (72\")   Wt 103 kg (226 lb)   SpO2 97%   BMI 30.65 kg/m²   Estimated body mass index is 30.65 kg/m² as calculated from the following:    Height as of this encounter: 182.9 cm (72\").    Weight as of this encounter: 103 kg (226 lb).    BMI is >= 30 and <35. (Class 1 Obesity). The following options were offered after discussion;: weight loss educational material (shared in after visit summary), exercise counseling/recommendations and nutrition counseling/recommendations      Physical Exam  Constitutional:       Appearance: Normal appearance. He is well-developed and normal weight.   HENT:      Head: Normocephalic and atraumatic.      Right Ear: Tympanic membrane, ear canal and external ear normal.      Left Ear: Tympanic membrane, ear canal and external ear normal.      Nose: Nose normal.      Mouth/Throat:      Mouth: Mucous membranes are moist.      Pharynx: Oropharynx is clear. No oropharyngeal exudate.   Eyes:      Extraocular Movements: Extraocular movements intact.      Conjunctiva/sclera: Conjunctivae normal.      Pupils: Pupils are equal, round, and reactive to light.   Cardiovascular:      Rate and Rhythm: Normal rate and regular rhythm.      Pulses: Normal pulses.      Heart sounds: Normal heart sounds.   Pulmonary:      Effort: Pulmonary effort is normal.      Breath sounds: Normal breath sounds.   Abdominal:      General: Bowel sounds are normal.      Palpations: Abdomen is soft.   Musculoskeletal:         General: Normal range of motion. "      Cervical back: Normal range of motion and neck supple.   Skin:     General: Skin is warm and dry.   Neurological:      General: No focal deficit present.      Mental Status: He is alert and oriented to person, place, and time. Mental status is at baseline.   Psychiatric:         Mood and Affect: Mood normal.         Behavior: Behavior normal.         Thought Content: Thought content normal.         Judgment: Judgment normal.          Assessment and Plan     1. History of prostate cancer  - Dennis has a history of prostate cancer, but he has no evidence of recurrence or residual disease. We will continue to monitor his prostate specific antigen numbers as the years go on and monitor him for symptoms.     2. Primary hypertension  - Dennis is under treatment for hypertension. His most recent blood pressure was 112/70 mmHg, which was done just a few minutes ago.  He is doing excellent with the current medications.     3. Dyspnea on exertion  - Dennis had dyspnea on exertion prior to the heart valve replacement. He has now had his mitral and his tricuspid valve replaced and he is feeling much better.    4.  Status post mitral valve replacement repair per Dr. Henderson  - Patient is postoperative 6 weeks and doing great. I hear no murmur, and he is doing very well.     5. Status post tricuspid valve repair by Dr. Henderson   - Patient is doing great with this. He has got no murmur and he is feels well and is getting around much better.       I spent 35 minutes caring for Clint on this date of service. This time includes time spent by me in the following activities:preparing for the visit, reviewing tests, obtaining and/or reviewing a separately obtained history, performing a medically appropriate examination and/or evaluation , counseling and educating the patient/family/caregiver, ordering medications, tests, or procedures, referring and communicating with other health care professionals , documenting information in the medical  record, independently interpreting results and communicating that information with the patient/family/caregiver and care coordination     Follow Up   Return in about 6 months (around 6/19/2023) for Medicare Wellness with labs the week before.  Patient was given instructions and counseling regarding his condition or for health maintenance advice. Please see specific information pulled into the AVS if appropriate.     Transcribed from ambient dictation for Jaret Castellanos MD by Candida Solis.  12/19/22   16:30 EST    Patient or patient representative verbalized consent to the visit recording.  I have personally performed the services described in this document as transcribed by the above individual, and it is both accurate and complete.  Jaret Castellanos MD  12/26/2022  08:56 EST

## 2022-12-20 ENCOUNTER — HOME CARE VISIT (OUTPATIENT)
Dept: HOME HEALTH SERVICES | Facility: HOME HEALTHCARE | Age: 77
End: 2022-12-20

## 2022-12-21 ENCOUNTER — TELEPHONE (OUTPATIENT)
Dept: CARDIAC REHAB | Facility: HOSPITAL | Age: 77
End: 2022-12-21

## 2022-12-21 NOTE — CASE COMMUNICATION
[facility] DC- HUDDLE DISCHARGE    Complete day 51-58    Plan for discharge: Will likely discharge, starts outpatient cardiac rehab Jan 4th per patient schedule

## 2022-12-22 ENCOUNTER — OFFICE VISIT (OUTPATIENT)
Dept: CARDIAC SURGERY | Facility: CLINIC | Age: 77
End: 2022-12-22

## 2022-12-22 ENCOUNTER — HOME CARE VISIT (OUTPATIENT)
Dept: HOME HEALTH SERVICES | Facility: HOME HEALTHCARE | Age: 77
End: 2022-12-22

## 2022-12-22 VITALS
OXYGEN SATURATION: 97 % | BODY MASS INDEX: 30.09 KG/M2 | SYSTOLIC BLOOD PRESSURE: 127 MMHG | HEIGHT: 73 IN | RESPIRATION RATE: 20 BRPM | WEIGHT: 227 LBS | DIASTOLIC BLOOD PRESSURE: 76 MMHG | HEART RATE: 98 BPM | TEMPERATURE: 97.1 F

## 2022-12-22 VITALS
HEART RATE: 94 BPM | DIASTOLIC BLOOD PRESSURE: 70 MMHG | OXYGEN SATURATION: 97 % | SYSTOLIC BLOOD PRESSURE: 130 MMHG | TEMPERATURE: 98 F

## 2022-12-22 DIAGNOSIS — Z98.890 S/P MVR (MITRAL VALVE REPAIR): Primary | ICD-10-CM

## 2022-12-22 DIAGNOSIS — Z98.890 S/P TVR (TRICUSPID VALVE REPAIR): ICD-10-CM

## 2022-12-22 PROCEDURE — 99024 POSTOP FOLLOW-UP VISIT: CPT | Performed by: NURSE PRACTITIONER

## 2022-12-22 PROCEDURE — G0151 HHCP-SERV OF PT,EA 15 MIN: HCPCS

## 2022-12-22 NOTE — HOME HEALTH
Patient requested SN discharge without visit, states PT is coming later today and he wants them to discharge him so he can start cardiac rehab as outpatient

## 2022-12-22 NOTE — PROGRESS NOTES
"CARDIOVASCULAR SURGERY FOLLOW-UP PROGRESS NOTE  Chief Complaint: Postop follow-up        HPI:   Dear Dr. Castellanos, Jaret AGUSTIN MD and colleagues:    It was nice to see Clint Cee in follow up 5 weeks after surgery.  As you know, he is a 77 y.o. male with mitral regurgitation, tricuspid regurgitation, hypertension, dyslipidemia, chronic thrombocytopenia who underwent mitral valve repair/ring, tricuspid valve repair/ring, CASSIE endocardial closure on 11/17/2022 at [facility] with Dr Henderson. He had issues with dysphagia postoperatively, he underwent a swallow study that demonstrated laryngeal penetration with different barium consistencies, he was instructed to continue a diet with honey thickened liquid and mechanical soft solids, he has follow-up scheduled with gastroenterology in early February. He comes in today complaining of fatigue and insomnia that is normal for postop recovery, and he continues to have intermittent cough but shows no signs of pneumonia or infection.  His activity level has been good, he is due to start cardiac rehab in January.  From a surgical standpoint, the sternal incision is well approximated without erythema, edema, or drainage.  The sternum is stable to palpation, and the patient denies any popping or clicking with deep inspiration or coughing.      Physical Exam:         /76 (BP Location: Right arm, Patient Position: Sitting, Cuff Size: Adult)   Pulse 98   Temp 97.1 °F (36.2 °C)   Resp 20   Ht 185.4 cm (73\")   Wt 103 kg (227 lb)   SpO2 97%   BMI 29.95 kg/m²   Heart:  regular rate and rhythm, S1, S2 normal, no murmur, click, rub or gallop  Lungs:  clear to auscultation bilaterally  Extremities:  no edema  Incision(s):  mid chest healing well, sternum stable    Assessment/Plan:     S/P mitral valve repair and tricuspid valve repair. Overall, he is doing well.    No significant post-op complications    No heavy lifting > 10 pounds for 1 more weeks  Prophylactic " antibiotics before dental work and other surgeries lifelong with artificial valve, prosthesis, or grafting  OK to begin cardiac rehab  Follow-up as scheduled with cardiology  Follow-up with CT surgery prn      Continue lifting restriction of 10 lbs until 6 weeks and 50 lbs until 12 weeks from the date of surgery, no excessive jarring motions or twisting motions until 12 weeks from the date of surgery    Return to clinic if any signs or symptoms of infection or sternal instability develop       Thank you for allowing me to participate in the care of your patient.    Regards,  Yue Britton, APRN

## 2023-01-04 ENCOUNTER — OFFICE VISIT (OUTPATIENT)
Dept: CARDIAC REHAB | Facility: HOSPITAL | Age: 78
End: 2023-01-04
Payer: MEDICARE

## 2023-01-04 DIAGNOSIS — Z98.890 S/P MITRAL VALVE REPAIR: ICD-10-CM

## 2023-01-04 DIAGNOSIS — Z98.890 S/P TRICUSPID VALVE REPAIR: Primary | ICD-10-CM

## 2023-01-04 PROCEDURE — 93798 PHYS/QHP OP CAR RHAB W/ECG: CPT

## 2023-01-04 NOTE — PROGRESS NOTES
Encounter Date:01/05/2023  Last seen 11/22/2022      Patient ID: Clint Cee is a 78 y.o. male.    Chief Complaint:  Status post mitral and tricuspid valve repair  Hypertension    History of present illness  Patient has occasional resting heart rate of 90 to 100/min.  Patient is not taking metoprolol due to cough.  Patient is taking amlodipine.    Since I have last seen, the patient has been without any chest discomfort ,shortness of breath, palpitations, dizziness or syncope.  Denies having any headache ,abdominal pain ,nausea, vomiting , diarrhea constipation, loss of weight or loss of appetite.  Denies having any excessive bruising ,hematuria or blood in the stool.    Review of all systems negative except as indicated.    Reviewed ROS.  Assessment and plan  ]]]]]]]]]]]]]]]]]]]]  Impression  ===========  -Status post mitral and tricuspid valve repair and left atrial appendage endocardial closure--Dr. Henderson 11/17/2022     -   Preoperative increased shortness of breath and significantly increased fatigue.   Preoperative significant mitral and tricuspid regurgitation.  Prominent posterior mitral leaflet prolapse     TERRA 9/30/2022  Myxomatous mitral and tricuspid valve degeneration.  Severe mitral valve prolapse involving both anterior and posterior mitral leaflets with severe mitral regurgitation with multiple jets.  Probable ruptured chordae.  In some views coaptation of the mitral valve is present.  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  Significant pulmonary hypertension.  Left atrial and left atrial appendage enlargement without clot.  Normal left ventricular size and contractility with ejection fraction of 60%.     Cardiac catheterization 9/30/2022   Moderate to significant pulmonary hypertension.  Severe mitral valve prolapse and mitral regurgitation and probable ruptured chordae (TERRA and cardiac cath)  Tricuspid valve prolapse.  Normal left ventricular function.  Normal coronary  arteries.     Echocardiogram 9/28/2022.  Mild mitral regurgitation (regurgitation jet may be not in the field)      - Premature ventricular contractions.  Occasional palpitations     - Hypertension vitamin D deficiency GERD ocular migraine.     - History of prostate cancer.  Status post radiation therapy.  Bone scan was negative for any spread.     - Status post appendectomy spine surgery adenoidectomy tonsillectomy     - Family history of prostate cancer     - Former smoker     - Allergic to morphine.  ============  Plan  ===========  Status post mitral valve repair tricuspid valve repair and left atrial appendage closure--Dr. Henderson-11/17/2022      Patient has occasional resting heart rate of 90 to 100/min.  Patient is not taking metoprolol due to cough.  Patient is taking amlodipine.  Patient was given prescription for metoprolol tartrate 12.5 mg to be taken on a as needed basis.    Patient had postoperative bradycardia.  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  Bradycardia has improved.  No need for pacemaker at this time.     Hypertension-stable.  110/60     Medications were reviewed and updated.    Follow-up in the office in 6 months.    Further plan will depend on patient's progress.  ]]]]]]]]]]]]]]]]          Diagnosis Plan   1. Nonrheumatic mitral valve regurgitation        2. Nonrheumatic tricuspid valve regurgitation        3. Primary hypertension        4. Premature ventricular contractions        5. Mitral valve prolapse        6. Heart murmur on physical examination        7. Essential hypertension        LAB RESULTS (LAST 7 DAYS)    CBC        BMP        CMP         BNP        TROPONIN        CoAg        Creatinine Clearance  CrCl cannot be calculated (Patient's most recent lab result is older than the maximum 30 days allowed.).    ABG        Radiology  No radiology results for the last day                The following portions of the patient's history were reviewed and updated as appropriate:  allergies, current medications, past family history, past medical history, past social history, past surgical history and problem list.    Review of Systems   Constitutional: Negative for malaise/fatigue.   Cardiovascular: Negative for chest pain, dyspnea on exertion, leg swelling and palpitations.   Respiratory: Negative for cough and shortness of breath.    Gastrointestinal: Negative for abdominal pain, nausea and vomiting.   Neurological: Negative for dizziness, focal weakness, headaches, light-headedness and numbness.   All other systems reviewed and are negative.        Current Outpatient Medications:   •  acetaminophen (TYLENOL) 500 MG tablet, Take 1 tablet by mouth Every 6 (Six) Hours As Needed for Mild Pain. Indications: Pain, Disp: , Rfl:   •  ACETYLCARN-ALPHA LIPOIC ACID PO, Take 1 capsule by mouth Daily. Indications: Dietary supplement, Disp: , Rfl:   •  allopurinol (ZYLOPRIM) 100 MG tablet, TAKE 1 TABLET BY MOUTH DAILY, Disp: 30 tablet, Rfl: 3  •  amLODIPine (NORVASC) 10 MG tablet, Take 1 tablet by mouth Daily for 90 days., Disp: 90 tablet, Rfl: 2  •  Apple Erik Vn-Grn Tea-Bit Or-Cr (APPLE CIDER VINEGAR PLUS PO), Take 1 Piece by mouth Daily. Indications: Dietary supplement, Disp: , Rfl:   •  aspirin 81 MG EC tablet, Take 1 tablet by mouth Daily., Disp: , Rfl:   •  atorvastatin (LIPITOR) 40 MG tablet, Take 1 tablet by mouth Every Night., Disp: 90 tablet, Rfl: 1  •  B Complex Vitamins (VITAMIN B COMPLEX PO), Take 1 tablet by mouth Daily. Indications: Dietary supplement, Disp: , Rfl:   •  Cholecalciferol (Vitamin D3) 50 MCG (2000 UT) capsule, Take 2,000 Units by mouth Daily. Indications: Dietary supplement, Disp: , Rfl:   •  DULoxetine (CYMBALTA) 60 MG capsule, Take 60 mg by mouth Daily. Indications: Major Depressive Disorder, Disp: , Rfl:   •  famotidine (PEPCID) 40 MG tablet, , Disp: , Rfl:   •  fexofenadine (ALLEGRA) 180 MG tablet, Take 180 mg by mouth Daily As Needed. Indications: Hayfever, Disp: , Rfl:    •  fluticasone (FLONASE) 50 MCG/ACT nasal spray, 2 sprays into the nostril(s) as directed by provider Daily. Indications: Allergic Rhinitis, Disp: , Rfl:   •  guaiFENesin (MUCINEX) 600 MG 12 hr tablet, Take 1,200 mg by mouth 2 (Two) Times a Day As Needed for Congestion or Cough., Disp: , Rfl:   •  Leuprolide Mesylate, 6 Month, (CAMCEVI SC), Inject  under the skin into the appropriate area as directed. Every 6 months injection, due in December  Indications: prostate cancer (to decrease testosterone)., Disp: , Rfl:   •  losartan (Cozaar) 50 MG tablet, Take 1 tablet by mouth Daily for 90 days., Disp: 90 tablet, Rfl: 2  •  melatonin 5 MG tablet tablet, Take 5 mg by mouth At Night As Needed (sleep). Indications: Trouble Sleeping, Disp: , Rfl:   •  MELATONIN-JENNIFER-TAMMI-PASSF-LBALM PO, Take 5 mg by mouth Daily. Indications: Dietary supplement, Disp: , Rfl:   •  Misc Natural Products (Elderberry Immune Complex) chewable tablet, Chew 1 Piece Daily. Indications: Dietary supplement, Disp: , Rfl:   •  zolpidem (AMBIEN) 10 MG tablet, Take 0.5 tablets by mouth At Night As Needed. Indications: Trouble Sleeping, Disp: , Rfl:     Allergies   Allergen Reactions   • Morphine Hallucinations   • Beta Adrenergic Blockers Cough       Family History   Problem Relation Age of Onset   • Arthritis Father    • Cancer Father         Prostate   • Arrhythmia Father         Congentive heart failure   • Diabetes Mother         Adult diabetes   • Early death Mother         Kidneys failed after giving birth-diabetes complications   • Heart disease Mother         Adult Diabetes       Past Surgical History:   Procedure Laterality Date   • ADENOIDECTOMY  1954    As child   • APPENDECTOMY  1956    Surgery   • CARDIAC CATHETERIZATION N/A 09/30/2022    Procedure: Right and Left Heart Cath with Coronar Angiography;  Surgeon: Felipe Garcia MD;  Location: [facility] CATH INVASIVE LOCATION;  Service: Cardiovascular;  Laterality: N/A;   • CARDIAC VALVE  REPLACEMENT N/A 11/17/2022    Procedure: TRICUSPID VALVE REPAIR/REPLACEMENT;  Surgeon: Femi Henderson MD;  Location: [facility];  Service: Cardiothoracic;  Laterality: N/A;  tricuspid valve repaired with 32mm  groves physio tricuspid annuloplasty ring   • CARDIAC VALVE REPLACEMENT  11/17/2022    Repaired mitral and tricuspid   • COLONOSCOPY  01/01/1997    Regularly scheduled   • EYE SURGERY  01/01/2010    Reattached right retina   • MITRAL VALVE REPAIR/REPLACEMENT N/A 11/17/2022    Procedure: MITRAL VALVE REPAIR/REPLACEMENT with left atrial appendage closure;  Surgeon: Femi Henderson MD;  Location: [facility];  Service: Cardiothoracic;  Laterality: N/A;  mitral valve repaired iwth 40mm medtronic annuloplasty band  with intraop TERRA   • SPINE SURGERY  01/01/1971    2 lumbar 1 cervical   • TONSILLECTOMY  01/01/1950    Childhood       Past Medical History:   Diagnosis Date   • Abnormal ECG June 2022   • Allergic 01/01/1954    Nasal alergies   • Arrhythmia 2004    Dr Youngblood examined me and said i was ok.   • Asthma    • Benign prostatic hyperplasia 11/14/2018   • Cataract 01/01/2014    Surgery. Caused detached retina of right eye 20/60 repair   • Colon polyp 01/01/2018    Found and removed last colon eca.q   • Gastroesophageal reflux disease 03/20/2014   • Glaucoma 2003    Normal pressures. Have low pressure glaucoma   • Headache 01/01/2015    Have shimmering in both eyes intermittantly. No headaches   • Heart murmur May 2022    During wellness exam   • Heart valve disease July 2022    During Echo   • HL (hearing loss) 01/01/2012    Have hearing aids   • Hypertension 12/02/2011   • Infectious viral hepatitis 1954    Yellow jaundis as child   • Insomnia 12/02/2011   • Mitral valve prolapse June 2022   • Mood disorder (HCC) 09/19/2013   • Polyosteoarthritis 12/02/2011   • Prostate Cancer Jan22 January 2022   • Spinal stenosis 12/02/2011   • Visual impairment 01/01/1955    Nearsighted corrected glasses   •  Vitamin D deficiency 05/23/2018       Family History   Problem Relation Age of Onset   • Arthritis Father    • Cancer Father         Prostate   • Arrhythmia Father         Congentive heart failure   • Diabetes Mother         Adult diabetes   • Early death Mother         Kidneys failed after giving birth-diabetes complications   • Heart disease Mother         Adult Diabetes       Social History     Socioeconomic History   • Marital status:    Tobacco Use   • Smoking status: Former     Packs/day: 0.00     Years: 0.50     Pack years: 0.00     Types: Cigarettes   • Smokeless tobacco: Never   • Tobacco comments:     Tried tobacco as child   Vaping Use   • Vaping Use: Never used   Substance and Sexual Activity   • Alcohol use: Not Currently     Comment: Not a regular drinker. Occasionly have a glass of wine or be   • Drug use: Never   • Sexual activity: Not Currently     Partners: Female     Birth control/protection: Other, Post-menopausal, None, Birth control pill     Comment: Chemically castrated. Prostate cancer         Procedures      Objective:       Physical Exam    There were no vitals taken for this visit.  The patient is alert, oriented and in no distress.    Vital signs as noted above.    Head and neck revealed no carotid bruits or jugular venous distension.  No thyromegaly or lymphadenopathy is present.    Lungs clear.  No wheezing.  Breath sounds are normal bilaterally.    Heart normal first and second heart sounds.  No murmur..  No pericardial rub is present.  No gallop is present.    Abdomen soft and nontender.  No organomegaly is present.    Extremities revealed good peripheral pulses without any pedal edema.    Skin warm and dry.    Musculoskeletal system is grossly normal.    CNS grossly normal.    Reviewed and updated.

## 2023-01-05 ENCOUNTER — OFFICE VISIT (OUTPATIENT)
Dept: CARDIOLOGY | Facility: CLINIC | Age: 78
End: 2023-01-05
Payer: MEDICARE

## 2023-01-05 ENCOUNTER — TREATMENT (OUTPATIENT)
Dept: CARDIAC REHAB | Facility: HOSPITAL | Age: 78
End: 2023-01-05
Payer: MEDICARE

## 2023-01-05 VITALS
OXYGEN SATURATION: 96 % | BODY MASS INDEX: 30.75 KG/M2 | DIASTOLIC BLOOD PRESSURE: 72 MMHG | SYSTOLIC BLOOD PRESSURE: 110 MMHG | HEIGHT: 73 IN | HEART RATE: 94 BPM | WEIGHT: 232 LBS

## 2023-01-05 DIAGNOSIS — I34.0 NONRHEUMATIC MITRAL VALVE REGURGITATION: Primary | ICD-10-CM

## 2023-01-05 DIAGNOSIS — Z98.890 S/P MITRAL VALVE REPAIR: Primary | ICD-10-CM

## 2023-01-05 DIAGNOSIS — Z98.890 S/P TRICUSPID VALVE REPAIR: ICD-10-CM

## 2023-01-05 DIAGNOSIS — I10 PRIMARY HYPERTENSION: ICD-10-CM

## 2023-01-05 DIAGNOSIS — I49.3 PREMATURE VENTRICULAR CONTRACTIONS: ICD-10-CM

## 2023-01-05 DIAGNOSIS — R01.1 HEART MURMUR ON PHYSICAL EXAMINATION: ICD-10-CM

## 2023-01-05 DIAGNOSIS — I34.1 MITRAL VALVE PROLAPSE: ICD-10-CM

## 2023-01-05 DIAGNOSIS — I36.1 NONRHEUMATIC TRICUSPID VALVE REGURGITATION: ICD-10-CM

## 2023-01-05 DIAGNOSIS — I10 ESSENTIAL HYPERTENSION: ICD-10-CM

## 2023-01-05 PROCEDURE — 93798 PHYS/QHP OP CAR RHAB W/ECG: CPT

## 2023-01-05 PROCEDURE — 99214 OFFICE O/P EST MOD 30 MIN: CPT | Performed by: INTERNAL MEDICINE

## 2023-01-09 ENCOUNTER — TREATMENT (OUTPATIENT)
Dept: CARDIAC REHAB | Facility: HOSPITAL | Age: 78
End: 2023-01-09
Payer: MEDICARE

## 2023-01-09 DIAGNOSIS — Z98.890 S/P MITRAL VALVE REPAIR: Primary | ICD-10-CM

## 2023-01-09 DIAGNOSIS — Z98.890 S/P TRICUSPID VALVE REPAIR: ICD-10-CM

## 2023-01-09 PROCEDURE — 93798 PHYS/QHP OP CAR RHAB W/ECG: CPT

## 2023-01-11 ENCOUNTER — TREATMENT (OUTPATIENT)
Dept: CARDIAC REHAB | Facility: HOSPITAL | Age: 78
End: 2023-01-11
Payer: MEDICARE

## 2023-01-11 DIAGNOSIS — Z98.890 S/P MITRAL VALVE REPAIR: Primary | ICD-10-CM

## 2023-01-11 PROCEDURE — 93798 PHYS/QHP OP CAR RHAB W/ECG: CPT

## 2023-01-12 ENCOUNTER — TREATMENT (OUTPATIENT)
Dept: CARDIAC REHAB | Facility: HOSPITAL | Age: 78
End: 2023-01-12
Payer: MEDICARE

## 2023-01-12 DIAGNOSIS — Z98.890 S/P TRICUSPID VALVE REPAIR: ICD-10-CM

## 2023-01-12 DIAGNOSIS — Z98.890 S/P MITRAL VALVE REPAIR: Primary | ICD-10-CM

## 2023-01-12 PROCEDURE — 93798 PHYS/QHP OP CAR RHAB W/ECG: CPT

## 2023-01-12 RX ORDER — DULOXETIN HYDROCHLORIDE 60 MG/1
CAPSULE, DELAYED RELEASE ORAL
Qty: 90 CAPSULE | Refills: 0 | Status: SHIPPED | OUTPATIENT
Start: 2023-01-12 | End: 2023-01-13 | Stop reason: SDUPTHER

## 2023-01-13 RX ORDER — DULOXETIN HYDROCHLORIDE 60 MG/1
60 CAPSULE, DELAYED RELEASE ORAL DAILY
Qty: 90 CAPSULE | Refills: 0 | Status: SHIPPED | OUTPATIENT
Start: 2023-01-13

## 2023-01-13 NOTE — TELEPHONE ENCOUNTER
Caller: Clint Cee    Relationship: Self    Best call back number: [phone]    Requested Prescriptions:   Requested Prescriptions     Pending Prescriptions Disp Refills   • DULoxetine (CYMBALTA) 60 MG capsule 90 capsule 0     Sig: Take 1 capsule by mouth Daily.        Pharmacy where request should be sent: [id number], [place] - [address] AT SEC OF [facility] [address] [facility] [phone] [facility] [phone] FX     Additional details provided by patient: COMPLETELY OUT     Does the patient have less than a 3 day supply:  [x] Yes  [] No    Would you like a call back once the refill request has been completed: [x] Yes [] No    If the office needs to give you a call back, can they leave a voicemail: [x] Yes [] No    Blane Matos Rep   01/13/23 15:40 EST

## 2023-01-16 ENCOUNTER — TREATMENT (OUTPATIENT)
Dept: CARDIAC REHAB | Facility: HOSPITAL | Age: 78
End: 2023-01-16
Payer: MEDICARE

## 2023-01-16 DIAGNOSIS — Z98.890 S/P TRICUSPID VALVE REPAIR: ICD-10-CM

## 2023-01-16 DIAGNOSIS — Z98.890 S/P MITRAL VALVE REPAIR: Primary | ICD-10-CM

## 2023-01-16 PROCEDURE — 93798 PHYS/QHP OP CAR RHAB W/ECG: CPT

## 2023-01-18 ENCOUNTER — TREATMENT (OUTPATIENT)
Dept: CARDIAC REHAB | Facility: HOSPITAL | Age: 78
End: 2023-01-18
Payer: MEDICARE

## 2023-01-18 DIAGNOSIS — Z98.890 S/P TRICUSPID VALVE REPAIR: ICD-10-CM

## 2023-01-18 DIAGNOSIS — Z98.890 S/P MITRAL VALVE REPAIR: Primary | ICD-10-CM

## 2023-01-18 PROCEDURE — 93798 PHYS/QHP OP CAR RHAB W/ECG: CPT

## 2023-01-19 ENCOUNTER — TREATMENT (OUTPATIENT)
Dept: CARDIAC REHAB | Facility: HOSPITAL | Age: 78
End: 2023-01-19
Payer: MEDICARE

## 2023-01-19 DIAGNOSIS — Z98.890 S/P MITRAL VALVE REPAIR: Primary | ICD-10-CM

## 2023-01-19 PROCEDURE — 93798 PHYS/QHP OP CAR RHAB W/ECG: CPT

## 2023-01-23 ENCOUNTER — TREATMENT (OUTPATIENT)
Dept: CARDIAC REHAB | Facility: HOSPITAL | Age: 78
End: 2023-01-23
Payer: MEDICARE

## 2023-01-23 DIAGNOSIS — Z98.890 S/P TRICUSPID VALVE REPAIR: ICD-10-CM

## 2023-01-23 DIAGNOSIS — Z98.890 S/P MITRAL VALVE REPAIR: Primary | ICD-10-CM

## 2023-01-23 PROCEDURE — 93798 PHYS/QHP OP CAR RHAB W/ECG: CPT

## 2023-01-23 RX ORDER — LOSARTAN POTASSIUM 50 MG/1
50 TABLET ORAL DAILY
Qty: 90 TABLET | Refills: 2 | Status: SHIPPED | OUTPATIENT
Start: 2023-01-23 | End: 2023-04-23

## 2023-01-24 ENCOUNTER — TREATMENT (OUTPATIENT)
Dept: CARDIAC REHAB | Facility: HOSPITAL | Age: 78
End: 2023-01-24
Payer: MEDICARE

## 2023-01-24 DIAGNOSIS — Z98.890 S/P MITRAL VALVE REPAIR: Primary | ICD-10-CM

## 2023-01-24 PROCEDURE — 93798 PHYS/QHP OP CAR RHAB W/ECG: CPT

## 2023-01-25 ENCOUNTER — TREATMENT (OUTPATIENT)
Dept: CARDIAC REHAB | Facility: HOSPITAL | Age: 78
End: 2023-01-25
Payer: MEDICARE

## 2023-01-25 DIAGNOSIS — Z98.890 S/P TRICUSPID VALVE REPAIR: ICD-10-CM

## 2023-01-25 DIAGNOSIS — Z98.890 S/P MITRAL VALVE REPAIR: Primary | ICD-10-CM

## 2023-01-25 PROCEDURE — 93798 PHYS/QHP OP CAR RHAB W/ECG: CPT

## 2023-01-26 ENCOUNTER — APPOINTMENT (OUTPATIENT)
Dept: CARDIAC REHAB | Facility: HOSPITAL | Age: 78
End: 2023-01-26
Payer: MEDICARE

## 2023-01-30 ENCOUNTER — TREATMENT (OUTPATIENT)
Dept: CARDIAC REHAB | Facility: HOSPITAL | Age: 78
End: 2023-01-30
Payer: MEDICARE

## 2023-01-30 DIAGNOSIS — Z98.890 S/P TRICUSPID VALVE REPAIR: ICD-10-CM

## 2023-01-30 DIAGNOSIS — Z98.890 S/P MITRAL VALVE REPAIR: Primary | ICD-10-CM

## 2023-01-30 PROCEDURE — 93798 PHYS/QHP OP CAR RHAB W/ECG: CPT

## 2023-01-31 ENCOUNTER — TREATMENT (OUTPATIENT)
Dept: CARDIAC REHAB | Facility: HOSPITAL | Age: 78
End: 2023-01-31
Payer: MEDICARE

## 2023-01-31 DIAGNOSIS — Z98.890 S/P MITRAL VALVE REPAIR: Primary | ICD-10-CM

## 2023-01-31 PROCEDURE — 93798 PHYS/QHP OP CAR RHAB W/ECG: CPT

## 2023-02-01 ENCOUNTER — TREATMENT (OUTPATIENT)
Dept: CARDIAC REHAB | Facility: HOSPITAL | Age: 78
End: 2023-02-01
Payer: MEDICARE

## 2023-02-01 DIAGNOSIS — Z98.890 S/P TRICUSPID VALVE REPAIR: ICD-10-CM

## 2023-02-01 DIAGNOSIS — Z98.890 S/P MITRAL VALVE REPAIR: Primary | ICD-10-CM

## 2023-02-01 PROCEDURE — 93798 PHYS/QHP OP CAR RHAB W/ECG: CPT

## 2023-02-02 ENCOUNTER — APPOINTMENT (OUTPATIENT)
Dept: CARDIAC REHAB | Facility: HOSPITAL | Age: 78
End: 2023-02-02
Payer: MEDICARE

## 2023-02-06 ENCOUNTER — TREATMENT (OUTPATIENT)
Dept: CARDIAC REHAB | Facility: HOSPITAL | Age: 78
End: 2023-02-06
Payer: MEDICARE

## 2023-02-06 DIAGNOSIS — Z98.890 S/P MITRAL VALVE REPAIR: Primary | ICD-10-CM

## 2023-02-06 PROCEDURE — 93798 PHYS/QHP OP CAR RHAB W/ECG: CPT

## 2023-02-07 ENCOUNTER — TREATMENT (OUTPATIENT)
Dept: CARDIAC REHAB | Facility: HOSPITAL | Age: 78
End: 2023-02-07
Payer: MEDICARE

## 2023-02-07 DIAGNOSIS — Z98.890 S/P MITRAL VALVE REPAIR: Primary | ICD-10-CM

## 2023-02-07 PROCEDURE — 93798 PHYS/QHP OP CAR RHAB W/ECG: CPT

## 2023-02-08 ENCOUNTER — TREATMENT (OUTPATIENT)
Dept: CARDIAC REHAB | Facility: HOSPITAL | Age: 78
End: 2023-02-08
Payer: MEDICARE

## 2023-02-08 DIAGNOSIS — Z98.890 S/P MITRAL VALVE REPAIR: Primary | ICD-10-CM

## 2023-02-08 PROCEDURE — 93798 PHYS/QHP OP CAR RHAB W/ECG: CPT

## 2023-02-09 ENCOUNTER — APPOINTMENT (OUTPATIENT)
Dept: CARDIAC REHAB | Facility: HOSPITAL | Age: 78
End: 2023-02-09
Payer: MEDICARE

## 2023-02-13 ENCOUNTER — TREATMENT (OUTPATIENT)
Dept: CARDIAC REHAB | Facility: HOSPITAL | Age: 78
End: 2023-02-13
Payer: MEDICARE

## 2023-02-13 DIAGNOSIS — F51.01 PRIMARY INSOMNIA: Primary | ICD-10-CM

## 2023-02-13 DIAGNOSIS — Z98.890 S/P MITRAL VALVE REPAIR: Primary | ICD-10-CM

## 2023-02-13 DIAGNOSIS — Z98.890 S/P TRICUSPID VALVE REPAIR: ICD-10-CM

## 2023-02-13 PROCEDURE — 93798 PHYS/QHP OP CAR RHAB W/ECG: CPT

## 2023-02-13 RX ORDER — ZOLPIDEM TARTRATE 10 MG/1
TABLET ORAL
Qty: 90 TABLET | Refills: 1 | Status: SHIPPED | OUTPATIENT
Start: 2023-02-13

## 2023-02-14 ENCOUNTER — TREATMENT (OUTPATIENT)
Dept: CARDIAC REHAB | Facility: HOSPITAL | Age: 78
End: 2023-02-14
Payer: MEDICARE

## 2023-02-14 DIAGNOSIS — Z98.890 S/P MITRAL VALVE REPAIR: Primary | ICD-10-CM

## 2023-02-14 PROCEDURE — 93798 PHYS/QHP OP CAR RHAB W/ECG: CPT

## 2023-02-15 ENCOUNTER — TREATMENT (OUTPATIENT)
Dept: CARDIAC REHAB | Facility: HOSPITAL | Age: 78
End: 2023-02-15
Payer: MEDICARE

## 2023-02-15 DIAGNOSIS — Z98.890 S/P MITRAL VALVE REPAIR: Primary | ICD-10-CM

## 2023-02-15 PROCEDURE — 93798 PHYS/QHP OP CAR RHAB W/ECG: CPT

## 2023-02-16 ENCOUNTER — APPOINTMENT (OUTPATIENT)
Dept: CARDIAC REHAB | Facility: HOSPITAL | Age: 78
End: 2023-02-16
Payer: MEDICARE

## 2023-02-16 RX ORDER — METOPROLOL SUCCINATE 25 MG/1
25 TABLET, EXTENDED RELEASE ORAL NIGHTLY
COMMUNITY

## 2023-02-20 ENCOUNTER — TREATMENT (OUTPATIENT)
Dept: CARDIAC REHAB | Facility: HOSPITAL | Age: 78
End: 2023-02-20
Payer: MEDICARE

## 2023-02-20 DIAGNOSIS — Z98.890 S/P MITRAL VALVE REPAIR: Primary | ICD-10-CM

## 2023-02-20 DIAGNOSIS — Z98.890 S/P TRICUSPID VALVE REPAIR: ICD-10-CM

## 2023-02-20 PROCEDURE — 93798 PHYS/QHP OP CAR RHAB W/ECG: CPT

## 2023-02-21 ENCOUNTER — APPOINTMENT (OUTPATIENT)
Dept: CARDIAC REHAB | Facility: HOSPITAL | Age: 78
End: 2023-02-21
Payer: MEDICARE

## 2023-02-21 ENCOUNTER — TREATMENT (OUTPATIENT)
Dept: CARDIAC REHAB | Facility: HOSPITAL | Age: 78
End: 2023-02-21
Payer: MEDICARE

## 2023-02-21 DIAGNOSIS — Z98.890 S/P MITRAL VALVE REPAIR: Primary | ICD-10-CM

## 2023-02-21 PROCEDURE — 93798 PHYS/QHP OP CAR RHAB W/ECG: CPT

## 2023-02-22 ENCOUNTER — HOSPITAL ENCOUNTER (OUTPATIENT)
Facility: HOSPITAL | Age: 78
Setting detail: HOSPITAL OUTPATIENT SURGERY
Discharge: HOME | End: 2023-02-22
Attending: INTERNAL MEDICINE | Admitting: INTERNAL MEDICINE
Payer: MEDICARE

## 2023-02-22 ENCOUNTER — ANESTHESIA (OUTPATIENT)
Dept: GASTROENTEROLOGY | Facility: HOSPITAL | Age: 78
End: 2023-02-22
Payer: MEDICARE

## 2023-02-22 ENCOUNTER — ANESTHESIA EVENT (OUTPATIENT)
Dept: GASTROENTEROLOGY | Facility: HOSPITAL | Age: 78
End: 2023-02-22
Payer: MEDICARE

## 2023-02-22 ENCOUNTER — ON CAMPUS - OUTPATIENT (AMBULATORY)
Dept: URBAN - METROPOLITAN AREA HOSPITAL 85 | Facility: HOSPITAL | Age: 78
End: 2023-02-22

## 2023-02-22 ENCOUNTER — APPOINTMENT (OUTPATIENT)
Dept: CARDIAC REHAB | Facility: HOSPITAL | Age: 78
End: 2023-02-22
Payer: MEDICARE

## 2023-02-22 VITALS
DIASTOLIC BLOOD PRESSURE: 75 MMHG | HEART RATE: 65 BPM | BODY MASS INDEX: 30.48 KG/M2 | HEIGHT: 73 IN | OXYGEN SATURATION: 97 % | RESPIRATION RATE: 15 BRPM | SYSTOLIC BLOOD PRESSURE: 121 MMHG | WEIGHT: 230 LBS

## 2023-02-22 DIAGNOSIS — Z86.010 PERSONAL HISTORY OF COLONIC POLYPS: ICD-10-CM

## 2023-02-22 DIAGNOSIS — D12.5 BENIGN NEOPLASM OF SIGMOID COLON: ICD-10-CM

## 2023-02-22 DIAGNOSIS — R13.10 PROBLEMS WITH SWALLOWING AND MASTICATION: ICD-10-CM

## 2023-02-22 DIAGNOSIS — R13.10 DYSPHAGIA, UNSPECIFIED: ICD-10-CM

## 2023-02-22 DIAGNOSIS — K21.00 GASTRO-ESOPHAGEAL REFLUX DISEASE WITH ESOPHAGITIS, WITHOUT B: ICD-10-CM

## 2023-02-22 DIAGNOSIS — K21.9 ESOPHAGEAL REFLUX: ICD-10-CM

## 2023-02-22 PROCEDURE — 25010000002 PROPOFOL 10 MG/ML EMULSION: Performed by: ANESTHESIOLOGIST ASSISTANT

## 2023-02-22 PROCEDURE — 88305 TISSUE EXAM BY PATHOLOGIST: CPT | Performed by: INTERNAL MEDICINE

## 2023-02-22 PROCEDURE — 43235 EGD DIAGNOSTIC BRUSH WASH: CPT | Performed by: INTERNAL MEDICINE

## 2023-02-22 PROCEDURE — 43450 DILATE ESOPHAGUS 1/MULT PASS: CPT | Performed by: INTERNAL MEDICINE

## 2023-02-22 PROCEDURE — C1769 GUIDE WIRE: HCPCS | Performed by: INTERNAL MEDICINE

## 2023-02-22 PROCEDURE — 45385 COLONOSCOPY W/LESION REMOVAL: CPT | Mod: PT | Performed by: INTERNAL MEDICINE

## 2023-02-22 RX ORDER — SODIUM CHLORIDE 0.9 % (FLUSH) 0.9 %
3 SYRINGE (ML) INJECTION EVERY 12 HOURS SCHEDULED
Status: CANCELLED | OUTPATIENT
Start: 2023-02-22

## 2023-02-22 RX ORDER — SODIUM CHLORIDE 0.9 % (FLUSH) 0.9 %
3-10 SYRINGE (ML) INJECTION AS NEEDED
Status: CANCELLED | OUTPATIENT
Start: 2023-02-22

## 2023-02-22 RX ORDER — ONDANSETRON 2 MG/ML
4 INJECTION INTRAMUSCULAR; INTRAVENOUS ONCE AS NEEDED
Status: DISCONTINUED | OUTPATIENT
Start: 2023-02-22 | End: 2023-02-22 | Stop reason: HOSPADM

## 2023-02-22 RX ORDER — PROPOFOL 10 MG/ML
VIAL (ML) INTRAVENOUS AS NEEDED
Status: DISCONTINUED | OUTPATIENT
Start: 2023-02-22 | End: 2023-02-22 | Stop reason: SURG

## 2023-02-22 RX ORDER — SODIUM CHLORIDE 9 MG/ML
INJECTION, SOLUTION INTRAVENOUS CONTINUOUS PRN
Status: DISCONTINUED | OUTPATIENT
Start: 2023-02-22 | End: 2023-02-22 | Stop reason: SURG

## 2023-02-22 RX ORDER — SODIUM CHLORIDE 9 MG/ML
40 INJECTION, SOLUTION INTRAVENOUS AS NEEDED
Status: DISCONTINUED | OUTPATIENT
Start: 2023-02-22 | End: 2023-02-22 | Stop reason: HOSPADM

## 2023-02-22 RX ADMIN — SODIUM CHLORIDE: 0.9 INJECTION, SOLUTION INTRAVENOUS at 10:42

## 2023-02-22 RX ADMIN — PROPOFOL 300 MG: 10 INJECTION, EMULSION INTRAVENOUS at 10:50

## 2023-02-22 NOTE — H&P
GI PREOPERATIVE HISTORY AND PHYSICAL:    Referring Provider:    Jaret Castellanos MD    Chief complaint: Dysphagia, personal history of colon polyps    Subjective .     History of present illness:      Clint Cee is a 78 y.o. male who presents today for Procedure(s):  ESOPHAGOGASTRODUODENOSCOPY  COLONOSCOPY for the indications listed below.     Dysphagia, personal history of colon polyps    The updated Patient Profile was reviewed prior to the procedure, in conjunction with the Physical Exam, including medical conditions, surgical procedures, medications, allergies, family history and social history.     Pre-operatively, I reviewed the indication(s) for the procedure, the risks of the procedure [including but not limited to: unexpected bleeding possibly requiring hospitalization and/or unplanned repeat procedures, perforation possibly requiring surgical treatment, missed lesions and complications of sedation/MAC (also explained by anesthesia staff)].     I have evaluated the patient for risks associated with the planned anesthesia and the procedure to be performed and find the patient an acceptable candidate for IV sedation.    Multiple opportunities were provided for any questions or concerns, and all questions were answered satisfactorily before any anesthesia was administered. We will proceed with the planned procedure.    Past Medical History:  Past Medical History:   Diagnosis Date   • Abnormal ECG June 2022   • Allergic 01/01/1954    Nasal alergies   • Arrhythmia 2004    Dr Youngblood examined me and said i was ok.  skipped beats   • Asthma     no sx   • Benign prostatic hyperplasia 11/14/2018   • Cataract 01/01/2014    Surgery. Caused detached retina of right eye 20/60 repair   • Colon polyp 01/01/2018    Found and removed last colon eca.q   • Depression    • Gastroesophageal reflux disease 03/20/2014   • Glaucoma 2003    Normal pressures. Have low pressure glaucoma   • Gout    • Headache 01/01/2015     Have shimmering in both eyes intermittantly. No headaches   • Heart murmur May 2022    During wellness exam   • Heart valve disease July 2022    During Echo   • HL (hearing loss) 01/01/2012    Have hearing aids   • Hyperlipidemia    • Hypertension 12/02/2011   • Infectious viral hepatitis 1954    Yellow jaundis as child   • Insomnia 12/02/2011   • Mitral valve prolapse June 2022   • Mood disorder (HCC) 09/19/2013   • Polyosteoarthritis 12/02/2011   • Prostate Cancer Jan22 January 2022   • Spinal stenosis 12/02/2011   • Visual impairment 01/01/1955    Nearsighted corrected glasses   • Vitamin D deficiency 05/23/2018       Past Surgical History:  Past Surgical History:   Procedure Laterality Date   • ADENOIDECTOMY  1954    As child   • APPENDECTOMY  1956    Surgery   • CARDIAC CATHETERIZATION N/A 09/30/2022    Procedure: Right and Left Heart Cath with Coronar Angiography;  Surgeon: Felipe Garcia MD;  Location: [facility] CATH INVASIVE LOCATION;  Service: Cardiovascular;  Laterality: N/A;   • CARDIAC VALVE REPLACEMENT N/A 11/17/2022    Procedure: TRICUSPID VALVE REPAIR/REPLACEMENT;  Surgeon: Femi Henderson MD;  Location: [facility];  Service: Cardiothoracic;  Laterality: N/A;  tricuspid valve repaired with 32mm  groves physio tricuspid annuloplasty ring   • CARDIAC VALVE REPLACEMENT  11/17/2022    Repaired mitral and tricuspid   • CATARACT EXTRACTION Bilateral    • COLONOSCOPY  01/01/1997    Regularly scheduled   • EYE SURGERY  01/01/2010    Reattached right retina   • MITRAL VALVE REPAIR/REPLACEMENT N/A 11/17/2022    Procedure: MITRAL VALVE REPAIR/REPLACEMENT with left atrial appendage closure;  Surgeon: Femi Henderson MD;  Location: [facility];  Service: Cardiothoracic;  Laterality: N/A;  mitral valve repaired iwth 40mm medtronic annuloplasty band  with intraop TERRA   • SPINE SURGERY  01/01/1971    2 lumbar 1 cervical   • TONSILLECTOMY  01/01/1950    Childhood       Social History:  Social History  "    Tobacco Use   • Smoking status: Former     Packs/day: 0.00     Years: 0.50     Pack years: 0.00     Types: Cigarettes   • Smokeless tobacco: Never   • Tobacco comments:     Tried tobacco as child   Vaping Use   • Vaping Use: Never used   Substance Use Topics   • Alcohol use: Not Currently     Comment: Not a regular drinker. Occasionly have a glass of wine or be   • Drug use: Never       Family History:  Family History   Problem Relation Age of Onset   • Arthritis Father    • Cancer Father         Prostate   • Arrhythmia Father         Congentive heart failure   • Diabetes Mother         Adult diabetes   • Early death Mother         Kidneys failed after giving birth-diabetes complications   • Heart disease Mother         Adult Diabetes       Medications:  No medications prior to admission.       Scheduled Meds:  Continuous Infusions:No current facility-administered medications for this encounter.    PRN Meds:.    ALLERGIES:  Morphine and Ace inhibitors    ROS:  The following systems were reviewed and negative;   Constitution:  No fevers, chills, no unintentional weight loss  Skin: no rash, no jaundice  Eyes:  No blurry vision, no eye pain  HENT:  No change in hearing or smell  Resp:  No dyspnea or cough  CV:  No chest pain or palpitations  :  No dysuria, hematuria  Musculoskeletal:  No leg cramps or arthralgias  Neuro:  No tremor, no numbness  Psych:  No depression or confsuion    Objective     Vital Signs:   Vitals:    02/16/23 0945   Weight: 104 kg (230 lb)   Height: 185.4 cm (73\")       Physical Exam:       General Appearance:    Awake and alert, in no acute distress   Head:    Normocephalic, without obvious abnormality, atraumatic   Throat:   No oral lesions, no thrush, oral mucosa moist   Lungs  Cardiac:  Abdomen:  Extremities:     Respirations regular, even and unlabored    Regular rate and rhythm, no murmur, gallop, rub    Non-distended, good bowel sounds, non tender, no masses     No edema, pulses 2+ "   Skin:   No rash, no jaundice       Results Review:  Lab Results (last 24 hours)     ** No results found for the last 24 hours. **          Imaging Results (Last 24 Hours)     ** No results found for the last 24 hours. **           I reviewed the patient's labs and imaging.    ASSESSMENT AND PLAN:  Dysphagia, personal history of colon polyps    Active Problems:    * No active hospital problems. *       Procedure(s):  ESOPHAGOGASTRODUODENOSCOPY  COLONOSCOPY      I discussed the patients findings and my recommendations with the patient.    Manfred Keating MD  02/22/23  07:01 EST

## 2023-02-22 NOTE — DISCHARGE INSTRUCTIONS
A responsible adult should stay with you and you should rest quietly for the rest of the day.    Do not drink alcohol, drive, operate any heavy machinery or power tools or make any legal/important decisions for the next 24 hours.     Progress your diet as tolerated.  If you begin to experience severe pain, increased shortness of breath, racing heartbeat or a fever above 101 F, seek immediate medical attention.     Follow up with MD as instructed. Call office for results in 3 to 5 days if needed.   Impression:  1.  Reflux esophagitis  2.  Sigmoid colon polyp     Recommendations:  1.  Proton pump inhibitor daily  2.  No recall for colonoscopy  3.  Follow-up in my office as needed        [phone]

## 2023-02-22 NOTE — ANESTHESIA POSTPROCEDURE EVALUATION
Patient: Clint Cee    Procedure Summary     Date: 02/22/23 Room / Location: [facility] ENDOSCOPY 1 / [facility] ENDOSCOPY    Anesthesia Start: 1042 Anesthesia Stop: 1116    Procedures:       ESOPHAGOGASTRODUODENOSCOPY with esophageal dilation using 48 Fr. Bougie Dilator      COLONOSCOPY with cold snare polypectomy x 1 Diagnosis:       Problems with swallowing and mastication      Esophageal reflux      Personal history of colonic polyps      (Problems with swallowing and mastication [R13.10])      (Esophageal reflux [K21.9])    Surgeons: Manfred Keating MD Provider: Michel Garcia MD    Anesthesia Type: general ASA Status: 3          Anesthesia Type: general    Vitals  Vitals Value Taken Time   /75 02/22/23 1135   Temp     Pulse 65 02/22/23 1135   Resp 15 02/22/23 1135   SpO2 97 % 02/22/23 1135           Post Anesthesia Care and Evaluation    Patient location during evaluation: PACU  Patient participation: complete - patient participated  Level of consciousness: awake  Pain scale: See nurse's notes for pain score.  Pain management: adequate    Airway patency: patent  Anesthetic complications: No anesthetic complications  PONV Status: none  Cardiovascular status: acceptable  Respiratory status: acceptable and spontaneous ventilation  Hydration status: acceptable    Comments: Patient seen and examined postoperatively; vital signs stable; SpO2 greater than or equal to 90%; cardiopulmonary status stable; nausea/vomiting adequately controlled; pain adequately controlled; no apparent anesthesia complications; patient discharged from anesthesia care when discharge criteria were met

## 2023-02-22 NOTE — OP NOTE
ESOPHAGOGASTRODUODENOSCOPY, COLONOSCOPY Procedure Report    Patient Name:  Clint Cee  YOB: 1945    Date of Surgery:  2/22/2023     Pre-Op Diagnosis:  1.  Dysphagia  2.  Personal history of colon polyps    Post-Op Diagnosis:  1.  Grade a erosive esophagitis  2.  Sigmoid colon polyp    Procedure/CPT® Codes:        Staff:  Surgeon(s):  Manfred Keating MD         Anesthesia: Monitored Anesthesia Care    Implants:    Nothing was implanted during the procedure    Specimen:        See Below    Complications:  None    Description of Procedure:  Informed consent was obtained for the procedure, including sedation.  Risks of perforation, hemorrhage, adverse drug reaction and aspiration were discussed.  The patient was brought into the endoscopy suite. Continuous cardiopulmonary monitoring was performed. The patient was placed in the left lateral decubitus position.  The bite block was inserted into the patient's mouth. After adequate sedation was attained, the Olympus gastroscope was inserted into the patient's mouth and advanced to the second portion of the duodenum without difficulty.  Circumferential examination was performed. A retroflex exam was performed in the patient's stomach.  On completion of the exam, the bowel was decompressed, the scope was removed from the patient, the patient tolerated the procedure well, there were no immediate post-operative complications.  There was no blood loss.      Examination of the esophagus: Small erosions at the GE junction consistent with mild grade a erosive esophagitis  Examination of the stomach: Normal  Examination of the duodenum: Normal    Subsequently,  the Olympus colonoscope was inserted into the patient's rectum and advanced to the level of the cecum and terminal ileum without difficulty.  The bowel prep was excellent.  Circumferential examination of the patient's colon was performed on scope withdrawal.  The cecum, ascending colon, and hepatic  flexure were examined twice.  The transverse colon, splenic flexure, descending colon, and rectum were examined.  A retroflex exam was performed in the rectum.  The bowel was decompressed, the scope was withdrawn from the patient, and the patient tolerated the procedure well. There were no immediate post-operative complications.  There was no blood loss.     Findings:   Terminal ileum: Normal  Colon: 4 mm polyp in the sigmoid colon removed with cold snare polypectomy    Impression:  1.  Reflux esophagitis  2.  Sigmoid colon polyp    Recommendations:  1.  Proton pump inhibitor daily  2.  No recall for colonoscopy  3.  Follow-up in my office as needed      Manfred Keating MD     Date: 2/22/2023  Time: 11:12 EST

## 2023-02-22 NOTE — ANESTHESIA PREPROCEDURE EVALUATION
Anesthesia Evaluation     Patient summary reviewed and Nursing notes reviewed   no history of anesthetic complications:  NPO Solid Status: > 8 hours  NPO Liquid Status: > 8 hours           Airway   Mallampati: I  TM distance: >3 FB  Neck ROM: full  No difficulty expected  Dental - normal exam     Pulmonary - normal exam   (+) asthma,shortness of breath,   Cardiovascular - normal exam    (+) hypertension, valvular problems/murmurs (s/p repair) MR and TI, hyperlipidemia,     ROS comment: Impression  Myxomatous mitral and tricuspid valve degeneration.  Severe mitral valve prolapse involving both anterior and posterior mitral leaflets with severe mitral regurgitation with multiple jets.  Probable ruptured chordae.  In some views coaptation of the mitral valve is present.  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  Significant pulmonary hypertension.  Left atrial and left atrial appendage enlargement without clot.  Normal left ventricular size and contractility with ejection fraction of 60%.      Neuro/Psych  (+) headaches, psychiatric history Anxiety,    GI/Hepatic/Renal/Endo    (+)  GERD,  hepatitis, liver disease,     Musculoskeletal     Abdominal  - normal exam    Bowel sounds: normal.   Substance History - negative use     OB/GYN negative ob/gyn ROS         Other   arthritis,    history of cancer    ROS/Med Hx Other: PROSTATE CANCER  RETINAL DETACHMENT POST CATARACT DATE?  MILD CAROTID STENOSIS  NORMAL CORONARIES  THROMBOCYTOPENIA PLT 92 PLT AGG 92  SEVERE MR P1 PROLAPSE ?P2 AS WELL. ?FLAIL SEGMENT  MOD/SEVER TR  MYXOMATOUS CHANGES                    Anesthesia Plan    ASA 3     general     intravenous induction     Anesthetic plan, risks, benefits, and alternatives have been provided, discussed and informed consent has been obtained with: patient.    Plan discussed with CAA.        CODE STATUS:

## 2023-02-23 ENCOUNTER — APPOINTMENT (OUTPATIENT)
Dept: CARDIAC REHAB | Facility: HOSPITAL | Age: 78
End: 2023-02-23
Payer: MEDICARE

## 2023-02-23 LAB
LAB AP CASE REPORT: NORMAL
PATH REPORT.FINAL DX SPEC: NORMAL
PATH REPORT.GROSS SPEC: NORMAL

## 2023-02-27 ENCOUNTER — TREATMENT (OUTPATIENT)
Dept: CARDIAC REHAB | Facility: HOSPITAL | Age: 78
End: 2023-02-27
Payer: MEDICARE

## 2023-02-27 DIAGNOSIS — Z98.890 S/P MITRAL VALVE REPAIR: Primary | ICD-10-CM

## 2023-02-27 PROCEDURE — 93798 PHYS/QHP OP CAR RHAB W/ECG: CPT

## 2023-02-28 ENCOUNTER — TREATMENT (OUTPATIENT)
Dept: CARDIAC REHAB | Facility: HOSPITAL | Age: 78
End: 2023-02-28
Payer: MEDICARE

## 2023-02-28 DIAGNOSIS — Z98.890 S/P MITRAL VALVE REPAIR: Primary | ICD-10-CM

## 2023-02-28 PROCEDURE — 93798 PHYS/QHP OP CAR RHAB W/ECG: CPT

## 2023-03-01 ENCOUNTER — TREATMENT (OUTPATIENT)
Dept: CARDIAC REHAB | Facility: HOSPITAL | Age: 78
End: 2023-03-01
Payer: MEDICARE

## 2023-03-01 DIAGNOSIS — Z98.890 S/P MITRAL VALVE REPAIR: Primary | ICD-10-CM

## 2023-03-01 PROCEDURE — 93798 PHYS/QHP OP CAR RHAB W/ECG: CPT

## 2023-03-02 ENCOUNTER — APPOINTMENT (OUTPATIENT)
Dept: CARDIAC REHAB | Facility: HOSPITAL | Age: 78
End: 2023-03-02
Payer: MEDICARE

## 2023-03-06 ENCOUNTER — TREATMENT (OUTPATIENT)
Dept: CARDIAC REHAB | Facility: HOSPITAL | Age: 78
End: 2023-03-06
Payer: MEDICARE

## 2023-03-06 DIAGNOSIS — Z98.890 S/P TRICUSPID VALVE REPAIR: ICD-10-CM

## 2023-03-06 DIAGNOSIS — Z98.890 S/P MITRAL VALVE REPAIR: Primary | ICD-10-CM

## 2023-03-06 PROCEDURE — 93798 PHYS/QHP OP CAR RHAB W/ECG: CPT

## 2023-03-07 ENCOUNTER — TREATMENT (OUTPATIENT)
Dept: CARDIAC REHAB | Facility: HOSPITAL | Age: 78
End: 2023-03-07
Payer: MEDICARE

## 2023-03-07 DIAGNOSIS — Z98.890 S/P MITRAL VALVE REPAIR: Primary | ICD-10-CM

## 2023-03-07 PROCEDURE — 93798 PHYS/QHP OP CAR RHAB W/ECG: CPT

## 2023-03-08 ENCOUNTER — TREATMENT (OUTPATIENT)
Dept: CARDIAC REHAB | Facility: HOSPITAL | Age: 78
End: 2023-03-08
Payer: MEDICARE

## 2023-03-08 DIAGNOSIS — Z98.890 S/P MITRAL VALVE REPAIR: Primary | ICD-10-CM

## 2023-03-08 PROCEDURE — 93798 PHYS/QHP OP CAR RHAB W/ECG: CPT

## 2023-03-09 ENCOUNTER — APPOINTMENT (OUTPATIENT)
Dept: CARDIAC REHAB | Facility: HOSPITAL | Age: 78
End: 2023-03-09
Payer: MEDICARE

## 2023-03-13 ENCOUNTER — APPOINTMENT (OUTPATIENT)
Dept: CARDIAC REHAB | Facility: HOSPITAL | Age: 78
End: 2023-03-13
Payer: MEDICARE

## 2023-03-13 DIAGNOSIS — Z98.890 S/P MITRAL VALVE REPAIR: Primary | ICD-10-CM

## 2023-03-13 DIAGNOSIS — Z98.890 S/P TRICUSPID VALVE REPAIR: ICD-10-CM

## 2023-03-13 PROCEDURE — 93798 PHYS/QHP OP CAR RHAB W/ECG: CPT

## 2023-03-14 ENCOUNTER — TREATMENT (OUTPATIENT)
Dept: CARDIAC REHAB | Facility: HOSPITAL | Age: 78
End: 2023-03-14
Payer: MEDICARE

## 2023-03-14 DIAGNOSIS — Z98.890 S/P MITRAL VALVE REPAIR: Primary | ICD-10-CM

## 2023-03-14 PROCEDURE — 93798 PHYS/QHP OP CAR RHAB W/ECG: CPT

## 2023-03-15 ENCOUNTER — TREATMENT (OUTPATIENT)
Dept: CARDIAC REHAB | Facility: HOSPITAL | Age: 78
End: 2023-03-15
Payer: MEDICARE

## 2023-03-15 DIAGNOSIS — Z98.890 S/P MITRAL VALVE REPAIR: Primary | ICD-10-CM

## 2023-03-15 PROCEDURE — 93798 PHYS/QHP OP CAR RHAB W/ECG: CPT

## 2023-03-16 ENCOUNTER — APPOINTMENT (OUTPATIENT)
Dept: CARDIAC REHAB | Facility: HOSPITAL | Age: 78
End: 2023-03-16
Payer: MEDICARE

## 2023-03-20 ENCOUNTER — APPOINTMENT (OUTPATIENT)
Dept: CARDIAC REHAB | Facility: HOSPITAL | Age: 78
End: 2023-03-20
Payer: MEDICARE

## 2023-03-21 ENCOUNTER — TREATMENT (OUTPATIENT)
Dept: CARDIAC REHAB | Facility: HOSPITAL | Age: 78
End: 2023-03-21
Payer: MEDICARE

## 2023-03-21 DIAGNOSIS — Z98.890 S/P MITRAL VALVE REPAIR: Primary | ICD-10-CM

## 2023-03-21 PROCEDURE — 93798 PHYS/QHP OP CAR RHAB W/ECG: CPT

## 2023-03-22 ENCOUNTER — TREATMENT (OUTPATIENT)
Dept: CARDIAC REHAB | Facility: HOSPITAL | Age: 78
End: 2023-03-22
Payer: MEDICARE

## 2023-03-22 DIAGNOSIS — Z98.890 S/P TRICUSPID VALVE REPAIR: Primary | ICD-10-CM

## 2023-03-22 DIAGNOSIS — Z98.890 S/P MITRAL VALVE REPAIR: ICD-10-CM

## 2023-03-22 PROCEDURE — 93798 PHYS/QHP OP CAR RHAB W/ECG: CPT

## 2023-03-23 ENCOUNTER — APPOINTMENT (OUTPATIENT)
Dept: CARDIAC REHAB | Facility: HOSPITAL | Age: 78
End: 2023-03-23
Payer: MEDICARE

## 2023-03-27 ENCOUNTER — TREATMENT (OUTPATIENT)
Dept: CARDIAC REHAB | Facility: HOSPITAL | Age: 78
End: 2023-03-27
Payer: MEDICARE

## 2023-03-27 DIAGNOSIS — Z98.890 S/P MITRAL VALVE REPAIR: ICD-10-CM

## 2023-03-27 DIAGNOSIS — Z98.890 S/P TRICUSPID VALVE REPAIR: Primary | ICD-10-CM

## 2023-03-27 PROCEDURE — 93798 PHYS/QHP OP CAR RHAB W/ECG: CPT

## 2023-03-28 ENCOUNTER — TREATMENT (OUTPATIENT)
Dept: CARDIAC REHAB | Facility: HOSPITAL | Age: 78
End: 2023-03-28
Payer: MEDICARE

## 2023-03-28 DIAGNOSIS — Z98.890 S/P MITRAL VALVE REPAIR: ICD-10-CM

## 2023-03-28 DIAGNOSIS — Z98.890 S/P TRICUSPID VALVE REPAIR: Primary | ICD-10-CM

## 2023-03-28 PROCEDURE — 93798 PHYS/QHP OP CAR RHAB W/ECG: CPT

## 2023-03-29 ENCOUNTER — APPOINTMENT (OUTPATIENT)
Dept: CARDIAC REHAB | Facility: HOSPITAL | Age: 78
End: 2023-03-29
Payer: MEDICARE

## 2023-04-03 ENCOUNTER — TREATMENT (OUTPATIENT)
Dept: CARDIAC REHAB | Facility: HOSPITAL | Age: 78
End: 2023-04-03
Payer: MEDICARE

## 2023-04-03 DIAGNOSIS — Z98.890 S/P TRICUSPID VALVE REPAIR: Primary | ICD-10-CM

## 2023-04-03 DIAGNOSIS — Z98.890 S/P MITRAL VALVE REPAIR: ICD-10-CM

## 2023-04-03 PROCEDURE — 93798 PHYS/QHP OP CAR RHAB W/ECG: CPT

## 2023-04-10 RX ORDER — ALLOPURINOL 100 MG/1
100 TABLET ORAL DAILY
Qty: 30 TABLET | Refills: 3 | Status: SHIPPED | OUTPATIENT
Start: 2023-04-10

## 2023-05-03 ENCOUNTER — TELEPHONE (OUTPATIENT)
Dept: CARDIOLOGY | Facility: CLINIC | Age: 78
End: 2023-05-03
Payer: MEDICARE

## 2023-05-03 NOTE — TELEPHONE ENCOUNTER
----- Message from Clint Cee sent at 5/3/2023  3:20 AM EDT -----  Regarding: Ticks  Contact: [phone]  Due to my heart valve repairs, I'm wondering if l need to do anything special, medically, as this Spring l am getting quite a few tick bites. None has turned into a bulls-eye, so far, and l'm just them treating normally.

## 2023-06-19 RX ORDER — ATORVASTATIN CALCIUM 40 MG/1
40 TABLET, FILM COATED ORAL NIGHTLY
Qty: 90 TABLET | Refills: 1 | OUTPATIENT
Start: 2023-06-19

## 2023-08-15 ENCOUNTER — HOSPITAL ENCOUNTER (EMERGENCY)
Facility: HOSPITAL | Age: 78
Discharge: HOME | End: 2023-08-15
Attending: EMERGENCY MEDICINE | Admitting: EMERGENCY MEDICINE
Payer: MEDICARE

## 2023-08-15 ENCOUNTER — APPOINTMENT (OUTPATIENT)
Dept: CT IMAGING | Facility: HOSPITAL | Age: 78
End: 2023-08-15
Payer: MEDICARE

## 2023-08-15 VITALS
SYSTOLIC BLOOD PRESSURE: 119 MMHG | TEMPERATURE: 96.4 F | DIASTOLIC BLOOD PRESSURE: 60 MMHG | OXYGEN SATURATION: 92 % | BODY MASS INDEX: 30.74 KG/M2 | RESPIRATION RATE: 20 BRPM | HEIGHT: 73 IN | HEART RATE: 72 BPM | WEIGHT: 231.92 LBS

## 2023-08-15 DIAGNOSIS — S39.012A STRAIN OF LUMBAR REGION, INITIAL ENCOUNTER: ICD-10-CM

## 2023-08-15 DIAGNOSIS — S09.90XA INJURY OF HEAD, INITIAL ENCOUNTER: Primary | ICD-10-CM

## 2023-08-15 PROCEDURE — 96375 TX/PRO/DX INJ NEW DRUG ADDON: CPT

## 2023-08-15 PROCEDURE — 96374 THER/PROPH/DIAG INJ IV PUSH: CPT

## 2023-08-15 PROCEDURE — 99284 EMERGENCY DEPT VISIT MOD MDM: CPT

## 2023-08-15 PROCEDURE — 70450 CT HEAD/BRAIN W/O DYE: CPT

## 2023-08-15 PROCEDURE — 25010000002 METHOCARBAMOL 1000 MG/10ML SOLUTION: Performed by: EMERGENCY MEDICINE

## 2023-08-15 PROCEDURE — 25010000002 HYDROMORPHONE 1 MG/ML SOLUTION: Performed by: EMERGENCY MEDICINE

## 2023-08-15 PROCEDURE — 72131 CT LUMBAR SPINE W/O DYE: CPT

## 2023-08-15 PROCEDURE — 25010000002 ONDANSETRON PER 1 MG: Performed by: EMERGENCY MEDICINE

## 2023-08-15 RX ORDER — HYDROCODONE BITARTRATE AND ACETAMINOPHEN 7.5; 325 MG/1; MG/1
1 TABLET ORAL ONCE
Status: COMPLETED | OUTPATIENT
Start: 2023-08-15 | End: 2023-08-15

## 2023-08-15 RX ORDER — HYDROCODONE BITARTRATE AND ACETAMINOPHEN 7.5; 325 MG/1; MG/1
1 TABLET ORAL EVERY 6 HOURS PRN
Qty: 15 TABLET | Refills: 0 | Status: SHIPPED | OUTPATIENT
Start: 2023-08-15 | End: 2023-08-27

## 2023-08-15 RX ORDER — ONDANSETRON 2 MG/ML
4 INJECTION INTRAMUSCULAR; INTRAVENOUS ONCE
Status: COMPLETED | OUTPATIENT
Start: 2023-08-15 | End: 2023-08-15

## 2023-08-15 RX ORDER — METHOCARBAMOL 100 MG/ML
1000 INJECTION, SOLUTION INTRAMUSCULAR; INTRAVENOUS ONCE
Status: COMPLETED | OUTPATIENT
Start: 2023-08-15 | End: 2023-08-15

## 2023-08-15 RX ADMIN — METHOCARBAMOL 1000 MG: 100 INJECTION INTRAMUSCULAR; INTRAVENOUS at 06:37

## 2023-08-15 RX ADMIN — ONDANSETRON 4 MG: 2 INJECTION INTRAMUSCULAR; INTRAVENOUS at 06:34

## 2023-08-15 RX ADMIN — HYDROMORPHONE HYDROCHLORIDE 0.25 MG: 1 INJECTION, SOLUTION INTRAMUSCULAR; INTRAVENOUS; SUBCUTANEOUS at 06:35

## 2023-08-15 RX ADMIN — HYDROCODONE BITARTRATE AND ACETAMINOPHEN 1 TABLET: 7.5; 325 TABLET ORAL at 05:09

## 2023-08-15 NOTE — ED NOTES
Pt arrived ambulatory via PV c/o back pain after a fall on Sunday. Pt states he was not seen in the ER directly following the fall. Wife reports pt wanted to see if he got better and reports since Sunday the pain his is back has gotten worse. Pt reports falling forward and hitting his face. Pt has abrasions on his face knees and arms. Pt denies blood thinners.

## 2023-08-15 NOTE — ED PROVIDER NOTES
Subjective   History of Present Illness  78-year-old male status post fall on Sunday.  Patient states he tripped and fell over a parking curb onto his face.  No LOC.  Patient has had intermittent headaches and dizziness since that time as well as moderate lower back pain, worse with movement.  Patient denies any facial jaw pain.  Patient denies any neck or extremity pain otherwise.    Review of Systems   Musculoskeletal:  Positive for back pain.   Neurological:  Positive for light-headedness and headaches.     Past Medical History:   Diagnosis Date    Abnormal ECG June 2022    Allergic 01/01/1954    Nasal alergies    Arrhythmia 2004    Dr Youngblood examined me and said i was ok.  skipped beats    Asthma     no sx    Benign prostatic hyperplasia 11/14/2018    Cataract 01/01/2014    Surgery. Caused detached retina of right eye 20/60 repair    Colon polyp 01/01/2018    Found and removed last colon eca.q    Depression     Gastroesophageal reflux disease 03/20/2014    Glaucoma 2003    Normal pressures. Have low pressure glaucoma    Gout     Headache 01/01/2015    Have shimmering in both eyes intermittantly. 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    Prostate Cancer Jan22 January 2022    Spinal stenosis 12/02/2011    Visual impairment 01/01/1955    Nearsighted corrected glasses    Vitamin D deficiency 05/23/2018       Allergies   Allergen Reactions    Morphine Hallucinations    Beta Adrenergic Blockers Cough    Ace Inhibitors Cough     cough       Past Surgical History:   Procedure Laterality Date    ADENOIDECTOMY  1954    As child    APPENDECTOMY  1956    Surgery    CARDIAC  CATHETERIZATION N/A 09/30/2022    Procedure: Right and Left Heart Cath with Coronar Angiography;  Surgeon: Felipe Garcia MD;  Location: [facility] CATH INVASIVE LOCATION;  Service: Cardiovascular;  Laterality: N/A;    CARDIAC VALVE REPLACEMENT N/A 11/17/2022    Procedure: TRICUSPID VALVE REPAIR/REPLACEMENT;  Surgeon: Femi Henderson MD;  Location: [facility] CVOR;  Service: Cardiothoracic;  Laterality: N/A;  tricuspid valve repaired with 32mm  groves physio tricuspid annuloplasty ring    CARDIAC VALVE REPLACEMENT  11/17/2022    Repaired mitral and tricuspid    CATARACT EXTRACTION Bilateral     COLONOSCOPY  01/01/1997    Regularly scheduled    COLONOSCOPY N/A 2/22/2023    Procedure: COLONOSCOPY with cold snare polypectomy x 1;  Surgeon: Manfred Keating MD;  Location: [facility] ENDOSCOPY;  Service: Gastroenterology;  Laterality: N/A;    ENDOSCOPY N/A 2/22/2023    Procedure: ESOPHAGOGASTRODUODENOSCOPY with esophageal dilation using 48 Fr. Bougie Dilator;  Surgeon: Manfred Keating MD;  Location: [facility] ENDOSCOPY;  Service: Gastroenterology;  Laterality: N/A;  erosive esophagitis    EYE SURGERY  01/01/2010    Reattached right retina    MITRAL VALVE REPAIR/REPLACEMENT N/A 11/17/2022    Procedure: MITRAL VALVE REPAIR/REPLACEMENT with left atrial appendage closure;  Surgeon: Femi Henderson MD;  Location: [facility];  Service: Cardiothoracic;  Laterality: N/A;  mitral valve repaired iwth 40mm medtronic annuloplasty band  with intraop TERRA    SPINE SURGERY  01/01/1971    2 lumbar 1 cervical    TONSILLECTOMY  01/01/1950    Childhood       Family History   Problem Relation Age of Onset    Arthritis Father     Cancer Father         Prostate    Arrhythmia Father         Congentive heart failure    Diabetes Mother         Adult diabetes    Early death Mother         Kidneys failed after giving birth-diabetes complications    Heart disease Mother         Adult Diabetes       Social History     Socioeconomic History     Marital status:    Tobacco Use    Smoking status: Former     Packs/day: 0.00     Years: 0.50     Pack years: 0.00     Types: Cigarettes     Passive exposure: Past    Smokeless tobacco: Never    Tobacco comments:     Tried tobacco as child   Vaping Use    Vaping Use: Never used   Substance and Sexual Activity    Alcohol use: Not Currently     Comment: Not a regular drinker. Occasionly have a glass of wine or be    Drug use: Never    Sexual activity: Defer     Comment: Chemically castrated. Prostate cancer           Objective   Physical Exam  Constitutional:       Appearance: Normal appearance.   HENT:      Head:      Comments: Ecchymosis to the chin and abrasion to the forehead, no facial bone pain or tenderness  Musculoskeletal:      Cervical back: Normal range of motion and neck supple. No tenderness.      Comments: Decreased range of motion lower back secondary to pain, no pain or tenderness over thoracic spine.  Scattered extremity abrasions, no pain or tenderness with full range of motion all 4 extremities.   Skin:     General: Skin is warm and dry.      Capillary Refill: Capillary refill takes less than 2 seconds.   Neurological:      General: No focal deficit present.      Mental Status: He is alert and oriented to person, place, and time.   Psychiatric:         Mood and Affect: Mood normal.         Behavior: Behavior normal.       Procedures           ED Course                                           Medical Decision Making  Problems Addressed:  Injury of head, initial encounter: complicated acute illness or injury     Details: neg imaging  Strain of lumbar region, initial encounter: complicated acute illness or injury     Details: neg imaging, feels better, inspect reviewed.    Amount and/or Complexity of Data Reviewed  Radiology: ordered.    Risk  Prescription drug management.        Final diagnoses:   Injury of head, initial encounter   Strain of lumbar region, initial encounter       ED  Disposition  ED Disposition       ED Disposition   Discharge    Condition   Stable    Comment   --               Jaret Castellanos MD  [address]  [place] [address] [phone]    Schedule an appointment as soon as possible for a visit            Medication List        New Prescriptions      HYDROcodone-acetaminophen 7.5-325 MG per tablet  Commonly known as: NORCO  Take 1 tablet by mouth Every 6 (Six) Hours As Needed for Moderate Pain.            Changed      zolpidem 10 MG tablet  Commonly known as: AMBIEN  TAKE 1 TABLET BY MOUTH EVERY NIGHT AT BEDTIME  What changed:   how to take this  when to take this  reasons to take this               Where to Get Your Medications        These medications were sent to [id number] - [place], [place] - [address] AT [address] & [address] - [phone]  - [phone]   [address] [place] [address]      Phone: [phone]   HYDROcodone-acetaminophen 7.5-325 MG per tablet            Jayme Grady MD  08/15/23 0714

## 2023-08-16 ENCOUNTER — PATIENT OUTREACH (OUTPATIENT)
Dept: CASE MANAGEMENT | Facility: OTHER | Age: 78
End: 2023-08-16
Payer: MEDICARE

## 2023-08-16 NOTE — OUTREACH NOTE
"AMBULATORY CASE MANAGEMENT NOTE    Name and Relationship of Patient/Support Person: Clint Cee \"Dennis\" - Self    Patient Outreach    Pt discharged from [facility] ED on 8/15/23, seen for fall. RN-ACM outreach call made to pt, spoke with pt's wife Mary Beth. Explained role of RN-ACM and reason for call. Mary Beth reports pt c/o back spasms, denies head pain. Reviewed ED AVS with Mary Beth. Education provided. Mary Beth declines PCP follow up appt for pt at this time. He has next routine PCP appt scheduled. Reviewed SDOH. Mary Beth denies any pt needs. No questions per Mary Beth. Advised her to call RN-ACM or [facility] Nurse Line with any needs. Follow up outreach prn.     Send Education  Questions/Answers      Flowsheet Row Most Recent Value   Annual Wellness Visit:  Patient Has Completed  [scheduled for 11/6/23]   Other Patient Education/Resources  24/7 [facility] Nurse Call Line   24/7 Nurse Call Line Education Method Verbal   Advanced Directives: --  [resources provided]          SDOH updated and reviewed with the patient during this program:  Financial Resource Strain: Low Risk     Difficulty of Paying Living Expenses: Not very hard      Food Insecurity: No Food Insecurity    Worried About Running Out of Food in the Last Year: Never true    Ran Out of Food in the Last Year: Never true      Transportation Needs: No Transportation Needs    Lack of Transportation (Medical): No    Lack of Transportation (Non-Medical): No           Golden VILLAR  Ambulatory Case Management    8/16/2023, 12:11 EDT  "

## 2023-08-21 ENCOUNTER — TELEPHONE (OUTPATIENT)
Dept: FAMILY MEDICINE CLINIC | Facility: CLINIC | Age: 78
End: 2023-08-21
Payer: MEDICARE

## 2023-08-21 NOTE — TELEPHONE ENCOUNTER
"  Caller: Clint Cee \"Dennis\"    Relationship to patient: Self    Best call back number: [phone]     Chief complaint: PULLED MUSCLES, FELL ON HIS FACE, HAD BLEEDING FROM THE HEAD- NOTHING INTERNAL    Type of visit: HOSPITAL FOLLOW UP     Requested date:  ASAP    If rescheduling, when is the original appointment:  N/A    Additional notes:       PATIENT GOT DISCHARGED FROM [facility] 8/15/23.    HE WAS NOT ADMITTED.     PATIENT WAS IN FOR A FALL HE HAD- ENDED UP GETTING  DISCHARGED SAME DAY:     HUB UNABLE TO SCHEDULE PER WORKFLOW.  HUB ATTEMPTED TO WARM TRANSFER PATIENT TO THE OFFICE AND WAS UNSUCCESSFUL.    PLEASE ADVISE   "

## 2023-08-22 DIAGNOSIS — F51.01 PRIMARY INSOMNIA: ICD-10-CM

## 2023-08-23 RX ORDER — ALLOPURINOL 100 MG/1
100 TABLET ORAL DAILY
Qty: 90 TABLET | Refills: 0 | Status: ON HOLD | OUTPATIENT
Start: 2023-08-23

## 2023-08-23 RX ORDER — ZOLPIDEM TARTRATE 10 MG/1
TABLET ORAL
Qty: 90 TABLET | Refills: 1 | Status: SHIPPED | OUTPATIENT
Start: 2023-08-23 | End: 2023-08-27

## 2023-08-24 ENCOUNTER — OFFICE VISIT (OUTPATIENT)
Dept: FAMILY MEDICINE CLINIC | Facility: CLINIC | Age: 78
End: 2023-08-24
Payer: MEDICARE

## 2023-08-24 VITALS
HEART RATE: 76 BPM | WEIGHT: 229.2 LBS | HEIGHT: 73 IN | TEMPERATURE: 96.9 F | OXYGEN SATURATION: 96 % | DIASTOLIC BLOOD PRESSURE: 72 MMHG | BODY MASS INDEX: 30.38 KG/M2 | RESPIRATION RATE: 20 BRPM | SYSTOLIC BLOOD PRESSURE: 147 MMHG

## 2023-08-24 DIAGNOSIS — T07.XXXA MULTIPLE CONTUSIONS: ICD-10-CM
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
## 2023-08-25 NOTE — PROGRESS NOTES
Rooming Tab(CC,VS,Pt Hx,Fall Screen)  Chief Complaint   Patient presents with    Follow-up     Follow up from ER visit.       Subjective     The patient presents to the clinic for an emergency room reevaluation.    The patient states that he was walking to [employer] from the parking lot on 08/13/2023 (Sunday) when he hit one of the parking bumpers. He reports that he then fell on his face and his chest. He states that he was helped up by some citizens. He was given some ice bags, which were placed on his head. He reports that he did not lose consciousness; however, he experienced dizziness. He states that he remembers the fall and the events prior to the fall. He notes that he has not experienced severe headaches; however, he reports that he has been experiencing severe muscle spasms. He notes that his entire body tenses up if he moves a certain way. He notes that he had to be in a fetal position occassionally when he experiences the muscle spasms. Other times, he states that he has been having pain in his back. His wife was with him when he had the fall. He took some Tylenol and tried to sleep on the night of the fall, 08/13/2023. Around 3:00 AM of 08/14/2023, he was in so much pain, so he went to the emergency room. He notes that he had a CT scan of his brain and bones and was informed that everything was okay. He states that he was froylan that he did not break his teeth and did not injure his nose.    The patient notes that he is breathing okay. He states that he has normal bowel movements. He reports that he has normal bladder functions. He notes that he is able to walk. He mentions that he has been drinking electrolytes. He reports that he is feeling improved now. He believes that he did not have any blood tests at the emergency room. He notes that he was given hydromorphone (Dilaudid) and a pain medication. He states that he had an intravenous infusion at that time.    The patient reports that he has  "low platelets. He notes that he had a right retina that came off and they had to put it back on. He states that he has 20/50 in one eye and 20/20 in the other eye. He states that he tends to walk with his head down because he could not see anything, which he believes could have contributed to his fall. He inquires if using heating pads can be beneficial. It seems that when he is lying flat on his back is the hard part. If he moves suddenly or if he reaches for his handkerchief and when he blows his nose is when his whole body will spasm. He states that it feels similar to having Charley horses.      I have reviewed and updated his medications, medical history and problem list during today's office visit.     Patient Care Team:  Jaret Castellanos MD as PCP - General (Family Medicine)  Felipe Garcia MD as Consulting Physician (Cardiology)  Golden Serna RN as Ambulatory  (Population Health)    Problem List Tab  Medications Tab  Synopsis Tab  Chart Review Tab  Care Everywhere Tab  Immunizations Tab  Patient History Tab    Social History     Tobacco Use    Smoking status: Former     Packs/day: 0.00     Years: 0.50     Pack years: 0.00     Types: Cigarettes     Passive exposure: Past    Smokeless tobacco: Never    Tobacco comments:     Tried tobacco as child   Substance Use Topics    Alcohol use: Not Currently     Comment: Not a regular drinker. Occasionly have a glass of wine or be       Review of Systems    Objective     Rooming Tab(CC,VS,Pt Hx,Fall Screen)  /72   Pulse 76   Temp 96.9 øF (36.1 øC) (Infrared)   Resp 20   Ht 185.4 cm (72.99\")   Wt 104 kg (229 lb 3.2 oz)   SpO2 96%   BMI 30.25 kg/mý     Body mass index is 30.25 kg/mý.    Physical Exam  Constitutional:       Appearance: He is well-developed and normal weight. He is ill-appearing.      Comments: Looks pale   HENT:      Head: Normocephalic and atraumatic.      Right Ear: Tympanic membrane, ear canal and external ear normal. "      Left Ear: Tympanic membrane, ear canal and external ear normal.      Nose: Nose normal.      Mouth/Throat:      Mouth: Mucous membranes are moist.      Pharynx: Oropharynx is clear. No oropharyngeal exudate.   Eyes:      Extraocular Movements: Extraocular movements intact.      Conjunctiva/sclera: Conjunctivae normal.      Pupils: Pupils are equal, round, and reactive to light.   Cardiovascular:      Rate and Rhythm: Normal rate and regular rhythm.      Pulses: Normal pulses.      Heart sounds: Normal heart sounds.   Pulmonary:      Effort: Pulmonary effort is normal.      Breath sounds: Normal breath sounds.   Abdominal:      General: Bowel sounds are normal.      Palpations: Abdomen is soft.   Musculoskeletal:         General: Normal range of motion.      Cervical back: Normal range of motion and neck supple.      Comments: Contusions over his forearms knees shins.  Some abrasions and cuts all superficial.  Osteoarthritic changes in knees ankles and back and hands.   Skin:     General: Skin is warm and dry.      Coloration: Skin is pale.      Comments: Generally pale.  Contusions over her shins and forearms with some abrasions and superficial cuts.   Neurological:      General: No focal deficit present.      Mental Status: He is alert and oriented to person, place, and time. Mental status is at baseline.   Psychiatric:         Mood and Affect: Mood normal.         Behavior: Behavior normal.         Thought Content: Thought content normal.         Judgment: Judgment normal.        Statin Choice Calculator  Data Reviewed:    CT Head Without Contrast    Result Date: 8/15/2023  Impression: Impression: No acute intracranial process. Electronically Signed: Bethany Perez MD  8/15/2023 4:52 AM EDT  Workstation ID: [id number]    CT Lumbar Spine Without Contrast    Result Date: 8/15/2023  Impression: Impression: No acute osseous abnormality. Degenerative changes as above. Electronically Signed: Bethany Perez MD  8/15/2023  4:54 AM EDT  Workstation ID: [id number]               Assessment & Plan   Order Review Tab  Health Maintenance Tab  Patient Plan/Order Tab    1. Fall, subsequent encounter  - The patient is a 78-year-old white male who was at [employer], a local ice cream establishment about a week ago, when he was walking along the sidewalk and clipped a parking concrete slab. He caught his toe on the edge of it and fell forward, landing face first and forehead first against the pavement along with contusions to his knees, arms, abdomen, and shins, which were hit pretty hard on the pavement. He had immediate pain, but no loss of consciousness. He was helped up by some other patrons of the establishment and his wife was there. He remembers the events prior to the fall, driving to the facility and also the events leading up to the fall. He had no retrograde amnesia whatsoever. He went home, cleaned up his abrasions and cuts, and then that evening, he became so uncomfortable with muscle spasms that he went to the emergency room. In the emergency room, he had some scans done, but no deep lacerations were found. He did not have any blood work done. Today, he looks pale to me. I am going to go ahead and do some blood work today to make sure he is not bleeding anywhere. Otherwise, he looks stable. His exam was normal today. Although he is a little sore, he can stand up and move around pretty normally.    2. Multiple contusions  - The patient has contusions in his shins and legs in particular, but also his forearms. I think his legs and his forearms took the brunt of the fall, although he has a big forehead contusion and down into the face and nose area. He is very fortunate not to have cracked all of his front teeth.    As I mentioned above, the patient looks a little pale to me today, so I want to make sure he did not have so much contusions in his deep tissue to be anemic. I will check his blood count to make sure he does not have a  reticulocyte count elevation and we will go from there. It will also give us a chance to check his kidney and liver function tests.      Wrapup Tab  Return in about 7 years (around 8/24/2030), or if symptoms worsen or fail to improve, for See next week if no better.    [facility] [facility] in May 2024.           Transcribed from ambient dictation for Jaret Castellanos MD by Shahid Degroot.  08/24/23   21:06 EDT    Patient or patient representative verbalized consent to the visit recording.  I have personally performed the services described in this document as transcribed by the above individual, and it is both accurate and complete.

## 2023-08-27 ENCOUNTER — HOSPITAL ENCOUNTER (OUTPATIENT)
Facility: HOSPITAL | Age: 78
Setting detail: OBSERVATION
Discharge: NURSING, REHAB OR LONG-TERM CARE | End: 2023-08-29
Attending: EMERGENCY MEDICINE | Admitting: EMERGENCY MEDICINE
Payer: MEDICARE

## 2023-08-27 DIAGNOSIS — M54.50 ACUTE BILATERAL LOW BACK PAIN WITHOUT SCIATICA: Primary | ICD-10-CM

## 2023-08-27 DIAGNOSIS — M62.830 BACK MUSCLE SPASM: ICD-10-CM

## 2023-08-27 DIAGNOSIS — R68.89 DECREASED ABILITY TO PERFORM ACTIVITIES: ICD-10-CM

## 2023-08-27 PROBLEM — M54.9 BACK PAIN: Status: ACTIVE | Noted: 2023-08-27

## 2023-08-27 LAB
BILIRUB UR QL STRIP: NEGATIVE
CLARITY UR: CLEAR
COLOR UR: YELLOW
GLUCOSE UR STRIP-MCNC: NEGATIVE MG/DL
HGB UR QL STRIP.AUTO: NEGATIVE
KETONES UR QL STRIP: NEGATIVE
LEUKOCYTE ESTERASE UR QL STRIP.AUTO: NEGATIVE
NITRITE UR QL STRIP: NEGATIVE
PH UR STRIP.AUTO: 7 [PH] (ref 5–8)
PROT UR QL STRIP: NEGATIVE
SP GR UR STRIP: 1.01 (ref 1–1.03)
UROBILINOGEN UR QL STRIP: NORMAL

## 2023-08-27 PROCEDURE — 96372 THER/PROPH/DIAG INJ SC/IM: CPT

## 2023-08-27 PROCEDURE — 81003 URINALYSIS AUTO W/O SCOPE: CPT | Performed by: PHYSICIAN ASSISTANT

## 2023-08-27 PROCEDURE — 99284 EMERGENCY DEPT VISIT MOD MDM: CPT

## 2023-08-27 PROCEDURE — 25010000002 HYDROMORPHONE 1 MG/ML SOLUTION: Performed by: PHYSICIAN ASSISTANT

## 2023-08-27 PROCEDURE — G0378 HOSPITAL OBSERVATION PER HR: HCPCS

## 2023-08-27 RX ORDER — BISACODYL 5 MG/1
5 TABLET, DELAYED RELEASE ORAL DAILY PRN
Status: DISCONTINUED | OUTPATIENT
Start: 2023-08-27 | End: 2023-08-29 | Stop reason: HOSPADM

## 2023-08-27 RX ORDER — HYDROCODONE BITARTRATE AND ACETAMINOPHEN 7.5; 325 MG/1; MG/1
1 TABLET ORAL EVERY 6 HOURS PRN
Status: DISCONTINUED | OUTPATIENT
Start: 2023-08-27 | End: 2023-08-29

## 2023-08-27 RX ORDER — SODIUM CHLORIDE 9 MG/ML
40 INJECTION, SOLUTION INTRAVENOUS AS NEEDED
Status: DISCONTINUED | OUTPATIENT
Start: 2023-08-27 | End: 2023-08-29 | Stop reason: HOSPADM

## 2023-08-27 RX ORDER — BISACODYL 10 MG
10 SUPPOSITORY, RECTAL RECTAL DAILY PRN
Status: DISCONTINUED | OUTPATIENT
Start: 2023-08-27 | End: 2023-08-29 | Stop reason: HOSPADM

## 2023-08-27 RX ORDER — SODIUM CHLORIDE 0.9 % (FLUSH) 0.9 %
10 SYRINGE (ML) INJECTION AS NEEDED
Status: DISCONTINUED | OUTPATIENT
Start: 2023-08-27 | End: 2023-08-29 | Stop reason: HOSPADM

## 2023-08-27 RX ORDER — POLYETHYLENE GLYCOL 3350 17 G/17G
17 POWDER, FOR SOLUTION ORAL DAILY PRN
Status: DISCONTINUED | OUTPATIENT
Start: 2023-08-27 | End: 2023-08-29 | Stop reason: HOSPADM

## 2023-08-27 RX ORDER — DULOXETIN HYDROCHLORIDE 30 MG/1
60 CAPSULE, DELAYED RELEASE ORAL DAILY
Status: DISCONTINUED | OUTPATIENT
Start: 2023-08-27 | End: 2023-08-29 | Stop reason: HOSPADM

## 2023-08-27 RX ORDER — ATORVASTATIN CALCIUM 40 MG/1
40 TABLET, FILM COATED ORAL NIGHTLY
Status: DISCONTINUED | OUTPATIENT
Start: 2023-08-27 | End: 2023-08-29 | Stop reason: HOSPADM

## 2023-08-27 RX ORDER — ZOLPIDEM TARTRATE 10 MG/1
10 TABLET ORAL NIGHTLY PRN
Status: ON HOLD | COMMUNITY

## 2023-08-27 RX ORDER — METHOCARBAMOL 500 MG/1
500 TABLET, FILM COATED ORAL ONCE
Status: COMPLETED | OUTPATIENT
Start: 2023-08-27 | End: 2023-08-27

## 2023-08-27 RX ORDER — CHOLECALCIFEROL (VITAMIN D3) 125 MCG
5 CAPSULE ORAL NIGHTLY PRN
Status: DISCONTINUED | OUTPATIENT
Start: 2023-08-27 | End: 2023-08-29 | Stop reason: HOSPADM

## 2023-08-27 RX ORDER — FAMOTIDINE 20 MG/1
40 TABLET, FILM COATED ORAL NIGHTLY
Status: DISCONTINUED | OUTPATIENT
Start: 2023-08-27 | End: 2023-08-29 | Stop reason: HOSPADM

## 2023-08-27 RX ORDER — ONDANSETRON 2 MG/ML
4 INJECTION INTRAMUSCULAR; INTRAVENOUS EVERY 6 HOURS PRN
Status: DISCONTINUED | OUTPATIENT
Start: 2023-08-27 | End: 2023-08-29 | Stop reason: HOSPADM

## 2023-08-27 RX ORDER — SODIUM CHLORIDE 0.9 % (FLUSH) 0.9 %
10 SYRINGE (ML) INJECTION EVERY 12 HOURS SCHEDULED
Status: DISCONTINUED | OUTPATIENT
Start: 2023-08-27 | End: 2023-08-29 | Stop reason: HOSPADM

## 2023-08-27 RX ORDER — ZOLPIDEM TARTRATE 5 MG/1
5 TABLET ORAL NIGHTLY PRN
Status: DISCONTINUED | OUTPATIENT
Start: 2023-08-27 | End: 2023-08-29 | Stop reason: HOSPADM

## 2023-08-27 RX ORDER — ONDANSETRON 4 MG/1
4 TABLET, FILM COATED ORAL EVERY 6 HOURS PRN
Status: DISCONTINUED | OUTPATIENT
Start: 2023-08-27 | End: 2023-08-29 | Stop reason: HOSPADM

## 2023-08-27 RX ORDER — AMOXICILLIN 250 MG
2 CAPSULE ORAL 2 TIMES DAILY
Status: DISCONTINUED | OUTPATIENT
Start: 2023-08-27 | End: 2023-08-29 | Stop reason: HOSPADM

## 2023-08-27 RX ADMIN — METOPROLOL TARTRATE 25 MG: 25 TABLET, FILM COATED ORAL at 18:00

## 2023-08-27 RX ADMIN — METHOCARBAMOL 500 MG: 500 TABLET ORAL at 10:05

## 2023-08-27 RX ADMIN — ATORVASTATIN CALCIUM 40 MG: 40 TABLET, FILM COATED ORAL at 21:19

## 2023-08-27 RX ADMIN — HYDROCODONE BITARTRATE AND ACETAMINOPHEN 1 TABLET: 7.5; 325 TABLET ORAL at 22:37

## 2023-08-27 RX ADMIN — FAMOTIDINE 40 MG: 20 TABLET, FILM COATED ORAL at 21:19

## 2023-08-27 RX ADMIN — ZOLPIDEM TARTRATE 5 MG: 5 TABLET ORAL at 22:37

## 2023-08-27 RX ADMIN — HYDROMORPHONE HYDROCHLORIDE 1 MG: 1 INJECTION, SOLUTION INTRAMUSCULAR; INTRAVENOUS; SUBCUTANEOUS at 10:05

## 2023-08-27 RX ADMIN — HYDROCODONE BITARTRATE AND ACETAMINOPHEN 1 TABLET: 7.5; 325 TABLET ORAL at 16:07

## 2023-08-27 RX ADMIN — DULOXETINE HYDROCHLORIDE 60 MG: 30 CAPSULE, DELAYED RELEASE ORAL at 16:07

## 2023-08-27 RX ADMIN — Medication 10 ML: at 21:23

## 2023-08-27 NOTE — PLAN OF CARE
Goal Outcome Evaluation:  Plan of Care Reviewed With: patient        Progress: no change  Outcome Evaluation: Pt admitted from ER to Obs due to previous fall and back pain; neuro consulted; PT/OT consulted; PRN pain med given; pt spouse at bedside; pt a&o resting in bed; vss

## 2023-08-27 NOTE — H&P
"[facility] H&P    Patient Name: Clint Cee  : 1945  MRN: [id number]  Primary Care Physician: Jaret Castellanos MD  Date of admission: 2023     Patient Care Team:  Jaret Castellanos MD as PCP - General (Family Medicine)  Felipe Garcia MD as Consulting Physician (Cardiology)  Golden Serna, SHANEL as Ambulatory  (Population Health)          Subjective   History Present Illness     Chief Complaint:   Chief Complaint   Patient presents with    Back Pain     Back pain/decreased mobility    Back Pain  Associated symptoms include numbness.   Patient is a 78 year old  male with history of asthma, hypertension, mitral valve prolapse presents ER with complaints of back pain for 2 weeks. Patient had mechanical fall 2 weeks ago, was seen in ER at that time, had head CT and CT lumbar spine that was unremarkable. Patient was discharged with prescription for Norco. He did follow-up with his PCP 3 days ago but states that he was having a \"good day\" and was not having much pain. Patient states over the last 24 hours his pain has become worse. He describes as aching soreness pain across his lower back that occasionally radiates into his legs that he rates a 4/10. Patient states that he has been ambulating at home independently but states this morning was hard to get out of bed which prompted his ER visit. He denies any saddle anesthesia or bladder or bowel dysfunction. Patient is moving his legs constantly in the stretcher while I examine him. He denies abdominal pain nausea vomiting diarrhea or dysuria. No chest pain or shortness of breath. No fever or chills.     Observation 23  Pt concurs with er hpi. Pt states he fell 2 weeks ago and had dandre able to ambulate although with difficulty. 2 days ago the pain was worsen and now ambulation is impossible. PT has good strength and feeling in mikhail extremities. Sensation is bilateral. No saddle paraesthesias. CT was performed 2 weeks " ago and showed no fracture.  Will give pain meds and consult neurosurgery.     Review of Systems   Musculoskeletal:  Positive for back pain, muscle cramps and stiffness.   Neurological:  Positive for numbness.           Personal History     Past Medical History:   Past Medical History:   Diagnosis Date    Abnormal ECG June 2022    Allergic 01/01/1954    Nasal alergies    Arrhythmia 2004    Dr Youngblood examined me and said i was ok.  skipped beats    Asthma     no sx    Benign prostatic hyperplasia 11/14/2018    Cataract 01/01/2014    Surgery. Caused detached retina of right eye 20/60 repair    Colon polyp 01/01/2018    Found and removed last colon eca.q    Depression     Gastroesophageal reflux disease 03/20/2014    Glaucoma 2003    Normal pressures. Have low pressure glaucoma    Gout     Headache 01/01/2015    Have shimmering in both eyes intermittantly. 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    Prostate Cancer Jan22 January 2022    Spinal stenosis 12/02/2011    Visual impairment 01/01/1955    Nearsighted corrected glasses    Vitamin D deficiency 05/23/2018       Surgical History:      Past Surgical History:   Procedure Laterality Date    ADENOIDECTOMY  1954    As child    APPENDECTOMY  1956    Surgery    CARDIAC CATHETERIZATION N/A 09/30/2022    Procedure: Right and Left Heart Cath with Coronar Angiography;  Surgeon: Felipe Garcia MD;  Location: [facility] CATH INVASIVE LOCATION;  Service: Cardiovascular;  Laterality: N/A;    CARDIAC VALVE REPLACEMENT N/A 11/17/2022    Procedure: TRICUSPID VALVE REPAIR/REPLACEMENT;  Surgeon: Femi Henderson MD;  Location: [facility]  CVOR;  Service: Cardiothoracic;  Laterality: N/A;  tricuspid valve repaired with 32mm  groves physio tricuspid annuloplasty ring    CARDIAC VALVE REPLACEMENT  11/17/2022    Repaired mitral and tricuspid    CATARACT EXTRACTION Bilateral     COLONOSCOPY  01/01/1997    Regularly scheduled    COLONOSCOPY N/A 2/22/2023    Procedure: COLONOSCOPY with cold snare polypectomy x 1;  Surgeon: Manfred Keating MD;  Location: [facility] ENDOSCOPY;  Service: Gastroenterology;  Laterality: N/A;    ENDOSCOPY N/A 2/22/2023    Procedure: ESOPHAGOGASTRODUODENOSCOPY with esophageal dilation using 48 Fr. Bougie Dilator;  Surgeon: Manfred Keating MD;  Location: [facility] ENDOSCOPY;  Service: Gastroenterology;  Laterality: N/A;  erosive esophagitis    EYE SURGERY  01/01/2010    Reattached right retina    MITRAL VALVE REPAIR/REPLACEMENT N/A 11/17/2022    Procedure: MITRAL VALVE REPAIR/REPLACEMENT with left atrial appendage closure;  Surgeon: Femi Henderson MD;  Location: [facility] CVOR;  Service: Cardiothoracic;  Laterality: N/A;  mitral valve repaired iwth 40mm medtronic annuloplasty band  with intraop TERRA    SPINE SURGERY  01/01/1971    2 lumbar 1 cervical    TONSILLECTOMY  01/01/1950    Childhood           Family History: family history includes Arrhythmia in his father; Arthritis in his father; Cancer in his father; Diabetes in his mother; Early death in his mother; Heart disease in his mother. Otherwise pertinent FHx was reviewed and unremarkable.     Social History:  reports that he has quit smoking. His smoking use included cigarettes. He has been exposed to tobacco smoke. He has never used smokeless tobacco. He reports that he does not currently use alcohol. He reports that he does not use drugs.      Medications:  Prior to Admission medications    Medication Sig Start Date End Date Taking? Authorizing Provider   acetaminophen (TYLENOL) 500 MG tablet Take 1 tablet by mouth Every 6 (Six) Hours As Needed for Mild Pain.  Indications: Pain   Yes Irlanda Kaminski MD   ACETYLCARN-ALPHA LIPOIC ACID PO Take 1 capsule by mouth Daily. Indications: Dietary supplement 1/1/21  Yes Jaret Castellanos MD   allopurinol (ZYLOPRIM) 100 MG tablet TAKE 1 TABLET BY MOUTH DAILY 8/23/23  Yes Jaret Castellanos MD   Apple Erik Vn-Grn Tea-Bit Or-Cr (APPLE CIDER VINEGAR PLUS PO) Take 1 Piece by mouth Daily. Indications: Dietary supplement 1/1/21  Yes Jaret Castellanos MD   atorvastatin (LIPITOR) 40 MG tablet Take 1 tablet by mouth Every Night. Indications: High Amount of Fats in the Blood 7/3/23  Yes Felipe Garcia MD   DULoxetine (CYMBALTA) 60 MG capsule Take 1 capsule by mouth Daily. 6/20/23  Yes Jaret Castellanos MD   famotidine (PEPCID) 40 MG tablet Take 1 tablet by mouth Every Night. 12/16/22  Yes Irlanda Kaminski MD   fexofenadine (ALLEGRA) 180 MG tablet Take 1 tablet by mouth 2 (Two) Times a Day As Needed. Indications: Hayfever   Yes Irlanda Kaminski MD   fluticasone (FLONASE) 50 MCG/ACT nasal spray 2 sprays into the nostril(s) as directed by provider Daily As Needed. Indications: Allergic Rhinitis   Yes Irlanda Kaminski MD   Leuprolide Mesylate, 6 Month, (CAMCEVI SC) Inject  under the skin into the appropriate area as directed. Every 6 months injection, due in December  Indications: prostate cancer (to decrease testosterone). 1/21/21  Yes Irlanda Kaminski MD   melatonin 5 MG tablet tablet Take 1 tablet by mouth At Night As Needed (sleep). Indications: Trouble Sleeping   Yes Irlanda Kaminski MD   metoprolol tartrate (LOPRESSOR) 25 MG tablet Take 1 tablet by mouth Daily. 5/18/23  Yes Irlanda Kaminski MD   zolpidem (Ambien) 10 MG tablet Take 1 tablet by mouth At Night As Needed for Sleep.   Yes Irlanda Kaminski MD   aspirin 81 MG EC tablet Take 1 tablet by mouth Daily. 11/22/22 8/27/23  Nata Verma APRN   B Complex Vitamins (VITAMIN B COMPLEX PO) Take 1 tablet by mouth Every Other Day.  Indications: Dietary supplement 1/1/21 8/27/23  Jaret Castellanos MD   Cholecalciferol (Vitamin D3) 50 MCG (2000 UT) capsule Take 1 capsule by mouth Daily. Indications: Dietary supplement 1/1/21 8/27/23  Jaret Castellanos MD   guaiFENesin (MUCINEX) 600 MG 12 hr tablet Take 2 tablets by mouth 2 (Two) Times a Day As Needed for Congestion or Cough. 11/27/22 8/27/23  ProviderIrlanda MD   HYDROcodone-acetaminophen (NORCO) 7.5-325 MG per tablet Take 1 tablet by mouth Every 6 (Six) Hours As Needed for Moderate Pain. 8/15/23 8/27/23  Jayme Grady MD   losartan (Cozaar) 50 MG tablet Take 1 tablet by mouth Daily for 90 days. 1/23/23 8/27/23  Jaret Castellanos MD   MELATONIN-JENNIFER-TAMMI-PASSF-LBALM PO Take 5 mg by mouth Daily. Indications: Dietary supplement 1/1/21 8/27/23  Jaret Castellanos MD   metoprolol succinate XL (TOPROL-XL) 25 MG 24 hr tablet Take 1 tablet by mouth Every Night.  8/27/23  ProviderIrlanda MD   Misc Natural Products (Elderberry Immune Complex) chewable tablet Chew 1 Piece Daily. Indications: Dietary supplement 1/1/21 8/27/23  Jaret Castellanos MD   zolpidem (AMBIEN) 10 MG tablet TAKE 1 TABLET BY MOUTH EVERY NIGHT AT BEDTIME 8/23/23 8/27/23  Jaret Castellanos MD       Allergies:    Allergies   Allergen Reactions    Morphine Hallucinations    Beta Adrenergic Blockers Cough    Ace Inhibitors Cough     cough       Objective   Objective     Vital Signs  Temp:  [97.7 °F (36.5 °C)-98.2 °F (36.8 °C)] 98.2 °F (36.8 °C)  Heart Rate:  [62-70] 62  Resp:  [11-22] 11  BP: (139-175)/(81-97) 155/81  SpO2:  [93 %-100 %] 95 %  on   ;   Device (Oxygen Therapy): room air  Body mass index is 30.08 kg/m².    Physical Exam  Constitutional:       Appearance: Normal appearance.   HENT:      Mouth/Throat:      Mouth: Mucous membranes are moist.   Cardiovascular:      Rate and Rhythm: Normal rate and regular rhythm.      Pulses: Normal pulses.      Heart sounds: Normal heart sounds.   Pulmonary:      Breath  sounds: Normal breath sounds.   Musculoskeletal:         General: Normal range of motion.      Comments: Good strength in mikhail extremities, good cap refill, sensation is equal mikhail  No swelling, coldness or color change in extremities  No bowel or bladder incontinence     Neurological:      Mental Status: He is alert.         Results Review:  I have personally reviewed most recent lab results and agree with findings, most notably: ua.              Invalid input(s):  ALKPHOS  CrCl cannot be calculated (Patient's most recent lab result is older than the maximum 30 days allowed.).  Brief Urine Lab Results  (Last result in the past 365 days)        Color   Clarity   Blood   Leuk Est   Nitrite   Protein   CREAT   Urine HCG        08/27/23 1145 Yellow   Clear   Negative   Negative   Negative   Negative                   Microbiology Results (last 10 days)       ** No results found for the last 240 hours. **            ECG/EMG Results (most recent)       None            Results for orders placed during the hospital encounter of 11/16/22    Bilateral Carotid Duplex    Interpretation Summary    Proximal right internal carotid artery is normal.    Mid right internal carotid artery is normal.    Proximal left internal carotid artery mild stenosis.      Results for orders placed during the hospital encounter of 09/30/22    Adult Transesophageal Echo (TERRA) W/ Cont if Necessary Per Protocol    Interpretation Summary  Date of study  9/30/2022    Procedure performed  Transesophageal echocardiogram and Doppler study.    Indications  Significant mitral regurgitation  Shortness of breath    Procedure  Anesthesia was provided by anesthesiologist with intravenous Diprivan.  TERRA probe could be passed without difficulty.  Patient tolerated the procedure well.  No complications were noted.    Results  Technically satisfactory study.  Mitral valve has severe myxomatous degeneration with multiple areas of severe prolapse and known coaptation  of the mitral leaflet as well as probable ruptured chordae.  Severe mitral regurgitation is seen with multiple jets with mitral regurgitation jet occupying the entire left atrium.  Tricuspid valve is also showing myxomatous degeneration with moderate tricuspid regurgitation.  Calculated pulmonary artery pressure is 60 mmHg.  Aortic valve is thickened with adequate opening motion.  Left atrium is enlarged.  Left atrial appendage is enlarged without clot.  Left ventricle is normal in size and contractility with ejection fraction of 60%.  Right ventricle is normal in size.  Right atrium is normal in size.  Atrial septum is intact without PFO.  Aortic intimal thickening is present without clot.  No pericardial effusion is seen.    Impression  Myxomatous mitral and tricuspid valve degeneration.  Severe mitral valve prolapse involving both anterior and posterior mitral leaflets with severe mitral regurgitation with multiple jets.  Probable ruptured chordae.  In some views coaptation of the mitral valve is present.  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  Significant pulmonary hypertension.  Left atrial and left atrial appendage enlargement without clot.  Normal left ventricular size and contractility with ejection fraction of 60%.      No radiology results for the last 7 days      CrCl cannot be calculated (Patient's most recent lab result is older than the maximum 30 days allowed.).    Assessment & Plan   Assessment/Plan       Active Hospital Problems    Diagnosis  POA    **Back pain [M54.9]  Yes      Resolved Hospital Problems   No resolved problems to display.     Lumbar pain  - Ua negative for uti  - pt fell 2 weeks ago and had imaging  - CT lumbar spine showing no acute osseus abnormality. Varying degrees of neuroforaminal stenosis present. Prior laminectomy L4-L5, multilevel disc space narrowing  - neurosurgery consulted  - Pain management     Hypertension  -moderately Controlled   BP  Readings from Last 1 Encounters:   08/27/23 155/81     - Continue metoprolol  - Monitor while admitted     HPL  - cont home statin therapy    VTE Prophylaxis -   Mechanical Order History:        Ordered        08/27/23 1456  Place Sequential Compression Device  Once            08/27/23 1456  Maintain Sequential Compression Device  Continuous                          Pharmalogical Order History:       None            CODE STATUS:    Code Status and Medical Interventions:   Ordered at: 08/27/23 1456     Code Status (Patient has no pulse and is not breathing):    CPR (Attempt to Resuscitate)     Medical Interventions (Patient has pulse or is breathing):    Full Support       This patient has been examined wearing personal protective equipment.     I discussed the patient's findings and my recommendations with patient and nursing staff.      Signature:Electronically signed by Dominique Martinez PA-C, 08/27/23, 3:01 PM EDT.

## 2023-08-28 ENCOUNTER — APPOINTMENT (OUTPATIENT)
Dept: MRI IMAGING | Facility: HOSPITAL | Age: 78
End: 2023-08-28
Payer: MEDICARE

## 2023-08-28 LAB
ANION GAP SERPL CALCULATED.3IONS-SCNC: 10 MMOL/L (ref 5–15)
BASOPHILS # BLD AUTO: 0 10*3/MM3 (ref 0–0.2)
BASOPHILS NFR BLD AUTO: 0.2 % (ref 0–1.5)
BUN SERPL-MCNC: 17 MG/DL (ref 8–23)
BUN/CREAT SERPL: 18.3 (ref 7–25)
CALCIUM SPEC-SCNC: 9.3 MG/DL (ref 8.6–10.5)
CHLORIDE SERPL-SCNC: 102 MMOL/L (ref 98–107)
CO2 SERPL-SCNC: 25 MMOL/L (ref 22–29)
CREAT SERPL-MCNC: 0.93 MG/DL (ref 0.76–1.27)
DEPRECATED RDW RBC AUTO: 44.2 FL (ref 37–54)
EGFRCR SERPLBLD CKD-EPI 2021: 85.1 ML/MIN/1.73
EOSINOPHIL # BLD AUTO: 0.2 10*3/MM3 (ref 0–0.4)
EOSINOPHIL NFR BLD AUTO: 2.8 % (ref 0.3–6.2)
ERYTHROCYTE [DISTWIDTH] IN BLOOD BY AUTOMATED COUNT: 13.6 % (ref 12.3–15.4)
GLUCOSE SERPL-MCNC: 107 MG/DL (ref 65–99)
HCT VFR BLD AUTO: 37.6 % (ref 37.5–51)
HGB BLD-MCNC: 13 G/DL (ref 13–17.7)
LYMPHOCYTES # BLD AUTO: 0.6 10*3/MM3 (ref 0.7–3.1)
LYMPHOCYTES NFR BLD AUTO: 10.4 % (ref 19.6–45.3)
MCH RBC QN AUTO: 30.4 PG (ref 26.6–33)
MCHC RBC AUTO-ENTMCNC: 34.4 G/DL (ref 31.5–35.7)
MCV RBC AUTO: 88.2 FL (ref 79–97)
MONOCYTES # BLD AUTO: 0.3 10*3/MM3 (ref 0.1–0.9)
MONOCYTES NFR BLD AUTO: 5.7 % (ref 5–12)
NEUTROPHILS NFR BLD AUTO: 4.5 10*3/MM3 (ref 1.7–7)
NEUTROPHILS NFR BLD AUTO: 80.9 % (ref 42.7–76)
NRBC BLD AUTO-RTO: 0.3 /100 WBC (ref 0–0.2)
PLATELET # BLD AUTO: 94 10*3/MM3 (ref 140–450)
PMV BLD AUTO: 6.4 FL (ref 6–12)
POTASSIUM SERPL-SCNC: 4.4 MMOL/L (ref 3.5–5.2)
RBC # BLD AUTO: 4.27 10*6/MM3 (ref 4.14–5.8)
SODIUM SERPL-SCNC: 137 MMOL/L (ref 136–145)
WBC NRBC COR # BLD: 5.6 10*3/MM3 (ref 3.4–10.8)

## 2023-08-28 PROCEDURE — 97161 PT EVAL LOW COMPLEX 20 MIN: CPT

## 2023-08-28 PROCEDURE — 96376 TX/PRO/DX INJ SAME DRUG ADON: CPT

## 2023-08-28 PROCEDURE — 85025 COMPLETE CBC W/AUTO DIFF WBC: CPT | Performed by: PHYSICIAN ASSISTANT

## 2023-08-28 PROCEDURE — 97166 OT EVAL MOD COMPLEX 45 MIN: CPT

## 2023-08-28 PROCEDURE — 25010000002 HYDROMORPHONE 1 MG/ML SOLUTION: Performed by: NURSE PRACTITIONER

## 2023-08-28 PROCEDURE — 72148 MRI LUMBAR SPINE W/O DYE: CPT

## 2023-08-28 PROCEDURE — G0378 HOSPITAL OBSERVATION PER HR: HCPCS

## 2023-08-28 PROCEDURE — 99213 OFFICE O/P EST LOW 20 MIN: CPT | Performed by: NURSE PRACTITIONER

## 2023-08-28 PROCEDURE — 80048 BASIC METABOLIC PNL TOTAL CA: CPT | Performed by: PHYSICIAN ASSISTANT

## 2023-08-28 PROCEDURE — 63710000001 DEXAMETHASONE PER 0.25 MG: Performed by: NURSE PRACTITIONER

## 2023-08-28 RX ORDER — DEXAMETHASONE 4 MG/1
4 TABLET ORAL EVERY 8 HOURS SCHEDULED
Status: DISCONTINUED | OUTPATIENT
Start: 2023-08-28 | End: 2023-08-29 | Stop reason: HOSPADM

## 2023-08-28 RX ADMIN — Medication 10 ML: at 09:06

## 2023-08-28 RX ADMIN — HYDROMORPHONE HYDROCHLORIDE 1 MG: 1 INJECTION, SOLUTION INTRAMUSCULAR; INTRAVENOUS; SUBCUTANEOUS at 09:35

## 2023-08-28 RX ADMIN — HYDROMORPHONE HYDROCHLORIDE 1 MG: 1 INJECTION, SOLUTION INTRAMUSCULAR; INTRAVENOUS; SUBCUTANEOUS at 14:06

## 2023-08-28 RX ADMIN — SENNOSIDES AND DOCUSATE SODIUM 2 TABLET: 50; 8.6 TABLET ORAL at 20:26

## 2023-08-28 RX ADMIN — Medication 10 ML: at 20:27

## 2023-08-28 RX ADMIN — ATORVASTATIN CALCIUM 40 MG: 40 TABLET, FILM COATED ORAL at 20:26

## 2023-08-28 RX ADMIN — HYDROMORPHONE HYDROCHLORIDE 1 MG: 1 INJECTION, SOLUTION INTRAMUSCULAR; INTRAVENOUS; SUBCUTANEOUS at 20:26

## 2023-08-28 RX ADMIN — FAMOTIDINE 40 MG: 20 TABLET, FILM COATED ORAL at 20:26

## 2023-08-28 RX ADMIN — Medication 10 ML: at 09:36

## 2023-08-28 RX ADMIN — DEXAMETHASONE 4 MG: 4 TABLET ORAL at 14:06

## 2023-08-28 RX ADMIN — HYDROCODONE BITARTRATE AND ACETAMINOPHEN 1 TABLET: 7.5; 325 TABLET ORAL at 04:36

## 2023-08-28 RX ADMIN — SENNOSIDES AND DOCUSATE SODIUM 2 TABLET: 50; 8.6 TABLET ORAL at 09:06

## 2023-08-28 RX ADMIN — DEXAMETHASONE 4 MG: 4 TABLET ORAL at 22:32

## 2023-08-28 RX ADMIN — METOPROLOL TARTRATE 25 MG: 25 TABLET, FILM COATED ORAL at 09:06

## 2023-08-28 RX ADMIN — DULOXETINE HYDROCHLORIDE 60 MG: 30 CAPSULE, DELAYED RELEASE ORAL at 09:06

## 2023-08-28 NOTE — PLAN OF CARE
Goal Outcome Evaluation:  Plan of Care Reviewed With: patient, spouse           Outcome Evaluation: Patient is a 78 year old male with history of asthma, hypertension, mitral valve prolapse presents to ER with complaints of back pain for 2 weeks. Patient had mechanical fall 2 weeks ago, was seen in ER at that time, had head CT and CT lumbar spine that was unremarkable. At baseline, patient lives with his spouse in a 2 story home. He was independent with ADLs and functional mobility. He has 1 flight of stairs to his bedroom but can stay on the main level if needed. This date, patient very limited by pain despite having just taken pain medication. Via logroll technique performed with cues, he was able to come to sitting EOB with Mod Ax2, but was unable to tolerate secondary to pain. Min Ax2 to come to standing in attempt to alleviate pain however unsuccessful and pt returned to supine. At this time secondary to pain he requires heavy assistance with lower body ADLs. OT does feel as syptoms improve, he will be safe to d/c home with assistance and would benefit from outpatient therapy.      Anticipated Discharge Disposition (OT): home with assist, home with outpatient therapy services

## 2023-08-28 NOTE — PLAN OF CARE
Problem: Adult Inpatient Plan of Care  Goal: Plan of Care Review  Outcome: Ongoing, Not Progressing  Flowsheets (Taken 8/28/2023 0735)  Progress: no change  Plan of Care Reviewed With: patient  Outcome Evaluation: pt to have PT/OT consult and neurosurgery consult, VSS, plan of care continued  Goal: Patient-Specific Goal (Individualized)  Outcome: Ongoing, Not Progressing  Goal: Absence of Hospital-Acquired Illness or Injury  Outcome: Ongoing, Not Progressing  Intervention: Identify and Manage Fall Risk  Recent Flowsheet Documentation  Taken 8/28/2023 0600 by Delma Enriquez RN  Safety Promotion/Fall Prevention: safety round/check completed  Taken 8/28/2023 0436 by Delma Enriquez RN  Safety Promotion/Fall Prevention: safety round/check completed  Taken 8/28/2023 0200 by Delma Enriquez RN  Safety Promotion/Fall Prevention: safety round/check completed  Taken 8/28/2023 0000 by Delma Enriquez RN  Safety Promotion/Fall Prevention: safety round/check completed  Taken 8/27/2023 2330 by Delma Enriquez RN  Safety Promotion/Fall Prevention: safety round/check completed  Taken 8/27/2023 2200 by Delma Enriquez RN  Safety Promotion/Fall Prevention: safety round/check completed  Taken 8/27/2023 1945 by Delma Enriquez, RN  Safety Promotion/Fall Prevention:   safety round/check completed   activity supervised   assistive device/personal items within reach   clutter free environment maintained   elopement precautions   fall prevention program maintained   lighting adjusted   nonskid shoes/slippers when out of bed   room organization consistent  Intervention: Prevent Skin Injury  Recent Flowsheet Documentation  Taken 8/27/2023 1945 by Delma Enriquez, RN  Body Position:   supine   sitting up in bed   position changed independently  Goal: Optimal Comfort and Wellbeing  Outcome: Ongoing, Not Progressing  Intervention: Monitor Pain and Promote Comfort  Recent Flowsheet Documentation  Taken 8/28/2023 0436 by Delma Enriquez  VAMSI, RN  Pain Management Interventions: see MAR  Taken 8/27/2023 2237 by Delma Enriquez, RN  Pain Management Interventions: see MAR  Intervention: Provide Person-Centered Care  Recent Flowsheet Documentation  Taken 8/27/2023 1945 by Delma Enriquez, RN  Trust Relationship/Rapport:   choices provided   care explained   emotional support provided   empathic listening provided   questions answered   questions encouraged   reassurance provided   thoughts/feelings acknowledged  Goal: Readiness for Transition of Care  Outcome: Ongoing, Not Progressing     Problem: Fall Injury Risk  Goal: Absence of Fall and Fall-Related Injury  Outcome: Ongoing, Not Progressing  Intervention: Identify and Manage Contributors  Recent Flowsheet Documentation  Taken 8/27/2023 1945 by Delma Enriquez, RN  Medication Review/Management: medications reviewed  Intervention: Promote Injury-Free Environment  Recent Flowsheet Documentation  Taken 8/28/2023 0600 by Delma Enriquez, RN  Safety Promotion/Fall Prevention: safety round/check completed  Taken 8/28/2023 0436 by Delma Enriquez, RN  Safety Promotion/Fall Prevention: safety round/check completed  Taken 8/28/2023 0200 by Delma Enriquez, RN  Safety Promotion/Fall Prevention: safety round/check completed  Taken 8/28/2023 0000 by Delma Enriquez, RN  Safety Promotion/Fall Prevention: safety round/check completed  Taken 8/27/2023 2330 by Delma Enriquez, RN  Safety Promotion/Fall Prevention: safety round/check completed  Taken 8/27/2023 2200 by Delma Enriquez, RN  Safety Promotion/Fall Prevention: safety round/check completed  Taken 8/27/2023 1945 by Delma Enriquez, RN  Safety Promotion/Fall Prevention:   safety round/check completed   activity supervised   assistive device/personal items within reach   clutter free environment maintained   elopement precautions   fall prevention program maintained   lighting adjusted   nonskid shoes/slippers when out of bed   room organization consistent      Problem: Pain Acute  Goal: Acceptable Pain Control and Functional Ability  Outcome: Ongoing, Not Progressing  Intervention: Prevent or Manage Pain  Recent Flowsheet Documentation  Taken 8/27/2023 1945 by Delma Enriquez, RN  Sensory Stimulation Regulation: care clustered  Sleep/Rest Enhancement: awakenings minimized  Medication Review/Management: medications reviewed  Intervention: Develop Pain Management Plan  Recent Flowsheet Documentation  Taken 8/28/2023 0436 by Delma Enriquez, RN  Pain Management Interventions: see MAR  Taken 8/27/2023 2237 by Delma Enriquez, RN  Pain Management Interventions: see MAR  Intervention: Optimize Psychosocial Wellbeing  Recent Flowsheet Documentation  Taken 8/27/2023 1945 by Delma Enriquez, RN  Supportive Measures: active listening utilized  Diversional Activities:   smartphone   television     Problem: Skin Injury Risk Increased  Goal: Skin Health and Integrity  Outcome: Ongoing, Not Progressing  Intervention: Optimize Skin Protection  Recent Flowsheet Documentation  Taken 8/27/2023 1945 by Delma Enriquez, RN  Head of Bed (HOB) Positioning: HOB at 30-45 degrees   Goal Outcome Evaluation:  Plan of Care Reviewed With: patient        Progress: no change  Outcome Evaluation: pt to have PT/OT consult and neurosurgery consult, VSS, plan of care continued

## 2023-08-28 NOTE — PLAN OF CARE
Goal Outcome Evaluation:  Plan of Care Reviewed With: patient, spouse           Outcome Evaluation: Patient arrived to the hospital due to fall 2 weeks ago due to parking curb and visual deficits. Patient sustained low back injury due to fall. Patient received CT scan of lumbar region which showed degenerative changes but no osseous fractures or red flags noted. Patient's PLOF was independent in all ADL's with mild visual impairments. Patient's functional mobility was assessed with bed mobility going from supine to sit with moderate assistance x2. Patient transferred from EOB sitting to stand with moderate assistance x2 but was unable to stay standing due to increasing reports of pain. Patient is unable to return to home due to pain reports which limit overall functional mobility and increasing fall risk due to pain. Patient will be assessed using LSO brace to perform functional mobility.      Anticipated Discharge Disposition (PT): home with home health, home with outpatient therapy services

## 2023-08-28 NOTE — THERAPY EVALUATION
Patient Name: Clint Cee  : 1945    MRN: [id number]                              Today's Date: 2023       Admit Date: 2023    Visit Dx:     ICD-10-CM ICD-9-CM   1. Acute bilateral low back pain without sciatica  M54.50 724.2     338.19   2. Back muscle spasm  M62.830 724.8   3. Decreased ability to perform activities  R68.89 780.99     Patient Active Problem List   Diagnosis    Benign prostatic hyperplasia    Gastroesophageal reflux disease    Mood disorder    Scoliosis    Spinal stenosis    Vitamin D deficiency    Polyosteoarthritis    Insomnia    Retinal detachment of right eye with single retinal tear    Osteoarthritis of knee    Cellulitis of face    Prostate cancer    Ocular migraine    Headache    Gout of multiple sites    Medicare annual wellness visit, subsequent    Allergic    HL (hearing loss)    Visual impairment    Cataract    Colon polyp    Primary hypertension    Dyspnea on exertion    S/P mitral valve repair per Dr. Henderson 2022    S/P tricuspid valve repair per Dr. Henderson 2022    Back pain     Past Medical History:   Diagnosis Date    Abnormal ECG 2022    Allergic 1954    Nasal alergies    Arrhythmia 2004    Dr Youngblood examined me and said i was ok.  skipped beats    Asthma     no sx    Benign prostatic hyperplasia 2018    Cataract 2014    Surgery. Caused detached retina of right eye 2060 repair    Colon polyp 2018    Found and removed last colon eca.q    Depression     Gastroesophageal reflux disease 2014    Glaucoma 2003    Normal pressures. Have low pressure glaucoma    Gout     Headache 2015    Have shimmering in both eyes intermittantly. No headaches    Heart murmur May 2022    During wellness exam    Heart valve disease 2022    During Echo    HL (hearing loss) 2012    Have hearing aids    Hyperlipidemia     Hypertension 2011    Infectious viral hepatitis     Yellow jaundis as child    Insomnia  12/02/2011    Mitral valve prolapse June 2022    Mood disorder 09/19/2013    Polyosteoarthritis 12/02/2011    Prostate Cancer Jan22 January 2022    Spinal stenosis 12/02/2011    Visual impairment 01/01/1955    Nearsighted corrected glasses    Vitamin D deficiency 05/23/2018     Past Surgical History:   Procedure Laterality Date    ADENOIDECTOMY  1954    As child    APPENDECTOMY  1956    Surgery    CARDIAC CATHETERIZATION N/A 09/30/2022    Procedure: Right and Left Heart Cath with Coronar Angiography;  Surgeon: Felipe Garcia MD;  Location: [facility] CATH INVASIVE LOCATION;  Service: Cardiovascular;  Laterality: N/A;    CARDIAC VALVE REPLACEMENT N/A 11/17/2022    Procedure: TRICUSPID VALVE REPAIR/REPLACEMENT;  Surgeon: Femi Henderson MD;  Location: [facility];  Service: Cardiothoracic;  Laterality: N/A;  tricuspid valve repaired with 32mm  groves physio tricuspid annuloplasty ring    CARDIAC VALVE REPLACEMENT  11/17/2022    Repaired mitral and tricuspid    CATARACT EXTRACTION Bilateral     COLONOSCOPY  01/01/1997    Regularly scheduled    COLONOSCOPY N/A 2/22/2023    Procedure: COLONOSCOPY with cold snare polypectomy x 1;  Surgeon: Manfred Keating MD;  Location: [facility] ENDOSCOPY;  Service: Gastroenterology;  Laterality: N/A;    ENDOSCOPY N/A 2/22/2023    Procedure: ESOPHAGOGASTRODUODENOSCOPY with esophageal dilation using 48 Fr. Bougie Dilator;  Surgeon: Manfred Keating MD;  Location: [facility] ENDOSCOPY;  Service: Gastroenterology;  Laterality: N/A;  erosive esophagitis    EYE SURGERY  01/01/2010    Reattached right retina    MITRAL VALVE REPAIR/REPLACEMENT N/A 11/17/2022    Procedure: MITRAL VALVE REPAIR/REPLACEMENT with left atrial appendage closure;  Surgeon: Femi Henderson MD;  Location: [facility];  Service: Cardiothoracic;  Laterality: N/A;  mitral valve repaired iwth 40mm medtronic annuloplasty band  with intraop TERRA    SPINE SURGERY  01/01/1971    2 lumbar 1 cervical    TONSILLECTOMY   01/01/1950    Childhood      General Information       Row Name 08/28/23 1450          OT Time and Intention    Document Type evaluation  -LS     Mode of Treatment occupational therapy  -       Row Name 08/28/23 1450          General Information    Patient Profile Reviewed yes  -LS     Prior Level of Function independent:;ADL's;all household mobility;community mobility;driving  -LS     Existing Precautions/Restrictions no known precautions/restrictions  -     Barriers to Rehab visual deficit  -LS       Row Name 08/28/23 1450          Living Environment    People in Home spouse  -LS       Row Name 08/28/23 1450          Home Main Entrance    Number of Stairs, Main Entrance none  -LS     Stair Railings, Main Entrance none  -LS       Row Name 08/28/23 1450          Stairs Within Home, Primary    Stairs, Within Home, Primary Pt restroom on second level but is able to stay on main level if need be  -LS     Stair Railings, Within Home, Primary railings safe and in good condition  -LS       Row Name 08/28/23 1450          Cognition    Orientation Status (Cognition) oriented x 4  -       Row Name 08/28/23 1450          Safety Issues, Functional Mobility    Impairments Affecting Function (Mobility) pain;visual/perceptual;endurance/activity tolerance;range of motion (ROM)  -               User Key  (r) = Recorded By, (t) = Taken By, (c) = Cosigned By      Initials Name Provider Type    LS Te Elias OT Occupational Therapist                     Mobility/ADL's       Row Name 08/28/23 1459          Bed Mobility    Bed Mobility supine-sit-supine  -LS     Supine-Sit-Supine [place] (Bed Mobility) moderate assist (50% patient effort);2 person assist  -LS     Assistive Device (Bed Mobility) bed rails  -     Comment, (Bed Mobility) Logroll technique  -       Row Name 08/28/23 1459          Transfers    Transfers sit-stand transfer  -       Row Name 08/28/23 1459          Sit-Stand Transfer    Sit-Stand  [place] (Transfers) minimum assist (75% patient effort);2 person assist  -LS     Comment, (Sit-Stand Transfer) HHA x2  -LS       Row Name 08/28/23 1459          Activities of Daily Living    BADL Assessment/Intervention lower body dressing  -LS       Row Name 08/28/23 1459          Lower Body Dressing Assessment/Training    [place] Level (Lower Body Dressing) doff;don;socks;maximum assist (25% patient effort)  -LS               User Key  (r) = Recorded By, (t) = Taken By, (c) = Cosigned By      Initials Name Provider Type    Te Arboleda OT Occupational Therapist                   Obj/Interventions       Row Name 08/28/23 1501          Sensory Assessment (Somatosensory)    Sensory Assessment (Somatosensory) sensation intact  -       Row Name 08/28/23 1501          Vision Assessment/Intervention    Visual Impairment/Limitations other (see comments)  Patient reports difficulty with depth perception  -       Row Name 08/28/23 1501          Range of Motion Comprehensive    Comment, General Range of Motion BUEs WFL  -       Row Name 08/28/23 1501          Balance    Balance Assessment sitting static balance;sitting dynamic balance;standing static balance;standing dynamic balance  -LS     Static Sitting Balance standby assist  -LS     Dynamic Sitting Balance contact guard  -LS     Position, Sitting Balance sitting edge of bed  -LS     Static Standing Balance 2-person assist;minimal assist  -LS     Dynamic Standing Balance 2-person assist;minimal assist  -LS               User Key  (r) = Recorded By, (t) = Taken By, (c) = Cosigned By      Initials Name Provider Type    Te Arboleda OT Occupational Therapist                   Goals/Plan       Row Name 08/28/23 1516          Bed Mobility Goal 1 (OT)    Activity/Assistive Device (Bed Mobility Goal 1, OT) bed mobility activities, all  -LS     [place] Level/Cues Needed (Bed Mobility Goal 1, OT) standby assist  -LS     Time Frame (Bed Mobility Goal  1, OT) long term goal (LTG);2 weeks  -LS       Row Name 08/28/23 1516          Transfer Goal 1 (OT)    Activity/Assistive Device (Transfer Goal 1, OT) transfers, all  -LS     [place] Level/Cues Needed (Transfer Goal 1, OT) standby assist  -LS     Time Frame (Transfer Goal 1, OT) long term goal (LTG);2 weeks  -LS       Row Name 08/28/23 1516          Dressing Goal 1 (OT)    Activity/Device (Dressing Goal 1, OT) dressing skills, all  -LS     [place]/Cues Needed (Dressing Goal 1, OT) contact guard required  -LS     Time Frame (Dressing Goal 1, OT) long term goal (LTG);2 weeks  -LS       Row Name 08/28/23 1516          Toileting Goal 1 (OT)    Activity/Device (Toileting Goal 1, OT) toileting skills, all  -LS     [place] Level/Cues Needed (Toileting Goal 1, OT) standby assist  -LS     Time Frame (Toileting Goal 1, OT) long term goal (LTG);2 weeks  -LS       Row Name 08/28/23 1516          Therapy Assessment/Plan (OT)    Planned Therapy Interventions (OT) activity tolerance training;BADL retraining;functional balance retraining;IADL retraining;occupation/activity based interventions;ROM/therapeutic exercise;transfer/mobility retraining;strengthening exercise;neuromuscular control/coordination retraining  -LS               User Key  (r) = Recorded By, (t) = Taken By, (c) = Cosigned By      Initials Name Provider Type    Te Arboleda OT Occupational Therapist                   Clinical Impression       Row Name 08/28/23 1506          Pain Assessment    Additional Documentation Pain Scale: FACES Pre/Post-Treatment (Group)  -LS       [facility] Name 08/28/23 1506          Pain Scale: FACES Pre/Post-Treatment    Pain: FACES Scale, Pretreatment 6-->hurts even more  -LS     Posttreatment Pain Rating 8-->hurts whole lot  -LS     Pain Location lower  -LS     Pain Location - back  -LS       Row Name 08/28/23 1501          Plan of Care Review    Plan of Care Reviewed With patient;spouse  -LS     Outcome Evaluation  Patient is a 78 year old male with history of asthma, hypertension, mitral valve prolapse presents to ER with complaints of back pain for 2 weeks. Patient had mechanical fall 2 weeks ago, was seen in ER at that time, had head CT and CT lumbar spine that was unremarkable. At baseline, patient lives with his spouse in a 2 story home. He was independent with ADLs and functional mobility. He has 1 flight of stairs to his bedroom but can stay on the main level if needed. This date, patient very limited by pain despite having just taken pain medication. Via logroll technique performed with cues, he was able to come to sitting EOB with Mod Ax2, but was unable to tolerate secondary to pain. Min Ax2 to come to standing in attempt to alleviate pain however unsuccessful and pt returned to supine. At this time secondary to pain he requires heavy assistance with lower body ADLs. OT does feel as syptoms improve, he will be safe to d/c home with assistance and would benefit from outpatient therapy.  -       Row Name 08/28/23 1503          Therapy Assessment/Plan (OT)    Rehab Potential (OT) good, to achieve stated therapy goals  -     Criteria for Skilled Therapeutic Interventions Met (OT) yes;skilled treatment is necessary  -     Therapy Frequency (OT) 3 times/wk  -     Predicted Duration of Therapy Intervention (OT) until dc  -       Row Name 08/28/23 1504          Therapy Plan Review/Discharge Plan (OT)    Anticipated Discharge Disposition (OT) home with assist;home with outpatient therapy services  -       Row Name 08/28/23 1507          Vital Signs    O2 Delivery Pre Treatment room air  -LS     O2 Delivery Intra Treatment room air  -LS     O2 Delivery Post Treatment room air  -LS     Pre Patient Position Supine  -LS     Intra Patient Position Standing  -LS     Post Patient Position Supine  -       Row Name 08/28/23 1501          Positioning and Restraints    Pre-Treatment Position in bed  -LS     Post Treatment  Position bed  -LS     In Bed notified nsg;supine;call light within reach;encouraged to call for assist;exit alarm on;with family/caregiver  -LS               User Key  (r) = Recorded By, (t) = Taken By, (c) = Cosigned By      Initials Name Provider Type    Te Arboleda OT Occupational Therapist                   Outcome Measures       Row Name 08/28/23 1517          How much help from another is currently needed...    Putting on and taking off regular lower body clothing? 2  -LS     Bathing (including washing, rinsing, and drying) 2  -LS     Toileting (which includes using toilet bed pan or urinal) 2  -LS     Putting on and taking off regular upper body clothing 3  -LS     Taking care of personal grooming (such as brushing teeth) 3  -LS     Eating meals 4  -LS     AM-PAC 6 Clicks Score (OT) 16  -LS       Row Name 08/28/23 1358 08/28/23 0800       How much help from another person do you currently need...    Turning from your back to your side while in flat bed without using bedrails? 2  -CR (r) DB (t) CR (c) 3  -CW    Moving from lying on back to sitting on the side of a flat bed without bedrails? 2  -CR (r) DB (t) CR (c) 3  -CW    Moving to and from a bed to a chair (including a wheelchair)? 2  -CR (r) DB (t) CR (c) 3  -CW    Standing up from a chair using your arms (e.g., wheelchair, bedside chair)? 2  -CR (r) DB (t) CR (c) 3  -CW    Climbing 3-5 steps with a railing? 1  -CR (r) DB (t) CR (c) 2  -CW    To walk in hospital room? 2  -CR (r) DB (t) CR (c) 2  -CW    AM-PAC 6 Clicks Score (PT) 11  -CR (r) DB (t) 16  -CW    Highest level of mobility 4 --> Transferred to chair/commode  -CR (r) DB (t) 5 --> Static standing  -CW      Row Name 08/28/23 1517          Functional Assessment    Outcome Measure Options AM-PAC 6 Clicks Daily Activity (OT)  -               User Key  (r) = Recorded By, (t) = Taken By, (c) = Cosigned By      Initials Name Provider Type    CR Reyes, Carmela, PT Physical Therapist    JOHN Elias,  ALFRED Tiwari Occupational Therapist    Grazyna Loomis, RN Registered Nurse    Pedro Pablo Landry, PT Student PT Student                    Occupational Therapy Education       Title: PT OT SLP Therapies (In Progress)       Topic: Occupational Therapy (Done)       Point: ADL training (Done)       Description:   Instruct learner(s) on proper safety adaptation and remediation techniques during self care or transfers.   Instruct in proper use of assistive devices.                  Learning Progress Summary             Patient Acceptance, E,TB, VU by  at 8/28/2023 [id number]                                         User Key       Initials Effective Dates Name Provider Type Discipline     09/22/22 -  Te Elias OT Occupational Therapist OT                  OT Recommendation and Plan  Planned Therapy Interventions (OT): activity tolerance training, BADL retraining, functional balance retraining, IADL retraining, occupation/activity based interventions, ROM/therapeutic exercise, transfer/mobility retraining, strengthening exercise, neuromuscular control/coordination retraining  Therapy Frequency (OT): 3 times/wk  Plan of Care Review  Plan of Care Reviewed With: patient, spouse  Outcome Evaluation: Patient is a 78 year old male with history of asthma, hypertension, mitral valve prolapse presents to ER with complaints of back pain for 2 weeks. Patient had mechanical fall 2 weeks ago, was seen in ER at that time, had head CT and CT lumbar spine that was unremarkable. At baseline, patient lives with his spouse in a 2 story home. He was independent with ADLs and functional mobility. He has 1 flight of stairs to his bedroom but can stay on the main level if needed. This date, patient very limited by pain despite having just taken pain medication. Via logroll technique performed with cues, he was able to come to sitting EOB with Mod Ax2, but was unable to tolerate secondary to pain. Min Ax2 to come to standing in attempt to  alleviate pain however unsuccessful and pt returned to supine. At this time secondary to pain he requires heavy assistance with lower body ADLs. OT does feel as syptoms improve, he will be safe to d/c home with assistance and would benefit from outpatient therapy.     Time Calculation:         Time Calculation- OT       Row Name 08/28/23 1520             Time Calculation- OT    OT Start Time 1036  -LS      OT Stop Time 1058  -LS      OT Time Calculation (min) 22 min  -LS      OT Received On 08/28/23  -      OT - Next Appointment 08/30/23  -      OT Goal Re-Cert Due Date 09/11/23  -         Untimed Charges    OT Eval/Re-eval Minutes 22  -LS         Total Minutes    Untimed Charges Total Minutes 22  -LS       Total Minutes 22  -LS                User Key  (r) = Recorded By, (t) = Taken By, (c) = Cosigned By      Initials Name Provider Type    Te Arboleda OT Occupational Therapist                  Therapy Charges for Today       Code Description Service Date Service Provider Modifiers Qty    [id number] HC OT EVAL MOD COMPLEXITY 4 8/28/2023 Te Elias OT GO 1                 Te Elias OT  8/28/2023

## 2023-08-28 NOTE — PLAN OF CARE
Goal Outcome Evaluation:   Focusing on pain management. Rates pain 5-8.  Last BM 8/27/

## 2023-08-28 NOTE — CONSULTS
"[facility] NEUROSURGERY CONSULT NOTE    Patient Name: Clint Cee  Referring Provider: Dominique Martinez PA-C  Reason for Consultation: Back pain    Patient Care Team:  Jaret Castellanos MD as PCP - General (Family Medicine)  Felipe Garcia MD as Consulting Physician (Cardiology)  Golden Serna, SHANEL as Ambulatory  ([facility])    Chief Complaint: Back pain    History of Present Illness:  Patient is a 78 y.o. that presented to ED on 8/27/2023 with continued complaints of back and leg pain that began 2 weeks prior after falling in a parking lot.  He originally presented to ED a couple weeks prior and was discharged with no acute findings were noted on CT.   he describes a component of back pain and anterior thigh pain and burning that has been unrelenting in his current pharmacologic course.  He has \"good days \"and bad days \".  He does have past surgical history in his lumbar spine approximately 60 years ago with what sounds like a L4-L5 discectomy.  He also endorses a cervical decompression and fusion years ago.  He is denying any saddle anesthesia or bowel/bladder dysfunction.  He really describes no leg weakness.  He has been controlling his pain with Norco.      Review of Systems:    Review of Systems   Musculoskeletal:  Positive for back pain.   Neurological:  Positive for numbness.   All other systems reviewed and are negative.    Past Medical History:  Past Medical History:   Diagnosis Date    Abnormal ECG June 2022    Allergic 01/01/1954    Nasal alergies    Arrhythmia 2004    Dr Youngblood examined me and said i was ok.  skipped beats    Asthma     no sx    Benign prostatic hyperplasia 11/14/2018    Cataract 01/01/2014    Surgery. Caused detached retina of right eye 20/60 repair    Colon polyp 01/01/2018    Found and removed last colon eca.q    Depression     Gastroesophageal reflux disease 03/20/2014    Glaucoma 2003    Normal pressures. Have low pressure glaucoma    Gout     Headache " 01/01/2015    Have shimmering in both eyes intermittantly. 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    Prostate Cancer Jan22 January 2022    Spinal stenosis 12/02/2011    Visual impairment 01/01/1955    Nearsighted corrected glasses    Vitamin D deficiency 05/23/2018       Past Surgical History:  Past Surgical History:   Procedure Laterality Date    ADENOIDECTOMY  1954    As child    APPENDECTOMY  1956    Surgery    CARDIAC CATHETERIZATION N/A 09/30/2022    Procedure: Right and Left Heart Cath with Coronar Angiography;  Surgeon: Felipe Garcia MD;  Location: [facility] CATH INVASIVE LOCATION;  Service: Cardiovascular;  Laterality: N/A;    CARDIAC VALVE REPLACEMENT N/A 11/17/2022    Procedure: TRICUSPID VALVE REPAIR/REPLACEMENT;  Surgeon: Femi Henderson MD;  Location: [facility] CVOR;  Service: Cardiothoracic;  Laterality: N/A;  tricuspid valve repaired with 32mm  groves physio tricuspid annuloplasty ring    CARDIAC VALVE REPLACEMENT  11/17/2022    Repaired mitral and tricuspid    CATARACT EXTRACTION Bilateral     COLONOSCOPY  01/01/1997    Regularly scheduled    COLONOSCOPY N/A 2/22/2023    Procedure: COLONOSCOPY with cold snare polypectomy x 1;  Surgeon: Manfred Keating MD;  Location: [facility] ENDOSCOPY;  Service: Gastroenterology;  Laterality: N/A;    ENDOSCOPY N/A 2/22/2023    Procedure: ESOPHAGOGASTRODUODENOSCOPY with esophageal dilation using 48 Fr. Bougie Dilator;  Surgeon: Manfred Keating MD;  Location: [facility] ENDOSCOPY;  Service: Gastroenterology;  Laterality: N/A;  erosive esophagitis    EYE SURGERY  01/01/2010    Reattached right retina    MITRAL  VALVE REPAIR/REPLACEMENT N/A 11/17/2022    Procedure: MITRAL VALVE REPAIR/REPLACEMENT with left atrial appendage closure;  Surgeon: Femi Henderson MD;  Location: [facility];  Service: Cardiothoracic;  Laterality: N/A;  mitral valve repaired iwth 40mm medtronic annuloplasty band  with intraop TERRA    SPINE SURGERY  01/01/1971    2 lumbar 1 cervical    TONSILLECTOMY  01/01/1950    Childhood     Reviewed past surgical history and it is not pertinent to this visit    Family History:  Family History   Problem Relation Age of Onset    Arthritis Father     Cancer Father         Prostate    Arrhythmia Father         Congentive heart failure    Diabetes Mother         Adult diabetes    Early death Mother         Kidneys failed after giving birth-diabetes complications    Heart disease Mother         Adult Diabetes     Reviewed past family history and it is not pertinent to this visit    Social History:  Social History     Tobacco Use    Smoking status: Former     Packs/day: 0.00     Years: 0.50     Pack years: 0.00     Types: Cigarettes     Passive exposure: Past    Smokeless tobacco: Never    Tobacco comments:     Tried tobacco as child   Vaping Use    Vaping Use: Never used   Substance Use Topics    Alcohol use: Not Currently     Comment: Not a regular drinker. Occasionly have a glass of wine or be    Drug use: Never     Reviewed past social history and it is not pertinent to this visit    Allergies:  Morphine, Beta adrenergic blockers, and Ace inhibitors    Home Medications:  Prior to Admission medications    Medication Sig Start Date End Date Taking? Authorizing Provider   acetaminophen (TYLENOL) 500 MG tablet Take 1 tablet by mouth Every 6 (Six) Hours As Needed for Mild Pain. Indications: Pain   Yes Provider, MD Irlanda   ACETYLCARN-ALPHA LIPOIC ACID PO Take 1 capsule by mouth Daily. Indications: Dietary supplement 1/1/21  Yes Jaret Castellanos MD   allopurinol (ZYLOPRIM) 100 MG tablet TAKE 1 TABLET BY MOUTH  DAILY 8/23/23  Yes Jaret Castellanos MD   Apple Erik Vn-Grn Tea-Bit Or-Cr (APPLE CIDER VINEGAR PLUS PO) Take 1 Piece by mouth Daily. Indications: Dietary supplement 1/1/21  Yes Jaert Castellanos MD   atorvastatin (LIPITOR) 40 MG tablet Take 1 tablet by mouth Every Night. Indications: High Amount of Fats in the Blood 7/3/23  Yes Felipe Garcia MD   DULoxetine (CYMBALTA) 60 MG capsule Take 1 capsule by mouth Daily. 6/20/23  Yes Jaret Castellanos MD   famotidine (PEPCID) 40 MG tablet Take 1 tablet by mouth Every Night. 12/16/22  Yes Irlanda Kaminski MD   fexofenadine (ALLEGRA) 180 MG tablet Take 1 tablet by mouth 2 (Two) Times a Day As Needed. Indications: Hayfever   Yes Irlanda Kaminski MD   fluticasone (FLONASE) 50 MCG/ACT nasal spray 2 sprays into the nostril(s) as directed by provider Daily As Needed. Indications: Allergic Rhinitis   Yes Irlanda Kaminski MD   Leuprolide Mesylate, 6 Month, (CAMCEVI SC) Inject  under the skin into the appropriate area as directed. Every 6 months injection, due in December  Indications: prostate cancer (to decrease testosterone). 1/21/21  Yes Irlanda Kaminski MD   melatonin 5 MG tablet tablet Take 1 tablet by mouth At Night As Needed (sleep). Indications: Trouble Sleeping   Yes Irlanda Kaminski MD   metoprolol tartrate (LOPRESSOR) 25 MG tablet Take 1 tablet by mouth Daily. 5/18/23  Yes Irlanda Kaminski MD   zolpidem (Ambien) 10 MG tablet Take 1 tablet by mouth At Night As Needed for Sleep.   Yes Irlanda Kaimnski MD       Results Review:  Labs:  Recent Results (from the past 24 hour(s))   Basic Metabolic Panel    Collection Time: 08/28/23  5:06 AM    Specimen: Arm, Left; Blood   Result Value Ref Range    Glucose 107 (H) 65 - 99 mg/dL    BUN 17 8 - 23 mg/dL    Creatinine 0.93 0.76 - 1.27 mg/dL    Sodium 137 136 - 145 mmol/L    Potassium 4.4 3.5 - 5.2 mmol/L    Chloride 102 98 - 107 mmol/L    CO2 25.0 22.0 - 29.0 mmol/L    Calcium 9.3 8.6 - 10.5  "mg/dL    BUN/Creatinine Ratio 18.3 7.0 - 25.0    Anion Gap 10.0 5.0 - 15.0 mmol/L    eGFR 85.1 >60.0 mL/min/1.73   CBC Auto Differential    Collection Time: 08/28/23  5:06 AM    Specimen: Arm, Left; Blood   Result Value Ref Range    WBC 5.60 3.40 - 10.80 10*3/mm3    RBC 4.27 4.14 - 5.80 10*6/mm3    Hemoglobin 13.0 13.0 - 17.7 g/dL    Hematocrit 37.6 37.5 - 51.0 %    MCV 88.2 79.0 - 97.0 fL    MCH 30.4 26.6 - 33.0 pg    MCHC 34.4 31.5 - 35.7 g/dL    RDW 13.6 12.3 - 15.4 %    RDW-SD 44.2 37.0 - 54.0 fl    MPV 6.4 6.0 - 12.0 fL    Platelets 94 (L) 140 - 450 10*3/mm3    Neutrophil % 80.9 (H) 42.7 - 76.0 %    Lymphocyte % 10.4 (L) 19.6 - 45.3 %    Monocyte % 5.7 5.0 - 12.0 %    Eosinophil % 2.8 0.3 - 6.2 %    Basophil % 0.2 0.0 - 1.5 %    Neutrophils, Absolute 4.50 1.70 - 7.00 10*3/mm3    Lymphocytes, Absolute 0.60 (L) 0.70 - 3.10 10*3/mm3    Monocytes, Absolute 0.30 0.10 - 0.90 10*3/mm3    Eosinophils, Absolute 0.20 0.00 - 0.40 10*3/mm3    Basophils, Absolute 0.00 0.00 - 0.20 10*3/mm3    nRBC 0.3 (H) 0.0 - 0.2 /100 WBC       Radiology:  No radiology results for the last day    Results Review:   I reviewed the patient's new clinical results.  I personally viewed and interpreted the patient's CT lumbar spine 8/15/2023 demonstrating advanced spondylitic changes from mid to lower lumbar spine.  There is significant disc space narrowing disc osteophyte complexes from L3-S1.  There is a possible fracture line along the left sided posterior margin of the L3 vertebral body versus lateral osteophyte.        vital Signs:   Temp:  [97.8 °F (36.6 °C)-98.6 °F (37 °C)] 97.8 °F (36.6 °C)  Heart Rate:  [56-73] 56  Resp:  [11-19] 16  BP: (135-165)/(67-83) 135/67        Physical Exam:  /67   Pulse 56   Temp 97.8 °F (36.6 °C)   Resp 16   Ht 185.4 cm (73\")   Wt 102 kg (225 lb 5 oz)   SpO2 94%   BMI 29.73 kg/m²     General Appearance:  Alert, cooperative, no distress, appropriate for age                             Head:  " Normocephalic, without obvious abnormality                              Eyes:  PERRL, EOM's intact, conjunctivae and cornea clear,                               Nose:  Nares symmetrical, septum midline, mucosa pink, clear watery discharge; no sinus tenderness                           Throat:  Lips, tongue, and mucosa are moist, pink, and intact;                               Neck:  Supple; symmetrical, trachea midline, no adenopathy; thyroid: no enlargement, symmetric, no tenderness/mass/nodules                              Back:  Symmetrical, no curvature, ROM normal, no CVA tenderness                             Lungs:  respirations unlabored, no audible wheeze                             Heart:  regular rate & rhythm, S1 and S2 normal                      Abdomen:  Soft, nontender, bowel sounds active all four quadrants          Musculoskeletal:  Tone and strength strong and symmetrical, all extremities; no joint pain or edema                                        Lymphatic:  No adenopathy              Skin/Hair/Nails:  Skin warm, dry and intact, no rashes or abnormal dyspigmentation                      Neurologic:   Mental Status: The patient is awake, alert and oriented x 3. Recent and remote memory functions are normal. The patient is attentive with normal concentration. Language is fluent. Speech is clear. The speech is nondysarthric. Fund of knowledge is normal.    Motor: Tone is normal in all four extremities without fasciculations, atrophy, or myoclonus. There are no involuntary movements.   Muscle Strength: 5/5 muscle strength in bilateral upper and bilateral lower extremities. No pronator drift.  Sensory: The sensory examination is normal for light touch bilaterally and symmetrically.        Back pain      Patient is a 78-year-old male presenting with acute back pain and left over right radicular pain after a fall.  CT imaging done a couple weeks prior demonstrated no acute process but on further  "neurosurgical review, there is a possible fracture line newly seen on the left side with possible extension into the posterior elements along L3 level and possibly L4.  This would correlate with his current leg symptoms.  MRI imaging of his lumbar spine is recommended for further evaluation.  I would also like patient to be placed in LSO brace anytime he is up ambulating until we can determine the stability of this possible injury.      PLAN:   Back pain    - MRI lumbar spine  -LSO brace patient have on anytime he is out of bed  -Decadron initiation  - Further pain management per primary  -Further recommendations after MRI imaging.    I discussed the patient's findings and my recommendations with patient, family, and nursing staff and Dr. Sun.    Caroline Robert, APRN  08/28/23  13:20 EDT    \"Dictated utilizing Dragon dictation\".      "

## 2023-08-28 NOTE — CASE MANAGEMENT/SOCIAL WORK
Discharge Planning Assessment  [facility]     Patient Name: Clint Cee  MRN: [id number]  Today's Date: 8/28/2023    Admit Date: 8/27/2023    Plan: Return home with spouse   Discharge Needs Assessment       Row Name 08/28/23 1419       Living Environment    People in Home spouse    Name(s) of People in Home Mary Beth    Current Living Arrangements home    Primary Care Provided by self    Provides Primary Care For no one, unable/limited ability to care for self    Family Caregiver if Needed spouse    Family Caregiver Names Mary Beth, spouse    Quality of Family Relationships helpful;involved;supportive    Able to Return to Prior Arrangements yes       Resource/Environmental Concerns    Transportation Concerns none       Transition Planning    Patient/Family Anticipates Transition to home with family    Patient/Family Anticipated Services at Transition ;none    Transportation Anticipated family or friend will provide;car, drives self       Discharge Needs Assessment    Readmission Within the Last 30 Days no previous admission in last 30 days    Equipment Currently Used at Home none    Concerns to be Addressed no discharge needs identified    Anticipated Changes Related to Illness none    Equipment Needed After Discharge none                   Discharge Plan       Row Name 08/28/23 1422       Plan    Plan Return home with spouse    Patient/Family in Agreement with Plan yes    Plan Comments Barriers: Neuro Surgery following. CM met with  and Mrs. Cee who reside together. Normally he drives and is independent and does not  use any equipment. CM verified PCP and Pharmacy                  Demographic Summary       Row Name 08/28/23 1418       General Information    Admission Type observation    Arrived From emergency department    Referral Source admission list    Reason for Consult discharge planning    Preferred Language English       Contact Information    Permission Granted to Share Info With                     Functional Status       Row Name 08/28/23 1418       Functional Status    Usual Activity Tolerance excellent    Current Activity Tolerance moderate       Functional Status, IADL    Medications independent    Meal Preparation assistive person    Housekeeping assistive person    Laundry assistive person    Shopping assistive person    IADL Comments independent                  Maintained distance greater than six feet and spent less than 15 minutes in the room.       Samantha RAMIREZ,RN Case Manager  [facility]  Phone: Desk- [phone] cell- [phone]

## 2023-08-28 NOTE — PROGRESS NOTES
"[facility] Progress Note    Patient Name: Clint Cee  : 1945  MRN: [id number]  Primary Care Physician: Jaret Castellanos MD  Date of admission: 2023     Patient Care Team:  Jaret Castellanos MD as PCP - General (Family Medicine)  Felipe Garcia MD as Consulting Physician (Cardiology)  Golden Serna, SHANEL as Ambulatory  (Population Health)          Subjective   History Present Illness     Chief Complaint:   Chief Complaint   Patient presents with    Back Pain     Back Pain     Back Pain  Associated symptoms include numbness.     Back Pain  Associated symptoms include numbness.   Patient is a 78 year old  male with history of asthma, hypertension, mitral valve prolapse presents ER with complaints of back pain for 2 weeks. Patient had mechanical fall 2 weeks ago, was seen in ER at that time, had head CT and CT lumbar spine that was unremarkable. Patient was discharged with prescription for Norco. He did follow-up with his PCP 3 days ago but states that he was having a \"good day\" and was not having much pain. Patient states over the last 24 hours his pain has become worse. He describes as aching soreness pain across his lower back that occasionally radiates into his legs that he rates a 4/10. Patient states that he has been ambulating at home independently but states this morning was hard to get out of bed which prompted his ER visit. He denies any saddle anesthesia or bladder or bowel dysfunction. Patient is moving his legs constantly in the stretcher while I examine him. He denies abdominal pain nausea vomiting diarrhea or dysuria. No chest pain or shortness of breath. No fever or chills.      Observation 23  Pt concurs with er hpi. Pt states he fell 2 weeks ago and had dandre able to ambulate although with difficulty. 2 days ago the pain was worsen and now ambulation is impossible. PT has good strength and feeling in mikhail extremities. Sensation is bilateral. No " saddle paraesthesias. CT was performed 2 weeks ago and showed no fracture.  Will give pain meds and consult neurosurgery.     Observation 08/28/2023  Patient currently rating his back pain as 5/10.  He states that currently as needed medication for pain is not relieving his pain.      Review of Systems   Musculoskeletal:  Positive for back pain and myalgias.   Neurological:  Positive for numbness.   All other systems reviewed and are negative.        Personal History     Past Medical History:   Past Medical History:   Diagnosis Date    Abnormal ECG June 2022    Allergic 01/01/1954    Nasal alergies    Arrhythmia 2004    Dr Youngblood examined me and said i was ok.  skipped beats    Asthma     no sx    Benign prostatic hyperplasia 11/14/2018    Cataract 01/01/2014    Surgery. Caused detached retina of right eye 20/60 repair    Colon polyp 01/01/2018    Found and removed last colon eca.q    Depression     Gastroesophageal reflux disease 03/20/2014    Glaucoma 2003    Normal pressures. Have low pressure glaucoma    Gout     Headache 01/01/2015    Have shimmering in both eyes intermittantly. 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    Prostate Cancer Jan22 January 2022    Spinal stenosis 12/02/2011    Visual impairment 01/01/1955    Nearsighted corrected glasses    Vitamin D deficiency 05/23/2018       Surgical History:      Past Surgical History:   Procedure Laterality Date    ADENOIDECTOMY  1954    As child    APPENDECTOMY  1956    Surgery    CARDIAC CATHETERIZATION N/A 09/30/2022    Procedure: Right and Left Heart Cath with Coronar Angiography;  Surgeon: Felipe Garcia MD;   Location: [facility] CATH INVASIVE LOCATION;  Service: Cardiovascular;  Laterality: N/A;    CARDIAC VALVE REPLACEMENT N/A 11/17/2022    Procedure: TRICUSPID VALVE REPAIR/REPLACEMENT;  Surgeon: Femi Henderson MD;  Location: [facility] CVOR;  Service: Cardiothoracic;  Laterality: N/A;  tricuspid valve repaired with 32mm  groves physio tricuspid annuloplasty ring    CARDIAC VALVE REPLACEMENT  11/17/2022    Repaired mitral and tricuspid    CATARACT EXTRACTION Bilateral     COLONOSCOPY  01/01/1997    Regularly scheduled    COLONOSCOPY N/A 2/22/2023    Procedure: COLONOSCOPY with cold snare polypectomy x 1;  Surgeon: Manfred Keating MD;  Location: [facility] ENDOSCOPY;  Service: Gastroenterology;  Laterality: N/A;    ENDOSCOPY N/A 2/22/2023    Procedure: ESOPHAGOGASTRODUODENOSCOPY with esophageal dilation using 48 Fr. Bougie Dilator;  Surgeon: Manfred Keating MD;  Location: [facility] ENDOSCOPY;  Service: Gastroenterology;  Laterality: N/A;  erosive esophagitis    EYE SURGERY  01/01/2010    Reattached right retina    MITRAL VALVE REPAIR/REPLACEMENT N/A 11/17/2022    Procedure: MITRAL VALVE REPAIR/REPLACEMENT with left atrial appendage closure;  Surgeon: Femi Henderson MD;  Location: [facility];  Service: Cardiothoracic;  Laterality: N/A;  mitral valve repaired iwth 40mm medtronic annuloplasty band  with intraop TERRA    SPINE SURGERY  01/01/1971    2 lumbar 1 cervical    TONSILLECTOMY  01/01/1950    Childhood           Family History: family history includes Arrhythmia in his father; Arthritis in his father; Cancer in his father; Diabetes in his mother; Early death in his mother; Heart disease in his mother. Otherwise pertinent FHx was reviewed and unremarkable.     Social History:  reports that he has quit smoking. His smoking use included cigarettes. He has been exposed to tobacco smoke. He has never used smokeless tobacco. He reports that he does not currently use alcohol. He reports that he does not use  drugs.      Medications:  Prior to Admission medications    Medication Sig Start Date End Date Taking? Authorizing Provider   acetaminophen (TYLENOL) 500 MG tablet Take 1 tablet by mouth Every 6 (Six) Hours As Needed for Mild Pain. Indications: Pain   Yes Irlanda Kaminski MD   ACETYLCARN-ALPHA LIPOIC ACID PO Take 1 capsule by mouth Daily. Indications: Dietary supplement 1/1/21  Yes Jaret Castellanos MD   allopurinol (ZYLOPRIM) 100 MG tablet TAKE 1 TABLET BY MOUTH DAILY 8/23/23  Yes Jaret Castellanos MD   Apple Erik Vn-Grn Tea-Bit Or-Cr (APPLE CIDER VINEGAR PLUS PO) Take 1 Piece by mouth Daily. Indications: Dietary supplement 1/1/21  Yes Jaret Castellanos MD   atorvastatin (LIPITOR) 40 MG tablet Take 1 tablet by mouth Every Night. Indications: High Amount of Fats in the Blood 7/3/23  Yes Felipe Garcia MD   DULoxetine (CYMBALTA) 60 MG capsule Take 1 capsule by mouth Daily. 6/20/23  Yes Jaret Castellanos MD   famotidine (PEPCID) 40 MG tablet Take 1 tablet by mouth Every Night. 12/16/22  Yes Irlanda Kaminski MD   fexofenadine (ALLEGRA) 180 MG tablet Take 1 tablet by mouth 2 (Two) Times a Day As Needed. Indications: Hayfever   Yes Irlanda Kaminski MD   fluticasone (FLONASE) 50 MCG/ACT nasal spray 2 sprays into the nostril(s) as directed by provider Daily As Needed. Indications: Allergic Rhinitis   Yes Irlanda Kaminski MD   Leuprolide Mesylate, 6 Month, (CAMCEVI SC) Inject  under the skin into the appropriate area as directed. Every 6 months injection, due in December  Indications: prostate cancer (to decrease testosterone). 1/21/21  Yes Irlanda Kaminski MD   melatonin 5 MG tablet tablet Take 1 tablet by mouth At Night As Needed (sleep). Indications: Trouble Sleeping   Yes Irlanda Kaminski MD   metoprolol tartrate (LOPRESSOR) 25 MG tablet Take 1 tablet by mouth Daily. 5/18/23  Yes Irlanda Kaminski MD   zolpidem (Ambien) 10 MG tablet Take 1 tablet by mouth At Night As Needed  for Sleep.   Yes Provider, Irlanda, MD       Allergies:    Allergies   Allergen Reactions    Morphine Hallucinations    Beta Adrenergic Blockers Cough    Ace Inhibitors Cough     cough       Objective   Objective     Vital Signs  Temp:  [97.8 °F (36.6 °C)-98.6 °F (37 °C)] 97.8 °F (36.6 °C)  Heart Rate:  [56-73] 56  Resp:  [11-19] 16  BP: (135-165)/(67-83) 135/67  SpO2:  [94 %-98 %] 94 %  on   ;   Device (Oxygen Therapy): room air  Body mass index is 29.73 kg/m².    Physical Exam  Vitals and nursing note reviewed.   Constitutional:       Appearance: Normal appearance.   HENT:      Head: Normocephalic and atraumatic.      Right Ear: External ear normal.      Left Ear: External ear normal.      Nose: Nose normal.      Mouth/Throat:      Mouth: Mucous membranes are moist.   Eyes:      Extraocular Movements: Extraocular movements intact.   Cardiovascular:      Rate and Rhythm: Normal rate and regular rhythm.      Pulses: Normal pulses.      Heart sounds: Normal heart sounds.   Pulmonary:      Effort: Pulmonary effort is normal.      Breath sounds: Normal breath sounds.   Abdominal:      General: Abdomen is flat. Bowel sounds are normal.      Palpations: Abdomen is soft.   Musculoskeletal:         General: Normal range of motion.      Cervical back: Normal range of motion.   Skin:     General: Skin is warm.   Neurological:      Mental Status: He is alert and oriented to person, place, and time.   Psychiatric:         Behavior: Behavior normal.         Thought Content: Thought content normal.         Judgment: Judgment normal.       Results Review:  I have personally reviewed most recent lab results and radiology images and interpretations and agree with findings, most notably: CMP, CBC, UA.    Results from last 7 days   Lab Units 08/28/23  0506   WBC 10*3/mm3 5.60   HEMOGLOBIN g/dL 13.0   HEMATOCRIT % 37.6   PLATELETS 10*3/mm3 94*     Results from last 7 days   Lab Units 08/28/23  0506   SODIUM mmol/L 137   POTASSIUM  mmol/L 4.4   CHLORIDE mmol/L 102   CO2 mmol/L 25.0   BUN mg/dL 17   CREATININE mg/dL 0.93   GLUCOSE mg/dL 107*   CALCIUM mg/dL 9.3     Estimated Creatinine Clearance: 84.8 mL/min (by C-G formula based on SCr of 0.93 mg/dL).  Brief Urine Lab Results  (Last result in the past 365 days)        Color   Clarity   Blood   Leuk Est   Nitrite   Protein   CREAT   Urine HCG        08/27/23 1145 Yellow   Clear   Negative   Negative   Negative   Negative                   Microbiology Results (last 10 days)       ** No results found for the last 240 hours. **            ECG/EMG Results (most recent)       Procedure Component Value Units Date/Time    SCANNED - TELEMETRY   [[id number]] Resulted: 08/27/23     Updated: 08/27/23 2142    SCANNED - TELEMETRY   [[id number]] Resulted: 08/27/23     Updated: 08/27/23 2148    SCANNED - TELEMETRY   [[id number]] Resulted: 08/27/23     Updated: 08/27/23 2247    SCANNED - TELEMETRY   [[id number]] Resulted: 08/27/23     Updated: 08/28/23 0648    SCANNED - TELEMETRY   [[id number]] Resulted: 08/27/23     Updated: 08/28/23 0757    SCANNED - TELEMETRY   [[id number]] Resulted: 08/27/23     Updated: 08/28/23 1135    SCANNED - TELEMETRY   [[id number]] Resulted: 08/27/23     Updated: 08/28/23 1158            Results for orders placed during the hospital encounter of 11/16/22    Bilateral Carotid Duplex    Interpretation Summary    Proximal right internal carotid artery is normal.    Mid right internal carotid artery is normal.    Proximal left internal carotid artery mild stenosis.      Results for orders placed during the hospital encounter of 09/30/22    Adult Transesophageal Echo (TERRA) W/ Cont if Necessary Per Protocol    Interpretation Summary  Date of study  9/30/2022    Procedure performed  Transesophageal echocardiogram and Doppler study.    Indications  Significant mitral regurgitation  Shortness of breath    Procedure  Anesthesia was provided by anesthesiologist with intravenous Diprivan.  TERAR probe  could be passed without difficulty.  Patient tolerated the procedure well.  No complications were noted.    Results  Technically satisfactory study.  Mitral valve has severe myxomatous degeneration with multiple areas of severe prolapse and known coaptation of the mitral leaflet as well as probable ruptured chordae.  Severe mitral regurgitation is seen with multiple jets with mitral regurgitation jet occupying the entire left atrium.  Tricuspid valve is also showing myxomatous degeneration with moderate tricuspid regurgitation.  Calculated pulmonary artery pressure is 60 mmHg.  Aortic valve is thickened with adequate opening motion.  Left atrium is enlarged.  Left atrial appendage is enlarged without clot.  Left ventricle is normal in size and contractility with ejection fraction of 60%.  Right ventricle is normal in size.  Right atrium is normal in size.  Atrial septum is intact without PFO.  Aortic intimal thickening is present without clot.  No pericardial effusion is seen.    Impression  Myxomatous mitral and tricuspid valve degeneration.  Severe mitral valve prolapse involving both anterior and posterior mitral leaflets with severe mitral regurgitation with multiple jets.  Probable ruptured chordae.  In some views coaptation of the mitral valve is present.  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  Significant pulmonary hypertension.  Left atrial and left atrial appendage enlargement without clot.  Normal left ventricular size and contractility with ejection fraction of 60%.      No radiology results for the last 7 days      Estimated Creatinine Clearance: 84.8 mL/min (by C-G formula based on SCr of 0.93 mg/dL).    Assessment & Plan   Assessment/Plan       Active Hospital Problems    Diagnosis  POA    **Back pain [M54.9]  Yes      Resolved Hospital Problems   No resolved problems to display.       Lumbar pain  - Ua negative for uti  - pt fell 2 weeks ago and had imaging  - CT lumbar spine  showing no acute osseus abnormality. Varying degrees of neuroforaminal stenosis present. Prior laminectomy L4-L5, multilevel disc space narrowing  - neurosurgery consulted  -[place] and Dilaudid as needed  -Consult PT/OT consulted     Hypertension  -Moderately Controlled   BP Readings from Last 1 Encounters:   08/28/23 135/67   - Continue metoprolol  - Monitor while admitted      HPL  - cont home statin therapy             VTE Prophylaxis -   Mechanical Order History:        Ordered        08/27/23 1456  Place Sequential Compression Device  Once            08/27/23 1456  Maintain Sequential Compression Device  Continuous                          Pharmalogical Order History:       None            CODE STATUS:    Code Status and Medical Interventions:   Ordered at: 08/27/23 1456     Code Status (Patient has no pulse and is not breathing):    CPR (Attempt to Resuscitate)     Medical Interventions (Patient has pulse or is breathing):    Full Support       This patient has been examined wearing personal protective equipment.     I discussed the patient's findings and my recommendations with patient and nursing staff.      Signature:Electronically signed by STUART Saavedra, 08/28/23, 12:30 PM EDT.

## 2023-08-28 NOTE — THERAPY EVALUATION
Patient Name: Clint Cee  : 1945    MRN: [id number]                              Today's Date: 2023       Admit Date: 2023    Visit Dx:     ICD-10-CM ICD-9-CM   1. Acute bilateral low back pain without sciatica  M54.50 724.2     338.19   2. Back muscle spasm  M62.830 724.8   3. Decreased ability to perform activities  R68.89 780.99     Patient Active Problem List   Diagnosis    Benign prostatic hyperplasia    Gastroesophageal reflux disease    Mood disorder    Scoliosis    Spinal stenosis    Vitamin D deficiency    Polyosteoarthritis    Insomnia    Retinal detachment of right eye with single retinal tear    Osteoarthritis of knee    Cellulitis of face    Prostate cancer    Ocular migraine    Headache    Gout of multiple sites    Medicare annual wellness visit, subsequent    Allergic    HL (hearing loss)    Visual impairment    Cataract    Colon polyp    Primary hypertension    Dyspnea on exertion    S/P mitral valve repair per Dr. Henderson 2022    S/P tricuspid valve repair per Dr. Henderson 2022    Back pain     Past Medical History:   Diagnosis Date    Abnormal ECG 2022    Allergic 1954    Nasal alergies    Arrhythmia 2004    Dr Youngblood examined me and said i was ok.  skipped beats    Asthma     no sx    Benign prostatic hyperplasia 2018    Cataract 2014    Surgery. Caused detached retina of right eye 2060 repair    Colon polyp 2018    Found and removed last colon eca.q    Depression     Gastroesophageal reflux disease 2014    Glaucoma 2003    Normal pressures. Have low pressure glaucoma    Gout     Headache 2015    Have shimmering in both eyes intermittantly. No headaches    Heart murmur May 2022    During wellness exam    Heart valve disease 2022    During Echo    HL (hearing loss) 2012    Have hearing aids    Hyperlipidemia     Hypertension 2011    Infectious viral hepatitis     Yellow jaundis as child    Insomnia  12/02/2011    Mitral valve prolapse June 2022    Mood disorder 09/19/2013    Polyosteoarthritis 12/02/2011    Prostate Cancer Jan22 January 2022    Spinal stenosis 12/02/2011    Visual impairment 01/01/1955    Nearsighted corrected glasses    Vitamin D deficiency 05/23/2018     Past Surgical History:   Procedure Laterality Date    ADENOIDECTOMY  1954    As child    APPENDECTOMY  1956    Surgery    CARDIAC CATHETERIZATION N/A 09/30/2022    Procedure: Right and Left Heart Cath with Coronar Angiography;  Surgeon: Felipe Garcia MD;  Location: [facility] CATH INVASIVE LOCATION;  Service: Cardiovascular;  Laterality: N/A;    CARDIAC VALVE REPLACEMENT N/A 11/17/2022    Procedure: TRICUSPID VALVE REPAIR/REPLACEMENT;  Surgeon: Femi Henderson MD;  Location: [facility];  Service: Cardiothoracic;  Laterality: N/A;  tricuspid valve repaired with 32mm  groves physio tricuspid annuloplasty ring    CARDIAC VALVE REPLACEMENT  11/17/2022    Repaired mitral and tricuspid    CATARACT EXTRACTION Bilateral     COLONOSCOPY  01/01/1997    Regularly scheduled    COLONOSCOPY N/A 2/22/2023    Procedure: COLONOSCOPY with cold snare polypectomy x 1;  Surgeon: Manfred Keating MD;  Location: [facility] ENDOSCOPY;  Service: Gastroenterology;  Laterality: N/A;    ENDOSCOPY N/A 2/22/2023    Procedure: ESOPHAGOGASTRODUODENOSCOPY with esophageal dilation using 48 Fr. Bougie Dilator;  Surgeon: Manfred Keating MD;  Location: [facility] ENDOSCOPY;  Service: Gastroenterology;  Laterality: N/A;  erosive esophagitis    EYE SURGERY  01/01/2010    Reattached right retina    MITRAL VALVE REPAIR/REPLACEMENT N/A 11/17/2022    Procedure: MITRAL VALVE REPAIR/REPLACEMENT with left atrial appendage closure;  Surgeon: Femi Henderson MD;  Location: [facility];  Service: Cardiothoracic;  Laterality: N/A;  mitral valve repaired iwth 40mm medtronic annuloplasty band  with intraop TERRA    SPINE SURGERY  01/01/1971    2 lumbar 1 cervical    TONSILLECTOMY   01/01/1950    Childhood      General Information       Row Name 08/28/23 1304          Physical Therapy Time and Intention    Document Type evaluation  -CR (r) DB (t) CR (c)     Mode of Treatment physical therapy  -CR (r) DB (t) CR (c)       Row Name 08/28/23 1304          General Information    Patient Profile Reviewed yes  -CR (r) DB (t) CR (c)     Prior Level of Function independent:;all household mobility;community mobility;driving;ADL's  -CR (r) DB (t) CR (c)     Existing Precautions/Restrictions no known precautions/restrictions  -CR (r) DB (t) CR (c)     Barriers to Rehab visual deficit  -CR (r) DB (t) CR (c)       Row Name 08/28/23 1304          Living Environment    People in Home spouse  -CR (r) DB (t) CR (c)       Row Name 08/28/23 1304          Home Main Entrance    Number of Stairs, Main Entrance none  -CR (r) DB (t) CR (c)     Stair Railings, Main Entrance none  -CR (r) DB (t) CR (c)       Row Name 08/28/23 1304          Stairs Within Home, Primary    Stairs, Within Home, Primary Upstairs bedroom  Can stay on the main level  -CR (r) DB (t) CR (c)     Stair Railings, Within Home, Primary railings safe and in good condition  -CR (r) DB (t) CR (c)       Row Name 08/28/23 1304          Cognition    Orientation Status (Cognition) oriented x 4  -CR (r) DB (t) CR (c)       Row Name 08/28/23 1304          Safety Issues, Functional Mobility    Impairments Affecting Function (Mobility) pain;visual/perceptual;endurance/activity tolerance;range of motion (ROM)  -CR (r) DB (t) CR (c)               User Key  (r) = Recorded By, (t) = Taken By, (c) = Cosigned By      Initials Name Provider Type    CR Reyes, Carmela, PT Physical Therapist    Pedro Pablo Landry PT Student PT Student                   Mobility       Row Name 08/28/23 1310          Bed Mobility    Bed Mobility supine-sit-supine  -CR (r) DB (t) CR (c)     Supine-Sit-Supine [place] (Bed Mobility) moderate assist (50% patient effort);2 person assist  -CR  (r) DB (t) CR (c)     Assistive Device (Bed Mobility) bed rails  -CR (r) DB (t) CR (c)     Comment, (Bed Mobility) Logroll technique  -CR (r) DB (t) CR (c)       Row Name 08/28/23 1310          Sit-Stand Transfer    Sit-Stand [place] (Transfers) minimum assist (75% patient effort);2 person assist  -CR (r) DB (t) CR (c)     Assistive Device (Sit-Stand Transfers) --  Handheld assist of two  -CR (r) DB (t) CR (c)       Row Name 08/28/23 1310          Gait/Stairs (Locomotion)    Comment, (Gait/Stairs) Unable to tolerate standing due to pain.  -CR (r) DB (t) CR (c)               User Key  (r) = Recorded By, (t) = Taken By, (c) = Cosigned By      Initials Name Provider Type    CR Reyes, Carmela, PT Physical Therapist    Pedro Pablo Landry PT Student PT Student                   Obj/Interventions       [facility] Name 08/28/23 1316          Range of Motion Comprehensive    Comment, General Range of Motion Limited bilateral hip flexion due to pain.  -CR (r) DB (t) CR (c)       [facility] Name 08/28/23 1318          Strength Comprehensive (MMT)    Comment, General Manual Muscle Testing (MMT) Assessment Able to stand without knee buckling.  -CR (r) DB (t) CR (c)       Row Name 08/28/23 1318          Balance    Balance Assessment sitting static balance;sitting dynamic balance;sit to stand dynamic balance;standing static balance  -CR (r) DB (t) CR (c)     Static Sitting Balance standby assist  -CR (r) DB (t) CR (c)     Dynamic Sitting Balance contact guard  -CR (r) DB (t) CR (c)     Position, Sitting Balance sitting edge of bed  -CR (r) DB (t) CR (c)     Sit to Stand Dynamic Balance minimal assist;2-person assist  -CR (r) DB (t) CR (c)     Static Standing Balance 2-person assist  -CR (r) DB (t) CR (c)     Comment, Balance Unable to tolerate due to pain.  -CR (r) DB (t) CR (c)       Row Name 08/28/23 1318          Sensory Assessment (Somatosensory)    Sensory Assessment (Somatosensory) LE sensation intact  -CR (r) DB (t) CR (c)                User Key  (r) = Recorded By, (t) = Taken By, (c) = Cosigned By      Initials Name Provider Type    CR Reyes, Carmela, PT Physical Therapist    Pedro Pablo Landry, PT Student PT Student                   Goals/Plan       Row Name 08/28/23 6253          Bed Mobility Goal 1 (PT)    Activity/Assistive Device (Bed Mobility Goal 1, PT) bed mobility activities, all  -CR (r) DB (t) CR (c)     [place] Level/Cues Needed (Bed Mobility Goal 1, PT) modified independence  -CR (r) DB (t) CR (c)     Time Frame (Bed Mobility Goal 1, PT) 2 weeks;long term goal (LTG)  -CR (r) DB (t) CR (c)       Row Name 08/28/23 7877          Gait Training Goal 1 (PT)    Activity/Assistive Device (Gait Training Goal 1, PT) gait (walking locomotion)  -CR (r) DB (t) CR (c)     [place] Level (Gait Training Goal 1, PT) independent  -CR (r) DB (t) CR (c)     Distance (Gait Training Goal 1, PT) 150 ft  -CR (r) DB (t) CR (c)     Time Frame (Gait Training Goal 1, PT) long term goal (LTG);2 weeks  -CR (r) DB (t) CR (c)       Row Name 08/28/23 0328          Stairs Goal 1 (PT)    Activity/Assistive Device (Stairs Goal 1, PT) stairs, all skills  -CR (r) DB (t) CR (c)     [place] Level/Cues Needed (Stairs Goal 1, PT) modified independence  -CR (r) DB (t) CR (c)     Number of Stairs (Stairs Goal 1, PT) 1 flight  -CR (r) DB (t) CR (c)     Time Frame (Stairs Goal 1, PT) long term goal (LTG);2 weeks  -CR (r) DB (t) CR (c)       Row Name 08/28/23 0922          Therapy Assessment/Plan (PT)    Planned Therapy Interventions (PT) bed mobility training;balance training;gait training;home exercise program;stair training;transfer training;patient/family education;ROM (range of motion)  -CR (r) DB (t) CR (c)               User Key  (r) = Recorded By, (t) = Taken By, (c) = Cosigned By      Initials Name Provider Type    CR Reyes, Carmela, PT Physical Therapist    Pedro Pablo Landry, EJ Student PT Student                   Clinical Impression       Row Name  08/28/23 1322          Pain    Pain Intervention(s) Medication (See MAR);Repositioned  -CR (r) DB (t) CR (c)     Additional Documentation Pain Scale: FACES Pre/Post-Treatment (Group)  -CR (r) DB (t) CR (c)       Row Name 08/28/23 1322          Pain Scale: FACES Pre/Post-Treatment    Pain: FACES Scale, Pretreatment 6-->hurts even more  -CR (r) DB (t) CR (c)     Posttreatment Pain Rating 8-->hurts whole lot  -CR (r) DB (t) CR (c)     Pain Location - back  -CR (r) DB (t) CR (c)       Row Name 08/28/23 1322          Plan of Care Review    Plan of Care Reviewed With patient;spouse  -CR (r) DB (t) CR (c)     Outcome Evaluation Patient arrived to the hospital due to fall 2 weeks ago due to parking curb and visual deficits. Patient sustained low back injury due to fall. Patient received CT scan of lumbar region which showed degenerative changes but no osseous fractures or red flags noted. Patient's PLOF was independent in all ADL's with mild visual impairments. Patient's functional mobility was assessed with bed mobility going from supine to sit with moderate assistance x2. Patient transferred from EOB sitting to stand with moderate assistance x2 but was unable to stay standing due to increasing reports of pain. Patient is unable to return to home due to pain reports which limit overall functional mobility and increasing fall risk due to pain. Patient will be assessed using LSO brace to perform functional mobility.  -CR (r) DB (t) CR (c)       Row Name 08/28/23 1322          Therapy Assessment/Plan (PT)    Patient/Family Therapy Goals Statement (PT) Patient's goal is to reduce pain to perform ADL's independently.  -CR (r) DB (t) CR (c)     Rehab Potential (PT) good, to achieve stated therapy goals  -CR (r) DB (t) CR (c)     Criteria for Skilled Interventions Met (PT) yes;skilled treatment is necessary  -CR (r) DB (t) CR (c)     Therapy Frequency (PT) 5 times/wk  -CR (r) DB (t) CR (c)       Row Name 08/28/23 1117           Positioning and Restraints    Pre-Treatment Position in bed  -CR (r) DB (t) CR (c)     Post Treatment Position bed  -CR (r) DB (t) CR (c)               User Key  (r) = Recorded By, (t) = Taken By, (c) = Cosigned By      Initials Name Provider Type    CR Reyes, Carmela, PT Physical Therapist    Pedro Pablo Landry, PT Student PT Student                   Outcome Measures       Row Name 08/28/23 1358 08/28/23 0800       How much help from another person do you currently need...    Turning from your back to your side while in flat bed without using bedrails? 2  -CR (r) DB (t) CR (c) 3  -CW    Moving from lying on back to sitting on the side of a flat bed without bedrails? 2  -CR (r) DB (t) CR (c) 3  -CW    Moving to and from a bed to a chair (including a wheelchair)? 2  -CR (r) DB (t) CR (c) 3  -CW    Standing up from a chair using your arms (e.g., wheelchair, bedside chair)? 2  -CR (r) DB (t) CR (c) 3  -CW    Climbing 3-5 steps with a railing? 1  -CR (r) DB (t) CR (c) 2  -CW    To walk in hospital room? 2  -CR (r) DB (t) CR (c) 2  -CW    AM-PAC 6 Clicks Score (PT) 11  -CR (r) DB (t) 16  -CW    Highest level of mobility 4 --> Transferred to chair/commode  -CR (r) DB (t) 5 --> Static standing  -CW              User Key  (r) = Recorded By, (t) = Taken By, (c) = Cosigned By      Initials Name Provider Type    CR Reyes, Carmela, PT Physical Therapist    Grazyna Loomis, RN Registered Nurse    Pedro Pablo Landry, PT Student PT Student                                 Physical Therapy Education       Title: PT OT SLP Therapies (In Progress)       Topic: Physical Therapy (In Progress)       Point: Mobility training (In Progress)       Learning Progress Summary             Patient Acceptance, E, NR by DB at 8/28/2023 1400                         Point: Home exercise program (Not Started)       Learner Progress:  Not documented in this visit.              Point: Body mechanics (Not Started)       Learner Progress:  Not documented  in this visit.              Point: Precautions (Not Started)       Learner Progress:  Not documented in this visit.                              User Key       Initials Effective Dates Name Provider Type Discipline    DB 08/16/23 -  Pedro Pablo Cee PT Student PT Student PT                  PT Recommendation and Plan  Planned Therapy Interventions (PT): bed mobility training, balance training, gait training, home exercise program, stair training, transfer training, patient/family education, ROM (range of motion)  Plan of Care Reviewed With: patient, spouse  Outcome Evaluation: Patient arrived to the hospital due to fall 2 weeks ago due to parking curb and visual deficits. Patient sustained low back injury due to fall. Patient received CT scan of lumbar region which showed degenerative changes but no osseous fractures or red flags noted. Patient's PLOF was independent in all ADL's with mild visual impairments. Patient's functional mobility was assessed with bed mobility going from supine to sit with moderate assistance x2. Patient transferred from EOB sitting to stand with moderate assistance x2 but was unable to stay standing due to increasing reports of pain. Patient is unable to return to home due to pain reports which limit overall functional mobility and increasing fall risk due to pain. Patient will be assessed using LSO brace to perform functional mobility.     Time Calculation:         PT Charges       Row Name 08/28/23 1402             Time Calculation    Start Time 1030  -CR (r) DB (t) CR (c)      Stop Time 1058  -CR (r) DB (t) CR (c)      Time Calculation (min) 28 min  -CR (r) DB (t)      PT Received On 08/28/23  -CR (r) DB (t) CR (c)      PT - Next Appointment 08/29/23  -CR (r) DB (t) CR (c)      PT Goal Re-Cert Due Date 09/11/23  -CR (r) DB (t) CR (c)                User Key  (r) = Recorded By, (t) = Taken By, (c) = Cosigned By      Initials Name Provider Type    CR Reyes, Carmela, PT Physical Therapist     DB Pedro Pablo Cee, PT Student PT Student                  Therapy Charges for Today       Code Description Service Date Service Provider Modifiers Qty    87591929157 HC PT EVAL LOW COMPLEXITY 4 8/28/2023 Pedro Pablo Cee, PT Student GP 1            PT G-Codes  AM-PAC 6 Clicks Score (PT): 11  PT Discharge Summary  Anticipated Discharge Disposition (PT): home with home health, home with outpatient therapy services    Pdero Pablo Cee, PT Student  8/28/2023

## 2023-08-29 VITALS
RESPIRATION RATE: 16 BRPM | OXYGEN SATURATION: 96 % | DIASTOLIC BLOOD PRESSURE: 61 MMHG | HEART RATE: 74 BPM | BODY MASS INDEX: 29.86 KG/M2 | WEIGHT: 225.31 LBS | HEIGHT: 73 IN | TEMPERATURE: 98 F | SYSTOLIC BLOOD PRESSURE: 125 MMHG

## 2023-08-29 LAB
ANION GAP SERPL CALCULATED.3IONS-SCNC: 10 MMOL/L (ref 5–15)
BASOPHILS # BLD AUTO: 0 10*3/MM3 (ref 0–0.2)
BASOPHILS NFR BLD AUTO: 0 % (ref 0–1.5)
BUN SERPL-MCNC: 19 MG/DL (ref 8–23)
BUN/CREAT SERPL: 19.8 (ref 7–25)
CALCIUM SPEC-SCNC: 9.6 MG/DL (ref 8.6–10.5)
CHLORIDE SERPL-SCNC: 100 MMOL/L (ref 98–107)
CO2 SERPL-SCNC: 24 MMOL/L (ref 22–29)
CREAT SERPL-MCNC: 0.96 MG/DL (ref 0.76–1.27)
DEPRECATED RDW RBC AUTO: 43.3 FL (ref 37–54)
EGFRCR SERPLBLD CKD-EPI 2021: 81.9 ML/MIN/1.73
EOSINOPHIL # BLD AUTO: 0 10*3/MM3 (ref 0–0.4)
EOSINOPHIL NFR BLD AUTO: 0 % (ref 0.3–6.2)
ERYTHROCYTE [DISTWIDTH] IN BLOOD BY AUTOMATED COUNT: 14 % (ref 12.3–15.4)
GLUCOSE SERPL-MCNC: 150 MG/DL (ref 65–99)
HCT VFR BLD AUTO: 40.7 % (ref 37.5–51)
HGB BLD-MCNC: 14 G/DL (ref 13–17.7)
LYMPHOCYTES # BLD AUTO: 0.3 10*3/MM3 (ref 0.7–3.1)
LYMPHOCYTES NFR BLD AUTO: 4 % (ref 19.6–45.3)
MCH RBC QN AUTO: 30.9 PG (ref 26.6–33)
MCHC RBC AUTO-ENTMCNC: 34.5 G/DL (ref 31.5–35.7)
MCV RBC AUTO: 89.5 FL (ref 79–97)
MONOCYTES # BLD AUTO: 0.1 10*3/MM3 (ref 0.1–0.9)
MONOCYTES NFR BLD AUTO: 1.4 % (ref 5–12)
NEUTROPHILS NFR BLD AUTO: 7.3 10*3/MM3 (ref 1.7–7)
NEUTROPHILS NFR BLD AUTO: 94.6 % (ref 42.7–76)
NRBC BLD AUTO-RTO: 0.1 /100 WBC (ref 0–0.2)
PLATELET # BLD AUTO: 117 10*3/MM3 (ref 140–450)
PMV BLD AUTO: 6.5 FL (ref 6–12)
POTASSIUM SERPL-SCNC: 4.7 MMOL/L (ref 3.5–5.2)
RBC # BLD AUTO: 4.54 10*6/MM3 (ref 4.14–5.8)
SODIUM SERPL-SCNC: 134 MMOL/L (ref 136–145)
WBC NRBC COR # BLD: 7.7 10*3/MM3 (ref 3.4–10.8)

## 2023-08-29 PROCEDURE — 97116 GAIT TRAINING THERAPY: CPT

## 2023-08-29 PROCEDURE — 80048 BASIC METABOLIC PNL TOTAL CA: CPT | Performed by: PHYSICIAN ASSISTANT

## 2023-08-29 PROCEDURE — 97530 THERAPEUTIC ACTIVITIES: CPT

## 2023-08-29 PROCEDURE — 99213 OFFICE O/P EST LOW 20 MIN: CPT | Performed by: NURSE PRACTITIONER

## 2023-08-29 PROCEDURE — 63710000001 DEXAMETHASONE PER 0.25 MG: Performed by: NURSE PRACTITIONER

## 2023-08-29 PROCEDURE — 85025 COMPLETE CBC W/AUTO DIFF WBC: CPT | Performed by: PHYSICIAN ASSISTANT

## 2023-08-29 PROCEDURE — 97535 SELF CARE MNGMENT TRAINING: CPT

## 2023-08-29 PROCEDURE — 96376 TX/PRO/DX INJ SAME DRUG ADON: CPT

## 2023-08-29 PROCEDURE — G0378 HOSPITAL OBSERVATION PER HR: HCPCS

## 2023-08-29 PROCEDURE — 25010000002 HYDROMORPHONE 1 MG/ML SOLUTION: Performed by: NURSE PRACTITIONER

## 2023-08-29 RX ORDER — OXYCODONE HYDROCHLORIDE 5 MG/1
10 TABLET ORAL EVERY 4 HOURS PRN
Status: DISCONTINUED | OUTPATIENT
Start: 2023-08-29 | End: 2023-08-29 | Stop reason: HOSPADM

## 2023-08-29 RX ORDER — ACETAMINOPHEN 325 MG/1
650 TABLET ORAL EVERY 6 HOURS PRN
Status: DISCONTINUED | OUTPATIENT
Start: 2023-08-29 | End: 2023-08-29 | Stop reason: HOSPADM

## 2023-08-29 RX ORDER — OXYCODONE HYDROCHLORIDE 10 MG/1
10 TABLET ORAL EVERY 4 HOURS PRN
Qty: 12 TABLET | Refills: 0 | Status: SHIPPED | OUTPATIENT
Start: 2023-08-29 | End: 2023-09-01

## 2023-08-29 RX ADMIN — DULOXETINE HYDROCHLORIDE 60 MG: 30 CAPSULE, DELAYED RELEASE ORAL at 08:06

## 2023-08-29 RX ADMIN — OXYCODONE HYDROCHLORIDE 10 MG: 5 TABLET ORAL at 13:21

## 2023-08-29 RX ADMIN — SENNOSIDES AND DOCUSATE SODIUM 2 TABLET: 50; 8.6 TABLET ORAL at 08:06

## 2023-08-29 RX ADMIN — DEXAMETHASONE 4 MG: 4 TABLET ORAL at 13:22

## 2023-08-29 RX ADMIN — HYDROCODONE BITARTRATE AND ACETAMINOPHEN 1 TABLET: 7.5; 325 TABLET ORAL at 00:28

## 2023-08-29 RX ADMIN — DEXAMETHASONE 4 MG: 4 TABLET ORAL at 06:03

## 2023-08-29 RX ADMIN — HYDROMORPHONE HYDROCHLORIDE 1 MG: 1 INJECTION, SOLUTION INTRAMUSCULAR; INTRAVENOUS; SUBCUTANEOUS at 00:28

## 2023-08-29 RX ADMIN — HYDROMORPHONE HYDROCHLORIDE 1 MG: 1 INJECTION, SOLUTION INTRAMUSCULAR; INTRAVENOUS; SUBCUTANEOUS at 08:06

## 2023-08-29 RX ADMIN — Medication 10 ML: at 08:08

## 2023-08-29 RX ADMIN — METOPROLOL TARTRATE 25 MG: 25 TABLET, FILM COATED ORAL at 08:06

## 2023-08-29 NOTE — DISCHARGE PLACEMENT REQUEST
"Clint Cee \"Dennis\" (78 y.o. Male)       Date of Birth   1945    Social Security Number       Address   [address] [place] [address] [place] [address]    Home Phone   [phone]    MRN   [id number]       [employer]   None    Marital Status                               Admission Date   8/27/23    Admission Type   Emergency    Admitting Provider   Praful Cuba MD    Attending Provider   Praful Cuba MD    Department, Room/Bed   [facility] OBSERVATION, 220/1       Discharge Date       Discharge Disposition       Discharge Destination                                 Attending Provider: Praful Cuba MD    Allergies: Morphine, Beta Adrenergic Blockers, Ace Inhibitors    Isolation: None   Infection: None   Code Status: CPR    Ht: 185.4 cm (73\")   Wt: 102 kg (225 lb 5 oz)    Admission Cmt: None   Principal Problem: Back pain [M54.9]                   Active Insurance as of 8/27/2023       Primary Coverage       Payor Plan Insurance Group Employer/Plan Group    MEDICARE MEDICARE A & B        Payor Plan Address Payor Plan Phone Number Payor Plan Fax Number Effective Dates    [address] [phone]  1/1/2012 - None Entered    [facility] [id number]         Subscriber Name Subscriber Birth Date Member ID       CLINT CEE 1945 [id number]               Secondary Coverage       Payor Plan Insurance Group Employer/Plan Group    [employer] SUP AAR HEALTH CARE OPTIONS PLAN F       Payor Plan Address Payor Plan Phone Number Payor Plan Fax Number Effective Dates    [facility] [phone]  1/1/2020 - None Entered    [address]       [facility] [id number]         Subscriber Name Subscriber Birth Date Member ID       CLINT CEE 1945 [id number]                     Emergency Contacts        (Rel.) Home Phone Work Phone Mobile Phone    ISABELVIKA (Spouse) [phone]    Ghassan Cee (Son) -- -- [phone]    Jessy Cee (Son) -- -- " [phone]

## 2023-08-29 NOTE — CASE MANAGEMENT/SOCIAL WORK
Continued Stay Note  [facility]     Patient Name: Clint Cee  MRN: [id number]  Today's Date: 8/29/2023    Admit Date: 8/27/2023    Plan: From home with spouse. Referred to [facility]; acceptance pending. No precert needed   Discharge Plan       Row Name 08/29/23 1441       Plan    Plan From home with spouse. Referred to [facility]; acceptance pending. No precert needed    Plan Comments Barriers: PT evaluated with TLSO and recommend IP Rehab. CM contacted Leny with [facility] and added to basket. Acceptance pending.                    Phone communication or documentation only - no physical contact with patient or family.   Samantha RAMIREZ,RN Case Manager  [facility]  Phone: Desk- [phone] cell- [phone]

## 2023-08-29 NOTE — PLAN OF CARE
Goal Outcome Evaluation:     Bed mobility - Mod-A log roll to the left. Donned TLSO at edge of the bed. Moderate assist of one for sit to left sidelying  with log roll back to supine.   Transfers - Min-A, Assist x 2, and with rolling walker  Ambulation - 20 + 15 feet Min-A and with rolling walker  pt needed to sit after 20 feet due to increased pain.  Pt ambulated again with same amount of pain.       High Intensity Therapy recommended post-acute care. This is recommended as therapy feels the patient would require 5-6 days per week, 2-3 hours per day. At this time, inpatient rehabilitation (acute rehab) would be recommended.  Pt requires no DME at discharge.     Pt desires Inpatient Rehabilitation placement at discharge. Pt cooperative; agreeable to therapeutic recommendations and plan of care.

## 2023-08-29 NOTE — CASE MANAGEMENT/SOCIAL WORK
Continued Stay Note  [facility]     Patient Name: Clint Cee  MRN: [id number]  Today's Date: 8/29/2023    Admit Date: 8/27/2023    Plan: From home with spouse. [facility] accepted with ready bed today. No precert needed   Discharge Plan       Row Name 08/29/23 1545       Plan    Plan From home with spouse. [facility] accepted with ready bed today. No precert needed    Plan Comments Per Leny with [facility] , they can accept today and have a ready bed. Their van will pick him up at 4:30 PM. CM informed nursing and  and Mrs. Cee                                      Maintained distance greater than six feet and spent less than 15 minutes in the room.     Samantha RAMIREZ,RN Case Manager  [facility]  Phone: Desk- [phone] cell- [phone]

## 2023-08-29 NOTE — PLAN OF CARE
Goal Outcome Evaluation:  Plan of Care Reviewed With: patient           Outcome Evaluation: Pt slept well throughout the night. VS stable with moderate c/o pain throughout shift. Will continue to manage pain and monitor while awaiting further orders per MD.

## 2023-08-29 NOTE — DISCHARGE SUMMARY
"[facility]    Jaret Castellanos MD    CHIEF COMPLAINT:     Back Pain     HISTORY OF PRESENT ILLNESS:    Back Pain    Back Pain  Associated symptoms include numbness.   Patient is a 78 year old  male with history of asthma, hypertension, mitral valve prolapse presents ER with complaints of back pain for 2 weeks. Patient had mechanical fall 2 weeks ago, was seen in ER at that time, had head CT and CT lumbar spine that was unremarkable. Patient was discharged with prescription for Norco. He did follow-up with his PCP 3 days ago but states that he was having a \"good day\" and was not having much pain. Patient states over the last 24 hours his pain has become worse. He describes as aching soreness pain across his lower back that occasionally radiates into his legs that he rates a 4/10. Patient states that he has been ambulating at home independently but states this morning was hard to get out of bed which prompted his ER visit. He denies any saddle anesthesia or bladder or bowel dysfunction. Patient is moving his legs constantly in the stretcher while I examine him. He denies abdominal pain nausea vomiting diarrhea or dysuria. No chest pain or shortness of breath. No fever or chills.      Observation 8/27/23  Pt concurs with er hpi. Pt states he fell 2 weeks ago and had dandre able to ambulate although with difficulty. 2 days ago the pain was worsen and now ambulation is impossible. PT has good strength and feeling in mikhail extremities. Sensation is bilateral. No saddle paraesthesias. CT was performed 2 weeks ago and showed no fracture.  Will give pain meds and consult neurosurgery.      Observation 08/28/2023  Patient currently rating his back pain as 5/10.  He states that currently as needed medication for pain is not relieving his pain.     Observation 08/29/2023  Patient still complains of back pain and rates pain as 5/10.  Worse with movement.  PT/OT recommended inpatient rehab and " neurosurgery recommending back brace strictly when up.    Past Medical History:   Diagnosis Date    Abnormal ECG June 2022    Allergic 01/01/1954    Nasal alergies    Arrhythmia 2004    Dr Youngblood examined me and said i was ok.  skipped beats    Asthma     no sx    Benign prostatic hyperplasia 11/14/2018    Cataract 01/01/2014    Surgery. Caused detached retina of right eye 20/60 repair    Colon polyp 01/01/2018    Found and removed last colon eca.q    Depression     Gastroesophageal reflux disease 03/20/2014    Glaucoma 2003    Normal pressures. Have low pressure glaucoma    Gout     Headache 01/01/2015    Have shimmering in both eyes intermittantly. 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    Prostate Cancer Jan22 January 2022    Spinal stenosis 12/02/2011    Visual impairment 01/01/1955    Nearsighted corrected glasses    Vitamin D deficiency 05/23/2018     Past Surgical History:   Procedure Laterality Date    ADENOIDECTOMY  1954    As child    APPENDECTOMY  1956    Surgery    CARDIAC CATHETERIZATION N/A 09/30/2022    Procedure: Right and Left Heart Cath with Coronar Angiography;  Surgeon: Felipe Garcia MD;  Location: [facility] CATH INVASIVE LOCATION;  Service: Cardiovascular;  Laterality: N/A;    CARDIAC VALVE REPLACEMENT N/A 11/17/2022    Procedure: TRICUSPID VALVE REPAIR/REPLACEMENT;  Surgeon: Femi Henderson MD;  Location: [facility] CVOR;  Service: Cardiothoracic;  Laterality: N/A;  tricuspid valve repaired with 32mm  groves physio tricuspid annuloplasty ring    CARDIAC VALVE REPLACEMENT  11/17/2022    Repaired mitral and tricuspid    CATARACT EXTRACTION Bilateral      COLONOSCOPY  01/01/1997    Regularly scheduled    COLONOSCOPY N/A 2/22/2023    Procedure: COLONOSCOPY with cold snare polypectomy x 1;  Surgeon: Manfred Keating MD;  [facility];  Service: Gastroenterology;  Laterality: N/A;    ENDOSCOPY N/A 2/22/2023    Procedure: ESOPHAGOGASTRODUODENOSCOPY with esophageal dilation using 48 Fr. Bougie Dilator;  Surgeon: Manfred Keating MD;  [facility];  Service: Gastroenterology;  Laterality: N/A;  erosive esophagitis    EYE SURGERY  01/01/2010    Reattached right retina    MITRAL VALVE REPAIR/REPLACEMENT N/A 11/17/2022    Procedure: MITRAL VALVE REPAIR/REPLACEMENT with left atrial appendage closure;  Surgeon: Femi Henderson MD;  [facility];  Service: Cardiothoracic;  Laterality: N/A;  mitral valve repaired iwth 40mm medtronic annuloplasty band  with intraop TERRA    SPINE SURGERY  01/01/1971    2 lumbar 1 cervical    TONSILLECTOMY  01/01/1950    Childhood     Family History   Problem Relation Age of Onset    Arthritis Father     Cancer Father         Prostate    Arrhythmia Father         Congentive heart failure    Diabetes Mother         Adult diabetes    Early death Mother         Kidneys failed after giving birth-diabetes complications    Heart disease Mother         Adult Diabetes     Social History     Tobacco Use    Smoking status: Former     Packs/day: 0.00     Years: 0.50     Pack years: 0.00     Types: Cigarettes     Passive exposure: Past    Smokeless tobacco: Never    Tobacco comments:     Tried tobacco as child   Vaping Use    Vaping Use: Never used   Substance Use Topics    Alcohol use: Not Currently     Comment: Not a regular drinker. Occasionly have a glass of wine or be    Drug use: Never     Medications Prior to Admission   Medication Sig Dispense Refill Last Dose    acetaminophen (TYLENOL) 500 MG tablet Take 1 tablet by mouth Every 6 (Six) Hours As Needed for Mild Pain. Indications: Pain        ACETYLCARN-ALPHA LIPOIC ACID PO Take 1 capsule by mouth Daily. Indications: Dietary supplement       allopurinol (ZYLOPRIM) 100 MG tablet TAKE 1 TABLET BY MOUTH DAILY 90 tablet 0     Apple Erik Vn-Grn Tea-Bit Or-Cr (APPLE CIDER VINEGAR PLUS PO) Take 1 Piece by mouth Daily. Indications: Dietary supplement       atorvastatin (LIPITOR) 40 MG tablet Take 1 tablet by mouth Every Night. Indications: High Amount of Fats in the Blood 90 tablet 1     DULoxetine (CYMBALTA) 60 MG capsule Take 1 capsule by mouth Daily. 90 capsule 0     famotidine (PEPCID) 40 MG tablet Take 1 tablet by mouth Every Night.       fexofenadine (ALLEGRA) 180 MG tablet Take 1 tablet by mouth 2 (Two) Times a Day As Needed. Indications: Hayfever       fluticasone (FLONASE) 50 MCG/ACT nasal spray 2 sprays into the nostril(s) as directed by provider Daily As Needed. Indications: Allergic Rhinitis       Leuprolide Mesylate, 6 Month, (CAMCEVI SC) Inject  under the skin into the appropriate area as directed. Every 6 months injection, due in December  Indications: prostate cancer (to decrease testosterone).       melatonin 5 MG tablet tablet Take 1 tablet by mouth At Night As Needed (sleep). Indications: Trouble Sleeping       metoprolol tartrate (LOPRESSOR) 25 MG tablet Take 1 tablet by mouth Daily.       zolpidem (Ambien) 10 MG tablet Take 1 tablet by mouth At Night As Needed for Sleep.        Allergies:  Morphine, Beta adrenergic blockers, and Ace inhibitors    Immunization History   Administered Date(s) Administered    COVID-19 (PFIZER) Purple Cap Monovalent 01/30/2021, 02/20/2021    Flu Vaccine Quad PF 6-35MO 10/28/2015, 09/30/2016    Fluad Quad 65+ 10/06/2020    Fluzone High Dose =>65 Years ([employer] ONLY) 09/13/2017, 09/06/2018, 10/08/2019    H1N1 Inj 12/06/2009    Influenza, Unspecified 10/08/2021, 10/13/2022    Pneumococcal Conjugate 13-Valent (PCV13) 07/17/2015    Pneumococcal Polysaccharide (PPSV23) 10/06/2020    Shingrix 12/01/2020, 06/10/2021     Tdap 12/01/2020           REVIEW OF SYSTEMS:    Review of Systems   Musculoskeletal:  Positive for back pain.   Review of Systems   Musculoskeletal:  Positive for back pain and myalgias.   Neurological:  Positive for numbness.   All other systems reviewed and are negati    Vital Signs  Temp:  [98 °F (36.7 °C)-98.7 °F (37.1 °C)] 98 °F (36.7 °C)  Heart Rate:  [62-75] 74  Resp:  [8-21] 16  BP: (124-147)/(61-86) 125/61          Physical Exam:  Physical Exam  Vitals and nursing note reviewed.   Constitutional:       Appearance: Normal appearance.   HENT:      Head: Normocephalic and atraumatic.      Right Ear: External ear normal.      Left Ear: External ear normal.      Nose: Nose normal.      Mouth/Throat:      Mouth: Mucous membranes are moist.   Eyes:      Extraocular Movements: Extraocular movements intact.   Cardiovascular:      Rate and Rhythm: Normal rate and regular rhythm.      Pulses: Normal pulses.      Heart sounds: Normal heart sounds.   Pulmonary:      Effort: Pulmonary effort is normal.      Breath sounds: Normal breath sounds.   Abdominal:      General: Abdomen is flat. Bowel sounds are normal.      Palpations: Abdomen is soft.   Musculoskeletal:         General: Normal range of motion.      Cervical back: Normal range of motion.   Skin:     General: Skin is warm.   Neurological:      Mental Status: He is alert and oriented to person, place, and time.      Motor: Weakness present.   Psychiatric:         Behavior: Behavior normal.         Thought Content: Thought content normal.         Judgment: Judgment normal.       Emotional Behavior:    Normal   Debilities:   None  Results Review:    I reviewed the patient's new clinical results.  Lab Results (most recent)       Procedure Component Value Units Date/Time    Basic Metabolic Panel [[id number]]  (Abnormal) Collected: 08/29/23 0439    Specimen: Blood from Arm, Right Updated: 08/29/23 0542     Glucose 150 mg/dL      BUN 19 mg/dL      Creatinine 0.96 mg/dL       Sodium 134 mmol/L      Potassium 4.7 mmol/L      Chloride 100 mmol/L      CO2 24.0 mmol/L      Calcium 9.6 mg/dL      BUN/Creatinine Ratio 19.8     Anion Gap 10.0 mmol/L      eGFR 81.9 mL/min/1.73     Narrative:      GFR Normal >60  Chronic Kidney Disease <60  Kidney Failure <15    The GFR formula is only valid for adults with stable renal function between ages 18 and 70.    CBC & Differential [[id number]]  (Abnormal) Collected: 08/29/23 0439    Specimen: Blood from Arm, Right Updated: 08/29/23 0526    Narrative:      The following orders were created for panel order CBC & Differential.  Procedure                               Abnormality         Status                     ---------                               -----------         ------                     CBC Auto Differential[[id number]]        Abnormal            Final result                 Please view results for these tests on the individual orders.    CBC Auto Differential [[id number]]  (Abnormal) Collected: 08/29/23 0439    Specimen: Blood from Arm, Right Updated: 08/29/23 0526     WBC 7.70 10*3/mm3      RBC 4.54 10*6/mm3      Hemoglobin 14.0 g/dL      Hematocrit 40.7 %      MCV 89.5 fL      MCH 30.9 pg      MCHC 34.5 g/dL      RDW 14.0 %      RDW-SD 43.3 fl      MPV 6.5 fL      Platelets 117 10*3/mm3      Neutrophil % 94.6 %      Lymphocyte % 4.0 %      Monocyte % 1.4 %      Eosinophil % 0.0 %      Basophil % 0.0 %      Neutrophils, Absolute 7.30 10*3/mm3      Lymphocytes, Absolute 0.30 10*3/mm3      Monocytes, Absolute 0.10 10*3/mm3      Eosinophils, Absolute 0.00 10*3/mm3      Basophils, Absolute 0.00 10*3/mm3      nRBC 0.1 /100 WBC     Basic Metabolic Panel [[id number]]  (Abnormal) Collected: 08/28/23 0506    Specimen: Blood from Arm, Left Updated: 08/28/23 0554     Glucose 107 mg/dL      BUN 17 mg/dL      Creatinine 0.93 mg/dL      Sodium 137 mmol/L      Potassium 4.4 mmol/L      Comment: Slight hemolysis detected by analyzer. Results may be affected.         Chloride 102 mmol/L      CO2 25.0 mmol/L      Calcium 9.3 mg/dL      BUN/Creatinine Ratio 18.3     Anion Gap 10.0 mmol/L      eGFR 85.1 mL/min/1.73     Narrative:      GFR Normal >60  Chronic Kidney Disease <60  Kidney Failure <15    The GFR formula is only valid for adults with stable renal function between ages 18 and 70.    CBC & Differential [[id number]]  (Abnormal) Collected: 08/28/23 0506    Specimen: Blood from Arm, Left Updated: 08/28/23 0528    Narrative:      The following orders were created for panel order CBC & Differential.  Procedure                               Abnormality         Status                     ---------                               -----------         ------                     CBC Auto Differential[[id number]]        Abnormal            Final result                 Please view results for these tests on the individual orders.    CBC Auto Differential [[id number]]  (Abnormal) Collected: 08/28/23 0506    Specimen: Blood from Arm, Left Updated: 08/28/23 0528     WBC 5.60 10*3/mm3      RBC 4.27 10*6/mm3      Hemoglobin 13.0 g/dL      Hematocrit 37.6 %      MCV 88.2 fL      MCH 30.4 pg      MCHC 34.4 g/dL      RDW 13.6 %      RDW-SD 44.2 fl      MPV 6.4 fL      Platelets 94 10*3/mm3      Neutrophil % 80.9 %      Lymphocyte % 10.4 %      Monocyte % 5.7 %      Eosinophil % 2.8 %      Basophil % 0.2 %      Neutrophils, Absolute 4.50 10*3/mm3      Lymphocytes, Absolute 0.60 10*3/mm3      Monocytes, Absolute 0.30 10*3/mm3      Eosinophils, Absolute 0.20 10*3/mm3      Basophils, Absolute 0.00 10*3/mm3      nRBC 0.3 /100 WBC     Urinalysis With Microscopic If Indicated (No Culture) - Urine, Clean Catch [[id number]]  (Normal) Collected: 08/27/23 1145    Specimen: Urine, Clean Catch Updated: 08/27/23 1152     Color, UA Yellow     Appearance, UA Clear     pH, UA 7.0     Specific Gravity, UA 1.007     Glucose, UA Negative     Ketones, UA Negative     Bilirubin, UA Negative     Blood, UA Negative      Protein, UA Negative     Leuk Esterase, UA Negative     Nitrite, UA Negative     Urobilinogen, UA 0.2 E.U./dL    Narrative:      Urine microscopic not indicated.            Imaging Results (Most Recent)       Procedure Component Value Units Date/Time    MRI Lumbar Spine Without Contrast [[id number]] Collected: 08/28/23 1417     Updated: 08/28/23 1442    Narrative:      MRI LUMBAR SPINE WO CONTRAST    Date of Exam: 8/28/2023 1:25 PM EDT    Indication: bilateral leg pain.     Comparison: CT 8/15/2023    Technique:  Routine multiplanar/multisequence sequence images of the lumbar spine were obtained without contrast administration.        Findings:  There is edema present along the vertebral bodies and endplates adjacent to the L3-4 disc space, extending across both the right and left facet joints, consistent with pathologic fracture through the disc space relating to surrounding changes of likely   ankylosing spondylitis. Heterogeneous fluid in the disc space is likely a combination of hemorrhage and edema. There is mild disc space widening, otherwise without traumatic malalignment. The conus medullaris and cauda equina nerve roots appear normal.   There is prominent edema through the posterior paraspinal musculature and interspinous ligament at the level of L3-4 likely reflecting injury. Focal edema and fluid signal is present noted anteriorly, possible reflecting some disruption of the anterior   longitudinal ligament. Spondylosis changes are evident, with areas of involvement including    L1-2, prominent osteophytes and bilateral facet arthropathy with mild to moderate spinal canal and bilateral neuroforaminal narrowing.    L2-3, osteophytes and bilateral facet arthropathy with mild spinal canal narrowing. There is moderate to severe left and mild to moderate right neuroforaminal stenosis.    L3-4, osteophytes are present, with some mild heterogeneous fluid signal present along the ventral aspect of the canal,  possible trace blood products and edema, without focal fluid collection concerning for space-occupying hematoma. There is moderate to   severe spinal canal narrowing and severe bilateral neuroforaminal stenosis.    L4-5, there appears to been prior posterior decompression and the spinal canal is patent. Bilateral facet arthropathy is noted with moderate to severe bilateral neuroforaminal narrowing.    L5-S1, patent spinal canal with appearance of prior posterior decompression. There is moderate bilateral neuroforaminal narrowing present.      Impression:      Impression: There is pathologic disc space fracture present spanning L3-4 involving bridging anterior syndesmophytes and extending to likely involve bilateral articulating facets, with edema and hemorrhage noted through the disc space and concern for   likely anterior longitudinal ligament injury and interspinous ligament injury, with questionable additional edema involving the posterior longitudinal ligament. There is minimal widening of the disc space, otherwise without evidence of traumatic   malalignment. Edema and possible trace hemorrhage are noted at the level of the fracture within the spinal canal combining with typical discogenic changes to result in moderate to severe canal narrowing. There is no definite focal space-occupying   epidural hematoma.    Additional moderate to severe multilevel spondylosis change is present as above. There is no evidence of additional high-grade spinal canal narrowing    Electronically Signed: Miguel Mclaughlin MD    8/28/2023 2:40 PM EDT    Workstation ID: [id number]          reviewed    ECG/EMG Results (most recent)       Procedure Component Value Units Date/Time    SCANNED - TELEMETRY   [[id number]] Resulted: 08/27/23     Updated: 08/27/23 2142    SCANNED - TELEMETRY   [[id number]] Resulted: 08/27/23     Updated: 08/27/23 2148    SCANNED - TELEMETRY   [[id number]] Resulted: 08/27/23     Updated: 08/27/23 2247    SCANNED -  TELEMETRY   [[id number]] Resulted: 08/27/23     Updated: 08/28/23 0648    SCANNED - TELEMETRY   [[id number]] Resulted: 08/27/23     Updated: 08/28/23 0757    SCANNED - TELEMETRY   [[id number]] Resulted: 08/27/23     Updated: 08/28/23 1135    SCANNED - TELEMETRY   [[id number]] Resulted: 08/27/23     Updated: 08/28/23 1158    SCANNED - TELEMETRY   [[id number]] Resulted: 08/27/23     Updated: 08/28/23 1610    SCANNED - TELEMETRY   [[id number]] Resulted: 08/27/23     Updated: 08/28/23 2137    SCANNED - TELEMETRY   [[id number]] Resulted: 08/27/23     Updated: 08/29/23 0221    SCANNED - TELEMETRY   [[id number]] Resulted: 08/27/23     Updated: 08/29/23 0649    SCANNED - TELEMETRY   [[id number]] Resulted: 08/27/23     Updated: 08/29/23 0721    SCANNED - TELEMETRY   [[id number]] Resulted: 08/27/23     Updated: 08/29/23 1135          reviewed    Results for orders placed during the hospital encounter of 11/16/22    Bilateral Carotid Duplex    Interpretation Summary    Proximal right internal carotid artery is normal.    Mid right internal carotid artery is normal.    Proximal left internal carotid artery mild stenosis.      Results for orders placed during the hospital encounter of 09/30/22    Adult Transesophageal Echo (TERRA) W/ Cont if Necessary Per Protocol    Interpretation Summary  Date of study  9/30/2022    Procedure performed  Transesophageal echocardiogram and Doppler study.    Indications  Significant mitral regurgitation  Shortness of breath    Procedure  Anesthesia was provided by anesthesiologist with intravenous Diprivan.  TERRA probe could be passed without difficulty.  Patient tolerated the procedure well.  No complications were noted.    Results  Technically satisfactory study.  Mitral valve has severe myxomatous degeneration with multiple areas of severe prolapse and known coaptation of the mitral leaflet as well as probable ruptured chordae.  Severe mitral regurgitation is seen with multiple jets with mitral  regurgitation jet occupying the entire left atrium.  Tricuspid valve is also showing myxomatous degeneration with moderate tricuspid regurgitation.  Calculated pulmonary artery pressure is 60 mmHg.  Aortic valve is thickened with adequate opening motion.  Left atrium is enlarged.  Left atrial appendage is enlarged without clot.  Left ventricle is normal in size and contractility with ejection fraction of 60%.  Right ventricle is normal in size.  Right atrium is normal in size.  Atrial septum is intact without PFO.  Aortic intimal thickening is present without clot.  No pericardial effusion is seen.    Impression  Myxomatous mitral and tricuspid valve degeneration.  Severe mitral valve prolapse involving both anterior and posterior mitral leaflets with severe mitral regurgitation with multiple jets.  Probable ruptured chordae.  In some views coaptation of the mitral valve is present.  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  Significant pulmonary hypertension.  Left atrial and left atrial appendage enlargement without clot.  Normal left ventricular size and contractility with ejection fraction of 60%.      Microbiology Results (last 10 days)       ** No results found for the last 240 hours. **            Assessment & Plan     Back pain        Lumbar pain  - Ua negative for uti  - pt fell 2 weeks ago and had imaging  - CT lumbar spine showing no acute osseus abnormality. Varying degrees of neuroforaminal stenosis present. Prior laminectomy L4-L5, multilevel disc space narrowing  -MRI spine showed fracture L3-4 and edema and hematoma through the disc.  No epidural hematoma noted  - neurosurgery consulted and recommended strict TLSO bracing when out of bed  -Consult PT/OT consulted and recommended inpatient rehab     Hypertension  -Moderately Controlled   BP Readings from Last 1 Encounters:   08/29/23 125/61   - Continue metoprolol  - Monitor while admitted      HPL  - cont home statin therapy        I discussed the patients findings and my recommendations with patient and nursing staff.     Discharge Diagnosis:      Back pain      Hospital Course  Patient is a 78 y.o. male presented with back pain as noted in HPI above.  Patient fell 2 weeks ago and CT lumbar spine showed no acute osseous abnormality.  MRI spine showed fracture L3-4 and edema/hematoma through the disc.  Neurosurgery was consulted and recommended strict TLSO bracing when out of bed.  PT and OT were consulted and recommended inpatient rehab. At this time, patient felt to be in good condition for discharge with close follow up with PCP. Instructed to take all medications as prescribed and to return to ED if any concerning signs/symptoms. All test/lab results were discussed with patient. All questions were answered and patient verbalizes understanding.   .      Past Medical History:     Past Medical History:   Diagnosis Date    Abnormal ECG June 2022    Allergic 01/01/1954    Nasal alergies    Arrhythmia 2004    Dr Youngblood examined me and said i was ok.  skipped beats    Asthma     no sx    Benign prostatic hyperplasia 11/14/2018    Cataract 01/01/2014    Surgery. Caused detached retina of right eye 20/60 repair    Colon polyp 01/01/2018    Found and removed last colon eca.q    Depression     Gastroesophageal reflux disease 03/20/2014    Glaucoma 2003    Normal pressures. Have low pressure glaucoma    Gout     Headache 01/01/2015    Have shimmering in both eyes intermittantly. 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    Prostate Cancer Jan22 January 2022    Spinal stenosis 12/02/2011    Visual  impairment 01/01/1955    Nearsighted corrected glasses    Vitamin D deficiency 05/23/2018       Past Surgical History:     Past Surgical History:   Procedure Laterality Date    ADENOIDECTOMY  1954    As child    APPENDECTOMY  1956    Surgery    CARDIAC CATHETERIZATION N/A 09/30/2022    Procedure: Right and Left Heart Cath with Coronar Angiography;  Surgeon: Felipe Garcia MD;  Location: [facility] CATH INVASIVE LOCATION;  Service: Cardiovascular;  Laterality: N/A;    CARDIAC VALVE REPLACEMENT N/A 11/17/2022    Procedure: TRICUSPID VALVE REPAIR/REPLACEMENT;  Surgeon: Femi Henderson MD;  Location: [facility];  Service: Cardiothoracic;  Laterality: N/A;  tricuspid valve repaired with 32mm  groves physio tricuspid annuloplasty ring    CARDIAC VALVE REPLACEMENT  11/17/2022    Repaired mitral and tricuspid    CATARACT EXTRACTION Bilateral     COLONOSCOPY  01/01/1997    Regularly scheduled    COLONOSCOPY N/A 2/22/2023    Procedure: COLONOSCOPY with cold snare polypectomy x 1;  Surgeon: Manfred Keating MD;  Location: [facility] ENDOSCOPY;  Service: Gastroenterology;  Laterality: N/A;    ENDOSCOPY N/A 2/22/2023    Procedure: ESOPHAGOGASTRODUODENOSCOPY with esophageal dilation using 48 Fr. Bougie Dilator;  Surgeon: Manfred Keating MD;  Location: [facility] ENDOSCOPY;  Service: Gastroenterology;  Laterality: N/A;  erosive esophagitis    EYE SURGERY  01/01/2010    Reattached right retina    MITRAL VALVE REPAIR/REPLACEMENT N/A 11/17/2022    Procedure: MITRAL VALVE REPAIR/REPLACEMENT with left atrial appendage closure;  Surgeon: Femi Henderson MD;  Location: [facility];  Service: Cardiothoracic;  Laterality: N/A;  mitral valve repaired iwth 40mm medtronic annuloplasty band  with intraop TERRA    SPINE SURGERY  01/01/1971    2 lumbar 1 cervical    TONSILLECTOMY  01/01/1950    Childhood       Social History:   Social History     Socioeconomic History    Marital status:    Tobacco Use    Smoking status: Former      Packs/day: 0.00     Years: 0.50     Pack years: 0.00     Types: Cigarettes     Passive exposure: Past    Smokeless tobacco: Never    Tobacco comments:     Tried tobacco as child   Vaping Use    Vaping Use: Never used   Substance and Sexual Activity    Alcohol use: Not Currently     Comment: Not a regular drinker. Occasionly have a glass of wine or be    Drug use: Never    Sexual activity: Defer     Comment: Chemically castrated. Prostate cancer       Procedures Performed         Consults:   Consults       Date and Time Order Name Status Description    8/27/2023  2:58 PM Inpatient Neurosurgery Consult Completed             Condition on Discharge:     Stable    Discharge Disposition  Rehab Facility or Unit (DC - External)    Discharge Medications     Discharge Medications        New Medications        Instructions Start Date   oxyCODONE 10 MG tablet  Commonly known as: ROXICODONE   10 mg, Oral, Every 4 Hours PRN             Continue These Medications        Instructions Start Date   acetaminophen 500 MG tablet  Commonly known as: TYLENOL   1 tablet, Oral, Every 6 Hours PRN      ACETYLCARN-ALPHA LIPOIC ACID PO   1 capsule, Oral, Daily      allopurinol 100 MG tablet  Commonly known as: ZYLOPRIM   100 mg, Oral, Daily      Ambien 10 MG tablet  Generic drug: zolpidem   10 mg, Oral, Nightly PRN      APPLE CIDER VINEGAR PLUS PO   1 Piece, Oral, Daily      atorvastatin 40 MG tablet  Commonly known as: LIPITOR   40 mg, Oral, Nightly      CAMCEVI SC   Subcutaneous, Every 6 months injection, due in December      DULoxetine 60 MG capsule  Commonly known as: CYMBALTA   60 mg, Oral, Daily      famotidine 40 MG tablet  Commonly known as: PEPCID   40 mg, Oral, Nightly      fexofenadine 180 MG tablet  Commonly known as: ALLEGRA   180 mg, Oral, 2 Times Daily PRN      fluticasone 50 MCG/ACT nasal spray  Commonly known as: FLONASE   2 sprays, Nasal, Daily PRN      melatonin 5 MG tablet tablet   5 mg, Oral, Nightly PRN      metoprolol  tartrate 25 MG tablet  Commonly known as: LOPRESSOR   Take 1 tablet by mouth Daily.               Discharge Diet:   Diet Instructions       Diet: Cardiac Diets; Healthy Heart (2-3 Na+); Regular Texture (IDDSI 7); Thin (IDDSI 0)      Discharge Diet: Cardiac Diets    Cardiac Diet: Healthy Heart (2-3 Na+)    Texture: Regular Texture (IDDSI 7)    Fluid Consistency: Thin (IDDSI 0)            Activity at Discharge:     Follow-up Appointments  Future Appointments   Date Time Provider Department Center   11/6/2023  3:30 PM Jaret Castellanos MD [facility]   1/22/2024  9:40 AM Felipe Garcia MD [facility]     Additional Instructions for the Follow-ups that You Need to Schedule       Discharge Follow-up with PCP   As directed       Currently Documented PCP:    Jaret Castellanos MD    PCP Phone Number:    [phone]     Follow Up Details: 5-7 days        Discharge Follow-up with Specified Provider: Neurosurgery   As directed      To: Neurosurgery   Follow Up Details: notify clinic if yes if patient is having worsening back pain or leg pain                Test Results Pending at Discharge       Risk for Readmission (LACE) Score: 5 (8/29/2023  6:00 AM)      Greater than 30 minutes spent in discharge activities for this patient    STUART Saavedra  08/29/23  16:07 EDT

## 2023-08-29 NOTE — THERAPY TREATMENT NOTE
Subjective: Pt agreeable to therapeutic plan of care.    Objective:     Bed mobility - Mod-A log roll to the left. Donned TLSO at edge of the bed. Moderate assist of one for sit to left sidelying  with log roll back to supine.   Transfers - Min-A, Assist x 2, and with rolling walker  Ambulation - 20 + 15 feet Min-A and with rolling walker  pt needed to sit after 20 feet due to increased pain.  Pt ambulated again with same amount of pain.     Vitals: WNL    Pain: 5 VAS   Location: at rest but 9 with activity.   Intervention for pain: Repositioned, RN provided medication, Increased Activity, and Therapeutic Presence    Education: Provided education on the importance of mobility in the acute care setting, Verbal/Tactile Cues, Transfer Training, and Gait Training provided education for TLSO brace to pt and pt's wife.    Assessment: Clint Cee presents with functional mobility impairments which indicate the need for skilled intervention. Tolerating session today without incident. Pt did tolerate activity better today. Pt still painful with transfers. Pt reported radiating pain down left leg when returning to supine.  Pt was able to ambulate short distance but was unsteady.  Pt not safe to return home with his wife at this time. Will change recommendation to Acute IP rehab.  Will continue to follow and progress as tolerated.     Plan/Recommendations:   High Intensity Therapy recommended post-acute care. This is recommended as therapy feels the patient would require 5-6 days per week, 2-3 hours per day. At this time, inpatient rehabilitation (acute rehab) would be recommended.  Pt requires no DME at discharge.     Pt desires Inpatient Rehabilitation placement at discharge. Pt cooperative; agreeable to therapeutic recommendations and plan of care.     Basic Mobility 6-click:  Rollin = Total, A lot = 2, A little = 3; 4 = None  Supine>Sit:   1 = Total, A lot = 2, A little = 3; 4 = None   Sit>Stand with arms:   1 = Total, A lot = 2, A little = 3; 4 = None  Bed>Chair:   1 = Total, A lot = 2, A little = 3; 4 = None  Ambulate in room:  1 = Total, A lot = 2, A little = 3; 4 = None  3-5 Steps with railin = Total, A lot = 2, A little = 3; 4 = None  Score: 13    Modified Dodge: N/A = No pre-op stroke/TIA    Post-Tx Position: Supine with HOB Elevated and Call light and personal items within reach pt's wife present.   PPE: gloves

## 2023-08-29 NOTE — PROGRESS NOTES
NEUROSURGERY PROGRESS NOTE     LOS: 0 days   Patient Care Team:  Jaret Castellanos MD as PCP - General (Family Medicine)  Felipe Garcia MD as Consulting Physician (Cardiology)  Golden Serna RN as Ambulatory  ([facility])    Chief Complaint: Back pain    Subjective     Interval History:     No acute events overnight, MRI lumbar spine was completed yesterday evening.    While in the room and during my examination of the patient I wore a mask and eye protection.  I washed my hands before and after this patient encounter.  The patient was also wearing a mask.     History taken from: patient chart    Objective      Vital Signs  Temp:  [98.2 °F (36.8 °C)-98.7 °F (37.1 °C)] 98.2 °F (36.8 °C)  Heart Rate:  [58-75] 66  Resp:  [8-24] 16  BP: (123-147)/(62-86) 124/70  Body mass index is 29.73 kg/m².    Intake/Output last 3 shifts:  I/O last 3 completed shifts:  In: 720 [P.O.:720]  Out: 1350 [Urine:1350]    Intake/Output this shift:  I/O this shift:  In: -   Out: 600 [Urine:600]    Physical    General:  Awake, alert, and oriented x 3. NAD  Motor:   Normal muscle strength, bulk and tone in upper and lower extremities.    Extremities:   Patient is not wearing SCD bilaterally    Results Review:     I reviewed the patient's new clinical results.  I reviewed the patient's new imaging results and agree with the interpretation.    Labs:    Lab Results (last 24 hours)       Procedure Component Value Units Date/Time    Basic Metabolic Panel [[id number]]  (Abnormal) Collected: 08/29/23 0439    Specimen: Blood from Arm, Right Updated: 08/29/23 0542     Glucose 150 mg/dL      BUN 19 mg/dL      Creatinine 0.96 mg/dL      Sodium 134 mmol/L      Potassium 4.7 mmol/L      Chloride 100 mmol/L      CO2 24.0 mmol/L      Calcium 9.6 mg/dL      BUN/Creatinine Ratio 19.8     Anion Gap 10.0 mmol/L      eGFR 81.9 mL/min/1.73     Narrative:      GFR Normal >60  Chronic Kidney Disease <60  Kidney Failure <15    The GFR formula  is only valid for adults with stable renal function between ages 18 and 70.    CBC & Differential [[id number]]  (Abnormal) Collected: 08/29/23 0439    Specimen: Blood from Arm, Right Updated: 08/29/23 0526    Narrative:      The following orders were created for panel order CBC & Differential.  Procedure                               Abnormality         Status                     ---------                               -----------         ------                     CBC Auto Differential[[id number]]        Abnormal            Final result                 Please view results for these tests on the individual orders.    CBC Auto Differential [[id number]]  (Abnormal) Collected: 08/29/23 0439    Specimen: Blood from Arm, Right Updated: 08/29/23 0526     WBC 7.70 10*3/mm3      RBC 4.54 10*6/mm3      Hemoglobin 14.0 g/dL      Hematocrit 40.7 %      MCV 89.5 fL      MCH 30.9 pg      MCHC 34.5 g/dL      RDW 14.0 %      RDW-SD 43.3 fl      MPV 6.5 fL      Platelets 117 10*3/mm3      Neutrophil % 94.6 %      Lymphocyte % 4.0 %      Monocyte % 1.4 %      Eosinophil % 0.0 %      Basophil % 0.0 %      Neutrophils, Absolute 7.30 10*3/mm3      Lymphocytes, Absolute 0.30 10*3/mm3      Monocytes, Absolute 0.10 10*3/mm3      Eosinophils, Absolute 0.00 10*3/mm3      Basophils, Absolute 0.00 10*3/mm3      nRBC 0.1 /100 WBC             Imaging:    I personally reviewed the images from the following radiographic studies.    Narrative & Impression   MRI LUMBAR SPINE WO CONTRAST     Date of Exam: 8/28/2023 1:25 PM EDT     Indication: bilateral leg pain.     Comparison: CT 8/15/2023     Technique:  Routine multiplanar/multisequence sequence images of the lumbar spine were obtained without contrast administration.          Findings:  There is edema present along the vertebral bodies and endplates adjacent to the L3-4 disc space, extending across both the right and left facet joints, consistent with pathologic fracture through the disc space  relating to surrounding changes of likely   ankylosing spondylitis. Heterogeneous fluid in the disc space is likely a combination of hemorrhage and edema. There is mild disc space widening, otherwise without traumatic malalignment. The conus medullaris and cauda equina nerve roots appear normal.   There is prominent edema through the posterior paraspinal musculature and interspinous ligament at the level of L3-4 likely reflecting injury. Focal edema and fluid signal is present noted anteriorly, possible reflecting some disruption of the anterior   longitudinal ligament. Spondylosis changes are evident, with areas of involvement including     L1-2, prominent osteophytes and bilateral facet arthropathy with mild to moderate spinal canal and bilateral neuroforaminal narrowing.     L2-3, osteophytes and bilateral facet arthropathy with mild spinal canal narrowing. There is moderate to severe left and mild to moderate right neuroforaminal stenosis.     L3-4, osteophytes are present, with some mild heterogeneous fluid signal present along the ventral aspect of the canal, possible trace blood products and edema, without focal fluid collection concerning for space-occupying hematoma. There is moderate to   severe spinal canal narrowing and severe bilateral neuroforaminal stenosis.     L4-5, there appears to been prior posterior decompression and the spinal canal is patent. Bilateral facet arthropathy is noted with moderate to severe bilateral neuroforaminal narrowing.     L5-S1, patent spinal canal with appearance of prior posterior decompression. There is moderate bilateral neuroforaminal narrowing present.     IMPRESSION:  Impression: There is pathologic disc space fracture present spanning L3-4 involving bridging anterior syndesmophytes and extending to likely involve bilateral articulating facets, with edema and hemorrhage noted through the disc space and concern for   likely anterior longitudinal ligament injury and  interspinous ligament injury, with questionable additional edema involving the posterior longitudinal ligament. There is minimal widening of the disc space, otherwise without evidence of traumatic   malalignment. Edema and possible trace hemorrhage are noted at the level of the fracture within the spinal canal combining with typical discogenic changes to result in moderate to severe canal narrowing. There is no definite focal space-occupying   epidural hematoma.     Additional moderate to severe multilevel spondylosis change is present as above. There is no evidence of additional high-grade spinal canal narrowing     Electronically Signed: Miguel Mclaughlin MD    8/28/2023 2:40 PM EDT    Workstation ID: [id number]       Current Medications:   Scheduled Meds:atorvastatin, 40 mg, Oral, Nightly  dexAMETHasone, 4 mg, Oral, Q8H  DULoxetine, 60 mg, Oral, Daily  famotidine, 40 mg, Oral, Nightly  ketamine (KETALAR) IVPB, 0.3 mg/kg, Intravenous, Once  metoprolol tartrate, 25 mg, Oral, Daily  senna-docusate sodium, 2 tablet, Oral, BID  sodium chloride, 10 mL, Intravenous, Q12H      Continuous Infusions:     Assessment & Plan       Back pain      Assessment: Patient is a 78-year-old male with radiographic axial spondyloarthritis being followed for acute back pain with component of acute on chronic leg pain  suffered after a fall 2 weeks prior.  Patient was placed in LSO brace yesterday due to questionable fracture on prior CT scan.  MRI imaging did confirm a trans discal fracture at L3-L4  involving the bridging syndesmophytes with edema extending into posterior elements.  Soft tissue edema with possible ligamentous injury.  There was some hemorrhage throughout the disc base with no focal epidural causing any compounding canal narrowing.  His overall alignment is preserved.  There is no emergent neurosurgical intervention warranted.  This fracture is now 2 weeks old given patient's timeline and while he does have significant pain,  we would like patient to be placed in strict TLSO bracing for extra stability. Patient to undergo bracing trial and if tolerates he can go home otherwise he will need surgical fixation.  Please have patient evaluated with PT once he is fitted with TLSO brace.      Plan:      Back pain  -Transdiskal fracture L3-L4 with posterior column involvement  -Strict TLSO bracing when out of bed  -Bracing trial with PT  - Pain management per primary        I discussed my findings with patient, family, and Dr. Sun.        Caroline Robert, APRN  08/29/23  12:05 EDT

## 2023-08-30 NOTE — CASE MANAGEMENT/SOCIAL WORK
Case Management Discharge Note      Final Note: [facility] per their wheelchair van         Selected Continued Care - Discharged on 8/29/2023 Admission date: 8/27/2023 - Discharge disposition: Rehab Facility or Unit (DC - External)      Destination Coordination complete.      Service Provider Selected Services Address Phone Fax Patient Preferred    [facility] [address] [facility] [place] [address] [phone] --                          Transportation Services  W/C Van: Skilled Nursing Facility Van    Final Discharge Disposition Code: 62 - inpatient rehab facility

## 2023-09-08 ENCOUNTER — HOSPITAL ENCOUNTER (INPATIENT)
Facility: HOSPITAL | Age: 78
LOS: 7 days | Discharge: NURSING, REHAB OR LONG-TERM CARE | DRG: 460 | End: 2023-09-19
Attending: EMERGENCY MEDICINE | Admitting: STUDENT IN AN ORGANIZED HEALTH CARE EDUCATION/TRAINING PROGRAM
Payer: MEDICARE

## 2023-09-08 ENCOUNTER — TELEPHONE (OUTPATIENT)
Dept: FAMILY MEDICINE CLINIC | Facility: CLINIC | Age: 78
End: 2023-09-08
Payer: MEDICARE

## 2023-09-08 ENCOUNTER — APPOINTMENT (OUTPATIENT)
Dept: GENERAL RADIOLOGY | Facility: HOSPITAL | Age: 78
DRG: 460 | End: 2023-09-08
Payer: MEDICARE

## 2023-09-08 ENCOUNTER — APPOINTMENT (OUTPATIENT)
Dept: MRI IMAGING | Facility: HOSPITAL | Age: 78
DRG: 460 | End: 2023-09-08
Payer: MEDICARE

## 2023-09-08 DIAGNOSIS — G47.00 INSOMNIA, UNSPECIFIED TYPE: ICD-10-CM

## 2023-09-08 DIAGNOSIS — M43.22 FUSION OF SPINE OF CERVICAL REGION: ICD-10-CM

## 2023-09-08 DIAGNOSIS — M54.42 ACUTE LEFT-SIDED LOW BACK PAIN WITH LEFT-SIDED SCIATICA: Primary | ICD-10-CM

## 2023-09-08 LAB
ANION GAP SERPL CALCULATED.3IONS-SCNC: 9 MMOL/L (ref 5–15)
BASOPHILS # BLD AUTO: 0 10*3/MM3 (ref 0–0.2)
BASOPHILS NFR BLD AUTO: 0.1 % (ref 0–1.5)
BUN SERPL-MCNC: 17 MG/DL (ref 8–23)
BUN/CREAT SERPL: 21.5 (ref 7–25)
CALCIUM SPEC-SCNC: 9.3 MG/DL (ref 8.6–10.5)
CHLORIDE SERPL-SCNC: 105 MMOL/L (ref 98–107)
CO2 SERPL-SCNC: 24 MMOL/L (ref 22–29)
CREAT SERPL-MCNC: 0.79 MG/DL (ref 0.76–1.27)
DEPRECATED RDW RBC AUTO: 45.1 FL (ref 37–54)
EGFRCR SERPLBLD CKD-EPI 2021: 92.1 ML/MIN/1.73
EOSINOPHIL # BLD AUTO: 0.1 10*3/MM3 (ref 0–0.4)
EOSINOPHIL NFR BLD AUTO: 1.7 % (ref 0.3–6.2)
ERYTHROCYTE [DISTWIDTH] IN BLOOD BY AUTOMATED COUNT: 14 % (ref 12.3–15.4)
GLUCOSE SERPL-MCNC: 113 MG/DL (ref 65–99)
HCT VFR BLD AUTO: 40.3 % (ref 37.5–51)
HGB BLD-MCNC: 13.9 G/DL (ref 13–17.7)
LYMPHOCYTES # BLD AUTO: 0.7 10*3/MM3 (ref 0.7–3.1)
LYMPHOCYTES NFR BLD AUTO: 11.3 % (ref 19.6–45.3)
MCH RBC QN AUTO: 30.1 PG (ref 26.6–33)
MCHC RBC AUTO-ENTMCNC: 34.5 G/DL (ref 31.5–35.7)
MCV RBC AUTO: 87.3 FL (ref 79–97)
MONOCYTES # BLD AUTO: 0.6 10*3/MM3 (ref 0.1–0.9)
MONOCYTES NFR BLD AUTO: 9.3 % (ref 5–12)
NEUTROPHILS NFR BLD AUTO: 4.8 10*3/MM3 (ref 1.7–7)
NEUTROPHILS NFR BLD AUTO: 77.6 % (ref 42.7–76)
NRBC BLD AUTO-RTO: 0.1 /100 WBC (ref 0–0.2)
PLATELET # BLD AUTO: 85 10*3/MM3 (ref 140–450)
PMV BLD AUTO: 6.6 FL (ref 6–12)
POTASSIUM SERPL-SCNC: 4.1 MMOL/L (ref 3.5–5.2)
RBC # BLD AUTO: 4.62 10*6/MM3 (ref 4.14–5.8)
SODIUM SERPL-SCNC: 138 MMOL/L (ref 136–145)
WBC NRBC COR # BLD: 6.2 10*3/MM3 (ref 3.4–10.8)

## 2023-09-08 PROCEDURE — 73502 X-RAY EXAM HIP UNI 2-3 VIEWS: CPT

## 2023-09-08 PROCEDURE — G0378 HOSPITAL OBSERVATION PER HR: HCPCS

## 2023-09-08 PROCEDURE — 99285 EMERGENCY DEPT VISIT HI MDM: CPT

## 2023-09-08 PROCEDURE — 85025 COMPLETE CBC W/AUTO DIFF WBC: CPT

## 2023-09-08 PROCEDURE — 87102 FUNGUS ISOLATION CULTURE: CPT | Performed by: EMERGENCY MEDICINE

## 2023-09-08 PROCEDURE — 80048 BASIC METABOLIC PNL TOTAL CA: CPT

## 2023-09-08 PROCEDURE — 72148 MRI LUMBAR SPINE W/O DYE: CPT

## 2023-09-08 RX ORDER — FAMOTIDINE 20 MG/1
40 TABLET, FILM COATED ORAL NIGHTLY
Status: DISCONTINUED | OUTPATIENT
Start: 2023-09-08 | End: 2023-09-19 | Stop reason: HOSPADM

## 2023-09-08 RX ORDER — ATORVASTATIN CALCIUM 40 MG/1
40 TABLET, FILM COATED ORAL NIGHTLY
Status: DISCONTINUED | OUTPATIENT
Start: 2023-09-08 | End: 2023-09-19 | Stop reason: HOSPADM

## 2023-09-08 RX ORDER — HYDROCODONE BITARTRATE AND ACETAMINOPHEN 7.5; 325 MG/1; MG/1
1 TABLET ORAL EVERY 6 HOURS PRN
Status: DISCONTINUED | OUTPATIENT
Start: 2023-09-08 | End: 2023-09-09

## 2023-09-08 RX ORDER — ZOLPIDEM TARTRATE 5 MG/1
10 TABLET ORAL NIGHTLY PRN
Status: DISCONTINUED | OUTPATIENT
Start: 2023-09-08 | End: 2023-09-13

## 2023-09-08 RX ORDER — SODIUM CHLORIDE 9 MG/ML
40 INJECTION, SOLUTION INTRAVENOUS AS NEEDED
Status: DISCONTINUED | OUTPATIENT
Start: 2023-09-08 | End: 2023-09-19 | Stop reason: HOSPADM

## 2023-09-08 RX ORDER — DULOXETIN HYDROCHLORIDE 30 MG/1
60 CAPSULE, DELAYED RELEASE ORAL DAILY
Status: DISCONTINUED | OUTPATIENT
Start: 2023-09-08 | End: 2023-09-19 | Stop reason: HOSPADM

## 2023-09-08 RX ORDER — ALLOPURINOL 100 MG/1
100 TABLET ORAL DAILY
Status: DISCONTINUED | OUTPATIENT
Start: 2023-09-08 | End: 2023-09-19 | Stop reason: HOSPADM

## 2023-09-08 RX ORDER — SODIUM CHLORIDE 0.9 % (FLUSH) 0.9 %
10 SYRINGE (ML) INJECTION AS NEEDED
Status: DISCONTINUED | OUTPATIENT
Start: 2023-09-08 | End: 2023-09-13

## 2023-09-08 RX ORDER — SODIUM CHLORIDE 0.9 % (FLUSH) 0.9 %
10 SYRINGE (ML) INJECTION EVERY 12 HOURS SCHEDULED
Status: DISCONTINUED | OUTPATIENT
Start: 2023-09-08 | End: 2023-09-19 | Stop reason: HOSPADM

## 2023-09-08 RX ORDER — ALLOPURINOL 100 MG/1
100 TABLET ORAL DAILY
COMMUNITY

## 2023-09-08 RX ORDER — CHOLECALCIFEROL (VITAMIN D3) 125 MCG
5 CAPSULE ORAL NIGHTLY PRN
Status: DISCONTINUED | OUTPATIENT
Start: 2023-09-08 | End: 2023-09-19 | Stop reason: HOSPADM

## 2023-09-08 RX ORDER — SODIUM CHLORIDE 0.9 % (FLUSH) 0.9 %
10 SYRINGE (ML) INJECTION AS NEEDED
Status: DISCONTINUED | OUTPATIENT
Start: 2023-09-08 | End: 2023-09-19 | Stop reason: HOSPADM

## 2023-09-08 RX ADMIN — Medication 10 ML: at 20:34

## 2023-09-08 RX ADMIN — HYDROCODONE BITARTRATE AND ACETAMINOPHEN 1 TABLET: 7.5; 325 TABLET ORAL at 23:40

## 2023-09-08 RX ADMIN — DULOXETINE HYDROCHLORIDE 60 MG: 30 CAPSULE, DELAYED RELEASE ORAL at 20:35

## 2023-09-08 RX ADMIN — ZOLPIDEM TARTRATE 10 MG: 5 TABLET ORAL at 23:41

## 2023-09-08 RX ADMIN — FAMOTIDINE 40 MG: 20 TABLET ORAL at 20:35

## 2023-09-08 RX ADMIN — ALLOPURINOL 100 MG: 100 TABLET ORAL at 20:35

## 2023-09-08 RX ADMIN — ATORVASTATIN CALCIUM 40 MG: 40 TABLET, FILM COATED ORAL at 20:34

## 2023-09-08 RX ADMIN — METOPROLOL TARTRATE 25 MG: 25 TABLET, FILM COATED ORAL at 20:34

## 2023-09-08 NOTE — ED NOTES
Pt presents to ED via EMS from [facility] where he has been for about 5 days for rehab for L4 and L5 fx. Pt fell on 8/13 and injured his back, at [facility] no fx were identified with CT, later he was not getting better and an MRI was obtained. Pt has been having more severe left sided sacral pain worse with movement and unable to get up in the last 2 days. Pt AxOx4.

## 2023-09-08 NOTE — ED NOTES
"Nursing report ED to floor  Clint Cee  78 y.o.  male    HPI:   Chief Complaint   Patient presents with    Back Pain       Admitting doctor:   Alfonzo Maher MD    Admitting diagnosis:   The encounter diagnosis was Acute left-sided low back pain with left-sided sciatica.    Code status:   Current Code Status       Date Active Code Status Order ID Comments User Context       Prior            Allergies:   Morphine, Beta adrenergic blockers, and Ace inhibitors    Isolation:  Contact     Fall Risk:  Fall Risk Assessment was completed, and patient is at moderate risk for falls.   Predictive Model Details         13 (Low) Factor Value    Calculated 9/8/2023 14:07 Age 78    Risk of Fall Model Imaging order in this encounter Present     Musculoskeletal Assessment X     Skin Assessment WDL     Magnesium not on file     Financial Class Medicare     Diastolic BP 67     Drug Use No     Tobacco Use Quit     Serjio Scale not on file     Peripheral Vascular Assessment WDL     Calcium not on file     Clinically Relevant Sex Not Female     Gastrointestinal Assessment WDL     Albumin not on file     Cardiac Assessment WDL     Respiratory Rate 16     Days after Admission 0.172     Total Bilirubin not on file     Chloride not on file     Potassium not on file     Creatinine not on file     ALT not on file         Weight:       09/08/23  0957   Weight: 102 kg (225 lb)       Intake and Output  No intake or output data in the 24 hours ending 09/08/23 1623    Diet:        Most recent vitals:   Vitals:    09/08/23 0957 09/08/23 1227 09/08/23 1501 09/08/23 1604   BP: 125/77 119/67 135/78 123/77   BP Location: Right arm Right arm     Patient Position: Lying Lying     Pulse: 78 63 66 68   Resp: 20 16 18 15   Temp: 97.5 °F (36.4 °C)      TempSrc: Oral      SpO2: 98% 98% 99% 98%   Weight: 102 kg (225 lb)      Height: 185.4 cm (73\")          Active LDAs/IV Access:   Lines, Drains & Airways       Active LDAs       Name Placement date " Placement time Site Days    Peripheral IV 09/08/23 1535 Anterior;Right Forearm 09/08/23  1535  Forearm  less than 1                    Skin Condition:   Skin Assessments (last day)       Date/Time Skin WDL    09/08/23 10:47:46 WD             Labs (abnormal labs have a star):   Labs Reviewed   BASIC METABOLIC PANEL - Abnormal; Notable for the following components:       Result Value    Glucose 113 (*)     All other components within normal limits    Narrative:     GFR Normal >60  Chronic Kidney Disease <60  Kidney Failure <15    The GFR formula is only valid for adults with stable renal function between ages 18 and 70.   CBC WITH AUTO DIFFERENTIAL - Abnormal; Notable for the following components:    Platelets 85 (*)     Neutrophil % 77.6 (*)     Lymphocyte % 11.3 (*)     All other components within normal limits   HERON AURIS SCREEN   CBC AND DIFFERENTIAL    Narrative:     The following orders were created for panel order CBC & Differential.  Procedure                               Abnormality         Status                     ---------                               -----------         ------                     CBC Auto Differential[[id number]]        Abnormal            Final result                 Please view results for these tests on the individual orders.       LOC: Person, Place, Time, and Situation    Telemetry:  Observation Unit    Cardiac Monitoring Ordered: no    EKG:   No orders to display       Medications Given in the ED:   Medications   sodium chloride 0.9 % flush 10 mL (has no administration in time range)       Imaging results:  MRI Lumbar Spine Without Contrast    Result Date: 9/8/2023  1. Features of advanced degenerative change and ankylosing spondylitis in the lumbar spine, with fracture through the L3-4 disc space extending to the posterior elements, is again noted. Mild widening of the anterior L3-4 disc space with fluid and edema,  is unchanged, and no subluxation is evident. 2. Moderate  canal stenosis at L1-2 and moderate to severe canal stenosis at L3-4, unchanged. Multilevel moderate to moderate to severe neural foraminal stenosis age of changes are unchanged from 8/28/2023. 3. No new finding since 8/28/2023. Electronically Signed: Deann Damon MD  9/8/2023 2:13 PM EDT  Workstation ID: [id number]    XR Hip With or Without Pelvis 2 - 3 View Left    Result Date: 9/8/2023  Impression: 1. No acute left hip or pelvic finding. 2. Moderately advanced degenerative changes of the hips. 3. Advanced diminished disc height in the lower lumbar spine. Electronically Signed: Deann Damon MD  9/8/2023 11:56 AM EDT  Workstation ID: [id number]     Social issues:   Social History     Socioeconomic History    Marital status:    Tobacco Use    Smoking status: Former     Packs/day: 0.00     Years: 0.50     Pack years: 0.00     Types: Cigarettes     Passive exposure: Past    Smokeless tobacco: Never    Tobacco comments:     Tried tobacco as child   Vaping Use    Vaping Use: Never used   Substance and Sexual Activity    Alcohol use: Not Currently     Comment: Not a regular drinker. Occasionly have a glass of wine or be    Drug use: Never    Sexual activity: Defer     Comment: Chemically castrated. Prostate cancer       NIH Stroke Scale:  Interval: (not recorded)  1a. Level of Consciousness: (not recorded)  1b. LOC Questions: (not recorded)  1c. LOC Commands: (not recorded)  2. Best Gaze: (not recorded)  3. Visual: (not recorded)  4. Facial Palsy: (not recorded)  5a. Motor Arm, Left: (not recorded)  5b. Motor Arm, Right: (not recorded)  6a. Motor Leg, Left: (not recorded)  6b. Motor Leg, Right: (not recorded)  7. Limb Ataxia: (not recorded)  8. Sensory: (not recorded)  9. Best Language: (not recorded)  10. Dysarthria: (not recorded)  11. Extinction and Inattention (formerly Neglect): (not recorded)    Total (NIH Stroke Scale): (not recorded)     Additional notable assessment information:     Nursing report ED to  floor:  SHANEL Van RN   09/08/23 16:23 EDT

## 2023-09-08 NOTE — PLAN OF CARE
Goal Outcome Evaluation:      Patient just recently got up to the unit. He is resting now.

## 2023-09-08 NOTE — ED PROVIDER NOTES
"Subjective   History of Present Illness  Patient is a 78-year-old  male with a history of a fall 8/13 who presents the emergency room with complaints of continued and worsening back pain that has been ongoing for several days.  Patient states that he was seen in the emergency room after the fall and had a negative CT.  When the pain continued, he had an MRI with findings including fracture and ligament tear of L3/L4.  He has been at [facility] for the past couple weeks and was scheduled to be discharged today but has had increasing pain.  He contacted his primary care provider who urged him to come to the emergency room for further evaluation.  Patient is also stating that he has pain in his left hip and is concerned for fracture.  Patient states that the pain is not unbearable if he sits still but increases tremendously if he tries to move.  It is in his left lumbar region with radiation to his left leg.  He also states that there is mild radiation to his right leg but not nearly as severe.  He states that sensation feels like \"an electric shock\".  He has had no loss of bowel/bladder.  He has had no numbness in his rectum or in her thighs.  Patient does have paresthesia in his left foot but this has been ongoing since his symptoms began after his fall.  Patient has been taking oxycodone, baclofen and gabapentin with his last dose at 830 this morning.    PCP: Angie Castellanos    Review of Systems   Constitutional:  Negative for appetite change and fever.   HENT:  Negative for congestion and rhinorrhea.    Respiratory:  Negative for shortness of breath.    Cardiovascular:  Negative for chest pain.   Gastrointestinal:  Negative for abdominal pain, nausea and vomiting.   Genitourinary:  Negative for dysuria.   Musculoskeletal:  Positive for back pain and myalgias.   Neurological:  Negative for dizziness and headaches.   Psychiatric/Behavioral:  The patient is not nervous/anxious.    All other systems " reviewed and are negative.    Past Medical History:   Diagnosis Date    Abnormal ECG June 2022    Allergic 01/01/1954    Nasal alergies    Arrhythmia 2004    Dr Youngblood examined me and said i was ok.  skipped beats    Asthma     no sx    Benign prostatic hyperplasia 11/14/2018    Cataract 01/01/2014    Surgery. Caused detached retina of right eye 20/60 repair    Colon polyp 01/01/2018    Found and removed last colon eca.q    Depression     Gastroesophageal reflux disease 03/20/2014    Glaucoma 2003    Normal pressures. Have low pressure glaucoma    Gout     Headache 01/01/2015    Have shimmering in both eyes intermittantly. 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    Prostate Cancer Jan22 January 2022    Spinal stenosis 12/02/2011    Visual impairment 01/01/1955    Nearsighted corrected glasses    Vitamin D deficiency 05/23/2018       Allergies   Allergen Reactions    Morphine Hallucinations    Beta Adrenergic Blockers Cough    Ace Inhibitors Cough     cough       Past Surgical History:   Procedure Laterality Date    ADENOIDECTOMY  1954    As child    APPENDECTOMY  1956    Surgery    CARDIAC CATHETERIZATION N/A 09/30/2022    Procedure: Right and Left Heart Cath with Coronar Angiography;  Surgeon: Felipe Garcia MD;  Location: [facility] CATH INVASIVE LOCATION;  Service: Cardiovascular;  Laterality: N/A;    CARDIAC VALVE REPLACEMENT N/A 11/17/2022    Procedure: TRICUSPID VALVE REPAIR/REPLACEMENT;  Surgeon: Femi Henderson MD;  Location: [facility] CVOR;  Service: Cardiothoracic;  Laterality: N/A;  tricuspid valve repaired with 32mm  groves physio tricuspid annuloplasty ring     CARDIAC VALVE REPLACEMENT  11/17/2022    Repaired mitral and tricuspid    CATARACT EXTRACTION Bilateral     COLONOSCOPY  01/01/1997    Regularly scheduled    COLONOSCOPY N/A 2/22/2023    Procedure: COLONOSCOPY with cold snare polypectomy x 1;  Surgeon: Manfred Keating MD;  Location: [facility] ENDOSCOPY;  Service: Gastroenterology;  Laterality: N/A;    ENDOSCOPY N/A 2/22/2023    Procedure: ESOPHAGOGASTRODUODENOSCOPY with esophageal dilation using 48 Fr. Bougie Dilator;  Surgeon: Manfred Keating MD;  Location: [facility] ENDOSCOPY;  Service: Gastroenterology;  Laterality: N/A;  erosive esophagitis    EYE SURGERY  01/01/2010    Reattached right retina    MITRAL VALVE REPAIR/REPLACEMENT N/A 11/17/2022    Procedure: MITRAL VALVE REPAIR/REPLACEMENT with left atrial appendage closure;  Surgeon: Femi Henderson MD;  Location: [facility] CVOR;  Service: Cardiothoracic;  Laterality: N/A;  mitral valve repaired iwth 40mm medtronic annuloplasty band  with intraop TERRA    SPINE SURGERY  01/01/1971    2 lumbar 1 cervical    TONSILLECTOMY  01/01/1950    Childhood       Family History   Problem Relation Age of Onset    Arthritis Father     Cancer Father         Prostate    Arrhythmia Father         Congentive heart failure    Diabetes Mother         Adult diabetes    Early death Mother         Kidneys failed after giving birth-diabetes complications    Heart disease Mother         Adult Diabetes       Social History     Socioeconomic History    Marital status:    Tobacco Use    Smoking status: Former     Packs/day: 0.00     Years: 0.50     Pack years: 0.00     Types: Cigarettes     Passive exposure: Past    Smokeless tobacco: Never    Tobacco comments:     Tried tobacco as child   Vaping Use    Vaping Use: Never used   Substance and Sexual Activity    Alcohol use: Not Currently     Comment: Not a regular drinker. Occasionly have a glass of wine or be    Drug use: Never    Sexual activity: Defer     Comment: Chemically  castrated. Prostate cancer           Objective   Physical Exam  Vitals and nursing note reviewed.   Constitutional:       General: He is awake. He is not in acute distress.     Appearance: Normal appearance. He is well-developed. He is not ill-appearing.   HENT:      Head: Normocephalic and atraumatic. No raccoon eyes or Greenfield's sign.      Right Ear: Hearing, tympanic membrane and ear canal normal.      Left Ear: Hearing, tympanic membrane and ear canal normal.   Eyes:      General: Vision grossly intact. Gaze aligned appropriately.      Extraocular Movements: Extraocular movements intact.      Pupils: Pupils are equal, round, and reactive to light.   Cardiovascular:      Rate and Rhythm: Normal rate and regular rhythm.      Pulses: Normal pulses.      Heart sounds: Normal heart sounds. No murmur heard.  Pulmonary:      Effort: Pulmonary effort is normal. No respiratory distress.      Breath sounds: Normal breath sounds.   Abdominal:      General: Bowel sounds are normal.      Palpations: Abdomen is soft.      Tenderness: There is no abdominal tenderness.   Musculoskeletal:         General: Tenderness present. No swelling or deformity. Normal range of motion.      Cervical back: Normal range of motion and neck supple.   Skin:     General: Skin is warm and dry.      Capillary Refill: Capillary refill takes less than 2 seconds.   Neurological:      General: No focal deficit present.      Mental Status: He is alert and oriented to person, place, and time. Mental status is at baseline.      GCS: GCS eye subscore is 4. GCS verbal subscore is 5. GCS motor subscore is 6.      Cranial Nerves: Cranial nerves 2-12 are intact.      Sensory: Sensation is intact.      Motor: Motor function is intact.      Deep Tendon Reflexes: Reflexes are normal and symmetric.   Psychiatric:         Mood and Affect: Mood normal.         Behavior: Behavior normal.       Procedures           ED Course      /77   Pulse 68   Temp 97.5 °F  "(36.4 °C) (Oral)   Resp 15   Ht 185.4 cm (73\")   Wt 102 kg (225 lb)   SpO2 98%   BMI 29.69 kg/m² bs  .edla  Medications   sodium chloride 0.9 % flush 10 mL (has no administration in time range)   1day  MRI Lumbar Spine Without Contrast    Result Date: 9/8/2023  1. Features of advanced degenerative change and ankylosing spondylitis in the lumbar spine, with fracture through the L3-4 disc space extending to the posterior elements, is again noted. Mild widening of the anterior L3-4 disc space with fluid and edema,  is unchanged, and no subluxation is evident. 2. Moderate canal stenosis at L1-2 and moderate to severe canal stenosis at L3-4, unchanged. Multilevel moderate to moderate to severe neural foraminal stenosis age of changes are unchanged from 8/28/2023. 3. No new finding since 8/28/2023. Electronically Signed: Deann Damon MD  9/8/2023 2:13 PM EDT  Workstation ID: [id number]    XR Hip With or Without Pelvis 2 - 3 View Left    Result Date: 9/8/2023  Impression: 1. No acute left hip or pelvic finding. 2. Moderately advanced degenerative changes of the hips. 3. Advanced diminished disc height in the lower lumbar spine. Electronically Signed: Deann Damon MD  9/8/2023 11:56 AM EDT  Workstation ID: [id number]                                        Medical Decision Making  Problems Addressed:  Acute left-sided low back pain with left-sided sciatica: complicated acute illness or injury    Amount and/or Complexity of Data Reviewed  Labs: ordered.  Radiology: ordered.    Risk  Prescription drug management.  Decision regarding hospitalization.    Patient is a pleasant 78-year-old overweight  male with a recent history of a fall that resulted in a fracture of his lumbar spine who presents with complaints of increased pain over the past several days, especially when he moves.  On exam, patient has tenderness in his lumbar spine without evidence of trauma.  Spine is mid line and atraumatic.  He is able to move " all extremities with active range of motion.  Distal pulses and sensation are strong and equal bilaterally.  Cap refill less than 2 seconds.  Pupils PERRLA.  GCS 15.  Initial differentials include sciatic nerve pain, lumbar fracture, hip fracture.  This is not a complete list.    Due to overwhelming hospital census and boarding inpatients in the emergency room, patient received above examination in hallway bed.  Examination was made to the best of provider's ability with respect to patient privacy and confidentiality.  MRI was repeated to assess for changes from previous study.  X-ray images were also ordered to evaluate his left hip.  At time of exam, patient has recently had medication at home and I do not believe he warranted further medication at this time.  My interpretation of x-ray reveals no dislocation or fracture.  This encounter with radiologist.  MRI revealed no changes from previous study.  Upon reassessment, patient states that he is still having trouble moving and getting himself up on his own.  I am concerned for discharging patient if he is unable to take care of himself.  This was discussed with patient and he has elected to stay in the hospital.  Patient was placed in ED observation unit for further evaluation and management of pain.  While he is here, I hope that he will get to speak with neurosurgery to plan if he qualifies for kyphoplasty.  Patient has remained hemodynamically stable and is in no acute distress.  I discussed patient with LUZMARIA Orantes as well.  No acute distress noted.      Final diagnoses:   Acute left-sided low back pain with left-sided sciatica       ED Disposition  ED Disposition       ED Disposition   Decision to Admit    Condition   --    Comment   --               No follow-up provider specified.       Medication List        ASK your doctor about these medications      allopurinol 100 MG tablet  Commonly known as: ZYLOPRIM  Ask about: Which instructions should I use?                  Griselda Fournier, APRN  09/08/23 1640

## 2023-09-08 NOTE — TELEPHONE ENCOUNTER
In a [employer] message yesterday, Dr. Castellanos told patient he would need to go back to the ER for evaluation.

## 2023-09-08 NOTE — TELEPHONE ENCOUNTER
Caller: VIKA HALL    Relationship: Emergency Contact    Best call back number:     [phone]       What is the best time to reach you: ANY    Who are you requesting to speak with (clinical staff, provider,  specific staff member): PCP    Do you know the name of the person who called:     What was the call regarding: PATIENT FELL ON 8/13/23, WAS IN THE HOSPITAL FOR 10 DAYS AND THEN WENT OVER TO [facility] WITH A FRACTURED VERTEBRAE. HE WILL BE DISCHARGED FROM REHAB TODAY 9/8/23. THE PATIENT DOES NOT WANT TO GO HOME. HE WANTS TO GO BACK TO THE HOSPITAL INSTEAD. HE WANTS TO DISCUSS SURGERY AS SOON AS POSSIBLE.    Is it okay if the provider responds through MyChart:

## 2023-09-08 NOTE — TELEPHONE ENCOUNTER
BETO TO SHARE     Left detailed message on Mary Beth's voice mail at 9:50am.    In a [employer] message yesterday, Dr. Castellanos told patient he would need to go back to the ER for evaluation.

## 2023-09-09 LAB
ANION GAP SERPL CALCULATED.3IONS-SCNC: 11 MMOL/L (ref 5–15)
BASOPHILS # BLD AUTO: 0 10*3/MM3 (ref 0–0.2)
BASOPHILS NFR BLD AUTO: 0.3 % (ref 0–1.5)
BUN SERPL-MCNC: 20 MG/DL (ref 8–23)
BUN/CREAT SERPL: 23.3 (ref 7–25)
CALCIUM SPEC-SCNC: 8.9 MG/DL (ref 8.6–10.5)
CHLORIDE SERPL-SCNC: 105 MMOL/L (ref 98–107)
CO2 SERPL-SCNC: 23 MMOL/L (ref 22–29)
CREAT SERPL-MCNC: 0.86 MG/DL (ref 0.76–1.27)
CRP SERPL-MCNC: 0.86 MG/DL (ref 0–0.5)
DEPRECATED RDW RBC AUTO: 42.9 FL (ref 37–54)
EGFRCR SERPLBLD CKD-EPI 2021: 89.7 ML/MIN/1.73
EOSINOPHIL # BLD AUTO: 0.1 10*3/MM3 (ref 0–0.4)
EOSINOPHIL NFR BLD AUTO: 1.9 % (ref 0.3–6.2)
ERYTHROCYTE [DISTWIDTH] IN BLOOD BY AUTOMATED COUNT: 13.8 % (ref 12.3–15.4)
ERYTHROCYTE [SEDIMENTATION RATE] IN BLOOD: 10 MM/HR (ref 0–20)
GLUCOSE BLDC GLUCOMTR-MCNC: 102 MG/DL (ref 70–105)
GLUCOSE SERPL-MCNC: 108 MG/DL (ref 65–99)
HCT VFR BLD AUTO: 41 % (ref 37.5–51)
HGB BLD-MCNC: 13.9 G/DL (ref 13–17.7)
LYMPHOCYTES # BLD AUTO: 0.9 10*3/MM3 (ref 0.7–3.1)
LYMPHOCYTES NFR BLD AUTO: 14.3 % (ref 19.6–45.3)
MCH RBC QN AUTO: 30.4 PG (ref 26.6–33)
MCHC RBC AUTO-ENTMCNC: 33.9 G/DL (ref 31.5–35.7)
MCV RBC AUTO: 89.7 FL (ref 79–97)
MONOCYTES # BLD AUTO: 0.6 10*3/MM3 (ref 0.1–0.9)
MONOCYTES NFR BLD AUTO: 9.2 % (ref 5–12)
NEUTROPHILS NFR BLD AUTO: 4.6 10*3/MM3 (ref 1.7–7)
NEUTROPHILS NFR BLD AUTO: 74.3 % (ref 42.7–76)
NRBC BLD AUTO-RTO: 0 /100 WBC (ref 0–0.2)
PLATELET # BLD AUTO: 94 10*3/MM3 (ref 140–450)
PMV BLD AUTO: 6.9 FL (ref 6–12)
POTASSIUM SERPL-SCNC: 4.2 MMOL/L (ref 3.5–5.2)
RBC # BLD AUTO: 4.57 10*6/MM3 (ref 4.14–5.8)
SODIUM SERPL-SCNC: 139 MMOL/L (ref 136–145)
WBC NRBC COR # BLD: 6.3 10*3/MM3 (ref 3.4–10.8)

## 2023-09-09 PROCEDURE — 80048 BASIC METABOLIC PNL TOTAL CA: CPT | Performed by: PHYSICIAN ASSISTANT

## 2023-09-09 PROCEDURE — 82948 REAGENT STRIP/BLOOD GLUCOSE: CPT

## 2023-09-09 PROCEDURE — 86140 C-REACTIVE PROTEIN: CPT | Performed by: NEUROLOGICAL SURGERY

## 2023-09-09 PROCEDURE — G0378 HOSPITAL OBSERVATION PER HR: HCPCS

## 2023-09-09 PROCEDURE — 85025 COMPLETE CBC W/AUTO DIFF WBC: CPT | Performed by: PHYSICIAN ASSISTANT

## 2023-09-09 PROCEDURE — 99214 OFFICE O/P EST MOD 30 MIN: CPT | Performed by: NEUROLOGICAL SURGERY

## 2023-09-09 PROCEDURE — 25010000002 HYDROMORPHONE 1 MG/ML SOLUTION: Performed by: PHYSICIAN ASSISTANT

## 2023-09-09 PROCEDURE — 85652 RBC SED RATE AUTOMATED: CPT | Performed by: NEUROLOGICAL SURGERY

## 2023-09-09 RX ORDER — OXYCODONE HYDROCHLORIDE 5 MG/1
10 TABLET ORAL EVERY 4 HOURS PRN
Status: DISPENSED | OUTPATIENT
Start: 2023-09-09 | End: 2023-09-16

## 2023-09-09 RX ADMIN — OXYCODONE HYDROCHLORIDE 10 MG: 5 TABLET ORAL at 23:42

## 2023-09-09 RX ADMIN — HYDROCODONE BITARTRATE AND ACETAMINOPHEN 1 TABLET: 7.5; 325 TABLET ORAL at 16:33

## 2023-09-09 RX ADMIN — ZOLPIDEM TARTRATE 10 MG: 5 TABLET ORAL at 23:43

## 2023-09-09 RX ADMIN — ALLOPURINOL 100 MG: 100 TABLET ORAL at 09:54

## 2023-09-09 RX ADMIN — HYDROMORPHONE HYDROCHLORIDE 0.5 MG: 1 INJECTION, SOLUTION INTRAMUSCULAR; INTRAVENOUS; SUBCUTANEOUS at 10:39

## 2023-09-09 RX ADMIN — METOPROLOL TARTRATE 25 MG: 25 TABLET, FILM COATED ORAL at 09:54

## 2023-09-09 RX ADMIN — Medication 10 ML: at 20:06

## 2023-09-09 RX ADMIN — ATORVASTATIN CALCIUM 40 MG: 40 TABLET, FILM COATED ORAL at 20:06

## 2023-09-09 RX ADMIN — DULOXETINE HYDROCHLORIDE 60 MG: 30 CAPSULE, DELAYED RELEASE ORAL at 09:54

## 2023-09-09 RX ADMIN — FAMOTIDINE 40 MG: 20 TABLET ORAL at 20:05

## 2023-09-09 NOTE — PROGRESS NOTES
LOS: 0 days   Patient Care Team:  Jaret Castellanos MD as PCP - General (Family Medicine)  Felipe Garcia MD as Consulting Physician (Cardiology)  Golden Serna RN as Ambulatory  (Population Health)    Chief Complaint: Back pain and left leg pain    Subjective     This patient had been seen by our service about 10 days ago.  Subsequent to that he was discharged to rehab and was doing well until the last couple of days when the pain got worse.  Pain is located in his lower back.  It radiates down his left leg in the posterior lateral thigh and posterolateral calf.  He has pretty severe pain whenever he moves around.    Interval History:     History taken from: patient chart    Objective      Patient has good movement of both lower extremities.    Vital Signs  Temp:  [97.8 °F (36.6 °C)-98.6 °F (37 °C)] 97.8 °F (36.6 °C)  Heart Rate:  [63-70] 66  Resp:  [15-19] 15  BP: (119-144)/(64-84) 128/69       Results Review:     I reviewed the patient's new clinical results.  I reviewed his MRI which was done yesterday.  It looks about the same as the MRI done on 28 August.  There is a solid fusion at L4-5 and L5-S1.  There is some stenosis at L3-4 but not severe.  There is a fracture through what the radiologist describes as the L3-4 disc space.  I am concerned this looks more like a discitis.  The area of abnormality appears to be completely in the disc.      Assessment & Plan       Back pain      I told the patient we will check some other labs at this point.  I think we need to be sure he does not have a discitis that needs to be treated.  He may require a disc aspiration.      Shelton Easton MD  09/09/23  09:59 EDT

## 2023-09-09 NOTE — H&P
"[facility] H&P    Patient Name: Clint Cee  : 1945  MRN: [id number]  Primary Care Physician: Jaret Castellanos MD  Date of admission: 2023     Patient Care Team:  Jaret Castellanos MD as PCP - General (Family Medicine)  Felipe Garcia MD as Consulting Physician (Cardiology)  Golden Serna RN as Ambulatory  (Formerly [facility])          Subjective   History Present Illness     Chief Complaint:   Chief Complaint   Patient presents with    Back Pain     Back pain    Back Pain      78-year-old  male with a history of a fall  who presents the emergency room with complaints of continued and worsening back pain that has been ongoing for several days.  Patient states that he was seen in the emergency room after the fall and had a negative CT.  When the pain continued, he had an MRI with findings including fracture and ligament tear of L3/L4.  He has been at [facility] rehab for the past couple weeks for rehab with PT.  Pt had increased pain and he came to the emergency room. Patient is also stating that he has pain in his left hip and is concerned for fracture.  Patient states that the pain is exacerbated by movement.  It is in his left lumbar region with radiation to his left leg. He states that sensation feels like \"an electric shock\".  He has had no loss of bowel/bladder.  He has had no numbness in his rectum or in her thighs.  Patient does have paresthesia in his left foot but this is chronic. Patient has been taking oxycodone, baclofen and gabapentin with his last dose at 830 this morning.     Observation 23  Pt concurs with er hpi. Pt still having pain with movement. Neuro sx consulted.       Review of Systems   Musculoskeletal:  Positive for back pain.   All other systems reviewed and are negative.          Personal History     Past Medical History:   Past Medical History:   Diagnosis Date    Abnormal ECG 2022    Allergic 1954    " Nasal alergies    Arrhythmia 2004    Dr Youngblood examined me and said i was ok.  skipped beats    Asthma     no sx    Benign prostatic hyperplasia 11/14/2018    Cataract 01/01/2014    Surgery. Caused detached retina of right eye 20/60 repair    Colon polyp 01/01/2018    Found and removed last colon eca.q    Depression     Gastroesophageal reflux disease 03/20/2014    Glaucoma 2003    Normal pressures. Have low pressure glaucoma    Gout     Headache 01/01/2015    Have shimmering in both eyes intermittantly. 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    Prostate Cancer Jan22 January 2022    Spinal stenosis 12/02/2011    Visual impairment 01/01/1955    Nearsighted corrected glasses    Vitamin D deficiency 05/23/2018       Surgical History:      Past Surgical History:   Procedure Laterality Date    ADENOIDECTOMY  1954    As child    APPENDECTOMY  1956    Surgery    CARDIAC CATHETERIZATION N/A 09/30/2022    Procedure: Right and Left Heart Cath with Coronar Angiography;  Surgeon: Felipe Garcia MD;  Location: [facility] CATH INVASIVE LOCATION;  Service: Cardiovascular;  Laterality: N/A;    CARDIAC VALVE REPLACEMENT N/A 11/17/2022    Procedure: TRICUSPID VALVE REPAIR/REPLACEMENT;  Surgeon: Femi Henderson MD;  Location: [facility] CVOR;  Service: Cardiothoracic;  Laterality: N/A;  tricuspid valve repaired with 32mm  groves physio tricuspid annuloplasty ring    CARDIAC VALVE REPLACEMENT  11/17/2022    Repaired mitral and tricuspid    CATARACT EXTRACTION Bilateral     COLONOSCOPY  01/01/1997    Regularly scheduled    COLONOSCOPY N/A 2/22/2023    Procedure: COLONOSCOPY with cold snare  polypectomy x 1;  Surgeon: Manfred Keating MD;  Location: [facility] ENDOSCOPY;  Service: Gastroenterology;  Laterality: N/A;    ENDOSCOPY N/A 2/22/2023    Procedure: ESOPHAGOGASTRODUODENOSCOPY with esophageal dilation using 48 Fr. Bougie Dilator;  Surgeon: Manfred Keating MD;  Location: [facility] ENDOSCOPY;  Service: Gastroenterology;  Laterality: N/A;  erosive esophagitis    EYE SURGERY  01/01/2010    Reattached right retina    MITRAL VALVE REPAIR/REPLACEMENT N/A 11/17/2022    Procedure: MITRAL VALVE REPAIR/REPLACEMENT with left atrial appendage closure;  Surgeon: Femi Henderson MD;  Location: [facility] CVOR;  Service: Cardiothoracic;  Laterality: N/A;  mitral valve repaired iwth 40mm medtronic annuloplasty band  with intraop TERRA    SPINE SURGERY  01/01/1971    2 lumbar 1 cervical    TONSILLECTOMY  01/01/1950    Childhood           Family History: family history includes Arrhythmia in his father; Arthritis in his father; Cancer in his father; Diabetes in his mother; Early death in his mother; Heart disease in his mother. Otherwise pertinent FHx was reviewed and unremarkable.     Social History:  reports that he has quit smoking. His smoking use included cigarettes. He has been exposed to tobacco smoke. He has never used smokeless tobacco. He reports that he does not currently use alcohol. He reports that he does not use drugs.      Medications:  Prior to Admission medications    Medication Sig Start Date End Date Taking? Authorizing Provider   acetaminophen (TYLENOL) 500 MG tablet Take 1 tablet by mouth Every 6 (Six) Hours As Needed for Mild Pain. Indications: Pain   Yes ProviderIrlanda MD   allopurinol (ZYLOPRIM) 100 MG tablet Take 1 tablet by mouth Daily.   Yes ProviderIrlanda MD   Apple Erik Vn-Grn Tea-Bit Or-Cr (APPLE CIDER VINEGAR PLUS PO) Take 1 Piece by mouth Daily As Needed. Indications: Dietary supplement 1/1/21  Yes Jaret Castellanos MD   atorvastatin (LIPITOR) 40 MG tablet Take 1  tablet by mouth Every Night. Indications: High Amount of Fats in the Blood 7/3/23  Yes Felipe Garcia MD   DULoxetine (CYMBALTA) 60 MG capsule Take 1 capsule by mouth Daily. 6/20/23  Yes Jaret Castellanos MD   famotidine (PEPCID) 40 MG tablet Take 1 tablet by mouth Every Night. 12/16/22  Yes Irlanda Kaimnski MD   fexofenadine (ALLEGRA) 180 MG tablet Take 1 tablet by mouth 2 (Two) Times a Day As Needed. Indications: Hayfever   Yes Irlanda Kaminski MD   fluticasone (FLONASE) 50 MCG/ACT nasal spray 2 sprays into the nostril(s) as directed by provider Daily As Needed. Indications: Allergic Rhinitis   Yes Irlanda Kaminski MD   Leuprolide Mesylate, 6 Month, (CAMCEVI SC) Inject  under the skin into the appropriate area as directed. Every 6 months injection, due in December  Indications: prostate cancer (to decrease testosterone). 1/21/21  Yes Irlanda Kaminski MD   melatonin 5 MG tablet tablet Take 1 tablet by mouth At Night As Needed (sleep). Indications: Trouble Sleeping   Yes Irlanda Kaminski MD   metoprolol tartrate (LOPRESSOR) 25 MG tablet Take 1 tablet by mouth Daily. 5/18/23  Yes Irlanda Kaminski MD   zolpidem (Ambien) 10 MG tablet Take 1 tablet by mouth At Night As Needed for Sleep.    ProviderIrlanda MD       Allergies:    Allergies   Allergen Reactions    Morphine Hallucinations    Beta Adrenergic Blockers Cough    Ace Inhibitors Cough     cough       Objective   Objective     Vital Signs  Temp:  [97.8 °F (36.6 °C)-98.6 °F (37 °C)] 97.8 °F (36.6 °C)  Heart Rate:  [63-70] 66  Resp:  [15-19] 15  BP: (119-144)/(64-84) 128/69  SpO2:  [92 %-100 %] 99 %  on   ;   Device (Oxygen Therapy): room air  Body mass index is 29.69 kg/m².    Physical Exam  Constitutional:       Appearance: Normal appearance.   HENT:      Mouth/Throat:      Mouth: Mucous membranes are moist.   Cardiovascular:      Rate and Rhythm: Normal rate and regular rhythm.      Pulses: Normal pulses.      Heart  sounds: Normal heart sounds.   Pulmonary:      Effort: Pulmonary effort is normal.      Breath sounds: Normal breath sounds.   Musculoskeletal:         General: Tenderness present.      Comments: Tender to lower back and left leg.   Painful response to change in body position   Skin:     General: Skin is warm and dry.   Neurological:      General: No focal deficit present.      Mental Status: He is alert and oriented to person, place, and time.   Psychiatric:         Mood and Affect: Mood normal.         Behavior: Behavior normal.         Results Review:  I have personally reviewed most recent lab results and radiology images and interpretations and agree with findings, most notably: cbc, cmp, mri lumbar spine, xr left hip.    Results from last 7 days   Lab Units 09/09/23  0326   WBC 10*3/mm3 6.30   HEMOGLOBIN g/dL 13.9   HEMATOCRIT % 41.0   PLATELETS 10*3/mm3 94*     Results from last 7 days   Lab Units 09/09/23  0326   SODIUM mmol/L 139   POTASSIUM mmol/L 4.2   CHLORIDE mmol/L 105   CO2 mmol/L 23.0   BUN mg/dL 20   CREATININE mg/dL 0.86   GLUCOSE mg/dL 108*   CALCIUM mg/dL 8.9     Estimated Creatinine Clearance: 91.7 mL/min (by C-G formula based on SCr of 0.86 mg/dL).  Brief Urine Lab Results  (Last result in the past 365 days)        Color   Clarity   Blood   Leuk Est   Nitrite   Protein   CREAT   Urine HCG        08/27/23 1145 Yellow   Clear   Negative   Negative   Negative   Negative                   Microbiology Results (last 10 days)       ** No results found for the last 240 hours. **            ECG/EMG Results (most recent)       None            Results for orders placed during the hospital encounter of 11/16/22    Bilateral Carotid Duplex    Interpretation Summary    Proximal right internal carotid artery is normal.    Mid right internal carotid artery is normal.    Proximal left internal carotid artery mild stenosis.      Results for orders placed during the hospital encounter of 09/30/22    Adult  Transesophageal Echo (TERRA) W/ Cont if Necessary Per Protocol    Interpretation Summary  Date of study  9/30/2022    Procedure performed  Transesophageal echocardiogram and Doppler study.    Indications  Significant mitral regurgitation  Shortness of breath    Procedure  Anesthesia was provided by anesthesiologist with intravenous Diprivan.  TERRA probe could be passed without difficulty.  Patient tolerated the procedure well.  No complications were noted.    Results  Technically satisfactory study.  Mitral valve has severe myxomatous degeneration with multiple areas of severe prolapse and known coaptation of the mitral leaflet as well as probable ruptured chordae.  Severe mitral regurgitation is seen with multiple jets with mitral regurgitation jet occupying the entire left atrium.  Tricuspid valve is also showing myxomatous degeneration with moderate tricuspid regurgitation.  Calculated pulmonary artery pressure is 60 mmHg.  Aortic valve is thickened with adequate opening motion.  Left atrium is enlarged.  Left atrial appendage is enlarged without clot.  Left ventricle is normal in size and contractility with ejection fraction of 60%.  Right ventricle is normal in size.  Right atrium is normal in size.  Atrial septum is intact without PFO.  Aortic intimal thickening is present without clot.  No pericardial effusion is seen.    Impression  Myxomatous mitral and tricuspid valve degeneration.  Severe mitral valve prolapse involving both anterior and posterior mitral leaflets with severe mitral regurgitation with multiple jets.  Probable ruptured chordae.  In some views coaptation of the mitral valve is present.  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  Significant pulmonary hypertension.  Left atrial and left atrial appendage enlargement without clot.  Normal left ventricular size and contractility with ejection fraction of 60%.      MRI Lumbar Spine Without Contrast    Result Date: 9/8/2023  1.  Features of advanced degenerative change and ankylosing spondylitis in the lumbar spine, with fracture through the L3-4 disc space extending to the posterior elements, is again noted. Mild widening of the anterior L3-4 disc space with fluid and edema,  is unchanged, and no subluxation is evident. 2. Moderate canal stenosis at L1-2 and moderate to severe canal stenosis at L3-4, unchanged. Multilevel moderate to moderate to severe neural foraminal stenosis age of changes are unchanged from 8/28/2023. 3. No new finding since 8/28/2023. Electronically Signed: Deann Damon MD  9/8/2023 2:13 PM EDT  Workstation ID: [id number]    XR Hip With or Without Pelvis 2 - 3 View Left    Result Date: 9/8/2023  Impression: 1. No acute left hip or pelvic finding. 2. Moderately advanced degenerative changes of the hips. 3. Advanced diminished disc height in the lower lumbar spine. Electronically Signed: Deann Damon MD  9/8/2023 11:56 AM EDT  Workstation ID: [id number]       Estimated Creatinine Clearance: 91.7 mL/min (by C-G formula based on SCr of 0.86 mg/dL).    Assessment & Plan   Assessment/Plan       Active Hospital Problems    Diagnosis  POA    **Back pain [M54.9]  Yes      Resolved Hospital Problems   No resolved problems to display.       L3-L4 fracture  - cbc and cmp are unremarkable  - MRI lumbar spine reviewed and showing ankylosing spondylitis in lumbar spine, L3-L4 disc space fracture extending posteriorly. Mild widening of the anterior L3-4 disc space with fluid and edema,   is unchanged, and no subluxation is evident.Moderate canal stenosis at L1-2 and moderate to severe canal stenosis at L3-4.  - XR Left hip reviewed and showing no acute findings  - Neuro surgery consulted  - pain control  - PT and OT consulted    Hypertension  -well Controlled   BP Readings from Last 1 Encounters:   09/09/23 117/67     - Continue metoprolol  - Monitor while admitted     VTE Prophylaxis -   Mechanical Order History:        Ordered         09/08/23 1816  Place Sequential Compression Device  Once            09/08/23 1816  Maintain Sequential Compression Device  Continuous                          Pharmalogical Order History:       None            CODE STATUS:    Code Status and Medical Interventions:   Ordered at: 09/08/23 1816     Code Status (Patient has no pulse and is not breathing):    CPR (Attempt to Resuscitate)     Medical Interventions (Patient has pulse or is breathing):    Full Support       This patient has been examined wearing personal protective equipment.     I discussed the patient's findings and my recommendations with patient and nursing staff.      Signature:Electronically signed by Dominique Martinez PA-C, 09/09/23, 10:53 AM EDT.

## 2023-09-09 NOTE — PLAN OF CARE
Goal Outcome Evaluation:  Plan of Care Reviewed With: patient        Progress: no change  Outcome Evaluation: Pt assisted with weight shifting, complaints of lower back pain, med given. other wise pt rested during the night. Awaiting neuro-surg. will cont to monitor.

## 2023-09-10 ENCOUNTER — APPOINTMENT (OUTPATIENT)
Dept: CT IMAGING | Facility: HOSPITAL | Age: 78
DRG: 460 | End: 2023-09-10
Payer: MEDICARE

## 2023-09-10 LAB
ANION GAP SERPL CALCULATED.3IONS-SCNC: 11 MMOL/L (ref 5–15)
BASOPHILS # BLD AUTO: 0 10*3/MM3 (ref 0–0.2)
BASOPHILS NFR BLD AUTO: 0.3 % (ref 0–1.5)
BUN SERPL-MCNC: 22 MG/DL (ref 8–23)
BUN/CREAT SERPL: 26.8 (ref 7–25)
CALCIUM SPEC-SCNC: 8.7 MG/DL (ref 8.6–10.5)
CHLORIDE SERPL-SCNC: 103 MMOL/L (ref 98–107)
CO2 SERPL-SCNC: 22 MMOL/L (ref 22–29)
CREAT SERPL-MCNC: 0.82 MG/DL (ref 0.76–1.27)
DEPRECATED RDW RBC AUTO: 43.3 FL (ref 37–54)
EGFRCR SERPLBLD CKD-EPI 2021: 91 ML/MIN/1.73
EOSINOPHIL # BLD AUTO: 0.1 10*3/MM3 (ref 0–0.4)
EOSINOPHIL NFR BLD AUTO: 1.9 % (ref 0.3–6.2)
ERYTHROCYTE [DISTWIDTH] IN BLOOD BY AUTOMATED COUNT: 13.9 % (ref 12.3–15.4)
GLUCOSE SERPL-MCNC: 126 MG/DL (ref 65–99)
HCT VFR BLD AUTO: 38.7 % (ref 37.5–51)
HGB BLD-MCNC: 13.7 G/DL (ref 13–17.7)
LYMPHOCYTES # BLD AUTO: 0.8 10*3/MM3 (ref 0.7–3.1)
LYMPHOCYTES NFR BLD AUTO: 11.5 % (ref 19.6–45.3)
MCH RBC QN AUTO: 31 PG (ref 26.6–33)
MCHC RBC AUTO-ENTMCNC: 35.3 G/DL (ref 31.5–35.7)
MCV RBC AUTO: 87.9 FL (ref 79–97)
MONOCYTES # BLD AUTO: 0.6 10*3/MM3 (ref 0.1–0.9)
MONOCYTES NFR BLD AUTO: 8.3 % (ref 5–12)
NEUTROPHILS NFR BLD AUTO: 5.3 10*3/MM3 (ref 1.7–7)
NEUTROPHILS NFR BLD AUTO: 78 % (ref 42.7–76)
NRBC BLD AUTO-RTO: 0 /100 WBC (ref 0–0.2)
PLATELET # BLD AUTO: 87 10*3/MM3 (ref 140–450)
PMV BLD AUTO: 6.8 FL (ref 6–12)
POTASSIUM SERPL-SCNC: 3.6 MMOL/L (ref 3.5–5.2)
RBC # BLD AUTO: 4.4 10*6/MM3 (ref 4.14–5.8)
SODIUM SERPL-SCNC: 136 MMOL/L (ref 136–145)
WBC NRBC COR # BLD: 6.9 10*3/MM3 (ref 3.4–10.8)

## 2023-09-10 PROCEDURE — 97166 OT EVAL MOD COMPLEX 45 MIN: CPT

## 2023-09-10 PROCEDURE — 85025 COMPLETE CBC W/AUTO DIFF WBC: CPT | Performed by: PHYSICIAN ASSISTANT

## 2023-09-10 PROCEDURE — G0378 HOSPITAL OBSERVATION PER HR: HCPCS

## 2023-09-10 PROCEDURE — 72131 CT LUMBAR SPINE W/O DYE: CPT

## 2023-09-10 PROCEDURE — 80048 BASIC METABOLIC PNL TOTAL CA: CPT | Performed by: PHYSICIAN ASSISTANT

## 2023-09-10 PROCEDURE — 97162 PT EVAL MOD COMPLEX 30 MIN: CPT

## 2023-09-10 PROCEDURE — 99212 OFFICE O/P EST SF 10 MIN: CPT | Performed by: NEUROLOGICAL SURGERY

## 2023-09-10 RX ORDER — CYCLOBENZAPRINE HCL 10 MG
10 TABLET ORAL 3 TIMES DAILY
Status: DISCONTINUED | OUTPATIENT
Start: 2023-09-10 | End: 2023-09-19 | Stop reason: HOSPADM

## 2023-09-10 RX ADMIN — Medication 10 ML: at 20:32

## 2023-09-10 RX ADMIN — CYCLOBENZAPRINE 10 MG: 10 TABLET, FILM COATED ORAL at 20:31

## 2023-09-10 RX ADMIN — FAMOTIDINE 40 MG: 20 TABLET ORAL at 20:31

## 2023-09-10 RX ADMIN — OXYCODONE HYDROCHLORIDE 10 MG: 5 TABLET ORAL at 16:35

## 2023-09-10 RX ADMIN — OXYCODONE HYDROCHLORIDE 10 MG: 5 TABLET ORAL at 09:52

## 2023-09-10 RX ADMIN — OXYCODONE HYDROCHLORIDE 10 MG: 5 TABLET ORAL at 20:32

## 2023-09-10 RX ADMIN — CYCLOBENZAPRINE 10 MG: 10 TABLET, FILM COATED ORAL at 16:35

## 2023-09-10 RX ADMIN — DULOXETINE HYDROCHLORIDE 60 MG: 30 CAPSULE, DELAYED RELEASE ORAL at 09:52

## 2023-09-10 RX ADMIN — CYCLOBENZAPRINE 10 MG: 10 TABLET, FILM COATED ORAL at 11:59

## 2023-09-10 RX ADMIN — ALLOPURINOL 100 MG: 100 TABLET ORAL at 09:52

## 2023-09-10 RX ADMIN — ZOLPIDEM TARTRATE 10 MG: 5 TABLET ORAL at 23:27

## 2023-09-10 RX ADMIN — METOPROLOL TARTRATE 25 MG: 25 TABLET, FILM COATED ORAL at 09:52

## 2023-09-10 RX ADMIN — ATORVASTATIN CALCIUM 40 MG: 40 TABLET, FILM COATED ORAL at 20:31

## 2023-09-10 NOTE — THERAPY EVALUATION
Patient Name: Clint Cee  : 1945    MRN: [id number]                              Today's Date: 9/10/2023       Admit Date: 2023    Visit Dx:     ICD-10-CM ICD-9-CM   1. Acute left-sided low back pain with left-sided sciatica  M54.42 724.2     724.3     Patient Active Problem List   Diagnosis    Benign prostatic hyperplasia    Gastroesophageal reflux disease    Mood disorder    Scoliosis    Spinal stenosis    Vitamin D deficiency    Polyosteoarthritis    Insomnia    Retinal detachment of right eye with single retinal tear    Osteoarthritis of knee    Cellulitis of face    Prostate cancer    Ocular migraine    Headache    Gout of multiple sites    Medicare annual wellness visit, subsequent    Allergic    HL (hearing loss)    Visual impairment    Cataract    Colon polyp    Primary hypertension    Dyspnea on exertion    S/P mitral valve repair per Dr. Henderson 2022    S/P tricuspid valve repair per Dr. Henderson 2022    Back pain     Past Medical History:   Diagnosis Date    Abnormal ECG 2022    Allergic 1954    Nasal alergies    Arrhythmia     Dr Youngblood examined me and said i was ok.  skipped beats    Asthma     no sx    Benign prostatic hyperplasia 2018    Cataract 2014    Surgery. Caused detached retina of right eye 20/60 repair    Colon polyp 2018    Found and removed last colon eca.q    Depression     Gastroesophageal reflux disease 2014    Glaucoma 2003    Normal pressures. Have low pressure glaucoma    Gout     Headache 2015    Have shimmering in both eyes intermittantly. No headaches    Heart murmur May 2022    During wellness exam    Heart valve disease 2022    During Echo    HL (hearing loss) 2012    Have hearing aids    Hyperlipidemia     Hypertension 2011    Infectious viral hepatitis     Yellow jaundis as child    Insomnia 2011    Mitral valve prolapse 2022    Mood disorder 2013     Polyosteoarthritis 12/02/2011    Prostate Cancer Jan22 January 2022    Spinal stenosis 12/02/2011    Visual impairment 01/01/1955    Nearsighted corrected glasses    Vitamin D deficiency 05/23/2018     Past Surgical History:   Procedure Laterality Date    ADENOIDECTOMY  1954    As child    APPENDECTOMY  1956    Surgery    CARDIAC CATHETERIZATION N/A 09/30/2022    Procedure: Right and Left Heart Cath with Coronar Angiography;  Surgeon: Felipe Garcia MD;  Location: [facility] CATH INVASIVE LOCATION;  Service: Cardiovascular;  Laterality: N/A;    CARDIAC VALVE REPLACEMENT N/A 11/17/2022    Procedure: TRICUSPID VALVE REPAIR/REPLACEMENT;  Surgeon: Femi Henderson MD;  Location: [facility] CVOR;  Service: Cardiothoracic;  Laterality: N/A;  tricuspid valve repaired with 32mm  groves physio tricuspid annuloplasty ring    CARDIAC VALVE REPLACEMENT  11/17/2022    Repaired mitral and tricuspid    CATARACT EXTRACTION Bilateral     COLONOSCOPY  01/01/1997    Regularly scheduled    COLONOSCOPY N/A 2/22/2023    Procedure: COLONOSCOPY with cold snare polypectomy x 1;  Surgeon: Manfred Keating MD;  Location: [facility] ENDOSCOPY;  Service: Gastroenterology;  Laterality: N/A;    ENDOSCOPY N/A 2/22/2023    Procedure: ESOPHAGOGASTRODUODENOSCOPY with esophageal dilation using 48 Fr. Bougie Dilator;  Surgeon: Manfred Keating MD;  Location: [facility] ENDOSCOPY;  Service: Gastroenterology;  Laterality: N/A;  erosive esophagitis    EYE SURGERY  01/01/2010    Reattached right retina    MITRAL VALVE REPAIR/REPLACEMENT N/A 11/17/2022    Procedure: MITRAL VALVE REPAIR/REPLACEMENT with left atrial appendage closure;  Surgeon: Femi Henderson MD;  Location: [facility];  Service: Cardiothoracic;  Laterality: N/A;  mitral valve repaired iwth 40mm medtronic annuloplasty band  with intraop TERRA    SPINE SURGERY  01/01/1971    2 lumbar 1 cervical    TONSILLECTOMY  01/01/1950    Childhood      General Information       Row Name 09/10/23 1136           Physical Therapy Time and Intention    Document Type evaluation  -CL     Mode of Treatment individual therapy;physical therapy  -CL       Row Name 09/10/23 1131          General Information    Patient Profile Reviewed yes  -CL     Prior Level of Function independent:;community mobility;ADL's  -CL     Existing Precautions/Restrictions fall  -CL     Barriers to Rehab hearing deficit  -CL       Row Name 09/10/23 1131          Living Environment    People in Home spouse  -CL       Row Name 09/10/23 1131          Home Main Entrance    Number of Stairs, Main Entrance none  -CL       Row Name 09/10/23 1131          Stairs Within Home, Primary    Stairs, Within Home, Primary 2 story home but can stay on 1st floor  -CL       Row Name 09/10/23 1131          Cognition    Orientation Status (Cognition) oriented x 4  -CL       Row Name 09/10/23 1131          Safety Issues, Functional Mobility    Safety Issues Affecting Function (Mobility) insight into deficits/self-awareness  -CL               User Key  (r) = Recorded By, (t) = Taken By, (c) = Cosigned By      Initials Name Provider Type    CL Barbra Casillas, PT Physical Therapist                   Mobility       Row Name 09/10/23 1138          Bed Mobility    Bed Mobility rolling right  -CL     Rolling Right Miller (Bed Mobility) minimum assist (75% patient effort);verbal cues  -CL     Comment, (Bed Mobility) Pt required cues for set up and log roll  -CL       Row Name 09/10/23 1138          Bed-Chair Transfer    Comment, (Bed-Chair Transfer) Unable; tolerated sitting EOB x 20 seconds and c/o increased pain and need to return to supine  -CL               User Key  (r) = Recorded By, (t) = Taken By, (c) = Cosigned By      Initials Name Provider Type    Barbra Bliss PT Physical Therapist                   Obj/Interventions       Row Name 09/10/23 1139          Range of Motion Comprehensive    General Range of Motion bilateral lower extremity ROM WFL   -CL       Row Name 09/10/23 1139          Strength Comprehensive (MMT)    Comment, General Manual Muscle Testing (MMT) Assessment unable to assess due to pain  -CL       Row Name 09/10/23 1139          Balance    Balance Assessment sitting static balance;sitting dynamic balance  -CL     Static Sitting Balance contact guard  -CL     Dynamic Sitting Balance minimal assist  -CL     Position, Sitting Balance sitting edge of bed  -CL       Row Name 09/10/23 1139          Sensory Assessment (Somatosensory)    Sensory Assessment (Somatosensory) LE sensation intact  -CL               User Key  (r) = Recorded By, (t) = Taken By, (c) = Cosigned By      Initials Name Provider Type    CL Barbra Casillas, PT Physical Therapist                   Goals/Plan       Row Name 09/10/23 1146          Bed Mobility Goal 1 (PT)    Activity/Assistive Device (Bed Mobility Goal 1, PT) bed mobility activities, all  -CL     [place] Level/Cues Needed (Bed Mobility Goal 1, PT) modified independence  -CL     Time Frame (Bed Mobility Goal 1, PT) long term goal (LTG);2 weeks  -CL       Row Name 09/10/23 1146          Transfer Goal 1 (PT)    Activity/Assistive Device (Transfer Goal 1, PT) sit-to-stand/stand-to-sit;bed-to-chair/chair-to-bed  -CL     [place] Level/Cues Needed (Transfer Goal 1, PT) modified independence  -CL     Time Frame (Transfer Goal 1, PT) long term goal (LTG);2 weeks  -CL       Row Name 09/10/23 1146          Gait Training Goal 1 (PT)    Activity/Assistive Device (Gait Training Goal 1, PT) gait (walking locomotion);assistive device use  -CL     [place] Level (Gait Training Goal 1, PT) modified independence  -CL     Distance (Gait Training Goal 1, PT) 150'  -CL     Time Frame (Gait Training Goal 1, PT) long term goal (LTG);2 weeks  -CL       Row Name 09/10/23 1146          Therapy Assessment/Plan (PT)    Planned Therapy Interventions (PT) balance training;bed mobility training;gait training;patient/family  education;neuromuscular re-education;strengthening;transfer training  -CL               User Key  (r) = Recorded By, (t) = Taken By, (c) = Cosigned By      Initials Name Provider Type    Barbra Bliss, PT Physical Therapist                   Clinical Impression       Row Name 09/10/23 1140          Pain    Pretreatment Pain Rating 5/10  -CL     Posttreatment Pain Rating 7/10  -CL     Pain Location - Side/Orientation Left  -CL     Pain Location - back;hip  -CL     Pain Intervention(s) Repositioned  -CL       Row Name 09/10/23 1140          Plan of Care Review    Plan of Care Reviewed With patient  -CL     Outcome Evaluation Clint Cee is a 78 y.o. male admitted with worsening back pain. MRI reveals  multilevel spondylosis. X ray is negative for hip fx but shows advanced DJD. Pt comes to [facility] after 4-5 day stay at [facility].  Per review of old records, pt was using a TLSO upon discharge from [facility] but does not have one with him at this time.  At time of evaluation, pt is A&O x 4, with some difficulty hearing related to hearing aid batteries.  He needed verbal cues and min a for log rolling and Mod A 2 for supine <-> sit.  Pt tolerated only 20 seconds sitting EOB and requested to return to supine due to pain.  PT recommends return to [facility] for continued therapy at discharge.  Pt would like to d/c to home.  PT explained concerns related to pts limited tolerance to sitting and inability to tolerate standing and/or transfers as barriers to safe return home. Pt then verbalized understanding.  -CL       Row Name 09/10/23 1140          Therapy Assessment/Plan (PT)    Rehab Potential (PT) good, to achieve stated therapy goals  -CL     Criteria for Skilled Interventions Met (PT) yes;skilled treatment is necessary  -CL     Predicted Duration of Therapy Intervention (PT) Until discharge  -CL       Row Name 09/10/23 1140          Positioning and Restraints    Pre-Treatment Position in bed  -CL     Post Treatment Position bed   -CL     In Bed notified nsg;supine;call light within reach;encouraged to call for assist;exit alarm on  -CL               User Key  (r) = Recorded By, (t) = Taken By, (c) = Cosigned By      Initials Name Provider Type    CL Barbra Casillas, PT Physical Therapist                   Outcome Measures       Row Name 09/10/23 1147 09/10/23 0840       How much help from another person do you currently need...    Turning from your back to your side while in flat bed without using bedrails? 3  -CL 3  -TM (r) MS (t) TM (c)    Moving from lying on back to sitting on the side of a flat bed without bedrails? 3  -CL 2  -TM (r) MS (t) TM (c)    Moving to and from a bed to a chair (including a wheelchair)? 1  -CL 2  -TM (r) MS (t) TM (c)    Standing up from a chair using your arms (e.g., wheelchair, bedside chair)? 1  -CL 2  -TM (r) MS (t) TM (c)    Climbing 3-5 steps with a railing? 1  -CL 1  -TM (r) MS (t) TM (c)    To walk in hospital room? 1  -CL 2  -TM (r) MS (t) TM (c)    AM-PAC 6 Clicks Score (PT) 10  -CL 12  -MS    Highest level of mobility 4 --> Transferred to chair/commode  -CL 4 --> Transferred to chair/commode  -MS      Row Name 09/10/23 1147          Functional Assessment    Outcome Measure Options AM-PAC 6 Clicks Daily Activity (OT)  -MSA               User Key  (r) = Recorded By, (t) = Taken By, (c) = Cosigned By      Initials Name Provider Type    Rosi Gonzalez, OT Occupational Therapist    CL Barbra Casillas, PT Physical Therapist    TM Carlota Anderson, RN Registered Nurse    Sandip Dasilva, RN Registered Nurse                                 Physical Therapy Education       Title: PT OT SLP Therapies (Done)       Topic: Physical Therapy (Done)       Point: Mobility training (Done)       Learning Progress Summary             Patient Acceptance, E,TB, VU by CL at 9/10/2023 1147                         Point: Body mechanics (Done)       Learning Progress Summary             Patient Acceptance, E,TB,  VU by CL at 9/10/2023 1147                         Point: Precautions (Done)       Learning Progress Summary             Patient Acceptance, E,TB, VU by CL at 9/10/2023 1147                                         User Key       Initials Effective Dates Name Provider Type Discipline     03/02/22 -  Barbra Casillas, PT Physical Therapist PT                  PT Recommendation and Plan  Planned Therapy Interventions (PT): balance training, bed mobility training, gait training, patient/family education, neuromuscular re-education, strengthening, transfer training  Plan of Care Reviewed With: patient  Outcome Evaluation: Clint Cee is a 78 y.o. male admitted with worsening back pain. MRI reveals  multilevel spondylosis. X ray is negative for hip fx but shows advanced DJD. Pt comes to [facility] after 4-5 day stay at [facility].  Per review of old records, pt was using a TLSO upon discharge from [facility] but does not have one with him at this time.  At time of evaluation, pt is A&O x 4, with some difficulty hearing related to hearing aid batteries.  He needed verbal cues and min a for log rolling and Mod A 2 for supine <-> sit.  Pt tolerated only 20 seconds sitting EOB and requested to return to supine due to pain.  PT recommends return to [facility] for continued therapy at discharge.  Pt would like to d/c to home.  PT explained concerns related to pts limited tolerance to sitting and inability to tolerate standing and/or transfers as barriers to safe return home. Pt then verbalized understanding.     Time Calculation:   PT Evaluation Complexity  History, PT Evaluation Complexity: 1-2 personal factors and/or comorbidities  Examination of Body Systems (PT Eval Complexity): total of 3 or more elements  Clinical Presentation (PT Evaluation Complexity): evolving  Clinical Decision Making (PT Evaluation Complexity): moderate complexity  Overall Complexity (PT Evaluation Complexity): moderate complexity     PT Charges       Row Name  09/10/23 1148             Time Calculation    Start Time 1023  -CL      Stop Time 1041  -CL      Time Calculation (min) 18 min  -CL      PT Received On 09/10/23  -CL      PT - Next Appointment 09/11/23  -CL      PT Goal Re-Cert Due Date 09/24/23  -CL         Time Calculation- PT    Total Timed Code Minutes- PT 0 minute(s)  -CL                User Key  (r) = Recorded By, (t) = Taken By, (c) = Cosigned By      Initials Name Provider Type    CL Barbra Casillas, PT Physical Therapist                  Therapy Charges for Today       Code Description Service Date Service Provider Modifiers Qty    [id number] HC PT EVAL MOD COMPLEXITY 4 9/10/2023 Barbra Casillas, PT GP 1            PT G-Codes  Outcome Measure Options: AM-PAC 6 Clicks Daily Activity (OT)  AM-PAC 6 Clicks Score (PT): 10  AM-PAC 6 Clicks Score (OT): 16  PT Discharge Summary  Anticipated Discharge Disposition (PT): inpatient rehabilitation facility    Barbra Casillas PT  9/10/2023

## 2023-09-10 NOTE — THERAPY EVALUATION
Patient Name: Clint Cee  : 1945    MRN: [id number]                              Today's Date: 9/10/2023       Admit Date: 2023    Visit Dx:     ICD-10-CM ICD-9-CM   1. Acute left-sided low back pain with left-sided sciatica  M54.42 724.2     724.3     Patient Active Problem List   Diagnosis    Benign prostatic hyperplasia    Gastroesophageal reflux disease    Mood disorder    Scoliosis    Spinal stenosis    Vitamin D deficiency    Polyosteoarthritis    Insomnia    Retinal detachment of right eye with single retinal tear    Osteoarthritis of knee    Cellulitis of face    Prostate cancer    Ocular migraine    Headache    Gout of multiple sites    Medicare annual wellness visit, subsequent    Allergic    HL (hearing loss)    Visual impairment    Cataract    Colon polyp    Primary hypertension    Dyspnea on exertion    S/P mitral valve repair per Dr. Henderson 2022    S/P tricuspid valve repair per Dr. Henderson 2022    Back pain     Past Medical History:   Diagnosis Date    Abnormal ECG 2022    Allergic 1954    Nasal alergies    Arrhythmia     Dr Youngblood examined me and said i was ok.  skipped beats    Asthma     no sx    Benign prostatic hyperplasia 2018    Cataract 2014    Surgery. Caused detached retina of right eye 20/60 repair    Colon polyp 2018    Found and removed last colon eca.q    Depression     Gastroesophageal reflux disease 2014    Glaucoma 2003    Normal pressures. Have low pressure glaucoma    Gout     Headache 2015    Have shimmering in both eyes intermittantly. No headaches    Heart murmur May 2022    During wellness exam    Heart valve disease 2022    During Echo    HL (hearing loss) 2012    Have hearing aids    Hyperlipidemia     Hypertension 2011    Infectious viral hepatitis     Yellow jaundis as child    Insomnia 2011    Mitral valve prolapse 2022    Mood disorder 2013     Polyosteoarthritis 12/02/2011    Prostate Cancer Jan22 January 2022    Spinal stenosis 12/02/2011    Visual impairment 01/01/1955    Nearsighted corrected glasses    Vitamin D deficiency 05/23/2018     Past Surgical History:   Procedure Laterality Date    ADENOIDECTOMY  1954    As child    APPENDECTOMY  1956    Surgery    CARDIAC CATHETERIZATION N/A 09/30/2022    Procedure: Right and Left Heart Cath with Coronar Angiography;  Surgeon: Felipe Garcia MD;  Location: [facility] CATH INVASIVE LOCATION;  Service: Cardiovascular;  Laterality: N/A;    CARDIAC VALVE REPLACEMENT N/A 11/17/2022    Procedure: TRICUSPID VALVE REPAIR/REPLACEMENT;  Surgeon: Femi Henderson MD;  Location: [facility] CVOR;  Service: Cardiothoracic;  Laterality: N/A;  tricuspid valve repaired with 32mm  groves physio tricuspid annuloplasty ring    CARDIAC VALVE REPLACEMENT  11/17/2022    Repaired mitral and tricuspid    CATARACT EXTRACTION Bilateral     COLONOSCOPY  01/01/1997    Regularly scheduled    COLONOSCOPY N/A 2/22/2023    Procedure: COLONOSCOPY with cold snare polypectomy x 1;  Surgeon: Manfred Keating MD;  Location: [facility] ENDOSCOPY;  Service: Gastroenterology;  Laterality: N/A;    ENDOSCOPY N/A 2/22/2023    Procedure: ESOPHAGOGASTRODUODENOSCOPY with esophageal dilation using 48 Fr. Bougie Dilator;  Surgeon: Manfred Keating MD;  Location: [facility] ENDOSCOPY;  Service: Gastroenterology;  Laterality: N/A;  erosive esophagitis    EYE SURGERY  01/01/2010    Reattached right retina    MITRAL VALVE REPAIR/REPLACEMENT N/A 11/17/2022    Procedure: MITRAL VALVE REPAIR/REPLACEMENT with left atrial appendage closure;  Surgeon: Femi Henderson MD;  Location: [facility];  Service: Cardiothoracic;  Laterality: N/A;  mitral valve repaired iwth 40mm medtronic annuloplasty band  with intraop TERRA    SPINE SURGERY  01/01/1971    2 lumbar 1 cervical    TONSILLECTOMY  01/01/1950    Childhood      General Information       Row Name 09/10/23 1149           OT Time and Intention    Document Type evaluation  -MS     Mode of Treatment occupational therapy  -MS       Row Name 09/10/23 1141          General Information    Patient Profile Reviewed yes  -MS     Prior Level of Function independent:;ADL's;driving  recently requiring increased assist with ADLs, recently utilizes RW at [facility]  -MS     Existing Precautions/Restrictions fall;spinal  -MS     Barriers to Rehab hearing deficit;previous functional deficit  -MS       Row Name 09/10/23 1141          Occupational Profile    Reason for Services/Referral (Occupational Profile) Pt is a 79 y/o M admitted to [facility] 9/8/23 from [facility] with c/o L hip and back pain. XR Hip (-) acute L hip/pelvic. MRI L spine (+) fx throughout L3-L4. PMHx significant for asthma, HTN, mitral valve prolapse mood disorder, scoliosis, OA, hx prostate cancer, hearing loss, visual impairment, and s/p MVR (2022). Of note pt was admitted for similar complaints 8/27/23-8/30/23 and dc to acute IP rehab. Pt has known fall x3 weeks prior, previous CT head and CT lumbar spine (-). At baseline pt resides with spouse in multi level home with 0 YOLANDA. Pt typically independent with ADLs, drives, and retired.  -MS     Environmental Supports and Barriers (Occupational Profile) supportive family  -MS       Row Name 09/10/23 1141          Living Environment    People in Home spouse  -MS       Row Name 09/10/23 1141          Home Main Entrance    Number of Stairs, Main Entrance none  -MS       Row Name 09/10/23 1141          Stairs Within Home, Primary    Number of Stairs, Within Home, Primary other (see comments)  2 story home, able to reside on main level if needed  -MS       Row Name 09/10/23 1141          Cognition    Orientation Status (Cognition) oriented x 4  -MS       Row Name 09/10/23 1141          Safety Issues, Functional Mobility    Safety Issues Affecting Function (Mobility) insight into deficits/self-awareness;judgment;problem-solving;safety precautions  follow-through/compliance  -MS     Impairments Affecting Function (Mobility) balance;endurance/activity tolerance;pain  -MS               User Key  (r) = Recorded By, (t) = Taken By, (c) = Cosigned By      Initials Name Provider Type    Rosi Hartman OT Occupational Therapist                     Mobility/ADL's       Row Name 09/10/23 1143          Bed Mobility    Bed Mobility rolling right;supine-sit;sit-supine  -MS     Rolling Right [place] (Bed Mobility) minimum assist (75% patient effort);verbal cues  -MS     Supine-Sit [place] (Bed Mobility) moderate assist (50% patient effort)  -MS     Sit-Supine [place] (Bed Mobility) moderate assist (50% patient effort);2 person assist  -MS     Comment, (Bed Mobility) pt able to pull self up in bed with cues  -MS       Row Name 09/10/23 1143          Transfers    Transfers bed-chair transfer;sit-stand transfer  -MS     Comment, (Transfers) pt unable to progress to transfers this date d/t increased pain  -MS       Row Name 09/10/23 1143          Bed-Chair Transfer    Bed-Chair [place] (Transfers) not tested  -MS       Row Name 09/10/23 1143          Sit-Stand Transfer    Sit-Stand [place] (Transfers) not tested  -MS       Row Name 09/10/23 1143          Activities of Daily Living    BADL Assessment/Intervention upper body dressing  -MS       Row Name 09/10/23 1143          Upper Body Dressing Assessment/Training    [place] Level (Upper Body Dressing) minimum assist (75% patient effort)  -MS     Position (Upper Body Dressing) supine  -MS     Comment, (Upper Body Dressing) gown donned  -MS               User Key  (r) = Recorded By, (t) = Taken By, (c) = Cosigned By      Initials Name Provider Type    Rosi Hartman OT Occupational Therapist                   Obj/Interventions       Row Name 09/10/23 1145          Sensory Assessment (Somatosensory)    Sensory Assessment (Somatosensory) UE sensation intact  -MS       Row Name 09/10/23 1145           Vision Assessment/Intervention    Visual Impairment/Limitations WNL  -MS       Row Name 09/10/23 1145          Range of Motion Comprehensive    General Range of Motion bilateral upper extremity ROM WNL  -MS       Row Name 09/10/23 1145          Strength Comprehensive (MMT)    Comment, General Manual Muscle Testing (MMT) Assessment BUE functionally 4-/5, unable to formally assess  -MS       Row Name 09/10/23 1145          Balance    Balance Assessment sitting static balance;sitting dynamic balance;standing dynamic balance;standing static balance  -MS     Static Sitting Balance contact guard  -MS     Dynamic Sitting Balance minimal assist  -MS     Position, Sitting Balance unsupported;sitting edge of bed  -MS     Static Standing Balance unable to assess  -MS     Dynamic Standing Balance unable to assess  -MS               User Key  (r) = Recorded By, (t) = Taken By, (c) = Cosigned By      Initials Name Provider Type    Rosi Hartman, OT Occupational Therapist                   Goals/Plan       [facility] Name 09/10/23 1145          Bed Mobility Goal 1 (OT)    Activity/Assistive Device (Bed Mobility Goal 1, OT) bed mobility activities, all  -MS     [place] Level/Cues Needed (Bed Mobility Goal 1, OT) modified independence  -MS     Time Frame (Bed Mobility Goal 1, OT) long term goal (LTG);2 weeks  -MS     Progress/Outcomes (Bed Mobility Goal 1, OT) new goal  -MS       [facility] Name 09/10/23 1145          Transfer Goal 1 (OT)    Activity/Assistive Device (Transfer Goal 1, OT) transfers, all  -MS     [place] Level/Cues Needed (Transfer Goal 1, OT) minimum assist (75% or more patient effort)  -MS     Time Frame (Transfer Goal 1, OT) long term goal (LTG);2 weeks  -MS     Progress/Outcome (Transfer Goal 1, OT) new goal  -MS       Row Name 09/10/23 1145          Dressing Goal 1 (OT)    Activity/Device (Dressing Goal 1, OT) dressing skills, all;dressing stick;reacher;sock-aid  -MS     [place]/Cues Needed (Dressing  Goal 1, OT) set-up required  -MS     Time Frame (Dressing Goal 1, OT) long term goal (LTG);2 weeks  -MS     Progress/Outcome (Dressing Goal 1, OT) new goal  -MS       Row Name 09/10/23 1145          Toileting Goal 1 (OT)    Activity/Device (Toileting Goal 1, OT) toileting skills, all  -MS     [place] Level/Cues Needed (Toileting Goal 1, OT) minimum assist (75% or more patient effort)  -MS     Time Frame (Toileting Goal 1, OT) long term goal (LTG);2 weeks  -MS     Progress/Outcome (Toileting Goal 1, OT) new goal  -MS       Row Name 09/10/23 1145          Grooming Goal 1 (OT)    Activity/Device (Grooming Goal 1, OT) grooming skills, all  -MS     [place] (Grooming Goal 1, OT) set-up required  -MS     Time Frame (Grooming Goal 1, OT) long term goal (LTG);2 weeks  -MS     Progress/Outcome (Grooming Goal 1, OT) new goal  -MS       Row Name 09/10/23 1145          Problem Specific Goal 1 (OT)    Problem Specific Goal 1 (OT) increase sitting tolerance needed for ADL routine >5 minutes without rest break  -MS     Time Frame (Problem Specific Goal 1, OT) long term goal (LTG);2 weeks  -MS     Progress/Outcome (Problem Specific Goal 1, OT) new [facility]  -MS       Row Name 09/10/23 1147          Therapy Assessment/Plan (OT)    Planned Therapy Interventions (OT) activity tolerance training;adaptive equipment training;BADL retraining;functional balance retraining;IADL retraining;occupation/activity based interventions;passive ROM/stretching;patient/caregiver education/training;transfer/mobility retraining;strengthening exercise;ROM/therapeutic exercise  -MS               User Key  (r) = Recorded By, (t) = Taken By, (c) = Cosigned By      Initials Name Provider Type    Rosi Hartman, OT Occupational Therapist                   Clinical Impression       Row Name 09/10/23 1147          Pain Assessment    Pretreatment Pain Rating 5/10  -MS     Posttreatment Pain Rating 7/10  -MS     Pain Location - Side/Orientation Left  -MS      Pain Location - back;hip  -MS     Pre/Posttreatment Pain Comment per NSG pt recently received pain medication prior to OT arrival  -MS     Pain Intervention(s) Repositioned;Emotional support  -MS       Row Name 09/10/23 1147          Plan of Care Review    Plan of Care Reviewed With patient  -MS     Progress no change  -MS     Outcome Evaluation Pt is a 79 y/o M admitted to [facility] 9/8/23 from [facility] with c/o L hip and back pain. XR Hip (-) acute L hip/pelvic. MRI L spine (+) fx throughout L3-L4. PMHx significant for asthma, HTN, mitral valve prolapse mood disorder, scoliosis, OA, hx prostate cancer, hearing loss, visual impairment, and s/p MVR (2022). Of note pt was admitted for similar complaints 8/27/23-8/30/23 and dc to acute IP rehab. Pt has known fall x3 weeks prior, previous CT head and CT lumbar spine (-). At baseline pt resides with spouse in multi level home with 0 YOLANDA. Pt typically independent with ADLs, drives, and retired. This date, pt A&Ox4, on room air and in supine upon arrival. Per chart review from previous admit, pt utilizing TLSO brace at [place], however pt not with brace currently. Pt requires min A for log rolling, mod A x2 for supine<>sit. Pt declines transfers this date d/t significant pain and tolerates sitting edge of bed ~20 seconds, requesting to return to supine. Pt will continue to require increased assist with all ADLs and transfers at this time, related to pain, global weakness and spinal precautions. OT recommend pt return to acute IP rehab when medically appropriate for d/c. OT will follow while admitted.  -MS       Row Name 09/10/23 1147          Therapy Assessment/Plan (OT)    Rehab Potential (OT) good, to achieve stated therapy goals  -MS     Criteria for Skilled Therapeutic Interventions Met (OT) yes;meets criteria;skilled treatment is necessary  -MS     Therapy Frequency (OT) 5 times/wk  -MS     Predicted Duration of Therapy Intervention (OT) until d/c  -MS       Row Name 09/10/23  1147          Therapy Plan Review/Discharge Plan (OT)    Anticipated Discharge Disposition (OT) inpatient rehabilitation facility  -MS       Row Name 09/10/23 1147          Vital Signs    O2 Delivery Pre Treatment room air  -MS     O2 Delivery Intra Treatment room air  -MS     O2 Delivery Post Treatment room air  -MS     Pre Patient Position Supine  -MS     Intra Patient Position Sitting  -MS     Post Patient Position Supine  -MS     Recovery Time VSS  -MS       Row Name 09/10/23 1147          Positioning and Restraints    Pre-Treatment Position in bed  -MS     Post Treatment Position bed  -MS     In Bed notified nsg;supine;call light within reach;encouraged to call for assist;exit alarm on  -MS               User Key  (r) = Recorded By, (t) = Taken By, (c) = Cosigned By      Initials Name Provider Type    Rosi Hartman, ALFRED Occupational Therapist                   Outcome Measures       Row Name 09/10/23 1147          How much help from another is currently needed...    Putting on and taking off regular lower body clothing? 2  -MS     Bathing (including washing, rinsing, and drying) 2  -MS     Toileting (which includes using toilet bed pan or urinal) 2  -MS     Putting on and taking off regular upper body clothing 3  -MS     Taking care of personal grooming (such as brushing teeth) 3  -MS     Eating meals 4  -MS     AM-PAC 6 Clicks Score (OT) 16  -MS       Row Name 09/10/23 1147 09/10/23 0840       How much help from another person do you currently need...    Turning from your back to your side while in flat bed without using bedrails? 3  -CL 3  -TM (r) MSA (t) TM (c)    Moving from lying on back to sitting on the side of a flat bed without bedrails? 3  -CL 2  -TM (r) MSA (t) TM (c)    Moving to and from a bed to a chair (including a wheelchair)? 1  -CL 2  -TM (r) MSA (t) TM (c)    Standing up from a chair using your arms (e.g., wheelchair, bedside chair)? 1  -CL 2  -TM (r) MSA (t) TM (c)    Climbing 3-5 steps  with a railing? 1  -CL 1  -TM (r) MSA (t) TM (c)    To walk in hospital room? 1  -CL 2  -TM (r) MSA (t) TM (c)    AM-PAC 6 Clicks Score (PT) 10  -CL 12  -MSA    Highest level of mobility 4 --> Transferred to chair/commode  -CL 4 --> Transferred to chair/commode  -MSA      Row Name 09/10/23 1147          Functional Assessment    Outcome Measure Options AM-PAC 6 Clicks Daily Activity (OT)  -MS               User Key  (r) = Recorded By, (t) = Taken By, (c) = Cosigned By      Initials Name Provider Type    MS Rosi Rae, OT Occupational Therapist    CL Barbra Casillas, PT Physical Therapist    TM Carlota Anderson, RN Registered Nurse    Sandip Chavarria, RN Registered Nurse                    Occupational Therapy Education       Title: PT OT SLP Therapies (Done)       Topic: Occupational Therapy (Done)       Point: ADL training (Done)       Description:   Instruct learner(s) on proper safety adaptation and remediation techniques during self care or transfers.   Instruct in proper use of assistive devices.                  Learning Progress Summary             Patient Acceptance, E,TB, VU by MS at 9/10/2023 1147                         Point: Precautions (Done)       Description:   Instruct learner(s) on prescribed precautions during self-care and functional transfers.                  Learning Progress Summary             Patient Acceptance, E,TB, VU by MS at 9/10/2023 1147                         Point: Body mechanics (Done)       Description:   Instruct learner(s) on proper positioning and spine alignment during self-care, functional mobility activities and/or exercises.                  Learning Progress Summary             Patient Acceptance, E,TB, VU by MS at 9/10/2023 1147                                         User Key       Initials Effective Dates Name Provider Type Discipline    MS 07/13/22 -  Rosi Rae, OT Occupational Therapist OT                  OT Recommendation and Plan  Planned Therapy  Interventions (OT): activity tolerance training, adaptive equipment training, BADL retraining, functional balance retraining, IADL retraining, occupation/activity based interventions, passive ROM/stretching, patient/caregiver education/training, transfer/mobility retraining, strengthening exercise, ROM/therapeutic exercise  Therapy Frequency (OT): 5 times/wk  Plan of Care Review  Plan of Care Reviewed With: patient  Progress: no change  Outcome Evaluation: Pt is a 79 y/o M admitted to [facility] 9/8/23 from [facility] with c/o L hip and back pain. XR Hip (-) acute L hip/pelvic. MRI L spine (+) fx throughout L3-L4. PMHx significant for asthma, HTN, mitral valve prolapse mood disorder, scoliosis, OA, hx prostate cancer, hearing loss, visual impairment, and s/p MVR (2022). Of note pt was admitted for similar complaints 8/27/23-8/30/23 and dc to acute IP rehab. Pt has known fall x3 weeks prior, previous CT head and CT lumbar spine (-). At baseline pt resides with spouse in multi level home with 0 YOLANDA. Pt typically independent with ADLs, drives, and retired. This date, pt A&Ox4, on room air and in supine upon arrival. Per chart review from previous admit, pt utilizing TLSO brace at [place], however pt not with brace currently. Pt requires min A for log rolling, mod A x2 for supine<>sit. Pt declines transfers this date d/t significant pain and tolerates sitting edge of bed ~20 seconds, requesting to return to supine. Pt will continue to require increased assist with all ADLs and transfers at this time, related to pain, global weakness and spinal precautions. OT recommend pt return to acute IP rehab when medically appropriate for d/c. OT will follow while admitted.     Time Calculation:                   Rosi Rae OT  9/10/2023

## 2023-09-10 NOTE — PLAN OF CARE
Goal Outcome Evaluation:  Plan of Care Reviewed With: patient        Progress: no change  Outcome Evaluation: Pt is a 79 y/o M admitted to [facility] 9/8/23 from [facility] with c/o L hip and back pain. XR Hip (-) acute L hip/pelvic. MRI L spine (+) fx throughout L3-L4. PMHx significant for asthma, HTN, mitral valve prolapse mood disorder, scoliosis, OA, hx prostate cancer, hearing loss, visual impairment, and s/p MVR (2022). Of note pt was admitted for similar complaints 8/27/23-8/30/23 and dc to acute IP rehab. Pt has known fall x3 weeks prior, previous CT head and CT lumbar spine (-). At baseline pt resides with spouse in multi level home with 0 YOLANDA. Pt typically independent with ADLs, drives, and retired. This date, pt A&Ox4, on room air and in supine upon arrival. Per chart review from previous admit, pt utilizing TLSO brace at [place], however pt not with brace currently. Pt requires min A for log rolling, mod A x2 for supine<>sit. Pt declines transfers this date d/t significant pain and tolerates sitting edge of bed ~20 seconds, requesting to return to supine. Pt will continue to require increased assist with all ADLs and transfers at this time, related to pain, global weakness and spinal precautions. OT recommend pt return to acute IP rehab when medically appropriate for d/c. OT will follow while admitted.      Anticipated Discharge Disposition (OT): inpatient rehabilitation facility

## 2023-09-10 NOTE — PLAN OF CARE
Goal Outcome Evaluation:  Plan of Care Reviewed With: patient           Outcome Evaluation: pt will remain in obs. neruo surgery following

## 2023-09-10 NOTE — PROGRESS NOTES
"[facility]    Patient Name: Clint Cee  : 1945  MRN: [id number]  Primary Care Physician: Jaret Castellanos MD  Date of admission: 2023     Patient Care Team:  Jaret Castellanos MD as PCP - General (Family Medicine)  Felipe Garcia MD as Consulting Physician (Cardiology)  Golden Serna, SHANEL as Ambulatory  ([facility])          Subjective   History Present Illness     Chief Complaint:   Chief Complaint   Patient presents with    Back Pain     Back pain    History of Present Illness      78-year-old  male with a history of a fall  who presents the emergency room with complaints of continued and worsening back pain that has been ongoing for several days.  Patient states that he was seen in the emergency room after the fall and had a negative CT.  When the pain continued, he had an MRI with findings including fracture and ligament tear of L3/L4.  He has been at [facility] rehab for the past couple weeks for rehab with PT.  Pt had increased pain and he came to the emergency room. Patient is also stating that he has pain in his left hip and is concerned for fracture.  Patient states that the pain is exacerbated by movement.  It is in his left lumbar region with radiation to his left leg. He states that sensation feels like \"an electric shock\".  He has had no loss of bowel/bladder.  He has had no numbness in his rectum or in her thighs.  Patient does have paresthesia in his left foot but this is chronic. Patient has been taking oxycodone, baclofen and gabapentin with his last dose at 830 this morning.     Observation 23  Pt concurs with er hpi. Pt still having pain with movement. Neuro sx consulted.     Observation 9/10/23  Pt seen by neuro sx and recommends pt be seen by Dr Strauss tomorrow. PT/OT recommends return to Inpt rehab for more therapy.  Intend to discharge tomorrow but waiting for recommendation from Dr" Rica.    ROS    Musculoskeletal:  Positive for back pain.   All other systems reviewed and are negative.      Personal History     Past Medical History:   Past Medical History:   Diagnosis Date    Abnormal ECG June 2022    Allergic 01/01/1954    Nasal alergies    Arrhythmia 2004    Dr Youngblood examined me and said i was ok.  skipped beats    Asthma     no sx    Benign prostatic hyperplasia 11/14/2018    Cataract 01/01/2014    Surgery. Caused detached retina of right eye 20/60 repair    Colon polyp 01/01/2018    Found and removed last colon eca.q    Depression     Gastroesophageal reflux disease 03/20/2014    Glaucoma 2003    Normal pressures. Have low pressure glaucoma    Gout     Headache 01/01/2015    Have shimmering in both eyes intermittantly. 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    Prostate Cancer Jan22 January 2022    Spinal stenosis 12/02/2011    Visual impairment 01/01/1955    Nearsighted corrected glasses    Vitamin D deficiency 05/23/2018       Surgical History:      Past Surgical History:   Procedure Laterality Date    ADENOIDECTOMY  1954    As child    APPENDECTOMY  1956    Surgery    CARDIAC CATHETERIZATION N/A 09/30/2022    Procedure: Right and Left Heart Cath with Coronar Angiography;  Surgeon: Felipe Garcia MD;  Location: [facility] CATH INVASIVE LOCATION;  Service: Cardiovascular;  Laterality: N/A;    CARDIAC VALVE REPLACEMENT N/A 11/17/2022    Procedure: TRICUSPID VALVE REPAIR/REPLACEMENT;  Surgeon: Femi Henderson MD;  Location: [facility] CVOR;  Service: Cardiothoracic;  Laterality: N/A;  tricuspid valve repaired with 32mm  groves physio tricuspid  annuloplasty ring    CARDIAC VALVE REPLACEMENT  11/17/2022    Repaired mitral and tricuspid    CATARACT EXTRACTION Bilateral     COLONOSCOPY  01/01/1997    Regularly scheduled    COLONOSCOPY N/A 2/22/2023    Procedure: COLONOSCOPY with cold snare polypectomy x 1;  Surgeon: Manfred Keating MD;  Location: [facility] ENDOSCOPY;  Service: Gastroenterology;  Laterality: N/A;    ENDOSCOPY N/A 2/22/2023    Procedure: ESOPHAGOGASTRODUODENOSCOPY with esophageal dilation using 48 Fr. Bougie Dilator;  Surgeon: Manfred Keating MD;  Location: [facility] ENDOSCOPY;  Service: Gastroenterology;  Laterality: N/A;  erosive esophagitis    EYE SURGERY  01/01/2010    Reattached right retina    MITRAL VALVE REPAIR/REPLACEMENT N/A 11/17/2022    Procedure: MITRAL VALVE REPAIR/REPLACEMENT with left atrial appendage closure;  Surgeon: Femi Henderson MD;  Location: [facility] CVOR;  Service: Cardiothoracic;  Laterality: N/A;  mitral valve repaired iwth 40mm medtronic annuloplasty band  with intraop TERRA    SPINE SURGERY  01/01/1971    2 lumbar 1 cervical    TONSILLECTOMY  01/01/1950    Childhood           Family History: family history includes Arrhythmia in his father; Arthritis in his father; Cancer in his father; Diabetes in his mother; Early death in his mother; Heart disease in his mother. Otherwise pertinent FHx was reviewed and unremarkable.     Social History:  reports that he has quit smoking. His smoking use included cigarettes. He has been exposed to tobacco smoke. He has never used smokeless tobacco. He reports that he does not currently use alcohol. He reports that he does not use drugs.      Medications:  Prior to Admission medications    Medication Sig Start Date End Date Taking? Authorizing Provider   acetaminophen (TYLENOL) 500 MG tablet Take 1 tablet by mouth Every 6 (Six) Hours As Needed for Mild Pain. Indications: Pain   Yes Provider, MD Irlanda   allopurinol (ZYLOPRIM) 100 MG tablet Take 1 tablet by mouth Daily.    Yes Irlanda Kaminski MD   Apple Erik Vn-Grn Tea-Bit Or-Cr (APPLE CIDER VINEGAR PLUS PO) Take 1 Piece by mouth Daily As Needed. Indications: Dietary supplement 1/1/21  Yes Jaret Castellanos MD   atorvastatin (LIPITOR) 40 MG tablet Take 1 tablet by mouth Every Night. Indications: High Amount of Fats in the Blood 7/3/23  Yes Felipe Garcia MD   DULoxetine (CYMBALTA) 60 MG capsule Take 1 capsule by mouth Daily. 6/20/23  Yes Jaret Castellanos MD   famotidine (PEPCID) 40 MG tablet Take 1 tablet by mouth Every Night. 12/16/22  Yes Irlanda Kaminski MD   fexofenadine (ALLEGRA) 180 MG tablet Take 1 tablet by mouth 2 (Two) Times a Day As Needed. Indications: Hayfever   Yes Irlanda Kaminski MD   fluticasone (FLONASE) 50 MCG/ACT nasal spray 2 sprays into the nostril(s) as directed by provider Daily As Needed. Indications: Allergic Rhinitis   Yes Irlanda Kaminski MD   Leuprolide Mesylate, 6 Month, (CAMCEVI SC) Inject  under the skin into the appropriate area as directed. Every 6 months injection, due in December  Indications: prostate cancer (to decrease testosterone). 1/21/21  Yes Irlanda Kaminski MD   melatonin 5 MG tablet tablet Take 1 tablet by mouth At Night As Needed (sleep). Indications: Trouble Sleeping   Yes Irlanda Kaminski MD   metoprolol tartrate (LOPRESSOR) 25 MG tablet Take 1 tablet by mouth Daily. 5/18/23  Yes Irlanda Kaminski MD   zolpidem (Ambien) 10 MG tablet Take 1 tablet by mouth At Night As Needed for Sleep.    ProviderIrlanda MD       Allergies:    Allergies   Allergen Reactions    Morphine Hallucinations    Beta Adrenergic Blockers Cough    Ace Inhibitors Cough     cough       Objective   Objective     Vital Signs  Temp:  [97.6 °F (36.4 °C)-98.3 °F (36.8 °C)] 97.9 °F (36.6 °C)  Heart Rate:  [59-74] 64  Resp:  [14-16] 16  BP: (114-144)/(43-78) 114/59  SpO2:  [95 %-100 %] 99 %  on   ;   Device (Oxygen Therapy): room air  Body mass index is 29.69  kg/m².    Physical Exam    Constitutional:       Appearance: Normal appearance.   HENT:      Mouth/Throat:      Mouth: Mucous membranes are moist.   Cardiovascular:      Rate and Rhythm: Normal rate and regular rhythm.      Pulses: Normal pulses.      Heart sounds: Normal heart sounds.   Pulmonary:      Effort: Pulmonary effort is normal.      Breath sounds: Normal breath sounds.   Musculoskeletal:         General: Tenderness present.      Comments: Tender to lower back and left leg.   Painful response to change in body position   Skin:     General: Skin is warm and dry.   Neurological:      General: No focal deficit present.      Mental Status: He is alert and oriented to person, place, and time.   Psychiatric:         Mood and Affect: Mood normal.         Behavior: Behavior normal.        Results Review:  I have personally reviewed most recent lab results and radiology images and interpretations and agree with findings, most notably: cbc, cmp, mri lumbar spine, xray left hip.    Results from last 7 days   Lab Units 09/10/23  0251   WBC 10*3/mm3 6.90   HEMOGLOBIN g/dL 13.7   HEMATOCRIT % 38.7   PLATELETS 10*3/mm3 87*     Results from last 7 days   Lab Units 09/10/23  0251   SODIUM mmol/L 136   POTASSIUM mmol/L 3.6   CHLORIDE mmol/L 103   CO2 mmol/L 22.0   BUN mg/dL 22   CREATININE mg/dL 0.82   GLUCOSE mg/dL 126*   CALCIUM mg/dL 8.7     Estimated Creatinine Clearance: 96.2 mL/min (by C-G formula based on SCr of 0.82 mg/dL).  Brief Urine Lab Results  (Last result in the past 365 days)        Color   Clarity   Blood   Leuk Est   Nitrite   Protein   CREAT   Urine HCG        08/27/23 1145 Yellow   Clear   Negative   Negative   Negative   Negative                   Microbiology Results (last 10 days)       Procedure Component Value - Date/Time    CANDIDA AURIS SCREEN - Swab, Axilla Right, Axilla Left and Groin [[id number]]  (Normal) Collected: 09/08/23 1418    Lab Status: Preliminary result Specimen: Swab from Axilla  Right, Axilla Left and Groin Updated: 09/09/23 1430     Candida Auris Screen Culture No Candida auris isolated at 24 hours            ECG/EMG Results (most recent)       None            Results for orders placed during the hospital encounter of 11/16/22    Bilateral Carotid Duplex    Interpretation Summary    Proximal right internal carotid artery is normal.    Mid right internal carotid artery is normal.    Proximal left internal carotid artery mild stenosis.      Results for orders placed during the hospital encounter of 09/30/22    Adult Transesophageal Echo (TERRA) W/ Cont if Necessary Per Protocol    Interpretation Summary  Date of study  9/30/2022    Procedure performed  Transesophageal echocardiogram and Doppler study.    Indications  Significant mitral regurgitation  Shortness of breath    Procedure  Anesthesia was provided by anesthesiologist with intravenous Diprivan.  TERRA probe could be passed without difficulty.  Patient tolerated the procedure well.  No complications were noted.    Results  Technically satisfactory study.  Mitral valve has severe myxomatous degeneration with multiple areas of severe prolapse and known coaptation of the mitral leaflet as well as probable ruptured chordae.  Severe mitral regurgitation is seen with multiple jets with mitral regurgitation jet occupying the entire left atrium.  Tricuspid valve is also showing myxomatous degeneration with moderate tricuspid regurgitation.  Calculated pulmonary artery pressure is 60 mmHg.  Aortic valve is thickened with adequate opening motion.  Left atrium is enlarged.  Left atrial appendage is enlarged without clot.  Left ventricle is normal in size and contractility with ejection fraction of 60%.  Right ventricle is normal in size.  Right atrium is normal in size.  Atrial septum is intact without PFO.  Aortic intimal thickening is present without clot.  No pericardial effusion is seen.    Impression  Myxomatous mitral and tricuspid valve  degeneration.  Severe mitral valve prolapse involving both anterior and posterior mitral leaflets with severe mitral regurgitation with multiple jets.  Probable ruptured chordae.  In some views coaptation of the mitral valve is present.  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  Significant pulmonary hypertension.  Left atrial and left atrial appendage enlargement without clot.  Normal left ventricular size and contractility with ejection fraction of 60%.      MRI Lumbar Spine Without Contrast    Result Date: 9/8/2023  1. Features of advanced degenerative change and ankylosing spondylitis in the lumbar spine, with fracture through the L3-4 disc space extending to the posterior elements, is again noted. Mild widening of the anterior L3-4 disc space with fluid and edema,  is unchanged, and no subluxation is evident. 2. Moderate canal stenosis at L1-2 and moderate to severe canal stenosis at L3-4, unchanged. Multilevel moderate to moderate to severe neural foraminal stenosis age of changes are unchanged from 8/28/2023. 3. No new finding since 8/28/2023. Electronically Signed: Deann Damon MD  9/8/2023 2:13 PM EDT  Workstation ID: [id number]    XR Hip With or Without Pelvis 2 - 3 View Left    Result Date: 9/8/2023  Impression: 1. No acute left hip or pelvic finding. 2. Moderately advanced degenerative changes of the hips. 3. Advanced diminished disc height in the lower lumbar spine. Electronically Signed: Deann Damon MD  9/8/2023 11:56 AM EDT  Workstation ID: [id number]       Estimated Creatinine Clearance: 96.2 mL/min (by C-G formula based on SCr of 0.82 mg/dL).    Assessment & Plan   Assessment/Plan       Active Hospital Problems    Diagnosis  POA    **Back pain [M54.9]  Yes      Resolved Hospital Problems   No resolved problems to display.     L3-L4 fracture  - cbc and cmp are unremarkable  - MRI lumbar spine reviewed and showing ankylosing spondylitis in lumbar spine, L3-L4 disc space fracture  extending posteriorly. Mild widening of the anterior L3-4 disc space with fluid and edema,   is unchanged, and no subluxation is evident.Moderate canal stenosis at L1-2 and moderate to severe canal stenosis at L3-4.  - XR Left hip reviewed and showing no acute findings  - Neuro surgery consulted and recommending pt wait to be seen by Dr Strauss tomorrow  - pain control  - PT and OT consulted and recommend return to inpt rehab     Hypertension  -well Controlled       BP Readings from Last 1 Encounters:   09/09/23 117/67      - Continue metoprolol  - Monitor while admitted             VTE Prophylaxis -   Mechanical Order History:        Ordered        09/08/23 1816  Place Sequential Compression Device  Once            09/08/23 1816  Maintain Sequential Compression Device  Continuous                          Pharmalogical Order History:       None            CODE STATUS:    Code Status and Medical Interventions:   Ordered at: 09/08/23 1816     Code Status (Patient has no pulse and is not breathing):    CPR (Attempt to Resuscitate)     Medical Interventions (Patient has pulse or is breathing):    Full Support       This patient has been examined wearing personal protective equipment.     I discussed the patient's findings and my recommendations with patient and nursing staff.      Signature:Electronically signed by Dominique Martinez PA-C, 09/10/23, 1:38 PM EDT.

## 2023-09-10 NOTE — PLAN OF CARE
Goal Outcome Evaluation:  Plan of Care Reviewed With: patient        Progress: no change  Outcome Evaluation: Pt has had minimal complaints of pain at rest. pain med given. pt rested during this shift. will cont to monitor.

## 2023-09-10 NOTE — PLAN OF CARE
Goal Outcome Evaluation:  Plan of Care Reviewed With: patient           Outcome Evaluation: Clint Cee is a 78 y.o. male admitted with worsening back pain. MRI reveals  multilevel spondylosis. X ray is negative for hip fx but shows advanced DJD. Pt comes to [facility] after 4-5 day stay at [facility].  Per review of old records, pt was using a TLSO upon discharge from [facility] but does not have one with him at this time.  At time of evaluation, pt is A&O x 4, with some difficulty hearing related to hearing aid batteries.  He needed verbal cues and min a for log rolling and Mod A 2 for supine <-> sit.  Pt tolerated only 20 seconds sitting EOB and requested to return to supine due to pain.  PT recommends return to [facility] for continued therapy at discharge.  Pt would like to d/c to home.  PT explained concerns related to pts limited tolerance to sitting and inability to tolerate standing and/or transfers as barriers to safe return home. Pt then verbalized understanding.      Anticipated Discharge Disposition (PT): inpatient rehabilitation facility

## 2023-09-10 NOTE — PROGRESS NOTES
LOS: 0 days   Patient Care Team:  Jraet Castellanos MD as PCP - General (Family Medicine)  Felipe Garcia MD as Consulting Physician (Cardiology)  Golden Serna, SHANEL as Ambulatory  (Population Health)    Chief Complaint: Severe back pain    Subjective     Patient continues to complain of severe back pain.  Radiates into his left leg in the posterior lateral thigh and posterolateral calf.    Interval History:     History taken from: patient chart family RN    Objective      Patient has good movement in both lower extremities    Vital Signs  Temp:  [97.6 °F (36.4 °C)-98.3 °F (36.8 °C)] 97.9 °F (36.6 °C)  Heart Rate:  [59-74] 64  Resp:  [14-16] 16  BP: (114-144)/(43-78) 114/59       Results Review:     I reviewed the patient's new clinical results.  I reviewed his sed rate and CRP from yesterday.  These both look okay.  His white count this morning is still normal.      Assessment & Plan       Back pain      I told the patient and his family that I think he had a discitis that has burned itself out.  I think he is now probably unstable and that is the reason for his increased pain.  His MRI does not show a fracture but does show what appears to be discitis.  We will have Dr. Strauss evaluate him tomorrow for further recommendations.      Shelton Easton MD  09/10/23  11:09 EDT

## 2023-09-11 LAB
ANION GAP SERPL CALCULATED.3IONS-SCNC: 9 MMOL/L (ref 5–15)
BASOPHILS # BLD AUTO: 0 10*3/MM3 (ref 0–0.2)
BASOPHILS NFR BLD AUTO: 0.3 % (ref 0–1.5)
BUN SERPL-MCNC: 22 MG/DL (ref 8–23)
BUN/CREAT SERPL: 25.9 (ref 7–25)
CALCIUM SPEC-SCNC: 8.7 MG/DL (ref 8.6–10.5)
CHLORIDE SERPL-SCNC: 103 MMOL/L (ref 98–107)
CO2 SERPL-SCNC: 25 MMOL/L (ref 22–29)
CREAT SERPL-MCNC: 0.85 MG/DL (ref 0.76–1.27)
DEPRECATED RDW RBC AUTO: 43.8 FL (ref 37–54)
EGFRCR SERPLBLD CKD-EPI 2021: 90.1 ML/MIN/1.73
EOSINOPHIL # BLD AUTO: 0.1 10*3/MM3 (ref 0–0.4)
EOSINOPHIL NFR BLD AUTO: 2.1 % (ref 0.3–6.2)
ERYTHROCYTE [DISTWIDTH] IN BLOOD BY AUTOMATED COUNT: 13.9 % (ref 12.3–15.4)
GLUCOSE SERPL-MCNC: 120 MG/DL (ref 65–99)
HCT VFR BLD AUTO: 40.9 % (ref 37.5–51)
HGB BLD-MCNC: 13.9 G/DL (ref 13–17.7)
LYMPHOCYTES # BLD AUTO: 0.8 10*3/MM3 (ref 0.7–3.1)
LYMPHOCYTES NFR BLD AUTO: 13.6 % (ref 19.6–45.3)
MCH RBC QN AUTO: 30.7 PG (ref 26.6–33)
MCHC RBC AUTO-ENTMCNC: 33.9 G/DL (ref 31.5–35.7)
MCV RBC AUTO: 90.3 FL (ref 79–97)
MONOCYTES # BLD AUTO: 0.5 10*3/MM3 (ref 0.1–0.9)
MONOCYTES NFR BLD AUTO: 8.1 % (ref 5–12)
NEUTROPHILS NFR BLD AUTO: 4.5 10*3/MM3 (ref 1.7–7)
NEUTROPHILS NFR BLD AUTO: 75.9 % (ref 42.7–76)
NRBC BLD AUTO-RTO: 0 /100 WBC (ref 0–0.2)
PLATELET # BLD AUTO: 86 10*3/MM3 (ref 140–450)
PMV BLD AUTO: 6.5 FL (ref 6–12)
POTASSIUM SERPL-SCNC: 3.8 MMOL/L (ref 3.5–5.2)
RBC # BLD AUTO: 4.53 10*6/MM3 (ref 4.14–5.8)
SODIUM SERPL-SCNC: 137 MMOL/L (ref 136–145)
WBC NRBC COR # BLD: 5.9 10*3/MM3 (ref 3.4–10.8)

## 2023-09-11 PROCEDURE — 25010000002 HYDROMORPHONE 1 MG/ML SOLUTION: Performed by: PHYSICIAN ASSISTANT

## 2023-09-11 PROCEDURE — 99214 OFFICE O/P EST MOD 30 MIN: CPT | Performed by: INTERNAL MEDICINE

## 2023-09-11 PROCEDURE — 99214 OFFICE O/P EST MOD 30 MIN: CPT | Performed by: NURSE PRACTITIONER

## 2023-09-11 PROCEDURE — 80048 BASIC METABOLIC PNL TOTAL CA: CPT | Performed by: PHYSICIAN ASSISTANT

## 2023-09-11 PROCEDURE — 93005 ELECTROCARDIOGRAM TRACING: CPT | Performed by: INTERNAL MEDICINE

## 2023-09-11 PROCEDURE — G0378 HOSPITAL OBSERVATION PER HR: HCPCS

## 2023-09-11 PROCEDURE — 85025 COMPLETE CBC W/AUTO DIFF WBC: CPT | Performed by: PHYSICIAN ASSISTANT

## 2023-09-11 PROCEDURE — 93010 ELECTROCARDIOGRAM REPORT: CPT | Performed by: INTERNAL MEDICINE

## 2023-09-11 RX ORDER — ENOXAPARIN SODIUM 100 MG/ML
40 INJECTION SUBCUTANEOUS EVERY 24 HOURS
Status: DISCONTINUED | OUTPATIENT
Start: 2023-09-11 | End: 2023-09-19 | Stop reason: HOSPADM

## 2023-09-11 RX ADMIN — HYDROMORPHONE HYDROCHLORIDE 0.5 MG: 1 INJECTION, SOLUTION INTRAMUSCULAR; INTRAVENOUS; SUBCUTANEOUS at 04:44

## 2023-09-11 RX ADMIN — DULOXETINE HYDROCHLORIDE 60 MG: 30 CAPSULE, DELAYED RELEASE ORAL at 09:02

## 2023-09-11 RX ADMIN — ALLOPURINOL 100 MG: 100 TABLET ORAL at 09:02

## 2023-09-11 RX ADMIN — METOPROLOL TARTRATE 25 MG: 25 TABLET, FILM COATED ORAL at 09:02

## 2023-09-11 RX ADMIN — CYCLOBENZAPRINE 10 MG: 10 TABLET, FILM COATED ORAL at 17:16

## 2023-09-11 RX ADMIN — ZOLPIDEM TARTRATE 10 MG: 5 TABLET ORAL at 23:45

## 2023-09-11 RX ADMIN — CYCLOBENZAPRINE 10 MG: 10 TABLET, FILM COATED ORAL at 09:02

## 2023-09-11 RX ADMIN — Medication 10 ML: at 09:02

## 2023-09-11 RX ADMIN — Medication 10 ML: at 21:45

## 2023-09-11 RX ADMIN — OXYCODONE HYDROCHLORIDE 10 MG: 5 TABLET ORAL at 21:45

## 2023-09-11 RX ADMIN — ATORVASTATIN CALCIUM 40 MG: 40 TABLET, FILM COATED ORAL at 21:43

## 2023-09-11 RX ADMIN — FAMOTIDINE 40 MG: 20 TABLET ORAL at 21:43

## 2023-09-11 RX ADMIN — HYDROMORPHONE HYDROCHLORIDE 0.5 MG: 1 INJECTION, SOLUTION INTRAMUSCULAR; INTRAVENOUS; SUBCUTANEOUS at 12:18

## 2023-09-11 RX ADMIN — CYCLOBENZAPRINE 10 MG: 10 TABLET, FILM COATED ORAL at 23:43

## 2023-09-11 RX ADMIN — OXYCODONE HYDROCHLORIDE 10 MG: 5 TABLET ORAL at 17:16

## 2023-09-11 NOTE — DISCHARGE PLACEMENT REQUEST
"Clint Cee \"Dennis\" (78 y.o. Male)       Date of Birth   1945    Social Security Number       Address   [address] [place] [address] [place] [address]    Home Phone   [phone]    MRN   [id number]       [place]   None    Marital Status                               Admission Date   9/8/23    Admission Type   Emergency    Admitting Provider   Alfonzo Maher MD    Attending Provider   Alfonzo Maher MD    Department, Room/Bed   [facility] OBSERVATION, 231/1       Discharge Date       Discharge Disposition       Discharge Destination                                 Attending Provider: Alfonzo Maher MD    Allergies: Morphine, Beta Adrenergic Blockers, Ace Inhibitors    Isolation: Contact   Infection: Candida Auris (rule out) (09/08/23)   Code Status: CPR    Ht: 185.4 cm (73\")   Wt: 102 kg (225 lb)    Admission Cmt: None   Principal Problem: Back pain [M54.9]                   Active Insurance as of 9/8/2023       Primary Coverage       Payor Plan Insurance Group Employer/Plan Group    MEDICARE MEDICARE A & B        Payor Plan Address Payor Plan Phone Number Payor Plan Fax Number Effective Dates    [address] [phone]  1/1/2012 - None Entered    [facility] [id number]         Subscriber Name Subscriber Birth Date Member ID       CLINT CEE 1945 [id number]               Secondary Coverage       Payor Plan Insurance Group Employer/Plan Group    [employer] [facility] [employer] [facility] HEALTH CARE OPTIONS PLAN F       Payor Plan Address Payor Plan Phone Number Payor Plan Fax Number Effective Dates    [facility] [phone]  1/1/2020 - None Entered    [address]       [facility] [address]         Subscriber Name Subscriber Birth Date Member ID       CLINT CEE 1945 [id number]                     Emergency Contacts        (Rel.) Home Phone Work Phone Mobile Phone    VIKA CEE (Spouse) [phone]    Ghassan Cee (Son) -- -- " [phone]    Jessy Cee (Son) -- -- [phone]

## 2023-09-11 NOTE — CONSULTS
Referring Provider: Alfonzo Maher MD  Reason for Consultation:  Preoperative cardiovascular evaluation prior to having back surgery.  Status post mitral valve repair    Patient Care Team:  Jaret Castellanos MD as PCP - General (Family Medicine)  Felipe Garcia MD as Consulting Physician (Cardiology)  Golden Serna, SHANEL as Ambulatory  ([facility])    Chief complaint  Back pain    Subjective .     History of present illness:  Clint Cee is a 78 y.o. male who presents with history of intractable back pain.  Patient recently had a fall.  Patient has L3-L4 unstable fracture.  Patient was treated conservatively without any significant improvement.  Patient is being considered for back surgery by neurosurgery.  Has history of mitral valve repair.  Cardiology consultation was requested for preoperative cardiovascular valuation and preoperative cardiovascular follow-up.  Patient denies having any cardiac symptoms such as chest pain shortness of breath palpitations dizziness or syncope.  No other associated aggravating or alleviating factors.    Since I have last seen, the patient has been without any chest discomfort ,shortness of breath, palpitations, dizziness or syncope.  Denies having any headache ,abdominal pain ,nausea, vomiting , diarrhea constipation, loss of weight or loss of appetite.  Denies having any excessive bruising ,hematuria or blood in the stool.    Review of all systems negative except as indicated.    Reviewed ROS.    ROS      The patient has been without any chest discomfort, shortness of breath, palpitations, dizziness or syncope.  Denies having any headache, abdominal pain, nausea, vomiting, diarrhea, constipation, loss of weight or loss of appetite.  Denies having any excessive bruising, hematuria or blood in the stool.    Review of all systems negative except as indicated      History  Past Medical History:   Diagnosis Date    Abnormal ECG June 2022    Allergic  01/01/1954    Nasal alergies    Arrhythmia 2004    Dr Youngblood examined me and said i was ok.  skipped beats    Asthma     no sx    Benign prostatic hyperplasia 11/14/2018    Cataract 01/01/2014    Surgery. Caused detached retina of right eye 20/60 repair    Colon polyp 01/01/2018    Found and removed last colon eca.q    Depression     Gastroesophageal reflux disease 03/20/2014    Glaucoma 2003    Normal pressures. Have low pressure glaucoma    Gout     Headache 01/01/2015    Have shimmering in both eyes intermittantly. 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    Prostate Cancer Jan22 January 2022    Spinal stenosis 12/02/2011    Visual impairment 01/01/1955    Nearsighted corrected glasses    Vitamin D deficiency 05/23/2018       Past Surgical History:   Procedure Laterality Date    ADENOIDECTOMY  1954    As child    APPENDECTOMY  1956    Surgery    CARDIAC CATHETERIZATION N/A 09/30/2022    Procedure: Right and Left Heart Cath with Coronar Angiography;  Surgeon: Felipe Garcia MD;  Location: [facility] CATH INVASIVE LOCATION;  Service: Cardiovascular;  Laterality: N/A;    CARDIAC VALVE REPLACEMENT N/A 11/17/2022    Procedure: TRICUSPID VALVE REPAIR/REPLACEMENT;  Surgeon: Femi Henderson MD;  Location: [facility] CVOR;  Service: Cardiothoracic;  Laterality: N/A;  tricuspid valve repaired with 32mm  groves physio tricuspid annuloplasty ring    CARDIAC VALVE REPLACEMENT  11/17/2022    Repaired mitral and tricuspid    CATARACT EXTRACTION Bilateral     COLONOSCOPY  01/01/1997    Regularly scheduled    COLONOSCOPY N/A 2/22/2023    Procedure: COLONOSCOPY with cold snare  polypectomy x 1;  Surgeon: Manfred Keating MD;  Location: [facility] ENDOSCOPY;  Service: Gastroenterology;  Laterality: N/A;    ENDOSCOPY N/A 2/22/2023    Procedure: ESOPHAGOGASTRODUODENOSCOPY with esophageal dilation using 48 Fr. Bougie Dilator;  Surgeon: Manfred Keating MD;  Location: [facility] ENDOSCOPY;  Service: Gastroenterology;  Laterality: N/A;  erosive esophagitis    EYE SURGERY  01/01/2010    Reattached right retina    MITRAL VALVE REPAIR/REPLACEMENT N/A 11/17/2022    Procedure: MITRAL VALVE REPAIR/REPLACEMENT with left atrial appendage closure;  Surgeon: Fmei Henderson MD;  Location: [facility] CVOR;  Service: Cardiothoracic;  Laterality: N/A;  mitral valve repaired iwth 40mm medtronic annuloplasty band  with intraop TERRA    SPINE SURGERY  01/01/1971    2 lumbar 1 cervical    TONSILLECTOMY  01/01/1950    Childhood       Family History   Problem Relation Age of Onset    Arthritis Father     Cancer Father         Prostate    Arrhythmia Father         Congentive heart failure    Diabetes Mother         Adult diabetes    Early death Mother         Kidneys failed after giving birth-diabetes complications    Heart disease Mother         Adult Diabetes       Social History     Tobacco Use    Smoking status: Former     Packs/day: 0.00     Years: 0.50     Pack years: 0.00     Types: Cigarettes     Passive exposure: Past    Smokeless tobacco: Never    Tobacco comments:     Tried tobacco as child   Vaping Use    Vaping Use: Never used   Substance Use Topics    Alcohol use: Not Currently     Comment: Not a regular drinker. Occasionly have a glass of wine or be    Drug use: Never        Medications Prior to Admission   Medication Sig Dispense Refill Last Dose    acetaminophen (TYLENOL) 500 MG tablet Take 1 tablet by mouth Every 6 (Six) Hours As Needed for Mild Pain. Indications: Pain       allopurinol (ZYLOPRIM) 100 MG tablet Take 1 tablet by mouth Daily.       Apple Erik Vn-Grn Tea-Bit Or-Cr (APPLE CIDER VINEGAR  PLUS PO) Take 1 Piece by mouth Daily As Needed. Indications: Dietary supplement       atorvastatin (LIPITOR) 40 MG tablet Take 1 tablet by mouth Every Night. Indications: High Amount of Fats in the Blood 90 tablet 1     DULoxetine (CYMBALTA) 60 MG capsule Take 1 capsule by mouth Daily. 90 capsule 0     famotidine (PEPCID) 40 MG tablet Take 1 tablet by mouth Every Night.       fexofenadine (ALLEGRA) 180 MG tablet Take 1 tablet by mouth 2 (Two) Times a Day As Needed. Indications: Hayfever       fluticasone (FLONASE) 50 MCG/ACT nasal spray 2 sprays into the nostril(s) as directed by provider Daily As Needed. Indications: Allergic Rhinitis       Leuprolide Mesylate, 6 Month, (CAMCEVI SC) Inject  under the skin into the appropriate area as directed. Every 6 months injection, due in December  Indications: prostate cancer (to decrease testosterone).       melatonin 5 MG tablet tablet Take 1 tablet by mouth At Night As Needed (sleep). Indications: Trouble Sleeping       metoprolol tartrate (LOPRESSOR) 25 MG tablet Take 1 tablet by mouth Daily.       zolpidem (Ambien) 10 MG tablet Take 1 tablet by mouth At Night As Needed for Sleep.            Morphine, Beta adrenergic blockers, and Ace inhibitors    Scheduled Meds:allopurinol, 100 mg, Oral, Daily  atorvastatin, 40 mg, Oral, Nightly  cyclobenzaprine, 10 mg, Oral, TID  DULoxetine, 60 mg, Oral, Daily  famotidine, 40 mg, Oral, Nightly  metoprolol tartrate, 25 mg, Oral, Daily  sodium chloride, 10 mL, Intravenous, Q12H      Continuous Infusions:   PRN Meds:.  HYDROmorphone    melatonin    oxyCODONE    [COMPLETED] Insert Peripheral IV **AND** sodium chloride    sodium chloride    sodium chloride    zolpidem    Objective     VITAL SIGNS  Vitals:    09/10/23 1921 09/10/23 2325 09/11/23 0434 09/11/23 0902   BP: 136/79 119/68 124/70 126/79   BP Location: Left arm Left arm Left arm    Patient Position: Lying Lying Lying    Pulse: 68 78 68 80   Resp: 17 16 16    Temp: 98.2 °F (36.8 °C)  "98.9 °F (37.2 °C) 99 °F (37.2 °C)    TempSrc: Oral Oral Oral    SpO2: 98% 97% 97%    Weight:       Height:           Flowsheet Rows      Flowsheet Row First Filed Value   Admission Height 185.4 cm (73\") Documented at 09/08/2023 0957   Admission Weight 102 kg (225 lb) Documented at 09/08/2023 0957              Intake/Output Summary (Last 24 hours) at 9/11/2023 1139  Last data filed at 9/10/2023 2000  Gross per 24 hour   Intake 444 ml   Output 400 ml   Net 44 ml        TELEMETRY: Sinus rhythm    Physical Exam:  The patient is alert, oriented and in no distress.  Vital signs as noted above.  Head and neck revealed no carotid bruits or jugular venous distention.  No thyromegaly or lymphadenopathy is present  Lungs clear.  No wheezing.  Breath sounds are normal bilaterally.  Heart normal first and second heart sounds. No murmur.  No precordial rub is present.  No gallop is present.  Abdomen soft and nontender.  No organomegaly is present.  Extremities with good peripheral pulses without any pedal edema.  Skin warm and dry.  Musculoskeletal system is grossly normal  CNS grossly normal    Reviewed and updated.    Results Review:   I reviewed the patient's new clinical results.  Lab Results (last 24 hours)       Procedure Component Value Units Date/Time    Basic Metabolic Panel [[id number]]  (Abnormal) Collected: 09/11/23 0442    Specimen: Blood Updated: 09/11/23 0525     Glucose 120 mg/dL      BUN 22 mg/dL      Creatinine 0.85 mg/dL      Sodium 137 mmol/L      Potassium 3.8 mmol/L      Chloride 103 mmol/L      CO2 25.0 mmol/L      Calcium 8.7 mg/dL      BUN/Creatinine Ratio 25.9     Anion Gap 9.0 mmol/L      eGFR 90.1 mL/min/1.73     Narrative:      GFR Normal >60  Chronic Kidney Disease <60  Kidney Failure <15    The GFR formula is only valid for adults with stable renal function between ages 18 and 70.    CBC & Differential [[id number]]  (Abnormal) Collected: 09/11/23 0442    Specimen: Blood Updated: 09/11/23 0458    " Narrative:      The following orders were created for panel order CBC & Differential.  Procedure                               Abnormality         Status                     ---------                               -----------         ------                     CBC Auto Differential[[id number]]        Abnormal            Final result                 Please view results for these tests on the individual orders.    CBC Auto Differential [[id number]]  (Abnormal) Collected: 09/11/23 0442    Specimen: Blood Updated: 09/11/23 0458     WBC 5.90 10*3/mm3      RBC 4.53 10*6/mm3      Hemoglobin 13.9 g/dL      Hematocrit 40.9 %      MCV 90.3 fL      MCH 30.7 pg      MCHC 33.9 g/dL      RDW 13.9 %      RDW-SD 43.8 fl      MPV 6.5 fL      Platelets 86 10*3/mm3      Neutrophil % 75.9 %      Lymphocyte % 13.6 %      Monocyte % 8.1 %      Eosinophil % 2.1 %      Basophil % 0.3 %      Neutrophils, Absolute 4.50 10*3/mm3      Lymphocytes, Absolute 0.80 10*3/mm3      Monocytes, Absolute 0.50 10*3/mm3      Eosinophils, Absolute 0.10 10*3/mm3      Basophils, Absolute 0.00 10*3/mm3      nRBC 0.0 /100 WBC     CANDIDA AURIS SCREEN - Swab, Axilla Right, Axilla Left and Groin [[id number]]  (Normal) Collected: 09/08/23 1418    Specimen: Swab from Axilla Right, Axilla Left and Groin Updated: 09/10/23 1430     Candida Auris Screen Culture No Candida auris isolated at 2 days            Imaging Results (Last 24 Hours)       Procedure Component Value Units Date/Time    CT Lumbar Spine Without Contrast [[id number]] Collected: 09/10/23 1440     Updated: 09/10/23 1459    Narrative:      CT LUMBAR SPINE WO CONTRAST    Date of Exam: 9/10/2023 12:45 PM EDT    Indication: Back pain.    Comparison: Lumbar spine MRI dated 9/8/2023    Technique: Axial CT images were obtained of the lumbar spine without contrast administration.  Sagittal and coronal reconstructions were performed.  Automated exposure control and iterative reconstruction methods were  used.      Findings:  Bones are demineralized. There are degenerative changes within the spine, and there are multilevel syndesmophytes with fusion across posterior elements. These findings may be seen in diffuse idiopathic skeletal hyperostosis.    There is a transversely oriented fracture which extends through the L3-L4 disc space, the posteroinferior corner of the L3 vertebral body, and the L3 posterior elements. Fracture lucency measures approximately 8 mm anteriorly. No significant bony   retropulsion or bony encroachment upon the spinal canal noted. Exact Please correlate clinically for pain/tenderness in this region.    No other fracture is identified. The sacroiliac joints appear unremarkable. The paraspinal soft tissues are unremarkable. Visualized intra-abdominal compartment demonstrates no acute abnormality      Impression:      Impression:  There is a transversely oriented fracture which extends through the L3-L4 disc space, the posteroinferior corner of the L3 vertebral body, and the L3 posterior elements. No significant bony retropulsion or bony encroachment upon the spinal canal noted.      Osteopenia with lumbar spondylosis, multilevel syndesmophyte formation, and fusion across posterior elements which may be seen in ankylosing spondylitis.    Please see above for additional details.          Electronically Signed: Dennys Yip MD    9/10/2023 2:56 PM EDT    Workstation ID: [id number]        LAB RESULTS (LAST 7 DAYS)    CBC  Results from last 7 days   Lab Units 09/11/23  0442 09/10/23  0251 09/09/23  0326 09/08/23  1534   WBC 10*3/mm3 5.90 6.90 6.30 6.20   RBC 10*6/mm3 4.53 4.40 4.57 4.62   HEMOGLOBIN g/dL 13.9 13.7 13.9 13.9   HEMATOCRIT % 40.9 38.7 41.0 40.3   MCV fL 90.3 87.9 89.7 87.3   PLATELETS 10*3/mm3 86* 87* 94* 85*       BMP  Results from last 7 days   Lab Units 09/11/23  0442 09/10/23  0251 09/09/23 0326 09/08/23  1534   SODIUM mmol/L 137 136 139 138   POTASSIUM mmol/L 3.8 3.6 4.2 4.1    CHLORIDE mmol/L 103 103 105 105   CO2 mmol/L 25.0 22.0 23.0 24.0   BUN mg/dL 22 22 20 17   CREATININE mg/dL 0.85 0.82 0.86 0.79   GLUCOSE mg/dL 120* 126* 108* 113*       CMP   Results from last 7 days   Lab Units 09/11/23  0442 09/10/23  0251 09/09/23  0326 09/08/23  1534   SODIUM mmol/L 137 136 139 138   POTASSIUM mmol/L 3.8 3.6 4.2 4.1   CHLORIDE mmol/L 103 103 105 105   CO2 mmol/L 25.0 22.0 23.0 24.0   BUN mg/dL 22 22 20 17   CREATININE mg/dL 0.85 0.82 0.86 0.79   GLUCOSE mg/dL 120* 126* 108* 113*         BNP        TROPONIN        CoAg        Creatinine Clearance  Estimated Creatinine Clearance: 92.8 mL/min (by C-G formula based on SCr of 0.85 mg/dL).    ABG        Radiology  CT Lumbar Spine Without Contrast    Result Date: 9/10/2023  Impression: There is a transversely oriented fracture which extends through the L3-L4 disc space, the posteroinferior corner of the L3 vertebral body, and the L3 posterior elements. No significant bony retropulsion or bony encroachment upon the spinal canal noted.  Osteopenia with lumbar spondylosis, multilevel syndesmophyte formation, and fusion across posterior elements which may be seen in ankylosing spondylitis. Please see above for additional details. Electronically Signed: Dennys Yip MD  9/10/2023 2:56 PM EDT  Workstation ID: [id number]       EKG      I personally viewed and interpreted the patient's EKG/Telemetry data:    ECHOCARDIOGRAM:    Results for orders placed during the hospital encounter of 09/30/22    Adult Transesophageal Echo (TERRA) W/ Cont if Necessary Per Protocol    Interpretation Summary  Date of study  9/30/2022    Procedure performed  Transesophageal echocardiogram and Doppler study.    Indications  Significant mitral regurgitation  Shortness of breath    Procedure  Anesthesia was provided by anesthesiologist with intravenous Diprivan.  TERRA probe could be passed without difficulty.  Patient tolerated the procedure well.  No complications were  noted.    Results  Technically satisfactory study.  Mitral valve has severe myxomatous degeneration with multiple areas of severe prolapse and known coaptation of the mitral leaflet as well as probable ruptured chordae.  Severe mitral regurgitation is seen with multiple jets with mitral regurgitation jet occupying the entire left atrium.  Tricuspid valve is also showing myxomatous degeneration with moderate tricuspid regurgitation.  Calculated pulmonary artery pressure is 60 mmHg.  Aortic valve is thickened with adequate opening motion.  Left atrium is enlarged.  Left atrial appendage is enlarged without clot.  Left ventricle is normal in size and contractility with ejection fraction of 60%.  Right ventricle is normal in size.  Right atrium is normal in size.  Atrial septum is intact without PFO.  Aortic intimal thickening is present without clot.  No pericardial effusion is seen.    Impression  Myxomatous mitral and tricuspid valve degeneration.  Severe mitral valve prolapse involving both anterior and posterior mitral leaflets with severe mitral regurgitation with multiple jets.  Probable ruptured chordae.  In some views coaptation of the mitral valve is present.  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  Significant pulmonary hypertension.  Left atrial and left atrial appendage enlargement without clot.  Normal left ventricular size and contractility with ejection fraction of 60%.      STRESS TEST        Cardiolite (Tc-99m sestamibi) stress test    HEART CATHETERIZATION  Results for orders placed during the hospital encounter of 09/30/22    Cardiac Catheterization/Vascular Study    Narrative  CARDIAC CATHETERIZATION REPORT    DATE OF PROCEDURE:  9/30/2022    INDICATION FOR PROCEDURE:  Shortness of breath  Mitral regurgitation    PROCEDURE PERFORMED:  Right heart catheterization left heart catheterization, coronary angiography, left ventriculography.    @@  Moderate conscious sedation was  utilized with intravenous Versed and fentanyl administered by registered nurse with continuous ECG, pulse oximetry and hemodynamic monitoring supervised by myself throughout the entire procedure.  Conscious sedation time   45 minutes    I was present with the patient for the duration of moderate sedation and supervised staff who had no other duties and monitored the patient for the entire procedure.    @@  PROCEDURE COMMENTS:    Under usual sterile precautions and 1% lidocaine filtration right femoral vein and femoral artery were percutaneously punctured and a 7 and 5 Wolof vascular sheaths were respectively inserted.  Coyle-Estelle catheter was used to measure right-sided pressures pulmonary capital wedge pressure and cardiac output using thermodilution technique.  Coyle-Estelle catheter was removed and subsequently left and right coronary angiography followed by left ventricular angiogram was performed.  Hemostasis was obtained and patient was returned to the room with intact pulses.  No complications were noted.  FINDINGS:    1. HEMODYNAMICS:  Left ventricle end-diastolic pressure is elevated.  Mean right atrial pressure is 4 right ventricle 47/7 pulmonary artery 40/16 mean pulmonary artery 26 pulmonary capital wedge pressure is 17.  No gradient was noted across aortic valve.    2. LEFT VENTRICULOGRAPHY:  Left ventricular size and contractility is normal with ejection fraction of 60%.  Left atrial enlargement is present with severe mitral valve prolapse and severe mitral regurgitation.    3. CORONARY ANGIOGRAPHY:  Left main coronary artery normal.  Left anterior descending artery normal.  Circumflex coronary artery normal.  Right coronary artery is a large and dominant vessel and is normal.    SUMMARY:  Moderate to significant pulmonary hypertension.  Severe mitral valve prolapse and mitral regurgitation and probable ruptured chordae (TERRA and cardiac cath)  Tricuspid valve prolapse.  Normal left ventricular  function.  Normal coronary arteries.    RECOMMENDATIONS:  Cardiovascular surgery consultation initiated for possible mitral valve and tricuspid valve repair soon possible.  Have discussed with Dr. Henderson cardiovascular surgeon for coordination of care.        ]]]]]]]]]]]]]]]]]]]]]  Date of study  9/30/2022    Procedure performed  Transesophageal echocardiogram and Doppler study.    Indications  Significant mitral regurgitation  Shortness of breath    Procedure  Anesthesia was provided by anesthesiologist with intravenous Diprivan.  TERRA probe could be passed without difficulty.  Patient tolerated the procedure well.  No complications were noted.    Results  Technically satisfactory study.  Mitral valve has severe myxomatous degeneration with multiple areas of severe prolapse and known coaptation of the mitral leaflet as well as probable ruptured chordae.  Severe mitral regurgitation is seen with multiple jets with mitral regurgitation jet occupying the entire left atrium.  Tricuspid valve is also showing myxomatous degeneration with moderate tricuspid regurgitation.  Calculated pulmonary artery pressure is 60 mmHg.  Aortic valve is thickened with adequate opening motion.  Left atrium is enlarged.  Left atrial appendage is enlarged without clot.  Left ventricle is normal in size and contractility with ejection fraction of 60%.  Right ventricle is normal in size.  Right atrium is normal in size.  Atrial septum is intact without PFO.  Aortic intimal thickening is present without clot.  No pericardial effusion is seen.    Impression  Myxomatous mitral and tricuspid valve degeneration.  Severe mitral valve prolapse involving both anterior and posterior mitral leaflets with severe mitral regurgitation with multiple jets.  Probable ruptured chordae.  In some views coaptation of the mitral valve is present.  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  Significant pulmonary hypertension.  Left atrial and  left atrial appendage enlargement without clot.  Normal left ventricular size and contractility with ejection fraction of 60%.  ]]]]]]]]]]]]]]]]]]]]      OTHER:     Assessment & Plan     Principal Problem:    Back pain    ]]]]]]]]]]]]]]]]]]]]  History  ===========  - Significant back discomfort  Patient has unstable L3-L4 fracture  Patient has continued symptoms despite being on conservative treatment.  Failed bracing trial and physical therapy.    -Status post mitral and tricuspid valve repair and left atrial appendage endocardial closure--Dr. Henderson 11/17/2022     -   Preoperative increased shortness of breath and significantly increased fatigue.   Preoperative significant mitral and tricuspid regurgitation.  Prominent posterior mitral leaflet prolapse     TERRA 9/30/2022  Myxomatous mitral and tricuspid valve degeneration.  Severe mitral valve prolapse involving both anterior and posterior mitral leaflets with severe mitral regurgitation with multiple jets.  Probable ruptured chordae.  In some views coaptation of the mitral valve is present.  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  Significant pulmonary hypertension.  Left atrial and left atrial appendage enlargement without clot.  Normal left ventricular size and contractility with ejection fraction of 60%.     Cardiac catheterization 9/30/2022   Moderate to significant pulmonary hypertension.  Severe mitral valve prolapse and mitral regurgitation and probable ruptured chordae (TERRA and cardiac cath)  Tricuspid valve prolapse.  Normal left ventricular function.  Normal coronary arteries.     Echocardiogram 9/28/2022.  Mild mitral regurgitation (regurgitation jet may be not in the field)      - Premature ventricular contractions.  Occasional palpitations     - Hypertension vitamin D deficiency GERD ocular migraine.     - History of prostate cancer.  Status post radiation therapy.  Bone scan was negative for any spread.     - Status post  appendectomy spine surgery adenoidectomy tonsillectomy     - Family history of prostate cancer     - Former smoker     - Allergic to morphine.  ============  Plan  ===========  Significant back discomfort  Patient has unstable L3-L4 fracture  Patient has continued symptoms despite being on conservative treatment.  Failed bracing trial and physical therapy.    Status post mitral and tricuspid valve repair and left atrial appendage endocardial closure--Dr. Henderson 11/17/2022  Status post mitral valve repair tricuspid valve repair and left atrial appendage closure--Dr. Henderson-11/17/2022    Patient does not have any manic symptoms at this time.  We will obtain EKG.  Echocardiogram.    Thrombocytopenia  No bleeding problems.  Platelet count 32689-89/ 8//2023  67417-29 /9//2023  [id number] /11//2023    History of resting tachycardia-improved  Patient is taking Metroprolol tartrate 25 mg twice daily.     Patient had postoperative bradycardia.  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  Bradycardia has improved.  No need for pacemaker at this time.     Hypertension-stable.  140/83     Medications were reviewed and updated.  Patient is on metoprolol 25 mg a day allopurinol atorvastatin cyclobenzaprine subcu Lovenox (DVT) famotidine.     Okay with planned procedure from cardiovascular standpoint pending EKG and echocardiogram    Patient is scheduled for surgery on Wednesday.    Have discussed with patient's wife at bedside.    Further plan will depend on patient's progress.  ]]]]]]]]]]]]]]]]        Felipe Garcia MD  09/11/23  11:39 EDT

## 2023-09-11 NOTE — CASE MANAGEMENT/SOCIAL WORK
Continued Stay Note   Bryan     Patient Name: Clint Cee  MRN: [id number]  Today's Date: 9/11/2023    Admit Date: 9/8/2023    Plan: From [facility] for acute rehab and they are following for return. Lives at home with spouse. No PASRR or precert needed   Discharge Plan       Row Name 09/11/23 1443       Plan    Plan From [facility] for acute rehab and they are following for return. Lives at home with spouse. No PASRR or precert needed    Plan Comments Barriers: Strict bedrest for surgical repair of L3 L4 fracture by NeuroSurgery. He was at [facility] for acute rehab and they are following if  he needs to return.                             Phone communication or documentation only - no physical contact with patient or family.   Samantha RAMIREZ,RN Case Manager  [facility]  Phone: Desk- [phone]  Cell- [phone]

## 2023-09-11 NOTE — PROGRESS NOTES
"[facility] Observation Unit Progress Note    Patient Name: Clint Cee  : 1945  MRN: [id number]  Primary Care Physician: Jaret Castellanos MD  Date of admission: 2023     Patient Care Team:  Jaret Castellanos MD as PCP - General (Family Medicine)  Felipe Garcia MD as Consulting Physician (Cardiology)  Golden Serna, SHANEL as Ambulatory  (Population Health)          Subjective   History Present Illness     Chief Complaint:   Chief Complaint   Patient presents with    Back Pain     Back Pain     Back Pain  Associated symptoms include numbness.   ED 2023  78-year-old  male with a history of a fall  who presents the emergency room with complaints of continued and worsening back pain that has been ongoing for several days.  Patient states that he was seen in the emergency room after the fall and had a negative CT.  When the pain continued, he had an MRI with findings including fracture and ligament tear of L3/L4.  He has been at [facility] rehab for the past couple weeks for rehab with PT.  Pt had increased pain and he came to the emergency room. Patient is also stating that he has pain in his left hip and is concerned for fracture.  Patient states that the pain is exacerbated by movement.  It is in his left lumbar region with radiation to his left leg. He states that sensation feels like \"an electric shock\".  He has had no loss of bowel/bladder.  He has had no numbness in his rectum or in her thighs.  Patient does have paresthesia in his left foot but this is chronic. Patient has been taking oxycodone, baclofen and gabapentin with his last dose at 830 this morning.     Observation 23  Pt concurs with er hpi. Pt still having pain with movement. Neuro sx consulted.      Observation 9/10/23  Pt seen by neuro sx and recommends pt be seen by Dr Strauss tomorrow. PT/OT recommends return to Inpt rehab for more therapy.  Intend to discharge tomorrow but waiting for " recommendation from Dr Strauss.     Observation 09/11/2023  Patient reports left knee pain 7/10 and states he is unable to raise left leg all the way.    Review of Systems   Musculoskeletal:  Positive for back pain and muscle weakness.   Neurological:  Positive for numbness.   All other systems reviewed and are negative.        Personal History     Past Medical History:   Past Medical History:   Diagnosis Date    Abnormal ECG June 2022    Allergic 01/01/1954    Nasal alergies    Arrhythmia 2004    Dr Youngblood examined me and said i was ok.  skipped beats    Asthma     no sx    Benign prostatic hyperplasia 11/14/2018    Cataract 01/01/2014    Surgery. Caused detached retina of right eye 20/60 repair    Colon polyp 01/01/2018    Found and removed last colon eca.q    Depression     Gastroesophageal reflux disease 03/20/2014    Glaucoma 2003    Normal pressures. Have low pressure glaucoma    Gout     Headache 01/01/2015    Have shimmering in both eyes intermittantly. 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    Prostate Cancer Jan22 January 2022    Spinal stenosis 12/02/2011    Visual impairment 01/01/1955    Nearsighted corrected glasses    Vitamin D deficiency 05/23/2018       Surgical History:      Past Surgical History:   Procedure Laterality Date    ADENOIDECTOMY  1954    As child    APPENDECTOMY  1956    Surgery    CARDIAC CATHETERIZATION N/A 09/30/2022    Procedure: Right and Left Heart Cath with Coronar Angiography;  Surgeon: Felipe Garcia MD;  Location: [facility] CATH INVASIVE LOCATION;  Service: Cardiovascular;  Laterality: N/A;    CARDIAC VALVE REPLACEMENT N/A  11/17/2022    Procedure: TRICUSPID VALVE REPAIR/REPLACEMENT;  Surgeon: Femi Henderson MD;  Location: [facility] CVOR;  Service: Cardiothoracic;  Laterality: N/A;  tricuspid valve repaired with 32mm  groves physio tricuspid annuloplasty ring    CARDIAC VALVE REPLACEMENT  11/17/2022    Repaired mitral and tricuspid    CATARACT EXTRACTION Bilateral     COLONOSCOPY  01/01/1997    Regularly scheduled    COLONOSCOPY N/A 2/22/2023    Procedure: COLONOSCOPY with cold snare polypectomy x 1;  Surgeon: Manfred Keating MD;  Location: [facility] ENDOSCOPY;  Service: Gastroenterology;  Laterality: N/A;    ENDOSCOPY N/A 2/22/2023    Procedure: ESOPHAGOGASTRODUODENOSCOPY with esophageal dilation using 48 Fr. Bougie Dilator;  Surgeon: Manfred Keating MD;  Location: [facility] ENDOSCOPY;  Service: Gastroenterology;  Laterality: N/A;  erosive esophagitis    EYE SURGERY  01/01/2010    Reattached right retina    MITRAL VALVE REPAIR/REPLACEMENT N/A 11/17/2022    Procedure: MITRAL VALVE REPAIR/REPLACEMENT with left atrial appendage closure;  Surgeon: Femi Henderson MD;  Location: [facility] CVOR;  Service: Cardiothoracic;  Laterality: N/A;  mitral valve repaired iwth 40mm medtronic annuloplasty band  with intraop TERRA    SPINE SURGERY  01/01/1971    2 lumbar 1 cervical    TONSILLECTOMY  01/01/1950    Childhood           Family History: family history includes Arrhythmia in his father; Arthritis in his father; Cancer in his father; Diabetes in his mother; Early death in his mother; Heart disease in his mother. Otherwise pertinent FHx was reviewed and unremarkable.     Social History:  reports that he has quit smoking. His smoking use included cigarettes. He has been exposed to tobacco smoke. He has never used smokeless tobacco. He reports that he does not currently use alcohol. He reports that he does not use drugs.      Medications:  Prior to Admission medications    Medication Sig Start Date End Date Taking? Authorizing Provider    acetaminophen (TYLENOL) 500 MG tablet Take 1 tablet by mouth Every 6 (Six) Hours As Needed for Mild Pain. Indications: Pain   Yes Irlanda Kaminski MD   allopurinol (ZYLOPRIM) 100 MG tablet Take 1 tablet by mouth Daily.   Yes Irlanda Kaminski MD   Apple Erik Vn-Grn Tea-Bit Or-Cr (APPLE CIDER VINEGAR PLUS PO) Take 1 Piece by mouth Daily As Needed. Indications: Dietary supplement 1/1/21  Yes Jaret Castellanos MD   atorvastatin (LIPITOR) 40 MG tablet Take 1 tablet by mouth Every Night. Indications: High Amount of Fats in the Blood 7/3/23  Yes Felipe Garcia MD   DULoxetine (CYMBALTA) 60 MG capsule Take 1 capsule by mouth Daily. 6/20/23  Yes Jaret Castellanos MD   famotidine (PEPCID) 40 MG tablet Take 1 tablet by mouth Every Night. 12/16/22  Yes Irlanda Kaminski MD   fexofenadine (ALLEGRA) 180 MG tablet Take 1 tablet by mouth 2 (Two) Times a Day As Needed. Indications: Hayfever   Yes Irlanda Kaminski MD   fluticasone (FLONASE) 50 MCG/ACT nasal spray 2 sprays into the nostril(s) as directed by provider Daily As Needed. Indications: Allergic Rhinitis   Yes Irlanda Kaminski MD   Leuprolide Mesylate, 6 Month, (CAMCEVI SC) Inject  under the skin into the appropriate area as directed. Every 6 months injection, due in December  Indications: prostate cancer (to decrease testosterone). 1/21/21  Yes Irlanda Kaminski MD   melatonin 5 MG tablet tablet Take 1 tablet by mouth At Night As Needed (sleep). Indications: Trouble Sleeping   Yes Irlanda Kaminski MD   metoprolol tartrate (LOPRESSOR) 25 MG tablet Take 1 tablet by mouth Daily. 5/18/23  Yes Irlanda Kaminski MD   zolpidem (Ambien) 10 MG tablet Take 1 tablet by mouth At Night As Needed for Sleep.    ProviderIrlanda MD       Allergies:    Allergies   Allergen Reactions    Morphine Hallucinations    Beta Adrenergic Blockers Cough    Ace Inhibitors Cough     cough       Objective   Objective     Vital Signs  Temp:  [98.2 °F (36.8  °C)-99.2 °F (37.3 °C)] 99.2 °F (37.3 °C)  Heart Rate:  [68-80] 68  Resp:  [15-17] 15  BP: (100-136)/(65-79) 134/74  SpO2:  [97 %-98 %] 97 %  on   ;   Device (Oxygen Therapy): room air  Body mass index is 29.69 kg/m².    Physical Exam  Vitals and nursing note reviewed.   Constitutional:       Appearance: Normal appearance.   HENT:      Head: Normocephalic and atraumatic.      Right Ear: External ear normal.      Left Ear: External ear normal.      Nose: Nose normal.      Mouth/Throat:      Mouth: Mucous membranes are moist.   Eyes:      Extraocular Movements: Extraocular movements intact.   Cardiovascular:      Rate and Rhythm: Normal rate and regular rhythm.      Pulses: Normal pulses.      Heart sounds: Normal heart sounds.   Pulmonary:      Effort: Pulmonary effort is normal.      Breath sounds: Normal breath sounds.   Abdominal:      General: Abdomen is flat. Bowel sounds are normal.      Palpations: Abdomen is soft.   Musculoskeletal:         General: Tenderness present. Normal range of motion.      Cervical back: Normal range of motion.      Comments: Limited range of motion due to pain and on strict bed rest   Skin:     General: Skin is warm.   Neurological:      Mental Status: He is alert and oriented to person, place, and time.   Psychiatric:         Behavior: Behavior normal.         Thought Content: Thought content normal.         Judgment: Judgment normal.         Results Review:  I have personally reviewed most recent lab results and radiology images and interpretations and agree with findings, most notably:cbc, cmp, mri lumbar spine, xray left hip, MRI lumbar spine.    .    Results from last 7 days   Lab Units 09/11/23  0442   WBC 10*3/mm3 5.90   HEMOGLOBIN g/dL 13.9   HEMATOCRIT % 40.9   PLATELETS 10*3/mm3 86*     Results from last 7 days   Lab Units 09/11/23  0442   SODIUM mmol/L 137   POTASSIUM mmol/L 3.8   CHLORIDE mmol/L 103   CO2 mmol/L 25.0   BUN mg/dL 22   CREATININE mg/dL 0.85   GLUCOSE mg/dL  120*   CALCIUM mg/dL 8.7     Estimated Creatinine Clearance: 92.8 mL/min (by C-G formula based on SCr of 0.85 mg/dL).  Brief Urine Lab Results  (Last result in the past 365 days)        Color   Clarity   Blood   Leuk Est   Nitrite   Protein   CREAT   Urine HCG        08/27/23 1145 Yellow   Clear   Negative   Negative   Negative   Negative                   Microbiology Results (last 10 days)       Procedure Component Value - Date/Time    CANDIDA AURIS SCREEN - Swab, Axilla Right, Axilla Left and Groin [[id number]]  (Normal) Collected: 09/08/23 1418    Lab Status: Preliminary result Specimen: Swab from Axilla Right, Axilla Left and Groin Updated: 09/10/23 1430     Candida Auris Screen Culture No Candida auris isolated at 2 days            ECG/EMG Results (most recent)       None            Results for orders placed during the hospital encounter of 11/16/22    Bilateral Carotid Duplex    Interpretation Summary    Proximal right internal carotid artery is normal.    Mid right internal carotid artery is normal.    Proximal left internal carotid artery mild stenosis.      Results for orders placed during the hospital encounter of 09/30/22    Adult Transesophageal Echo (TERRA) W/ Cont if Necessary Per Protocol    Interpretation Summary  Date of study  9/30/2022    Procedure performed  Transesophageal echocardiogram and Doppler study.    Indications  Significant mitral regurgitation  Shortness of breath    Procedure  Anesthesia was provided by anesthesiologist with intravenous Diprivan.  TERRA probe could be passed without difficulty.  Patient tolerated the procedure well.  No complications were noted.    Results  Technically satisfactory study.  Mitral valve has severe myxomatous degeneration with multiple areas of severe prolapse and known coaptation of the mitral leaflet as well as probable ruptured chordae.  Severe mitral regurgitation is seen with multiple jets with mitral regurgitation jet occupying the entire left  atrium.  Tricuspid valve is also showing myxomatous degeneration with moderate tricuspid regurgitation.  Calculated pulmonary artery pressure is 60 mmHg.  Aortic valve is thickened with adequate opening motion.  Left atrium is enlarged.  Left atrial appendage is enlarged without clot.  Left ventricle is normal in size and contractility with ejection fraction of 60%.  Right ventricle is normal in size.  Right atrium is normal in size.  Atrial septum is intact without PFO.  Aortic intimal thickening is present without clot.  No pericardial effusion is seen.    Impression  Myxomatous mitral and tricuspid valve degeneration.  Severe mitral valve prolapse involving both anterior and posterior mitral leaflets with severe mitral regurgitation with multiple jets.  Probable ruptured chordae.  In some views coaptation of the mitral valve is present.  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  Significant pulmonary hypertension.  Left atrial and left atrial appendage enlargement without clot.  Normal left ventricular size and contractility with ejection fraction of 60%.      CT Lumbar Spine Without Contrast    Result Date: 9/10/2023  Impression: There is a transversely oriented fracture which extends through the L3-L4 disc space, the posteroinferior corner of the L3 vertebral body, and the L3 posterior elements. No significant bony retropulsion or bony encroachment upon the spinal canal noted.  Osteopenia with lumbar spondylosis, multilevel syndesmophyte formation, and fusion across posterior elements which may be seen in ankylosing spondylitis. Please see above for additional details. Electronically Signed: Dennys Yip MD  9/10/2023 2:56 PM EDT  Workstation ID: [id number]    MRI Lumbar Spine Without Contrast    Result Date: 9/8/2023  1. Features of advanced degenerative change and ankylosing spondylitis in the lumbar spine, with fracture through the L3-4 disc space extending to the posterior elements,  is again noted. Mild widening of the anterior L3-4 disc space with fluid and edema,  is unchanged, and no subluxation is evident. 2. Moderate canal stenosis at L1-2 and moderate to severe canal stenosis at L3-4, unchanged. Multilevel moderate to moderate to severe neural foraminal stenosis age of changes are unchanged from 8/28/2023. 3. No new finding since 8/28/2023. Electronically Signed: Deann Damon MD  9/8/2023 2:13 PM EDT  Workstation ID: [id number]    XR Hip With or Without Pelvis 2 - 3 View Left    Result Date: 9/8/2023  Impression: 1. No acute left hip or pelvic finding. 2. Moderately advanced degenerative changes of the hips. 3. Advanced diminished disc height in the lower lumbar spine. Electronically Signed: Deann Damon MD  9/8/2023 11:56 AM EDT  Workstation ID: [id number]       Estimated Creatinine Clearance: 92.8 mL/min (by C-G formula based on SCr of 0.85 mg/dL).    Assessment & Plan   Assessment/Plan       Active Hospital Problems    Diagnosis  POA    **Back pain [M54.9]  Yes      Resolved Hospital Problems   No resolved problems to display.       L3-L4 fracture  - cbc and cmp are generally unremarkable  - MRI lumbar spine reviewed and showing ankylosing spondylitis in lumbar spine, L3-L4 disc space fracture extending posteriorly. Mild widening of the anterior L3-4 disc space with fluid and edema,   is unchanged, and no subluxation is evident.Moderate canal stenosis at L1-2 and moderate to severe canal stenosis at L3-4.  - XR Left hip reviewed and showing no acute findings  - Neuro surgery consulted and recommending pt wait to be seen by Dr Strauss on 9/11/23  - pain control  - PT and OT consulted and recommend return to inpt rehab  -Dr. Strauss recommended strict bedrest and plans on performing surgery this week  -Consult hospitalist for further medical management     Hypertension  -Well Controlled   BP Readings from Last 1 Encounters:   09/11/23 134/74   - Continue metoprolol  - Monitor while admitted            VTE Prophylaxis -   Mechanical Order History:        Ordered        09/08/23 1816  Place Sequential Compression Device  Once            09/08/23 1816  Maintain Sequential Compression Device  Continuous                          Pharmalogical Order History:       None            CODE STATUS:    Code Status and Medical Interventions:   Ordered at: 09/08/23 1816     Code Status (Patient has no pulse and is not breathing):    CPR (Attempt to Resuscitate)     Medical Interventions (Patient has pulse or is breathing):    Full Support       This patient has been examined wearing personal protective equipment.     I discussed the patient's findings and my recommendations with patient and nursing staff.      Signature:Electronically signed by STUART Saavedra, 09/11/23, 12:27 PM EDT.

## 2023-09-11 NOTE — PLAN OF CARE
Problem: Adult Inpatient Plan of Care  Goal: Plan of Care Review  Outcome: Ongoing, Progressing  Goal: Patient-Specific Goal (Individualized)  Outcome: Ongoing, Progressing  Goal: Absence of Hospital-Acquired Illness or Injury  Outcome: Ongoing, Progressing  Intervention: Identify and Manage Fall Risk  Recent Flowsheet Documentation  Taken 9/11/2023 1631 by Zaida Hsieh RN  Safety Promotion/Fall Prevention: safety round/check completed  Taken 9/11/2023 1400 by Zaida Hsieh RN  Safety Promotion/Fall Prevention: safety round/check completed  Taken 9/11/2023 1218 by Zaida Hsieh RN  Safety Promotion/Fall Prevention: safety round/check completed  Taken 9/11/2023 1000 by Zaida Hsieh RN  Safety Promotion/Fall Prevention: safety round/check completed  Taken 9/11/2023 0902 by Zaida Hsieh RN  Safety Promotion/Fall Prevention:   safety round/check completed   room organization consistent  Intervention: Prevent Skin Injury  Recent Flowsheet Documentation  Taken 9/11/2023 0902 by Zaida Hsieh RN  Body Position: supine  Intervention: Prevent and Manage VTE (Venous Thromboembolism) Risk  Recent Flowsheet Documentation  Taken 9/11/2023 1000 by Zaida Hsieh RN  Activity Management: bedrest  Taken 9/11/2023 0902 by Zaida Hsieh RN  Activity Management: bedrest  Intervention: Prevent Infection  Recent Flowsheet Documentation  Taken 9/11/2023 1400 by Zaida Hsieh RN  Infection Prevention:   environmental surveillance performed   single patient room provided  Taken 9/11/2023 1218 by Zaida Hsieh RN  Infection Prevention: environmental surveillance performed  Taken 9/11/2023 1000 by Zaida Hsieh RN  Infection Prevention: environmental surveillance performed  Taken 9/11/2023 0902 by Zaida Hsieh RN  Infection Prevention:   environmental surveillance performed   single patient room provided  Goal: Optimal Comfort and Wellbeing  Outcome: Ongoing,  Progressing  Intervention: Monitor Pain and Promote Comfort  Recent Flowsheet Documentation  Taken 9/11/2023 1218 by Zaida Hsieh RN  Pain Management Interventions:   see MAR   quiet environment facilitated   care clustered  Taken 9/11/2023 0902 by Zaida Hsieh RN  Pain Management Interventions:   see MAR   quiet environment facilitated   position adjusted  Intervention: Provide Person-Centered Care  Recent Flowsheet Documentation  Taken 9/11/2023 0902 by Zaida Hsieh RN  Trust Relationship/Rapport:   care explained   choices provided  Goal: Readiness for Transition of Care  Outcome: Ongoing, Progressing     Problem: Asthma Comorbidity  Goal: Maintenance of Asthma Control  Outcome: Ongoing, Progressing  Intervention: Maintain Asthma Symptom Control  Recent Flowsheet Documentation  Taken 9/11/2023 1631 by Zaida Hsieh RN  Medication Review/Management: medications reviewed  Taken 9/11/2023 1400 by Zaida Hsieh RN  Medication Review/Management: medications reviewed  Taken 9/11/2023 1218 by Zaida Hsieh RN  Medication Review/Management: medications reviewed  Taken 9/11/2023 1000 by Zaida Hsieh RN  Medication Review/Management: medications reviewed  Taken 9/11/2023 0902 by Zaida Hsieh RN  Medication Review/Management: medications reviewed     Problem: Behavioral Health Comorbidity  Goal: Maintenance of Behavioral Health Symptom Control  Outcome: Ongoing, Progressing  Intervention: Maintain Behavioral Health Symptom Control  Recent Flowsheet Documentation  Taken 9/11/2023 1631 by Zaida Hsieh RN  Medication Review/Management: medications reviewed  Taken 9/11/2023 1400 by Zaida Hsieh RN  Medication Review/Management: medications reviewed  Taken 9/11/2023 1218 by Zaida Hsieh RN  Medication Review/Management: medications reviewed  Taken 9/11/2023 1000 by aZida Hsieh RN  Medication Review/Management: medications reviewed  Taken 9/11/2023  0902 by Zaida Hsieh RN  Medication Review/Management: medications reviewed     Problem: COPD (Chronic Obstructive Pulmonary Disease) Comorbidity  Goal: Maintenance of COPD Symptom Control  Outcome: Ongoing, Progressing  Intervention: Maintain COPD-Symptom Control  Recent Flowsheet Documentation  Taken 9/11/2023 1631 by Zaida Hsieh RN  Medication Review/Management: medications reviewed  Taken 9/11/2023 1400 by Zaida Hsieh RN  Medication Review/Management: medications reviewed  Taken 9/11/2023 1218 by Zaida Hsieh RN  Medication Review/Management: medications reviewed  Taken 9/11/2023 1000 by Zaida Hsieh RN  Medication Review/Management: medications reviewed  Taken 9/11/2023 0902 by Zaida Hsieh RN  Supportive Measures: active listening utilized  Medication Review/Management: medications reviewed     Problem: Diabetes Comorbidity  Goal: Blood Glucose Level Within Targeted Range  Outcome: Ongoing, Progressing     Problem: Heart Failure Comorbidity  Goal: Maintenance of Heart Failure Symptom Control  Outcome: Ongoing, Progressing  Intervention: Maintain Heart Failure-Management  Recent Flowsheet Documentation  Taken 9/11/2023 1631 by Zaida Hsieh RN  Medication Review/Management: medications reviewed  Taken 9/11/2023 1400 by Zaida Hsieh RN  Medication Review/Management: medications reviewed  Taken 9/11/2023 1218 by Zaida Hsieh RN  Medication Review/Management: medications reviewed  Taken 9/11/2023 1000 by Zaida Hsieh RN  Medication Review/Management: medications reviewed  Taken 9/11/2023 0902 by Zaida Hsieh RN  Medication Review/Management: medications reviewed     Problem: Hypertension Comorbidity  Goal: Blood Pressure in Desired Range  Outcome: Ongoing, Progressing  Intervention: Maintain Blood Pressure Management  Recent Flowsheet Documentation  Taken 9/11/2023 1631 by Zaida Hsieh RN  Medication Review/Management: medications  reviewed  Taken 9/11/2023 1400 by Zaida Hsieh RN  Medication Review/Management: medications reviewed  Taken 9/11/2023 1218 by Zaida Hsieh RN  Medication Review/Management: medications reviewed  Taken 9/11/2023 1000 by Zaida Hsieh RN  Medication Review/Management: medications reviewed  Taken 9/11/2023 0902 by Zaida Hsieh RN  Syncope Management: position changed slowly  Medication Review/Management: medications reviewed     Problem: Obstructive Sleep Apnea Risk or Actual Comorbidity Management  Goal: Unobstructed Breathing During Sleep  Outcome: Ongoing, Progressing     Problem: Osteoarthritis Comorbidity  Goal: Maintenance of Osteoarthritis Symptom Control  Outcome: Ongoing, Progressing  Intervention: Maintain Osteoarthritis Symptom Control  Recent Flowsheet Documentation  Taken 9/11/2023 1631 by Zaida Hsieh RN  Medication Review/Management: medications reviewed  Taken 9/11/2023 1400 by Zaida Hsieh RN  Medication Review/Management: medications reviewed  Taken 9/11/2023 1218 by Zaida Hsieh RN  Medication Review/Management: medications reviewed  Taken 9/11/2023 1000 by Zaida Hsieh RN  Activity Management: bedrest  Medication Review/Management: medications reviewed  Taken 9/11/2023 0902 by Zaida Hsieh RN  Activity Management: bedrest  Medication Review/Management: medications reviewed     Problem: Pain Chronic (Persistent) (Comorbidity Management)  Goal: Acceptable Pain Control and Functional Ability  Outcome: Ongoing, Progressing  Intervention: Manage Persistent Pain  Recent Flowsheet Documentation  Taken 9/11/2023 1631 by Zaida Hsieh RN  Medication Review/Management: medications reviewed  Taken 9/11/2023 1400 by Zaida Hsieh RN  Medication Review/Management: medications reviewed  Taken 9/11/2023 1218 by Zaida Hsieh RN  Medication Review/Management: medications reviewed  Taken 9/11/2023 1000 by Zaida Hsieh  RN  Medication Review/Management: medications reviewed  Taken 9/11/2023 0902 by Zaida Hsieh RN  Medication Review/Management: medications reviewed  Intervention: Develop Pain Management Plan  Recent Flowsheet Documentation  Taken 9/11/2023 1218 by Zaida Hsieh RN  Pain Management Interventions:   see MAR   quiet environment facilitated   care clustered  Taken 9/11/2023 0902 by Zaida Hsieh RN  Pain Management Interventions:   see MAR   quiet environment facilitated   position adjusted  Intervention: Optimize Psychosocial Wellbeing  Recent Flowsheet Documentation  Taken 9/11/2023 0902 by Zaida Hsieh RN  Supportive Measures: active listening utilized     Problem: Seizure Disorder Comorbidity  Goal: Maintenance of Seizure Control  Outcome: Ongoing, Progressing     Problem: Fall Injury Risk  Goal: Absence of Fall and Fall-Related Injury  Outcome: Ongoing, Progressing  Intervention: Identify and Manage Contributors  Recent Flowsheet Documentation  Taken 9/11/2023 1631 by Zaida Hsieh RN  Medication Review/Management: medications reviewed  Taken 9/11/2023 1400 by Zaida Hsieh RN  Medication Review/Management: medications reviewed  Taken 9/11/2023 1218 by Zaida Hsieh RN  Medication Review/Management: medications reviewed  Taken 9/11/2023 1000 by Zaida Hsieh RN  Medication Review/Management: medications reviewed  Taken 9/11/2023 0902 by Zaida Hsieh RN  Medication Review/Management: medications reviewed  Intervention: Promote Injury-Free Environment  Recent Flowsheet Documentation  Taken 9/11/2023 1631 by Zaida Hsieh RN  Safety Promotion/Fall Prevention: safety round/check completed  Taken 9/11/2023 1400 by Zaida Hsieh RN  Safety Promotion/Fall Prevention: safety round/check completed  Taken 9/11/2023 1218 by Zaida Hsieh RN  Safety Promotion/Fall Prevention: safety round/check completed  Taken 9/11/2023 1000 by Zaida Hsieh  RN  Safety Promotion/Fall Prevention: safety round/check completed  Taken 9/11/2023 0902 by Zaida Hsieh RN  Safety Promotion/Fall Prevention:   safety round/check completed   room organization consistent     Problem: Skin Injury Risk Increased  Goal: Skin Health and Integrity  Outcome: Ongoing, Progressing  Intervention: Optimize Skin Protection  Recent Flowsheet Documentation  Taken 9/11/2023 0902 by Zaida Hsieh RN  Head of Bed (HOB) Positioning: HOB lowered   Goal Outcome Evaluation:

## 2023-09-11 NOTE — CONSULTS
[facility] NEUROSURGERY CONSULT NOTE    Patient Name: Clint Cee  Referring Provider: Dominique Martinez PA-C  Reason for Consultation: Back pain    Patient Care Team:  Jaret Castellanos MD as PCP - General (Family Medicine)  Felipe Garcia MD as Consulting Physician (Cardiology)  Golden Serna, SHANEL as Ambulatory  ([facility]– [place])    Chief Complaint: Back pain left leg weakness    History of Present Illness:  Patient is a 78 y.o.  male that is known to the neurosurgical service for continued/worsening of back pain and new left leg weakness.  Patient was initially seen 2 weeks prior and found with a fracture at L3-L4 with extension into posterior elements.  He underwent a bracing trial  and discharged to rehab.  Patient presented back to emergency room on 9/8/2023 from [facility] rehab with worsening back pain worse with movement.  He also complains of left leg weakness with the inability to straight raise his left leg.  He denies any new trauma and reports wearing his brace as directed.  He does have chronic numbness in his foot as well.  The history is being obtained by the patient,  and by review of the medical chart.  He denies any bowel/bladder dysfunction or saddle anesthesia.    Review of Systems:    Review of Systems   Musculoskeletal:  Positive for back pain.   Neurological:  Positive for weakness and numbness.   All other systems reviewed and are negative.    Past Medical History:  Past Medical History:   Diagnosis Date    Abnormal ECG June 2022    Allergic 01/01/1954    Nasal alergies    Arrhythmia 2004    Dr Youngblood examined me and said i was ok.  skipped beats    Asthma     no sx    Benign prostatic hyperplasia 11/14/2018    Cataract 01/01/2014    Surgery. Caused detached retina of right eye 20/60 repair    Colon polyp 01/01/2018    Found and removed last colon [email or web address]    Depression     Gastroesophageal reflux disease 03/20/2014    Glaucoma 2003    Normal pressures. Have low  pressure glaucoma    Gout     Headache 01/01/2015    Have shimmering in both eyes intermittantly. 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    Prostate Cancer Jan22 January 2022    Spinal stenosis 12/02/2011    Visual impairment 01/01/1955    Nearsighted corrected glasses    Vitamin D deficiency 05/23/2018       Past Surgical History:  Past Surgical History:   Procedure Laterality Date    ADENOIDECTOMY  1954    As child    APPENDECTOMY  1956    Surgery    CARDIAC CATHETERIZATION N/A 09/30/2022    Procedure: Right and Left Heart Cath with Coronar Angiography;  Surgeon: Felipe Garcia MD;  Location: [facility] CATH INVASIVE LOCATION;  Service: Cardiovascular;  Laterality: N/A;    CARDIAC VALVE REPLACEMENT N/A 11/17/2022    Procedure: TRICUSPID VALVE REPAIR/REPLACEMENT;  Surgeon: Femi Henderson MD;  Location: [facility] CVOR;  Service: Cardiothoracic;  Laterality: N/A;  tricuspid valve repaired with 32mm  groves physio tricuspid annuloplasty ring    CARDIAC VALVE REPLACEMENT  11/17/2022    Repaired mitral and tricuspid    CATARACT EXTRACTION Bilateral     COLONOSCOPY  01/01/1997    Regularly scheduled    COLONOSCOPY N/A 2/22/2023    Procedure: COLONOSCOPY with cold snare polypectomy x 1;  Surgeon: Manfred Keating MD;  Location: [facility] ENDOSCOPY;  Service: Gastroenterology;  Laterality: N/A;    ENDOSCOPY N/A 2/22/2023    Procedure: ESOPHAGOGASTRODUODENOSCOPY with esophageal dilation using 48 Fr. Bougie Dilator;  Surgeon: Manfred Keating MD;  Location: [facility] ENDOSCOPY;  Service: Gastroenterology;  Laterality: N/A;  erosive esophagitis    EYE SURGERY  01/01/2010     Reattached right retina    MITRAL VALVE REPAIR/REPLACEMENT N/A 11/17/2022    Procedure: MITRAL VALVE REPAIR/REPLACEMENT with left atrial appendage closure;  Surgeon: Femi Henderson MD;  Location: [facility];  Service: Cardiothoracic;  Laterality: N/A;  mitral valve repaired iwth 40mm medtronic annuloplasty band  with intraop TERRA    SPINE SURGERY  01/01/1971    2 lumbar 1 cervical    TONSILLECTOMY  01/01/1950    Childhood     Reviewed past surgical history and it is not pertinent to this visit    Family History:  Family History   Problem Relation Age of Onset    Arthritis Father     Cancer Father         Prostate    Arrhythmia Father         Congentive heart failure    Diabetes Mother         Adult diabetes    Early death Mother         Kidneys failed after giving birth-diabetes complications    Heart disease Mother         Adult Diabetes     Reviewed past family history and it is not pertinent to this visit    Social History:  Social History     Tobacco Use    Smoking status: Former     Packs/day: 0.00     Years: 0.50     Pack years: 0.00     Types: Cigarettes     Passive exposure: Past    Smokeless tobacco: Never    Tobacco comments:     Tried tobacco as child   Vaping Use    Vaping Use: Never used   Substance Use Topics    Alcohol use: Not Currently     Comment: Not a regular drinker. Occasionly have a glass of wine or be    Drug use: Never     Reviewed past social history and it is not pertinent to this visit    Allergies:  Morphine, Beta adrenergic blockers, and Ace inhibitors    Home Medications:  Prior to Admission medications    Medication Sig Start Date End Date Taking? Authorizing Provider   acetaminophen (TYLENOL) 500 MG tablet Take 1 tablet by mouth Every 6 (Six) Hours As Needed for Mild Pain. Indications: Pain   Yes ProviderIrlanda MD   allopurinol (ZYLOPRIM) 100 MG tablet Take 1 tablet by mouth Daily.   Yes ProviderIrlanda MD Apple Erik Vn-Grn Tea-Bit Or-Cr (APPLE CIDER VINEGAR PLUS  PO) Take 1 Piece by mouth Daily As Needed. Indications: Dietary supplement 1/1/21  Yes Jaret Castellanos MD   atorvastatin (LIPITOR) 40 MG tablet Take 1 tablet by mouth Every Night. Indications: High Amount of Fats in the Blood 7/3/23  Yes Felipe Garcia MD   DULoxetine (CYMBALTA) 60 MG capsule Take 1 capsule by mouth Daily. 6/20/23  Yes Jaret Castellanos MD   famotidine (PEPCID) 40 MG tablet Take 1 tablet by mouth Every Night. 12/16/22  Yes Irlanda Kaminski MD   fexofenadine (ALLEGRA) 180 MG tablet Take 1 tablet by mouth 2 (Two) Times a Day As Needed. Indications: Hayfever   Yes Irlanda Kaminski MD   fluticasone (FLONASE) 50 MCG/ACT nasal spray 2 sprays into the nostril(s) as directed by provider Daily As Needed. Indications: Allergic Rhinitis   Yes Irlanda Kaminski MD   Leuprolide Mesylate, 6 Month, (CAMCEVI SC) Inject  under the skin into the appropriate area as directed. Every 6 months injection, due in December  Indications: prostate cancer (to decrease testosterone). 1/21/21  Yes Irlanda Kaminski MD   melatonin 5 MG tablet tablet Take 1 tablet by mouth At Night As Needed (sleep). Indications: Trouble Sleeping   Yes Irlanda Kaminski MD   metoprolol tartrate (LOPRESSOR) 25 MG tablet Take 1 tablet by mouth Daily. 5/18/23  Yes Irlanda Kaminski MD   zolpidem (Ambien) 10 MG tablet Take 1 tablet by mouth At Night As Needed for Sleep.    ProviderIrlanda MD       Results Review:  Labs:  Recent Results (from the past 24 hour(s))   Basic Metabolic Panel    Collection Time: 09/11/23  4:42 AM    Specimen: Blood   Result Value Ref Range    Glucose 120 (H) 65 - 99 mg/dL    BUN 22 8 - 23 mg/dL    Creatinine 0.85 0.76 - 1.27 mg/dL    Sodium 137 136 - 145 mmol/L    Potassium 3.8 3.5 - 5.2 mmol/L    Chloride 103 98 - 107 mmol/L    CO2 25.0 22.0 - 29.0 mmol/L    Calcium 8.7 8.6 - 10.5 mg/dL    BUN/Creatinine Ratio 25.9 (H) 7.0 - 25.0    Anion Gap 9.0 5.0 - 15.0 mmol/L    eGFR 90.1 >60.0  mL/min/1.73   CBC Auto Differential    Collection Time: 09/11/23  4:42 AM    Specimen: Blood   Result Value Ref Range    WBC 5.90 3.40 - 10.80 10*3/mm3    RBC 4.53 4.14 - 5.80 10*6/mm3    Hemoglobin 13.9 13.0 - 17.7 g/dL    Hematocrit 40.9 37.5 - 51.0 %    MCV 90.3 79.0 - 97.0 fL    MCH 30.7 26.6 - 33.0 pg    MCHC 33.9 31.5 - 35.7 g/dL    RDW 13.9 12.3 - 15.4 %    RDW-SD 43.8 37.0 - 54.0 fl    MPV 6.5 6.0 - 12.0 fL    Platelets 86 (L) 140 - 450 10*3/mm3    Neutrophil % 75.9 42.7 - 76.0 %    Lymphocyte % 13.6 (L) 19.6 - 45.3 %    Monocyte % 8.1 5.0 - 12.0 %    Eosinophil % 2.1 0.3 - 6.2 %    Basophil % 0.3 0.0 - 1.5 %    Neutrophils, Absolute 4.50 1.70 - 7.00 10*3/mm3    Lymphocytes, Absolute 0.80 0.70 - 3.10 10*3/mm3    Monocytes, Absolute 0.50 0.10 - 0.90 10*3/mm3    Eosinophils, Absolute 0.10 0.00 - 0.40 10*3/mm3    Basophils, Absolute 0.00 0.00 - 0.20 10*3/mm3    nRBC 0.0 0.0 - 0.2 /100 WBC       Radiology:  CT Lumbar Spine Without Contrast    Result Date: 9/10/2023  Impression: There is a transversely oriented fracture which extends through the L3-L4 disc space, the posteroinferior corner of the L3 vertebral body, and the L3 posterior elements. No significant bony retropulsion or bony encroachment upon the spinal canal noted.  Osteopenia with lumbar spondylosis, multilevel syndesmophyte formation, and fusion across posterior elements which may be seen in ankylosing spondylitis. Please see above for additional details. Electronically Signed: Dennys Yip MD  9/10/2023 2:56 PM EDT  Workstation ID: [id number]     Results Review:   I reviewed the patient's new clinical results.  I personally viewed and interpreted the patient's CT and MRI lumbar spine redemonstrating fracture through the disc base at L3-L4 with extension into the L3 posterior elements.  There is continued edema and fluid throughout the disc base with somewhat widening of the disc space on more recent MRI imaging is compared to imaging approximately  "2 weeks ago.    Vital Signs:   Temp:  [98.2 °F (36.8 °C)-99 °F (37.2 °C)] 99 °F (37.2 °C)  Heart Rate:  [68-80] 80  Resp:  [16-17] 16  BP: (100-136)/(65-79) 126/79        Physical Exam:  /79   Pulse 80   Temp 99 °F (37.2 °C) (Oral)   Resp 16   Ht 185.4 cm (73\")   Wt 102 kg (225 lb)   SpO2 97%   BMI 29.69 kg/m²     General Appearance:  Alert, cooperative, no distress, appropriate for age                             Back: Point tenderness midline lumbar spine                             Skin/Hair/Nails: Bruising right knee                      Neurologic:   Mental Status: The patient is awake, alert and oriented x 3. Recent and remote memory functions are normal. The patient is attentive with normal concentration. Language is fluent. Speech is clear. The speech is nondysarthric. Fund of knowledge is normal.  Muscle Strength: 3-/5 muscle strength left straight leg raise test         Back pain      Patient is a 78-year-old male with history of ankylosing spondylitis with unstable fracture at L3-L4 with interval widening of disc space.  He does have moderate to severe spinal canal stenosis this remains unchanged.  Patient has no signs symptoms of cauda equina requiring need for any emergent neurosurgical intervention.  Given failure of bracing trial and worsening back pain, I have spoken to patient regarding possible elective neurosurgical invention to stabilize fracture but we will need to obtain medical and cardiac clearance first.  He does have significant cardiac history and platelet level is 86.  Once clearances have been obtained, stabilization surgery possibly Wednesday.    PLAN:     Ankylosing spondylitis with fracture    -Surgery pending medical and cardiac clearance  - Strict bedrest  - Pain management per primary      I discussed the patient's findings and my recommendations with patient and nursing staff and Dr. Strauss.    Caroline Robert, APRN  09/11/23  11:33 EDT    \"Dictated utilizing Dragon " "dictation\".      "

## 2023-09-11 NOTE — CASE MANAGEMENT/SOCIAL WORK
Discharge Planning Assessment   Bryan     Patient Name: Clint Cee  MRN: [id number]  Today's Date: 9/11/2023    Admit Date: 9/8/2023    Plan: From [facility] for acute rehab and they are following for return. Lives at home with spouse. No PASRR or precert needed   Discharge Needs Assessment       Row Name 09/11/23 1557       Living Environment    People in Home spouse    Name(s) of People in Home Mary Beth    Current Living Arrangements home    Potentially Unsafe Housing Conditions none    Primary Care Provided by self    Provides Primary Care For no one, unable/limited ability to care for self    Family Caregiver if Needed spouse    Family Caregiver Names Mary Beth, spouse    Quality of Family Relationships helpful;involved;supportive    Able to Return to Prior Arrangements yes    Living Arrangement Comments was at [facility] for acute rehab and can return       Resource/Environmental Concerns    Transportation Concerns none       Transition Planning    Patient/Family Anticipates Transition to inpatient rehabilitation facility    Patient/Family Anticipated Services at Transition rehabilitation services    Transportation Anticipated family or friend will provide       Discharge Needs Assessment    Equipment Currently Used at Home walker, rolling    Concerns to be Addressed discharge planning    Anticipated Changes Related to Illness inability to care for self    Equipment Needed After Discharge none    Discharge Facility/Level of Care Needs acute rehab                   Discharge Plan       Row Name 09/11/23 1443       Plan    Plan From [facility] for acute rehab and they are following for return. Lives at home with spouse. No PASRR or precert needed    Plan Comments Barriers: Strict bedrest for surgical repair of L3 L4 fracture by NeuroSurgery. He was at [facility] for acute rehab and they are following if  he needs to return.                  Continued Care and Services - Admitted Since 9/8/2023       Destination       Service  Provider Request Status Selected Services Address Phone Fax Patient Preferred    [facility] Pending - Request Sent N/A [address] [facility] [place] [address] [phone] --                           Expected Discharge Date and Time       Expected Discharge Date Expected Discharge Time    Sep 14, 2023            Demographic Summary       Row Name 09/11/23 1556       General Information    Admission Type observation    Arrived From emergency department    Referral Source admission list    Reason for Consult discharge planning    Preferred Language English       Contact Information    Permission Granted to Share Info With                    Functional Status       Row Name 09/11/23 1556       Functional Status    Usual Activity Tolerance good    Current Activity Tolerance fair       Functional Status, IADL    Medications independent    Meal Preparation assistive person    Housekeeping assistive person    Laundry assistive person    Shopping assistive person    IADL Comments independent                                   Maintained distance greater than six feet and spent less than 15 minutes in the room.     Samantha RAMIREZ,RN Case Manager  [facility]  Phone: Desk- [phone] cell- [phone]

## 2023-09-11 NOTE — CONSULTS
"    [facility] - Consult Note    Patient Name: Clint Cee  : 1945  MRN: [id number]  Primary Care Physician:  Jaret Castellanos MD  Referring Physician: Jaret Castellanos MD  Date of admission: 2023    Consults    Subjective      Reason for Consult/ Chief Complaint: Back pain    History of Present Illness: Clint Cee is a 78 y.o. male with past medical history of mitral valve repair, hypertension, hyperlipidemia, and recent history of fall on  presented to the ED. with complaints of continued and worsening back pain that has been ongoing for several days.  Patient states that he was seen in the emergency room after the fall and had a negative CT.  When the pain continued, he had an MRI with findings including fracture and ligament tear of L3/L4.  He has been at [facility] rehab for the past couple weeks for rehab with PT.  Pt had increased pain and he came to the emergency room. Patient is also stating that he has pain in his left hip and is concerned for fracture.  Patient states that the pain is exacerbated by movement.  It is in his left lumbar region with radiation to his left leg. He states that sensation feels like \"an electric shock\".  He has had no loss of bowel/bladder.  He has had no numbness in his rectum or in her thighs.  Patient does have paresthesia in his left foot but this is chronic.  MRI and CT findings consistent with L3-L4 fracture as well as ankylosing spondylitis changes.  Patient has no signs or symptoms of cauda equina needing emergent neurosurgical intervention at this time.  However patient well-known to the neurosurgery service from recent fall.  Patient admitted to observation unit initially.  Neurosurgery consulted and recommending surgical intervention given failure of bracing trial and worsening back pain.  Hospitalist service consulted for further management.  Due to patient's history of MV repair, cardiology " consulted for cardiac clearance.    Patient seen and examined today.  Back pain improved with pain meds.  Going for surgery likely Wednesday per neurosurgery.  Patient states he had mitral valve repaired by Dr. Henderson in 2022, has been stable since then.  Cardiology has been consulted for clearance per neurosurgery.    ROS: Pertinent positives as noted in HPI/subjective.  All other systems were reviewed and are negative.      Personal History     Past Medical History:   Diagnosis Date    Abnormal ECG June 2022    Allergic 01/01/1954    Nasal alergies    Arrhythmia 2004    Dr Youngblood examined me and said i was ok.  skipped beats    Asthma     no sx    Benign prostatic hyperplasia 11/14/2018    Cataract 01/01/2014    Surgery. Caused detached retina of right eye 20/60 repair    Colon polyp 01/01/2018    Found and removed last colon eca.q    Depression     Gastroesophageal reflux disease 03/20/2014    Glaucoma 2003    Normal pressures. Have low pressure glaucoma    Gout     Headache 01/01/2015    Have shimmering in both eyes intermittantly. 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    Prostate Cancer Jan22 January 2022    Spinal stenosis 12/02/2011    Visual impairment 01/01/1955    Nearsighted corrected glasses    Vitamin D deficiency 05/23/2018       Past Surgical History:   Procedure Laterality Date    ADENOIDECTOMY  1954    As child    APPENDECTOMY  1956    Surgery    CARDIAC CATHETERIZATION N/A 09/30/2022    Procedure: Right and Left Heart Cath with Coronar Angiography;  Surgeon: Felipe Garcia MD;  Location: [facility] CATH INVASIVE LOCATION;  Service: Cardiovascular;   Laterality: N/A;    CARDIAC VALVE REPLACEMENT N/A 11/17/2022    Procedure: TRICUSPID VALVE REPAIR/REPLACEMENT;  Surgeon: Femi Henderson MD;  [facility];  Service: Cardiothoracic;  Laterality: N/A;  tricuspid valve repaired with 32mm  groves physio tricuspid annuloplasty ring    CARDIAC VALVE REPLACEMENT  11/17/2022    Repaired mitral and tricuspid    CATARACT EXTRACTION Bilateral     COLONOSCOPY  01/01/1997    Regularly scheduled    COLONOSCOPY N/A 2/22/2023    Procedure: COLONOSCOPY with cold snare polypectomy x 1;  Surgeon: Manfred Keating MD;  Location: [facility] ENDOSCOPY;  Service: Gastroenterology;  Laterality: N/A;    ENDOSCOPY N/A 2/22/2023    Procedure: ESOPHAGOGASTRODUODENOSCOPY with esophageal dilation using 48 Fr. Bougie Dilator;  Surgeon: Manfred Keating MD;  Location: [facility] ENDOSCOPY;  Service: Gastroenterology;  Laterality: N/A;  erosive esophagitis    EYE SURGERY  01/01/2010    Reattached right retina    MITRAL VALVE REPAIR/REPLACEMENT N/A 11/17/2022    Procedure: MITRAL VALVE REPAIR/REPLACEMENT with left atrial appendage closure;  Surgeon: Femi Henderson MD;  Location: [facility] CVOR;  Service: Cardiothoracic;  Laterality: N/A;  mitral valve repaired iwth 40mm medtronic annuloplasty band  with intraop TERRA    SPINE SURGERY  01/01/1971    2 lumbar 1 cervical    TONSILLECTOMY  01/01/1950    Childhood       Family History: family history includes Arrhythmia in his father; Arthritis in his father; Cancer in his father; Diabetes in his mother; Early death in his mother; Heart disease in his mother. Otherwise pertinent FHx was reviewed and not pertinent to current issue.    Social History:  reports that he has quit smoking. His smoking use included cigarettes. He has been exposed to tobacco smoke. He has never used smokeless tobacco. He reports that he does not currently use alcohol. He reports that he does not use drugs.    Home Medications:   Apple Erik Vn-Grn Tea-Bit Or-Cr,  DULoxetine, Leuprolide Mesylate (6 Month), acetaminophen, allopurinol, atorvastatin, famotidine, fexofenadine, fluticasone, melatonin, metoprolol tartrate, and zolpidem    Allergies:  Allergies   Allergen Reactions    Morphine Hallucinations    Beta Adrenergic Blockers Cough    Ace Inhibitors Cough     cough         Objective      Vitals:  Temp:  [98.2 °F (36.8 °C)-99.2 °F (37.3 °C)] 99.2 °F (37.3 °C)  Heart Rate:  [68-80] 68  Resp:  [15-17] 15  BP: (100-136)/(65-79) 134/74    Physical Exam:  General: Awake, alert, NAD  Eyes: PERRL, EOMI, conjunctivae are clear  Cardiovascular: Regular rate and rhythm, no murmurs  Respiratory: Clear to auscultation bilaterally, no wheezing or rales, unlabored breathing  Abdomen: Soft, nontender, positive bowel sounds, no guarding  Neurologic: A&O, CN grossly intact, moves all extremities spontaneously  Musculoskeletal: Normal range of motion, no other gross deformities  Skin: Warm, dry, intact    Result Review    Result Review:  I have personally reviewed the results from the time of this admission to 9/11/2023 12:45 EDT and agree with these findings:  [x]  Laboratory  []  Microbiology  [x]  Radiology  [x]  EKG/Telemetry   [x]  Cardiology/Vascular   []  Pathology  [x]  Old records  []  Other:        Assessment & Plan        Active Hospital Problems:  Active Hospital Problems    Diagnosis     **Back pain      Plan:     Unstable L3-L4 fracture  Acute on chronic back pain  -MRI and CT findings noted  -Patient failed bracing trial and physical therapy with worsening symptoms  -No signs or symptoms of cauda equina syndrome at this time  -Neurosurgery following and plan for surgical intervention  -Strict bedrest per neurosurgery  -Continue pain control  -Cardiology consulted for clearance    Hypertension  Hyperlipidemia  -Continue home meds as tolerated, monitor    History of MV repair  -Stable at this time, does not appear to be in exacerbation  -Cardiology consulted for clearance per  neurosurgery    DVT prophylaxis  -Lovenox    Full code.    Signature:     Electronically signed by Micaela Henderson DO, 09/11/23, 12:45 PM EDT.    Part of this note may be an electronic transcription/translation of spoken language to printed text using the Dragon Dictation System.

## 2023-09-11 NOTE — PLAN OF CARE
Goal Outcome Evaluation:           Progress: no change  Outcome Evaluation: Patient slept fairly comfortably most of the night. PRN pain meds given when requested

## 2023-09-11 NOTE — SIGNIFICANT NOTE
Consult from neuro:  pt on strict bedrest.  Possible surgery Wednesday?  Awaiting medical clearance.

## 2023-09-12 ENCOUNTER — APPOINTMENT (OUTPATIENT)
Dept: CT IMAGING | Facility: HOSPITAL | Age: 78
DRG: 460 | End: 2023-09-12
Payer: MEDICARE

## 2023-09-12 ENCOUNTER — APPOINTMENT (OUTPATIENT)
Dept: CARDIOLOGY | Facility: HOSPITAL | Age: 78
DRG: 460 | End: 2023-09-12
Payer: MEDICARE

## 2023-09-12 LAB
ABO GROUP BLD: NORMAL
ALBUMIN SERPL-MCNC: 3.1 G/DL (ref 3.5–5.2)
ALBUMIN/GLOB SERPL: 1.3 G/DL
ALP SERPL-CCNC: 132 U/L (ref 39–117)
ALT SERPL W P-5'-P-CCNC: 8 U/L (ref 1–41)
ANION GAP SERPL CALCULATED.3IONS-SCNC: 9 MMOL/L (ref 5–15)
AST SERPL-CCNC: 9 U/L (ref 1–40)
BASOPHILS # BLD AUTO: 0 10*3/MM3 (ref 0–0.2)
BASOPHILS NFR BLD AUTO: 0.4 % (ref 0–1.5)
BH CV ECHO MEAS - AO MAX PG: 2.6 MMHG
BH CV ECHO MEAS - AO MEAN PG: 1 MMHG
BH CV ECHO MEAS - AO V2 MAX: 80.4 CM/SEC
BH CV ECHO MEAS - AO V2 VTI: 16.8 CM
BH CV ECHO MEAS - AVA(I,D): 4.3 CM2
BH CV ECHO MEAS - EDV(CUBED): 117.6 ML
BH CV ECHO MEAS - EDV(MOD-SP4): 90.5 ML
BH CV ECHO MEAS - EF(MOD-SP4): 62.3 %
BH CV ECHO MEAS - ESV(CUBED): 68.9 ML
BH CV ECHO MEAS - ESV(MOD-SP4): 34.1 ML
BH CV ECHO MEAS - FS: 16.3 %
BH CV ECHO MEAS - IVS/LVPW: 1 CM
BH CV ECHO MEAS - IVSD: 1.1 CM
BH CV ECHO MEAS - LA DIMENSION: 4.7 CM
BH CV ECHO MEAS - LAT PEAK E' VEL: 12 CM/SEC
BH CV ECHO MEAS - LV DIASTOLIC VOL/BSA (35-75): 40 CM2
BH CV ECHO MEAS - LV MASS(C)D: 200.5 GRAMS
BH CV ECHO MEAS - LV MAX PG: 2.8 MMHG
BH CV ECHO MEAS - LV MEAN PG: 1 MMHG
BH CV ECHO MEAS - LV SYSTOLIC VOL/BSA (12-30): 15.1 CM2
BH CV ECHO MEAS - LV V1 MAX: 83.6 CM/SEC
BH CV ECHO MEAS - LV V1 VTI: 16 CM
BH CV ECHO MEAS - LVIDD: 4.9 CM
BH CV ECHO MEAS - LVIDS: 4.1 CM
BH CV ECHO MEAS - LVOT AREA: 4.5 CM2
BH CV ECHO MEAS - LVOT DIAM: 2.4 CM
BH CV ECHO MEAS - LVPWD: 1.1 CM
BH CV ECHO MEAS - MED PEAK E' VEL: 7.6 CM/SEC
BH CV ECHO MEAS - MR MAX PG: 83.2 MMHG
BH CV ECHO MEAS - MR MAX VEL: 456 CM/SEC
BH CV ECHO MEAS - MV A MAX VEL: 81.7 CM/SEC
BH CV ECHO MEAS - MV DEC SLOPE: 508 CM/SEC2
BH CV ECHO MEAS - MV DEC TIME: 0.25 MSEC
BH CV ECHO MEAS - MV E MAX VEL: 90.1 CM/SEC
BH CV ECHO MEAS - MV E/A: 1.1
BH CV ECHO MEAS - MV MAX PG: 4.4 MMHG
BH CV ECHO MEAS - MV MEAN PG: 2 MMHG
BH CV ECHO MEAS - MV P1/2T: 64.6 MSEC
BH CV ECHO MEAS - MV V2 VTI: 36.4 CM
BH CV ECHO MEAS - MVA(P1/2T): 3.4 CM2
BH CV ECHO MEAS - MVA(VTI): 1.99 CM2
BH CV ECHO MEAS - PA V2 MAX: 73.8 CM/SEC
BH CV ECHO MEAS - RAP SYSTOLE: 8 MMHG
BH CV ECHO MEAS - RV MAX PG: 1.36 MMHG
BH CV ECHO MEAS - RV V1 MAX: 58.3 CM/SEC
BH CV ECHO MEAS - RV V1 VTI: 10.9 CM
BH CV ECHO MEAS - SI(MOD-SP4): 24.9 ML/M2
BH CV ECHO MEAS - SV(LVOT): 72.4 ML
BH CV ECHO MEAS - SV(MOD-SP4): 56.4 ML
BH CV ECHO MEAS - TAPSE (>1.6): 1.22 CM
BH CV ECHO MEASUREMENTS AVERAGE E/E' RATIO: 9.19
BH CV XLRA - TDI S': 8.5 CM/SEC
BILIRUB SERPL-MCNC: 0.5 MG/DL (ref 0–1.2)
BLD GP AB SCN SERPL QL: NEGATIVE
BUN SERPL-MCNC: 17 MG/DL (ref 8–23)
BUN/CREAT SERPL: 22.1 (ref 7–25)
CALCIUM SPEC-SCNC: 8.3 MG/DL (ref 8.6–10.5)
CHLORIDE SERPL-SCNC: 101 MMOL/L (ref 98–107)
CO2 SERPL-SCNC: 25 MMOL/L (ref 22–29)
CREAT SERPL-MCNC: 0.77 MG/DL (ref 0.76–1.27)
DEPRECATED RDW RBC AUTO: 44.6 FL (ref 37–54)
EGFRCR SERPLBLD CKD-EPI 2021: 92.8 ML/MIN/1.73
EOSINOPHIL # BLD AUTO: 0.1 10*3/MM3 (ref 0–0.4)
EOSINOPHIL NFR BLD AUTO: 1 % (ref 0.3–6.2)
ERYTHROCYTE [DISTWIDTH] IN BLOOD BY AUTOMATED COUNT: 14.1 % (ref 12.3–15.4)
GLOBULIN UR ELPH-MCNC: 2.3 GM/DL
GLUCOSE SERPL-MCNC: 149 MG/DL (ref 65–99)
HCT VFR BLD AUTO: 38 % (ref 37.5–51)
HGB BLD-MCNC: 13 G/DL (ref 13–17.7)
INR PPP: 1.09 (ref 0.93–1.1)
LEFT ATRIUM VOLUME INDEX: 22.3 ML/M2
LYMPHOCYTES # BLD AUTO: 0.5 10*3/MM3 (ref 0.7–3.1)
LYMPHOCYTES NFR BLD AUTO: 7.2 % (ref 19.6–45.3)
MCH RBC QN AUTO: 30.8 PG (ref 26.6–33)
MCHC RBC AUTO-ENTMCNC: 34.2 G/DL (ref 31.5–35.7)
MCV RBC AUTO: 90 FL (ref 79–97)
MONOCYTES # BLD AUTO: 0.4 10*3/MM3 (ref 0.1–0.9)
MONOCYTES NFR BLD AUTO: 5.7 % (ref 5–12)
NEUTROPHILS NFR BLD AUTO: 6.3 10*3/MM3 (ref 1.7–7)
NEUTROPHILS NFR BLD AUTO: 85.7 % (ref 42.7–76)
NRBC BLD AUTO-RTO: 0 /100 WBC (ref 0–0.2)
PLATELET # BLD AUTO: 92 10*3/MM3 (ref 140–450)
PMV BLD AUTO: 6.6 FL (ref 6–12)
POTASSIUM SERPL-SCNC: 4.6 MMOL/L (ref 3.5–5.2)
PROT SERPL-MCNC: 5.4 G/DL (ref 6–8.5)
PROTHROMBIN TIME: 11.6 SECONDS (ref 9.6–11.7)
RBC # BLD AUTO: 4.22 10*6/MM3 (ref 4.14–5.8)
RH BLD: POSITIVE
SINUS: 3.5 CM
SODIUM SERPL-SCNC: 135 MMOL/L (ref 136–145)
T&S EXPIRATION DATE: NORMAL
WBC NRBC COR # BLD: 7.4 10*3/MM3 (ref 3.4–10.8)

## 2023-09-12 PROCEDURE — 72131 CT LUMBAR SPINE W/O DYE: CPT

## 2023-09-12 PROCEDURE — 93306 TTE W/DOPPLER COMPLETE: CPT | Performed by: INTERNAL MEDICINE

## 2023-09-12 PROCEDURE — 85025 COMPLETE CBC W/AUTO DIFF WBC: CPT | Performed by: NURSE PRACTITIONER

## 2023-09-12 PROCEDURE — 86850 RBC ANTIBODY SCREEN: CPT | Performed by: NURSE PRACTITIONER

## 2023-09-12 PROCEDURE — 25010000002 HYDROMORPHONE 1 MG/ML SOLUTION: Performed by: PHYSICIAN ASSISTANT

## 2023-09-12 PROCEDURE — 99232 SBSQ HOSP IP/OBS MODERATE 35: CPT | Performed by: INTERNAL MEDICINE

## 2023-09-12 PROCEDURE — 85610 PROTHROMBIN TIME: CPT | Performed by: NURSE PRACTITIONER

## 2023-09-12 PROCEDURE — 99232 SBSQ HOSP IP/OBS MODERATE 35: CPT | Performed by: NURSE PRACTITIONER

## 2023-09-12 PROCEDURE — 93306 TTE W/DOPPLER COMPLETE: CPT

## 2023-09-12 PROCEDURE — 86900 BLOOD TYPING SEROLOGIC ABO: CPT | Performed by: NURSE PRACTITIONER

## 2023-09-12 PROCEDURE — 86901 BLOOD TYPING SEROLOGIC RH(D): CPT | Performed by: NURSE PRACTITIONER

## 2023-09-12 PROCEDURE — 80053 COMPREHEN METABOLIC PANEL: CPT | Performed by: NURSE PRACTITIONER

## 2023-09-12 RX ADMIN — DULOXETINE HYDROCHLORIDE 60 MG: 30 CAPSULE, DELAYED RELEASE ORAL at 08:29

## 2023-09-12 RX ADMIN — OXYCODONE HYDROCHLORIDE 10 MG: 5 TABLET ORAL at 23:12

## 2023-09-12 RX ADMIN — CYCLOBENZAPRINE 10 MG: 10 TABLET, FILM COATED ORAL at 08:29

## 2023-09-12 RX ADMIN — CYCLOBENZAPRINE 10 MG: 10 TABLET, FILM COATED ORAL at 23:13

## 2023-09-12 RX ADMIN — ZOLPIDEM TARTRATE 10 MG: 5 TABLET ORAL at 23:13

## 2023-09-12 RX ADMIN — METOPROLOL TARTRATE 25 MG: 25 TABLET, FILM COATED ORAL at 08:29

## 2023-09-12 RX ADMIN — ALLOPURINOL 100 MG: 100 TABLET ORAL at 08:29

## 2023-09-12 RX ADMIN — CYCLOBENZAPRINE 10 MG: 10 TABLET, FILM COATED ORAL at 18:36

## 2023-09-12 RX ADMIN — ATORVASTATIN CALCIUM 40 MG: 40 TABLET, FILM COATED ORAL at 21:49

## 2023-09-12 RX ADMIN — FAMOTIDINE 40 MG: 20 TABLET ORAL at 21:49

## 2023-09-12 RX ADMIN — Medication 10 ML: at 08:29

## 2023-09-12 RX ADMIN — HYDROMORPHONE HYDROCHLORIDE 0.5 MG: 1 INJECTION, SOLUTION INTRAMUSCULAR; INTRAVENOUS; SUBCUTANEOUS at 00:40

## 2023-09-12 RX ADMIN — OXYCODONE HYDROCHLORIDE 10 MG: 5 TABLET ORAL at 14:02

## 2023-09-12 RX ADMIN — Medication 10 ML: at 21:51

## 2023-09-12 RX ADMIN — OXYCODONE HYDROCHLORIDE 10 MG: 5 TABLET ORAL at 08:29

## 2023-09-12 NOTE — PLAN OF CARE
Problem: Adult Inpatient Plan of Care  Goal: Plan of Care Review  Outcome: Ongoing, Progressing  Flowsheets (Taken 9/12/2023 0512)  Progress: no change  Plan of Care Reviewed With: patient  Goal: Patient-Specific Goal (Individualized)  Outcome: Ongoing, Progressing  Goal: Absence of Hospital-Acquired Illness or Injury  Outcome: Ongoing, Progressing  Intervention: Identify and Manage Fall Risk  Recent Flowsheet Documentation  Taken 9/12/2023 0410 by Luzma Keita RN  Safety Promotion/Fall Prevention:   safety round/check completed   room organization consistent   nonskid shoes/slippers when out of bed   lighting adjusted   clutter free environment maintained   assistive device/personal items within reach   activity supervised  Taken 9/12/2023 0210 by Luzma Keita, RN  Safety Promotion/Fall Prevention:   safety round/check completed   room organization consistent   nonskid shoes/slippers when out of bed   lighting adjusted   clutter free environment maintained   fall prevention program maintained   assistive device/personal items within reach   activity supervised  Taken 9/12/2023 0020 by Luzma Keita, RN  Safety Promotion/Fall Prevention:   safety round/check completed   room organization consistent   nonskid shoes/slippers when out of bed   lighting adjusted   fall prevention program maintained   clutter free environment maintained   assistive device/personal items within reach   activity supervised  Taken 9/11/2023 2205 by Luzma Keita, RN  Safety Promotion/Fall Prevention:   safety round/check completed   room organization consistent   nonskid shoes/slippers when out of bed   lighting adjusted   fall prevention program maintained   clutter free environment maintained   assistive device/personal items within reach   activity supervised  Taken 9/11/2023 2035 by Luzma Keita, RN  Safety Promotion/Fall Prevention:   safety round/check completed   room organization consistent    nonskid shoes/slippers when out of bed   lighting adjusted   fall prevention program maintained   clutter free environment maintained   assistive device/personal items within reach   activity supervised  Intervention: Prevent Skin Injury  Recent Flowsheet Documentation  Taken 9/12/2023 0410 by Luzma Keita RN  Body Position: supine  Taken 9/12/2023 0020 by Luzma Keita RN  Body Position: supine  Taken 9/11/2023 2035 by Luzma Keita RN  Body Position:   supine   position changed independently  Intervention: Prevent and Manage VTE (Venous Thromboembolism) Risk  Recent Flowsheet Documentation  Taken 9/12/2023 0410 by Luzma Keita RN  Activity Management: bedrest  Taken 9/12/2023 0020 by Luzma Keita RN  Activity Management: bedrest  Taken 9/11/2023 2035 by Luzma Keita RN  Activity Management: bedrest  Intervention: Prevent Infection  Recent Flowsheet Documentation  Taken 9/12/2023 0410 by Luzma Keita RN  Infection Prevention:   rest/sleep promoted   single patient room provided   personal protective equipment utilized   hand hygiene promoted  Taken 9/12/2023 0210 by Luzma Keita RN  Infection Prevention:   single patient room provided   rest/sleep promoted   personal protective equipment utilized   hand hygiene promoted  Taken 9/12/2023 0020 by Luzma Keita RN  Infection Prevention:   single patient room provided   rest/sleep promoted   personal protective equipment utilized   hand hygiene promoted  Taken 9/11/2023 2205 by Luzma Keita RN  Infection Prevention:   single patient room provided   rest/sleep promoted   personal protective equipment utilized   hand hygiene promoted  Taken 9/11/2023 2035 by Luzma Keita RN  Infection Prevention:   single patient room provided   rest/sleep promoted   hand hygiene promoted   equipment surfaces disinfected  Goal: Optimal Comfort and Wellbeing  Outcome: Ongoing, Progressing  Intervention:  Provide Person-Centered Care  Recent Flowsheet Documentation  Taken 9/11/2023 2035 by Luzma Keita RN  Trust Relationship/Rapport:   care explained   choices provided   questions answered   questions encouraged   reassurance provided   thoughts/feelings acknowledged  Goal: Readiness for Transition of Care  Outcome: Ongoing, Progressing     Problem: Asthma Comorbidity  Goal: Maintenance of Asthma Control  Outcome: Ongoing, Progressing  Intervention: Maintain Asthma Symptom Control  Recent Flowsheet Documentation  Taken 9/12/2023 0410 by Luzma Keita RN  Medication Review/Management: medications reviewed  Taken 9/12/2023 0210 by Luzma Keita RN  Medication Review/Management: medications reviewed  Taken 9/12/2023 0020 by Luzma Keita RN  Medication Review/Management: medications reviewed  Taken 9/11/2023 2205 by Luzma Keita RN  Medication Review/Management: medications reviewed  Taken 9/11/2023 2035 by Luzma Keita RN  Medication Review/Management: medications reviewed     Problem: Behavioral Health Comorbidity  Goal: Maintenance of Behavioral Health Symptom Control  Outcome: Ongoing, Progressing  Intervention: Maintain Behavioral Health Symptom Control  Recent Flowsheet Documentation  Taken 9/12/2023 0410 by Luzma Keita RN  Medication Review/Management: medications reviewed  Taken 9/12/2023 0210 by Luzma Keita RN  Medication Review/Management: medications reviewed  Taken 9/12/2023 0020 by Luzma Keita RN  Medication Review/Management: medications reviewed  Taken 9/11/2023 2205 by Luzma Keita RN  Medication Review/Management: medications reviewed  Taken 9/11/2023 2035 by Luzma Keita RN  Medication Review/Management: medications reviewed     Problem: COPD (Chronic Obstructive Pulmonary Disease) Comorbidity  Goal: Maintenance of COPD Symptom Control  Outcome: Ongoing, Progressing  Intervention: Maintain COPD-Symptom  Control  Recent Flowsheet Documentation  Taken 9/12/2023 0410 by Luzma Keita RN  Medication Review/Management: medications reviewed  Taken 9/12/2023 0210 by Luzma Keita RN  Medication Review/Management: medications reviewed  Taken 9/12/2023 0020 by Luzma Keita RN  Medication Review/Management: medications reviewed  Taken 9/11/2023 2205 by Luzma Keita RN  Medication Review/Management: medications reviewed  Taken 9/11/2023 2035 by Luzma Keita RN  Supportive Measures: active listening utilized  Medication Review/Management: medications reviewed     Problem: Diabetes Comorbidity  Goal: Blood Glucose Level Within Targeted Range  Outcome: Ongoing, Progressing     Problem: Hypertension Comorbidity  Goal: Blood Pressure in Desired Range  Outcome: Ongoing, Progressing  Intervention: Maintain Blood Pressure Management  Recent Flowsheet Documentation  Taken 9/12/2023 0410 by Luzma Keita RN  Medication Review/Management: medications reviewed  Taken 9/12/2023 0210 by Luzma Keita RN  Medication Review/Management: medications reviewed  Taken 9/12/2023 0020 by Luzma Keita RN  Medication Review/Management: medications reviewed  Taken 9/11/2023 2205 by Luzma Keita RN  Medication Review/Management: medications reviewed  Taken 9/11/2023 2035 by Luzma Keita RN  Medication Review/Management: medications reviewed     Problem: Obstructive Sleep Apnea Risk or Actual Comorbidity Management  Goal: Unobstructed Breathing During Sleep  Outcome: Ongoing, Progressing     Problem: Osteoarthritis Comorbidity  Goal: Maintenance of Osteoarthritis Symptom Control  Outcome: Ongoing, Progressing  Intervention: Maintain Osteoarthritis Symptom Control  Recent Flowsheet Documentation  Taken 9/12/2023 0410 by Luzma Keita RN  Activity Management: bedrest  Medication Review/Management: medications reviewed  Taken 9/12/2023 0210 by Luzma Keita  RN  Medication Review/Management: medications reviewed  Taken 9/12/2023 0020 by Luzma Keita RN  Activity Management: bedrest  Medication Review/Management: medications reviewed  Taken 9/11/2023 2205 by Luzma Keita RN  Medication Review/Management: medications reviewed  Taken 9/11/2023 2035 by Luzma Keita RN  Activity Management: bedrest  Medication Review/Management: medications reviewed     Problem: Pain Chronic (Persistent) (Comorbidity Management)  Goal: Acceptable Pain Control and Functional Ability  Outcome: Ongoing, Progressing  Intervention: Manage Persistent Pain  Recent Flowsheet Documentation  Taken 9/12/2023 0410 by Luzma Keita RN  Sleep/Rest Enhancement: awakenings minimized  Medication Review/Management: medications reviewed  Taken 9/12/2023 0210 by Luzma Keita RN  Medication Review/Management: medications reviewed  Taken 9/12/2023 0020 by Luzma Keita RN  Medication Review/Management: medications reviewed  Taken 9/11/2023 2205 by Luzma Keita RN  Medication Review/Management: medications reviewed  Taken 9/11/2023 2035 by Luzma Keita RN  Sleep/Rest Enhancement: awakenings minimized  Medication Review/Management: medications reviewed  Intervention: Optimize Psychosocial Wellbeing  Recent Flowsheet Documentation  Taken 9/11/2023 2035 by Luzma Keita RN  Supportive Measures: active listening utilized  Diversional Activities:   television   smartphone  Family/Support System Care: support provided     Problem: Seizure Disorder Comorbidity  Goal: Maintenance of Seizure Control  Outcome: Ongoing, Progressing     Problem: Fall Injury Risk  Goal: Absence of Fall and Fall-Related Injury  Outcome: Ongoing, Progressing  Intervention: Identify and Manage Contributors  Recent Flowsheet Documentation  Taken 9/12/2023 0410 by Luzma Keita RN  Medication Review/Management: medications reviewed  Self-Care Promotion:   BADL personal  routines maintained   BADL personal objects within reach  Taken 9/12/2023 0210 by Luzma Keita, RN  Medication Review/Management: medications reviewed  Taken 9/12/2023 0020 by Luzma Keita, RN  Medication Review/Management: medications reviewed  Self-Care Promotion:   BADL personal routines maintained   BADL personal objects within reach  Taken 9/11/2023 2205 by Luzma Keita, RN  Medication Review/Management: medications reviewed  Taken 9/11/2023 2035 by Luzma Keita RN  Medication Review/Management: medications reviewed  Self-Care Promotion:   BADL personal routines maintained   BADL personal objects within reach   independence encouraged  Intervention: Promote Injury-Free Environment  Recent Flowsheet Documentation  Taken 9/12/2023 0410 by Luzma Keita, RN  Safety Promotion/Fall Prevention:   safety round/check completed   room organization consistent   nonskid shoes/slippers when out of bed   lighting adjusted   clutter free environment maintained   assistive device/personal items within reach   activity supervised  Taken 9/12/2023 0210 by Luzma Keita, RN  Safety Promotion/Fall Prevention:   safety round/check completed   room organization consistent   nonskid shoes/slippers when out of bed   lighting adjusted   clutter free environment maintained   fall prevention program maintained   assistive device/personal items within reach   activity supervised  Taken 9/12/2023 0020 by Luzma Keita, RN  Safety Promotion/Fall Prevention:   safety round/check completed   room organization consistent   nonskid shoes/slippers when out of bed   lighting adjusted   fall prevention program maintained   clutter free environment maintained   assistive device/personal items within reach   activity supervised  Taken 9/11/2023 2205 by Luzma Keita, RN  Safety Promotion/Fall Prevention:   safety round/check completed   room organization consistent   nonskid shoes/slippers when  out of bed   lighting adjusted   fall prevention program maintained   clutter free environment maintained   assistive device/personal items within reach   activity supervised  Taken 9/11/2023 2035 by Luzma Keita, RN  Safety Promotion/Fall Prevention:   safety round/check completed   room organization consistent   nonskid shoes/slippers when out of bed   lighting adjusted   fall prevention program maintained   clutter free environment maintained   assistive device/personal items within reach   activity supervised     Problem: Skin Injury Risk Increased  Goal: Skin Health and Integrity  Outcome: Ongoing, Progressing  Intervention: Optimize Skin Protection  Recent Flowsheet Documentation  Taken 9/12/2023 0410 by Luzma Keita, RN  Head of Bed (HOB) Positioning: HOB flat  Taken 9/12/2023 0020 by Luzma Keita, RN  Head of Bed (HOB) Positioning: HOB flat  Taken 9/11/2023 2035 by Luzma Keita, RN  Head of Bed (HOB) Positioning: HOB elevated   Goal Outcome Evaluation:  Plan of Care Reviewed With: patient        Progress: no change

## 2023-09-12 NOTE — PROGRESS NOTES
Referring Provider: Micaela Henderson DO    Reason for follow-up:  Status post mitral valve repair.  Preoperative cardiovascular valuation     Patient Care Team:  Jaret Castellanos MD as PCP - General (Family Medicine)  Felipe Garcia MD as Consulting Physician (Cardiology)  Golden Serna, SHANEL as Ambulatory  (Population Health)    Subjective .      ROS    Since I have last seen him yesterday, the patient has been without any chest discomfort ,shortness of breath, palpitations, dizziness or syncope.  Denies having any headache ,abdominal pain ,nausea, vomiting , diarrhea constipation, loss of weight or loss of appetite.  Denies having any excessive bruising ,hematuria or blood in the stool.    Review of all systems negative except as indicated    History  Past Medical History:   Diagnosis Date    Abnormal ECG June 2022    Allergic 01/01/1954    Nasal alergies    Arrhythmia 2004    Dr Youngblood examined me and said i was ok.  skipped beats    Asthma     no sx    Benign prostatic hyperplasia 11/14/2018    Cataract 01/01/2014    Surgery. Caused detached retina of right eye 20/60 repair    Colon polyp 01/01/2018    Found and removed last colon eca.q    Depression     Gastroesophageal reflux disease 03/20/2014    Glaucoma 2003    Normal pressures. Have low pressure glaucoma    Gout     Headache 01/01/2015    Have shimmering in both eyes intermittantly. 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    Prostate Cancer Jan22 January 2022    Spinal stenosis 12/02/2011    Visual impairment 01/01/1955    Nearsighted corrected glasses    Vitamin D deficiency 05/23/2018        Past Surgical History:   Procedure Laterality Date    ADENOIDECTOMY  1954    As child    APPENDECTOMY  1956    Surgery    CARDIAC CATHETERIZATION N/A 09/30/2022    Procedure: Right and Left Heart Cath with Coronar Angiography;  Surgeon: Felipe Garcia MD;  Location: [facility] CATH INVASIVE LOCATION;  Service: Cardiovascular;  Laterality: N/A;    CARDIAC VALVE REPLACEMENT N/A 11/17/2022    Procedure: TRICUSPID VALVE REPAIR/REPLACEMENT;  Surgeon: Femi Henderson MD;  Location: [facility];  Service: Cardiothoracic;  Laterality: N/A;  tricuspid valve repaired with 32mm  groves physio tricuspid annuloplasty ring    CARDIAC VALVE REPLACEMENT  11/17/2022    Repaired mitral and tricuspid    CATARACT EXTRACTION Bilateral     COLONOSCOPY  01/01/1997    Regularly scheduled    COLONOSCOPY N/A 2/22/2023    Procedure: COLONOSCOPY with cold snare polypectomy x 1;  Surgeon: Manfred Keating MD;  Location: [facility] ENDOSCOPY;  Service: Gastroenterology;  Laterality: N/A;    ENDOSCOPY N/A 2/22/2023    Procedure: ESOPHAGOGASTRODUODENOSCOPY with esophageal dilation using 48 Fr. Bougie Dilator;  Surgeon: Manfred Keating MD;  Location: [facility] ENDOSCOPY;  Service: Gastroenterology;  Laterality: N/A;  erosive esophagitis    EYE SURGERY  01/01/2010    Reattached right retina    MITRAL VALVE REPAIR/REPLACEMENT N/A 11/17/2022    Procedure: MITRAL VALVE REPAIR/REPLACEMENT with left atrial appendage closure;  Surgeon: Femi Henderson MD;  Location: [facility];  Service: Cardiothoracic;  Laterality: N/A;  mitral valve repaired iwth 40mm medtronic annuloplasty band  with intraop TERRA    SPINE SURGERY  01/01/1971    2 lumbar 1 cervical    TONSILLECTOMY  01/01/1950    Childhood       Family History   Problem Relation Age of Onset    Arthritis Father     Cancer Father         Prostate    Arrhythmia Father         Congentive heart failure    Diabetes Mother         Adult diabetes    Early death Mother         Kidneys failed  after giving birth-diabetes complications    Heart disease Mother         Adult Diabetes       Social History     Tobacco Use    Smoking status: Former     Packs/day: 0.00     Years: 0.50     Pack years: 0.00     Types: Cigarettes     Passive exposure: Past    Smokeless tobacco: Never    Tobacco comments:     Tried tobacco as child   Vaping Use    Vaping Use: Never used   Substance Use Topics    Alcohol use: Not Currently     Comment: Not a regular drinker. Occasionly have a glass of wine or be    Drug use: Never        Medications Prior to Admission   Medication Sig Dispense Refill Last Dose    acetaminophen (TYLENOL) 500 MG tablet Take 1 tablet by mouth Every 6 (Six) Hours As Needed for Mild Pain. Indications: Pain       allopurinol (ZYLOPRIM) 100 MG tablet Take 1 tablet by mouth Daily.       Apple Erik Vn-Grn Tea-Bit Or-Cr (APPLE CIDER VINEGAR PLUS PO) Take 1 Piece by mouth Daily As Needed. Indications: Dietary supplement       atorvastatin (LIPITOR) 40 MG tablet Take 1 tablet by mouth Every Night. Indications: High Amount of Fats in the Blood 90 tablet 1     DULoxetine (CYMBALTA) 60 MG capsule Take 1 capsule by mouth Daily. 90 capsule 0     famotidine (PEPCID) 40 MG tablet Take 1 tablet by mouth Every Night.       fexofenadine (ALLEGRA) 180 MG tablet Take 1 tablet by mouth 2 (Two) Times a Day As Needed. Indications: Hayfever       fluticasone (FLONASE) 50 MCG/ACT nasal spray 2 sprays into the nostril(s) as directed by provider Daily As Needed. Indications: Allergic Rhinitis       Leuprolide Mesylate, 6 Month, (CAMCEVI SC) Inject  under the skin into the appropriate area as directed. Every 6 months injection, due in December  Indications: prostate cancer (to decrease testosterone).       melatonin 5 MG tablet tablet Take 1 tablet by mouth At Night As Needed (sleep). Indications: Trouble Sleeping       metoprolol tartrate (LOPRESSOR) 25 MG tablet Take 1 tablet by mouth Daily.       zolpidem (Ambien) 10 MG tablet  "Take 1 tablet by mouth At Night As Needed for Sleep.          Allergies  Morphine, Beta adrenergic blockers, and Ace inhibitors    Scheduled Meds:allopurinol, 100 mg, Oral, Daily  atorvastatin, 40 mg, Oral, Nightly  cyclobenzaprine, 10 mg, Oral, TID  DULoxetine, 60 mg, Oral, Daily  enoxaparin, 40 mg, Subcutaneous, Q24H  famotidine, 40 mg, Oral, Nightly  metoprolol tartrate, 25 mg, Oral, Daily  sodium chloride, 10 mL, Intravenous, Q12H      Continuous Infusions:   PRN Meds:.  HYDROmorphone    melatonin    oxyCODONE    [COMPLETED] Insert Peripheral IV **AND** sodium chloride    sodium chloride    sodium chloride    zolpidem    Objective     VITAL SIGNS  Vitals:    09/12/23 0535 09/12/23 0540 09/12/23 0545 09/12/23 0550   BP:    137/68   BP Location:    Left arm   Patient Position:    Lying   Pulse: 75 76 76 71   Resp:    19   Temp:    99 °F (37.2 °C)   TempSrc:    Oral   SpO2: 95% 95% 95% 94%   Weight:       Height:           Flowsheet Rows      Flowsheet Row First Filed Value   Admission Height 185.4 cm (73\") Documented at 09/08/2023 0957   Admission Weight 102 kg (225 lb) Documented at 09/08/2023 0957              Intake/Output Summary (Last 24 hours) at 9/12/2023 0634  Last data filed at 9/11/2023 2150  Gross per 24 hour   Intake 480 ml   Output 1200 ml   Net -720 ml        TELEMETRY: Sinus rhythm    Physical Exam:  The patient is alert, oriented and in no distress.  Vital signs as noted above.  Head and neck revealed no carotid bruits or jugular venous distention.  No thyromegaly or lymphadenopathy is present  Lungs clear.  No wheezing.  Breath sounds are normal bilaterally.  Heart normal first and second heart sounds.  No murmur. No precordial rub is present.  No gallop is present.  Abdomen soft and nontender.  No organomegaly is present.  Extremities with good peripheral pulses without any pedal edema.  Skin warm and dry.  Musculoskeletal system is grossly normal  CNS grossly normal    Reviewed and " updated.    Results Review:   I reviewed the patient's new clinical results.  Lab Results (last 24 hours)       Procedure Component Value Units Date/Time    CANDIDA AURIS SCREEN - Swab, Axilla Right, Axilla Left and Groin [[id number]]  (Normal) Collected: 09/08/23 1418    Specimen: Swab from Axilla Right, Axilla Left and Groin Updated: 09/11/23 1430     Candida Auris Screen Culture No Candida auris isolated at 3 days            Imaging Results (Last 24 Hours)       ** No results found for the last 24 hours. **        LAB RESULTS (LAST 7 DAYS)    CBC  Results from last 7 days   Lab Units 09/11/23  0442 09/10/23  0251 09/09/23  0326 09/08/23  1534   WBC 10*3/mm3 5.90 6.90 6.30 6.20   RBC 10*6/mm3 4.53 4.40 4.57 4.62   HEMOGLOBIN g/dL 13.9 13.7 13.9 13.9   HEMATOCRIT % 40.9 38.7 41.0 40.3   MCV fL 90.3 87.9 89.7 87.3   PLATELETS 10*3/mm3 86* 87* 94* 85*       BMP  Results from last 7 days   Lab Units 09/11/23  0442 09/10/23  0251 09/09/23  0326 09/08/23  1534   SODIUM mmol/L 137 136 139 138   POTASSIUM mmol/L 3.8 3.6 4.2 4.1   CHLORIDE mmol/L 103 103 105 105   CO2 mmol/L 25.0 22.0 23.0 24.0   BUN mg/dL 22 22 20 17   CREATININE mg/dL 0.85 0.82 0.86 0.79   GLUCOSE mg/dL 120* 126* 108* 113*       CMP   Results from last 7 days   Lab Units 09/11/23  0442 09/10/23  0251 09/09/23  0326 09/08/23  1534   SODIUM mmol/L 137 136 139 138   POTASSIUM mmol/L 3.8 3.6 4.2 4.1   CHLORIDE mmol/L 103 103 105 105   CO2 mmol/L 25.0 22.0 23.0 24.0   BUN mg/dL 22 22 20 17   CREATININE mg/dL 0.85 0.82 0.86 0.79   GLUCOSE mg/dL 120* 126* 108* 113*         BNP        TROPONIN        CoAg        Creatinine Clearance  Estimated Creatinine Clearance: 92.8 mL/min (by C-G formula based on SCr of 0.85 mg/dL).    ABG        Radiology  CT Lumbar Spine Without Contrast    Result Date: 9/10/2023  Impression: There is a transversely oriented fracture which extends through the L3-L4 disc space, the posteroinferior corner of the L3 vertebral body, and the L3  posterior elements. No significant bony retropulsion or bony encroachment upon the spinal canal noted.  Osteopenia with lumbar spondylosis, multilevel syndesmophyte formation, and fusion across posterior elements which may be seen in ankylosing spondylitis. Please see above for additional details. Electronically Signed: Dennys Yip MD  9/10/2023 2:56 PM EDT  Workstation ID: [id number]         EKG      I personally viewed and interpreted the patient's EKG/Telemetry data: Sinus rhythm nonspecific ST-T wave change    ECHOCARDIOGRAM:    Results for orders placed during the hospital encounter of 09/30/22    Adult Transesophageal Echo (TERRA) W/ Cont if Necessary Per Protocol    Interpretation Summary  Date of study  9/30/2022    Procedure performed  Transesophageal echocardiogram and Doppler study.    Indications  Significant mitral regurgitation  Shortness of breath    Procedure  Anesthesia was provided by anesthesiologist with intravenous Diprivan.  TERRA probe could be passed without difficulty.  Patient tolerated the procedure well.  No complications were noted.    Results  Technically satisfactory study.  Mitral valve has severe myxomatous degeneration with multiple areas of severe prolapse and known coaptation of the mitral leaflet as well as probable ruptured chordae.  Severe mitral regurgitation is seen with multiple jets with mitral regurgitation jet occupying the entire left atrium.  Tricuspid valve is also showing myxomatous degeneration with moderate tricuspid regurgitation.  Calculated pulmonary artery pressure is 60 mmHg.  Aortic valve is thickened with adequate opening motion.  Left atrium is enlarged.  Left atrial appendage is enlarged without clot.  Left ventricle is normal in size and contractility with ejection fraction of 60%.  Right ventricle is normal in size.  Right atrium is normal in size.  Atrial septum is intact without PFO.  Aortic intimal thickening is present without clot.  No pericardial  effusion is seen.    Impression  Myxomatous mitral and tricuspid valve degeneration.  Severe mitral valve prolapse involving both anterior and posterior mitral leaflets with severe mitral regurgitation with multiple jets.  Probable ruptured chordae.  In some views coaptation of the mitral valve is present.  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  Significant pulmonary hypertension.  Left atrial and left atrial appendage enlargement without clot.  Normal left ventricular size and contractility with ejection fraction of 60%.          STRESS TEST        Cardiolite (Tc-99m sestamibi) stress test    CARDIAC CATHETERIZATION  Results for orders placed during the hospital encounter of 09/30/22    Cardiac Catheterization/Vascular Study    Narrative  CARDIAC CATHETERIZATION REPORT    DATE OF PROCEDURE:  9/30/2022    INDICATION FOR PROCEDURE:  Shortness of breath  Mitral regurgitation    PROCEDURE PERFORMED:  Right heart catheterization left heart catheterization, coronary angiography, left ventriculography.    @@  Moderate conscious sedation was utilized with intravenous Versed and fentanyl administered by registered nurse with continuous ECG, pulse oximetry and hemodynamic monitoring supervised by myself throughout the entire procedure.  Conscious sedation time   45 minutes    I was present with the patient for the duration of moderate sedation and supervised staff who had no other duties and monitored the patient for the entire procedure.    @@  PROCEDURE COMMENTS:    Under usual sterile precautions and 1% lidocaine filtration right femoral vein and femoral artery were percutaneously punctured and a 7 and 5 [place] vascular sheaths were respectively inserted.  [place] catheter was used to measure right-sided pressures pulmonary capital wedge pressure and cardiac output using thermodilution technique.  [place] catheter was removed and subsequently left and right coronary angiography followed by  left ventricular angiogram was performed.  Hemostasis was obtained and patient was returned to the room with intact pulses.  No complications were noted.  FINDINGS:    1. HEMODYNAMICS:  Left ventricle end-diastolic pressure is elevated.  Mean right atrial pressure is 4 right ventricle 47/7 pulmonary artery 40/16 mean pulmonary artery 26 pulmonary capital wedge pressure is 17.  No gradient was noted across aortic valve.    2. LEFT VENTRICULOGRAPHY:  Left ventricular size and contractility is normal with ejection fraction of 60%.  Left atrial enlargement is present with severe mitral valve prolapse and severe mitral regurgitation.    3. CORONARY ANGIOGRAPHY:  Left main coronary artery normal.  Left anterior descending artery normal.  Circumflex coronary artery normal.  Right coronary artery is a large and dominant vessel and is normal.    SUMMARY:  Moderate to significant pulmonary hypertension.  Severe mitral valve prolapse and mitral regurgitation and probable ruptured chordae (TERRA and cardiac cath)  Tricuspid valve prolapse.  Normal left ventricular function.  Normal coronary arteries.    RECOMMENDATIONS:  Cardiovascular surgery consultation initiated for possible mitral valve and tricuspid valve repair soon possible.  Have discussed with Dr. Henderson cardiovascular surgeon for coordination of care.        ]]]]]]]]]]]]]]]]]]]]]  Date of study  9/30/2022    Procedure performed  Transesophageal echocardiogram and Doppler study.    Indications  Significant mitral regurgitation  Shortness of breath    Procedure  Anesthesia was provided by anesthesiologist with intravenous Diprivan.  TERRA probe could be passed without difficulty.  Patient tolerated the procedure well.  No complications were noted.    Results  Technically satisfactory study.  Mitral valve has severe myxomatous degeneration with multiple areas of severe prolapse and known coaptation of the mitral leaflet as well as probable ruptured chordae.  Severe mitral  regurgitation is seen with multiple jets with mitral regurgitation jet occupying the entire left atrium.  Tricuspid valve is also showing myxomatous degeneration with moderate tricuspid regurgitation.  Calculated pulmonary artery pressure is 60 mmHg.  Aortic valve is thickened with adequate opening motion.  Left atrium is enlarged.  Left atrial appendage is enlarged without clot.  Left ventricle is normal in size and contractility with ejection fraction of 60%.  Right ventricle is normal in size.  Right atrium is normal in size.  Atrial septum is intact without PFO.  Aortic intimal thickening is present without clot.  No pericardial effusion is seen.    Impression  Myxomatous mitral and tricuspid valve degeneration.  Severe mitral valve prolapse involving both anterior and posterior mitral leaflets with severe mitral regurgitation with multiple jets.  Probable ruptured chordae.  In some views coaptation of the mitral valve is present.  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  Significant pulmonary hypertension.  Left atrial and left atrial appendage enlargement without clot.  Normal left ventricular size and contractility with ejection fraction of 60%.  ]]]]]]]]]]]]]]]]]]]]                OTHER:         Assessment & Plan     Principal Problem:    Back pain      ]]]]]]]]]]]]]]]]]]]]  History  ===========  - Significant back discomfort  Patient has unstable L3-L4 fracture  Patient has continued symptoms despite being on conservative treatment.  Failed bracing trial and physical therapy.     -Status post mitral and tricuspid valve repair and left atrial appendage endocardial closure--Dr. Henderson 11/17/2022     -   Preoperative increased shortness of breath and significantly increased fatigue.   Preoperative significant mitral and tricuspid regurgitation.  Prominent posterior mitral leaflet prolapse     TERRA 9/30/2022  Myxomatous mitral and tricuspid valve degeneration.  Severe mitral valve prolapse  involving both anterior and posterior mitral leaflets with severe mitral regurgitation with multiple jets.  Probable ruptured chordae.  In some views coaptation of the mitral valve is present.  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  Significant pulmonary hypertension.  Left atrial and left atrial appendage enlargement without clot.  Normal left ventricular size and contractility with ejection fraction of 60%.     Cardiac catheterization 9/30/2022   Moderate to significant pulmonary hypertension.  Severe mitral valve prolapse and mitral regurgitation and probable ruptured chordae (TERRA and cardiac cath)  Tricuspid valve prolapse.  Normal left ventricular function.  Normal coronary arteries.     Echocardiogram 9/28/2022.  Mild mitral regurgitation (regurgitation jet may be not in the field)      - Premature ventricular contractions.  Occasional palpitations     - Hypertension vitamin D deficiency GERD ocular migraine.     - History of prostate cancer.  Status post radiation therapy.  Bone scan was negative for any spread.     - Status post appendectomy spine surgery adenoidectomy tonsillectomy     - Family history of prostate cancer     - Former smoker     - Allergic to morphine.  ============  Plan  ===========  Significant back discomfort  Patient has unstable L3-L4 fracture  Patient has continued symptoms despite being on conservative treatment.  Failed bracing trial and physical therapy.     Status post mitral and tricuspid valve repair and left atrial appendage endocardial closure--Dr. Henderson 11/17/2022  Status post mitral valve repair tricuspid valve repair and left atrial appendage closure--Dr. Henderson-11/17/2022     Patient does not have any manic symptoms at this time.  EKG-normal-9/11/2023.  Echocardiogram.  9/12/2023  Status post mitral valve repair.  No mitral regurgitation is present.  Aortic valve is thickened with adequate opening motion.  Left atrial enlargement.  Left ventricular  size and contractility is normal with ejection fraction of 60%.    Thrombocytopenia  No bleeding problems.  Platelet count 93431-09/ 8//2023  20166-89 /9//2023  90250-49 /11//2023     History of resting tachycardia-improved  Patient is taking Metroprolol tartrate 25 mg twice daily.     Patient had postoperative bradycardia.  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  Bradycardia has improved.  No need for pacemaker at this time.     Hypertension-stable.  137/68     Medications were reviewed and updated.  Patient is on metoprolol 25 mg a day allopurinol atorvastatin cyclobenzaprine subcu Lovenox (DVT) famotidine.     Okay with planned procedure from cardiovascular standpoint pending EKG and echocardiogram     Patient is scheduled for surgery tomorrow.      Further plan will depend on patient's progress.    Reviewed and updated-9/12/2023  ]]]]]]]]]]]]]]]]           Felipe Garcia MD  09/12/23  06:34 EDT

## 2023-09-12 NOTE — CASE MANAGEMENT/SOCIAL WORK
Continued Stay Note   Bryan     Patient Name: Clint Cee  MRN: [id number]  Today's Date: 9/12/2023    Admit Date: 9/8/2023    Plan: From [facility] for acute rehab and they are followig for return. Lives at home with spouse. No PASRR or precert needed if return to [facility]   Discharge Plan       Row Name 09/12/23 1220       Plan    Plan From [facility] for acute rehab and they are followig for return. Lives at home with spouse. No PASRR or precert needed if return to [facility]    Plan Comments Barriers:  - OR Wednesday afternoon pending medical clearance for lumbar fractures. [facility] will continue to follow after surgery and therapy evals.                        Phone communication or documentation only - no physical contact with patient or family.   Samantha RAMIREZ,RN Case Manager  [facility]  Phone: Desk- [phone]  Cell- [phone]

## 2023-09-12 NOTE — PROGRESS NOTES
[facility] Medicine Services   Daily Progress Note      Patient Name: Clint Cee  : 1945  MRN: [id number]  Primary Care Physician:  Jaret Castellanos MD  Date of admission: 2023      Subjective      Chief Complaint: Back pain    Patient seen and examined this morning.  Doing okay, back pain well controlled.  Going for surgery tomorrow.  No other complaints.    Pertinent positives as noted in HPI/subjective.  All other systems were reviewed and are negative.      Objective      Vitals:   Temp:  [97.9 °F (36.6 °C)-99.4 °F (37.4 °C)] 98 °F (36.7 °C)  Heart Rate:  [] 71  Resp:  [15-22] 18  BP: (106-137)/(54-74) 126/74    Physical Exam:    General: Awake, alert, NAD  Eyes: PERRL, EOMI, conjunctivae are clear  Cardiovascular: Regular rate and rhythm, no murmurs  Respiratory: Clear to auscultation bilaterally, no wheezing or rales, unlabored breathing  Abdomen: Soft, nontender, positive bowel sounds, no guarding  Neurologic: A&O, CN grossly intact, moves all extremities spontaneously  Musculoskeletal: Generalized weakness, no other gross deformities  Skin: Warm, dry, intact         Result Review    Result Review:  I have personally reviewed the results from the time of this admission to 2023 09:55 EDT and agree with these findings:  [x]  Laboratory  []  Microbiology  []  Radiology  [x]  EKG/Telemetry   [x]  Cardiology/Vascular   []  Pathology  []  Old records  []  Other:          Assessment & Plan      Brief Patient Summary:  Clint Cee is a 78 y.o. male with past medical history of mitral valve repair, hypertension, hyperlipidemia, and recent history of fall on  presented to the ED. with complaints of continued and worsening back pain that has been ongoing for several days.  Patient states that he was seen in the emergency room after the fall and had a negative CT.  When the pain continued, he had an MRI with findings including fracture and ligament tear of L3/L4.  " He has been at [facility] rehab for the past couple weeks for rehab with PT.  Pt had increased pain and he came to the emergency room. Patient is also stating that he has pain in his left hip and is concerned for fracture.  Patient states that the pain is exacerbated by movement.  It is in his left lumbar region with radiation to his left leg. He states that sensation feels like \"an electric shock\".  He has had no loss of bowel/bladder.  He has had no numbness in his rectum or in her thighs.  Patient does have paresthesia in his left foot but this is chronic.  MRI and CT findings consistent with L3-L4 fracture as well as ankylosing spondylitis changes.  Patient has no signs or symptoms of cauda equina needing emergent neurosurgical intervention at this time.  However patient well-known to the neurosurgery service from recent fall.  Patient admitted to observation unit initially.  Neurosurgery consulted and recommending surgical intervention given failure of bracing trial and worsening back pain.  Hospitalist service consulted for further management.  Due to patient's history of MV repair, cardiology consulted for cardiac clearance.       allopurinol, 100 mg, Oral, Daily  atorvastatin, 40 mg, Oral, Nightly  cyclobenzaprine, 10 mg, Oral, TID  DULoxetine, 60 mg, Oral, Daily  enoxaparin, 40 mg, Subcutaneous, Q24H  famotidine, 40 mg, Oral, Nightly  metoprolol tartrate, 25 mg, Oral, Daily  sodium chloride, 10 mL, Intravenous, Q12H             I have utilized all available, immediate resources to obtain, update, or review the patient's current medications including all prescriptions, over-the-counter products, herbals, cannabis/cannabidiol products, and vitamin.mineral/dietary (nutritional) supplements.    Active Hospital Problems:  Active Hospital Problems    Diagnosis     **Back pain      Plan:     Unstable L3-L4 fracture  Acute on chronic back pain  -MRI and CT findings noted  -Patient failed bracing trial and " physical therapy with worsening symptoms  -No signs or symptoms of cauda equina syndrome at this time  -Neurosurgery following and plan for surgical intervention  -Strict bedrest per neurosurgery  -Continue pain control  -Cardiology consulted for clearance  -Surgery planned for tomorrow     Hypertension  Hyperlipidemia  -Continue home meds as tolerated, monitor     History of MV repair  -Stable at this time, does not appear to be in exacerbation  -Cardiology consulted for clearance per neurosurgery  -Okay for planned procedure per cardiology pending echo results     DVT prophylaxis  -Lovenox    CODE STATUS:    Code Status (Patient has no pulse and is not breathing): CPR (Attempt to Resuscitate)  Medical Interventions (Patient has pulse or is breathing): Full Support      Disposition: will likely need rehab placement postsurgery.    Electronically signed by Micaela Henderson DO, 09/12/23, 09:55 EDT.  [facility] Hospitalist Team      Part of this note may be an electronic transcription/translation of spoken language to printed text using the Dragon Dictation System.

## 2023-09-12 NOTE — SIGNIFICANT NOTE
09/12/23 0744   OTHER   Discipline occupational therapist   Therapy Assessment/Plan (PT)   Criteria for Skilled Interventions Met (PT) other (see comments)  (Bedrest secondary to fracture through his autofused disc space at L3-4 with extension into the posterior elements of L3.)   Recommendation   OT - Next Appointment 09/13/23

## 2023-09-13 ENCOUNTER — APPOINTMENT (OUTPATIENT)
Dept: GENERAL RADIOLOGY | Facility: HOSPITAL | Age: 78
DRG: 460 | End: 2023-09-13
Payer: MEDICARE

## 2023-09-13 ENCOUNTER — ANESTHESIA EVENT (OUTPATIENT)
Dept: PERIOP | Facility: HOSPITAL | Age: 78
DRG: 460 | End: 2023-09-13
Payer: MEDICARE

## 2023-09-13 ENCOUNTER — ANESTHESIA (OUTPATIENT)
Dept: PERIOP | Facility: HOSPITAL | Age: 78
DRG: 460 | End: 2023-09-13
Payer: MEDICARE

## 2023-09-13 LAB
ANION GAP SERPL CALCULATED.3IONS-SCNC: 7 MMOL/L (ref 5–15)
ANION GAP SERPL CALCULATED.3IONS-SCNC: 9 MMOL/L (ref 5–15)
BACTERIA ISLT: NORMAL
BASOPHILS # BLD AUTO: 0 10*3/MM3 (ref 0–0.2)
BASOPHILS # BLD AUTO: 0 10*3/MM3 (ref 0–0.2)
BASOPHILS NFR BLD AUTO: 0 % (ref 0–1.5)
BASOPHILS NFR BLD AUTO: 0.2 % (ref 0–1.5)
BUN SERPL-MCNC: 16 MG/DL (ref 8–23)
BUN SERPL-MCNC: 16 MG/DL (ref 8–23)
BUN/CREAT SERPL: 18 (ref 7–25)
BUN/CREAT SERPL: 20.5 (ref 7–25)
CALCIUM SPEC-SCNC: 8.5 MG/DL (ref 8.6–10.5)
CALCIUM SPEC-SCNC: 9.1 MG/DL (ref 8.6–10.5)
CHLORIDE SERPL-SCNC: 101 MMOL/L (ref 98–107)
CHLORIDE SERPL-SCNC: 102 MMOL/L (ref 98–107)
CO2 SERPL-SCNC: 27 MMOL/L (ref 22–29)
CO2 SERPL-SCNC: 29 MMOL/L (ref 22–29)
CREAT SERPL-MCNC: 0.78 MG/DL (ref 0.76–1.27)
CREAT SERPL-MCNC: 0.89 MG/DL (ref 0.76–1.27)
DEPRECATED RDW RBC AUTO: 43.8 FL (ref 37–54)
DEPRECATED RDW RBC AUTO: 45.1 FL (ref 37–54)
EGFRCR SERPLBLD CKD-EPI 2021: 88.8 ML/MIN/1.73
EGFRCR SERPLBLD CKD-EPI 2021: 92.4 ML/MIN/1.73
EOSINOPHIL # BLD AUTO: 0 10*3/MM3 (ref 0–0.4)
EOSINOPHIL # BLD AUTO: 0.1 10*3/MM3 (ref 0–0.4)
EOSINOPHIL NFR BLD AUTO: 0 % (ref 0.3–6.2)
EOSINOPHIL NFR BLD AUTO: 1.4 % (ref 0.3–6.2)
ERYTHROCYTE [DISTWIDTH] IN BLOOD BY AUTOMATED COUNT: 13.6 % (ref 12.3–15.4)
ERYTHROCYTE [DISTWIDTH] IN BLOOD BY AUTOMATED COUNT: 13.8 % (ref 12.3–15.4)
GLUCOSE SERPL-MCNC: 121 MG/DL (ref 65–99)
GLUCOSE SERPL-MCNC: 201 MG/DL (ref 65–99)
HCT VFR BLD AUTO: 32.8 % (ref 37.5–51)
HCT VFR BLD AUTO: 38.9 % (ref 37.5–51)
HGB BLD-MCNC: 11.4 G/DL (ref 13–17.7)
HGB BLD-MCNC: 13.1 G/DL (ref 13–17.7)
LYMPHOCYTES # BLD AUTO: 0.2 10*3/MM3 (ref 0.7–3.1)
LYMPHOCYTES # BLD AUTO: 0.3 10*3/MM3 (ref 0.7–3.1)
LYMPHOCYTES NFR BLD AUTO: 2.8 % (ref 19.6–45.3)
LYMPHOCYTES NFR BLD AUTO: 4.2 % (ref 19.6–45.3)
MCH RBC QN AUTO: 29.9 PG (ref 26.6–33)
MCH RBC QN AUTO: 30.6 PG (ref 26.6–33)
MCHC RBC AUTO-ENTMCNC: 33.7 G/DL (ref 31.5–35.7)
MCHC RBC AUTO-ENTMCNC: 34.8 G/DL (ref 31.5–35.7)
MCV RBC AUTO: 87.9 FL (ref 79–97)
MCV RBC AUTO: 88.8 FL (ref 79–97)
MONOCYTES # BLD AUTO: 0.2 10*3/MM3 (ref 0.1–0.9)
MONOCYTES # BLD AUTO: 0.4 10*3/MM3 (ref 0.1–0.9)
MONOCYTES NFR BLD AUTO: 3.2 % (ref 5–12)
MONOCYTES NFR BLD AUTO: 5.2 % (ref 5–12)
NEUTROPHILS NFR BLD AUTO: 6.7 10*3/MM3 (ref 1.7–7)
NEUTROPHILS NFR BLD AUTO: 7 10*3/MM3 (ref 1.7–7)
NEUTROPHILS NFR BLD AUTO: 89 % (ref 42.7–76)
NEUTROPHILS NFR BLD AUTO: 94 % (ref 42.7–76)
NRBC BLD AUTO-RTO: 0 /100 WBC (ref 0–0.2)
NRBC BLD AUTO-RTO: 0 /100 WBC (ref 0–0.2)
PLATELET # BLD AUTO: 84 10*3/MM3 (ref 140–450)
PLATELET # BLD AUTO: 98 10*3/MM3 (ref 140–450)
PMV BLD AUTO: 7.3 FL (ref 6–12)
PMV BLD AUTO: 7.5 FL (ref 6–12)
POTASSIUM SERPL-SCNC: 4.2 MMOL/L (ref 3.5–5.2)
POTASSIUM SERPL-SCNC: 4.5 MMOL/L (ref 3.5–5.2)
RBC # BLD AUTO: 3.73 10*6/MM3 (ref 4.14–5.8)
RBC # BLD AUTO: 4.38 10*6/MM3 (ref 4.14–5.8)
SODIUM SERPL-SCNC: 137 MMOL/L (ref 136–145)
SODIUM SERPL-SCNC: 138 MMOL/L (ref 136–145)
WBC NRBC COR # BLD: 7.1 10*3/MM3 (ref 3.4–10.8)
WBC NRBC COR # BLD: 7.8 10*3/MM3 (ref 3.4–10.8)

## 2023-09-13 PROCEDURE — P9100 PATHOGEN TEST FOR PLATELETS: HCPCS

## 2023-09-13 PROCEDURE — C1713 ANCHOR/SCREW BN/BN,TIS/BN: HCPCS | Performed by: NEUROLOGICAL SURGERY

## 2023-09-13 PROCEDURE — 25010000002 PHENYLEPHRINE 10 MG/ML SOLUTION 5 ML VIAL: Performed by: NURSE ANESTHETIST, CERTIFIED REGISTERED

## 2023-09-13 PROCEDURE — 25010000002 HYDROMORPHONE 1 MG/ML SOLUTION: Performed by: ANESTHESIOLOGY

## 2023-09-13 PROCEDURE — 36430 TRANSFUSION BLD/BLD COMPNT: CPT

## 2023-09-13 PROCEDURE — 25010000002 DEXAMETHASONE PER 1 MG: Performed by: NURSE ANESTHETIST, CERTIFIED REGISTERED

## 2023-09-13 PROCEDURE — 25010000002 ONDANSETRON PER 1 MG: Performed by: NURSE ANESTHETIST, CERTIFIED REGISTERED

## 2023-09-13 PROCEDURE — 85025 COMPLETE CBC W/AUTO DIFF WBC: CPT | Performed by: NEUROLOGICAL SURGERY

## 2023-09-13 PROCEDURE — 0SG10K1 FUSION OF 2 OR MORE LUMBAR VERTEBRAL JOINTS WITH NONAUTOLOGOUS TISSUE SUBSTITUTE, POSTERIOR APPROACH, POSTERIOR COLUMN, OPEN APPROACH: ICD-10-PCS | Performed by: NEUROLOGICAL SURGERY

## 2023-09-13 PROCEDURE — 25010000002 FENTANYL CITRATE (PF) 100 MCG/2ML SOLUTION: Performed by: NURSE ANESTHETIST, CERTIFIED REGISTERED

## 2023-09-13 PROCEDURE — 85025 COMPLETE CBC W/AUTO DIFF WBC: CPT | Performed by: STUDENT IN AN ORGANIZED HEALTH CARE EDUCATION/TRAINING PROGRAM

## 2023-09-13 PROCEDURE — 80048 BASIC METABOLIC PNL TOTAL CA: CPT | Performed by: NEUROLOGICAL SURGERY

## 2023-09-13 PROCEDURE — 22614 ARTHRD PST TQ 1NTRSPC EA ADD: CPT | Performed by: NEUROLOGICAL SURGERY

## 2023-09-13 PROCEDURE — 22842 INSERT SPINE FIXATION DEVICE: CPT | Performed by: NEUROLOGICAL SURGERY

## 2023-09-13 PROCEDURE — 61783 SCAN PROC SPINAL: CPT | Performed by: NEUROLOGICAL SURGERY

## 2023-09-13 PROCEDURE — 25010000002 HYDROMORPHONE 1 MG/ML SOLUTION: Performed by: NEUROLOGICAL SURGERY

## 2023-09-13 PROCEDURE — 25010000002 CEFAZOLIN PER 500 MG: Performed by: NEUROLOGICAL SURGERY

## 2023-09-13 PROCEDURE — P9035 PLATELET PHERES LEUKOREDUCED: HCPCS

## 2023-09-13 PROCEDURE — 25010000002 SUGAMMADEX 200 MG/2ML SOLUTION: Performed by: NURSE ANESTHETIST, CERTIFIED REGISTERED

## 2023-09-13 PROCEDURE — 80048 BASIC METABOLIC PNL TOTAL CA: CPT | Performed by: STUDENT IN AN ORGANIZED HEALTH CARE EDUCATION/TRAINING PROGRAM

## 2023-09-13 PROCEDURE — 22612 ARTHRD PST TQ 1NTRSPC LUMBAR: CPT | Performed by: NEUROLOGICAL SURGERY

## 2023-09-13 PROCEDURE — 83036 HEMOGLOBIN GLYCOSYLATED A1C: CPT | Performed by: INTERNAL MEDICINE

## 2023-09-13 PROCEDURE — 25010000002 ALBUMIN HUMAN 5% PER 50 ML: Performed by: NURSE ANESTHETIST, CERTIFIED REGISTERED

## 2023-09-13 PROCEDURE — 99232 SBSQ HOSP IP/OBS MODERATE 35: CPT | Performed by: INTERNAL MEDICINE

## 2023-09-13 PROCEDURE — 8E0W0CZ ROBOTIC ASSISTED PROCEDURE OF TRUNK REGION, OPEN APPROACH: ICD-10-PCS | Performed by: NEUROLOGICAL SURGERY

## 2023-09-13 PROCEDURE — 25010000002 PROPOFOL 1000 MG/100ML EMULSION: Performed by: NURSE ANESTHETIST, CERTIFIED REGISTERED

## 2023-09-13 PROCEDURE — 8E0WXBF COMPUTER ASSISTED PROCEDURE OF TRUNK REGION, WITH FLUOROSCOPY: ICD-10-PCS | Performed by: NEUROLOGICAL SURGERY

## 2023-09-13 PROCEDURE — P9041 ALBUMIN (HUMAN),5%, 50ML: HCPCS | Performed by: NURSE ANESTHETIST, CERTIFIED REGISTERED

## 2023-09-13 PROCEDURE — 72100 X-RAY EXAM L-S SPINE 2/3 VWS: CPT

## 2023-09-13 PROCEDURE — 25010000002 HYDROMORPHONE 1 MG/ML SOLUTION: Performed by: PHYSICIAN ASSISTANT

## 2023-09-13 PROCEDURE — 76000 FLUOROSCOPY <1 HR PHYS/QHP: CPT

## 2023-09-13 PROCEDURE — 25010000002 PROPOFOL 200 MG/20ML EMULSION: Performed by: NURSE ANESTHETIST, CERTIFIED REGISTERED

## 2023-09-13 PROCEDURE — 25010000002 HYDROMORPHONE 1 MG/ML SOLUTION: Performed by: NURSE ANESTHETIST, CERTIFIED REGISTERED

## 2023-09-13 DEVICE — FLOSEAL HEMOSTATIC MATRIX, 10ML
Type: IMPLANTABLE DEVICE | Site: SPINE LUMBAR | Status: FUNCTIONAL
Brand: FLOSEAL HEMOSTATIC MATRIX

## 2023-09-13 DEVICE — ALLOGRFT FIBR OSTEOAMP SELECT 10CC: Type: IMPLANTABLE DEVICE | Site: SPINE LUMBAR | Status: FUNCTIONAL

## 2023-09-13 DEVICE — 6.5 X 45MM CANNULATED SCREW, MODULAR, CREO AMP
Type: IMPLANTABLE DEVICE | Site: SPINE LUMBAR | Status: FUNCTIONAL
Brand: CREO

## 2023-09-13 DEVICE — CREO MIS 5.5MM CURVED ROD, TITANIUM ALLOY, 120MM LENGTH
Type: IMPLANTABLE DEVICE | Site: SPINE LUMBAR | Status: FUNCTIONAL
Brand: CREO

## 2023-09-13 DEVICE — I-FACTOR™ PUTTY, 5.0 CC SYRINGE
Type: IMPLANTABLE DEVICE | Site: SPINE LUMBAR | Status: FUNCTIONAL
Brand: I-FACTOR™ PEPTIDE ENHANCED BONE GRAFT

## 2023-09-13 DEVICE — DEV CONTRL TISS STRATAFIX SPIRAL MNCRYL UD 3/0 PLS 30CM: Type: IMPLANTABLE DEVICE | Site: SPINE LUMBAR | Status: FUNCTIONAL

## 2023-09-13 DEVICE — DEV WND/CLS CONTRL TISS STRATAFIX SYMM PDS PLS CTX 60CM VIL: Type: IMPLANTABLE DEVICE | Site: SPINE LUMBAR | Status: FUNCTIONAL

## 2023-09-13 DEVICE — CREO MIS LOCKING CAP
Type: IMPLANTABLE DEVICE | Site: SPINE LUMBAR | Status: FUNCTIONAL
Brand: CREO

## 2023-09-13 RX ORDER — SODIUM CHLORIDE 9 MG/ML
9 INJECTION, SOLUTION INTRAVENOUS CONTINUOUS PRN
Status: DISCONTINUED | OUTPATIENT
Start: 2023-09-13 | End: 2023-09-19 | Stop reason: HOSPADM

## 2023-09-13 RX ORDER — SODIUM CHLORIDE 0.9 % (FLUSH) 0.9 %
10 SYRINGE (ML) INJECTION AS NEEDED
Status: DISCONTINUED | OUTPATIENT
Start: 2023-09-13 | End: 2023-09-13 | Stop reason: HOSPADM

## 2023-09-13 RX ORDER — ALBUTEROL SULFATE 2.5 MG/3ML
2.5 SOLUTION RESPIRATORY (INHALATION) ONCE AS NEEDED
Status: DISCONTINUED | OUTPATIENT
Start: 2023-09-13 | End: 2023-09-13

## 2023-09-13 RX ORDER — SODIUM CHLORIDE 0.9 % (FLUSH) 0.9 %
10 SYRINGE (ML) INJECTION EVERY 12 HOURS SCHEDULED
Status: DISCONTINUED | OUTPATIENT
Start: 2023-09-13 | End: 2023-09-13 | Stop reason: HOSPADM

## 2023-09-13 RX ORDER — PHENYLEPHRINE HCL IN 0.9% NACL 1 MG/10 ML
SYRINGE (ML) INTRAVENOUS AS NEEDED
Status: DISCONTINUED | OUTPATIENT
Start: 2023-09-13 | End: 2023-09-13 | Stop reason: SURG

## 2023-09-13 RX ORDER — ALBUMIN, HUMAN INJ 5% 5 %
SOLUTION INTRAVENOUS CONTINUOUS PRN
Status: DISCONTINUED | OUTPATIENT
Start: 2023-09-13 | End: 2023-09-13 | Stop reason: SURG

## 2023-09-13 RX ORDER — SODIUM CHLORIDE 9 MG/ML
40 INJECTION, SOLUTION INTRAVENOUS AS NEEDED
Status: DISCONTINUED | OUTPATIENT
Start: 2023-09-13 | End: 2023-09-13 | Stop reason: HOSPADM

## 2023-09-13 RX ORDER — LIDOCAINE HYDROCHLORIDE AND EPINEPHRINE 10; 10 MG/ML; UG/ML
INJECTION, SOLUTION INFILTRATION; PERINEURAL AS NEEDED
Status: DISCONTINUED | OUTPATIENT
Start: 2023-09-13 | End: 2023-09-13 | Stop reason: HOSPADM

## 2023-09-13 RX ORDER — ONDANSETRON 2 MG/ML
4 INJECTION INTRAMUSCULAR; INTRAVENOUS ONCE AS NEEDED
Status: DISCONTINUED | OUTPATIENT
Start: 2023-09-13 | End: 2023-09-13 | Stop reason: HOSPADM

## 2023-09-13 RX ORDER — FENTANYL CITRATE 50 UG/ML
INJECTION, SOLUTION INTRAMUSCULAR; INTRAVENOUS AS NEEDED
Status: DISCONTINUED | OUTPATIENT
Start: 2023-09-13 | End: 2023-09-13 | Stop reason: SURG

## 2023-09-13 RX ORDER — DIPHENHYDRAMINE HYDROCHLORIDE 50 MG/ML
12.5 INJECTION INTRAMUSCULAR; INTRAVENOUS
Status: DISCONTINUED | OUTPATIENT
Start: 2023-09-13 | End: 2023-09-13

## 2023-09-13 RX ORDER — HYDROCODONE BITARTRATE AND ACETAMINOPHEN 7.5; 325 MG/1; MG/1
1 TABLET ORAL EVERY 4 HOURS PRN
Status: DISCONTINUED | OUTPATIENT
Start: 2023-09-13 | End: 2023-09-13

## 2023-09-13 RX ORDER — REMIFENTANIL HYDROCHLORIDE 1 MG/ML
INJECTION, POWDER, LYOPHILIZED, FOR SOLUTION INTRAVENOUS CONTINUOUS PRN
Status: DISCONTINUED | OUTPATIENT
Start: 2023-09-13 | End: 2023-09-13 | Stop reason: SURG

## 2023-09-13 RX ORDER — FENTANYL CITRATE 50 UG/ML
50 INJECTION, SOLUTION INTRAMUSCULAR; INTRAVENOUS
Status: DISCONTINUED | OUTPATIENT
Start: 2023-09-13 | End: 2023-09-13

## 2023-09-13 RX ORDER — OXYCODONE HCL 10 MG/1
10 TABLET, FILM COATED, EXTENDED RELEASE ORAL ONCE
Status: COMPLETED | OUTPATIENT
Start: 2023-09-13 | End: 2023-09-13

## 2023-09-13 RX ORDER — OXYCODONE HYDROCHLORIDE 5 MG/1
10 TABLET ORAL EVERY 4 HOURS PRN
Status: DISCONTINUED | OUTPATIENT
Start: 2023-09-13 | End: 2023-09-13 | Stop reason: HOSPADM

## 2023-09-13 RX ORDER — DEXAMETHASONE SODIUM PHOSPHATE 4 MG/ML
INJECTION, SOLUTION INTRA-ARTICULAR; INTRALESIONAL; INTRAMUSCULAR; INTRAVENOUS; SOFT TISSUE AS NEEDED
Status: DISCONTINUED | OUTPATIENT
Start: 2023-09-13 | End: 2023-09-13 | Stop reason: SURG

## 2023-09-13 RX ORDER — GABAPENTIN 300 MG/1
300 CAPSULE ORAL ONCE
Status: COMPLETED | OUTPATIENT
Start: 2023-09-13 | End: 2023-09-13

## 2023-09-13 RX ORDER — ROCURONIUM BROMIDE 10 MG/ML
INJECTION, SOLUTION INTRAVENOUS AS NEEDED
Status: DISCONTINUED | OUTPATIENT
Start: 2023-09-13 | End: 2023-09-13 | Stop reason: SURG

## 2023-09-13 RX ORDER — ACETAMINOPHEN 500 MG
1000 TABLET ORAL ONCE
Status: COMPLETED | OUTPATIENT
Start: 2023-09-13 | End: 2023-09-13

## 2023-09-13 RX ORDER — PROCHLORPERAZINE EDISYLATE 5 MG/ML
10 INJECTION INTRAMUSCULAR; INTRAVENOUS ONCE AS NEEDED
Status: DISCONTINUED | OUTPATIENT
Start: 2023-09-13 | End: 2023-09-13

## 2023-09-13 RX ORDER — ACETAMINOPHEN 650 MG/1
325 SUPPOSITORY RECTAL EVERY 4 HOURS PRN
Status: DISCONTINUED | OUTPATIENT
Start: 2023-09-13 | End: 2023-09-13

## 2023-09-13 RX ORDER — ONDANSETRON 2 MG/ML
INJECTION INTRAMUSCULAR; INTRAVENOUS AS NEEDED
Status: DISCONTINUED | OUTPATIENT
Start: 2023-09-13 | End: 2023-09-13 | Stop reason: SURG

## 2023-09-13 RX ORDER — ACETAMINOPHEN 325 MG/1
650 TABLET ORAL ONCE AS NEEDED
Status: DISCONTINUED | OUTPATIENT
Start: 2023-09-13 | End: 2023-09-13

## 2023-09-13 RX ORDER — NALOXONE HCL 0.4 MG/ML
0.4 VIAL (ML) INJECTION AS NEEDED
Status: DISCONTINUED | OUTPATIENT
Start: 2023-09-13 | End: 2023-09-13

## 2023-09-13 RX ORDER — FLUMAZENIL 0.1 MG/ML
0.1 INJECTION INTRAVENOUS AS NEEDED
Status: DISCONTINUED | OUTPATIENT
Start: 2023-09-13 | End: 2023-09-13

## 2023-09-13 RX ORDER — MIDAZOLAM HYDROCHLORIDE 1 MG/ML
0.5 INJECTION INTRAMUSCULAR; INTRAVENOUS
Status: DISCONTINUED | OUTPATIENT
Start: 2023-09-13 | End: 2023-09-13 | Stop reason: HOSPADM

## 2023-09-13 RX ORDER — CELECOXIB 200 MG/1
200 CAPSULE ORAL ONCE
Status: COMPLETED | OUTPATIENT
Start: 2023-09-13 | End: 2023-09-13

## 2023-09-13 RX ORDER — PROPOFOL 10 MG/ML
INJECTION, EMULSION INTRAVENOUS AS NEEDED
Status: DISCONTINUED | OUTPATIENT
Start: 2023-09-13 | End: 2023-09-13 | Stop reason: SURG

## 2023-09-13 RX ORDER — SODIUM CHLORIDE, SODIUM LACTATE, POTASSIUM CHLORIDE, CALCIUM CHLORIDE 600; 310; 30; 20 MG/100ML; MG/100ML; MG/100ML; MG/100ML
1000 INJECTION, SOLUTION INTRAVENOUS CONTINUOUS
Status: DISPENSED | OUTPATIENT
Start: 2023-09-13 | End: 2023-09-15

## 2023-09-13 RX ORDER — FENTANYL CITRATE 50 UG/ML
50 INJECTION, SOLUTION INTRAMUSCULAR; INTRAVENOUS
Status: DISCONTINUED | OUTPATIENT
Start: 2023-09-13 | End: 2023-09-13 | Stop reason: HOSPADM

## 2023-09-13 RX ORDER — HYDRALAZINE HYDROCHLORIDE 20 MG/ML
5 INJECTION INTRAMUSCULAR; INTRAVENOUS
Status: DISCONTINUED | OUTPATIENT
Start: 2023-09-13 | End: 2023-09-13

## 2023-09-13 RX ORDER — PROPOFOL 10 MG/ML
INJECTION, EMULSION INTRAVENOUS CONTINUOUS PRN
Status: DISCONTINUED | OUTPATIENT
Start: 2023-09-13 | End: 2023-09-13 | Stop reason: SURG

## 2023-09-13 RX ORDER — FENTANYL CITRATE 50 UG/ML
25 INJECTION, SOLUTION INTRAMUSCULAR; INTRAVENOUS
Status: DISCONTINUED | OUTPATIENT
Start: 2023-09-13 | End: 2023-09-13

## 2023-09-13 RX ORDER — ONDANSETRON 2 MG/ML
4 INJECTION INTRAMUSCULAR; INTRAVENOUS ONCE AS NEEDED
Status: DISCONTINUED | OUTPATIENT
Start: 2023-09-13 | End: 2023-09-13

## 2023-09-13 RX ADMIN — PHENYLEPHRINE HYDROCHLORIDE 1 MCG/KG/MIN: 10 INJECTION INTRAVENOUS at 12:53

## 2023-09-13 RX ADMIN — Medication 200 MCG: at 12:58

## 2023-09-13 RX ADMIN — ATORVASTATIN CALCIUM 40 MG: 40 TABLET, FILM COATED ORAL at 22:03

## 2023-09-13 RX ADMIN — HYDROMORPHONE HYDROCHLORIDE 0.5 MG: 1 INJECTION, SOLUTION INTRAMUSCULAR; INTRAVENOUS; SUBCUTANEOUS at 15:48

## 2023-09-13 RX ADMIN — CELECOXIB 200 MG: 200 CAPSULE ORAL at 12:38

## 2023-09-13 RX ADMIN — PROPOFOL INJECTABLE EMULSION 150 MCG/KG/MIN: 10 INJECTION, EMULSION INTRAVENOUS at 12:53

## 2023-09-13 RX ADMIN — ROCURONIUM BROMIDE 50 MG: 10 INJECTION, SOLUTION INTRAVENOUS at 12:53

## 2023-09-13 RX ADMIN — DULOXETINE HYDROCHLORIDE 60 MG: 30 CAPSULE, DELAYED RELEASE ORAL at 09:07

## 2023-09-13 RX ADMIN — ALLOPURINOL 100 MG: 100 TABLET ORAL at 09:08

## 2023-09-13 RX ADMIN — HYDROMORPHONE HYDROCHLORIDE 0.5 MG: 1 INJECTION, SOLUTION INTRAMUSCULAR; INTRAVENOUS; SUBCUTANEOUS at 01:13

## 2023-09-13 RX ADMIN — CEFAZOLIN 2000 MG: 2 INJECTION, POWDER, FOR SOLUTION INTRAMUSCULAR; INTRAVENOUS at 12:57

## 2023-09-13 RX ADMIN — HYDROMORPHONE HYDROCHLORIDE 0.25 MG: 1 INJECTION, SOLUTION INTRAMUSCULAR; INTRAVENOUS; SUBCUTANEOUS at 14:39

## 2023-09-13 RX ADMIN — Medication 100 MCG: at 12:55

## 2023-09-13 RX ADMIN — SODIUM CHLORIDE: 9 INJECTION, SOLUTION INTRAVENOUS at 12:44

## 2023-09-13 RX ADMIN — ACETAMINOPHEN 1000 MG: 500 TABLET, FILM COATED ORAL at 12:38

## 2023-09-13 RX ADMIN — OXYCODONE HYDROCHLORIDE 10 MG: 5 TABLET ORAL at 20:07

## 2023-09-13 RX ADMIN — FENTANYL CITRATE 100 MCG: 50 INJECTION, SOLUTION INTRAMUSCULAR; INTRAVENOUS at 12:50

## 2023-09-13 RX ADMIN — METOPROLOL TARTRATE 25 MG: 25 TABLET, FILM COATED ORAL at 09:08

## 2023-09-13 RX ADMIN — REMIFENTANIL HYDROCHLORIDE 0.2 MCG/KG/MIN: 1 INJECTION, POWDER, LYOPHILIZED, FOR SOLUTION INTRAVENOUS at 12:55

## 2023-09-13 RX ADMIN — ONDANSETRON 4 MG: 2 INJECTION INTRAMUSCULAR; INTRAVENOUS at 14:15

## 2023-09-13 RX ADMIN — HYDROMORPHONE HYDROCHLORIDE 0.5 MG: 1 INJECTION, SOLUTION INTRAMUSCULAR; INTRAVENOUS; SUBCUTANEOUS at 09:26

## 2023-09-13 RX ADMIN — PROPOFOL 150 MG: 10 INJECTION, EMULSION INTRAVENOUS at 12:53

## 2023-09-13 RX ADMIN — CYCLOBENZAPRINE 10 MG: 10 TABLET, FILM COATED ORAL at 22:03

## 2023-09-13 RX ADMIN — ALBUMIN HUMAN: 0.05 INJECTION, SOLUTION INTRAVENOUS at 13:05

## 2023-09-13 RX ADMIN — SUGAMMADEX 400 MG: 100 INJECTION, SOLUTION INTRAVENOUS at 13:10

## 2023-09-13 RX ADMIN — OXYCODONE HYDROCHLORIDE 10 MG: 10 TABLET, FILM COATED, EXTENDED RELEASE ORAL at 12:38

## 2023-09-13 RX ADMIN — HYDROMORPHONE HYDROCHLORIDE 0.5 MG: 1 INJECTION, SOLUTION INTRAMUSCULAR; INTRAVENOUS; SUBCUTANEOUS at 23:32

## 2023-09-13 RX ADMIN — DEXAMETHASONE SODIUM PHOSPHATE 4 MG: 4 INJECTION, SOLUTION INTRAMUSCULAR; INTRAVENOUS at 13:00

## 2023-09-13 RX ADMIN — CYCLOBENZAPRINE 10 MG: 10 TABLET, FILM COATED ORAL at 09:08

## 2023-09-13 RX ADMIN — Medication 10 ML: at 10:53

## 2023-09-13 RX ADMIN — GABAPENTIN 300 MG: 300 CAPSULE ORAL at 12:38

## 2023-09-13 RX ADMIN — FAMOTIDINE 40 MG: 20 TABLET ORAL at 22:03

## 2023-09-13 RX ADMIN — SODIUM CHLORIDE, POTASSIUM CHLORIDE, SODIUM LACTATE AND CALCIUM CHLORIDE 1000 ML: 600; 310; 30; 20 INJECTION, SOLUTION INTRAVENOUS at 12:30

## 2023-09-13 NOTE — CASE MANAGEMENT/SOCIAL WORK
Social Work Assessment   Bryan     Patient Name: Clint Cee  MRN: [id number]  Today's Date: 9/13/2023    Admit Date: 9/8/2023       Discharge Plan       Row Name 09/13/23 1347       Plan    Plan From [facility] for acute rehab and they are following for return. Lives at home with spouse. PASRR approved if needed.      AUSTIN Travis, MSW    Phone: [phone]  Fax: [phone]  Email: [email or web address]

## 2023-09-13 NOTE — ANESTHESIA PROCEDURE NOTES
Peripheral IV    Patient location during procedure: OR  Start time: 9/13/2023 1:30 PM  Line placed for Fluids/Medication Admin.  Performed By   CRNA/CAA: Tyler Mckenzie CRNA  Preanesthetic Checklist  Completed: patient identified, IV checked, site marked, risks and benefits discussed, surgical consent, monitors and equipment checked, pre-op evaluation and timeout performed  Peripheral IV Prep   Patient position: supine   Prep: ChloraPrep  Patient monitoring: heart rate, cardiac monitor and continuous pulse ox  Peripheral IV Procedure   Laterality:left  Location:  Hand  Catheter size: 16 G          Post Assessment     IV Dressing/Site: clean, dry and intact

## 2023-09-13 NOTE — PROGRESS NOTES
Referring Provider: Micaela Henderson DO    Reason for follow-up:  Status post mitral valve repair.  Preoperative cardiovascular valuation     Patient Care Team:  Jaret Castellanos MD as PCP - General (Family Medicine)  Felipe Garcia MD as Consulting Physician (Cardiology)  Golden Serna, SHANEL as Ambulatory  (Population Health)    Subjective .      ROS  Significant back pain.    Since I have last seen him yesterday, the patient has been without any chest discomfort ,shortness of breath, palpitations, dizziness or syncope.  Denies having any headache ,abdominal pain ,nausea, vomiting , diarrhea constipation, loss of weight or loss of appetite.  Denies having any excessive bruising ,hematuria or blood in the stool.    Review of all systems negative except as indicated    History  Past Medical History:   Diagnosis Date    Abnormal ECG June 2022    Allergic 01/01/1954    Nasal alergies    Arrhythmia 2004    Dr Youngblood examined me and said i was ok.  skipped beats    Asthma     no sx    Benign prostatic hyperplasia 11/14/2018    Cataract 01/01/2014    Surgery. Caused detached retina of right eye 20/60 repair    Colon polyp 01/01/2018    Found and removed last colon eca.q    Depression     Gastroesophageal reflux disease 03/20/2014    Glaucoma 2003    Normal pressures. Have low pressure glaucoma    Gout     Headache 01/01/2015    Have shimmering in both eyes intermittantly. 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    Prostate Cancer Jan22 January 2022    Spinal stenosis 12/02/2011    Visual impairment 01/01/1955    Nearsighted corrected glasses    Vitamin D  deficiency 05/23/2018       Past Surgical History:   Procedure Laterality Date    ADENOIDECTOMY  1954    As child    APPENDECTOMY  1956    Surgery    CARDIAC CATHETERIZATION N/A 09/30/2022    Procedure: Right and Left Heart Cath with Coronar Angiography;  Surgeon: Felipe Garcia MD;  Location: [facility] CATH INVASIVE LOCATION;  Service: Cardiovascular;  Laterality: N/A;    CARDIAC VALVE REPLACEMENT N/A 11/17/2022    Procedure: TRICUSPID VALVE REPAIR/REPLACEMENT;  Surgeon: Femi Henderson MD;  Location: [facility] CVOR;  Service: Cardiothoracic;  Laterality: N/A;  tricuspid valve repaired with 32mm  groves physio tricuspid annuloplasty ring    CARDIAC VALVE REPLACEMENT  11/17/2022    Repaired mitral and tricuspid    CATARACT EXTRACTION Bilateral     COLONOSCOPY  01/01/1997    Regularly scheduled    COLONOSCOPY N/A 2/22/2023    Procedure: COLONOSCOPY with cold snare polypectomy x 1;  Surgeon: Manfred Keating MD;  Location: [facility] ENDOSCOPY;  Service: Gastroenterology;  Laterality: N/A;    ENDOSCOPY N/A 2/22/2023    Procedure: ESOPHAGOGASTRODUODENOSCOPY with esophageal dilation using 48 Fr. Bougie Dilator;  Surgeon: Manfred Keating MD;  Location: [facility] ENDOSCOPY;  Service: Gastroenterology;  Laterality: N/A;  erosive esophagitis    EYE SURGERY  01/01/2010    Reattached right retina    MITRAL VALVE REPAIR/REPLACEMENT N/A 11/17/2022    Procedure: MITRAL VALVE REPAIR/REPLACEMENT with left atrial appendage closure;  Surgeon: Femi Henderson MD;  Location: [facility];  Service: Cardiothoracic;  Laterality: N/A;  mitral valve repaired iwth 40mm medtronic annuloplasty band  with intraop TERRA    SPINE SURGERY  01/01/1971    2 lumbar 1 cervical    TONSILLECTOMY  01/01/1950    Childhood       Family History   Problem Relation Age of Onset    Arthritis Father     Cancer Father         Prostate    Arrhythmia Father         Congentive heart failure    Diabetes Mother         Adult diabetes    Early death Mother          Kidneys failed after giving birth-diabetes complications    Heart disease Mother         Adult Diabetes       Social History     Tobacco Use    Smoking status: Former     Packs/day: 0.00     Years: 0.50     Pack years: 0.00     Types: Cigarettes     Passive exposure: Past    Smokeless tobacco: Never    Tobacco comments:     Tried tobacco as child   Vaping Use    Vaping Use: Never used   Substance Use Topics    Alcohol use: Not Currently     Comment: Not a regular drinker. Occasionly have a glass of wine or be    Drug use: Never        Medications Prior to Admission   Medication Sig Dispense Refill Last Dose    acetaminophen (TYLENOL) 500 MG tablet Take 1 tablet by mouth Every 6 (Six) Hours As Needed for Mild Pain. Indications: Pain       allopurinol (ZYLOPRIM) 100 MG tablet Take 1 tablet by mouth Daily.       Apple Erik Vn-Grn Tea-Bit Or-Cr (APPLE CIDER VINEGAR PLUS PO) Take 1 Piece by mouth Daily As Needed. Indications: Dietary supplement       atorvastatin (LIPITOR) 40 MG tablet Take 1 tablet by mouth Every Night. Indications: High Amount of Fats in the Blood 90 tablet 1     DULoxetine (CYMBALTA) 60 MG capsule Take 1 capsule by mouth Daily. 90 capsule 0     famotidine (PEPCID) 40 MG tablet Take 1 tablet by mouth Every Night.       fexofenadine (ALLEGRA) 180 MG tablet Take 1 tablet by mouth 2 (Two) Times a Day As Needed. Indications: Hayfever       fluticasone (FLONASE) 50 MCG/ACT nasal spray 2 sprays into the nostril(s) as directed by provider Daily As Needed. Indications: Allergic Rhinitis       Leuprolide Mesylate, 6 Month, (CAMCEVI SC) Inject  under the skin into the appropriate area as directed. Every 6 months injection, due in December  Indications: prostate cancer (to decrease testosterone).       melatonin 5 MG tablet tablet Take 1 tablet by mouth At Night As Needed (sleep). Indications: Trouble Sleeping       metoprolol tartrate (LOPRESSOR) 25 MG tablet Take 1 tablet by mouth Daily.       zolpidem  "(Ambien) 10 MG tablet Take 1 tablet by mouth At Night As Needed for Sleep.          Allergies  Morphine, Beta adrenergic blockers, and Ace inhibitors    Scheduled Meds:allopurinol, 100 mg, Oral, Daily  atorvastatin, 40 mg, Oral, Nightly  cyclobenzaprine, 10 mg, Oral, TID  DULoxetine, 60 mg, Oral, Daily  enoxaparin, 40 mg, Subcutaneous, Q24H  famotidine, 40 mg, Oral, Nightly  metoprolol tartrate, 25 mg, Oral, Daily  sodium chloride, 10 mL, Intravenous, Q12H      Continuous Infusions:   PRN Meds:.  HYDROmorphone    melatonin    oxyCODONE    [COMPLETED] Insert Peripheral IV **AND** sodium chloride    sodium chloride    sodium chloride    zolpidem    Objective     VITAL SIGNS  Vitals:    09/12/23 1808 09/12/23 2214 09/13/23 0212 09/13/23 0617   BP: 119/62 118/67 109/64 125/73   BP Location: Left arm Left arm Left arm Left arm   Patient Position: Lying Lying Lying Lying   Pulse: 76 77 64 74   Resp: 15 16 16 16   Temp: 99.6 °F (37.6 °C) 98.9 °F (37.2 °C) 98.6 °F (37 °C) 98.6 °F (37 °C)   TempSrc: Oral Oral Oral Oral   SpO2: 95% 95% 96% 95%   Weight:       Height:           Flowsheet Rows      Flowsheet Row First Filed Value   Admission Height 185.4 cm (73\") Documented at 09/08/2023 0957   Admission Weight 102 kg (225 lb) Documented at 09/08/2023 0957              Intake/Output Summary (Last 24 hours) at 9/13/2023 0639  Last data filed at 9/12/2023 2110  Gross per 24 hour   Intake 340 ml   Output 1100 ml   Net -760 ml          TELEMETRY: Sinus rhythm    Physical Exam:  The patient is alert, oriented and in no distress.  Vital signs as noted above.  Head and neck revealed no carotid bruits or jugular venous distention.  No thyromegaly or lymphadenopathy is present  Lungs clear.  No wheezing.  Breath sounds are normal bilaterally.  Heart normal first and second heart sounds.  No murmur. No precordial rub is present.  No gallop is present.  Abdomen soft and nontender.  No organomegaly is present.  Extremities with good " peripheral pulses without any pedal edema.  Skin warm and dry.  Musculoskeletal system is grossly normal  CNS grossly normal    Reviewed and updated    Results Review:   I reviewed the patient's new clinical results.  Lab Results (last 24 hours)       Procedure Component Value Units Date/Time    Comprehensive Metabolic Panel [[id number]]  (Abnormal) Collected: 09/12/23 1740    Specimen: Blood Updated: 09/12/23 1817     Glucose 149 mg/dL      BUN 17 mg/dL      Creatinine 0.77 mg/dL      Sodium 135 mmol/L      Potassium 4.6 mmol/L      Chloride 101 mmol/L      CO2 25.0 mmol/L      Calcium 8.3 mg/dL      Total Protein 5.4 g/dL      Albumin 3.1 g/dL      ALT (SGPT) 8 U/L      AST (SGOT) 9 U/L      Alkaline Phosphatase 132 U/L      Total Bilirubin 0.5 mg/dL      Globulin 2.3 gm/dL      A/G Ratio 1.3 g/dL      BUN/Creatinine Ratio 22.1     Anion Gap 9.0 mmol/L      eGFR 92.8 mL/min/1.73     Narrative:      GFR Normal >60  Chronic Kidney Disease <60  Kidney Failure <15    The GFR formula is only valid for adults with stable renal function between ages 18 and 70.    Protime-INR [[id number]]  (Normal) Collected: 09/12/23 1740    Specimen: Blood Updated: 09/12/23 1803     Protime 11.6 Seconds      INR 1.09    CBC & Differential [[id number]]  (Abnormal) Collected: 09/12/23 1740    Specimen: Blood Updated: 09/12/23 1754    Narrative:      The following orders were created for panel order CBC & Differential.  Procedure                               Abnormality         Status                     ---------                               -----------         ------                     CBC Auto Differential[[id number]]        Abnormal            Final result                 Please view results for these tests on the individual orders.    CBC Auto Differential [[id number]]  (Abnormal) Collected: 09/12/23 1740    Specimen: Blood Updated: 09/12/23 1754     WBC 7.40 10*3/mm3      RBC 4.22 10*6/mm3      Hemoglobin 13.0 g/dL      Hematocrit 38.0  %      MCV 90.0 fL      MCH 30.8 pg      MCHC 34.2 g/dL      RDW 14.1 %      RDW-SD 44.6 fl      MPV 6.6 fL      Platelets 92 10*3/mm3      Neutrophil % 85.7 %      Lymphocyte % 7.2 %      Monocyte % 5.7 %      Eosinophil % 1.0 %      Basophil % 0.4 %      Neutrophils, Absolute 6.30 10*3/mm3      Lymphocytes, Absolute 0.50 10*3/mm3      Monocytes, Absolute 0.40 10*3/mm3      Eosinophils, Absolute 0.10 10*3/mm3      Basophils, Absolute 0.00 10*3/mm3      nRBC 0.0 /100 WBC     CANDIDA AURIS SCREEN - Swab, Axilla Right, Axilla Left and Groin [[id number]]  (Normal) Collected: 09/08/23 1418    Specimen: Swab from Axilla Right, Axilla Left and Groin Updated: 09/12/23 1430     Candida Auris Screen Culture No Candida auris isolated at 4 days            Imaging Results (Last 24 Hours)       Procedure Component Value Units Date/Time    CT Lumbar Spine Without Contrast [[id number]] Collected: 09/12/23 1227     Updated: 09/12/23 1608    Narrative:      CT LUMBAR SPINE WO CONTRAST    Date of Exam: 9/12/2023 10:50 AM EDT    Indication: Globus robot preop protocol.    Comparison: CT lumbar spine without contrast 9/10/2023, MRI lumbar spine 9/8/2023    Technique: Axial CT images were obtained of the lumbar spine without contrast administration.  Sagittal and coronal reconstructions were performed.  Automated exposure control and iterative reconstruction methods were used.    Findings:  Bones are diffusely demineralized. There is redemonstration of extensive bridging syndesmophytes throughout the visualized lower thoracic spine and lumbar spine extending to the sacrum consistent with ankylosing spondylitis. There is osseous fusion at   the L4-5 and L5-S1 disc spaces.    Widening of the L3-4 disc space likely related to edema with associated transverse fracture extending through the inferior endplate of L3 involving the L3-4 disc space. Fracture extends into the posterior elements with comminuted fracture of the   posterior spinous  process of L3. Endplate irregularity at the L3-4 disc space is unchanged. No new fracture. There is ankylosis of the facet joints bilaterally at the L3-4 through L5-S1 levels. Posterior calcified disc osteophyte contributes to suspected   mild to moderate central canal stenosis at L1-2. Additional multilevel degenerative findings better assessed on prior MRI.    Lung bases without consolidation. The adrenal glands are normal. Low-density right renal cyst noted for example at the lower pole of the right kidney measuring 3.8 cm. Mild aortoiliac atherosclerotic calcifications.      Impression:      Impression:  1. Ankylosing spondylitis with fracture through the L3-4 disc space which involves the inferior endplate of L3 and extends into the posterior elements at L3 with comminuted posterior spinous process fracture, unchanged from recent lumbar spine CT on   9/10/2023.  2. Widening of the L3-4 disc space and endplate irregularity unchanged related to edema and sequela of recent trauma, better assessed on recent MRI from 9/8/2023.   3. Multilevel degenerative findings, see prior MRI and recent lumbar spine CT for detailed findings.      Electronically Signed: Bronson Sosa MD    9/12/2023 4:06 PM EDT    Workstation ID: [id number]        LAB RESULTS (LAST 7 DAYS)    CBC  Results from last 7 days   Lab Units 09/12/23  1740 09/11/23  0442 09/10/23  0251 09/09/23  0326 09/08/23  1534   WBC 10*3/mm3 7.40 5.90 6.90 6.30 6.20   RBC 10*6/mm3 4.22 4.53 4.40 4.57 4.62   HEMOGLOBIN g/dL 13.0 13.9 13.7 13.9 13.9   HEMATOCRIT % 38.0 40.9 38.7 41.0 40.3   MCV fL 90.0 90.3 87.9 89.7 87.3   PLATELETS 10*3/mm3 92* 86* 87* 94* 85*         BMP  Results from last 7 days   Lab Units 09/12/23  1740 09/11/23  0442 09/10/23  0251 09/09/23 0326 09/08/23  1534   SODIUM mmol/L 135* 137 136 139 138   POTASSIUM mmol/L 4.6 3.8 3.6 4.2 4.1   CHLORIDE mmol/L 101 103 103 105 105   CO2 mmol/L 25.0 25.0 22.0 23.0 24.0   BUN mg/dL 17 22 22 20 17    CREATININE mg/dL 0.77 0.85 0.82 0.86 0.79   GLUCOSE mg/dL 149* 120* 126* 108* 113*         CMP   Results from last 7 days   Lab Units 09/12/23  1740 09/11/23  0442 09/10/23  0251 09/09/23  0326 09/08/23  1534   SODIUM mmol/L 135* 137 136 139 138   POTASSIUM mmol/L 4.6 3.8 3.6 4.2 4.1   CHLORIDE mmol/L 101 103 103 105 105   CO2 mmol/L 25.0 25.0 22.0 23.0 24.0   BUN mg/dL 17 22 22 20 17   CREATININE mg/dL 0.77 0.85 0.82 0.86 0.79   GLUCOSE mg/dL 149* 120* 126* 108* 113*   ALBUMIN g/dL 3.1*  --   --   --   --    BILIRUBIN mg/dL 0.5  --   --   --   --    ALK PHOS U/L 132*  --   --   --   --    AST (SGOT) U/L 9  --   --   --   --    ALT (SGPT) U/L 8  --   --   --   --            BNP        TROPONIN        CoAg  Results from last 7 days   Lab Units 09/12/23  1740   INR  1.09       Creatinine Clearance  Estimated Creatinine Clearance: 102.4 mL/min (by C-G formula based on SCr of 0.77 mg/dL).    ABG        Radiology  CT Lumbar Spine Without Contrast    Result Date: 9/12/2023  Impression: 1. Ankylosing spondylitis with fracture through the L3-4 disc space which involves the inferior endplate of L3 and extends into the posterior elements at L3 with comminuted posterior spinous process fracture, unchanged from recent lumbar spine CT on 9/10/2023. 2. Widening of the L3-4 disc space and endplate irregularity unchanged related to edema and sequela of recent trauma, better assessed on recent MRI from 9/8/2023. 3. Multilevel degenerative findings, see prior MRI and recent lumbar spine CT for detailed findings. Electronically Signed: Bronson Sosa MD  9/12/2023 4:06 PM EDT  Workstation ID: [id number]         EKG      I personally viewed and interpreted the patient's EKG/Telemetry data: Sinus rhythm nonspecific ST-T wave change    ECHOCARDIOGRAM:    Results for orders placed during the hospital encounter of 09/08/23    Adult Transthoracic Echo Complete W/ Cont if Necessary Per Protocol    Interpretation Summary    Left ventricular  ejection fraction appears to be 56 - 60%.    Indications  Valvular function  Status post mitral valve repair.    Technically satisfactory study.  Mitral valve is structurally normal.  Status post mitral valve repair.  No mitral regurgitation is present.  Tricuspid valve is structurally normal.  Aortic valve is thickened with adequate opening motion.  Pulmonic valve could not be well visualized.  No evidence for mitral tricuspid or aortic regurgitation is seen by Doppler study.  Left atrium is enlarged.  Right atrium is normal in size.  Left ventricle is normal in size and contractility with ejection fraction of 60%.  Right ventricle is normal in size.  Atrial septum is intact.  Aorta is normal.  No pericardial effusion or intracardiac thrombus is seen.    Impression  Status post mitral valve repair.  No mitral regurgitation is present.  Aortic valve is thickened with adequate opening motion.  Left atrial enlargement.  Left ventricular size and contractility is normal with ejection fraction of 60%.          STRESS TEST        Cardiolite (Tc-99m sestamibi) stress test    CARDIAC CATHETERIZATION  Results for orders placed during the hospital encounter of 09/30/22    Cardiac Catheterization/Vascular Study    Narrative  CARDIAC CATHETERIZATION REPORT    DATE OF PROCEDURE:  9/30/2022    INDICATION FOR PROCEDURE:  Shortness of breath  Mitral regurgitation    PROCEDURE PERFORMED:  Right heart catheterization left heart catheterization, coronary angiography, left ventriculography.    @@  Moderate conscious sedation was utilized with intravenous Versed and fentanyl administered by registered nurse with continuous ECG, pulse oximetry and hemodynamic monitoring supervised by myself throughout the entire procedure.  Conscious sedation time   45 minutes    I was present with the patient for the duration of moderate sedation and supervised staff who had no other duties and monitored the patient for the entire  procedure.    @@  PROCEDURE COMMENTS:    Under usual sterile precautions and 1% lidocaine filtration right femoral vein and femoral artery were percutaneously punctured and a 7 and 5 Swiss vascular sheaths were respectively inserted.  [place]-Estelle catheter was used to measure right-sided pressures pulmonary capital wedge pressure and cardiac output using thermodilution technique.  [place]-Estelle catheter was removed and subsequently left and right coronary angiography followed by left ventricular angiogram was performed.  Hemostasis was obtained and patient was returned to the room with intact pulses.  No complications were noted.  FINDINGS:    1. HEMODYNAMICS:  Left ventricle end-diastolic pressure is elevated.  Mean right atrial pressure is 4 right ventricle 47/7 pulmonary artery 40/16 mean pulmonary artery 26 pulmonary capital wedge pressure is 17.  No gradient was noted across aortic valve.    2. LEFT VENTRICULOGRAPHY:  Left ventricular size and contractility is normal with ejection fraction of 60%.  Left atrial enlargement is present with severe mitral valve prolapse and severe mitral regurgitation.    3. CORONARY ANGIOGRAPHY:  Left main coronary artery normal.  Left anterior descending artery normal.  Circumflex coronary artery normal.  Right coronary artery is a large and dominant vessel and is normal.    SUMMARY:  Moderate to significant pulmonary hypertension.  Severe mitral valve prolapse and mitral regurgitation and probable ruptured chordae (TERRA and cardiac cath)  Tricuspid valve prolapse.  Normal left ventricular function.  Normal coronary arteries.    RECOMMENDATIONS:  Cardiovascular surgery consultation initiated for possible mitral valve and tricuspid valve repair soon possible.  Have discussed with Dr. Henderson cardiovascular surgeon for coordination of care.        ]]]]]]]]]]]]]]]]]]]]]  Date of study  9/30/2022    Procedure performed  Transesophageal echocardiogram and Doppler  study.    Indications  Significant mitral regurgitation  Shortness of breath    Procedure  Anesthesia was provided by anesthesiologist with intravenous Diprivan.  TERRA probe could be passed without difficulty.  Patient tolerated the procedure well.  No complications were noted.    Results  Technically satisfactory study.  Mitral valve has severe myxomatous degeneration with multiple areas of severe prolapse and known coaptation of the mitral leaflet as well as probable ruptured chordae.  Severe mitral regurgitation is seen with multiple jets with mitral regurgitation jet occupying the entire left atrium.  Tricuspid valve is also showing myxomatous degeneration with moderate tricuspid regurgitation.  Calculated pulmonary artery pressure is 60 mmHg.  Aortic valve is thickened with adequate opening motion.  Left atrium is enlarged.  Left atrial appendage is enlarged without clot.  Left ventricle is normal in size and contractility with ejection fraction of 60%.  Right ventricle is normal in size.  Right atrium is normal in size.  Atrial septum is intact without PFO.  Aortic intimal thickening is present without clot.  No pericardial effusion is seen.    Impression  Myxomatous mitral and tricuspid valve degeneration.  Severe mitral valve prolapse involving both anterior and posterior mitral leaflets with severe mitral regurgitation with multiple jets.  Probable ruptured chordae.  In some views coaptation of the mitral valve is present.  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  Significant pulmonary hypertension.  Left atrial and left atrial appendage enlargement without clot.  Normal left ventricular size and contractility with ejection fraction of 60%.  ]]]]]]]]]]]]]]]]]]]]                OTHER:         Assessment & Plan     Principal Problem:    Back pain      ]]]]]]]]]]]]]]]]]]]]  History  ===========  - Significant back discomfort  Patient has unstable L3-L4 fracture  Patient has continued  symptoms despite being on conservative treatment.  Failed bracing trial and physical therapy.     -Status post mitral and tricuspid valve repair and left atrial appendage endocardial closure--Dr. Henderson 11/17/2022    Echocardiogram 9/8/2023 revealed  Status post mitral valve repair.  No mitral regurgitation is present.  Aortic valve is thickened with adequate opening motion.  Left atrial enlargement.  Left ventricular size and contractility is normal with ejection fraction of 60%.     -   Preoperative increased shortness of breath and significantly increased fatigue.   Preoperative significant mitral and tricuspid regurgitation.  Prominent posterior mitral leaflet prolapse     TERRA 9/30/2022  Myxomatous mitral and tricuspid valve degeneration.  Severe mitral valve prolapse involving both anterior and posterior mitral leaflets with severe mitral regurgitation with multiple jets.  Probable ruptured chordae.  In some views coaptation of the mitral valve is present.  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  Significant pulmonary hypertension.  Left atrial and left atrial appendage enlargement without clot.  Normal left ventricular size and contractility with ejection fraction of 60%.     Cardiac catheterization 9/30/2022   Moderate to significant pulmonary hypertension.  Severe mitral valve prolapse and mitral regurgitation and probable ruptured chordae (TERRA and cardiac cath)  Tricuspid valve prolapse.  Normal left ventricular function.  Normal coronary arteries.     Echocardiogram 9/28/2022.  Mild mitral regurgitation (regurgitation jet may be not in the field)      - Premature ventricular contractions.  Occasional palpitations     - Hypertension vitamin D deficiency GERD ocular migraine.     - History of prostate cancer.  Status post radiation therapy.  Bone scan was negative for any spread.     - Status post appendectomy spine surgery adenoidectomy tonsillectomy     - Family history of prostate  cancer     - Former smoker     - Allergic to morphine.  ============  Plan  ===========  Significant back discomfort  Patient has unstable L3-L4 fracture  Patient has continued symptoms despite being on conservative treatment.  Failed bracing trial and physical therapy.  Patient to have surgery for his back today.     Status post mitral and tricuspid valve repair and left atrial appendage endocardial closure--Dr. Henderson 11/17/2022  Status post mitral valve repair tricuspid valve repair and left atrial appendage closure--Dr. Henderson-11/17/2022     Patient does not have any manic symptoms at this time.    EKG-normal-9/11/2023.    Echocardiogram.  9/12/2023  Status post mitral valve repair.  No mitral regurgitation is present.  Aortic valve is thickened with adequate opening motion.  Left atrial enlargement.  Left ventricular size and contractility is normal with ejection fraction of 60%.    Thrombocytopenia  No bleeding problems.  Platelet count 87746-39/ 8//2023  87051-19 /9//2023  29882-59 /11//2023  [id number] 13 2023     History of resting tachycardia-improved  Patient is taking Metroprolol tartrate 25 mg twice daily.     Patient had postoperative bradycardia.  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  Bradycardia has improved.  No need for pacemaker at this time.     Hypertension-stable.  125/73     Medications were reviewed and updated.  Patient is on metoprolol 25 mg a day allopurinol atorvastatin cyclobenzaprine subcu Lovenox (DVT) famotidine.     Okay with planned procedure from cardiovascular standpoint pending EKG and echocardiogram      Further plan will depend on patient's progress.    Reviewed and updated-9/13/2023    ]]]]]]]]]]]]]]]]           Felipe Garcia MD  09/13/23  06:39 EDT

## 2023-09-13 NOTE — CASE MANAGEMENT/SOCIAL WORK
Continued Stay Note   Bryan     Patient Name: Clint Cee  MRN: [id number]  Today's Date: 9/13/2023    Admit Date: 9/8/2023    Plan: From [facility] for acute rehab and they are followig for return. Lives at home with spouse. No PASRR or precert needed if return to [facility]   Discharge Plan       Row Name 09/13/23 0837       Plan    Plan Comments Barriers: Today,  LUMBAR LAMINECTOMY TRANSFORAMINAL LUMBAR INTERBODY FUSION. [facility] following for possible readmission for inpatient rehab                        Phone communication or documentation only - no physical contact with patient or family.   Samantha RAMIREZ,RN Case Manager  [facility]  Phone: Desk- [phone] cell- [phone]

## 2023-09-13 NOTE — OP NOTE
LUMBAR LAMINECTOMY TRANSFORAMINAL LUMBAR INTERBODY FUSION  Procedure Report    Patient Name:  Clint Cee  YOB: 1945    Date of Surgery:  9/13/2023     Indications: 78-year-old male who suffered a 3 column fracture of L3 with fracture through ankylosed disc space and posterior elements.  Given instability of a 3 column fracture patient was taken to the OR for L2-5 fusion.  Patient understood the risks of surgery including bleeding infection CSF leak nerve damage spinal cord injury hardware failure pseudoarthrosis nonhealing fracture need for future operation among many other risks and he agreed to undergo the procedure.  He also understood that he was at higher risk of bleeding complications due to his chronic thrombocytopenia, and received platelets at the beginning of the surgery in order to combat this.    Pre-op Diagnosis:   3 column fracture L3         Post-Op Diagnosis Codes:  3: Fracture L3    Procedure/CPT® Codes:      Procedure(s):  L2-5 segmental instrumented fusion with globus screws and rods  L2-5 posterior lateral fusion with iFactor and allograft  Use of neuronavigation and robotic technology        Spinal Surgery Levels Completed:3 Levels      Staff:  Surgeon(s):  Jayme Strauss IV, MD         Anesthesia: General    Estimated Blood Loss: 50 mL    Implants:    Implant Name Type Inv. Item Serial No.  Lot No. LRB No. Used Action   KT SEAL HEMOS ABS FLOSEAL MATRX FAST/PREP 10ML - [id number] Implant KT SEAL HEMOS ABS FLOSEAL MATRX FAST/PREP 10ML  HEATH [employer] [id number] N/A 1 Implanted   ALLOGRFT FIBR OSTEOAMP SELECT 10CC - [id number] - [id number] Implant ALLOGRFT FIBR OSTEOAMP SELECT 10CC [id number] [facility] . N/A 1 Implanted   GRFT BONE IFACTOR PUTTY 5ML - [id number] Implant GRFT BONE IFACTOR PUTTY 5ML  C[employer] [id number] N/A 1 Implanted   DEV CONTRL TISS STRATAFIX SPIRAL MNCRYL UD 3/0 PLS 30CM - [id number] Implant DEV CONTRL TISS STRATAFIX SPIRAL MNCRYL UD 3/0  PLS 30CM  [employer]  DIV OF JEAN PIERRE AND JEAN PIERRE THBAPD N/A 2 Implanted   SCRW PEDCL SPINE CREO/MIS KIRK MOD EXT/HD 6.5X45MM - [id number] Implant SCRW PEDCL SPINE CREO/MIS KIRK MOD EXT/HD 6.5X45MM  [employer] . N/A 8 Implanted   POPPY SPINE CREO/MIS CRV TI 5.6Y564OA - [id number] Implant POPPY SPINE CREO/MIS CRV TI [id number]  [employer] . N/A 2 Implanted   CP LK THOR/LUM CREO/MIS THRD - [id number] Implant CP LK THOR/LUM CREO/MIS THRD  [employer] . N/A 8 Implanted   DEV WND/CLS CONTRL TISS STRATAFIX SYMM PDS PLS CTX 60CM MITCH - [id number] Implant DEV WND/CLS CONTRL TISS STRATAFIX SYMM PDS PLS CTX 60CM MITCH  [employer]  DIV OF JEAN PIERRE AND JEAN PIERRE [id number] N/A 3 Implanted       Specimen:          None        Findings: None    Complications: None    Description of Procedure: Patient was brought to the OR placed under general endotracheal anesthesia.  He was flipped into the prone position on a Isacc table taking great care to gently position him given his fracture.  He was then prepped and draped in usual fashion.  A DRB and surveillance marker were placed in the patient's posterior iliac crest.  X-rays were taken to orient the [employer] robot and its neuro navigation software to the patient.  Prior to surgery, a preoperative CT scan was used to plan the trajectories of the screws.  Robotic arm was then brought in the field.  Incisions were made along the screw trajectories from L2-5.  Screws were then placed from L3-L5 bilaterally utilizing the robotic arm and neuronavigation.  High-speed bur was used to make the initial cut through the cortical bone followed by a drill through the pedicle and the proper trajectory followed by tap followed by placement of the screw.  All screws were placed utilizing the robot and neuro navigation.  Fascia and muscle was then opened from L2-L5.  The facets and lamina were identified and decorticated from L2-L5 bilaterally.  Rods were then placed bilaterally under fluoroscopic guidance and locking caps  were placed and final tightened.  Wounds were thoroughly irrigated. Posterior lateral fusion was performed from L2-5 over the facets and lamina with iFactor and allograft.  The fascia was closed with strata fix 1, the deep dermal layer was closed with 2-0 Vicryl sutures and the skin was closed with a running subcuticular Monocryl.  Dermabond was placed over the wound.  There were no changes in neuro monitoring throughout the case.  All screws stimulated above 20 mA.  Final x-rays demonstrate good positioning of all hardware                   was responsible for performing the following activities: Retraction, Suction, Irrigation, Suturing, Closing, and Placing Dressing and their skilled assistance was necessary for the success of this case.    Jayme Strauss IV, MD     Date: 9/13/2023  Time: 14:57 EDT

## 2023-09-13 NOTE — PROGRESS NOTES
78-year-old male with 3 column fracture status post L2-5 fusion.  -Okay for transfer back to floor  -Okay to mobilize the patient   -PT OT  -Pain management

## 2023-09-13 NOTE — ANESTHESIA PREPROCEDURE EVALUATION
Anesthesia Evaluation     Patient summary reviewed and Nursing notes reviewed   no history of anesthetic complications:   NPO Solid Status: > 8 hours  NPO Liquid Status: > 8 hours           Airway   Mallampati: I  TM distance: >3 FB  Neck ROM: full  No difficulty expected  Dental - normal exam     Pulmonary - normal exam   (+) asthma,shortness of breath  Cardiovascular - normal exam    (+) hypertension, valvular problems/murmurs (s/p repair) MR and TI, hyperlipidemia    ROS comment: Impression  Myxomatous mitral and tricuspid valve degeneration.  Severe mitral valve prolapse involving both anterior and posterior mitral leaflets with severe mitral regurgitation with multiple jets.  Probable ruptured chordae.  In some views coaptation of the mitral valve is present.  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  Significant pulmonary hypertension.  Left atrial and left atrial appendage enlargement without clot.  Normal left ventricular size and contractility with ejection fraction of 60%.      Neuro/Psych  (+) headaches, psychiatric history Anxiety  GI/Hepatic/Renal/Endo    (+) GERD, hepatitis, liver disease    Musculoskeletal     (+) back pain  Abdominal  - normal exam    Bowel sounds: normal.   Substance History - negative use     OB/GYN negative ob/gyn ROS         Other      history of cancer    ROS/Med Hx Other: PROSTATE CANCER  RETINAL DETACHMENT POST CATARACT DATE?  MILD CAROTID STENOSIS  NORMAL CORONARIES  THROMBOCYTOPENIA PLT 92 PLT AGG 92  SEVERE MR P1 PROLAPSE ?P2 AS WELL. ?FLAIL SEGMENT  MOD/SEVER TR  MYXOMATOUS CHANGES                  Anesthesia Plan    ASA 4     general and ERAS Protocol   total IV anesthesia  intravenous induction   Postoperative Plan: Expected vent after surgery  Anesthetic plan, risks, benefits, and alternatives have been provided, discussed and informed consent has been obtained with: patient.    Plan discussed with CRNA.      CODE STATUS:    Code Status (Patient  has no pulse and is not breathing): CPR (Attempt to Resuscitate)  Medical Interventions (Patient has pulse or is breathing): Full Support

## 2023-09-13 NOTE — PROGRESS NOTES
No events    Awake and alert  Moving all extremities.  Weakness in the left IP; no other focal deficits    Plts 84    Plan: 78-year-old male with unstable 3 column fracture at L3-4 also with chronic thrombocytopenia  -Neuro stable  -OR today for L2-5 fusion for stabilization of fracture  -Patient understands risks of surgery as well as increased risks due to his chronic thrombocytopenia GERD history of heart valve disease and replacement hypertension among other medical comorbidities and is agreed to undergo the procedure.  He may need platelet transfusion perioperatively  -Care per primary team

## 2023-09-13 NOTE — ANESTHESIA POSTPROCEDURE EVALUATION
Patient: Clint Cee    Procedure Summary       Date: 09/13/23 Room / Location: [facility] OR 12 / [facility] MAIN OR    Anesthesia Start: 1244 Anesthesia Stop: 1510    Procedure: POSTERIOR LUMBAR FUSION WITH ROBOT (Spine Lumbar) Diagnosis:     Surgeons: Jayme Strauss IV, MD Provider: Arnold Brown MD    Anesthesia Type: general, ERAS Protocol ASA Status: 4            Anesthesia Type: general, ERAS Protocol    Vitals  Vitals Value Taken Time   /49 09/13/23 1701   Temp 98 °F (36.7 °C) 09/13/23 1510   Pulse 76 09/13/23 1713   Resp 20 09/13/23 1655   SpO2 95 % 09/13/23 1713   Vitals shown include unvalidated device data.        Post Anesthesia Care and Evaluation    Patient location during evaluation: PACU  Patient participation: complete - patient participated  Level of consciousness: awake  Pain scale: See nurse's notes for pain score.  Pain management: adequate    Airway patency: patent  Anesthetic complications: No anesthetic complications  PONV Status: none  Cardiovascular status: acceptable  Respiratory status: acceptable and spontaneous ventilation  Hydration status: acceptable    Comments: Patient seen and examined postoperatively; vital signs stable; SpO2 greater than or equal to 90%; cardiopulmonary status stable; nausea/vomiting adequately controlled; pain adequately controlled; no apparent anesthesia complications; patient discharged from anesthesia care when discharge criteria were met

## 2023-09-13 NOTE — ANESTHESIA PROCEDURE NOTES
Airway  Urgency: elective    Date/Time: 9/13/2023 12:53 PM  Airway not difficult    General Information and Staff    Patient location during procedure: OR  CRNA/CAA: Tyler Mckenzie CRNA    Indications and Patient Condition  Indications for airway management: airway protection    Preoxygenated: yes  Mask difficulty assessment: 1 - vent by mask    Final Airway Details  Final airway type: endotracheal airway      Successful airway: ETT     Successful intubation technique: video laryngoscopy  Facilitating devices/methods: intubating stylet  Endotracheal tube insertion site: oral  Blade: Samaniego  Blade size: 3 (X)  ETT size (mm): 7.5  Cormack-Lehane Classification: grade I - full view of glottis  Placement verified by: chest auscultation and capnometry   Measured from: teeth  ETT/EBT  to teeth (cm): 21  Number of attempts at approach: 1  Assessment: lips, teeth, and gum same as pre-op and atraumatic intubation

## 2023-09-13 NOTE — PLAN OF CARE
Goal Outcome Evaluation:  Plan of Care Reviewed With: patient        Progress: improving.  Pt is scheduled for surgical repair/stabilization of fracture today, Wed 9/13/23.

## 2023-09-13 NOTE — PROGRESS NOTES
[facility] Medicine Services   Daily Progress Note      Patient Name: Clint Cee  : 1945  MRN: [id number]  Primary Care Physician:  Jaret Castellanos MD  Date of admission: 2023      Subjective      Chief Complaint: Back pain    Patient seen and examined this morning.  Doing okay, back pain well controlled.  Going for surgery today.  No other complaints.    Pertinent positives as noted in HPI/subjective.  All other systems were reviewed and are negative.      Objective      Vitals:   Temp:  [97.8 °F (36.6 °C)-99.6 °F (37.6 °C)] 98.6 °F (37 °C)  Heart Rate:  [62-77] 74  Resp:  [15-18] 16  BP: (109-127)/(62-74) 125/73    Physical Exam:    General: Awake, alert, NAD  Eyes: PERRL, EOMI, conjunctivae are clear  Cardiovascular: Regular rate and rhythm, no murmurs  Respiratory: Clear to auscultation bilaterally, no wheezing or rales, unlabored breathing  Abdomen: Soft, nontender, positive bowel sounds, no guarding  Neurologic: A&O, CN grossly intact, moves all extremities spontaneously  Musculoskeletal: Generalized weakness, no other gross deformities  Skin: Warm, dry, intact         Result Review    Result Review:  I have personally reviewed the results from the time of this admission to 2023 07:52 EDT and agree with these findings:  [x]  Laboratory  []  Microbiology  []  Radiology  [x]  EKG/Telemetry   [x]  Cardiology/Vascular   []  Pathology  []  Old records  []  Other:          Assessment & Plan      Brief Patient Summary:  Clint Cee is a 78 y.o. male with past medical history of mitral valve repair, hypertension, hyperlipidemia, and recent history of fall on  presented to the ED. with complaints of continued and worsening back pain that has been ongoing for several days.  Patient states that he was seen in the emergency room after the fall and had a negative CT.  When the pain continued, he had an MRI with findings including fracture and ligament tear of L3/L4.  He  "has been at [facility] rehab for the past couple weeks for rehab with PT.  Pt had increased pain and he came to the emergency room. Patient is also stating that he has pain in his left hip and is concerned for fracture.  Patient states that the pain is exacerbated by movement.  It is in his left lumbar region with radiation to his left leg. He states that sensation feels like \"an electric shock\".  He has had no loss of bowel/bladder.  He has had no numbness in his rectum or in her thighs.  Patient does have paresthesia in his left foot but this is chronic.  MRI and CT findings consistent with L3-L4 fracture as well as ankylosing spondylitis changes.  Patient has no signs or symptoms of cauda equina needing emergent neurosurgical intervention at this time.  However patient well-known to the neurosurgery service from recent fall.  Patient admitted to observation unit initially.  Neurosurgery consulted and recommending surgical intervention given failure of bracing trial and worsening back pain.  Hospitalist service consulted for further management.  Due to patient's history of MV repair, cardiology consulted for cardiac clearance.       allopurinol, 100 mg, Oral, Daily  atorvastatin, 40 mg, Oral, Nightly  cyclobenzaprine, 10 mg, Oral, TID  DULoxetine, 60 mg, Oral, Daily  enoxaparin, 40 mg, Subcutaneous, Q24H  famotidine, 40 mg, Oral, Nightly  metoprolol tartrate, 25 mg, Oral, Daily  sodium chloride, 10 mL, Intravenous, Q12H             I have utilized all available, immediate resources to obtain, update, or review the patient's current medications including all prescriptions, over-the-counter products, herbals, cannabis/cannabidiol products, and vitamin.mineral/dietary (nutritional) supplements.    Active Hospital Problems:  Active Hospital Problems    Diagnosis     **Back pain      Plan:     Unstable L3-L4 fracture  Acute on chronic back pain  -MRI and CT findings noted  -Patient failed bracing trial and " physical therapy with worsening symptoms  -No signs or symptoms of cauda equina syndrome at this time  -Neurosurgery following and plan for surgical intervention  -Strict bedrest per neurosurgery  -Continue pain control  -Cardiology consulted for clearance  -Surgery planned for tomorrow     Hypertension  Hyperlipidemia  -Continue home meds as tolerated, monitor     History of MV repair  -Stable at this time, does not appear to be in exacerbation  -Cardiology consulted for clearance per neurosurgery  -Okay for planned procedure per cardiology pending echo results     DVT prophylaxis  -Lovenox    78-year-old male with 3 column fracture status post L2-5 fusion. Continue pain management.     CODE STATUS:    Code Status (Patient has no pulse and is not breathing): CPR (Attempt to Resuscitate)  Medical Interventions (Patient has pulse or is breathing): Full Support      Disposition: will likely need rehab placement postsurgery. PT/OT    Electronically signed by Marlene Presley MD, 09/13/23, 07:52 EDT.  [facility] Hospitalist Team      Part of this note may be an electronic transcription/translation of spoken language to printed text using the Dragon Dictation System.

## 2023-09-14 LAB
BH BB BLOOD EXPIRATION DATE: NORMAL
BH BB BLOOD EXPIRATION DATE: NORMAL
BH BB BLOOD TYPE BARCODE: 6200
BH BB BLOOD TYPE BARCODE: 6200
BH BB DISPENSE STATUS: NORMAL
BH BB DISPENSE STATUS: NORMAL
BH BB PRODUCT CODE: NORMAL
BH BB PRODUCT CODE: NORMAL
BH BB UNIT NUMBER: NORMAL
BH BB UNIT NUMBER: NORMAL
HBA1C MFR BLD: 5 % (ref 4.8–5.6)
UNIT  ABO: NORMAL
UNIT  ABO: NORMAL
UNIT  RH: NORMAL
UNIT  RH: NORMAL

## 2023-09-14 PROCEDURE — 97535 SELF CARE MNGMENT TRAINING: CPT

## 2023-09-14 PROCEDURE — 25010000002 ENOXAPARIN PER 10 MG: Performed by: NEUROLOGICAL SURGERY

## 2023-09-14 PROCEDURE — 85025 COMPLETE CBC W/AUTO DIFF WBC: CPT | Performed by: NEUROLOGICAL SURGERY

## 2023-09-14 PROCEDURE — 25010000002 HYDROMORPHONE 1 MG/ML SOLUTION: Performed by: NEUROLOGICAL SURGERY

## 2023-09-14 PROCEDURE — 80048 BASIC METABOLIC PNL TOTAL CA: CPT | Performed by: NEUROLOGICAL SURGERY

## 2023-09-14 PROCEDURE — 99232 SBSQ HOSP IP/OBS MODERATE 35: CPT | Performed by: INTERNAL MEDICINE

## 2023-09-14 PROCEDURE — 97164 PT RE-EVAL EST PLAN CARE: CPT

## 2023-09-14 PROCEDURE — 97168 OT RE-EVAL EST PLAN CARE: CPT

## 2023-09-14 PROCEDURE — 97530 THERAPEUTIC ACTIVITIES: CPT

## 2023-09-14 RX ORDER — ACETAMINOPHEN 325 MG/1
650 TABLET ORAL EVERY 6 HOURS PRN
Status: DISCONTINUED | OUTPATIENT
Start: 2023-09-14 | End: 2023-09-19 | Stop reason: HOSPADM

## 2023-09-14 RX ADMIN — SODIUM CHLORIDE 40 ML: 9 INJECTION, SOLUTION INTRAVENOUS at 16:59

## 2023-09-14 RX ADMIN — DULOXETINE HYDROCHLORIDE 60 MG: 30 CAPSULE, DELAYED RELEASE ORAL at 09:00

## 2023-09-14 RX ADMIN — METOPROLOL TARTRATE 25 MG: 25 TABLET, FILM COATED ORAL at 09:00

## 2023-09-14 RX ADMIN — OXYCODONE HYDROCHLORIDE 10 MG: 5 TABLET ORAL at 20:49

## 2023-09-14 RX ADMIN — HYDROMORPHONE HYDROCHLORIDE 0.5 MG: 1 INJECTION, SOLUTION INTRAMUSCULAR; INTRAVENOUS; SUBCUTANEOUS at 09:00

## 2023-09-14 RX ADMIN — ATORVASTATIN CALCIUM 40 MG: 40 TABLET, FILM COATED ORAL at 20:49

## 2023-09-14 RX ADMIN — CYCLOBENZAPRINE 10 MG: 10 TABLET, FILM COATED ORAL at 17:25

## 2023-09-14 RX ADMIN — CYCLOBENZAPRINE 10 MG: 10 TABLET, FILM COATED ORAL at 09:00

## 2023-09-14 RX ADMIN — ALLOPURINOL 100 MG: 100 TABLET ORAL at 09:00

## 2023-09-14 RX ADMIN — OXYCODONE HYDROCHLORIDE 10 MG: 5 TABLET ORAL at 02:40

## 2023-09-14 RX ADMIN — Medication 5 MG: at 20:49

## 2023-09-14 RX ADMIN — ACETAMINOPHEN 650 MG: 325 TABLET, FILM COATED ORAL at 15:17

## 2023-09-14 RX ADMIN — FAMOTIDINE 40 MG: 20 TABLET ORAL at 20:49

## 2023-09-14 RX ADMIN — HYDROMORPHONE HYDROCHLORIDE 0.5 MG: 1 INJECTION, SOLUTION INTRAMUSCULAR; INTRAVENOUS; SUBCUTANEOUS at 03:51

## 2023-09-14 RX ADMIN — HYDROMORPHONE HYDROCHLORIDE 0.5 MG: 1 INJECTION, SOLUTION INTRAMUSCULAR; INTRAVENOUS; SUBCUTANEOUS at 15:00

## 2023-09-14 RX ADMIN — OXYCODONE HYDROCHLORIDE 10 MG: 5 TABLET ORAL at 07:22

## 2023-09-14 RX ADMIN — ENOXAPARIN SODIUM 40 MG: 40 INJECTION, SOLUTION SUBCUTANEOUS at 17:25

## 2023-09-14 RX ADMIN — CYCLOBENZAPRINE 10 MG: 10 TABLET, FILM COATED ORAL at 20:49

## 2023-09-14 RX ADMIN — Medication 10 ML: at 20:49

## 2023-09-14 RX ADMIN — OXYCODONE HYDROCHLORIDE 10 MG: 5 TABLET ORAL at 13:40

## 2023-09-14 NOTE — CASE MANAGEMENT/SOCIAL WORK
Continued Stay Note  WILMA Adams     Patient Name: Clint Cee  MRN: [id number]  Today's Date: 9/14/2023    Admit Date: 9/8/2023    Plan: From [facility] for acute rehab and they are following for return. Lives at home with spouse. PASRR approved if needed.   Discharge Plan       Row Name 09/14/23 1233       Plan    Plan From [facility] for acute rehab and they are following for return. Lives at home with spouse. PASRR approved if needed.    Plan Comments Discharge barriers: cont. fluids, 2L of NC, abnormal labs, and pain managment control.                  Expected Discharge Date and Time       Expected Discharge Date Expected Discharge Time    Sep 15, 2023           Phone communication or documentation only - no physical contact with patient or family.     Loli Hammond RN

## 2023-09-14 NOTE — PROGRESS NOTES
Referring Provider: Marlene Presley MD    Reason for follow-up:  Status post mitral valve repair.  Preoperative cardiovascular valuation  Status post back surgery     Patient Care Team:  Jaret Castellanos MD as PCP - General (Family Medicine)  Felipe Garcia MD as Consulting Physician (Cardiology)  Golden Serna, SHANEL as Ambulatory  (Population Health)    Subjective .      ROS  Back pain is better.  Surgical soreness.  Since I have last seen him yesterday, the patient has been without any chest discomfort ,shortness of breath, palpitations, dizziness or syncope.  Denies having any headache ,abdominal pain ,nausea, vomiting , diarrhea constipation, loss of weight or loss of appetite.  Denies having any excessive bruising ,hematuria or blood in the stool.    Review of all systems negative except as indicated    History  Past Medical History:   Diagnosis Date    Abnormal ECG June 2022    Allergic 01/01/1954    Nasal alergies    Arrhythmia 2004    Dr Youngblood examined me and said i was ok.  skipped beats    Asthma     no sx    Benign prostatic hyperplasia 11/14/2018    Cataract 01/01/2014    Surgery. Caused detached retina of right eye 20/60 repair    Colon polyp 01/01/2018    Found and removed last colon eca.q    Depression     Gastroesophageal reflux disease 03/20/2014    Glaucoma 2003    Normal pressures. Have low pressure glaucoma    Gout     Headache 01/01/2015    Have shimmering in both eyes intermittantly. 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    Prostate Cancer Jan22 January 2022    Spinal stenosis 12/02/2011    Visual impairment 01/01/1955     Nearsighted corrected glasses    Vitamin D deficiency 05/23/2018       Past Surgical History:   Procedure Laterality Date    ADENOIDECTOMY  1954    As child    APPENDECTOMY  1956    Surgery    CARDIAC CATHETERIZATION N/A 09/30/2022    Procedure: Right and Left Heart Cath with Coronar Angiography;  Surgeon: Felipe Garcia MD;  Location: [facility] CATH INVASIVE LOCATION;  Service: Cardiovascular;  Laterality: N/A;    CARDIAC VALVE REPLACEMENT N/A 11/17/2022    Procedure: TRICUSPID VALVE REPAIR/REPLACEMENT;  Surgeon: Femi Henderson MD;  Location: [facility];  Service: Cardiothoracic;  Laterality: N/A;  tricuspid valve repaired with 32mm  groves physio tricuspid annuloplasty ring    CARDIAC VALVE REPLACEMENT  11/17/2022    Repaired mitral and tricuspid    CATARACT EXTRACTION Bilateral     COLONOSCOPY  01/01/1997    Regularly scheduled    COLONOSCOPY N/A 2/22/2023    Procedure: COLONOSCOPY with cold snare polypectomy x 1;  Surgeon: Manfred Keating MD;  Location: [facility] ENDOSCOPY;  Service: Gastroenterology;  Laterality: N/A;    ENDOSCOPY N/A 2/22/2023    Procedure: ESOPHAGOGASTRODUODENOSCOPY with esophageal dilation using 48 Fr. Bougie Dilator;  Surgeon: Manfred Keating MD;  Location: [facility] ENDOSCOPY;  Service: Gastroenterology;  Laterality: N/A;  erosive esophagitis    EYE SURGERY  01/01/2010    Reattached right retina    MITRAL VALVE REPAIR/REPLACEMENT N/A 11/17/2022    Procedure: MITRAL VALVE REPAIR/REPLACEMENT with left atrial appendage closure;  Surgeon: Femi Henderson MD;  Location: [facility];  Service: Cardiothoracic;  Laterality: N/A;  mitral valve repaired iwth 40mm medtronic annuloplasty band  with intraop TERRA    SPINE SURGERY  01/01/1971    2 lumbar 1 cervical    TONSILLECTOMY  01/01/1950    Childhood       Family History   Problem Relation Age of Onset    Arthritis Father     Cancer Father         Prostate    Arrhythmia Father         Congentive heart failure    Diabetes Mother          Adult diabetes    Early death Mother         Kidneys failed after giving birth-diabetes complications    Heart disease Mother         Adult Diabetes       Social History     Tobacco Use    Smoking status: Former     Packs/day: 0.00     Years: 0.50     Pack years: 0.00     Types: Cigarettes     Passive exposure: Past    Smokeless tobacco: Never    Tobacco comments:     Tried tobacco as child   Vaping Use    Vaping Use: Never used   Substance Use Topics    Alcohol use: Not Currently     Comment: Not a regular drinker. Occasionly have a glass of wine or be    Drug use: Never        Medications Prior to Admission   Medication Sig Dispense Refill Last Dose    acetaminophen (TYLENOL) 500 MG tablet Take 1 tablet by mouth Every 6 (Six) Hours As Needed for Mild Pain. Indications: Pain       allopurinol (ZYLOPRIM) 100 MG tablet Take 1 tablet by mouth Daily.       Apple Erik Vn-Grn Tea-Bit Or-Cr (APPLE CIDER VINEGAR PLUS PO) Take 1 Piece by mouth Daily As Needed. Indications: Dietary supplement       atorvastatin (LIPITOR) 40 MG tablet Take 1 tablet by mouth Every Night. Indications: High Amount of Fats in the Blood 90 tablet 1     DULoxetine (CYMBALTA) 60 MG capsule Take 1 capsule by mouth Daily. 90 capsule 0     famotidine (PEPCID) 40 MG tablet Take 1 tablet by mouth Every Night.       fexofenadine (ALLEGRA) 180 MG tablet Take 1 tablet by mouth 2 (Two) Times a Day As Needed. Indications: Hayfever       fluticasone (FLONASE) 50 MCG/ACT nasal spray 2 sprays into the nostril(s) as directed by provider Daily As Needed. Indications: Allergic Rhinitis       Leuprolide Mesylate, 6 Month, (CAMCEVI SC) Inject  under the skin into the appropriate area as directed. Every 6 months injection, due in December  Indications: prostate cancer (to decrease testosterone).       melatonin 5 MG tablet tablet Take 1 tablet by mouth At Night As Needed (sleep). Indications: Trouble Sleeping       metoprolol tartrate (LOPRESSOR) 25 MG tablet Take 1  "tablet by mouth Daily.       zolpidem (Ambien) 10 MG tablet Take 1 tablet by mouth At Night As Needed for Sleep.          Allergies  Morphine, Beta adrenergic blockers, and Ace inhibitors    Scheduled Meds:allopurinol, 100 mg, Oral, Daily  atorvastatin, 40 mg, Oral, Nightly  cyclobenzaprine, 10 mg, Oral, TID  DULoxetine, 60 mg, Oral, Daily  enoxaparin, 40 mg, Subcutaneous, Q24H  famotidine, 40 mg, Oral, Nightly  metoprolol tartrate, 25 mg, Oral, Daily  sodium chloride, 10 mL, Intravenous, Q12H      Continuous Infusions:lactated ringers, 1,000 mL, Last Rate: 1,000 mL (09/13/23 1230)  sodium chloride, 9 mL/hr    PRN Meds:.  HYDROmorphone    melatonin    oxyCODONE    [MAR Hold] sodium chloride    sodium chloride    sodium chloride    Objective     VITAL SIGNS  Vitals:    09/13/23 1956 09/13/23 2033 09/13/23 2330 09/14/23 0546   BP: 130/53 97/55 108/69 108/59   BP Location: Left arm Left arm Left arm Left arm   Patient Position: Lying Lying Lying Lying   Pulse: 74 71 81 80   Resp: 10 18 18 18   Temp: 97.6 °F (36.4 °C) 98.1 °F (36.7 °C)  99.7 °F (37.6 °C)   TempSrc: Oral Oral  Oral   SpO2: 98% 99%  100%   Weight:       Height:           Flowsheet Rows      Flowsheet Row First Filed Value   Admission Height 185.4 cm (73\") Documented at 09/08/2023 0957   Admission Weight 102 kg (225 lb) Documented at 09/08/2023 0957              Intake/Output Summary (Last 24 hours) at 9/14/2023 0638  Last data filed at 9/13/2023 2326  Gross per 24 hour   Intake 2541 ml   Output 1200 ml   Net 1341 ml          TELEMETRY: Sinus rhythm    Physical Exam:  The patient is alert, oriented and in no distress.  Vital signs as noted above.  Head and neck revealed no carotid bruits or jugular venous distention.  No thyromegaly or lymphadenopathy is present  Lungs clear.  No wheezing.  Breath sounds are normal bilaterally.  Heart normal first and second heart sounds.  No murmur. No precordial rub is present.  No gallop is present.  Abdomen soft and " nontender.  No organomegaly is present.  Extremities with good peripheral pulses without any pedal edema.  Skin warm and dry.  Musculoskeletal system is grossly normal  CNS grossly normal    Reviewed and updated.    Results Review:   I reviewed the patient's new clinical results.  Lab Results (last 24 hours)       Procedure Component Value Units Date/Time    Basic Metabolic Panel [[id number]]  (Abnormal) Collected: 09/13/23 2310    Specimen: Blood Updated: 09/13/23 2354     Glucose 201 mg/dL      BUN 16 mg/dL      Creatinine 0.89 mg/dL      Sodium 138 mmol/L      Potassium 4.5 mmol/L      Chloride 102 mmol/L      CO2 29.0 mmol/L      Calcium 8.5 mg/dL      BUN/Creatinine Ratio 18.0     Anion Gap 7.0 mmol/L      eGFR 88.8 mL/min/1.73     Narrative:      GFR Normal >60  Chronic Kidney Disease <60  Kidney Failure <15    The GFR formula is only valid for adults with stable renal function between ages 18 and 70.    CBC & Differential [[id number]]  (Abnormal) Collected: 09/13/23 2310    Specimen: Blood Updated: 09/13/23 2337    Narrative:      The following orders were created for panel order CBC & Differential.  Procedure                               Abnormality         Status                     ---------                               -----------         ------                     CBC Auto Differential[[id number]]        Abnormal            Final result                 Please view results for these tests on the individual orders.    CBC Auto Differential [[id number]]  (Abnormal) Collected: 09/13/23 2310    Specimen: Blood Updated: 09/13/23 2337     WBC 7.10 10*3/mm3      RBC 3.73 10*6/mm3      Hemoglobin 11.4 g/dL      Hematocrit 32.8 %      MCV 87.9 fL      MCH 30.6 pg      MCHC 34.8 g/dL      RDW 13.6 %      RDW-SD 43.8 fl      MPV 7.5 fL      Platelets 98 10*3/mm3      Neutrophil % 94.0 %      Lymphocyte % 2.8 %      Monocyte % 3.2 %      Eosinophil % 0.0 %      Basophil % 0.0 %      Neutrophils, Absolute 6.70 10*3/mm3       Lymphocytes, Absolute 0.20 10*3/mm3      Monocytes, Absolute 0.20 10*3/mm3      Eosinophils, Absolute 0.00 10*3/mm3      Basophils, Absolute 0.00 10*3/mm3      nRBC 0.0 /100 WBC     CANDIDA AURIS SCREEN - Swab, Axilla Right, Axilla Left and Groin [[id number]]  (Normal) Collected: 09/08/23 1418    Specimen: Swab from Axilla Right, Axilla Left and Groin Updated: 09/13/23 1430     Candida Auris Screen Culture No Candida auris isolated at 5 days    Basic Metabolic Panel [[id number]]  (Abnormal) Collected: 09/13/23 1132    Specimen: Blood Updated: 09/13/23 1231     Glucose 121 mg/dL      BUN 16 mg/dL      Creatinine 0.78 mg/dL      Sodium 137 mmol/L      Potassium 4.2 mmol/L      Chloride 101 mmol/L      CO2 27.0 mmol/L      Calcium 9.1 mg/dL      BUN/Creatinine Ratio 20.5     Anion Gap 9.0 mmol/L      eGFR 92.4 mL/min/1.73     Narrative:      GFR Normal >60  Chronic Kidney Disease <60  Kidney Failure <15    The GFR formula is only valid for adults with stable renal function between ages 18 and 70.    CBC & Differential [[id number]]  (Abnormal) Collected: 09/13/23 1132    Specimen: Blood Updated: 09/13/23 1215    Narrative:      The following orders were created for panel order CBC & Differential.  Procedure                               Abnormality         Status                     ---------                               -----------         ------                     CBC Auto Differential[[id number]]        Abnormal            Final result                 Please view results for these tests on the individual orders.    CBC Auto Differential [[id number]]  (Abnormal) Collected: 09/13/23 1132    Specimen: Blood Updated: 09/13/23 1215     WBC 7.80 10*3/mm3      RBC 4.38 10*6/mm3      Hemoglobin 13.1 g/dL      Hematocrit 38.9 %      MCV 88.8 fL      MCH 29.9 pg      MCHC 33.7 g/dL      RDW 13.8 %      RDW-SD 45.1 fl      MPV 7.3 fL      Platelets 84 10*3/mm3      Neutrophil % 89.0 %      Lymphocyte % 4.2 %      Monocyte %  5.2 %      Eosinophil % 1.4 %      Basophil % 0.2 %      Neutrophils, Absolute 7.00 10*3/mm3      Lymphocytes, Absolute 0.30 10*3/mm3      Monocytes, Absolute 0.40 10*3/mm3      Eosinophils, Absolute 0.10 10*3/mm3      Basophils, Absolute 0.00 10*3/mm3      nRBC 0.0 /100 WBC             Imaging Results (Last 24 Hours)       Procedure Component Value Units Date/Time    XR Spine Lumbar 2 or 3 View [[id number]] Resulted: 09/13/23 1447     Updated: 09/13/23 1453    FL C Arm During Surgery [[id number]] Resulted: 09/13/23 1452     Updated: 09/13/23 1452    Narrative:      This procedure was auto-finalized with no dictation required.        LAB RESULTS (LAST 7 DAYS)    CBC  Results from last 7 days   Lab Units 09/13/23  2310 09/13/23  1132 09/12/23  1740 09/11/23  0442 09/10/23  0251 09/09/23  0326 09/08/23  1534   WBC 10*3/mm3 7.10 7.80 7.40 5.90 6.90 6.30 6.20   RBC 10*6/mm3 3.73* 4.38 4.22 4.53 4.40 4.57 4.62   HEMOGLOBIN g/dL 11.4* 13.1 13.0 13.9 13.7 13.9 13.9   HEMATOCRIT % 32.8* 38.9 38.0 40.9 38.7 41.0 40.3   MCV fL 87.9 88.8 90.0 90.3 87.9 89.7 87.3   PLATELETS 10*3/mm3 98* 84* 92* 86* 87* 94* 85*         BMP  Results from last 7 days   Lab Units 09/13/23  2310 09/13/23  1132 09/12/23  1740 09/11/23  0442 09/10/23  0251 09/09/23  0326 09/08/23  1534   SODIUM mmol/L 138 137 135* 137 136 139 138   POTASSIUM mmol/L 4.5 4.2 4.6 3.8 3.6 4.2 4.1   CHLORIDE mmol/L 102 101 101 103 103 105 105   CO2 mmol/L 29.0 27.0 25.0 25.0 22.0 23.0 24.0   BUN mg/dL 16 16 17 22 22 20 17   CREATININE mg/dL 0.89 0.78 0.77 0.85 0.82 0.86 0.79   GLUCOSE mg/dL 201* 121* 149* 120* 126* 108* 113*         CMP   Results from last 7 days   Lab Units 09/13/23  2310 09/13/23  1132 09/12/23  1740 09/11/23  0442 09/10/23  0251 09/09/23  0326 09/08/23  1534   SODIUM mmol/L 138 137 135* 137 136 139 138   POTASSIUM mmol/L 4.5 4.2 4.6 3.8 3.6 4.2 4.1   CHLORIDE mmol/L 102 101 101 103 103 105 105   CO2 mmol/L 29.0 27.0 25.0 25.0 22.0 23.0 24.0   BUN mg/dL  16 16 17 22 22 20 17   CREATININE mg/dL 0.89 0.78 0.77 0.85 0.82 0.86 0.79   GLUCOSE mg/dL 201* 121* 149* 120* 126* 108* 113*   ALBUMIN g/dL  --   --  3.1*  --   --   --   --    BILIRUBIN mg/dL  --   --  0.5  --   --   --   --    ALK PHOS U/L  --   --  132*  --   --   --   --    AST (SGOT) U/L  --   --  9  --   --   --   --    ALT (SGPT) U/L  --   --  8  --   --   --   --            BNP        TROPONIN        CoAg  Results from last 7 days   Lab Units 09/12/23  1740   INR  1.09         Creatinine Clearance  Estimated Creatinine Clearance: 88.6 mL/min (by C-G formula based on SCr of 0.89 mg/dL).    ABG        Radiology  CT Lumbar Spine Without Contrast    Result Date: 9/12/2023  Impression: 1. Ankylosing spondylitis with fracture through the L3-4 disc space which involves the inferior endplate of L3 and extends into the posterior elements at L3 with comminuted posterior spinous process fracture, unchanged from recent lumbar spine CT on 9/10/2023. 2. Widening of the L3-4 disc space and endplate irregularity unchanged related to edema and sequela of recent trauma, better assessed on recent MRI from 9/8/2023. 3. Multilevel degenerative findings, see prior MRI and recent lumbar spine CT for detailed findings. Electronically Signed: Bronson Sosa MD  9/12/2023 4:06 PM EDT  Workstation ID: [id number]         EKG      I personally viewed and interpreted the patient's EKG/Telemetry data: Sinus rhythm nonspecific ST-T wave change    ECHOCARDIOGRAM:    Results for orders placed during the hospital encounter of 09/08/23    Adult Transthoracic Echo Complete W/ Cont if Necessary Per Protocol    Interpretation Summary    Left ventricular ejection fraction appears to be 56 - 60%.    Indications  Valvular function  Status post mitral valve repair.    Technically satisfactory study.  Mitral valve is structurally normal.  Status post mitral valve repair.  No mitral regurgitation is present.  Tricuspid valve is structurally  normal.  Aortic valve is thickened with adequate opening motion.  Pulmonic valve could not be well visualized.  No evidence for mitral tricuspid or aortic regurgitation is seen by Doppler study.  Left atrium is enlarged.  Right atrium is normal in size.  Left ventricle is normal in size and contractility with ejection fraction of 60%.  Right ventricle is normal in size.  Atrial septum is intact.  Aorta is normal.  No pericardial effusion or intracardiac thrombus is seen.    Impression  Status post mitral valve repair.  No mitral regurgitation is present.  Aortic valve is thickened with adequate opening motion.  Left atrial enlargement.  Left ventricular size and contractility is normal with ejection fraction of 60%.          STRESS TEST        Cardiolite (Tc-99m sestamibi) stress test    CARDIAC CATHETERIZATION  Results for orders placed during the hospital encounter of 09/30/22    Cardiac Catheterization/Vascular Study    Narrative  CARDIAC CATHETERIZATION REPORT    DATE OF PROCEDURE:  9/30/2022    INDICATION FOR PROCEDURE:  Shortness of breath  Mitral regurgitation    PROCEDURE PERFORMED:  Right heart catheterization left heart catheterization, coronary angiography, left ventriculography.    @@  Moderate conscious sedation was utilized with intravenous Versed and fentanyl administered by registered nurse with continuous ECG, pulse oximetry and hemodynamic monitoring supervised by myself throughout the entire procedure.  Conscious sedation time   45 minutes    I was present with the patient for the duration of moderate sedation and supervised staff who had no other duties and monitored the patient for the entire procedure.    @@  PROCEDURE COMMENTS:    Under usual sterile precautions and 1% lidocaine filtration right femoral vein and femoral artery were percutaneously punctured and a 7 and 5 [place] vascular sheaths were respectively inserted.  [place]-Estelle catheter was used to measure right-sided pressures pulmonary  capital wedge pressure and cardiac output using thermodilution technique.  [place]-Estelle catheter was removed and subsequently left and right coronary angiography followed by left ventricular angiogram was performed.  Hemostasis was obtained and patient was returned to the room with intact pulses.  No complications were noted.  FINDINGS:    1. HEMODYNAMICS:  Left ventricle end-diastolic pressure is elevated.  Mean right atrial pressure is 4 right ventricle 47/7 pulmonary artery 40/16 mean pulmonary artery 26 pulmonary capital wedge pressure is 17.  No gradient was noted across aortic valve.    2. LEFT VENTRICULOGRAPHY:  Left ventricular size and contractility is normal with ejection fraction of 60%.  Left atrial enlargement is present with severe mitral valve prolapse and severe mitral regurgitation.    3. CORONARY ANGIOGRAPHY:  Left main coronary artery normal.  Left anterior descending artery normal.  Circumflex coronary artery normal.  Right coronary artery is a large and dominant vessel and is normal.    SUMMARY:  Moderate to significant pulmonary hypertension.  Severe mitral valve prolapse and mitral regurgitation and probable ruptured chordae (TERRA and cardiac cath)  Tricuspid valve prolapse.  Normal left ventricular function.  Normal coronary arteries.    RECOMMENDATIONS:  Cardiovascular surgery consultation initiated for possible mitral valve and tricuspid valve repair soon possible.  Have discussed with Dr. Henderson cardiovascular surgeon for coordination of care.        ]]]]]]]]]]]]]]]]]]]]]  Date of study  9/30/2022    Procedure performed  Transesophageal echocardiogram and Doppler study.    Indications  Significant mitral regurgitation  Shortness of breath    Procedure  Anesthesia was provided by anesthesiologist with intravenous Diprivan.  TERRA probe could be passed without difficulty.  Patient tolerated the procedure well.  No complications were noted.    Results  Technically satisfactory study.  Mitral valve  has severe myxomatous degeneration with multiple areas of severe prolapse and known coaptation of the mitral leaflet as well as probable ruptured chordae.  Severe mitral regurgitation is seen with multiple jets with mitral regurgitation jet occupying the entire left atrium.  Tricuspid valve is also showing myxomatous degeneration with moderate tricuspid regurgitation.  Calculated pulmonary artery pressure is 60 mmHg.  Aortic valve is thickened with adequate opening motion.  Left atrium is enlarged.  Left atrial appendage is enlarged without clot.  Left ventricle is normal in size and contractility with ejection fraction of 60%.  Right ventricle is normal in size.  Right atrium is normal in size.  Atrial septum is intact without PFO.  Aortic intimal thickening is present without clot.  No pericardial effusion is seen.    Impression  Myxomatous mitral and tricuspid valve degeneration.  Severe mitral valve prolapse involving both anterior and posterior mitral leaflets with severe mitral regurgitation with multiple jets.  Probable ruptured chordae.  In some views coaptation of the mitral valve is present.  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  Significant pulmonary hypertension.  Left atrial and left atrial appendage enlargement without clot.  Normal left ventricular size and contractility with ejection fraction of 60%.  ]]]]]]]]]]]]]]]]]]]]                OTHER:         Assessment & Plan     Principal Problem:    Back pain      ]]]]]]]]]]]]]]]]]]]]  History  ===========  - Significant back discomfort  Patient has unstable L3-L4 fracture  Patient has continued symptoms despite being on conservative treatment.  Failed bracing trial and physical therapy.  Status post lumbar laminectomy 9/13/2023     -Status post mitral and tricuspid valve repair and left atrial appendage endocardial closure--Dr. Henderson 11/17/2022    Echocardiogram 9/8/2023 revealed  Status post mitral valve repair.  No mitral  regurgitation is present.  Aortic valve is thickened with adequate opening motion.  Left atrial enlargement.  Left ventricular size and contractility is normal with ejection fraction of 60%.     -   Preoperative increased shortness of breath and significantly increased fatigue.   Preoperative significant mitral and tricuspid regurgitation.  Prominent posterior mitral leaflet prolapse     TERRA 9/30/2022  Myxomatous mitral and tricuspid valve degeneration.  Severe mitral valve prolapse involving both anterior and posterior mitral leaflets with severe mitral regurgitation with multiple jets.  Probable ruptured chordae.  In some views coaptation of the mitral valve is present.  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  Significant pulmonary hypertension.  Left atrial and left atrial appendage enlargement without clot.  Normal left ventricular size and contractility with ejection fraction of 60%.     Cardiac catheterization 9/30/2022   Moderate to significant pulmonary hypertension.  Severe mitral valve prolapse and mitral regurgitation and probable ruptured chordae (TERRA and cardiac cath)  Tricuspid valve prolapse.  Normal left ventricular function.  Normal coronary arteries.     Echocardiogram 9/28/2022.  Mild mitral regurgitation (regurgitation jet may be not in the field)      - Premature ventricular contractions.  Occasional palpitations     - Hypertension vitamin D deficiency GERD ocular migraine.     - History of prostate cancer.  Status post radiation therapy.  Bone scan was negative for any spread.     - Status post appendectomy spine surgery adenoidectomy tonsillectomy     - Family history of prostate cancer     - Former smoker     - Allergic to morphine.  ============  Plan  ===========  Significant back discomfort  Patient has unstable L3-L4 fracture  Patient has continued symptoms despite being on conservative treatment.  Failed bracing trial and physical therapy.  Patient to have surgery  for his back today.  Status post lumbar laminectomy 9/13/2023.  Patient did not have any perioperative cardiovascular problems.     Status post mitral and tricuspid valve repair and left atrial appendage endocardial closure--Dr. Henderson 11/17/2022  Status post mitral valve repair tricuspid valve repair and left atrial appendage closure--Dr. Henderson-11/17/2022     Patient does not have any manic symptoms at this time.    EKG-normal-9/11/2023.    Echocardiogram.  9/12/2023  Status post mitral valve repair.  No mitral regurgitation is present.  Aortic valve is thickened with adequate opening motion.  Left atrial enlargement.  Left ventricular size and contractility is normal with ejection fraction of 60%.    Thrombocytopenia  No bleeding problems.  Platelet count 57833-19/ 8//2023  87029-09 /9//2023  57043-89 /11//2023  [id number] 13 2023     History of resting tachycardia-improved  Patient is taking Metroprolol tartrate 25 mg twice daily.     Patient had postoperative bradycardia.  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  Bradycardia has improved.  No need for pacemaker at this time.     Hypertension-stable.  125/73     Medications were reviewed and updated.  Patient is on metoprolol 25 mg a day allopurinol atorvastatin cyclobenzaprine subcu Lovenox (DVT) famotidine.       Further plan will depend on patient's progress.    Reviewed and updated 9/14/2023  ]]]]]]]]]]]]]]]]           Felipe Garcia MD  09/14/23  06:38 EDT

## 2023-09-14 NOTE — PLAN OF CARE
Goal Outcome Evaluation:  Patient doing well, pain treated per MAR. Patient spouse at bedside. Patient was able to tolerate sitting in chair for about an hour this afternoon. Care plan ongoing

## 2023-09-14 NOTE — PROGRESS NOTES
[facility] Medicine Services   Daily Progress Note      Patient Name: Clint Cee  : 1945  MRN: [id number]  Primary Care Physician:  Jaret Castellanos MD  Date of admission: 2023    Brief clinical summary:  78-year-old male with history of hypertension, hyperlipidemia, depression, GERD, valvular heart disease, BPH, mood disorder who was admitted for evaluation following unresolved and increasing back pain that resulted from a fall on , and has since then failed outpatient treatment including inpatient rehab.  CT was unrevealing but MRI did reveal L3 fracture.  Patient underwent L2-5 fusion on .    Subjective      Chief Complaint: Back pain    Follow-up on postop day 1.  Met patient resting supine in bed.  Expressive operative pain otherwise no other issues    Objective      Vitals:   Temp:  [97.6 °F (36.4 °C)-99.7 °F (37.6 °C)] 97.9 °F (36.6 °C)  Heart Rate:  [71-81] 78  Resp:  [10-22] 17  BP: ()/(38-69) 118/59  Flow (L/min):  [2-6] 2    Physical Exam:    General: Awake, alert, NAD  Eyes: PERRL, EOMI, conjunctivae are clear  Cardiovascular: Regular rate and rhythm, no murmurs  Respiratory: Clear to auscultation bilaterally, no wheezing or rales, unlabored breathing  Abdomen: Soft, nontender, positive bowel sounds, no guarding  Neurologic: A&O, CN grossly intact, moves all extremities spontaneously  Musculoskeletal: Generalized weakness, no other gross deformities  Skin: Warm, dry, intact  Psychiatry: Normal affect, appropriate behavior         Result Review    Result Review:  I have personally reviewed the results from the time of this admission to 2023 12:03 EDT and agree with these findings:  [x]  Laboratory  []  Microbiology  []  Radiology  []  EKG/Telemetry   []  Cardiology/Vascular   []  Pathology  []  Old records  [x]  Other: Reviewed operative reports    Wounds (last 24 hours)       LDA Wound       Row Name 23 0840 23 0600 23  0456       Wound 09/13/23 1326 Bilateral posterior lumbar spine Incision    Wound - Properties Group Placement Date: 09/13/23  -TT Placement Time: 1326  -TT Present on Hospital Admission: N  -TT Side: Bilateral  -TT Orientation: posterior  -TT Location: lumbar spine  -TT Primary Wound Type: Incision  -TT    Dressing Appearance intact  -EY -- intact;dried drainage  -SR    Closure REBA  -EY REBA  -SR REBA  -SR    Base dressing in place, unable to visualize  -EY dressing in place, unable to visualize  -SR dressing in place, unable to visualize  -SR    Retired Wound - Properties Group Placement Date: 09/13/23  -TT Placement Time: 1326  -TT Present on Hospital Admission: N  -TT Side: Bilateral  -TT Orientation: posterior  -TT Location: lumbar spine  -TT Primary Wound Type: Incision  -TT    Retired Wound - Properties Group Date first assessed: 09/13/23  -TT Time first assessed: 1326  -TT Present on Hospital Admission: N  -TT Side: Bilateral  -TT Location: lumbar spine  -TT Primary Wound Type: Incision  -TT      Row Name 09/13/23 2059 09/13/23 1655 09/13/23 1640       Wound 09/13/23 1326 Bilateral posterior lumbar spine Incision    Wound - Properties Group Placement Date: 09/13/23  -TT Placement Time: 1326  -TT Present on Hospital Admission: N  -TT Side: Bilateral  -TT Orientation: posterior  -TT Location: lumbar spine  -TT Primary Wound Type: Incision  -TT    Dressing Appearance intact;dried drainage  -SR -- --    Closure REBA  -SR -- --    Base dressing in place, unable to visualize  -SR -- --    Drainage Amount -- none  -JM none  -JM    Retired Wound - Properties Group Placement Date: 09/13/23  -TT Placement Time: 1326  -TT Present on Hospital Admission: N  -TT Side: Bilateral  -TT Orientation: posterior  -TT Location: lumbar spine  -TT Primary Wound Type: Incision  -TT    Retired Wound - Properties Group Date first assessed: 09/13/23  -TT Time first assessed: 1326  -TT Present on Hospital Admission: N  -TT Side: Bilateral   -TT Location: lumbar spine  -TT Primary Wound Type: Incision  -TT      Row Name 09/13/23 1625 09/13/23 1610 09/13/23 1555       Wound 09/13/23 1326 Bilateral posterior lumbar spine Incision    Wound - Properties Group Placement Date: 09/13/23  -TT Placement Time: 1326  -TT Present on Hospital Admission: N  -TT Side: Bilateral  -TT Orientation: posterior  -TT Location: lumbar spine  -TT Primary Wound Type: Incision  -TT    Drainage Amount none  -JM none  -JM none  -JM    Retired Wound - Properties Group Placement Date: 09/13/23  -TT Placement Time: 1326  -TT Present on Hospital Admission: N  -TT Side: Bilateral  -TT Orientation: posterior  -TT Location: lumbar spine  -TT Primary Wound Type: Incision  -TT    Retired Wound - Properties Group Date first assessed: 09/13/23  -TT Time first assessed: 1326  -TT Present on Hospital Admission: N  -TT Side: Bilateral  -TT Location: lumbar spine  -TT Primary Wound Type: Incision  -TT      Row Name 09/13/23 1540 09/13/23 1525 09/13/23 1510       Wound 09/13/23 1326 Bilateral posterior lumbar spine Incision    Wound - Properties Group Placement Date: 09/13/23  -TT Placement Time: 1326  -TT Present on Hospital Admission: N  -TT Side: Bilateral  -TT Orientation: posterior  -TT Location: lumbar spine  -TT Primary Wound Type: Incision  -TT    Drainage Amount none  -JM none  -JM none  -JM    Dressing Care -- -- --  exofin, covaderm, bandaid  -JM    Retired Wound - Properties Group Placement Date: 09/13/23  -TT Placement Time: 1326  -TT Present on Hospital Admission: N  -TT Side: Bilateral  -TT Orientation: posterior  -TT Location: lumbar spine  -TT Primary Wound Type: Incision  -TT    Retired Wound - Properties Group Date first assessed: 09/13/23  -TT Time first assessed: 1326  -TT Present on Hospital Admission: N  -TT Side: Bilateral  -TT Location: lumbar spine  -TT Primary Wound Type: Incision  -TT      Row Name 09/13/23 1453             Wound 09/13/23 1326 Bilateral posterior  lumbar spine Incision    Wound - Properties Group Placement Date: 09/13/23  -TT Placement Time: 1326  -TT Present on Hospital Admission: N  -TT Side: Bilateral  -TT Orientation: posterior  -TT Location: lumbar spine  -TT Primary Wound Type: Incision  -TT    Dressing Care dressing applied  exofin, covaderm, bandaid  -TT      Retired Wound - Properties Group Placement Date: 09/13/23  -TT Placement Time: 1326  -TT Present on Hospital Admission: N  -TT Side: Bilateral  -TT Orientation: posterior  -TT Location: lumbar spine  -TT Primary Wound Type: Incision  -TT    Retired Wound - Properties Group Date first assessed: 09/13/23  -TT Time first assessed: 1326  -TT Present on Hospital Admission: N  -TT Side: Bilateral  -TT Location: lumbar spine  -TT Primary Wound Type: Incision  -TT              User Key  (r) = Recorded By, (t) = Taken By, (c) = Cosigned By      Initials Name Provider Type    Felipa Fraser RN Registered Nurse    Terrie Warner RN Registered Nurse    Kalli Naylor RN Registered Nurse    SR Loli Yarbrough RN Registered Nurse                      Assessment & Plan        allopurinol, 100 mg, Oral, Daily  atorvastatin, 40 mg, Oral, Nightly  cyclobenzaprine, 10 mg, Oral, TID  DULoxetine, 60 mg, Oral, Daily  enoxaparin, 40 mg, Subcutaneous, Q24H  famotidine, 40 mg, Oral, Nightly  metoprolol tartrate, 25 mg, Oral, Daily  sodium chloride, 10 mL, Intravenous, Q12H       lactated ringers, 1,000 mL, Last Rate: 1,000 mL (09/13/23 1230)  sodium chloride, 9 mL/hr         I have utilized all available, immediate resources to obtain, update, or review the patient's current medications including all prescriptions, over-the-counter products, herbals, cannabis/cannabidiol products, and vitamin.mineral/dietary (nutritional) supplements.    Active Hospital Problems:  Active Hospital Problems    Diagnosis     **Back pain      Plan:     #Unstable L3 fracture  -Patient failed bracing trial and physical therapy  with worsening symptoms  -No signs or symptoms of cauda equina syndrome at this time  -Status post L2-5 fusion on September 13  -Postop orders per neurosurgery    Hypertension  Hyperlipidemia  -Continue home meds as tolerated     History of MV repair  -No acute issues,   -Cardiology ok'd for surgery    Hyperglycemia above 200  -No history of diabetes based on prior A1c less than 5%  -We will repeat A1c on this visit     DVT prophylaxis  -Lovenox        CODE STATUS:    Code Status (Patient has no pulse and is not breathing): CPR (Attempt to Resuscitate)  Medical Interventions (Patient has pulse or is breathing): Full Support      Disposition: will likely need rehab placement postsurgery. PT/OT    Electronically signed by Rita Carl MD, 09/14/23, 12:03 EDT.  [facility] Hospitalist Team      Part of this note may be an electronic transcription/translation of spoken language to printed text using the Dragon Dictation System.

## 2023-09-14 NOTE — THERAPY RE-EVALUATION
Patient Name: Clint Cee  : 1945    MRN: [id number]                              Today's Date: 2023       Admit Date: 2023    Visit Dx:     ICD-10-CM ICD-9-CM   1. Acute left-sided low back pain with left-sided sciatica  M54.42 724.2     724.3     Patient Active Problem List   Diagnosis    Benign prostatic hyperplasia    Gastroesophageal reflux disease    Mood disorder    Scoliosis    Spinal stenosis    Vitamin D deficiency    Polyosteoarthritis    Insomnia    Retinal detachment of right eye with single retinal tear    Osteoarthritis of knee    Cellulitis of face    Prostate cancer    Ocular migraine    Headache    Gout of multiple sites    Medicare annual wellness visit, subsequent    Allergic    HL (hearing loss)    Visual impairment    Cataract    Colon polyp    Primary hypertension    Dyspnea on exertion    S/P mitral valve repair per Dr. Henderson 2022    S/P tricuspid valve repair per Dr. Henderson 2022    Back pain     Past Medical History:   Diagnosis Date    Abnormal ECG 2022    Allergic 1954    Nasal alergies    Arrhythmia     Dr Youngblood examined me and said i was ok.  skipped beats    Asthma     no sx    Benign prostatic hyperplasia 2018    Cataract 2014    Surgery. Caused detached retina of right eye 20/60 repair    Colon polyp 2018    Found and removed last colon eca.q    Depression     Gastroesophageal reflux disease 2014    Glaucoma 2003    Normal pressures. Have low pressure glaucoma    Gout     Headache 2015    Have shimmering in both eyes intermittantly. No headaches    Heart murmur May 2022    During wellness exam    Heart valve disease 2022    During Echo    HL (hearing loss) 2012    Have hearing aids    Hyperlipidemia     Hypertension 2011    Infectious viral hepatitis     Yellow jaundis as child    Insomnia 2011    Mitral valve prolapse 2022    Mood disorder 2013     Polyosteoarthritis 12/02/2011    Prostate Cancer Jan22 January 2022    Spinal stenosis 12/02/2011    Visual impairment 01/01/1955    Nearsighted corrected glasses    Vitamin D deficiency 05/23/2018     Past Surgical History:   Procedure Laterality Date    ADENOIDECTOMY  1954    As child    APPENDECTOMY  1956    Surgery    CARDIAC CATHETERIZATION N/A 09/30/2022    Procedure: Right and Left Heart Cath with Coronar Angiography;  Surgeon: Felipe Garcia MD;  Location: [facility] CATH INVASIVE LOCATION;  Service: Cardiovascular;  Laterality: N/A;    CARDIAC VALVE REPLACEMENT N/A 11/17/2022    Procedure: TRICUSPID VALVE REPAIR/REPLACEMENT;  Surgeon: Femi Henderson MD;  Location: [facility] CVOR;  Service: Cardiothoracic;  Laterality: N/A;  tricuspid valve repaired with 32mm  groves physio tricuspid annuloplasty ring    CARDIAC VALVE REPLACEMENT  11/17/2022    Repaired mitral and tricuspid    CATARACT EXTRACTION Bilateral     COLONOSCOPY  01/01/1997    Regularly scheduled    COLONOSCOPY N/A 2/22/2023    Procedure: COLONOSCOPY with cold snare polypectomy x 1;  Surgeon: Manfred Keating MD;  Location: [facility] ENDOSCOPY;  Service: Gastroenterology;  Laterality: N/A;    ENDOSCOPY N/A 2/22/2023    Procedure: ESOPHAGOGASTRODUODENOSCOPY with esophageal dilation using 48 Fr. Bougie Dilator;  Surgeon: Manfred Keating MD;  Location: [facility] ENDOSCOPY;  Service: Gastroenterology;  Laterality: N/A;  erosive esophagitis    EYE SURGERY  01/01/2010    Reattached right retina    MITRAL VALVE REPAIR/REPLACEMENT N/A 11/17/2022    Procedure: MITRAL VALVE REPAIR/REPLACEMENT with left atrial appendage closure;  Surgeon: Femi Henderson MD;  Location: [facility];  Service: Cardiothoracic;  Laterality: N/A;  mitral valve repaired iwth 40mm medtronic annuloplasty band  with intraop TERRA    SPINE SURGERY  01/01/1971    2 lumbar 1 cervical    TONSILLECTOMY  01/01/1950    Childhood      General Information       Row Name 09/14/23 8544           Physical Therapy Time and Intention    Document Type re-evaluation  -     Mode of Treatment physical therapy  -       Row Name 09/14/23 1309          General Information    Prior Level of Function --  See initial evaluation. Pt [facility]     Existing Precautions/Restrictions fall;spinal  -     Barriers to Rehab previous functional deficit;hearing deficit  -       Row Name 09/14/23 1309          Living Environment    People in Home spouse  -[facility] Name 09/14/23 1309          Home Main Entrance    Number of Stairs, Main Entrance none  -       Row Name 09/14/23 1309          Safety Issues, Functional Mobility    Impairments Affecting Function (Mobility) balance;endurance/activity tolerance;pain;strength  -               User Key  (r) = Recorded By, (t) = Taken By, (c) = Cosigned By      Initials Name Provider Type     Millicent Gilbert, PT Physical Therapist                   Mobility       Row Name 09/14/23 1310          Bed Mobility    Bed Mobility supine-sit  -     Supine-Sit [place] (Bed Mobility) minimum assist (75% patient effort);moderate assist (50% patient effort);verbal cues  -     Assistive Device (Bed Mobility) bed rails  -     Comment, (Bed Mobility) Pt requires increased time and cueing to complete supine>sit; assist at trunk  -[facility] Name 09/14/23 1310          Sit-Stand Transfer    Sit-Stand [place] (Transfers) minimum assist (75% patient effort);verbal cues;2 person assist  -     Assistive Device (Sit-Stand Transfers) walker, front-wheeled  -     Comment, (Sit-Stand Transfer) v/c for hand placement; STS from EOB; Stands at commode to urinate-min A for stability using RW  -[facility] Name 09/14/23 1310          Gait/Stairs (Locomotion)    [place]ulloch Level (Gait) minimum assist (75% patient effort);2 person assist;verbal cues  -     Assistive Device (Gait) walker, front-wheeled  [facility]     Distance in Feet (Gait) 20ft x2; Narrow VIKA during  turns, unsteady with posterior tendency at times. Followed with recliner chair due to L quad weakness. No buckling noted, but unsteady.  -               User Key  (r) = Recorded By, (t) = Taken By, (c) = Cosigned By      Initials Name Provider Type    Millicent Escobar, EJ Physical Therapist                   Obj/Interventions       Row Name 09/14/23 1314          Range of Motion Comprehensive    General Range of Motion bilateral lower extremity ROM WFL  -       Row Name 09/14/23 1314          Strength Comprehensive (MMT)    Comment, General Manual Muscle Testing (MMT) Assessment L knee grossly 3+/5, L ankle grossly 4/5. RLE grossly 4/5  -       Row Name 09/14/23 1314          Balance    Balance Assessment sitting static balance;standing static balance  -RUBEN     Static Sitting Balance contact guard  -     Position, Sitting Balance unsupported;sitting edge of bed  -RUBEN     Static Standing Balance minimal assist;contact guard  -     Position/Device Used, Standing Balance walker, front-wheeled  -       Row Name 09/14/23 1314          Sensory Assessment (Somatosensory)    Sensory Assessment (Somatosensory) LE sensation intact;other (see comments)  Pt reports anteiror, proximal LLE pain though no N/T  -               User Key  (r) = Recorded By, (t) = Taken By, (c) = Cosigned By      Initials Name Provider Type    Millicent Escobar, EJ Physical Therapist                   Goals/Plan       Row Name 09/14/23 1318          Therapy Assessment/Plan (PT)    Planned Therapy Interventions (PT) other (see comments)  Pt demonstrates progress toward previous set goals. Goals still appropriate following fusion  -RUBEN               User Key  (r) = Recorded By, (t) = Taken By, (c) = Cosigned By      Initials Name Provider Type    Millicent Escobar, EJ Physical Therapist                   Clinical Impression       Row Name 09/14/23 1316          Pain    Pretreatment Pain Rating 7/10  -RUBEN     Posttreatment Pain Rating 7/10   -     Pain Location - Side/Orientation Left  -     Pain Location proximal  -     Pain Location - back;hip;extremity  -     Pain Intervention(s) Repositioned;Ambulation/increased activity;Nursing Notified  -       Row Name 09/14/23 1316          Plan of Care Review    Plan of Care Reviewed With patient  -     Outcome Evaluation Pt is a 77 y/o male who is being seen for a PT re-evaluation following L2-5 spinal fusion on 9/13. At this time pt reports improvement in low back pain with mobility following surgery than prior to. He reports new L proximal leg pain and LLE weakness. Pt initiates transfers and ambulation w/ min Ax2. He ambulates 20ft x2 following with the recliner chair during ambulation to ensure safety due to L quad weakness. Pt had a good idea of spinal precautions and initiates log roll with minimal cueing. He requires max A-dependent to don TLSO seated EOB. He presents with decreased L quad strength, instability in standing requiring increased assist for safety, and increased pain in the LLE. Continue to recommend acute inpatient rehab at discharge.  -       Row Name 09/14/23 1316          Therapy Assessment/Plan (PT)    Rehab Potential (PT) good, to achieve stated therapy goals  -     Criteria for Skilled Interventions Met (PT) yes;meets criteria  -     Therapy Frequency (PT) daily  -     Predicted Duration of Therapy Intervention (PT) Until d/c  -       Row Name 09/14/23 1316          Vital Signs    Pre SpO2 (%) 97  -RUBEN     O2 Delivery Pre Treatment supplemental O2  2L  -RUBEN     Intra SpO2 (%) 94  -     O2 Delivery Intra Treatment room air  -     O2 Delivery Post Treatment room air  nursing notified  -       Row Name 09/14/23 1316          Positioning and Restraints    Pre-Treatment Position in bed  -     Post Treatment Position chair  -RUBEN     In Chair notified nsg;call light within reach;encouraged to call for assist;sitting;exit alarm on;with family/caregiver  -                User Key  (r) = Recorded By, (t) = Taken By, (c) = Cosigned By      Initials Name Provider Type    Millicent Escobar, EJ Physical Therapist                   Outcome Measures       Row Name 09/14/23 1319 09/14/23 0840       How much help from another person do you currently need...    Turning from your back to your side while in flat bed without using bedrails? 2  -RUBEN 2  -EY    Moving from lying on back to sitting on the side of a flat bed without bedrails? 2  -RUBEN 2  -EY    Moving to and from a bed to a chair (including a wheelchair)? 2  -RUBEN 2  -EY    Standing up from a chair using your arms (e.g., wheelchair, bedside chair)? 2  -RUBEN 2  -EY    Climbing 3-5 steps with a railing? 2  -RUBEN 2  -EY    To walk in hospital room? 2  -RUBEN 2  -EY    AM-PAC 6 Clicks Score (PT) 12  -RUBEN 12  -EY    Highest level of mobility 4 --> Transferred to chair/commode  -RUBEN 4 --> Transferred to chair/commode  -EY      Row Name 09/14/23 1319          Functional Assessment    Outcome Measure Options AM-PAC 6 Clicks Basic Mobility (PT)  -RUBEN               User Key  (r) = Recorded By, (t) = Taken By, (c) = Cosigned By      Initials Name Provider Type    Kalli Naylor RN Registered Nurse    Millicent Escobar, PT Physical Therapist                                 Physical Therapy Education       Title: PT OT SLP Therapies (Done)       Topic: Physical Therapy (Done)       Point: Mobility training (Done)       Learning Progress Summary             Patient Acceptance, E,TB, VU by RUBEN at 9/14/2023 1320    Acceptance, E, VU by MSITH at 9/13/2023 1016    Acceptance, E,TB, VU by CL at 9/10/2023 1147                         Point: Body mechanics (Done)       Learning Progress Summary             Patient Acceptance, E,TB, VU by RUBEN at 9/14/2023 1320    Acceptance, E, VU by SMITH at 9/13/2023 1016    Acceptance, E,TB, VU by CL at 9/10/2023 1147                         Point: Precautions (Done)       Learning Progress Summary             Patient  Acceptance, E,TB, VU by  at 9/14/2023 1320    Acceptance, E, VU by  at 9/13/2023 1016    Acceptance, E,TB, VU by  at 9/10/2023 1147                                         User Key       Initials Effective Dates Name Provider Type Discipline     08/23/21 -  Millicent Gilbert, PT Physical Therapist PT     03/02/22 -  Barbra Casillas PT Physical Therapist PT     09/06/23 -  Alisha Tristan, RN Registered Nurse Nurse                  PT Recommendation and Plan  Planned Therapy Interventions (PT): other (see comments) (Pt demonstrates progress toward previous set goals. Goals still appropriate following fusion)  Plan of Care Reviewed With: patient  Outcome Evaluation: Pt is a 77 y/o male who is being seen for a PT re-evaluation following L2-5 spinal fusion on 9/13. At this time pt reports improvement in low back pain with mobility following surgery than prior to. He reports new L proximal leg pain and LLE weakness. Pt initiates transfers and ambulation w/ min Ax2. He ambulates 20ft x2 following with the recliner chair during ambulation to ensure safety due to L quad weakness. Pt had a good idea of spinal precautions and initiates log roll with minimal cueing. He requires max A-dependent to don TLSO seated EOB. He presents with decreased L quad strength, instability in standing requiring increased assist for safety, and increased pain in the LLE. Continue to recommend acute inpatient rehab at discharge.     Time Calculation:         PT Charges       Row Name 09/14/23 1320             Time Calculation    Start Time 1154  -      Stop Time 1217  -      Time Calculation (min) 23 min  -      PT Received On 09/14/23  -      PT - Next Appointment 09/15/23  -      PT Goal Re-Cert Due Date 09/28/23  -         Time Calculation- PT    Total Timed Code Minutes- PT 10 minute(s)  -                User Key  (r) = Recorded By, (t) = Taken By, (c) = Cosigned By      Initials Name Provider Type    RUBEN Gilbert  Millicent, PT Physical Therapist                  Therapy Charges for Today       Code Description Service Date Service Provider Modifiers Qty    [id number] HC PT RE-EVAL ESTABLISHED PLAN 2 9/14/2023 Millicent Gilbert, PT GP 1    [id number]  PT THERAPEUTIC ACT EA 15 MIN 9/14/2023 Millicent Gilbert, PT GP 1            PT G-Codes  Outcome Measure Options: AM-PAC 6 Clicks Basic Mobility (PT)  AM-PAC 6 Clicks Score (PT): 12  AM-PAC 6 Clicks Score (OT): 16  PT Discharge Summary  Anticipated Discharge Disposition (PT): inpatient rehabilitation facility    Millicent Gilbert PT  9/14/2023

## 2023-09-14 NOTE — THERAPY RE-EVALUATION
Patient Name: Clint Cee  : 1945    MRN: [id number]                              Today's Date: 2023       Admit Date: 2023    Visit Dx:     ICD-10-CM ICD-9-CM   1. Acute left-sided low back pain with left-sided sciatica  M54.42 724.2     724.3     Patient Active Problem List   Diagnosis    Benign prostatic hyperplasia    Gastroesophageal reflux disease    Mood disorder    Scoliosis    Spinal stenosis    Vitamin D deficiency    Polyosteoarthritis    Insomnia    Retinal detachment of right eye with single retinal tear    Osteoarthritis of knee    Cellulitis of face    Prostate cancer    Ocular migraine    Headache    Gout of multiple sites    Medicare annual wellness visit, subsequent    Allergic    HL (hearing loss)    Visual impairment    Cataract    Colon polyp    Primary hypertension    Dyspnea on exertion    S/P mitral valve repair per Dr. Henderson 2022    S/P tricuspid valve repair per Dr. Henderson 2022    Back pain     Past Medical History:   Diagnosis Date    Abnormal ECG 2022    Allergic 1954    Nasal alergies    Arrhythmia     Dr Youngblood examined me and said i was ok.  skipped beats    Asthma     no sx    Benign prostatic hyperplasia 2018    Cataract 2014    Surgery. Caused detached retina of right eye 20/60 repair    Colon polyp 2018    Found and removed last colon eca.q    Depression     Gastroesophageal reflux disease 2014    Glaucoma 2003    Normal pressures. Have low pressure glaucoma    Gout     Headache 2015    Have shimmering in both eyes intermittantly. No headaches    Heart murmur May 2022    During wellness exam    Heart valve disease 2022    During Echo    HL (hearing loss) 2012    Have hearing aids    Hyperlipidemia     Hypertension 2011    Infectious viral hepatitis     Yellow jaundis as child    Insomnia 2011    Mitral valve prolapse 2022    Mood disorder 2013     Polyosteoarthritis 12/02/2011    Prostate Cancer Jan22 January 2022    Spinal stenosis 12/02/2011    Visual impairment 01/01/1955    Nearsighted corrected glasses    Vitamin D deficiency 05/23/2018     Past Surgical History:   Procedure Laterality Date    ADENOIDECTOMY  1954    As child    APPENDECTOMY  1956    Surgery    CARDIAC CATHETERIZATION N/A 09/30/2022    Procedure: Right and Left Heart Cath with Coronar Angiography;  Surgeon: Felipe Garcia MD;  Location: [facility] CATH INVASIVE LOCATION;  Service: Cardiovascular;  Laterality: N/A;    CARDIAC VALVE REPLACEMENT N/A 11/17/2022    Procedure: TRICUSPID VALVE REPAIR/REPLACEMENT;  Surgeon: Femi Henderson MD;  Location: [facility] CVOR;  Service: Cardiothoracic;  Laterality: N/A;  tricuspid valve repaired with 32mm  groves physio tricuspid annuloplasty ring    CARDIAC VALVE REPLACEMENT  11/17/2022    Repaired mitral and tricuspid    CATARACT EXTRACTION Bilateral     COLONOSCOPY  01/01/1997    Regularly scheduled    COLONOSCOPY N/A 2/22/2023    Procedure: COLONOSCOPY with cold snare polypectomy x 1;  Surgeon: Manfred Keating MD;  Location: [facility] ENDOSCOPY;  Service: Gastroenterology;  Laterality: N/A;    ENDOSCOPY N/A 2/22/2023    Procedure: ESOPHAGOGASTRODUODENOSCOPY with esophageal dilation using 48 Fr. Bougie Dilator;  Surgeon: Manfred Keating MD;  Location: [facility] ENDOSCOPY;  Service: Gastroenterology;  Laterality: N/A;  erosive esophagitis    EYE SURGERY  01/01/2010    Reattached right retina    MITRAL VALVE REPAIR/REPLACEMENT N/A 11/17/2022    Procedure: MITRAL VALVE REPAIR/REPLACEMENT with left atrial appendage closure;  Surgeon: Femi Henderson MD;  Location: [facility];  Service: Cardiothoracic;  Laterality: N/A;  mitral valve repaired iwth 40mm medtronic annuloplasty band  with intraop TERRA    SPINE SURGERY  01/01/1971    2 lumbar 1 cervical    TONSILLECTOMY  01/01/1950    Childhood      General Information       Row Name 09/14/23 8411           OT Time and Intention    Document Type re-evaluation  -MP     Mode of Treatment occupational therapy  -MP       Row Name 09/14/23 1646          General Information    Patient Profile Reviewed yes  -MP     Prior Level of Function independent:;ADL's  -MP     Existing Precautions/Restrictions fall;spinal  -       Row Name 09/14/23 1646          Living Environment    People in Home spouse  -       Row Name 09/14/23 1646          Cognition    Orientation Status (Cognition) oriented x 4  -MP       Row Name 09/14/23 1646          Safety Issues, Functional Mobility    Impairments Affecting Function (Mobility) balance;endurance/activity tolerance;pain;strength  -MP               User Key  (r) = Recorded By, (t) = Taken By, (c) = Cosigned By      Initials Name Provider Type    MP Huang Chang OT Occupational Therapist                     Mobility/ADL's       Row Name 09/14/23 1651          Bed Mobility    Bed Mobility supine-sit  -MP     Rolling Right Soldier (Bed Mobility) moderate assist (50% patient effort)  -MP     Supine-Sit Soldier (Bed Mobility) minimum assist (75% patient effort);moderate assist (50% patient effort);verbal cues  -       Row Name 09/14/23 1651          Sit-Stand Transfer    Sit-Stand Soldier (Transfers) minimum assist (75% patient effort);verbal cues;2 person assist  -     Assistive Device (Sit-Stand Transfers) walker, front-wheeled  -       Row Name 09/14/23 1651          Functional Mobility    Functional Mobility- Ind. Level minimum assist (75% patient effort);2 person assist required  -       Row Name 09/14/23 1651          Activities of Daily Living    BADL Assessment/Intervention lower body dressing  -       Row Name 09/14/23 1651          Lower Body Dressing Assessment/Training    Soldier Level (Lower Body Dressing) lower body dressing skills;maximum assist (25% patient effort)  -MP               User Key  (r) = Recorded By, (t) = Taken By, (c) =  Cosigned By      Initials Name Provider Type    Huang Ball OT Occupational Therapist                   Obj/Interventions       Row Name 09/14/23 1652          Range of Motion Comprehensive    Comment, General Range of Motion BUE WFL  -MP       Row Name 09/14/23 1652          Strength Comprehensive (MMT)    Comment, General Manual Muscle Testing (MMT) Assessment BUE WFL  -MP       Row Name 09/14/23 1652          Balance    Balance Interventions sitting;standing;sit to stand;supported;static;dynamic  -MP               User Key  (r) = Recorded By, (t) = Taken By, (c) = Cosigned By      Initials Name Provider Type    Huang Ball OT Occupational Therapist                   Goals/Plan       Row Name 09/14/23 1701          Bed Mobility Goal 1 (OT)    Activity/Assistive Device (Bed Mobility Goal 1, OT) bed mobility activities, all  -MP     Story Level/Cues Needed (Bed Mobility Goal 1, OT) contact guard required  -MP     Time Frame (Bed Mobility Goal 1, OT) long term goal (LTG);2 weeks  -MP       Row Name 09/14/23 1701          Transfer Goal 1 (OT)    Activity/Assistive Device (Transfer Goal 1, OT) transfers, all  -MP     Story Level/Cues Needed (Transfer Goal 1, OT) contact guard required  -MP     Time Frame (Transfer Goal 1, OT) long term goal (LTG);2 weeks  -MP       [facility] Name 09/14/23 1701          Dressing Goal 1 (OT)    Activity/Device (Dressing Goal 1, OT) lower body dressing  -MP     Story/Cues Needed (Dressing Goal 1, OT) minimum assist (75% or more patient effort)  -MP     Time Frame (Dressing Goal 1, OT) long term goal (LTG);2 weeks  -MP               User Key  (r) = Recorded By, (t) = Taken By, (c) = Cosigned By      Initials Name Provider Type    Huang Ball OT Occupational Therapist                   Clinical Impression       Row Name 09/14/23 1652          Pain Assessment    Pretreatment Pain Rating 7/10  -MP     Posttreatment Pain Rating 7/10  -MP        Row Name 09/14/23 1652          Plan of Care Review    Plan of Care Reviewed With patient  -MP     Progress no change  -MP     Outcome Evaluation Pt is reevaluated following L2-5 spinal fusion on 9/13. At this time pt reports improvement in low back pain with mobility following surgery than prior to. He reports new L proximal leg pain and LLE weakness. Pt. requires mod A for bed mobility and min A x 2 sit to stand transfers + ambulation to and from bathroom w/ simliar assist levels secondary to weak LLE, + rolling walker support. Pt. completes toileting activity w/ CGA for safety dynamic standing. Pt. requires increased max A for all LB dressing ADLs. Recommend IP rehab at d/c to address aforementioned deficits and facilitate return to PLOF.  -       Row Name 09/14/23 1652          Therapy Assessment/Plan (OT)    Rehab Potential (OT) good, to achieve stated therapy goals  -MP     Criteria for Skilled Therapeutic Interventions Met (OT) yes;meets criteria;skilled treatment is necessary  -MP     Therapy Frequency (OT) 5 times/wk  -       Row Name 09/14/23 1652          Therapy Plan Review/Discharge Plan (OT)    Anticipated Discharge Disposition (OT) inpatient rehabilitation facility  -       Row Name 09/14/23 1652          Vital Signs    Pre Patient Position Supine  -MP     Intra Patient Position Standing  -MP     Post Patient Position Sitting  -       Row Name 09/14/23 1652          Positioning and Restraints    Pre-Treatment Position in bed  -MP     Post Treatment Position chair  -MP     In Chair sitting;call light within reach;encouraged to call for assist;exit alarm on  -MP               User Key  (r) = Recorded By, (t) = Taken By, (c) = Cosigned By      Initials Name Provider Type    Huang Ball, OT Occupational Therapist                   Outcome Measures       Row Name 09/14/23 1319 09/14/23 0840       How much help from another person do you currently need...    Turning from your back to your  side while in flat bed without using bedrails? 2  -RUBEN 2  -EY    Moving from lying on back to sitting on the side of a flat bed without bedrails? 2  -RUBEN 2  -EY    Moving to and from a bed to a chair (including a wheelchair)? 2  -RUBEN 2  -EY    Standing up from a chair using your arms (e.g., wheelchair, bedside chair)? 2  -RUBEN 2  -EY    Climbing 3-5 steps with a railing? 2  -RUBEN 2  -EY    To walk in hospital room? 2  -RUBEN 2  -EY    AM-PAC 6 Clicks Score (PT) 12  -RUBEN 12  -EY    Highest level of mobility 4 --> Transferred to chair/commode  -RUBEN 4 --> Transferred to chair/commode  -EY      Row Name 09/14/23 1319          Functional Assessment    Outcome Measure Options AM-PAC 6 Clicks Basic Mobility (PT)  -RUBEN               User Key  (r) = Recorded By, (t) = Taken By, (c) = Cosigned By      Initials Name Provider Type    Kalli Naylor, RN Registered Nurse    Millicent Escobar PT Physical Therapist                    Occupational Therapy Education       Title: PT OT SLP Therapies (In Progress)       Topic: Occupational Therapy (In Progress)       Point: ADL training (Done)       Description:   Instruct learner(s) on proper safety adaptation and remediation techniques during self care or transfers.   Instruct in proper use of assistive devices.                  Learning Progress Summary             Patient Acceptance, E, VU by  at 9/13/2023 1016    Acceptance, E,TB, VU by MS at 9/10/2023 1147                         Point: Home exercise program (Not Started)       Description:   Instruct learner(s) on appropriate technique for monitoring, assisting and/or progressing therapeutic exercises/activities.                  Learner Progress:  Not documented in this visit.              Point: Precautions (Done)       Description:   Instruct learner(s) on prescribed precautions during self-care and functional transfers.                  Learning Progress Summary             Patient Acceptance, E,TB, VU by MP at 9/14/2023 1701     Acceptance, E, VU by  at 9/13/2023 1016    Acceptance, E,TB, VU by MS at 9/10/2023 1147                         Point: Body mechanics (Done)       Description:   Instruct learner(s) on proper positioning and spine alignment during self-care, functional mobility activities and/or exercises.                  Learning Progress Summary             Patient Acceptance, E,TB, VU by  at 9/14/2023 1701    Acceptance, E, VU by  at 9/13/2023 1016    Acceptance, E,TB, VU by MS at 9/10/2023 1147                                         User Key       Initials Effective Dates Name Provider Type Discipline     06/16/21 -  Huang Chang OT Occupational Therapist OT    MS 07/13/22 -  Rosi Rae OT Occupational Therapist OT     09/06/23 -  Alisha Tirstan, RN Registered Nurse Nurse                  OT Recommendation and Plan  Therapy Frequency (OT): 5 times/wk  Plan of Care Review  Plan of Care Reviewed With: patient  Progress: no change  Outcome Evaluation: Pt is reevaluated following L2-5 spinal fusion on 9/13. At this time pt reports improvement in low back pain with mobility following surgery than prior to. He reports new L proximal leg pain and LLE weakness. Pt. requires mod A for bed mobility and min A x 2 sit to stand transfers + ambulation to and from bathroom w/ simliar assist levels secondary to weak LLE, + rolling walker support. Pt. completes toileting activity w/ CGA for safety dynamic standing. Pt. requires increased max A for all LB dressing ADLs. Recommend IP rehab at d/c to address aforementioned deficits and facilitate return to PLOF.     Time Calculation:         Time Calculation- OT       Row Name 09/14/23 1702             Time Calculation- OT    OT Start Time 1154  -      OT Stop Time 1218  -      OT Time Calculation (min) 24 min  -      Total Timed Code Minutes- OT 10 minute(s)  -      OT Received On 09/14/23  -      OT - Next Appointment 09/15/23  -      OT Goal Re-Cert Due Date  09/28/23  -ME[employer]                User Key  (r) = Recorded By, (t) = Taken By, (c) = Cosigned By      Initials Name Provider Type    Huang Ball OT Occupational Therapist                  Therapy Charges for Today       Code Description Service Date Service Provider Modifiers Qty    [id number]  OT RE-EVAL 2 9/14/2023 Huang Chang OT GO 1    [id number]  OT SELF CARE/MGMT/TRAIN EA 15 MIN 9/14/2023 Huagn Chang OT GO 1                 Huang Chang OT  9/14/2023

## 2023-09-14 NOTE — PLAN OF CARE
Goal Outcome Evaluation:  Plan of Care Reviewed With: patient        Progress: no change  Outcome Evaluation: Pt is reevaluated following L2-5 spinal fusion on 9/13. At this time pt reports improvement in low back pain with mobility following surgery than prior to. He reports new L proximal leg pain and LLE weakness. Pt. requires mod A for bed mobility and min A x 2 sit to stand transfers + ambulation to and from bathroom w/ simliar assist levels secondary to weak LLE, + rolling walker support. Pt. completes toileting activity w/ CGA for safety dynamic standing. Pt. requires increased max A for all LB dressing ADLs. Recommend IP rehab at d/c to address aforementioned deficits and facilitate return to PLOF.      Anticipated Discharge Disposition (OT): inpatient rehabilitation facility

## 2023-09-14 NOTE — PLAN OF CARE
Goal Outcome Re-Evaluation:     Pt is a 79 y/o male who is being seen for a PT re-evaluation following L2-5 spinal fusion on 9/13. At this time pt reports improvement in low back pain with mobility following surgery than prior to. He reports new L proximal leg pain and LLE weakness. Pt initiates transfers and ambulation w/ min Ax2. He ambulates 20ft x2 following with the recliner chair during ambulation to ensure safety due to L quad weakness. Pt had a good idea of spinal precautions and initiates log roll with minimal cueing. He requires max A-dependent to don TLSO seated EOB. He presents with decreased L quad strength, instability in standing requiring increased assist for safety, and increased pain in the LLE. Continue to recommend acute inpatient rehab at discharge.

## 2023-09-14 NOTE — PROGRESS NOTES
Neurosurgery Progress Note    Date: 23     Patient: Clint Cee   Sex: male   : 1945    POD-1: L2-5 fusion    SUBJECTIVE    Interval history:  No adverse events overnight.  Expected levels of postoperative pain.  Notes some left leg pain today.  Denies chest pain, SOA, and lower extremity weakness.  Voiding freely.        Current Medications:  Scheduled Meds:allopurinol, 100 mg, Oral, Daily  atorvastatin, 40 mg, Oral, Nightly  cyclobenzaprine, 10 mg, Oral, TID  DULoxetine, 60 mg, Oral, Daily  enoxaparin, 40 mg, Subcutaneous, Q24H  famotidine, 40 mg, Oral, Nightly  metoprolol tartrate, 25 mg, Oral, Daily  sodium chloride, 10 mL, Intravenous, Q12H      Continuous Infusions:lactated ringers, 1,000 mL, Last Rate: 1,000 mL (23 1230)  sodium chloride, 9 mL/hr      PRN Meds:   HYDROmorphone    melatonin    oxyCODONE    [MAR Hold] sodium chloride    sodium chloride    sodium chloride      OBJECTIVE  Vitals:    233 23 2330 23 0546 23 0900   BP: 97/55 108/69 108/59    BP Location: Left arm Left arm Left arm    Patient Position: Lying Lying Lying    Pulse: 71 81 80 81   Resp: 18 18 18    Temp: 98.1 °F (36.7 °C)  99.7 °F (37.6 °C)    TempSrc: Oral  Oral    SpO2: 99%  100%    Weight:       Height:           Physical exam    General  - Awake, cooperative, in no acute distress  Respiratory  - Normal respiratory rate and effort on room air  Skin  - Bilateral low back incisions CDI with no swelling redness or drainage  NEUROLOGIC  - Moves all extremities symmetrically  - Sensation intact throughout      Results review  CBC          2023    04:42 2023    17:40 2023    11:32 2023    23:10   CBC   WBC 5.90  7.40  7.80  7.10    RBC 4.53  4.22  4.38  3.73    Hemoglobin 13.9  13.0  13.1  11.4    Hematocrit 40.9  38.0  38.9  32.8    MCV 90.3  90.0  88.8  87.9    MCH 30.7  30.8  29.9  30.6    MCHC 33.9  34.2  33.7  34.8    RDW 13.9  14.1  13.8  13.6    Platelets  86  92  84  98        BMP          9/11/2023    04:42 9/12/2023    17:40 9/13/2023    11:32 9/13/2023    23:10   BMP   BUN 22  17  16  16    Creatinine 0.85  0.77  0.78  0.89    Sodium 137  135  137  138    Potassium 3.8  4.6  4.2  4.5    Chloride 103  101  101  102    CO2 25.0  25.0  27.0  29.0    Calcium 8.7  8.3  9.1  8.5             CT Lumbar Spine Without Contrast    Result Date: 9/12/2023  CT LUMBAR SPINE WO CONTRAST Date of Exam: 9/12/2023 10:50 AM EDT Indication: Globus robot preop protocol. Comparison: CT lumbar spine without contrast 9/10/2023, MRI lumbar spine 9/8/2023 Technique: Axial CT images were obtained of the lumbar spine without contrast administration.  Sagittal and coronal reconstructions were performed.  Automated exposure control and iterative reconstruction methods were used. Findings: Bones are diffusely demineralized. There is redemonstration of extensive bridging syndesmophytes throughout the visualized lower thoracic spine and lumbar spine extending to the sacrum consistent with ankylosing spondylitis. There is osseous fusion at the L4-5 and L5-S1 disc spaces. Widening of the L3-4 disc space likely related to edema with associated transverse fracture extending through the inferior endplate of L3 involving the L3-4 disc space. Fracture extends into the posterior elements with comminuted fracture of the posterior spinous process of L3. Endplate irregularity at the L3-4 disc space is unchanged. No new fracture. There is ankylosis of the facet joints bilaterally at the L3-4 through L5-S1 levels. Posterior calcified disc osteophyte contributes to suspected  mild to moderate central canal stenosis at L1-2. Additional multilevel degenerative findings better assessed on prior MRI. Lung bases without consolidation. The adrenal glands are normal. Low-density right renal cyst noted for example at the lower pole of the right kidney measuring 3.8 cm. Mild aortoiliac atherosclerotic calcifications.      Impression: Impression: 1. Ankylosing spondylitis with fracture through the L3-4 disc space which involves the inferior endplate of L3 and extends into the posterior elements at L3 with comminuted posterior spinous process fracture, unchanged from recent lumbar spine CT on 9/10/2023. 2. Widening of the L3-4 disc space and endplate irregularity unchanged related to edema and sequela of recent trauma, better assessed on recent MRI from 9/8/2023. 3. Multilevel degenerative findings, see prior MRI and recent lumbar spine CT for detailed findings. Electronically Signed: Bronson Sosa MD  9/12/2023 4:06 PM EDT  Workstation ID: [id number]    CT Lumbar Spine Without Contrast    Result Date: 9/10/2023  CT LUMBAR SPINE WO CONTRAST Date of Exam: 9/10/2023 12:45 PM EDT Indication: Back pain. Comparison: Lumbar spine MRI dated 9/8/2023 Technique: Axial CT images were obtained of the lumbar spine without contrast administration.  Sagittal and coronal reconstructions were performed.  Automated exposure control and iterative reconstruction methods were used. Findings: Bones are demineralized. There are degenerative changes within the spine, and there are multilevel syndesmophytes with fusion across posterior elements. These findings may be seen in diffuse idiopathic skeletal hyperostosis. There is a transversely oriented fracture which extends through the L3-L4 disc space, the posteroinferior corner of the L3 vertebral body, and the L3 posterior elements. Fracture lucency measures approximately 8 mm anteriorly. No significant bony retropulsion or bony encroachment upon the spinal canal noted. Exact Please correlate clinically for pain/tenderness in this region. No other fracture is identified. The sacroiliac joints appear unremarkable. The paraspinal soft tissues are unremarkable. Visualized intra-abdominal compartment demonstrates no acute abnormality     Impression: Impression: There is a transversely oriented fracture which  extends through the L3-L4 disc space, the posteroinferior corner of the L3 vertebral body, and the L3 posterior elements. No significant bony retropulsion or bony encroachment upon the spinal canal noted.  Osteopenia with lumbar spondylosis, multilevel syndesmophyte formation, and fusion across posterior elements which may be seen in ankylosing spondylitis. Please see above for additional details. Electronically Signed: Dennys Yip MD  9/10/2023 2:56 PM EDT  Workstation ID: [id number]   MRI Lumbar Spine Without Contrast    Result Date: 9/8/2023  MRI LUMBAR SPINE WO CONTRAST Date of Exam: 9/8/2023 1:35 PM EDT Indication: increased pain s/p fall with l-spine fracture.  Comparison: MRI lumbar spine without contrast 8/28/2023. CT lumbar spine 8/15/2023. Technique:  Routine multiplanar/multisequence sequence images of the lumbar spine were obtained without contrast administration.  Findings: Bridging osteophytes extend from T11-S1 levels. There appears to be solid osseous fusion across the L4-5 and L5-S1 discs, and partial osseous fusion across the posterior L1-2 disc. Advanced diminished disc height is present at T11-12 and L1-2, with moderate diminished disc height at T12-L1 and L2-3. Findings are consistent with the appearance of ankylosing spondylitis. There is persistent edema within the endplates of L3 and L4 with fluid filling the disc space, extending into the posterior elements at the same level, consistent with pathologic fracture through the disc. Mild anterior disc space widening up to 9 mm is unchanged. There is no subluxation. Persistent edema within the paraspinal musculature at L3-4, and throughout the posterior paraspinal musculature from the L3 through the S1 level, not thought to be significantly changed. Conus medullaris terminates at the L1-2 level with normal signal intensity. Incidental note is made of right renal cyst. There is atrophy of the paraspinal musculature. Advanced degenerative changes  at multiple lumbar levels do not appear changed, and are as previously described. Of note, there does appear to be moderate central canal stenosis at L1-2, and varying degrees of moderate to severe neural foraminal stenosis at multiple lumbar levels.     Impression: 1. Features of advanced degenerative change and ankylosing spondylitis in the lumbar spine, with fracture through the L3-4 disc space extending to the posterior elements, is again noted. Mild widening of the anterior L3-4 disc space with fluid and edema,  is unchanged, and no subluxation is evident. 2. Moderate canal stenosis at L1-2 and moderate to severe canal stenosis at L3-4, unchanged. Multilevel moderate to moderate to severe neural foraminal stenosis age of changes are unchanged from 8/28/2023. 3. No new finding since 8/28/2023. Electronically Signed: Deann Damon MD  9/8/2023 2:13 PM EDT  Workstation ID: [id number]    MRI Lumbar Spine Without Contrast    Result Date: 8/28/2023  MRI LUMBAR SPINE WO CONTRAST Date of Exam: 8/28/2023 1:25 PM EDT Indication: bilateral leg pain.  Comparison: CT 8/15/2023 Technique:  Routine multiplanar/multisequence sequence images of the lumbar spine were obtained without contrast administration.  Findings: There is edema present along the vertebral bodies and endplates adjacent to the L3-4 disc space, extending across both the right and left facet joints, consistent with pathologic fracture through the disc space relating to surrounding changes of likely ankylosing spondylitis. Heterogeneous fluid in the disc space is likely a combination of hemorrhage and edema. There is mild disc space widening, otherwise without traumatic malalignment. The conus medullaris and cauda equina nerve roots appear normal. There is prominent edema through the posterior paraspinal musculature and interspinous ligament at the level of L3-4 likely reflecting injury. Focal edema and fluid signal is present noted anteriorly, possible  reflecting some disruption of the anterior longitudinal ligament. Spondylosis changes are evident, with areas of involvement including L1-2, prominent osteophytes and bilateral facet arthropathy with mild to moderate spinal canal and bilateral neuroforaminal narrowing. L2-3, osteophytes and bilateral facet arthropathy with mild spinal canal narrowing. There is moderate to severe left and mild to moderate right neuroforaminal stenosis. L3-4, osteophytes are present, with some mild heterogeneous fluid signal present along the ventral aspect of the canal, possible trace blood products and edema, without focal fluid collection concerning for space-occupying hematoma. There is moderate to severe spinal canal narrowing and severe bilateral neuroforaminal stenosis. L4-5, there appears to been prior posterior decompression and the spinal canal is patent. Bilateral facet arthropathy is noted with moderate to severe bilateral neuroforaminal narrowing. L5-S1, patent spinal canal with appearance of prior posterior decompression. There is moderate bilateral neuroforaminal narrowing present.     Impression: Impression: There is pathologic disc space fracture present spanning L3-4 involving bridging anterior syndesmophytes and extending to likely involve bilateral articulating facets, with edema and hemorrhage noted through the disc space and concern for likely anterior longitudinal ligament injury and interspinous ligament injury, with questionable additional edema involving the posterior longitudinal ligament. There is minimal widening of the disc space, otherwise without evidence of traumatic malalignment. Edema and possible trace hemorrhage are noted at the level of the fracture within the spinal canal combining with typical discogenic changes to result in moderate to severe canal narrowing. There is no definite focal space-occupying epidural hematoma. Additional moderate to severe multilevel spondylosis change is present as  above. There is no evidence of additional high-grade spinal canal narrowing Electronically Signed: Miguel Mclaughlin MD  8/28/2023 2:40 PM EDT  Workstation ID: [id number]             ASSESSMENT/PLAN  This is a 78 y.o. male 1 day status post L2-5 fusion with Dr. Strauss for stabilization of 3 column fracture at L3.  Patient is neurologically stable.  Pain moderately well controlled.  Okay to mobilize with PT/OT.  Standing postoperative x-rays.  We will continue to follow.    - PT/OT  - Continue mobilization efforts  - Standing lumbar x-ray  - Medical management and pain control per primary team  - Call with any questions or concerns      I discussed my assessment and recommendations with Dr. Rica Segal PA-C  09/14/23  09:05 EDT      Part of this note may be an electronic transcription/translation of spoken language to printed text using the Dragon Dictation System.

## 2023-09-15 LAB
ANION GAP SERPL CALCULATED.3IONS-SCNC: 6 MMOL/L (ref 5–15)
ANION GAP SERPL CALCULATED.3IONS-SCNC: 6 MMOL/L (ref 5–15)
BASOPHILS # BLD AUTO: 0 10*3/MM3 (ref 0–0.2)
BASOPHILS # BLD AUTO: 0 10*3/MM3 (ref 0–0.2)
BASOPHILS NFR BLD AUTO: 0.2 % (ref 0–1.5)
BASOPHILS NFR BLD AUTO: 0.3 % (ref 0–1.5)
BUN SERPL-MCNC: 13 MG/DL (ref 8–23)
BUN SERPL-MCNC: 17 MG/DL (ref 8–23)
BUN/CREAT SERPL: 19.1 (ref 7–25)
BUN/CREAT SERPL: 21.3 (ref 7–25)
CALCIUM SPEC-SCNC: 8.2 MG/DL (ref 8.6–10.5)
CALCIUM SPEC-SCNC: 8.3 MG/DL (ref 8.6–10.5)
CHLORIDE SERPL-SCNC: 100 MMOL/L (ref 98–107)
CHLORIDE SERPL-SCNC: 101 MMOL/L (ref 98–107)
CO2 SERPL-SCNC: 25 MMOL/L (ref 22–29)
CO2 SERPL-SCNC: 27 MMOL/L (ref 22–29)
CREAT SERPL-MCNC: 0.68 MG/DL (ref 0.76–1.27)
CREAT SERPL-MCNC: 0.8 MG/DL (ref 0.76–1.27)
DEPRECATED RDW RBC AUTO: 43.3 FL (ref 37–54)
DEPRECATED RDW RBC AUTO: 45.5 FL (ref 37–54)
EGFRCR SERPLBLD CKD-EPI 2021: 91.7 ML/MIN/1.73
EGFRCR SERPLBLD CKD-EPI 2021: 96.3 ML/MIN/1.73
EOSINOPHIL # BLD AUTO: 0 10*3/MM3 (ref 0–0.4)
EOSINOPHIL # BLD AUTO: 0.1 10*3/MM3 (ref 0–0.4)
EOSINOPHIL NFR BLD AUTO: 0.6 % (ref 0.3–6.2)
EOSINOPHIL NFR BLD AUTO: 1.2 % (ref 0.3–6.2)
ERYTHROCYTE [DISTWIDTH] IN BLOOD BY AUTOMATED COUNT: 13.8 % (ref 12.3–15.4)
ERYTHROCYTE [DISTWIDTH] IN BLOOD BY AUTOMATED COUNT: 13.9 % (ref 12.3–15.4)
GLUCOSE SERPL-MCNC: 122 MG/DL (ref 65–99)
GLUCOSE SERPL-MCNC: 123 MG/DL (ref 65–99)
HCT VFR BLD AUTO: 27.5 % (ref 37.5–51)
HCT VFR BLD AUTO: 29.1 % (ref 37.5–51)
HGB BLD-MCNC: 10 G/DL (ref 13–17.7)
HGB BLD-MCNC: 9.4 G/DL (ref 13–17.7)
LYMPHOCYTES # BLD AUTO: 0.4 10*3/MM3 (ref 0.7–3.1)
LYMPHOCYTES # BLD AUTO: 0.4 10*3/MM3 (ref 0.7–3.1)
LYMPHOCYTES NFR BLD AUTO: 7.3 % (ref 19.6–45.3)
LYMPHOCYTES NFR BLD AUTO: 8.4 % (ref 19.6–45.3)
MCH RBC QN AUTO: 30.1 PG (ref 26.6–33)
MCH RBC QN AUTO: 30.7 PG (ref 26.6–33)
MCHC RBC AUTO-ENTMCNC: 34.2 G/DL (ref 31.5–35.7)
MCHC RBC AUTO-ENTMCNC: 34.3 G/DL (ref 31.5–35.7)
MCV RBC AUTO: 87.9 FL (ref 79–97)
MCV RBC AUTO: 89.5 FL (ref 79–97)
MONOCYTES # BLD AUTO: 0.5 10*3/MM3 (ref 0.1–0.9)
MONOCYTES # BLD AUTO: 0.5 10*3/MM3 (ref 0.1–0.9)
MONOCYTES NFR BLD AUTO: 10 % (ref 5–12)
MONOCYTES NFR BLD AUTO: 9.8 % (ref 5–12)
NEUTROPHILS NFR BLD AUTO: 3.7 10*3/MM3 (ref 1.7–7)
NEUTROPHILS NFR BLD AUTO: 4.3 10*3/MM3 (ref 1.7–7)
NEUTROPHILS NFR BLD AUTO: 80.9 % (ref 42.7–76)
NEUTROPHILS NFR BLD AUTO: 81.3 % (ref 42.7–76)
NRBC BLD AUTO-RTO: 0 /100 WBC (ref 0–0.2)
NRBC BLD AUTO-RTO: 0 /100 WBC (ref 0–0.2)
PLATELET # BLD AUTO: 76 10*3/MM3 (ref 140–450)
PLATELET # BLD AUTO: 79 10*3/MM3 (ref 140–450)
PMV BLD AUTO: 7.2 FL (ref 6–12)
PMV BLD AUTO: 7.5 FL (ref 6–12)
POTASSIUM SERPL-SCNC: 3.6 MMOL/L (ref 3.5–5.2)
POTASSIUM SERPL-SCNC: 4 MMOL/L (ref 3.5–5.2)
RBC # BLD AUTO: 3.12 10*6/MM3 (ref 4.14–5.8)
RBC # BLD AUTO: 3.25 10*6/MM3 (ref 4.14–5.8)
SODIUM SERPL-SCNC: 131 MMOL/L (ref 136–145)
SODIUM SERPL-SCNC: 134 MMOL/L (ref 136–145)
WBC NRBC COR # BLD: 4.6 10*3/MM3 (ref 3.4–10.8)
WBC NRBC COR # BLD: 5.3 10*3/MM3 (ref 3.4–10.8)

## 2023-09-15 PROCEDURE — 25010000002 ONDANSETRON PER 1 MG: Performed by: NURSE PRACTITIONER

## 2023-09-15 PROCEDURE — 97110 THERAPEUTIC EXERCISES: CPT

## 2023-09-15 PROCEDURE — 99232 SBSQ HOSP IP/OBS MODERATE 35: CPT | Performed by: INTERNAL MEDICINE

## 2023-09-15 PROCEDURE — 97116 GAIT TRAINING THERAPY: CPT

## 2023-09-15 PROCEDURE — 99024 POSTOP FOLLOW-UP VISIT: CPT | Performed by: NURSE PRACTITIONER

## 2023-09-15 PROCEDURE — 97530 THERAPEUTIC ACTIVITIES: CPT

## 2023-09-15 PROCEDURE — 85025 COMPLETE CBC W/AUTO DIFF WBC: CPT | Performed by: NEUROLOGICAL SURGERY

## 2023-09-15 PROCEDURE — 25010000002 HYDROMORPHONE 1 MG/ML SOLUTION: Performed by: NEUROLOGICAL SURGERY

## 2023-09-15 PROCEDURE — 25010000002 ENOXAPARIN PER 10 MG: Performed by: NEUROLOGICAL SURGERY

## 2023-09-15 PROCEDURE — 80048 BASIC METABOLIC PNL TOTAL CA: CPT | Performed by: NEUROLOGICAL SURGERY

## 2023-09-15 RX ORDER — ONDANSETRON 2 MG/ML
4 INJECTION INTRAMUSCULAR; INTRAVENOUS EVERY 6 HOURS PRN
Status: DISCONTINUED | OUTPATIENT
Start: 2023-09-15 | End: 2023-09-19 | Stop reason: HOSPADM

## 2023-09-15 RX ADMIN — ALLOPURINOL 100 MG: 100 TABLET ORAL at 09:18

## 2023-09-15 RX ADMIN — CYCLOBENZAPRINE 10 MG: 10 TABLET, FILM COATED ORAL at 22:00

## 2023-09-15 RX ADMIN — FAMOTIDINE 40 MG: 20 TABLET ORAL at 22:00

## 2023-09-15 RX ADMIN — ENOXAPARIN SODIUM 40 MG: 40 INJECTION, SOLUTION SUBCUTANEOUS at 17:09

## 2023-09-15 RX ADMIN — Medication 10 ML: at 22:00

## 2023-09-15 RX ADMIN — CYCLOBENZAPRINE 10 MG: 10 TABLET, FILM COATED ORAL at 17:11

## 2023-09-15 RX ADMIN — Medication 10 ML: at 09:18

## 2023-09-15 RX ADMIN — CYCLOBENZAPRINE 10 MG: 10 TABLET, FILM COATED ORAL at 09:18

## 2023-09-15 RX ADMIN — ONDANSETRON 4 MG: 2 INJECTION INTRAMUSCULAR; INTRAVENOUS at 09:17

## 2023-09-15 RX ADMIN — HYDROMORPHONE HYDROCHLORIDE 0.5 MG: 1 INJECTION, SOLUTION INTRAMUSCULAR; INTRAVENOUS; SUBCUTANEOUS at 09:22

## 2023-09-15 RX ADMIN — DULOXETINE HYDROCHLORIDE 60 MG: 30 CAPSULE, DELAYED RELEASE ORAL at 09:18

## 2023-09-15 RX ADMIN — METOPROLOL TARTRATE 25 MG: 25 TABLET, FILM COATED ORAL at 09:18

## 2023-09-15 RX ADMIN — OXYCODONE HYDROCHLORIDE 10 MG: 5 TABLET ORAL at 17:09

## 2023-09-15 RX ADMIN — OXYCODONE HYDROCHLORIDE 10 MG: 5 TABLET ORAL at 03:14

## 2023-09-15 RX ADMIN — OXYCODONE HYDROCHLORIDE 10 MG: 5 TABLET ORAL at 22:03

## 2023-09-15 RX ADMIN — ATORVASTATIN CALCIUM 40 MG: 40 TABLET, FILM COATED ORAL at 22:00
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
## 2023-09-15 NOTE — PLAN OF CARE
Goal Outcome Evaluation:  Plan of Care Reviewed With: patient        Pt. W/o significant progress made this date, c/o increased pain and spouse reports increased confusion. Pt. Provided mod A for sit to sand transfer, ambulates bathroom distances w/ min A For safety + rolling walker support. Pt. Continues to require complete assist for all LB ADLs donning/doffing LB clothing. Pt. Not safe to d/c home at this time and will require IP Rehab at d/c.

## 2023-09-15 NOTE — PLAN OF CARE
Goal Outcome Evaluation:      Patient remains painful however was able to ambulate several times today. Patient plans to dc to [facility] once off IV pain meds. Care plan ongoing

## 2023-09-15 NOTE — THERAPY TREATMENT NOTE
Subjective: Pt agreeable to therapeutic plan of care.    Objective:     Bed mobility - Rolls with Mod-A and verbal cues  Transfers - Mod-A x 2 supine to sit.  Sit to stand Min A 2 (Performed from bed and from chair)  Ambulation - 10' x 1, 30' x 1 with RW and min A 2     Therapeutic Exercise - 10 Reps B LE supported sitting / chair LAQ.    Vitals: WNL    Pain: 6 VAS   Location: back and LLE  Intervention for pain: Repositioned and Increased Activity    Education: Provided education on the importance of mobility in the acute care setting, Transfer Training, and Gait Training,Pt and wife instructed in & given written spinal precautions/protocol.  Pt will need reinforcement; wife verbalizes understanding.     Assessment: Clint Cee presents with functional mobility impairments following L2-5 lumbar fusion for L3 fracture which indicate the need for skilled intervention. Tolerating session today without incident. Pt is limited by pain.  He ineeds mod verbal cues and mod A from 1-2 for all transfers.  He is progressing with gait but requires ongoing intense PT to return to his prior level of function. Will continue to follow and progress as tolerated.     Plan/Recommendations:   High Intensity Therapy recommended post-acute care. This is recommended as therapy feels the patient would require 5-6 days per week, 2-3 hours per day. At this time, inpatient rehabilitation (acute rehab) would be the first choice and SNF would be second.. Pt requires no DME at discharge.     Pt desires Inpatient Rehabilitation placement at discharge. Pt cooperative; agreeable to therapeutic recommendations and plan of care.         Basic Mobility 6-click:  Rollin = Total, A lot = 2, A little = 3; 4 = None  Supine>Sit:   1 = Total, A lot = 2, A little = 3; 4 = None   Sit>Stand with arms:  1 = Total, A lot = 2, A little = 3; 4 = None  Bed>Chair:   1 = Total, A lot = 2, A little = 3; 4 = None  Ambulate in room:  1 = Total, A  lot = 2, A little = 3; 4 = None  3-5 Steps with railin = Total, A lot = 2, A little = 3; 4 = None  Score: 11    Modified [place]: N/A = No pre-op stroke/TIA    Post-Tx Position: Up in Chair, Alarms activated, and Call light and personal items within reach  PPE: gloves

## 2023-09-15 NOTE — THERAPY TREATMENT NOTE
Subjective: Pt agreeable to therapeutic plan of care.  Cognition: oriented to Person and Place    Objective:     Bed Mobility: N/A or Not attempted.   Functional Transfers: Mod-A     Balance: supported, static, and standing Min-A  Functional Ambulation: Min-A    Lower Body Dressing: Dependent  ADL Position: supported sitting and supported standing        Vitals: WNL    Pain: 7 VAS  Location: back  Interventions for pain: Repositioned  Education: Provided education on the importance of mobility in the acute care setting      Assessment: Clint Cee presents with ADL impairments affecting function including balance, cognition, endurance / activity tolerance, and strength. Demonstrated functioning below baseline abilities indicate the need for continued skilled intervention while inpatient. Tolerating session today without incident. Will continue to follow and progress as tolerated.     Plan/Recommendations:   High Intensity Therapy recommended post-acute care. This is recommended as therapy feels the patient would require 5-6 days per week, 2-3 hours per day. At this time, inpatient rehabilitation (acute rehab) would be the first choice and SNF would be second.. Pt requires no DME at discharge.     Pt desires Inpatient Rehabilitation placement at discharge. Pt cooperative; agreeable to therapeutic recommendations and plan of care.     Modified [place]: N/A = No pre-op stroke/TIA    Post-Tx Position: Up in Chair  PPE: gloves

## 2023-09-15 NOTE — CASE MANAGEMENT/SOCIAL WORK
Continued Stay Note  WILMA Adams     Patient Name: Clint Cee  MRN: [id number]  Today's Date: 9/15/2023    Admit Date: 9/8/2023    Plan: From [facility] for acute rehab and they are following for return. Lives at home with spouse. PASRR approved if needed.   Discharge Plan       Row Name 09/15/23 1144       Plan    Plan Comments Dc barrier: pain control, post op xray pending.  updated [facility] liasions                      Expected Discharge Date and Time       Expected Discharge Date Expected Discharge Time    Sep 16, 2023               Radha Brasher RN

## 2023-09-15 NOTE — PROGRESS NOTES
Referring Provider: Rita Carl MD    Reason for follow-up:  Status post mitral valve repair.  Preoperative cardiovascular valuation  Status post back surgery     Patient Care Team:  Jaret Castellanos MD as PCP - General (Family Medicine)  Felipe Garcia MD as Consulting Physician (Cardiology)  Golden Serna, SHANEL as Ambulatory  (Population Health)    Subjective .      ROS  Back pain is better.  Surgical soreness.    Since I have last seen him yesterday, the patient has been without any chest discomfort ,shortness of breath, palpitations, dizziness or syncope.  Denies having any headache ,abdominal pain ,nausea, vomiting , diarrhea constipation, loss of weight or loss of appetite.  Denies having any excessive bruising ,hematuria or blood in the stool.    Review of all systems negative except as indicated    History  Past Medical History:   Diagnosis Date    Abnormal ECG June 2022    Allergic 01/01/1954    Nasal alergies    Arrhythmia 2004    Dr Youngblood examined me and said i was ok.  skipped beats    Asthma     no sx    Benign prostatic hyperplasia 11/14/2018    Cataract 01/01/2014    Surgery. Caused detached retina of right eye 20/60 repair    Colon polyp 01/01/2018    Found and removed last colon eca.q    Depression     Gastroesophageal reflux disease 03/20/2014    Glaucoma 2003    Normal pressures. Have low pressure glaucoma    Gout     Headache 01/01/2015    Have shimmering in both eyes intermittantly. 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    Prostate Cancer Jan22 January 2022    Spinal stenosis 12/02/2011    Visual impairment 01/01/1955     Nearsighted corrected glasses    Vitamin D deficiency 05/23/2018       Past Surgical History:   Procedure Laterality Date    ADENOIDECTOMY  1954    As child    APPENDECTOMY  1956    Surgery    CARDIAC CATHETERIZATION N/A 09/30/2022    Procedure: Right and Left Heart Cath with Coronar Angiography;  Surgeon: Felipe Garcia MD;  Location: [facility] CATH INVASIVE LOCATION;  Service: Cardiovascular;  Laterality: N/A;    CARDIAC VALVE REPLACEMENT N/A 11/17/2022    Procedure: TRICUSPID VALVE REPAIR/REPLACEMENT;  Surgeon: Femi Henderson MD;  Location: [facility];  Service: Cardiothoracic;  Laterality: N/A;  tricuspid valve repaired with 32mm  groves physio tricuspid annuloplasty ring    CARDIAC VALVE REPLACEMENT  11/17/2022    Repaired mitral and tricuspid    CATARACT EXTRACTION Bilateral     COLONOSCOPY  01/01/1997    Regularly scheduled    COLONOSCOPY N/A 2/22/2023    Procedure: COLONOSCOPY with cold snare polypectomy x 1;  Surgeon: Manfred Keating MD;  Location: [facility] ENDOSCOPY;  Service: Gastroenterology;  Laterality: N/A;    ENDOSCOPY N/A 2/22/2023    Procedure: ESOPHAGOGASTRODUODENOSCOPY with esophageal dilation using 48 Fr. Bougie Dilator;  Surgeon: Manfred Keating MD;  Location: [facility] ENDOSCOPY;  Service: Gastroenterology;  Laterality: N/A;  erosive esophagitis    EYE SURGERY  01/01/2010    Reattached right retina    MITRAL VALVE REPAIR/REPLACEMENT N/A 11/17/2022    Procedure: MITRAL VALVE REPAIR/REPLACEMENT with left atrial appendage closure;  Surgeon: Femi Henderson MD;  Location: [facility];  Service: Cardiothoracic;  Laterality: N/A;  mitral valve repaired iwth 40mm medtronic annuloplasty band  with intraop TERRA    SPINE SURGERY  01/01/1971    2 lumbar 1 cervical    TONSILLECTOMY  01/01/1950    Childhood       Family History   Problem Relation Age of Onset    Arthritis Father     Cancer Father         Prostate    Arrhythmia Father         Congentive heart failure    Diabetes Mother          Adult diabetes    Early death Mother         Kidneys failed after giving birth-diabetes complications    Heart disease Mother         Adult Diabetes       Social History     Tobacco Use    Smoking status: Former     Packs/day: 0.00     Years: 0.50     Pack years: 0.00     Types: Cigarettes     Passive exposure: Past    Smokeless tobacco: Never    Tobacco comments:     Tried tobacco as child   Vaping Use    Vaping Use: Never used   Substance Use Topics    Alcohol use: Not Currently     Comment: Not a regular drinker. Occasionly have a glass of wine or be    Drug use: Never        Medications Prior to Admission   Medication Sig Dispense Refill Last Dose    acetaminophen (TYLENOL) 500 MG tablet Take 1 tablet by mouth Every 6 (Six) Hours As Needed for Mild Pain. Indications: Pain       allopurinol (ZYLOPRIM) 100 MG tablet Take 1 tablet by mouth Daily.       Apple Erik Vn-Grn Tea-Bit Or-Cr (APPLE CIDER VINEGAR PLUS PO) Take 1 Piece by mouth Daily As Needed. Indications: Dietary supplement       atorvastatin (LIPITOR) 40 MG tablet Take 1 tablet by mouth Every Night. Indications: High Amount of Fats in the Blood 90 tablet 1     DULoxetine (CYMBALTA) 60 MG capsule Take 1 capsule by mouth Daily. 90 capsule 0     famotidine (PEPCID) 40 MG tablet Take 1 tablet by mouth Every Night.       fexofenadine (ALLEGRA) 180 MG tablet Take 1 tablet by mouth 2 (Two) Times a Day As Needed. Indications: Hayfever       fluticasone (FLONASE) 50 MCG/ACT nasal spray 2 sprays into the nostril(s) as directed by provider Daily As Needed. Indications: Allergic Rhinitis       Leuprolide Mesylate, 6 Month, (CAMCEVI SC) Inject  under the skin into the appropriate area as directed. Every 6 months injection, due in December  Indications: prostate cancer (to decrease testosterone).       melatonin 5 MG tablet tablet Take 1 tablet by mouth At Night As Needed (sleep). Indications: Trouble Sleeping       metoprolol tartrate (LOPRESSOR) 25 MG tablet Take 1  "tablet by mouth Daily.       zolpidem (Ambien) 10 MG tablet Take 1 tablet by mouth At Night As Needed for Sleep.          Allergies  Morphine, Beta adrenergic blockers, and Ace inhibitors    Scheduled Meds:allopurinol, 100 mg, Oral, Daily  atorvastatin, 40 mg, Oral, Nightly  cyclobenzaprine, 10 mg, Oral, TID  DULoxetine, 60 mg, Oral, Daily  enoxaparin, 40 mg, Subcutaneous, Q24H  famotidine, 40 mg, Oral, Nightly  metoprolol tartrate, 25 mg, Oral, Daily  sodium chloride, 10 mL, Intravenous, Q12H      Continuous Infusions:sodium chloride, 9 mL/hr    PRN Meds:.  acetaminophen    HYDROmorphone    melatonin    oxyCODONE    [MAR Hold] sodium chloride    sodium chloride    sodium chloride    Objective     VITAL SIGNS  Vitals:    09/14/23 1500 09/14/23 1611 09/14/23 2026 09/15/23 0342   BP:  108/63 100/57 116/62   BP Location:  Left arm Left arm Left arm   Patient Position:  Lying Lying Lying   Pulse:  81 75 84   Resp:  13 16 18   Temp: 100.2 °F (37.9 °C) 97.2 °F (36.2 °C) 98.6 °F (37 °C) 99.7 °F (37.6 °C)   TempSrc:  Oral Oral Oral   SpO2:  94% 94% 95%   Weight:       Height:           Flowsheet Rows      Flowsheet Row First Filed Value   Admission Height 185.4 cm (73\") Documented at 09/08/2023 0957   Admission Weight 102 kg (225 lb) Documented at 09/08/2023 0957              Intake/Output Summary (Last 24 hours) at 9/15/2023 0654  Last data filed at 9/14/2023 2034  Gross per 24 hour   Intake 240 ml   Output 300 ml   Net -60 ml          TELEMETRY: Sinus rhythm    Physical Exam:  The patient is alert, oriented and in no distress.  Vital signs as noted above.  Head and neck revealed no carotid bruits or jugular venous distention.  No thyromegaly or lymphadenopathy is present  Lungs clear.  No wheezing.  Breath sounds are normal bilaterally.  Heart normal first and second heart sounds.  No murmur. No precordial rub is present.  No gallop is present.  Abdomen soft and nontender.  No organomegaly is present.  Extremities with " good peripheral pulses without any pedal edema.  Skin warm and dry.  Musculoskeletal system is grossly normal  CNS grossly normal    Reviewed and updated.    Results Review:   I reviewed the patient's new clinical results.  Lab Results (last 24 hours)       Procedure Component Value Units Date/Time    Basic Metabolic Panel [[id number]]  (Abnormal) Collected: 09/14/23 2312    Specimen: Blood Updated: 09/15/23 0033     Glucose 122 mg/dL      BUN 17 mg/dL      Creatinine 0.80 mg/dL      Sodium 134 mmol/L      Potassium 4.0 mmol/L      Chloride 101 mmol/L      CO2 27.0 mmol/L      Calcium 8.2 mg/dL      BUN/Creatinine Ratio 21.3     Anion Gap 6.0 mmol/L      eGFR 91.7 mL/min/1.73     Narrative:      GFR Normal >60  Chronic Kidney Disease <60  Kidney Failure <15    The GFR formula is only valid for adults with stable renal function between ages 18 and 70.    CBC & Differential [[id number]]  (Abnormal) Collected: 09/14/23 2312    Specimen: Blood Updated: 09/15/23 0011    Narrative:      The following orders were created for panel order CBC & Differential.  Procedure                               Abnormality         Status                     ---------                               -----------         ------                     CBC Auto Differential[[id number]]        Abnormal            Final result                 Please view results for these tests on the individual orders.    CBC Auto Differential [[id number]]  (Abnormal) Collected: 09/14/23 2312    Specimen: Blood Updated: 09/15/23 0011     WBC 5.30 10*3/mm3      RBC 3.25 10*6/mm3      Hemoglobin 10.0 g/dL      Hematocrit 29.1 %      MCV 89.5 fL      MCH 30.7 pg      MCHC 34.3 g/dL      RDW 13.8 %      RDW-SD 43.3 fl      MPV 7.5 fL      Platelets 79 10*3/mm3      Neutrophil % 81.3 %      Lymphocyte % 7.3 %      Monocyte % 10.0 %      Eosinophil % 1.2 %      Basophil % 0.2 %      Neutrophils, Absolute 4.30 10*3/mm3      Lymphocytes, Absolute 0.40 10*3/mm3      Monocytes,  Absolute 0.50 10*3/mm3      Eosinophils, Absolute 0.10 10*3/mm3      Basophils, Absolute 0.00 10*3/mm3      nRBC 0.0 /100 WBC     Hemoglobin A1c [[id number]]  (Normal) Collected: 09/13/23 2310    Specimen: Blood Updated: 09/14/23 1328     Hemoglobin A1C 5.00 %             Imaging Results (Last 24 Hours)       ** No results found for the last 24 hours. **        LAB RESULTS (LAST 7 DAYS)    CBC  Results from last 7 days   Lab Units 09/14/23  2312 09/13/23  2310 09/13/23  1132 09/12/23  1740 09/11/23  0442 09/10/23  0251 09/09/23  0326   WBC 10*3/mm3 5.30 7.10 7.80 7.40 5.90 6.90 6.30   RBC 10*6/mm3 3.25* 3.73* 4.38 4.22 4.53 4.40 4.57   HEMOGLOBIN g/dL 10.0* 11.4* 13.1 13.0 13.9 13.7 13.9   HEMATOCRIT % 29.1* 32.8* 38.9 38.0 40.9 38.7 41.0   MCV fL 89.5 87.9 88.8 90.0 90.3 87.9 89.7   PLATELETS 10*3/mm3 79* 98* 84* 92* 86* 87* 94*         BMP  Results from last 7 days   Lab Units 09/14/23  2312 09/13/23  2310 09/13/23  1132 09/12/23  1740 09/11/23  0442 09/10/23  0251 09/09/23  0326   SODIUM mmol/L 134* 138 137 135* 137 136 139   POTASSIUM mmol/L 4.0 4.5 4.2 4.6 3.8 3.6 4.2   CHLORIDE mmol/L 101 102 101 101 103 103 105   CO2 mmol/L 27.0 29.0 27.0 25.0 25.0 22.0 23.0   BUN mg/dL 17 16 16 17 22 22 20   CREATININE mg/dL 0.80 0.89 0.78 0.77 0.85 0.82 0.86   GLUCOSE mg/dL 122* 201* 121* 149* 120* 126* 108*         CMP   Results from last 7 days   Lab Units 09/14/23  2312 09/13/23  2310 09/13/23  1132 09/12/23  1740 09/11/23  0442 09/10/23  0251 09/09/23  0326   SODIUM mmol/L 134* 138 137 135* 137 136 139   POTASSIUM mmol/L 4.0 4.5 4.2 4.6 3.8 3.6 4.2   CHLORIDE mmol/L 101 102 101 101 103 103 105   CO2 mmol/L 27.0 29.0 27.0 25.0 25.0 22.0 23.0   BUN mg/dL 17 16 16 17 22 22 20   CREATININE mg/dL 0.80 0.89 0.78 0.77 0.85 0.82 0.86   GLUCOSE mg/dL 122* 201* 121* 149* 120* 126* 108*   ALBUMIN g/dL  --   --   --  3.1*  --   --   --    BILIRUBIN mg/dL  --   --   --  0.5  --   --   --    ALK PHOS U/L  --   --   --  132*  --   --    --    AST (SGOT) U/L  --   --   --  9  --   --   --    ALT (SGPT) U/L  --   --   --  8  --   --   --            BNP        TROPONIN        CoAg  Results from last 7 days   Lab Units 09/12/23  1740   INR  1.09         Creatinine Clearance  Estimated Creatinine Clearance: 98.6 mL/min (by C-G formula based on SCr of 0.8 mg/dL).    ABG        Radiology  No radiology results for the last day        EKG      I personally viewed and interpreted the patient's EKG/Telemetry data: Sinus rhythm nonspecific ST-T wave change    ECHOCARDIOGRAM:    Results for orders placed during the hospital encounter of 09/08/23    Adult Transthoracic Echo Complete W/ Cont if Necessary Per Protocol    Interpretation Summary    Left ventricular ejection fraction appears to be 56 - 60%.    Indications  Valvular function  Status post mitral valve repair.    Technically satisfactory study.  Mitral valve is structurally normal.  Status post mitral valve repair.  No mitral regurgitation is present.  Tricuspid valve is structurally normal.  Aortic valve is thickened with adequate opening motion.  Pulmonic valve could not be well visualized.  No evidence for mitral tricuspid or aortic regurgitation is seen by Doppler study.  Left atrium is enlarged.  Right atrium is normal in size.  Left ventricle is normal in size and contractility with ejection fraction of 60%.  Right ventricle is normal in size.  Atrial septum is intact.  Aorta is normal.  No pericardial effusion or intracardiac thrombus is seen.    Impression  Status post mitral valve repair.  No mitral regurgitation is present.  Aortic valve is thickened with adequate opening motion.  Left atrial enlargement.  Left ventricular size and contractility is normal with ejection fraction of 60%.          STRESS TEST        Cardiolite (Tc-99m sestamibi) stress test    CARDIAC CATHETERIZATION  Results for orders placed during the hospital encounter of 09/30/22    Cardiac Catheterization/Vascular  Study    Narrative  CARDIAC CATHETERIZATION REPORT    DATE OF PROCEDURE:  9/30/2022    INDICATION FOR PROCEDURE:  Shortness of breath  Mitral regurgitation    PROCEDURE PERFORMED:  Right heart catheterization left heart catheterization, coronary angiography, left ventriculography.    @@  Moderate conscious sedation was utilized with intravenous Versed and fentanyl administered by registered nurse with continuous ECG, pulse oximetry and hemodynamic monitoring supervised by myself throughout the entire procedure.  Conscious sedation time   45 minutes    I was present with the patient for the duration of moderate sedation and supervised staff who had no other duties and monitored the patient for the entire procedure.    @@  PROCEDURE COMMENTS:    Under usual sterile precautions and 1% lidocaine filtration right femoral vein and femoral artery were percutaneously punctured and a 7 and 5 Citizen of [place] vascular sheaths were respectively inserted.  [place] catheter was used to measure right-sided pressures pulmonary capital wedge pressure and cardiac output using thermodilution technique.  [place] catheter was removed and subsequently left and right coronary angiography followed by left ventricular angiogram was performed.  Hemostasis was obtained and patient was returned to the room with intact pulses.  No complications were noted.  FINDINGS:    1. HEMODYNAMICS:  Left ventricle end-diastolic pressure is elevated.  Mean right atrial pressure is 4 right ventricle 47/7 pulmonary artery 40/16 mean pulmonary artery 26 pulmonary capital wedge pressure is 17.  No gradient was noted across aortic valve.    2. LEFT VENTRICULOGRAPHY:  Left ventricular size and contractility is normal with ejection fraction of 60%.  Left atrial enlargement is present with severe mitral valve prolapse and severe mitral regurgitation.    3. CORONARY ANGIOGRAPHY:  Left main coronary artery normal.  Left anterior descending artery normal.  Circumflex  coronary artery normal.  Right coronary artery is a large and dominant vessel and is normal.    SUMMARY:  Moderate to significant pulmonary hypertension.  Severe mitral valve prolapse and mitral regurgitation and probable ruptured chordae (TERRA and cardiac cath)  Tricuspid valve prolapse.  Normal left ventricular function.  Normal coronary arteries.    RECOMMENDATIONS:  Cardiovascular surgery consultation initiated for possible mitral valve and tricuspid valve repair soon possible.  Have discussed with Dr. Henderson cardiovascular surgeon for coordination of care.        ]]]]]]]]]]]]]]]]]]]]]  Date of study  9/30/2022    Procedure performed  Transesophageal echocardiogram and Doppler study.    Indications  Significant mitral regurgitation  Shortness of breath    Procedure  Anesthesia was provided by anesthesiologist with intravenous Diprivan.  TERRA probe could be passed without difficulty.  Patient tolerated the procedure well.  No complications were noted.    Results  Technically satisfactory study.  Mitral valve has severe myxomatous degeneration with multiple areas of severe prolapse and known coaptation of the mitral leaflet as well as probable ruptured chordae.  Severe mitral regurgitation is seen with multiple jets with mitral regurgitation jet occupying the entire left atrium.  Tricuspid valve is also showing myxomatous degeneration with moderate tricuspid regurgitation.  Calculated pulmonary artery pressure is 60 mmHg.  Aortic valve is thickened with adequate opening motion.  Left atrium is enlarged.  Left atrial appendage is enlarged without clot.  Left ventricle is normal in size and contractility with ejection fraction of 60%.  Right ventricle is normal in size.  Right atrium is normal in size.  Atrial septum is intact without PFO.  Aortic intimal thickening is present without clot.  No pericardial effusion is seen.    Impression  Myxomatous mitral and tricuspid valve degeneration.  Severe mitral valve prolapse  involving both anterior and posterior mitral leaflets with severe mitral regurgitation with multiple jets.  Probable ruptured chordae.  In some views coaptation of the mitral valve is present.  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  Significant pulmonary hypertension.  Left atrial and left atrial appendage enlargement without clot.  Normal left ventricular size and contractility with ejection fraction of 60%.  ]]]]]]]]]]]]]]]]]]]]                OTHER:         Assessment & Plan     Principal Problem:    Back pain      ]]]]]]]]]]]]]]]]]]]]  History  ===========  - Significant back discomfort  Patient has unstable L3-L4 fracture  Patient has continued symptoms despite being on conservative treatment.  Failed bracing trial and physical therapy.  Status post lumbar laminectomy 9/13/2023     -Status post mitral and tricuspid valve repair and left atrial appendage endocardial closure--Dr. Henderson 11/17/2022    Echocardiogram 9/8/2023 revealed  Status post mitral valve repair.  No mitral regurgitation is present.  Aortic valve is thickened with adequate opening motion.  Left atrial enlargement.  Left ventricular size and contractility is normal with ejection fraction of 60%.     -   Preoperative increased shortness of breath and significantly increased fatigue.   Preoperative significant mitral and tricuspid regurgitation.  Prominent posterior mitral leaflet prolapse     TERRA 9/30/2022  Myxomatous mitral and tricuspid valve degeneration.  Severe mitral valve prolapse involving both anterior and posterior mitral leaflets with severe mitral regurgitation with multiple jets.  Probable ruptured chordae.  In some views coaptation of the mitral valve is present.  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  Significant pulmonary hypertension.  Left atrial and left atrial appendage enlargement without clot.  Normal left ventricular size and contractility with ejection fraction of 60%.      Cardiac catheterization 9/30/2022   Moderate to significant pulmonary hypertension.  Severe mitral valve prolapse and mitral regurgitation and probable ruptured chordae (TERRA and cardiac cath)  Tricuspid valve prolapse.  Normal left ventricular function.  Normal coronary arteries.     Echocardiogram 9/28/2022.  Mild mitral regurgitation (regurgitation jet may be not in the field)      - Premature ventricular contractions.  Occasional palpitations     - Hypertension vitamin D deficiency GERD ocular migraine.     - History of prostate cancer.  Status post radiation therapy.  Bone scan was negative for any spread.     - Status post appendectomy spine surgery adenoidectomy tonsillectomy     - Family history of prostate cancer     - Former smoker     - Allergic to morphine.  ============  Plan  ===========  Significant back discomfort  Patient has unstable L3-L4 fracture  Patient has continued symptoms despite being on conservative treatment.  Failed bracing trial and physical therapy.  Patient to have surgery for his back today.  Status post lumbar laminectomy 9/13/2023.  Patient did not have any perioperative cardiovascular problems.     Status post mitral and tricuspid valve repair and left atrial appendage endocardial closure--Dr. Henderson 11/17/2022  Status post mitral valve repair tricuspid valve repair and left atrial appendage closure--Dr. Henderson-11/17/2022     Patient does not have any manic symptoms at this time.    EKG-normal-9/11/2023.    Echocardiogram.  9/12/2023  Status post mitral valve repair.  No mitral regurgitation is present.  Aortic valve is thickened with adequate opening motion.  Left atrial enlargement.  Left ventricular size and contractility is normal with ejection fraction of 60%.    Thrombocytopenia  No bleeding problems.  Platelet count 62339-29/ 8//2023  40214-49 /9//2023  01724-79 /11//2023  [id number] 13 2023  12983-0/15/2023     History of resting tachycardia-improved  Patient is taking  Metroprolol tartrate 25 mg twice daily.     Patient had postoperative bradycardia.  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  Bradycardia has improved.  No need for pacemaker at this time.     Hypertension-stable.  116/62     Medications were reviewed and updated.  Patient is on allopurinol metoprolol 25 mg a day allopurinol atorvastatin cyclobenzaprine subcu Lovenox (DVT) famotidine.       Further plan will depend on patient's progress.    Reviewed and updated-9/15/2023  ]]]]]]]]]]]]]]]]           Felipe Garcia MD  09/15/23  06:54 EDT

## 2023-09-15 NOTE — PROGRESS NOTES
Neurosurgery Progress Note      Patient: Clint Cee    YOB: 1945    Medical Record Number: [id number]    Attending Physician: Rita Carl MD    Date of Admission: 9/8/2023 10:00 AM    Status Post: L2-L5 posterior fusion    Post Operative Day Number: 2    Admitting Dx: Back pain [M54.9]  Acute left-sided low back pain with left-sided sciatica [M54.42]    General Appearance:  78 y.o. male lying in bed in no acute distress.  He is moving all extremities.  Reports his presurgical back pain much better.  He still has some chronic left leg pain.    Current Problem List:     Back pain          Current Medications:  Scheduled Meds:allopurinol, 100 mg, Oral, Daily  atorvastatin, 40 mg, Oral, Nightly  cyclobenzaprine, 10 mg, Oral, TID  DULoxetine, 60 mg, Oral, Daily  enoxaparin, 40 mg, Subcutaneous, Q24H  famotidine, 40 mg, Oral, Nightly  metoprolol tartrate, 25 mg, Oral, Daily  sodium chloride, 10 mL, Intravenous, Q12H      Continuous Infusions:sodium chloride, 9 mL/hr      PRN Meds:.  acetaminophen    HYDROmorphone    melatonin    ondansetron    oxyCODONE    [MAR Hold] sodium chloride    sodium chloride    sodium chloride      Diagnostic Tests:    Lab Results (last 24 hours)       Procedure Component Value Units Date/Time    Basic Metabolic Panel [[id number]]  (Abnormal) Collected: 09/14/23 2312    Specimen: Blood Updated: 09/15/23 0033     Glucose 122 mg/dL      BUN 17 mg/dL      Creatinine 0.80 mg/dL      Sodium 134 mmol/L      Potassium 4.0 mmol/L      Chloride 101 mmol/L      CO2 27.0 mmol/L      Calcium 8.2 mg/dL      BUN/Creatinine Ratio 21.3     Anion Gap 6.0 mmol/L      eGFR 91.7 mL/min/1.73     Narrative:      GFR Normal >60  Chronic Kidney Disease <60  Kidney Failure <15    The GFR formula is only valid for adults with stable renal function between ages 18 and 70.    CBC & Differential [[id number]]  (Abnormal) Collected: 09/14/23 2312    Specimen: Blood Updated: 09/15/23 0011     Narrative:      The following orders were created for panel order CBC & Differential.  Procedure                               Abnormality         Status                     ---------                               -----------         ------                     CBC Auto Differential[[id number]]        Abnormal            Final result                 Please view results for these tests on the individual orders.    CBC Auto Differential [[id number]]  (Abnormal) Collected: 09/14/23 2312    Specimen: Blood Updated: 09/15/23 0011     WBC 5.30 10*3/mm3      RBC 3.25 10*6/mm3      Hemoglobin 10.0 g/dL      Hematocrit 29.1 %      MCV 89.5 fL      MCH 30.7 pg      MCHC 34.3 g/dL      RDW 13.8 %      RDW-SD 43.3 fl      MPV 7.5 fL      Platelets 79 10*3/mm3      Neutrophil % 81.3 %      Lymphocyte % 7.3 %      Monocyte % 10.0 %      Eosinophil % 1.2 %      Basophil % 0.2 %      Neutrophils, Absolute 4.30 10*3/mm3      Lymphocytes, Absolute 0.40 10*3/mm3      Monocytes, Absolute 0.50 10*3/mm3      Eosinophils, Absolute 0.10 10*3/mm3      Basophils, Absolute 0.00 10*3/mm3      nRBC 0.0 /100 WBC     Hemoglobin A1c [[id number]]  (Normal) Collected: 09/13/23 2310    Specimen: Blood Updated: 09/14/23 1328     Hemoglobin A1C 5.00 %             Imaging Results (Last 24 Hours)       ** No results found for the last 24 hours. **            Physical Exam:   General Appearance:  Alert, cooperative, in no acute distress   Head:  Normocephalic, without obvious abnormality, atraumatic   Back:   Incision is clean dry and intact no obvious fluid collection   Abdomen:   nontender, nondistended   Extremities/MSK: Moves all extremities well, no edema, no cyanosis, no             Redness    Skin: No bleeding, bruising or rash   Neurologic: Cranial nerves 2 - 12 grossly intact         Vitals:    09/14/23 1500 09/14/23 1611 09/14/23 2026 09/15/23 0342   BP:  108/63 100/57 116/62   BP Location:  Left arm Left arm Left arm   Patient Position:  Lying  Lying Lying   Pulse:  81 75 84   Resp:  13 16 18   Temp: 100.2 °F (37.9 °C) 97.2 °F (36.2 °C) 98.6 °F (37 °C) 99.7 °F (37.6 °C)   TempSrc:  Oral Oral Oral   SpO2:  94% 94% 95%   Weight:       Height:             Assessment: Patient is a 78-year-old male that is status post L2 L5 fusion for unstable 3 column fracture with Dr. Strauss.  He is postop day 2.  Pain is better controlled.  He is neurologically stable.    PT recommending inpatient rehab. He is okay for discharge per the neurosurgical standpoint once standing x-rays are completed-reviewed.    Plan:      Standing post op x rays still pending  Continue Pain and medical management efforts per primary  Continue mobilization efforts   Continue incisional care  Continue DVT Prophylaxis    Discharge Plan: Inpatient rehab      Discussed findings with patient, nursing staff and Dr. Strauss.  Date: 9/15/2023    Caroline Robert, STUART  10:52 EDT

## 2023-09-15 NOTE — PLAN OF CARE
Goal Outcome Evaluation:  Plan of Care Reviewed With: patient      Clint Cee presents with functional mobility impairments following L2-5 lumbar fusion for L3 fracture which indicate the need for skilled intervention. Tolerating session today without incident. Pt is limited by pain.  He ineeds mod verbal cues and mod A from 1-2 for all transfers.  He is progressing with gait but requires ongoing intense PT to return to his prior level of function. Will continue to follow and progress as tolerated.       Anticipated Discharge Disposition (PT): inpatient rehabilitation facility

## 2023-09-16 LAB
ANION GAP SERPL CALCULATED.3IONS-SCNC: 8 MMOL/L (ref 5–15)
BASOPHILS # BLD AUTO: 0 10*3/MM3 (ref 0–0.2)
BASOPHILS NFR BLD AUTO: 0.3 % (ref 0–1.5)
BUN SERPL-MCNC: 15 MG/DL (ref 8–23)
BUN/CREAT SERPL: 18.8 (ref 7–25)
CALCIUM SPEC-SCNC: 8.4 MG/DL (ref 8.6–10.5)
CHLORIDE SERPL-SCNC: 99 MMOL/L (ref 98–107)
CO2 SERPL-SCNC: 26 MMOL/L (ref 22–29)
CREAT SERPL-MCNC: 0.8 MG/DL (ref 0.76–1.27)
DEPRECATED RDW RBC AUTO: 44.2 FL (ref 37–54)
EGFRCR SERPLBLD CKD-EPI 2021: 91.7 ML/MIN/1.73
EOSINOPHIL # BLD AUTO: 0.1 10*3/MM3 (ref 0–0.4)
EOSINOPHIL NFR BLD AUTO: 1.7 % (ref 0.3–6.2)
ERYTHROCYTE [DISTWIDTH] IN BLOOD BY AUTOMATED COUNT: 13.6 % (ref 12.3–15.4)
GLUCOSE SERPL-MCNC: 117 MG/DL (ref 65–99)
HCT VFR BLD AUTO: 28 % (ref 37.5–51)
HGB BLD-MCNC: 9.8 G/DL (ref 13–17.7)
LYMPHOCYTES # BLD AUTO: 0.4 10*3/MM3 (ref 0.7–3.1)
LYMPHOCYTES NFR BLD AUTO: 8.1 % (ref 19.6–45.3)
MCH RBC QN AUTO: 30.7 PG (ref 26.6–33)
MCHC RBC AUTO-ENTMCNC: 35.1 G/DL (ref 31.5–35.7)
MCV RBC AUTO: 87.3 FL (ref 79–97)
MONOCYTES # BLD AUTO: 0.4 10*3/MM3 (ref 0.1–0.9)
MONOCYTES NFR BLD AUTO: 8.3 % (ref 5–12)
NEUTROPHILS NFR BLD AUTO: 3.6 10*3/MM3 (ref 1.7–7)
NEUTROPHILS NFR BLD AUTO: 81.6 % (ref 42.7–76)
NRBC BLD AUTO-RTO: 0.1 /100 WBC (ref 0–0.2)
PLATELET # BLD AUTO: 94 10*3/MM3 (ref 140–450)
PMV BLD AUTO: 7 FL (ref 6–12)
POTASSIUM SERPL-SCNC: 3.8 MMOL/L (ref 3.5–5.2)
RBC # BLD AUTO: 3.21 10*6/MM3 (ref 4.14–5.8)
SODIUM SERPL-SCNC: 133 MMOL/L (ref 136–145)
WBC NRBC COR # BLD: 4.4 10*3/MM3 (ref 3.4–10.8)

## 2023-09-16 PROCEDURE — 85025 COMPLETE CBC W/AUTO DIFF WBC: CPT | Performed by: NEUROLOGICAL SURGERY

## 2023-09-16 PROCEDURE — 97110 THERAPEUTIC EXERCISES: CPT

## 2023-09-16 PROCEDURE — 25010000002 ENOXAPARIN PER 10 MG: Performed by: NEUROLOGICAL SURGERY

## 2023-09-16 PROCEDURE — 80048 BASIC METABOLIC PNL TOTAL CA: CPT | Performed by: NEUROLOGICAL SURGERY

## 2023-09-16 PROCEDURE — 97116 GAIT TRAINING THERAPY: CPT

## 2023-09-16 PROCEDURE — 99232 SBSQ HOSP IP/OBS MODERATE 35: CPT | Performed by: INTERNAL MEDICINE

## 2023-09-16 PROCEDURE — 97530 THERAPEUTIC ACTIVITIES: CPT

## 2023-09-16 RX ORDER — OXYCODONE HYDROCHLORIDE 5 MG/1
10 TABLET ORAL EVERY 4 HOURS PRN
Status: DISCONTINUED | OUTPATIENT
Start: 2023-09-16 | End: 2023-09-19 | Stop reason: HOSPADM

## 2023-09-16 RX ADMIN — METOPROLOL TARTRATE 25 MG: 25 TABLET, FILM COATED ORAL at 10:06

## 2023-09-16 RX ADMIN — ENOXAPARIN SODIUM 40 MG: 40 INJECTION, SOLUTION SUBCUTANEOUS at 18:00

## 2023-09-16 RX ADMIN — DULOXETINE HYDROCHLORIDE 60 MG: 30 CAPSULE, DELAYED RELEASE ORAL at 10:06

## 2023-09-16 RX ADMIN — CYCLOBENZAPRINE 10 MG: 10 TABLET, FILM COATED ORAL at 18:00

## 2023-09-16 RX ADMIN — FAMOTIDINE 40 MG: 20 TABLET ORAL at 20:05

## 2023-09-16 RX ADMIN — Medication 10 ML: at 20:06

## 2023-09-16 RX ADMIN — CYCLOBENZAPRINE 10 MG: 10 TABLET, FILM COATED ORAL at 20:05

## 2023-09-16 RX ADMIN — ALLOPURINOL 100 MG: 100 TABLET ORAL at 10:06

## 2023-09-16 RX ADMIN — OXYCODONE HYDROCHLORIDE 10 MG: 5 TABLET ORAL at 05:55

## 2023-09-16 RX ADMIN — OXYCODONE HYDROCHLORIDE 10 MG: 5 TABLET ORAL at 20:05

## 2023-09-16 RX ADMIN — CYCLOBENZAPRINE 10 MG: 10 TABLET, FILM COATED ORAL at 10:05

## 2023-09-16 RX ADMIN — ATORVASTATIN CALCIUM 40 MG: 40 TABLET, FILM COATED ORAL at 20:05

## 2023-09-16 RX ADMIN — OXYCODONE HYDROCHLORIDE 10 MG: 5 TABLET ORAL at 10:06

## 2023-09-16 NOTE — PLAN OF CARE
Goal Outcome Evaluation:      Patient doing well, was able to tolerate ambulating today and sitting in chair, less requests for pain mediation. Patient plans to d/c to [facility] for rehab. Care plan ongoing

## 2023-09-16 NOTE — PROGRESS NOTES
Referring Provider: Rita Carl MD    Reason for follow-up:  Status post mitral valve repair.  Preoperative cardiovascular valuation  Status post back surgery     Patient Care Team:  Jaret Castellanos MD as PCP - General (Family Medicine)  Felipe Garcia MD as Consulting Physician (Cardiology)  Golden Serna, SHANEL as Ambulatory  (Population Health)    Subjective .      ROS  Back pain is better.  Surgical soreness.  Sitting up in chair.    Since I have last seen him yesterday, the patient has been without any chest discomfort ,shortness of breath, palpitations, dizziness or syncope.  Denies having any headache ,abdominal pain ,nausea, vomiting , diarrhea constipation, loss of weight or loss of appetite.  Denies having any excessive bruising ,hematuria or blood in the stool.    Review of all systems negative except as indicated    History  Past Medical History:   Diagnosis Date    Abnormal ECG June 2022    Allergic 01/01/1954    Nasal alergies    Arrhythmia 2004    Dr Youngblood examined me and said i was ok.  skipped beats    Asthma     no sx    Benign prostatic hyperplasia 11/14/2018    Cataract 01/01/2014    Surgery. Caused detached retina of right eye 20/60 repair    Colon polyp 01/01/2018    Found and removed last colon eca.q    Depression     Gastroesophageal reflux disease 03/20/2014    Glaucoma 2003    Normal pressures. Have low pressure glaucoma    Gout     Headache 01/01/2015    Have shimmering in both eyes intermittantly. 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    Prostate Cancer Jan22 January 2022    Spinal stenosis 12/02/2011    Visual  impairment 01/01/1955    Nearsighted corrected glasses    Vitamin D deficiency 05/23/2018       Past Surgical History:   Procedure Laterality Date    ADENOIDECTOMY  1954    As child    APPENDECTOMY  1956    Surgery    CARDIAC CATHETERIZATION N/A 09/30/2022    Procedure: Right and Left Heart Cath with Coronar Angiography;  Surgeon: Felipe Garcia MD;  Location: [facility] CATH INVASIVE LOCATION;  Service: Cardiovascular;  Laterality: N/A;    CARDIAC VALVE REPLACEMENT N/A 11/17/2022    Procedure: TRICUSPID VALVE REPAIR/REPLACEMENT;  Surgeon: Femi Henderson MD;  Location: [facility] CVOR;  Service: Cardiothoracic;  Laterality: N/A;  tricuspid valve repaired with 32mm  groves physio tricuspid annuloplasty ring    CARDIAC VALVE REPLACEMENT  11/17/2022    Repaired mitral and tricuspid    CATARACT EXTRACTION Bilateral     COLONOSCOPY  01/01/1997    Regularly scheduled    COLONOSCOPY N/A 2/22/2023    Procedure: COLONOSCOPY with cold snare polypectomy x 1;  Surgeon: Manfred Keating MD;  Location: [facility] ENDOSCOPY;  Service: Gastroenterology;  Laterality: N/A;    ENDOSCOPY N/A 2/22/2023    Procedure: ESOPHAGOGASTRODUODENOSCOPY with esophageal dilation using 48 Fr. Bougie Dilator;  Surgeon: Manfred Keating MD;  Location: [facility] ENDOSCOPY;  Service: Gastroenterology;  Laterality: N/A;  erosive esophagitis    EYE SURGERY  01/01/2010    Reattached right retina    LUMBAR FUSION N/A 9/13/2023    Procedure: POSTERIOR LUMBAR FUSION WITH ROBOT;  Surgeon: Jayme Strauss IV, MD;  Location: [facility] MAIN OR;  Service: Robotics - Neuro;  Laterality: N/A;    MITRAL VALVE REPAIR/REPLACEMENT N/A 11/17/2022    Procedure: MITRAL VALVE REPAIR/REPLACEMENT with left atrial appendage closure;  Surgeon: Femi Henderson MD;  Location: [facility] CVOR;  Service: Cardiothoracic;  Laterality: N/A;  mitral valve repaired iwth 40mm medtronic annuloplasty band  with intraop TERRA    SPINE SURGERY  01/01/1971    2 lumbar 1 cervical    TONSILLECTOMY   01/01/1950    Childhood       Family History   Problem Relation Age of Onset    Arthritis Father     Cancer Father         Prostate    Arrhythmia Father         Congentive heart failure    Diabetes Mother         Adult diabetes    Early death Mother         Kidneys failed after giving birth-diabetes complications    Heart disease Mother         Adult Diabetes       Social History     Tobacco Use    Smoking status: Former     Packs/day: 0.00     Years: 0.50     Pack years: 0.00     Types: Cigarettes     Passive exposure: Past    Smokeless tobacco: Never    Tobacco comments:     Tried tobacco as child   Vaping Use    Vaping Use: Never used   Substance Use Topics    Alcohol use: Not Currently     Comment: Not a regular drinker. Occasionly have a glass of wine or be    Drug use: Never        Medications Prior to Admission   Medication Sig Dispense Refill Last Dose    acetaminophen (TYLENOL) 500 MG tablet Take 1 tablet by mouth Every 6 (Six) Hours As Needed for Mild Pain. Indications: Pain       allopurinol (ZYLOPRIM) 100 MG tablet Take 1 tablet by mouth Daily.       Apple Erik Vn-Grn Tea-Bit Or-Cr (APPLE CIDER VINEGAR PLUS PO) Take 1 Piece by mouth Daily As Needed. Indications: Dietary supplement       atorvastatin (LIPITOR) 40 MG tablet Take 1 tablet by mouth Every Night. Indications: High Amount of Fats in the Blood 90 tablet 1     DULoxetine (CYMBALTA) 60 MG capsule Take 1 capsule by mouth Daily. 90 capsule 0     famotidine (PEPCID) 40 MG tablet Take 1 tablet by mouth Every Night.       fexofenadine (ALLEGRA) 180 MG tablet Take 1 tablet by mouth 2 (Two) Times a Day As Needed. Indications: Hayfever       fluticasone (FLONASE) 50 MCG/ACT nasal spray 2 sprays into the nostril(s) as directed by provider Daily As Needed. Indications: Allergic Rhinitis       Leuprolide Mesylate, 6 Month, (CAMCEVI SC) Inject  under the skin into the appropriate area as directed. Every 6 months injection, due in December  Indications:  "prostate cancer (to decrease testosterone).       melatonin 5 MG tablet tablet Take 1 tablet by mouth At Night As Needed (sleep). Indications: Trouble Sleeping       metoprolol tartrate (LOPRESSOR) 25 MG tablet Take 1 tablet by mouth Daily.       zolpidem (Ambien) 10 MG tablet Take 1 tablet by mouth At Night As Needed for Sleep.          Allergies  Morphine, Beta adrenergic blockers, and Ace inhibitors    Scheduled Meds:allopurinol, 100 mg, Oral, Daily  atorvastatin, 40 mg, Oral, Nightly  cyclobenzaprine, 10 mg, Oral, TID  DULoxetine, 60 mg, Oral, Daily  enoxaparin, 40 mg, Subcutaneous, Q24H  famotidine, 40 mg, Oral, Nightly  metoprolol tartrate, 25 mg, Oral, Daily  sodium chloride, 10 mL, Intravenous, Q12H      Continuous Infusions:sodium chloride, 9 mL/hr    PRN Meds:.  acetaminophen    melatonin    ondansetron    oxyCODONE    [MAR Hold] sodium chloride    sodium chloride    sodium chloride    Objective     VITAL SIGNS  Vitals:    09/15/23 0342 09/15/23 1231 09/15/23 2203 09/16/23 0552   BP: 116/62 103/64 138/74 141/74   BP Location: Left arm Left arm  Left arm   Patient Position: Lying Lying     Pulse: 84 75 83 83   Resp: 18 16 16 16   Temp: 99.7 °F (37.6 °C) 98.4 °F (36.9 °C) 99.4 °F (37.4 °C) 98 °F (36.7 °C)   TempSrc: Oral Oral  Oral   SpO2: 95% 92% 98% 96%   Weight:       Height:           Flowsheet Rows      Flowsheet Row First Filed Value   Admission Height 185.4 cm (73\") Documented at 09/08/2023 0957   Admission Weight 102 kg (225 lb) Documented at 09/08/2023 0957              Intake/Output Summary (Last 24 hours) at 9/16/2023 0705  Last data filed at 9/16/2023 0600  Gross per 24 hour   Intake 360 ml   Output 350 ml   Net 10 ml          TELEMETRY: Sinus rhythm    Physical Exam:  The patient is alert, oriented and in no distress.  Vital signs as noted above.  Head and neck revealed no carotid bruits or jugular venous distention.  No thyromegaly or lymphadenopathy is present  Lungs clear.  No wheezing.  " Breath sounds are normal bilaterally.  Heart normal first and second heart sounds.  No murmur. No precordial rub is present.  No gallop is present.  Abdomen soft and nontender.  No organomegaly is present.  Extremities with good peripheral pulses without any pedal edema.  Skin warm and dry.  Musculoskeletal system is grossly normal  CNS grossly normal    Reviewed and updated.    Results Review:   I reviewed the patient's new clinical results.  Lab Results (last 24 hours)       Procedure Component Value Units Date/Time    Basic Metabolic Panel [[id number]]  (Abnormal) Collected: 09/15/23 2303    Specimen: Blood Updated: 09/15/23 2342     Glucose 123 mg/dL      BUN 13 mg/dL      Creatinine 0.68 mg/dL      Sodium 131 mmol/L      Potassium 3.6 mmol/L      Chloride 100 mmol/L      CO2 25.0 mmol/L      Calcium 8.3 mg/dL      BUN/Creatinine Ratio 19.1     Anion Gap 6.0 mmol/L      eGFR 96.3 mL/min/1.73     Narrative:      GFR Normal >60  Chronic Kidney Disease <60  Kidney Failure <15    The GFR formula is only valid for adults with stable renal function between ages 18 and 70.    CBC & Differential [[id number]]  (Abnormal) Collected: 09/15/23 2303    Specimen: Blood Updated: 09/15/23 2323    Narrative:      The following orders were created for panel order CBC & Differential.  Procedure                               Abnormality         Status                     ---------                               -----------         ------                     CBC Auto Differential[[id number]]        Abnormal            Final result                 Please view results for these tests on the individual orders.    CBC Auto Differential [[id number]]  (Abnormal) Collected: 09/15/23 2303    Specimen: Blood Updated: 09/15/23 2323     WBC 4.60 10*3/mm3      RBC 3.12 10*6/mm3      Hemoglobin 9.4 g/dL      Hematocrit 27.5 %      MCV 87.9 fL      MCH 30.1 pg      MCHC 34.2 g/dL      RDW 13.9 %      RDW-SD 45.5 fl      MPV 7.2 fL      Platelets 76  10*3/mm3      Neutrophil % 80.9 %      Lymphocyte % 8.4 %      Monocyte % 9.8 %      Eosinophil % 0.6 %      Basophil % 0.3 %      Neutrophils, Absolute 3.70 10*3/mm3      Lymphocytes, Absolute 0.40 10*3/mm3      Monocytes, Absolute 0.50 10*3/mm3      Eosinophils, Absolute 0.00 10*3/mm3      Basophils, Absolute 0.00 10*3/mm3      nRBC 0.0 /100 WBC             Imaging Results (Last 24 Hours)       ** No results found for the last 24 hours. **        LAB RESULTS (LAST 7 DAYS)    CBC  Results from last 7 days   Lab Units 09/15/23  2303 09/14/23  2312 09/13/23  2310 09/13/23  1132 09/12/23  1740 09/11/23  0442 09/10/23  0251   WBC 10*3/mm3 4.60 5.30 7.10 7.80 7.40 5.90 6.90   RBC 10*6/mm3 3.12* 3.25* 3.73* 4.38 4.22 4.53 4.40   HEMOGLOBIN g/dL 9.4* 10.0* 11.4* 13.1 13.0 13.9 13.7   HEMATOCRIT % 27.5* 29.1* 32.8* 38.9 38.0 40.9 38.7   MCV fL 87.9 89.5 87.9 88.8 90.0 90.3 87.9   PLATELETS 10*3/mm3 76* 79* 98* 84* 92* 86* 87*         BMP  Results from last 7 days   Lab Units 09/15/23  2303 09/14/23  2312 09/13/23  2310 09/13/23  1132 09/12/23  1740 09/11/23  0442 09/10/23  0251   SODIUM mmol/L 131* 134* 138 137 135* 137 136   POTASSIUM mmol/L 3.6 4.0 4.5 4.2 4.6 3.8 3.6   CHLORIDE mmol/L 100 101 102 101 101 103 103   CO2 mmol/L 25.0 27.0 29.0 27.0 25.0 25.0 22.0   BUN mg/dL 13 17 16 16 17 22 22   CREATININE mg/dL 0.68* 0.80 0.89 0.78 0.77 0.85 0.82   GLUCOSE mg/dL 123* 122* 201* 121* 149* 120* 126*         CMP   Results from last 7 days   Lab Units 09/15/23  2303 09/14/23  2312 09/13/23  2310 09/13/23  1132 09/12/23  1740 09/11/23  0442 09/10/23  0251   SODIUM mmol/L 131* 134* 138 137 135* 137 136   POTASSIUM mmol/L 3.6 4.0 4.5 4.2 4.6 3.8 3.6   CHLORIDE mmol/L 100 101 102 101 101 103 103   CO2 mmol/L 25.0 27.0 29.0 27.0 25.0 25.0 22.0   BUN mg/dL 13 17 16 16 17 22 22   CREATININE mg/dL 0.68* 0.80 0.89 0.78 0.77 0.85 0.82   GLUCOSE mg/dL 123* 122* 201* 121* 149* 120* 126*   ALBUMIN g/dL  --   --   --   --  3.1*  --   --     BILIRUBIN mg/dL  --   --   --   --  0.5  --   --    ALK PHOS U/L  --   --   --   --  132*  --   --    AST (SGOT) U/L  --   --   --   --  9  --   --    ALT (SGPT) U/L  --   --   --   --  8  --   --            BNP        TROPONIN        CoAg  Results from last 7 days   Lab Units 09/12/23  1740   INR  1.09         Creatinine Clearance  Estimated Creatinine Clearance: 115.9 mL/min (A) (by C-G formula based on SCr of 0.68 mg/dL (L)).    ABG        Radiology  No radiology results for the last day        EKG      I personally viewed and interpreted the patient's EKG/Telemetry data: Sinus rhythm nonspecific ST-T wave change    ECHOCARDIOGRAM:    Results for orders placed during the hospital encounter of 09/08/23    Adult Transthoracic Echo Complete W/ Cont if Necessary Per Protocol    Interpretation Summary    Left ventricular ejection fraction appears to be 56 - 60%.    Indications  Valvular function  Status post mitral valve repair.    Technically satisfactory study.  Mitral valve is structurally normal.  Status post mitral valve repair.  No mitral regurgitation is present.  Tricuspid valve is structurally normal.  Aortic valve is thickened with adequate opening motion.  Pulmonic valve could not be well visualized.  No evidence for mitral tricuspid or aortic regurgitation is seen by Doppler study.  Left atrium is enlarged.  Right atrium is normal in size.  Left ventricle is normal in size and contractility with ejection fraction of 60%.  Right ventricle is normal in size.  Atrial septum is intact.  Aorta is normal.  No pericardial effusion or intracardiac thrombus is seen.    Impression  Status post mitral valve repair.  No mitral regurgitation is present.  Aortic valve is thickened with adequate opening motion.  Left atrial enlargement.  Left ventricular size and contractility is normal with ejection fraction of 60%.          STRESS TEST        Cardiolite (Tc-99m sestamibi) stress test    CARDIAC  CATHETERIZATION  Results for orders placed during the hospital encounter of 09/30/22    Cardiac Catheterization/Vascular Study    Narrative  CARDIAC CATHETERIZATION REPORT    DATE OF PROCEDURE:  9/30/2022    INDICATION FOR PROCEDURE:  Shortness of breath  Mitral regurgitation    PROCEDURE PERFORMED:  Right heart catheterization left heart catheterization, coronary angiography, left ventriculography.    @@  Moderate conscious sedation was utilized with intravenous Versed and fentanyl administered by registered nurse with continuous ECG, pulse oximetry and hemodynamic monitoring supervised by myself throughout the entire procedure.  Conscious sedation time   45 minutes    I was present with the patient for the duration of moderate sedation and supervised staff who had no other duties and monitored the patient for the entire procedure.    @@  PROCEDURE COMMENTS:    Under usual sterile precautions and 1% lidocaine filtration right femoral vein and femoral artery were percutaneously punctured and a 7 and 5 Polish vascular sheaths were respectively inserted.  [place] catheter was used to measure right-sided pressures pulmonary capital wedge pressure and cardiac output using thermodilution technique.  [place] catheter was removed and subsequently left and right coronary angiography followed by left ventricular angiogram was performed.  Hemostasis was obtained and patient was returned to the room with intact pulses.  No complications were noted.  FINDINGS:    1. HEMODYNAMICS:  Left ventricle end-diastolic pressure is elevated.  Mean right atrial pressure is 4 right ventricle 47/7 pulmonary artery 40/16 mean pulmonary artery 26 pulmonary capital wedge pressure is 17.  No gradient was noted across aortic valve.    2. LEFT VENTRICULOGRAPHY:  Left ventricular size and contractility is normal with ejection fraction of 60%.  Left atrial enlargement is present with severe mitral valve prolapse and severe mitral  regurgitation.    3. CORONARY ANGIOGRAPHY:  Left main coronary artery normal.  Left anterior descending artery normal.  Circumflex coronary artery normal.  Right coronary artery is a large and dominant vessel and is normal.    SUMMARY:  Moderate to significant pulmonary hypertension.  Severe mitral valve prolapse and mitral regurgitation and probable ruptured chordae (TERRA and cardiac cath)  Tricuspid valve prolapse.  Normal left ventricular function.  Normal coronary arteries.    RECOMMENDATIONS:  Cardiovascular surgery consultation initiated for possible mitral valve and tricuspid valve repair soon possible.  Have discussed with Dr. Henderson cardiovascular surgeon for coordination of care.        ]]]]]]]]]]]]]]]]]]]]]  Date of study  9/30/2022    Procedure performed  Transesophageal echocardiogram and Doppler study.    Indications  Significant mitral regurgitation  Shortness of breath    Procedure  Anesthesia was provided by anesthesiologist with intravenous Diprivan.  TERRA probe could be passed without difficulty.  Patient tolerated the procedure well.  No complications were noted.    Results  Technically satisfactory study.  Mitral valve has severe myxomatous degeneration with multiple areas of severe prolapse and known coaptation of the mitral leaflet as well as probable ruptured chordae.  Severe mitral regurgitation is seen with multiple jets with mitral regurgitation jet occupying the entire left atrium.  Tricuspid valve is also showing myxomatous degeneration with moderate tricuspid regurgitation.  Calculated pulmonary artery pressure is 60 mmHg.  Aortic valve is thickened with adequate opening motion.  Left atrium is enlarged.  Left atrial appendage is enlarged without clot.  Left ventricle is normal in size and contractility with ejection fraction of 60%.  Right ventricle is normal in size.  Right atrium is normal in size.  Atrial septum is intact without PFO.  Aortic intimal thickening is present without  clot.  No pericardial effusion is seen.    Impression  Myxomatous mitral and tricuspid valve degeneration.  Severe mitral valve prolapse involving both anterior and posterior mitral leaflets with severe mitral regurgitation with multiple jets.  Probable ruptured chordae.  In some views coaptation of the mitral valve is present.  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  Significant pulmonary hypertension.  Left atrial and left atrial appendage enlargement without clot.  Normal left ventricular size and contractility with ejection fraction of 60%.  ]]]]]]]]]]]]]]]]]]]]                OTHER:         Assessment & Plan     Principal Problem:    Back pain      ]]]]]]]]]]]]]]]]]]]]  History  ===========  - Significant back discomfort  Patient has unstable L3-L4 fracture  Patient has continued symptoms despite being on conservative treatment.  Failed bracing trial and physical therapy.  Status post lumbar laminectomy 9/13/2023     -Status post mitral and tricuspid valve repair and left atrial appendage endocardial closure--Dr. Henderson 11/17/2022    Echocardiogram 9/8/2023 revealed  Status post mitral valve repair.  No mitral regurgitation is present.  Aortic valve is thickened with adequate opening motion.  Left atrial enlargement.  Left ventricular size and contractility is normal with ejection fraction of 60%.     -   Preoperative increased shortness of breath and significantly increased fatigue.   Preoperative significant mitral and tricuspid regurgitation.  Prominent posterior mitral leaflet prolapse     TERRA 9/30/2022  Myxomatous mitral and tricuspid valve degeneration.  Severe mitral valve prolapse involving both anterior and posterior mitral leaflets with severe mitral regurgitation with multiple jets.  Probable ruptured chordae.  In some views coaptation of the mitral valve is present.  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  Significant pulmonary hypertension.  Left  atrial and left atrial appendage enlargement without clot.  Normal left ventricular size and contractility with ejection fraction of 60%.     Cardiac catheterization 9/30/2022   Moderate to significant pulmonary hypertension.  Severe mitral valve prolapse and mitral regurgitation and probable ruptured chordae (TERRA and cardiac cath)  Tricuspid valve prolapse.  Normal left ventricular function.  Normal coronary arteries.     Echocardiogram 9/28/2022.  Mild mitral regurgitation (regurgitation jet may be not in the field)      - Premature ventricular contractions.  Occasional palpitations     - Hypertension vitamin D deficiency GERD ocular migraine.     - History of prostate cancer.  Status post radiation therapy.  Bone scan was negative for any spread.     - Status post appendectomy spine surgery adenoidectomy tonsillectomy     - Family history of prostate cancer     - Former smoker     - Allergic to morphine.  ============  Plan  ===========  Significant back discomfort  Patient has unstable L3-L4 fracture  Patient has continued symptoms despite being on conservative treatment.  Failed bracing trial and physical therapy.  Patient to have surgery for his back today.  Status post lumbar laminectomy 9/13/2023.  Patient did not have any perioperative cardiovascular problems.     Status post mitral and tricuspid valve repair and left atrial appendage endocardial closure--Dr. Henderson 11/17/2022  Status post mitral valve repair tricuspid valve repair and left atrial appendage closure--Dr. Henderson-11/17/2022     Patient does not have any manic symptoms at this time.    EKG-normal-9/11/2023.    Echocardiogram.  9/12/2023  Status post mitral valve repair.  No mitral regurgitation is present.  Aortic valve is thickened with adequate opening motion.  Left atrial enlargement.  Left ventricular size and contractility is normal with ejection fraction of 60%.    Thrombocytopenia  No bleeding problems.  Platelet count 09849-0/  8//2023  50423-69 /9//2023  08034-19 /11//2023  62533-79 13 2023  73250-8/15/2023  78334-89/16/2023     History of resting tachycardia-improved  Patient is taking Metroprolol tartrate 25 mg twice daily.     Patient had postoperative bradycardia.  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  Bradycardia has improved.  No need for pacemaker at this time.     Hypertension-stable.  116/62     Medications were reviewed and updated.  Patient is on allopurinol metoprolol 25 mg a day allopurinol atorvastatin cyclobenzaprine subcu Lovenox (DVT) famotidine.    Okay with transfer plans to rehab.       Further plan will depend on patient's progress.    Reviewed and updated-9/16/2023  ]]]]]]]]]]]]]]]]           Felipe Garcia MD  09/16/23  07:05 EDT
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
## 2023-09-16 NOTE — THERAPY TREATMENT NOTE
Subjective: Pt agreeable to therapeutic plan of care.    Objective:     Bed mobility - Semifowler position to sitting EOB Mod-A for trunk support.     Transfers - STS Mod-A and with rolling walker. Posterior lean noted upon standing.     Ambulation - 25 feet CGA and with rolling walker. Slow hanna with pt reporting nBLE pain, R>L while ambulating.     Therapeutic Exercise - 20 Reps B LE AROM unsupported sitting / EOB    Vitals: WNL    Pain: 6 VAS   Location: low back, BLEs (R>L)  Intervention for pain: RN provided medication, RN notified, Increased Activity, and Therapeutic Presence    Education: Provided education on the importance of mobility in the acute care setting, Verbal/Tactile Cues, Transfer Training, Gait Training, and Post-Op Precautions    Assessment: Clint Cee presents with functional mobility impairments which indicate the need for skilled intervention. Tolerating session today without incident. Pt reports he feels better overall after surgery compared to prior. Pt requires mod A for bed mobility and functional transfers this session, and is DEP for donning TLSO brace for upright mobility tasks. Pt encouraged to stay up in recliner chair as long as he can tolerate, and also encouraged to use the commode to void with nursing assistance when he needs to void. Will continue to follow and progress as tolerated.     Plan/Recommendations:   High Intensity Therapy recommended post-acute care. This is recommended as therapy feels the patient would require 5-6 days per week, 2-3 hours per day. At this time, inpatient rehabilitation (acute rehab) would be the first choice and SNF would be second.. Pt requires no DME at discharge.     Pt desires Inpatient Rehabilitation placement at discharge. Pt cooperative; agreeable to therapeutic recommendations and plan of care.         Basic Mobility 6-click:  Rollin = Total, A lot = 2, A little = 3; 4 = None  Supine>Sit:   1 = Total, A lot = 2, A  little = 3; 4 = None   Sit>Stand with arms:  1 = Total, A lot = 2, A little = 3; 4 = None  Bed>Chair:   1 = Total, A lot = 2, A little = 3; 4 = None  Ambulate in room:  1 = Total, A lot = 2, A little = 3; 4 = None  3-5 Steps with railin = Total, A lot = 2, A little = 3; 4 = None  Score: 14    Modified [place]: N/A = No pre-op stroke/TIA    Post-Tx Position: Up in Chair, Alarms activated, and Call light and personal items within reach  PPE: gloves

## 2023-09-16 NOTE — PROGRESS NOTES
NEUROSURGERY PROGRESS NOTE     LOS: 4 days   Patient Care Team:  Jaret Castellanos MD as PCP - General (Family Medicine)  Felipe Garcia MD as Consulting Physician (Cardiology)  Golden Serna RN as Ambulatory  (Population Health)    Chief Complaint:    Chief Complaint   Patient presents with    Back Pain   Back pain    Subjective     Interval History: NAEO.  Still has considerable pain when lying down flat in bed.  Was able to get up and walk yesterday.  Pain is not very well controlled with current medication regimen.  Flatus present, tolerating general diet, voiding.  Complains of significant left-sided leg pain which was present since before surgery.    While in the room and during my examination of the patient I wore a mask and eye protection.  I washed my hands before and after this patient encounter.  The patient was also wearing a mask.     History taken from: patient    Objective      Vital Signs  Temp:  [98 °F (36.7 °C)-99.4 °F (37.4 °C)] 98 °F (36.7 °C)  Heart Rate:  [75-83] 83  Resp:  [16] 16  BP: (103-141)/(64-74) 141/74  Body mass index is 29.69 kg/m².    Intake/Output last 3 shifts:  I/O last 3 completed shifts:  In: 360 [P.O.:360]  Out: 650 [Urine:650]    Intake/Output this shift:  No intake/output data recorded.    Physical    GENERAL: No acute distress. Appears well nourished. Appears stated age.  HEENT: Atraumatic and normocephalic  CARDIO: RRR on monitoring. Pulses 2+  PULM: breathing nonlabored on room air  NEURO: AAOx3. EOMI. PERRL. CNi. Strength 5/5 in all extremities. Sensation intact to light touch. Reflexes 2+ throughout.  WOUND: Incision CDI,    Results Review:  I reviewed the patient's new clinical results.    Labs:    Lab Results (last 24 hours)       Procedure Component Value Units Date/Time    CBC & Differential [[id number]]  (Abnormal) Collected: 09/15/23 2303    Specimen: Blood Updated: 09/15/23 2323    Narrative:      The following orders were created for panel  order CBC & Differential.  Procedure                               Abnormality         Status                     ---------                               -----------         ------                     CBC Auto Differential[[id number]]        Abnormal            Final result                 Please view results for these tests on the individual orders.    Basic Metabolic Panel [[id number]]  (Abnormal) Collected: 09/15/23 2303    Specimen: Blood Updated: 09/15/23 2342     Glucose 123 mg/dL      BUN 13 mg/dL      Creatinine 0.68 mg/dL      Sodium 131 mmol/L      Potassium 3.6 mmol/L      Chloride 100 mmol/L      CO2 25.0 mmol/L      Calcium 8.3 mg/dL      BUN/Creatinine Ratio 19.1     Anion Gap 6.0 mmol/L      eGFR 96.3 mL/min/1.73     Narrative:      GFR Normal >60  Chronic Kidney Disease <60  Kidney Failure <15    The GFR formula is only valid for adults with stable renal function between ages 18 and 70.    CBC Auto Differential [[id number]]  (Abnormal) Collected: 09/15/23 2303    Specimen: Blood Updated: 09/15/23 2323     WBC 4.60 10*3/mm3      RBC 3.12 10*6/mm3      Hemoglobin 9.4 g/dL      Hematocrit 27.5 %      MCV 87.9 fL      MCH 30.1 pg      MCHC 34.2 g/dL      RDW 13.9 %      RDW-SD 45.5 fl      MPV 7.2 fL      Platelets 76 10*3/mm3      Neutrophil % 80.9 %      Lymphocyte % 8.4 %      Monocyte % 9.8 %      Eosinophil % 0.6 %      Basophil % 0.3 %      Neutrophils, Absolute 3.70 10*3/mm3      Lymphocytes, Absolute 0.40 10*3/mm3      Monocytes, Absolute 0.50 10*3/mm3      Eosinophils, Absolute 0.00 10*3/mm3      Basophils, Absolute 0.00 10*3/mm3      nRBC 0.0 /100 WBC             Imaging:    No new neuroimaging.    Current Medications:   Scheduled Meds:allopurinol, 100 mg, Oral, Daily  atorvastatin, 40 mg, Oral, Nightly  cyclobenzaprine, 10 mg, Oral, TID  DULoxetine, 60 mg, Oral, Daily  enoxaparin, 40 mg, Subcutaneous, Q24H  famotidine, 40 mg, Oral, Nightly  metoprolol tartrate, 25 mg, Oral, Daily  sodium  "chloride, 10 mL, Intravenous, Q12H      Continuous Infusions:sodium chloride, 9 mL/hr        Assessment & Plan       Back pain      Assessment/Plan:  Clint Cee is a 78 y.o. male with L2-5 segmental instrumentation for fracture    Neuro: Continue routine neuro checks.  Continue current pain and bowel regimen.  Postoperative x-ray pending.  The majority of his pain seems to be neurogenic in nature and increasing his narcotics will not significantly improve those symptoms.  Wound/Incision: Continue dressing.  Cardio: Goal Normotensive  Pulm: Room air.  Activity: No restrictions to activity., Encourage ambulation., Work with PT/OT.  Diet: Advance diet as tolerated.  DVT Prophylaxis: Prophylactic Lovenox  GI Prophylaxis: Not indicated due to low risk.  Dispo: Dispo pending PT/OT assessment      Luis Angel Chicas MD  09/16/23  09:20 EDT    \"Dictated utilizing Dragon dictation\".      "

## 2023-09-16 NOTE — PLAN OF CARE
Assessment: Clint Cee presents with functional mobility impairments which indicate the need for skilled intervention. Tolerating session today without incident. Pt reports he feels better overall after surgery compared to prior. Pt requires mod A for bed mobility and functional transfers this session, and is DEP for donning TLSO brace for upright mobility tasks. Pt encouraged to stay up in recliner chair as long as he can tolerate, and also encouraged to use the commode to void with nursing assistance when he needs to void. Will continue to follow and progress as tolerated.     Plan/Recommendations:   High Intensity Therapy recommended post-acute care. This is recommended as therapy feels the patient would require 5-6 days per week, 2-3 hours per day. At this time, inpatient rehabilitation (acute rehab) would be the first choice and SNF would be second.. Pt requires no DME at discharge.     Pt desires Inpatient Rehabilitation placement at discharge. Pt cooperative; agreeable to therapeutic recommendations and plan of care.

## 2023-09-16 NOTE — PLAN OF CARE
Goal Outcome Evaluation:                        Patients pain well controlled with po pain meds during night, utilizing urinal for elimination, dressings intact to incisions

## 2023-09-17 ENCOUNTER — APPOINTMENT (OUTPATIENT)
Dept: GENERAL RADIOLOGY | Facility: HOSPITAL | Age: 78
DRG: 460 | End: 2023-09-17
Payer: MEDICARE

## 2023-09-17 PROCEDURE — 80048 BASIC METABOLIC PNL TOTAL CA: CPT | Performed by: NEUROLOGICAL SURGERY

## 2023-09-17 PROCEDURE — 72100 X-RAY EXAM L-S SPINE 2/3 VWS: CPT

## 2023-09-17 PROCEDURE — 25010000002 ENOXAPARIN PER 10 MG: Performed by: NEUROLOGICAL SURGERY

## 2023-09-17 PROCEDURE — 99232 SBSQ HOSP IP/OBS MODERATE 35: CPT | Performed by: INTERNAL MEDICINE

## 2023-09-17 PROCEDURE — 97112 NEUROMUSCULAR REEDUCATION: CPT

## 2023-09-17 PROCEDURE — 85025 COMPLETE CBC W/AUTO DIFF WBC: CPT | Performed by: NEUROLOGICAL SURGERY

## 2023-09-17 PROCEDURE — 97530 THERAPEUTIC ACTIVITIES: CPT

## 2023-09-17 PROCEDURE — 97116 GAIT TRAINING THERAPY: CPT

## 2023-09-17 PROCEDURE — 97110 THERAPEUTIC EXERCISES: CPT

## 2023-09-17 RX ADMIN — CYCLOBENZAPRINE 10 MG: 10 TABLET, FILM COATED ORAL at 08:36

## 2023-09-17 RX ADMIN — DULOXETINE HYDROCHLORIDE 60 MG: 30 CAPSULE, DELAYED RELEASE ORAL at 08:35

## 2023-09-17 RX ADMIN — OXYCODONE HYDROCHLORIDE 10 MG: 5 TABLET ORAL at 08:37

## 2023-09-17 RX ADMIN — FAMOTIDINE 40 MG: 20 TABLET ORAL at 20:11

## 2023-09-17 RX ADMIN — METOPROLOL TARTRATE 25 MG: 25 TABLET, FILM COATED ORAL at 08:36

## 2023-09-17 RX ADMIN — ACETAMINOPHEN 650 MG: 325 TABLET, FILM COATED ORAL at 00:13

## 2023-09-17 RX ADMIN — OXYCODONE HYDROCHLORIDE 10 MG: 5 TABLET ORAL at 00:09

## 2023-09-17 RX ADMIN — ENOXAPARIN SODIUM 40 MG: 40 INJECTION, SOLUTION SUBCUTANEOUS at 16:45

## 2023-09-17 RX ADMIN — CYCLOBENZAPRINE 10 MG: 10 TABLET, FILM COATED ORAL at 20:11

## 2023-09-17 RX ADMIN — ALLOPURINOL 100 MG: 100 TABLET ORAL at 08:35

## 2023-09-17 RX ADMIN — Medication 10 ML: at 08:36

## 2023-09-17 RX ADMIN — OXYCODONE HYDROCHLORIDE 10 MG: 5 TABLET ORAL at 20:11

## 2023-09-17 RX ADMIN — Medication 10 ML: at 20:11

## 2023-09-17 RX ADMIN — ATORVASTATIN CALCIUM 40 MG: 40 TABLET, FILM COATED ORAL at 20:11

## 2023-09-17 RX ADMIN — CYCLOBENZAPRINE 10 MG: 10 TABLET, FILM COATED ORAL at 16:45

## 2023-09-17 NOTE — THERAPY TREATMENT NOTE
Subjective: Pt agreeable to therapeutic plan of care.Pt stated he has no depth perception which make his more at risk for falls.     Objective:     Bed mobility - Min-A to logroll to R  Transfers - CGA and with rolling walker once TLSO donned  Ambulation - 60 feet CGA and with rolling walker with TLSO    Therapeutic Exercise - 10 Reps B LE AROM supported sitting / chair    Vitals: WNL    Pain: 5 VAS   Location: L thigh  Intervention for pain: Repositioned, RN notified, Increased Activity, and Therapeutic Presence    Education: Provided education on the importance of mobility in the acute care setting, Verbal/Tactile Cues, Transfer Training, Gait Training, and Post-Op Precautions, donning TLSO    Assessment: Clint Cee presents with functional mobility impairments which indicate the need for skilled intervention. Tolerating session today without incident. Pt unable to don TLSO without assist and stated his wife can help.Presented with slowed hanna, guarded and dependent on Ue's. Plans on acute rehab at [place].Will continue to follow and progress as tolerated.     Plan/Recommendations:   High Intensity Therapy recommended post-acute care. This is recommended as therapy feels the patient would require 5-6 days per week, 2-3 hours per day. At this time, inpatient rehabilitation (acute rehab) would be the first choice and SNF would be second.. Pt requires no DME at discharge.     Pt desires Inpatient Rehabilitation placement at discharge. Pt cooperative; agreeable to therapeutic recommendations and plan of care.         Basic Mobility 6-click:  Rollin = Total, A lot = 2, A little = 3; 4 = None  Supine>Sit:   1 = Total, A lot = 2, A little = 3; 4 = None   Sit>Stand with arms:  1 = Total, A lot = 2, A little = 3; 4 = None  Bed>Chair:   1 = Total, A lot = 2, A little = 3; 4 = None  Ambulate in room:  1 = Total, A lot = 2, A little = 3; 4 = None  3-5 Steps with railin = Total, A lot = 2, A little =  3; 4 = None  Score: 16    Post-Tx Position: Up in Chair, Alarms activated, and Call light and personal items within reach  PPE: gloves and gown

## 2023-09-17 NOTE — PROGRESS NOTES
Referring Provider: Rita Carl MD    Reason for follow-up:  Status post mitral valve repair.  Preoperative cardiovascular valuation  Status post back surgery     Patient Care Team:  Jaret Castellanos MD as PCP - General (Family Medicine)  Felipe Garcia MD as Consulting Physician (Cardiology)  Golden Serna, SHANEL as Ambulatory  (Population Health)    Subjective .      ROS  Back pain is better.  Surgical soreness.  Sitting up in chair.    Since I have last seen him yesterday, the patient has been without any chest discomfort ,shortness of breath, palpitations, dizziness or syncope.  Denies having any headache ,abdominal pain ,nausea, vomiting , diarrhea constipation, loss of weight or loss of appetite.  Denies having any excessive bruising ,hematuria or blood in the stool.    Review of all systems negative except as indicated    History  Past Medical History:   Diagnosis Date    Abnormal ECG June 2022    Allergic 01/01/1954    Nasal alergies    Arrhythmia 2004    Dr Youngblood examined me and said i was ok.  skipped beats    Asthma     no sx    Benign prostatic hyperplasia 11/14/2018    Cataract 01/01/2014    Surgery. Caused detached retina of right eye 20/60 repair    Colon polyp 01/01/2018    Found and removed last colon eca.q    Depression     Gastroesophageal reflux disease 03/20/2014    Glaucoma 2003    Normal pressures. Have low pressure glaucoma    Gout     Headache 01/01/2015    Have shimmering in both eyes intermittantly. 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    Prostate Cancer Jan22 January 2022    Spinal stenosis 12/02/2011    Visual  impairment 01/01/1955    Nearsighted corrected glasses    Vitamin D deficiency 05/23/2018       Past Surgical History:   Procedure Laterality Date    ADENOIDECTOMY  1954    As child    APPENDECTOMY  1956    Surgery    CARDIAC CATHETERIZATION N/A 09/30/2022    Procedure: Right and Left Heart Cath with Coronar Angiography;  Surgeon: Felipe Garcia MD;  Location: [facility] CATH INVASIVE LOCATION;  Service: Cardiovascular;  Laterality: N/A;    CARDIAC VALVE REPLACEMENT N/A 11/17/2022    Procedure: TRICUSPID VALVE REPAIR/REPLACEMENT;  Surgeon: Femi Henderson MD;  Location: [facility] CVOR;  Service: Cardiothoracic;  Laterality: N/A;  tricuspid valve repaired with 32mm  groves physio tricuspid annuloplasty ring    CARDIAC VALVE REPLACEMENT  11/17/2022    Repaired mitral and tricuspid    CATARACT EXTRACTION Bilateral     COLONOSCOPY  01/01/1997    Regularly scheduled    COLONOSCOPY N/A 2/22/2023    Procedure: COLONOSCOPY with cold snare polypectomy x 1;  Surgeon: Manfred Keating MD;  Location: [facility] ENDOSCOPY;  Service: Gastroenterology;  Laterality: N/A;    ENDOSCOPY N/A 2/22/2023    Procedure: ESOPHAGOGASTRODUODENOSCOPY with esophageal dilation using 48 Fr. Bougie Dilator;  Surgeon: Manfred Keating MD;  Location: [facility] ENDOSCOPY;  Service: Gastroenterology;  Laterality: N/A;  erosive esophagitis    EYE SURGERY  01/01/2010    Reattached right retina    LUMBAR FUSION N/A 9/13/2023    Procedure: POSTERIOR LUMBAR FUSION WITH ROBOT;  Surgeon: Jayme Strauss IV, MD;  Location: [facility] MAIN OR;  Service: Robotics - Neuro;  Laterality: N/A;    MITRAL VALVE REPAIR/REPLACEMENT N/A 11/17/2022    Procedure: MITRAL VALVE REPAIR/REPLACEMENT with left atrial appendage closure;  Surgeon: Femi Henderson MD;  Location: [facility] CVOR;  Service: Cardiothoracic;  Laterality: N/A;  mitral valve repaired iwth 40mm medtronic annuloplasty band  with intraop TERRA    SPINE SURGERY  01/01/1971    2 lumbar 1 cervical    TONSILLECTOMY   01/01/1950    Childhood       Family History   Problem Relation Age of Onset    Arthritis Father     Cancer Father         Prostate    Arrhythmia Father         Congentive heart failure    Diabetes Mother         Adult diabetes    Early death Mother         Kidneys failed after giving birth-diabetes complications    Heart disease Mother         Adult Diabetes       Social History     Tobacco Use    Smoking status: Former     Packs/day: 0.00     Years: 0.50     Pack years: 0.00     Types: Cigarettes     Passive exposure: Past    Smokeless tobacco: Never    Tobacco comments:     Tried tobacco as child   Vaping Use    Vaping Use: Never used   Substance Use Topics    Alcohol use: Not Currently     Comment: Not a regular drinker. Occasionly have a glass of wine or be    Drug use: Never        Medications Prior to Admission   Medication Sig Dispense Refill Last Dose    acetaminophen (TYLENOL) 500 MG tablet Take 1 tablet by mouth Every 6 (Six) Hours As Needed for Mild Pain. Indications: Pain       allopurinol (ZYLOPRIM) 100 MG tablet Take 1 tablet by mouth Daily.       Apple Erik Vn-Grn Tea-Bit Or-Cr (APPLE CIDER VINEGAR PLUS PO) Take 1 Piece by mouth Daily As Needed. Indications: Dietary supplement       atorvastatin (LIPITOR) 40 MG tablet Take 1 tablet by mouth Every Night. Indications: High Amount of Fats in the Blood 90 tablet 1     DULoxetine (CYMBALTA) 60 MG capsule Take 1 capsule by mouth Daily. 90 capsule 0     famotidine (PEPCID) 40 MG tablet Take 1 tablet by mouth Every Night.       fexofenadine (ALLEGRA) 180 MG tablet Take 1 tablet by mouth 2 (Two) Times a Day As Needed. Indications: Hayfever       fluticasone (FLONASE) 50 MCG/ACT nasal spray 2 sprays into the nostril(s) as directed by provider Daily As Needed. Indications: Allergic Rhinitis       Leuprolide Mesylate, 6 Month, (CAMCEVI SC) Inject  under the skin into the appropriate area as directed. Every 6 months injection, due in December  Indications:  "prostate cancer (to decrease testosterone).       melatonin 5 MG tablet tablet Take 1 tablet by mouth At Night As Needed (sleep). Indications: Trouble Sleeping       metoprolol tartrate (LOPRESSOR) 25 MG tablet Take 1 tablet by mouth Daily.       zolpidem (Ambien) 10 MG tablet Take 1 tablet by mouth At Night As Needed for Sleep.          Allergies  Morphine, Beta adrenergic blockers, and Ace inhibitors    Scheduled Meds:allopurinol, 100 mg, Oral, Daily  atorvastatin, 40 mg, Oral, Nightly  cyclobenzaprine, 10 mg, Oral, TID  DULoxetine, 60 mg, Oral, Daily  enoxaparin, 40 mg, Subcutaneous, Q24H  famotidine, 40 mg, Oral, Nightly  metoprolol tartrate, 25 mg, Oral, Daily  sodium chloride, 10 mL, Intravenous, Q12H      Continuous Infusions:sodium chloride, 9 mL/hr    PRN Meds:.  acetaminophen    melatonin    ondansetron    oxyCODONE    [MAR Hold] sodium chloride    sodium chloride    sodium chloride    Objective     VITAL SIGNS  Vitals:    09/16/23 0552 09/16/23 1223 09/16/23 2055 09/17/23 0459   BP: 141/74 108/71 127/65 134/73   BP Location: Left arm Left arm Left arm Left arm   Patient Position:  Sitting Lying Lying   Pulse: 83 79 76 80   Resp: 16 18 15 14   Temp: 98 °F (36.7 °C) 98.2 °F (36.8 °C) 98 °F (36.7 °C) 97.9 °F (36.6 °C)   TempSrc: Oral Oral Oral Oral   SpO2: 96% 95% 100% 100%   Weight:       Height:           Flowsheet Rows      Flowsheet Row First Filed Value   Admission Height 185.4 cm (73\") Documented at 09/08/2023 0957   Admission Weight 102 kg (225 lb) Documented at 09/08/2023 0957              Intake/Output Summary (Last 24 hours) at 9/17/2023 0805  Last data filed at 9/17/2023 0459  Gross per 24 hour   Intake 960 ml   Output 1400 ml   Net -440 ml          TELEMETRY: Sinus rhythm    Physical Exam:  The patient is alert, oriented and in no distress.  Vital signs as noted above.  Head and neck revealed no carotid bruits or jugular venous distention.  No thyromegaly or lymphadenopathy is present  Lungs " clear.  No wheezing.  Breath sounds are normal bilaterally.  Heart normal first and second heart sounds.  No murmur. No precordial rub is present.  No gallop is present.  Abdomen soft and nontender.  No organomegaly is present.  Extremities with good peripheral pulses without any pedal edema.  Skin warm and dry.  Musculoskeletal system is grossly normal  CNS grossly normal    Reviewed and updated.    Results Review:   I reviewed the patient's new clinical results.  Lab Results (last 24 hours)       Procedure Component Value Units Date/Time    Basic Metabolic Panel [[id number]]  (Abnormal) Collected: 09/16/23 2305    Specimen: Blood Updated: 09/16/23 2340     Glucose 117 mg/dL      BUN 15 mg/dL      Creatinine 0.80 mg/dL      Sodium 133 mmol/L      Potassium 3.8 mmol/L      Chloride 99 mmol/L      CO2 26.0 mmol/L      Calcium 8.4 mg/dL      BUN/Creatinine Ratio 18.8     Anion Gap 8.0 mmol/L      eGFR 91.7 mL/min/1.73     Narrative:      GFR Normal >60  Chronic Kidney Disease <60  Kidney Failure <15    The GFR formula is only valid for adults with stable renal function between ages 18 and 70.    CBC & Differential [[id number]]  (Abnormal) Collected: 09/16/23 2305    Specimen: Blood Updated: 09/16/23 2320    Narrative:      The following orders were created for panel order CBC & Differential.  Procedure                               Abnormality         Status                     ---------                               -----------         ------                     CBC Auto Differential[[id number]]        Abnormal            Final result                 Please view results for these tests on the individual orders.    CBC Auto Differential [[id number]]  (Abnormal) Collected: 09/16/23 2305    Specimen: Blood Updated: 09/16/23 2320     WBC 4.40 10*3/mm3      RBC 3.21 10*6/mm3      Hemoglobin 9.8 g/dL      Hematocrit 28.0 %      MCV 87.3 fL      MCH 30.7 pg      MCHC 35.1 g/dL      RDW 13.6 %      RDW-SD 44.2 fl      MPV 7.0 fL       Platelets 94 10*3/mm3      Neutrophil % 81.6 %      Lymphocyte % 8.1 %      Monocyte % 8.3 %      Eosinophil % 1.7 %      Basophil % 0.3 %      Neutrophils, Absolute 3.60 10*3/mm3      Lymphocytes, Absolute 0.40 10*3/mm3      Monocytes, Absolute 0.40 10*3/mm3      Eosinophils, Absolute 0.10 10*3/mm3      Basophils, Absolute 0.00 10*3/mm3      nRBC 0.1 /100 WBC             Imaging Results (Last 24 Hours)       ** No results found for the last 24 hours. **        LAB RESULTS (LAST 7 DAYS)    CBC  Results from last 7 days   Lab Units 09/16/23  2305 09/15/23  2303 09/14/23  2312 09/13/23  2310 09/13/23  1132 09/12/23  1740 09/11/23  0442   WBC 10*3/mm3 4.40 4.60 5.30 7.10 7.80 7.40 5.90   RBC 10*6/mm3 3.21* 3.12* 3.25* 3.73* 4.38 4.22 4.53   HEMOGLOBIN g/dL 9.8* 9.4* 10.0* 11.4* 13.1 13.0 13.9   HEMATOCRIT % 28.0* 27.5* 29.1* 32.8* 38.9 38.0 40.9   MCV fL 87.3 87.9 89.5 87.9 88.8 90.0 90.3   PLATELETS 10*3/mm3 94* 76* 79* 98* 84* 92* 86*         BMP  Results from last 7 days   Lab Units 09/16/23  2305 09/15/23  2303 09/14/23  2312 09/13/23  2310 09/13/23  1132 09/12/23  1740 09/11/23  0442   SODIUM mmol/L 133* 131* 134* 138 137 135* 137   POTASSIUM mmol/L 3.8 3.6 4.0 4.5 4.2 4.6 3.8   CHLORIDE mmol/L 99 100 101 102 101 101 103   CO2 mmol/L 26.0 25.0 27.0 29.0 27.0 25.0 25.0   BUN mg/dL 15 13 17 16 16 17 22   CREATININE mg/dL 0.80 0.68* 0.80 0.89 0.78 0.77 0.85   GLUCOSE mg/dL 117* 123* 122* 201* 121* 149* 120*         CMP   Results from last 7 days   Lab Units 09/16/23  2305 09/15/23  2303 09/14/23  2312 09/13/23  2310 09/13/23  1132 09/12/23  1740 09/11/23  0442   SODIUM mmol/L 133* 131* 134* 138 137 135* 137   POTASSIUM mmol/L 3.8 3.6 4.0 4.5 4.2 4.6 3.8   CHLORIDE mmol/L 99 100 101 102 101 101 103   CO2 mmol/L 26.0 25.0 27.0 29.0 27.0 25.0 25.0   BUN mg/dL 15 13 17 16 16 17 22   CREATININE mg/dL 0.80 0.68* 0.80 0.89 0.78 0.77 0.85   GLUCOSE mg/dL 117* 123* 122* 201* 121* 149* 120*   ALBUMIN g/dL  --   --   --    --   --  3.1*  --    BILIRUBIN mg/dL  --   --   --   --   --  0.5  --    ALK PHOS U/L  --   --   --   --   --  132*  --    AST (SGOT) U/L  --   --   --   --   --  9  --    ALT (SGPT) U/L  --   --   --   --   --  8  --            BNP        TROPONIN        CoAg  Results from last 7 days   Lab Units 09/12/23  1740   INR  1.09         Creatinine Clearance  Estimated Creatinine Clearance: 98.6 mL/min (by C-G formula based on SCr of 0.8 mg/dL).    ABG        Radiology  No radiology results for the last day        EKG      I personally viewed and interpreted the patient's EKG/Telemetry data: Sinus rhythm nonspecific ST-T wave change    ECHOCARDIOGRAM:    Results for orders placed during the hospital encounter of 09/08/23    Adult Transthoracic Echo Complete W/ Cont if Necessary Per Protocol    Interpretation Summary    Left ventricular ejection fraction appears to be 56 - 60%.    Indications  Valvular function  Status post mitral valve repair.    Technically satisfactory study.  Mitral valve is structurally normal.  Status post mitral valve repair.  No mitral regurgitation is present.  Tricuspid valve is structurally normal.  Aortic valve is thickened with adequate opening motion.  Pulmonic valve could not be well visualized.  No evidence for mitral tricuspid or aortic regurgitation is seen by Doppler study.  Left atrium is enlarged.  Right atrium is normal in size.  Left ventricle is normal in size and contractility with ejection fraction of 60%.  Right ventricle is normal in size.  Atrial septum is intact.  Aorta is normal.  No pericardial effusion or intracardiac thrombus is seen.    Impression  Status post mitral valve repair.  No mitral regurgitation is present.  Aortic valve is thickened with adequate opening motion.  Left atrial enlargement.  Left ventricular size and contractility is normal with ejection fraction of 60%.          STRESS TEST        Cardiolite (Tc-99m sestamibi) stress test    CARDIAC  CATHETERIZATION  Results for orders placed during the hospital encounter of 09/30/22    Cardiac Catheterization/Vascular Study    Narrative  CARDIAC CATHETERIZATION REPORT    DATE OF PROCEDURE:  9/30/2022    INDICATION FOR PROCEDURE:  Shortness of breath  Mitral regurgitation    PROCEDURE PERFORMED:  Right heart catheterization left heart catheterization, coronary angiography, left ventriculography.    @@  Moderate conscious sedation was utilized with intravenous Versed and fentanyl administered by registered nurse with continuous ECG, pulse oximetry and hemodynamic monitoring supervised by myself throughout the entire procedure.  Conscious sedation time   45 minutes    I was present with the patient for the duration of moderate sedation and supervised staff who had no other duties and monitored the patient for the entire procedure.    @@  PROCEDURE COMMENTS:    Under usual sterile precautions and 1% lidocaine filtration right femoral vein and femoral artery were percutaneously punctured and a 7 and 5 [place] vascular sheaths were respectively inserted.  [place] catheter was used to measure right-sided pressures pulmonary capital wedge pressure and cardiac output using thermodilution technique.  [place] catheter was removed and subsequently left and right coronary angiography followed by left ventricular angiogram was performed.  Hemostasis was obtained and patient was returned to the room with intact pulses.  No complications were noted.  FINDINGS:    1. HEMODYNAMICS:  Left ventricle end-diastolic pressure is elevated.  Mean right atrial pressure is 4 right ventricle 47/7 pulmonary artery 40/16 mean pulmonary artery 26 pulmonary capital wedge pressure is 17.  No gradient was noted across aortic valve.    2. LEFT VENTRICULOGRAPHY:  Left ventricular size and contractility is normal with ejection fraction of 60%.  Left atrial enlargement is present with severe mitral valve prolapse and severe mitral  regurgitation.    3. CORONARY ANGIOGRAPHY:  Left main coronary artery normal.  Left anterior descending artery normal.  Circumflex coronary artery normal.  Right coronary artery is a large and dominant vessel and is normal.    SUMMARY:  Moderate to significant pulmonary hypertension.  Severe mitral valve prolapse and mitral regurgitation and probable ruptured chordae (TERRA and cardiac cath)  Tricuspid valve prolapse.  Normal left ventricular function.  Normal coronary arteries.    RECOMMENDATIONS:  Cardiovascular surgery consultation initiated for possible mitral valve and tricuspid valve repair soon possible.  Have discussed with Dr. Henderson cardiovascular surgeon for coordination of care.        ]]]]]]]]]]]]]]]]]]]]]  Date of study  9/30/2022    Procedure performed  Transesophageal echocardiogram and Doppler study.    Indications  Significant mitral regurgitation  Shortness of breath    Procedure  Anesthesia was provided by anesthesiologist with intravenous Diprivan.  TERRA probe could be passed without difficulty.  Patient tolerated the procedure well.  No complications were noted.    Results  Technically satisfactory study.  Mitral valve has severe myxomatous degeneration with multiple areas of severe prolapse and known coaptation of the mitral leaflet as well as probable ruptured chordae.  Severe mitral regurgitation is seen with multiple jets with mitral regurgitation jet occupying the entire left atrium.  Tricuspid valve is also showing myxomatous degeneration with moderate tricuspid regurgitation.  Calculated pulmonary artery pressure is 60 mmHg.  Aortic valve is thickened with adequate opening motion.  Left atrium is enlarged.  Left atrial appendage is enlarged without clot.  Left ventricle is normal in size and contractility with ejection fraction of 60%.  Right ventricle is normal in size.  Right atrium is normal in size.  Atrial septum is intact without PFO.  Aortic intimal thickening is present without  clot.  No pericardial effusion is seen.    Impression  Myxomatous mitral and tricuspid valve degeneration.  Severe mitral valve prolapse involving both anterior and posterior mitral leaflets with severe mitral regurgitation with multiple jets.  Probable ruptured chordae.  In some views coaptation of the mitral valve is present.  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  Significant pulmonary hypertension.  Left atrial and left atrial appendage enlargement without clot.  Normal left ventricular size and contractility with ejection fraction of 60%.  ]]]]]]]]]]]]]]]]]]]]                OTHER:         Assessment & Plan     Principal Problem:    Back pain      ]]]]]]]]]]]]]]]]]]]]  History  ===========  - Significant back discomfort  Patient has unstable L3-L4 fracture  Patient has continued symptoms despite being on conservative treatment.  Failed bracing trial and physical therapy.  Status post lumbar laminectomy 9/13/2023     -Status post mitral and tricuspid valve repair and left atrial appendage endocardial closure--Dr. Henderson 11/17/2022    Echocardiogram 9/8/2023 revealed  Status post mitral valve repair.  No mitral regurgitation is present.  Aortic valve is thickened with adequate opening motion.  Left atrial enlargement.  Left ventricular size and contractility is normal with ejection fraction of 60%.     -   Preoperative increased shortness of breath and significantly increased fatigue.   Preoperative significant mitral and tricuspid regurgitation.  Prominent posterior mitral leaflet prolapse     TERRA 9/30/2022  Myxomatous mitral and tricuspid valve degeneration.  Severe mitral valve prolapse involving both anterior and posterior mitral leaflets with severe mitral regurgitation with multiple jets.  Probable ruptured chordae.  In some views coaptation of the mitral valve is present.  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  Significant pulmonary hypertension.  Left  atrial and left atrial appendage enlargement without clot.  Normal left ventricular size and contractility with ejection fraction of 60%.     Cardiac catheterization 9/30/2022   Moderate to significant pulmonary hypertension.  Severe mitral valve prolapse and mitral regurgitation and probable ruptured chordae (TERRA and cardiac cath)  Tricuspid valve prolapse.  Normal left ventricular function.  Normal coronary arteries.     Echocardiogram 9/28/2022.  Mild mitral regurgitation (regurgitation jet may be not in the field)      - Premature ventricular contractions.  Occasional palpitations     - Hypertension vitamin D deficiency GERD ocular migraine.     - History of prostate cancer.  Status post radiation therapy.  Bone scan was negative for any spread.     - Status post appendectomy spine surgery adenoidectomy tonsillectomy     - Family history of prostate cancer     - Former smoker     - Allergic to morphine.  ============  Plan  ===========  Significant back discomfort  Patient has unstable L3-L4 fracture  Patient has continued symptoms despite being on conservative treatment.  Failed bracing trial and physical therapy.  Patient to have surgery for his back today.  Status post lumbar laminectomy 9/13/2023.  Patient did not have any perioperative cardiovascular problems.     Status post mitral and tricuspid valve repair and left atrial appendage endocardial closure--Dr. Henderson 11/17/2022  Status post mitral valve repair tricuspid valve repair and left atrial appendage closure--Dr. Henderson-11/17/2022     Patient does not have any manic symptoms at this time.    EKG-normal-9/11/2023.    Echocardiogram.  9/12/2023  Status post mitral valve repair.  No mitral regurgitation is present.  Aortic valve is thickened with adequate opening motion.  Left atrial enlargement.  Left ventricular size and contractility is normal with ejection fraction of 60%.    Thrombocytopenia  No bleeding problems.  Platelet count 77412-2/  8//2023  27931-0 /9//2023  56766-59 /11//2023  16879-3 13 2023  27788-7/15/2023  04374-79/16/2023  33246-8 17 2023     History of resting tachycardia-improved  Patient is taking Metroprolol tartrate 25 mg twice daily.     Patient had postoperative bradycardia.  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  Bradycardia has improved.  No need for pacemaker at this time.     Hypertension-stable.  116/62     Medications were reviewed and updated.  Patient is on allopurinol metoprolol 25 mg a day allopurinol atorvastatin cyclobenzaprine subcu Lovenox (DVT) famotidine.    Okay with transfer plans to rehab.       Further plan will depend on patient's progress.    Reviewed and updated-9/17/2023  ]]]]]]]]]]]]]]]]           Felipe Garcia MD  09/17/23  08:05 EDT

## 2023-09-17 NOTE — CASE MANAGEMENT/SOCIAL WORK
Continued Stay Note   Bryan     Patient Name: Clint Cee  MRN: [id number]  Today's Date: 9/17/2023    Admit Date: 9/8/2023    Plan: From [facility] for acute rehab and they are following for return. Lives at home with spouse. PASRR approved if needed.   Discharge Plan       Row Name 09/17/23 0959       Plan    Plan Comments DC Barrier: need to have a BM prior to d/c to rehab.  updated nursing,  [facility] should have bed avail on Monday                      Expected Discharge Date and Time       Expected Discharge Date Expected Discharge Time    Sep 16, 2023               Radha Brasher RN

## 2023-09-17 NOTE — PLAN OF CARE
Assessment: Clint Cee presents with functional mobility impairments which indicate the need for skilled intervention. Tolerating session today without incident. Pt unable to don TLSO without assist and stated his wife can help.Presented with slowed hanna, guarded and dependent on Ue's. Plans on acute rehab at OR.Will continue to follow and progress as tolerated.

## 2023-09-17 NOTE — PLAN OF CARE
Goal Outcome Evaluation:                        Pain managed with prn po meds, no request for in between IV Dilaudid, turning and repositioning self abed, ice applied to back incision site, dressing intact
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
## 2023-09-17 NOTE — PROGRESS NOTES
NEUROSURGERY PROGRESS NOTE     LOS: 5 days   Patient Care Team:  Jaret Castellanos MD as PCP - General (Family Medicine)  Felipe Garcia MD as Consulting Physician (Cardiology)  Golden Serna RN as Ambulatory  (Population Health)    Chief Complaint:    Chief Complaint   Patient presents with    Back Pain   Back pain    Subjective     Interval History: NAEO.  Pain has improved spontaneously compared to yesterday.  Sitting up in the chair enjoying breakfast, wearing brace and tolerating well.    While in the room and during my examination of the patient I wore a mask and eye protection.  I washed my hands before and after this patient encounter.  The patient was also wearing a mask.     History taken from: patient    Objective      Vital Signs  Temp:  [97.9 °F (36.6 °C)-98.2 °F (36.8 °C)] 97.9 °F (36.6 °C)  Heart Rate:  [76-80] 80  Resp:  [14-18] 14  BP: (108-134)/(65-73) 134/73  Body mass index is 29.69 kg/m².    Intake/Output last 3 shifts:  I/O last 3 completed shifts:  In: 1320 [P.O.:1320]  Out: 1400 [Urine:1400]    Intake/Output this shift:  I/O this shift:  In: 240 [P.O.:240]  Out: 300 [Urine:300]    Physical    GENERAL: No acute distress. Appears well nourished. Appears stated age.  HEENT: Atraumatic and normocephalic  CARDIO: RRR on monitoring. Pulses 2+  PULM: breathing nonlabored on room air  NEURO: AAOx3. EOMI. PERRL. CNi. Strength 5/5 in all extremities. Sensation intact to light touch. Reflexes 2+ throughout.  WOUND: Incision CDI,    Results Review:  I reviewed the patient's new clinical results.    Labs:    Lab Results (last 24 hours)       Procedure Component Value Units Date/Time    CBC & Differential [[id number]]  (Abnormal) Collected: 09/16/23 2305    Specimen: Blood Updated: 09/16/23 2320    Narrative:      The following orders were created for panel order CBC & Differential.  Procedure                               Abnormality         Status                     ---------                                -----------         ------                     CBC Auto Differential[[id number]]        Abnormal            Final result                 Please view results for these tests on the individual orders.    Basic Metabolic Panel [[id number]]  (Abnormal) Collected: 09/16/23 2305    Specimen: Blood Updated: 09/16/23 2340     Glucose 117 mg/dL      BUN 15 mg/dL      Creatinine 0.80 mg/dL      Sodium 133 mmol/L      Potassium 3.8 mmol/L      Chloride 99 mmol/L      CO2 26.0 mmol/L      Calcium 8.4 mg/dL      BUN/Creatinine Ratio 18.8     Anion Gap 8.0 mmol/L      eGFR 91.7 mL/min/1.73     Narrative:      GFR Normal >60  Chronic Kidney Disease <60  Kidney Failure <15    The GFR formula is only valid for adults with stable renal function between ages 18 and 70.    CBC Auto Differential [[id number]]  (Abnormal) Collected: 09/16/23 2305    Specimen: Blood Updated: 09/16/23 2320     WBC 4.40 10*3/mm3      RBC 3.21 10*6/mm3      Hemoglobin 9.8 g/dL      Hematocrit 28.0 %      MCV 87.3 fL      MCH 30.7 pg      MCHC 35.1 g/dL      RDW 13.6 %      RDW-SD 44.2 fl      MPV 7.0 fL      Platelets 94 10*3/mm3      Neutrophil % 81.6 %      Lymphocyte % 8.1 %      Monocyte % 8.3 %      Eosinophil % 1.7 %      Basophil % 0.3 %      Neutrophils, Absolute 3.60 10*3/mm3      Lymphocytes, Absolute 0.40 10*3/mm3      Monocytes, Absolute 0.40 10*3/mm3      Eosinophils, Absolute 0.10 10*3/mm3      Basophils, Absolute 0.00 10*3/mm3      nRBC 0.1 /100 WBC             Imaging:    No new neuroimaging.    Current Medications:   Scheduled Meds:allopurinol, 100 mg, Oral, Daily  atorvastatin, 40 mg, Oral, Nightly  cyclobenzaprine, 10 mg, Oral, TID  DULoxetine, 60 mg, Oral, Daily  enoxaparin, 40 mg, Subcutaneous, Q24H  famotidine, 40 mg, Oral, Nightly  metoprolol tartrate, 25 mg, Oral, Daily  sodium chloride, 10 mL, Intravenous, Q12H      Continuous Infusions:sodium chloride, 9 mL/hr        Assessment & Plan       Back  "pain      Assessment/Plan:  Clint Cee is a 78 y.o. male with L2-5 segmental instrumentation for fracture    Neuro: Continue routine neuro checks.  Continue current pain and bowel regimen.  Postoperative x-ray pending.   Wound/Incision: Continue dressing.  Cardio: Goal Normotensive  Pulm: Room air.  Activity: No restrictions to activity., Encourage ambulation., Work with PT/OT.  Diet: Advance diet as tolerated.  DVT Prophylaxis: Prophylactic Lovenox  GI Prophylaxis: Not indicated due to low risk.  Dispo: Dispo pending PT/OT assessment      Luis Angel Chicas MD  09/17/23  09:28 EDT    \"Dictated utilizing Dragon dictation\".      "

## 2023-09-17 NOTE — PLAN OF CARE
Goal Outcome Evaluation:              Outcome Evaluation: Pt abed presently, appears to be resting comfortably. No c/o pain/discomfort, no s/sx of distress noted. Pt anticipates rehab once bed is available at [facility]. VSS call light in reach, plan of care on going.

## 2023-09-18 LAB
ANION GAP SERPL CALCULATED.3IONS-SCNC: 8 MMOL/L (ref 5–15)
BASOPHILS # BLD AUTO: 0 10*3/MM3 (ref 0–0.2)
BASOPHILS NFR BLD AUTO: 0.3 % (ref 0–1.5)
BUN SERPL-MCNC: 16 MG/DL (ref 8–23)
BUN/CREAT SERPL: 20 (ref 7–25)
CALCIUM SPEC-SCNC: 8.2 MG/DL (ref 8.6–10.5)
CHLORIDE SERPL-SCNC: 101 MMOL/L (ref 98–107)
CO2 SERPL-SCNC: 27 MMOL/L (ref 22–29)
CREAT SERPL-MCNC: 0.8 MG/DL (ref 0.76–1.27)
DEPRECATED RDW RBC AUTO: 44.2 FL (ref 37–54)
EGFRCR SERPLBLD CKD-EPI 2021: 91.7 ML/MIN/1.73
EOSINOPHIL # BLD AUTO: 0.1 10*3/MM3 (ref 0–0.4)
EOSINOPHIL NFR BLD AUTO: 2.2 % (ref 0.3–6.2)
ERYTHROCYTE [DISTWIDTH] IN BLOOD BY AUTOMATED COUNT: 13.7 % (ref 12.3–15.4)
GLUCOSE SERPL-MCNC: 92 MG/DL (ref 65–99)
HCT VFR BLD AUTO: 29 % (ref 37.5–51)
HGB BLD-MCNC: 10.2 G/DL (ref 13–17.7)
LYMPHOCYTES # BLD AUTO: 0.4 10*3/MM3 (ref 0.7–3.1)
LYMPHOCYTES NFR BLD AUTO: 10.1 % (ref 19.6–45.3)
MCH RBC QN AUTO: 30.6 PG (ref 26.6–33)
MCHC RBC AUTO-ENTMCNC: 35.1 G/DL (ref 31.5–35.7)
MCV RBC AUTO: 87.3 FL (ref 79–97)
MONOCYTES # BLD AUTO: 0.4 10*3/MM3 (ref 0.1–0.9)
MONOCYTES NFR BLD AUTO: 9.3 % (ref 5–12)
NEUTROPHILS NFR BLD AUTO: 3.3 10*3/MM3 (ref 1.7–7)
NEUTROPHILS NFR BLD AUTO: 78.1 % (ref 42.7–76)
NRBC BLD AUTO-RTO: 0.1 /100 WBC (ref 0–0.2)
PLATELET # BLD AUTO: 123 10*3/MM3 (ref 140–450)
PMV BLD AUTO: 6.9 FL (ref 6–12)
POTASSIUM SERPL-SCNC: 3.8 MMOL/L (ref 3.5–5.2)
RBC # BLD AUTO: 3.32 10*6/MM3 (ref 4.14–5.8)
SODIUM SERPL-SCNC: 136 MMOL/L (ref 136–145)
WBC NRBC COR # BLD: 4.2 10*3/MM3 (ref 3.4–10.8)

## 2023-09-18 PROCEDURE — 25010000002 ENOXAPARIN PER 10 MG: Performed by: NEUROLOGICAL SURGERY

## 2023-09-18 PROCEDURE — 97535 SELF CARE MNGMENT TRAINING: CPT

## 2023-09-18 PROCEDURE — 97116 GAIT TRAINING THERAPY: CPT

## 2023-09-18 PROCEDURE — 97110 THERAPEUTIC EXERCISES: CPT

## 2023-09-18 PROCEDURE — 99024 POSTOP FOLLOW-UP VISIT: CPT

## 2023-09-18 PROCEDURE — 97530 THERAPEUTIC ACTIVITIES: CPT

## 2023-09-18 PROCEDURE — 99232 SBSQ HOSP IP/OBS MODERATE 35: CPT | Performed by: INTERNAL MEDICINE

## 2023-09-18 RX ORDER — LACTULOSE 10 G/15ML
30 SOLUTION ORAL ONCE
Status: COMPLETED | OUTPATIENT
Start: 2023-09-18 | End: 2023-09-18

## 2023-09-18 RX ORDER — ZOLPIDEM TARTRATE 5 MG/1
5 TABLET ORAL NIGHTLY
Status: DISCONTINUED | OUTPATIENT
Start: 2023-09-18 | End: 2023-09-19 | Stop reason: HOSPADM

## 2023-09-18 RX ADMIN — FAMOTIDINE 40 MG: 20 TABLET ORAL at 21:07

## 2023-09-18 RX ADMIN — ZOLPIDEM TARTRATE 5 MG: 5 TABLET ORAL at 21:07

## 2023-09-18 RX ADMIN — OXYCODONE HYDROCHLORIDE 10 MG: 5 TABLET ORAL at 03:28

## 2023-09-18 RX ADMIN — DULOXETINE HYDROCHLORIDE 60 MG: 30 CAPSULE, DELAYED RELEASE ORAL at 09:08

## 2023-09-18 RX ADMIN — OXYCODONE HYDROCHLORIDE 10 MG: 5 TABLET ORAL at 09:08

## 2023-09-18 RX ADMIN — CYCLOBENZAPRINE 10 MG: 10 TABLET, FILM COATED ORAL at 15:37

## 2023-09-18 RX ADMIN — METOPROLOL TARTRATE 25 MG: 25 TABLET, FILM COATED ORAL at 09:08

## 2023-09-18 RX ADMIN — CYCLOBENZAPRINE 10 MG: 10 TABLET, FILM COATED ORAL at 09:08

## 2023-09-18 RX ADMIN — OXYCODONE HYDROCHLORIDE 10 MG: 5 TABLET ORAL at 15:37

## 2023-09-18 RX ADMIN — ATORVASTATIN CALCIUM 40 MG: 40 TABLET, FILM COATED ORAL at 21:07

## 2023-09-18 RX ADMIN — CYCLOBENZAPRINE 10 MG: 10 TABLET, FILM COATED ORAL at 21:07

## 2023-09-18 RX ADMIN — LACTULOSE 30 G: 20 SOLUTION ORAL at 11:18

## 2023-09-18 RX ADMIN — ENOXAPARIN SODIUM 40 MG: 40 INJECTION, SOLUTION SUBCUTANEOUS at 15:37

## 2023-09-18 RX ADMIN — Medication 5 MG: at 00:43

## 2023-09-18 RX ADMIN — Medication 10 ML: at 21:07

## 2023-09-18 RX ADMIN — ALLOPURINOL 100 MG: 100 TABLET ORAL at 09:08

## 2023-09-18 NOTE — DISCHARGE INSTRUCTIONS
LUMBAR FUSION  Post-op Guide    Dr. Jayme Strauss MD                                                              POST-OP   ACTIVITY GUIDE     DAY OF SURGERY   We want you to get up and Move!    Getting out of bed soon after surgery will speed your recovery!    GOAL:  Out of bed 2 hours after awaking from surgery for your first walk  You may require assistance from nurse  A walker for stability may be used initially but briefly  Short frequent walks (5min) every 2 hours while in hospital  Engage core when getting in and out of bed   Use smart movement strategies when changing positions  Practice DEEP BREATHING while you walk  3-6 count inhale and exhale  Rely on ORAL pain medications NOT IV       ADVANTAGES:  Prevent blood clots in the legs  Prevents pneumonia through promoting deep breathing  Prevents back muscles from stiffening - will decrease pain   You CANNOT walk too much  Oral pain meds have better steady control of pain     POST-OP DAY #1   Diet / Activity / Pain Control / GO HOME    Assessments by Physical Therapy (PT) and Occupational Therapy (OT)    GOAL  Review proper spine mechanics/ restrictions  Walking up and down stairs is GOOD   PT / OT will assess when you are safe to go home  Activities of Daily Living - okay to shower, dress, navigate around house  Surgical Incision needs to be covered in the shower unless otherwise advised by Dr. Strauss  No baths or hot tubs 6 weeks or until incision closed without scab.               Criteria for Discharge Home:  Cleared by PT / OT   Cleared by the Medicine Service  Adequate pain control with or without medications  Tolerating food and Liquids  Ability to urinate  Having a bowel movement IS NOT a discharge requirement unless there is a medical issue as per hospitalist   Depends on pain control, support at home, mobility    Occasionally some patients with extra care needs continue their recovery at a local rehabilitation facility (e.g. patients with minimal  home social support, additional medical needs). Hospital based  will assist with rehab placement.     ACTIVITY GUIDELINES    Careful with the BLT's for 3 months !    Bending  Engage Core when changing positions   Use smart movement strategies - Squat, kneel, support yourself using arms  Bending with bad form once or twice is NOT a problem  Surgical hardware won't break from just bending / getting out of bed  Repetitive poor bending form can loosen hardware over time  Not using core to bend can “strain” back muscles    Lifting  General weight limit for first 12 weeks is a maximum of 20-40 lbs   Anything that causes “strain” should not be lifted  Coughing, sneezing, vomiting, straining with bowel movements will not damage your surgical hardware  Lap top, gallon of milk, purse okay  Luggage, large backpack, heavy grocery bag, furniture - not okay  Lift things close to body - limit reaching to pick things up    Twisting  Turn hips, shoulders and spine as one unit  Avoid reaching across the body  Activate core during more complex movements  Remember it is repetitive poor spine mechanics not solitary actions which can harm surgical hardware placement          Driving    - Okay to consider during first 2 weeks after surgery   - MUST meet following requirements:    -You must be OFF narcotics and not under their influence     - You must be a SAFE  yourself.     - Patients may be considered to be UNSAFE if they are:     -Distracted by their pain     -Unable to sit comfortably for the required length of the journey     -Unable to use mirrors safely because of restrictions or surgical pain      ACTIVITY - FIRST 2 WEEKS:    Low Impact Aerobic Exercise   5-30min 2 times perday EVERY DAY  Walking, elliptical, stairs and/or recumbent bike  Slow safe pace, okay to do intervals (walk 4 min rest 1 min x 3-5 sets)  No hiking, speed walking or carrying more than 20lbs   FOCUS ON POSTURE, DEEP BREATHING (6 count) AND  CORE ACTIVATION    Physical Therapy  Will start after your first post-operative office visit (2 weeks)  It's Important, So, YES it's Mandatory  2 times per week x 12 weeks  We can recommend a Physical Therapist near your home  PT should help guide your core exercise program  Home exercises and low impact aerobic work should be done 4-5x per week in addition to PT   My Post-op Core Stabilization Program is the static core program we recommend your physical therapist take you though  Additional modalities are balance and light resistance band training     RETURNING TO WORK     The timescale for Return to Work will vary from patient-to-patient and depends mainly on the nature of your specific surgery and the type of work / activity you wish to re-commence.  General Guidelines:    Can work from home if needed within the first week or so of surgery  Do not work for longer than 30-45 minutes at a time without a break (standing and walking around)    Sedentary jobs (deskwork, etc)   Typically within 1-6 weeks  depends on particulars of job  driving distance   stress, etc  light duty  <10lbs weight limit, Minimal BLT - okay  If you have the option, sometimes returning to work alternate days (i.e Mon-Wed-Fri) for 1-2 weeks assists with the transition back to work.     Manual Labor  3-6 months - varies per case  depends on intensity and weight limit  must have exhibited a strong core with Level 3 or higher on core program or a note from PT reflecting a strong core        RECREATIONAL SPORTS & ACTIVITIES     After 2 weeks  swimming    After 6 weeks   hiking (no Pack, light grade); biking with upright, flat back posture    After 3 months   Must be cleared by Dr Strauss and PT  Biking, jogging, resistance training, climbing, Pilates, sports specific drills, body weight interval training, golf, tennis  Core level 3 completed  Start slow and build up slowly   Do NOT go back FULL SPEED right away     After 6 moths - 1 year  Skiing,  horseback riding, ATV riding, snow mobile, etc                  Sports - Non-Contact  Minimum 12 weeks  Must have core level 3 or greater  Must have completed aerobic and resistance training   Must have successfully done sports specific drill and been asymptomatic    Sports - Contact   Varies from sport to sport  Minimum 6 Months  Must exhibit strong Core Level 4 or 5   Must exhibit signs of fusion on CT   Must have completed non-contact  sports specific drills without any symptoms      Lifelong recommendations:  Warm up before EVERY activity 5-10 minutes using Recumbent bike, Stair Master, elliptical , speed walk  Core exercises:   3 -5 x /week - forever!  10 minutes  Should follow warm up prior to activity / sport  Always know your core level
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
## 2023-09-18 NOTE — PROGRESS NOTES
NEUROSURGERY PROGRESS NOTE     LOS: 6 days   Patient Care Team:  Jaret Castellanos MD as PCP - General (Family Medicine)  Felipe Garcia MD as Consulting Physician (Cardiology)  Golden Serna RN as Ambulatory  (Population Health)    Chief Complaint:    Chief Complaint   Patient presents with    Back Pain   Back pain    Subjective     Interval History:         History taken from: patient  ROS: Reports lower left back pain.  Denies numbness, tingling, weakness.  Denies bowel or bladder incontinence.  Objective      Vital Signs  Temp:  [97.7 °F (36.5 °C)-99.6 °F (37.6 °C)] 97.7 °F (36.5 °C)  Heart Rate:  [69-81] 69  Resp:  [15-17] 15  BP: (103-124)/(64-77) 124/77  Body mass index is 29.69 kg/m².    Intake/Output last 3 shifts:  I/O last 3 completed shifts:  In: 1500 [P.O.:1500]  Out: 2350 [Urine:2350]    Intake/Output this shift:  No intake/output data recorded.    Physical  Back: Bilateral vertical lumbar incisions with surgical glue in place appear without redness, swelling, or drainage.  TLSO brace in place.  Neuro: BLE no numbness or tingling  Motor: BLE strength 5/5  Reflex: BLE reflexes 2+, no clonus    Results Review:  I reviewed the patient's new clinical results.    Labs:    Lab Results (last 24 hours)       Procedure Component Value Units Date/Time    CBC & Differential [[id number]]  (Abnormal) Collected: 09/17/23 2336    Specimen: Blood Updated: 09/18/23 0001    Narrative:      The following orders were created for panel order CBC & Differential.  Procedure                               Abnormality         Status                     ---------                               -----------         ------                     CBC Auto Differential[[id number]]        Abnormal            Final result                 Please view results for these tests on the individual orders.    Basic Metabolic Panel [[id number]]  (Abnormal) Collected: 09/17/23 2336    Specimen: Blood Updated: 09/18/23 0016      Glucose 92 mg/dL      BUN 16 mg/dL      Creatinine 0.80 mg/dL      Sodium 136 mmol/L      Potassium 3.8 mmol/L      Chloride 101 mmol/L      CO2 27.0 mmol/L      Calcium 8.2 mg/dL      BUN/Creatinine Ratio 20.0     Anion Gap 8.0 mmol/L      eGFR 91.7 mL/min/1.73     Narrative:      GFR Normal >60  Chronic Kidney Disease <60  Kidney Failure <15    The GFR formula is only valid for adults with stable renal function between ages 18 and 70.    CBC Auto Differential [[id number]]  (Abnormal) Collected: 09/17/23 2336    Specimen: Blood Updated: 09/18/23 0001     WBC 4.20 10*3/mm3      RBC 3.32 10*6/mm3      Hemoglobin 10.2 g/dL      Hematocrit 29.0 %      MCV 87.3 fL      MCH 30.6 pg      MCHC 35.1 g/dL      RDW 13.7 %      RDW-SD 44.2 fl      MPV 6.9 fL      Platelets 123 10*3/mm3      Neutrophil % 78.1 %      Lymphocyte % 10.1 %      Monocyte % 9.3 %      Eosinophil % 2.2 %      Basophil % 0.3 %      Neutrophils, Absolute 3.30 10*3/mm3      Lymphocytes, Absolute 0.40 10*3/mm3      Monocytes, Absolute 0.40 10*3/mm3      Eosinophils, Absolute 0.10 10*3/mm3      Basophils, Absolute 0.00 10*3/mm3      nRBC 0.1 /100 WBC             Imaging:    X-ray lumbar spine 9/17/2023:  Shows postoperative changes of the L3-5 posterior fusion with no evidence of hardware displacement, malalignment.    Personally reviewed and interpreted imaging studies, medications labs and chart.    Current Medications:   Scheduled Meds:allopurinol, 100 mg, Oral, Daily  atorvastatin, 40 mg, Oral, Nightly  cyclobenzaprine, 10 mg, Oral, TID  DULoxetine, 60 mg, Oral, Daily  enoxaparin, 40 mg, Subcutaneous, Q24H  famotidine, 40 mg, Oral, Nightly  metoprolol tartrate, 25 mg, Oral, Daily  sodium chloride, 10 mL, Intravenous, Q12H      Continuous Infusions:sodium chloride, 9 mL/hr        Assessment & Plan       Back pain      Assessment/Plan:  Clint Cee is a 78 y.o. male with L2-5 segmental instrumentation for fracture.  He seems to be doing quite  "well today with improvement in radicular pain.  He only has some mild to moderate left low back pain which is much improved from prior to surgery.  The plan is for acute rehab.  He is fine for discharge to rehab from a neurosurgical standpoint.      Plan for acute rehab  T LSO brace  Okay for discharge from a neurosurgical standpoint  Follow-up neurosurgery office visit 2 -3 weeks    I discussed treatment plan with patient, and Dr. Strauss.      Marcos Last, APRN  09/18/23  08:12 EDT    \"Dictated utilizing Dragon dictation\".      "

## 2023-09-18 NOTE — PLAN OF CARE
Goal Outcome Evaluation:      Clint Cee presents with functional mobility impairments which indicate the need for skilled intervention. Improved mobility this date with less pain.Tolerating session today without incident. Will continue to follow and progress as tolerated.

## 2023-09-18 NOTE — PROGRESS NOTES
Referring Provider: Rita Carl MD    Reason for follow-up:  Status post mitral valve repair.  Preoperative cardiovascular valuation  Status post back surgery     Patient Care Team:  Jaret Castellanos MD as PCP - General (Family Medicine)  Felipe Garcia MD as Consulting Physician (Cardiology)  Golden Serna, SHANEL as Ambulatory  (Population Health)    Subjective .      ROS  Back pain is better.  Surgical soreness.  Sitting up in chair.    Since I have last seen him yesterday, the patient has been without any chest discomfort ,shortness of breath, palpitations, dizziness or syncope.  Denies having any headache ,abdominal pain ,nausea, vomiting , diarrhea constipation, loss of weight or loss of appetite.  Denies having any excessive bruising ,hematuria or blood in the stool.    Review of all systems negative except as indicated    History  Past Medical History:   Diagnosis Date    Abnormal ECG June 2022    Allergic 01/01/1954    Nasal alergies    Arrhythmia 2004    Dr Youngblood examined me and said i was ok.  skipped beats    Asthma     no sx    Benign prostatic hyperplasia 11/14/2018    Cataract 01/01/2014    Surgery. Caused detached retina of right eye 20/60 repair    Colon polyp 01/01/2018    Found and removed last colon eca.q    Depression     Gastroesophageal reflux disease 03/20/2014    Glaucoma 2003    Normal pressures. Have low pressure glaucoma    Gout     Headache 01/01/2015    Have shimmering in both eyes intermittantly. 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    Prostate Cancer Jan22 January 2022    Spinal stenosis 12/02/2011    Visual  impairment 01/01/1955    Nearsighted corrected glasses    Vitamin D deficiency 05/23/2018       Past Surgical History:   Procedure Laterality Date    ADENOIDECTOMY  1954    As child    APPENDECTOMY  1956    Surgery    CARDIAC CATHETERIZATION N/A 09/30/2022    Procedure: Right and Left Heart Cath with Coronar Angiography;  Surgeon: Felipe Garcia MD;  Location: [facility] CATH INVASIVE LOCATION;  Service: Cardiovascular;  Laterality: N/A;    CARDIAC VALVE REPLACEMENT N/A 11/17/2022    Procedure: TRICUSPID VALVE REPAIR/REPLACEMENT;  Surgeon: Femi Henderson MD;  Location: [facility] CVOR;  Service: Cardiothoracic;  Laterality: N/A;  tricuspid valve repaired with 32mm  groves physio tricuspid annuloplasty ring    CARDIAC VALVE REPLACEMENT  11/17/2022    Repaired mitral and tricuspid    CATARACT EXTRACTION Bilateral     COLONOSCOPY  01/01/1997    Regularly scheduled    COLONOSCOPY N/A 2/22/2023    Procedure: COLONOSCOPY with cold snare polypectomy x 1;  Surgeon: Manfred Keating MD;  Location: [facility] ENDOSCOPY;  Service: Gastroenterology;  Laterality: N/A;    ENDOSCOPY N/A 2/22/2023    Procedure: ESOPHAGOGASTRODUODENOSCOPY with esophageal dilation using 48 Fr. Bougie Dilator;  Surgeon: Manfred Keating MD;  Location: [facility] ENDOSCOPY;  Service: Gastroenterology;  Laterality: N/A;  erosive esophagitis    EYE SURGERY  01/01/2010    Reattached right retina    LUMBAR FUSION N/A 9/13/2023    Procedure: POSTERIOR LUMBAR FUSION WITH ROBOT;  Surgeon: Jayme Strauss IV, MD;  Location: [facility] MAIN OR;  Service: Robotics - Neuro;  Laterality: N/A;    MITRAL VALVE REPAIR/REPLACEMENT N/A 11/17/2022    Procedure: MITRAL VALVE REPAIR/REPLACEMENT with left atrial appendage closure;  Surgeon: Femi Henderson MD;  Location: [facility] CVOR;  Service: Cardiothoracic;  Laterality: N/A;  mitral valve repaired iwth 40mm medtronic annuloplasty band  with intraop TERRA    SPINE SURGERY  01/01/1971    2 lumbar 1 cervical    TONSILLECTOMY   01/01/1950    Childhood       Family History   Problem Relation Age of Onset    Arthritis Father     Cancer Father         Prostate    Arrhythmia Father         Congentive heart failure    Diabetes Mother         Adult diabetes    Early death Mother         Kidneys failed after giving birth-diabetes complications    Heart disease Mother         Adult Diabetes       Social History     Tobacco Use    Smoking status: Former     Packs/day: 0.00     Years: 0.50     Pack years: 0.00     Types: Cigarettes     Passive exposure: Past    Smokeless tobacco: Never    Tobacco comments:     Tried tobacco as child   Vaping Use    Vaping Use: Never used   Substance Use Topics    Alcohol use: Not Currently     Comment: Not a regular drinker. Occasionly have a glass of wine or be    Drug use: Never        Medications Prior to Admission   Medication Sig Dispense Refill Last Dose    acetaminophen (TYLENOL) 500 MG tablet Take 1 tablet by mouth Every 6 (Six) Hours As Needed for Mild Pain. Indications: Pain       allopurinol (ZYLOPRIM) 100 MG tablet Take 1 tablet by mouth Daily.       Apple Erik Vn-Grn Tea-Bit Or-Cr (APPLE CIDER VINEGAR PLUS PO) Take 1 Piece by mouth Daily As Needed. Indications: Dietary supplement       atorvastatin (LIPITOR) 40 MG tablet Take 1 tablet by mouth Every Night. Indications: High Amount of Fats in the Blood 90 tablet 1     DULoxetine (CYMBALTA) 60 MG capsule Take 1 capsule by mouth Daily. 90 capsule 0     famotidine (PEPCID) 40 MG tablet Take 1 tablet by mouth Every Night.       fexofenadine (ALLEGRA) 180 MG tablet Take 1 tablet by mouth 2 (Two) Times a Day As Needed. Indications: Hayfever       fluticasone (FLONASE) 50 MCG/ACT nasal spray 2 sprays into the nostril(s) as directed by provider Daily As Needed. Indications: Allergic Rhinitis       Leuprolide Mesylate, 6 Month, (CAMCEVI SC) Inject  under the skin into the appropriate area as directed. Every 6 months injection, due in December  Indications:  "prostate cancer (to decrease testosterone).       melatonin 5 MG tablet tablet Take 1 tablet by mouth At Night As Needed (sleep). Indications: Trouble Sleeping       metoprolol tartrate (LOPRESSOR) 25 MG tablet Take 1 tablet by mouth Daily.       zolpidem (Ambien) 10 MG tablet Take 1 tablet by mouth At Night As Needed for Sleep.          Allergies  Morphine, Beta adrenergic blockers, and Ace inhibitors    Scheduled Meds:allopurinol, 100 mg, Oral, Daily  atorvastatin, 40 mg, Oral, Nightly  cyclobenzaprine, 10 mg, Oral, TID  DULoxetine, 60 mg, Oral, Daily  enoxaparin, 40 mg, Subcutaneous, Q24H  famotidine, 40 mg, Oral, Nightly  metoprolol tartrate, 25 mg, Oral, Daily  sodium chloride, 10 mL, Intravenous, Q12H      Continuous Infusions:sodium chloride, 9 mL/hr    PRN Meds:.  acetaminophen    melatonin    ondansetron    oxyCODONE    [MAR Hold] sodium chloride    sodium chloride    sodium chloride    Objective     VITAL SIGNS  Vitals:    09/17/23 0459 09/17/23 1324 09/17/23 2135 09/18/23 0500   BP: 134/73 103/64 118/66 124/77   BP Location: Left arm Left arm Left arm Left arm   Patient Position: Lying Lying Lying Lying   Pulse: 80 70 81 69   Resp: 14 16 17 15   Temp: 97.9 °F (36.6 °C) 97.8 °F (36.6 °C) 99.6 °F (37.6 °C) 97.7 °F (36.5 °C)   TempSrc: Oral Oral Oral Oral   SpO2: 100% 97% 98% 100%   Weight:       Height:           Flowsheet Rows      Flowsheet Row First Filed Value   Admission Height 185.4 cm (73\") Documented at 09/08/2023 0957   Admission Weight 102 kg (225 lb) Documented at 09/08/2023 0957              Intake/Output Summary (Last 24 hours) at 9/18/2023 0656  Last data filed at 9/18/2023 0500  Gross per 24 hour   Intake 1020 ml   Output 950 ml   Net 70 ml          TELEMETRY: Sinus rhythm    Physical Exam:  The patient is alert, oriented and in no distress.  Vital signs as noted above.  Head and neck revealed no carotid bruits or jugular venous distention.  No thyromegaly or lymphadenopathy is present  Lungs " clear.  No wheezing.  Breath sounds are normal bilaterally.  Heart normal first and second heart sounds.  No murmur. No precordial rub is present.  No gallop is present.  Abdomen soft and nontender.  No organomegaly is present.  Extremities with good peripheral pulses without any pedal edema.  Skin warm and dry.  Musculoskeletal system is grossly normal  CNS grossly normal    Reviewed and updated.    Results Review:   I reviewed the patient's new clinical results.  Lab Results (last 24 hours)       Procedure Component Value Units Date/Time    Basic Metabolic Panel [[id number]]  (Abnormal) Collected: 09/17/23 2336    Specimen: Blood Updated: 09/18/23 0016     Glucose 92 mg/dL      BUN 16 mg/dL      Creatinine 0.80 mg/dL      Sodium 136 mmol/L      Potassium 3.8 mmol/L      Chloride 101 mmol/L      CO2 27.0 mmol/L      Calcium 8.2 mg/dL      BUN/Creatinine Ratio 20.0     Anion Gap 8.0 mmol/L      eGFR 91.7 mL/min/1.73     Narrative:      GFR Normal >60  Chronic Kidney Disease <60  Kidney Failure <15    The GFR formula is only valid for adults with stable renal function between ages 18 and 70.    CBC & Differential [[id number]]  (Abnormal) Collected: 09/17/23 2336    Specimen: Blood Updated: 09/18/23 0001    Narrative:      The following orders were created for panel order CBC & Differential.  Procedure                               Abnormality         Status                     ---------                               -----------         ------                     CBC Auto Differential[[id number]]        Abnormal            Final result                 Please view results for these tests on the individual orders.    CBC Auto Differential [[id number]]  (Abnormal) Collected: 09/17/23 2336    Specimen: Blood Updated: 09/18/23 0001     WBC 4.20 10*3/mm3      RBC 3.32 10*6/mm3      Hemoglobin 10.2 g/dL      Hematocrit 29.0 %      MCV 87.3 fL      MCH 30.6 pg      MCHC 35.1 g/dL      RDW 13.7 %      RDW-SD 44.2 fl      MPV 6.9  fL      Platelets 123 10*3/mm3      Neutrophil % 78.1 %      Lymphocyte % 10.1 %      Monocyte % 9.3 %      Eosinophil % 2.2 %      Basophil % 0.3 %      Neutrophils, Absolute 3.30 10*3/mm3      Lymphocytes, Absolute 0.40 10*3/mm3      Monocytes, Absolute 0.40 10*3/mm3      Eosinophils, Absolute 0.10 10*3/mm3      Basophils, Absolute 0.00 10*3/mm3      nRBC 0.1 /100 WBC             Imaging Results (Last 24 Hours)       Procedure Component Value Units Date/Time    XR Spine Lumbar 2 or 3 View [[id number]] Collected: 09/17/23 1656     Updated: 09/17/23 1716    Narrative:      XR SPINE LUMBAR 2 OR 3 VW    Date of Exam: 9/17/2023 4:27 PM EDT    Indication: Postop    Comparison: 9/13/2023.    Findings: Postoperative changes are present from prior L3-L5 posterior fusion. There is no evidence of hardware displacement, fracture or other immediate complication. There is no new malalignment. Extensive multilevel spondylosis is present, similar to   prior.      Impression:      Impression:  Postoperative changes are present from prior L3-L5 posterior fusion. There is no evidence of hardware displacement, fracture or other immediate complication. There is no new malalignment. Extensive multilevel spondylosis is present, similar to prior.          Electronically Signed: Miguel Mclaughlin MD    9/17/2023 5:14 PM EDT    Workstation ID: [id number]        LAB RESULTS (LAST 7 DAYS)    CBC  Results from last 7 days   Lab Units 09/17/23  2336 09/16/23  2305 09/15/23  2303 09/14/23  2312 09/13/23  2310 09/13/23  1132 09/12/23  1740   WBC 10*3/mm3 4.20 4.40 4.60 5.30 7.10 7.80 7.40   RBC 10*6/mm3 3.32* 3.21* 3.12* 3.25* 3.73* 4.38 4.22   HEMOGLOBIN g/dL 10.2* 9.8* 9.4* 10.0* 11.4* 13.1 13.0   HEMATOCRIT % 29.0* 28.0* 27.5* 29.1* 32.8* 38.9 38.0   MCV fL 87.3 87.3 87.9 89.5 87.9 88.8 90.0   PLATELETS 10*3/mm3 123* 94* 76* 79* 98* 84* 92*         BMP  Results from last 7 days   Lab Units 09/17/23  2336 09/16/23  2305 09/15/23  2303 09/14/23  2312  09/13/23 2310 09/13/23 1132 09/12/23  1740   SODIUM mmol/L 136 133* 131* 134* 138 137 135*   POTASSIUM mmol/L 3.8 3.8 3.6 4.0 4.5 4.2 4.6   CHLORIDE mmol/L 101 99 100 101 102 101 101   CO2 mmol/L 27.0 26.0 25.0 27.0 29.0 27.0 25.0   BUN mg/dL 16 15 13 17 16 16 17   CREATININE mg/dL 0.80 0.80 0.68* 0.80 0.89 0.78 0.77   GLUCOSE mg/dL 92 117* 123* 122* 201* 121* 149*         CMP   Results from last 7 days   Lab Units 09/17/23  2336 09/16/23  2305 09/15/23  2303 09/14/23  2312 09/13/23  2310 09/13/23  1132 09/12/23  1740   SODIUM mmol/L 136 133* 131* 134* 138 137 135*   POTASSIUM mmol/L 3.8 3.8 3.6 4.0 4.5 4.2 4.6   CHLORIDE mmol/L 101 99 100 101 102 101 101   CO2 mmol/L 27.0 26.0 25.0 27.0 29.0 27.0 25.0   BUN mg/dL 16 15 13 17 16 16 17   CREATININE mg/dL 0.80 0.80 0.68* 0.80 0.89 0.78 0.77   GLUCOSE mg/dL 92 117* 123* 122* 201* 121* 149*   ALBUMIN g/dL  --   --   --   --   --   --  3.1*   BILIRUBIN mg/dL  --   --   --   --   --   --  0.5   ALK PHOS U/L  --   --   --   --   --   --  132*   AST (SGOT) U/L  --   --   --   --   --   --  9   ALT (SGPT) U/L  --   --   --   --   --   --  8           BNP        TROPONIN        CoAg  Results from last 7 days   Lab Units 09/12/23  1740   INR  1.09         Creatinine Clearance  Estimated Creatinine Clearance: 98.6 mL/min (by C-G formula based on SCr of 0.8 mg/dL).    ABG        Radiology  XR Spine Lumbar 2 or 3 View    Result Date: 9/17/2023  Impression: Postoperative changes are present from prior L3-L5 posterior fusion. There is no evidence of hardware displacement, fracture or other immediate complication. There is no new malalignment. Extensive multilevel spondylosis is present, similar to prior. Electronically Signed: Miguel Mclaughlin MD  9/17/2023 5:14 PM EDT  Workstation ID: [id number]         EKG      I personally viewed and interpreted the patient's EKG/Telemetry data: Sinus rhythm nonspecific ST-T wave change    ECHOCARDIOGRAM:    Results for orders placed during the  hospital encounter of 09/08/23    Adult Transthoracic Echo Complete W/ Cont if Necessary Per Protocol    Interpretation Summary    Left ventricular ejection fraction appears to be 56 - 60%.    Indications  Valvular function  Status post mitral valve repair.    Technically satisfactory study.  Mitral valve is structurally normal.  Status post mitral valve repair.  No mitral regurgitation is present.  Tricuspid valve is structurally normal.  Aortic valve is thickened with adequate opening motion.  Pulmonic valve could not be well visualized.  No evidence for mitral tricuspid or aortic regurgitation is seen by Doppler study.  Left atrium is enlarged.  Right atrium is normal in size.  Left ventricle is normal in size and contractility with ejection fraction of 60%.  Right ventricle is normal in size.  Atrial septum is intact.  Aorta is normal.  No pericardial effusion or intracardiac thrombus is seen.    Impression  Status post mitral valve repair.  No mitral regurgitation is present.  Aortic valve is thickened with adequate opening motion.  Left atrial enlargement.  Left ventricular size and contractility is normal with ejection fraction of 60%.          STRESS TEST        Cardiolite (Tc-99m sestamibi) stress test    CARDIAC CATHETERIZATION  Results for orders placed during the hospital encounter of 09/30/22    Cardiac Catheterization/Vascular Study    Narrative  CARDIAC CATHETERIZATION REPORT    DATE OF PROCEDURE:  9/30/2022    INDICATION FOR PROCEDURE:  Shortness of breath  Mitral regurgitation    PROCEDURE PERFORMED:  Right heart catheterization left heart catheterization, coronary angiography, left ventriculography.    @@  Moderate conscious sedation was utilized with intravenous Versed and fentanyl administered by registered nurse with continuous ECG, pulse oximetry and hemodynamic monitoring supervised by myself throughout the entire procedure.  Conscious sedation time   45 minutes    I was present with the  patient for the duration of moderate sedation and supervised staff who had no other duties and monitored the patient for the entire procedure.    @@  PROCEDURE COMMENTS:    Under usual sterile precautions and 1% lidocaine filtration right femoral vein and femoral artery were percutaneously punctured and a 7 and 5 Tamazight vascular sheaths were respectively inserted.  [place] catheter was used to measure right-sided pressures pulmonary capital wedge pressure and cardiac output using thermodilution technique.  [place] catheter was removed and subsequently left and right coronary angiography followed by left ventricular angiogram was performed.  Hemostasis was obtained and patient was returned to the room with intact pulses.  No complications were noted.  FINDINGS:    1. HEMODYNAMICS:  Left ventricle end-diastolic pressure is elevated.  Mean right atrial pressure is 4 right ventricle 47/7 pulmonary artery 40/16 mean pulmonary artery 26 pulmonary capital wedge pressure is 17.  No gradient was noted across aortic valve.    2. LEFT VENTRICULOGRAPHY:  Left ventricular size and contractility is normal with ejection fraction of 60%.  Left atrial enlargement is present with severe mitral valve prolapse and severe mitral regurgitation.    3. CORONARY ANGIOGRAPHY:  Left main coronary artery normal.  Left anterior descending artery normal.  Circumflex coronary artery normal.  Right coronary artery is a large and dominant vessel and is normal.    SUMMARY:  Moderate to significant pulmonary hypertension.  Severe mitral valve prolapse and mitral regurgitation and probable ruptured chordae (TERRA and cardiac cath)  Tricuspid valve prolapse.  Normal left ventricular function.  Normal coronary arteries.    RECOMMENDATIONS:  Cardiovascular surgery consultation initiated for possible mitral valve and tricuspid valve repair soon possible.  Have discussed with Dr. Henderson cardiovascular surgeon for coordination of  care.        ]]]]]]]]]]]]]]]]]]]]]  Date of study  9/30/2022    Procedure performed  Transesophageal echocardiogram and Doppler study.    Indications  Significant mitral regurgitation  Shortness of breath    Procedure  Anesthesia was provided by anesthesiologist with intravenous Diprivan.  TERRA probe could be passed without difficulty.  Patient tolerated the procedure well.  No complications were noted.    Results  Technically satisfactory study.  Mitral valve has severe myxomatous degeneration with multiple areas of severe prolapse and known coaptation of the mitral leaflet as well as probable ruptured chordae.  Severe mitral regurgitation is seen with multiple jets with mitral regurgitation jet occupying the entire left atrium.  Tricuspid valve is also showing myxomatous degeneration with moderate tricuspid regurgitation.  Calculated pulmonary artery pressure is 60 mmHg.  Aortic valve is thickened with adequate opening motion.  Left atrium is enlarged.  Left atrial appendage is enlarged without clot.  Left ventricle is normal in size and contractility with ejection fraction of 60%.  Right ventricle is normal in size.  Right atrium is normal in size.  Atrial septum is intact without PFO.  Aortic intimal thickening is present without clot.  No pericardial effusion is seen.    Impression  Myxomatous mitral and tricuspid valve degeneration.  Severe mitral valve prolapse involving both anterior and posterior mitral leaflets with severe mitral regurgitation with multiple jets.  Probable ruptured chordae.  In some views coaptation of the mitral valve is present.  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  Significant pulmonary hypertension.  Left atrial and left atrial appendage enlargement without clot.  Normal left ventricular size and contractility with ejection fraction of 60%.  ]]]]]]]]]]]]]]]]]]]]                OTHER:         Assessment & Plan     Principal Problem:    Back  pain      ]]]]]]]]]]]]]]]]]]]]  History  ===========  - Significant back discomfort  Patient has unstable L3-L4 fracture  Patient has continued symptoms despite being on conservative treatment.  Failed bracing trial and physical therapy.  Status post lumbar laminectomy 9/13/2023     -Status post mitral and tricuspid valve repair and left atrial appendage endocardial closure--Dr. Henderson 11/17/2022    Echocardiogram 9/8/2023 revealed  Status post mitral valve repair.  No mitral regurgitation is present.  Aortic valve is thickened with adequate opening motion.  Left atrial enlargement.  Left ventricular size and contractility is normal with ejection fraction of 60%.     -   Preoperative increased shortness of breath and significantly increased fatigue.   Preoperative significant mitral and tricuspid regurgitation.  Prominent posterior mitral leaflet prolapse     TERRA 9/30/2022  Myxomatous mitral and tricuspid valve degeneration.  Severe mitral valve prolapse involving both anterior and posterior mitral leaflets with severe mitral regurgitation with multiple jets.  Probable ruptured chordae.  In some views coaptation of the mitral valve is present.  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  Significant pulmonary hypertension.  Left atrial and left atrial appendage enlargement without clot.  Normal left ventricular size and contractility with ejection fraction of 60%.     Cardiac catheterization 9/30/2022   Moderate to significant pulmonary hypertension.  Severe mitral valve prolapse and mitral regurgitation and probable ruptured chordae (TERRA and cardiac cath)  Tricuspid valve prolapse.  Normal left ventricular function.  Normal coronary arteries.     Echocardiogram 9/28/2022.  Mild mitral regurgitation (regurgitation jet may be not in the field)      - Premature ventricular contractions.  Occasional palpitations     - Hypertension vitamin D deficiency GERD ocular migraine.     - History of prostate  cancer.  Status post radiation therapy.  Bone scan was negative for any spread.     - Status post appendectomy spine surgery adenoidectomy tonsillectomy     - Family history of prostate cancer     - Former smoker     - Allergic to morphine.  ============  Plan  ===========  Significant back discomfort  Patient has unstable L3-L4 fracture  Patient has continued symptoms despite being on conservative treatment.  Failed bracing trial and physical therapy.  Patient to have surgery for his back today.  Status post lumbar laminectomy 9/13/2023.  Patient did not have any perioperative cardiovascular problems.     Status post mitral and tricuspid valve repair and left atrial appendage endocardial closure--Dr. Henderson 11/17/2022  Status post mitral valve repair tricuspid valve repair and left atrial appendage closure--Dr. Henderson-11/17/2022     Patient does not have any manic symptoms at this time.    EKG-normal-9/11/2023.    Echocardiogram.  9/12/2023  Status post mitral valve repair.  No mitral regurgitation is present.  Aortic valve is thickened with adequate opening motion.  Left atrial enlargement.  Left ventricular size and contractility is normal with ejection fraction of 60%.    Thrombocytopenia  No bleeding problems.  Platelet count 21465-39/ 8//2023  61767-99 /9//2023  27581-39 /11//2023  64678-5 13 2023  85680-3/15/2023  80718-59/16/2023  41644-3 17 2023  292523-9 18 2023     History of resting tachycardia-improved  Patient is taking Metroprolol tartrate 25 mg twice daily.     Patient had postoperative bradycardia.  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  Bradycardia has improved.  No need for pacemaker at this time.     Hypertension-stable.  120/77     Medications were reviewed and updated.  Patient is on allopurinol metoprolol 25 mg a day allopurinol atorvastatin cyclobenzaprine subcu Lovenox (DVT) famotidine.    Okay with transfer plans to rehab.       Further plan will depend on patient's  progress.    Reviewed and updated 9/18/2023  ]]]]]]]]]]]]]]]]           Felipe Garcia MD  09/18/23  06:56 EDT

## 2023-09-18 NOTE — PLAN OF CARE
Goal Outcome Evaluation:         Assessment: Clint Cee presents with ADL impairments affecting function including balance, endurance / activity tolerance, pain, sensation / sensory awareness, and strength. Demonstrated functioning below baseline abilities indicate the need for continued skilled intervention while inpatient. Tolerating session today without incident. Will continue to follow and progress as tolerated.     Plan/Recommendations:   High Intensity Therapy recommended post-acute care. This is recommended as therapy feels the patient would require 5-6 days per week, 2-3 hours per day. At this time, inpatient rehabilitation (acute rehab) would be the first choice and SNF would be second.. Pt requires no DME at discharge.     Pt desires Inpatient Rehabilitation placement at discharge. Pt cooperative; agreeable to therapeutic recommendations and plan of care.

## 2023-09-18 NOTE — THERAPY TREATMENT NOTE
Subjective: Pt agreeable to therapeutic plan of care.  Cognition: oriented to Person, Place, Time, and Situation, memory: Normal, arousal/alertness: Alert, safety/judgement: good, and awareness of deficits: fair awareness of safety precautions and fair awareness of deficits    Objective: TLSO for mobiity & OOB activity    Bed Mobility: Min-A sup>sit, min cues for logroll  Functional Transfers: SBA, with adaptive equipment, and with rolling walker     Balance: supported, dynamic, with UE support, and standing Min-A  Functional Ambulation: SBA, with adaptive equipment, and with rolling walker    Toileting and Grooming: Modified-Independent with setup  ADL Position: supported sitting  ADL Comments: pt is using urinal & grooming from seated. Once activity tolerance improves will advance to upright ADL in the bathroom    Vitals: WNL    Pain: 4 VAS  Location: back  Interventions for pain: Repositioned, RN notified, Increased Activity, and Therapeutic Presence  Education: Provided education on the importance of mobility in the acute care setting, Verbal/Tactile Cues, ADL training, Transfer Training, and Post-Op Precautions      Assessment: Clint Cee presents with ADL impairments affecting function including balance, endurance / activity tolerance, pain, sensation / sensory awareness, and strength. Demonstrated functioning below baseline abilities indicate the need for continued skilled intervention while inpatient. Tolerating session today without incident. Will continue to follow and progress as tolerated.     Plan/Recommendations:   High Intensity Therapy recommended post-acute care. This is recommended as therapy feels the patient would require 5-6 days per week, 2-3 hours per day. At this time, inpatient rehabilitation (acute rehab) would be the first choice and SNF would be second.. Pt requires no DME at discharge.     Pt desires Inpatient Rehabilitation placement at discharge. Pt cooperative; agreeable to  therapeutic recommendations and plan of care.     Modified Murray: 4 = Moderately severe disability (Unable to attend to own bodily needs without assistance, and unable to walk unassisted)     Post-Tx Position: Up in Chair, Alarms activated, and Call light and personal items within reach  PPE: gloves

## 2023-09-18 NOTE — PLAN OF CARE
Goal Outcome Evaluation:                        Patient doing well on po pain meds, having trouble sleeping tonight even after given Melatonin, anticipating return to [facility] for rehab, dressing intact to back incision site

## 2023-09-18 NOTE — PLAN OF CARE
Goal Outcome Evaluation:      Patient doing well, did ambulate more today. Pain treated per MAR. Patient had BM this afternoon. Patient to dc to [facility] possibly tomorrow. Care plan ongoing

## 2023-09-18 NOTE — CONSULTS
"Nutrition Services    Patient Name: Clint Cee  YOB: 1945  MRN: [id number]  Admission date: 9/8/2023    NUTRITION SCREENING      Encounter Information: Pt evaluated due to LOS. Pt is eating and tolerating PO diet; no current challenges with chewing/swallowing or other problems that could inhibit ongoing PO intake. Appetite somewhat diminished from baseline - intakes variable with 50-75% intake at recent meals. Does not currently meet criteria for malnutrition per AND/ASPEN guidelines, but is at risk - will continue to monitor this.       PO Diet: Diet: Regular/House Diet; Texture: Regular Texture (IDDSI 7); Fluid Consistency: Thin (IDDSI 0)   PO Supplements: None ordered   PO Intake:  50-75% at recent meals       Labs (reviewed below): Some mild hyperglycemia - likely R/t stressors; A1C is WNL        GI Function:  Stool Output  Stool Unmeasured Occurrence: 0 (09/15/23 1728)  Bowel Incontinence: No (09/18/23 1536)  Stool Amount: large (09/18/23 1536)          Skin: Incision       Weight Hx Review: Estimated body mass index is 29.69 kg/m² as calculated from the following:    Height as of this encounter: 185.4 cm (73\").    Weight as of this encounter: 102 kg (225 lb).    Stable weight       Nutrition Intervention: Continue liberalized Regular diet, which remains the best nutrition intervention at this time.     Will continue to monitor and follow up to determine if nutrition Dx arises.        Results from last 7 days   Lab Units 09/17/23  2336 09/16/23  2305 09/15/23  2303 09/13/23  1132 09/12/23  1740   SODIUM mmol/L 136 133* 131*   < > 135*   POTASSIUM mmol/L 3.8 3.8 3.6   < > 4.6   CHLORIDE mmol/L 101 99 100   < > 101   CO2 mmol/L 27.0 26.0 25.0   < > 25.0   BUN mg/dL 16 15 13   < > 17   CREATININE mg/dL 0.80 0.80 0.68*   < > 0.77   CALCIUM mg/dL 8.2* 8.4* 8.3*   < > 8.3*   BILIRUBIN mg/dL  --   --   --   --  0.5   ALK PHOS U/L  --   --   --   --  132*   ALT (SGPT) U/L  --   --   --   --  8 "   AST (SGOT) U/L  --   --   --   --  9   GLUCOSE mg/dL 92 117* 123*   < > 149*    < > = values in this interval not displayed.     Results from last 7 days   Lab Units 09/17/23  2336   HEMOGLOBIN g/dL 10.2*   HEMATOCRIT % 29.0*     COVID19   Date Value Ref Range Status   11/16/2022 Not Detected Not Detected - Ref. Range Final     Lab Results   Component Value Date    HGBA1C 5.00 09/13/2023       RD to follow up per protocol.    Electronically signed by:  Hillary Valle RD  09/18/23 15:49 EDT

## 2023-09-18 NOTE — THERAPY TREATMENT NOTE
Subjective: Pt agreeable to therapeutic plan of care.    Objective:   Pt is up in room with walker with OT, wanting to walk and practice the steps.  Pt is wearing TLSO.     Bed mobility - N/A or Not attempted.  Transfers - CGA and with rolling walker and verbal cues stand to sit  Ambulation - 100 feet CGA, Min-A, and with rolling walker, improved gait speed, does not fully extend B knees and decreased step length with LLE    Stairs:  up/down 4 steps with B handrails and CGAx1.  L leg weaker with mild knee buckling on steps requiring support of handrails.      Pain: 4 VAS   Location: back  Intervention for pain: Repositioned and Therapeutic Presence    Education: Provided education on the importance of mobility in the acute care setting, Transfer Training, and Gait Training    Assessment: Clint Cee presents with functional mobility impairments which indicate the need for skilled intervention. Improved mobility this date with less pain.Tolerating session today without incident. Will continue to follow and progress as tolerated.     Plan/Recommendations:   High Intensity Therapy recommended post-acute care. This is recommended as therapy feels the patient would require 5-6 days per week, 2-3 hours per day. At this time, inpatient rehabilitation (acute rehab) would be the first choice and SNF would be second.. Pt requires no DME at discharge.     Pt desires Inpatient Rehabilitation placement at discharge. Pt cooperative; agreeable to therapeutic recommendations and plan of care.       Post-Tx Position: Up in Chair, OT present  PPE: gloves and surgical mask

## 2023-09-19 VITALS
WEIGHT: 222.66 LBS | BODY MASS INDEX: 29.51 KG/M2 | DIASTOLIC BLOOD PRESSURE: 66 MMHG | TEMPERATURE: 98 F | HEART RATE: 77 BPM | OXYGEN SATURATION: 97 % | SYSTOLIC BLOOD PRESSURE: 109 MMHG | RESPIRATION RATE: 17 BRPM | HEIGHT: 73 IN

## 2023-09-19 LAB
ANION GAP SERPL CALCULATED.3IONS-SCNC: 7 MMOL/L (ref 5–15)
BASOPHILS # BLD AUTO: 0 10*3/MM3 (ref 0–0.2)
BASOPHILS NFR BLD AUTO: 0.2 % (ref 0–1.5)
BUN SERPL-MCNC: 12 MG/DL (ref 8–23)
BUN/CREAT SERPL: 15.6 (ref 7–25)
CALCIUM SPEC-SCNC: 8.3 MG/DL (ref 8.6–10.5)
CHLORIDE SERPL-SCNC: 100 MMOL/L (ref 98–107)
CO2 SERPL-SCNC: 27 MMOL/L (ref 22–29)
CREAT SERPL-MCNC: 0.77 MG/DL (ref 0.76–1.27)
DEPRECATED RDW RBC AUTO: 42.4 FL (ref 37–54)
EGFRCR SERPLBLD CKD-EPI 2021: 92.8 ML/MIN/1.73
EOSINOPHIL # BLD AUTO: 0.1 10*3/MM3 (ref 0–0.4)
EOSINOPHIL NFR BLD AUTO: 2.2 % (ref 0.3–6.2)
ERYTHROCYTE [DISTWIDTH] IN BLOOD BY AUTOMATED COUNT: 13.6 % (ref 12.3–15.4)
GLUCOSE SERPL-MCNC: 128 MG/DL (ref 65–99)
HCT VFR BLD AUTO: 28.2 % (ref 37.5–51)
HGB BLD-MCNC: 9.8 G/DL (ref 13–17.7)
LYMPHOCYTES # BLD AUTO: 0.5 10*3/MM3 (ref 0.7–3.1)
LYMPHOCYTES NFR BLD AUTO: 10.7 % (ref 19.6–45.3)
MCH RBC QN AUTO: 30.8 PG (ref 26.6–33)
MCHC RBC AUTO-ENTMCNC: 34.7 G/DL (ref 31.5–35.7)
MCV RBC AUTO: 88.8 FL (ref 79–97)
MONOCYTES # BLD AUTO: 0.4 10*3/MM3 (ref 0.1–0.9)
MONOCYTES NFR BLD AUTO: 9.3 % (ref 5–12)
NEUTROPHILS NFR BLD AUTO: 3.6 10*3/MM3 (ref 1.7–7)
NEUTROPHILS NFR BLD AUTO: 77.6 % (ref 42.7–76)
NRBC BLD AUTO-RTO: 0.1 /100 WBC (ref 0–0.2)
PLATELET # BLD AUTO: 117 10*3/MM3 (ref 140–450)
PMV BLD AUTO: 6.4 FL (ref 6–12)
POTASSIUM SERPL-SCNC: 3.2 MMOL/L (ref 3.5–5.2)
RBC # BLD AUTO: 3.18 10*6/MM3 (ref 4.14–5.8)
SODIUM SERPL-SCNC: 134 MMOL/L (ref 136–145)
WBC NRBC COR # BLD: 4.6 10*3/MM3 (ref 3.4–10.8)

## 2023-09-19 PROCEDURE — 85025 COMPLETE CBC W/AUTO DIFF WBC: CPT | Performed by: NEUROLOGICAL SURGERY

## 2023-09-19 PROCEDURE — 97110 THERAPEUTIC EXERCISES: CPT

## 2023-09-19 PROCEDURE — 99232 SBSQ HOSP IP/OBS MODERATE 35: CPT | Performed by: INTERNAL MEDICINE

## 2023-09-19 PROCEDURE — 97116 GAIT TRAINING THERAPY: CPT

## 2023-09-19 PROCEDURE — 80048 BASIC METABOLIC PNL TOTAL CA: CPT | Performed by: NEUROLOGICAL SURGERY

## 2023-09-19 PROCEDURE — 97530 THERAPEUTIC ACTIVITIES: CPT

## 2023-09-19 RX ORDER — CYCLOBENZAPRINE HCL 10 MG
10 TABLET ORAL 3 TIMES DAILY
Qty: 90 TABLET | Refills: 0 | Status: SHIPPED | OUTPATIENT
Start: 2023-09-19 | End: 2023-10-19

## 2023-09-19 RX ORDER — ZOLPIDEM TARTRATE 5 MG/1
5 TABLET ORAL NIGHTLY PRN
Qty: 30 TABLET | Refills: 0 | Status: SHIPPED | OUTPATIENT
Start: 2023-09-19 | End: 2023-10-19

## 2023-09-19 RX ORDER — POTASSIUM CHLORIDE 20 MEQ/1
40 TABLET, EXTENDED RELEASE ORAL ONCE
Status: COMPLETED | OUTPATIENT
Start: 2023-09-19 | End: 2023-09-19

## 2023-09-19 RX ORDER — OXYCODONE HYDROCHLORIDE 10 MG/1
10 TABLET ORAL EVERY 4 HOURS PRN
Qty: 12 TABLET | Refills: 0 | Status: SHIPPED | OUTPATIENT
Start: 2023-09-19 | End: 2023-09-23

## 2023-09-19 RX ORDER — CALCIUM CARBONATE 500 MG/1
2 TABLET, CHEWABLE ORAL 3 TIMES DAILY PRN
Start: 2023-09-19 | End: 2023-09-19 | Stop reason: HOSPADM

## 2023-09-19 RX ADMIN — METOPROLOL TARTRATE 25 MG: 25 TABLET, FILM COATED ORAL at 09:17

## 2023-09-19 RX ADMIN — OXYCODONE HYDROCHLORIDE 10 MG: 5 TABLET ORAL at 13:52

## 2023-09-19 RX ADMIN — ALLOPURINOL 100 MG: 100 TABLET ORAL at 09:17

## 2023-09-19 RX ADMIN — POTASSIUM CHLORIDE 40 MEQ: 1500 TABLET, EXTENDED RELEASE ORAL at 09:17

## 2023-09-19 RX ADMIN — OXYCODONE HYDROCHLORIDE 10 MG: 5 TABLET ORAL at 04:16

## 2023-09-19 RX ADMIN — CYCLOBENZAPRINE 10 MG: 10 TABLET, FILM COATED ORAL at 09:17

## 2023-09-19 RX ADMIN — OXYCODONE HYDROCHLORIDE 10 MG: 5 TABLET ORAL at 09:17

## 2023-09-19 RX ADMIN — DULOXETINE HYDROCHLORIDE 60 MG: 30 CAPSULE, DELAYED RELEASE ORAL at 09:17

## 2023-09-19 NOTE — PLAN OF CARE
Goal Outcome Evaluation:         Patient pain treated per MAR, VSS. Patient to dc to [facility] via their transport. Rep[ort given to Nu

## 2023-09-19 NOTE — PLAN OF CARE
Goal Outcome Evaluation:     Bed mobility - Min-A log roll to the right for sidelying to sit.  Pt needed moderate assist to don TLSO brace.  Transfers - CGA and with rolling walker  Ambulation - 50 feet CGA and with rolling walker    Therapeutic Exercise - 10 Reps B LE AROM supported sitting / chair      High Intensity Therapy recommended post-acute care. This is recommended as therapy feels the patient would require 5-6 days per week, 2-3 hours per day. At this time, inpatient rehabilitation (acute rehab) would be the first choice and SNF would be second.. Pt requires no DME at discharge.     Pt desires Inpatient Rehabilitation placement at discharge. Pt cooperative; agreeable to therapeutic recommendations and plan of care.

## 2023-09-19 NOTE — THERAPY TREATMENT NOTE
Subjective: Pt agreeable to therapeutic plan of care.    Objective:     Bed mobility - Min-A log roll to the right for sidelying to sit.  Pt needed moderate assist to don TLSO brace.  Transfers - CGA and with rolling walker  Ambulation - 50 feet CGA and with rolling walker    Therapeutic Exercise - 10 Reps B LE AROM supported sitting / chair    Vitals: WNL    Pain: 5 VAS   Location: back  Intervention for pain: Repositioned, RN provided medication, Increased Activity, and Therapeutic Presence    Education: Provided education on the importance of mobility in the acute care setting, Verbal/Tactile Cues, Transfer Training, Gait Training, and Post-Op Precautions    Assessment: Clint Cee presents with functional mobility impairments which indicate the need for skilled intervention. Tolerating session today without incident. Pt is making very good progress with his mobility since his recent back surgery.  Recommend rehab at [facility].  Will continue to follow and progress as tolerated.     Plan/Recommendations:   High Intensity Therapy recommended post-acute care. This is recommended as therapy feels the patient would require 5-6 days per week, 2-3 hours per day. At this time, inpatient rehabilitation (acute rehab) would be the first choice and SNF would be second.. Pt requires no DME at discharge.     Pt desires Inpatient Rehabilitation placement at discharge. Pt cooperative; agreeable to therapeutic recommendations and plan of care.     Post-Tx Position: Up in Chair, Alarms activated, and Call light and personal items within reach  PPE: gloves and gown

## 2023-09-19 NOTE — CASE MANAGEMENT/SOCIAL WORK
Case Management Discharge Note      Final Note: [facility]         Selected Continued Care - Discharged on 9/19/2023 Admission date: 9/8/2023 - Discharge disposition: Skilled Nursing Facility (DC - External)      Destination Coordination complete.      Service Provider Selected Services Address Phone Fax Patient Preferred    [facility] [address] [place] [address] [phone] --              Durable Medical Equipment    No services have been selected for the patient.                Dialysis/Infusion    No services have been selected for the patient.                Home Medical Care    No services have been selected for the patient.                Therapy    No services have been selected for the patient.                Community Resources    No services have been selected for the patient.                Community & DME    No services have been selected for the patient.                    Selected Continued Care - Episodes Includes continued care and service providers with selected services from the active episodes listed below      High Risk Care Management Episode start date: 8/16/2023 (Paused)   There are no active outsourced providers for this episode.                 Selected Continued Care - Prior Encounters Includes continued care and service providers with selected services from prior encounters from 6/10/2023 to 9/19/2023      Discharged on 8/29/2023 Admission date: 8/27/2023 - Discharge disposition: Rehab Facility or Unit (DC - External)      Destination       Service Provider Selected Services Address Phone Fax Patient Preferred    [facility] [address] [place] [address] [phone] --                          Transportation Services  W/C Van: Other (arjun martinez)    Final Discharge Disposition Code: 62 - inpatient rehab facility

## 2023-09-19 NOTE — DISCHARGE SUMMARY
[facility] Medicine Services  Discharge Summary    Date of Service: 2023  Patient Name: Clint Cee  : 1945  MRN: [id number]    Date of Admission: 2023  Discharge Diagnosis: L3 fracture  Date of Discharge: 2023  Primary Care Physician: Jaret Castellanos MD      Presenting Problem:   Back pain [M54.9]  Acute left-sided low back pain with left-sided sciatica [M54.42]    Active and Resolved Hospital Problems:  Active Hospital Problems    Diagnosis POA    **Back pain [M54.9] Yes      Resolved Hospital Problems   No resolved problems to display.         Hospital Course   78-year-old male with history of hypertension, hyperlipidemia, depression, GERD, valvular heart disease, BPH, mood disorder who was admitted for evaluation following unresolved and increasing back pain that resulted from a fall on , and has since then failed outpatient treatment including inpatient rehab. CT was unrevealing but MRI did reveal L3 fracture. Patient underwent L2-5 fusion on .  Refer to discussion below    #Unstable L3 fracture  -Patient failed bracing trial and physical therapy with worsening symptoms  -Did not have signs or symptoms of cauda equina syndrome  -Status post L2-5 fusion on      #LLE spasm  -On muscle relaxant     Hypertension  Hyperlipidemia  -Continue home meds as tolerated     History of MV repair  -No acute issues,      Acute hyperglycemia, stress-induced  -A1c consistently < 5% rules out DM     Chronic insomnia  Resume home Ambien 5 mg at bedtime      Day of Discharge     Vital Signs:  Temp:  [97.9 °F (36.6 °C)-98.9 °F (37.2 °C)] 98.7 °F (37.1 °C)  Heart Rate:  [78-81] 80  Resp:  [17-20] 20  BP: (120-137)/(56-76) 137/56    Physical Exam:  General: Awake, alert, normal interaction  Eyes: PERRL, EOMI  Cardiovascular: Regular rate and rhythm, no murmurs  Respiratory: no wheezing or rales, unlabored breathing  Abdomen:  Soft, nontender, positive bowel sounds, no guarding  Neurologic: A&O, CN grossly intact, moves all extremities   Musculoskeletal: Generalized weakness, status post spine surgery  Skin: Warm, dry, intact  Psychiatry: Normal affect, appropriate behavior     Pertinent  and/or Most Recent Results     LAB RESULTS:      Lab 09/19/23  0003 09/17/23 2336 09/16/23  2305 09/15/23  2303 09/14/23  2312 09/13/23  1132 09/12/23  1740   WBC 4.60 4.20 4.40 4.60 5.30   < > 7.40   HEMOGLOBIN 9.8* 10.2* 9.8* 9.4* 10.0*   < > 13.0   HEMATOCRIT 28.2* 29.0* 28.0* 27.5* 29.1*   < > 38.0   PLATELETS 117* 123* 94* 76* 79*   < > 92*   NEUTROS ABS 3.60 3.30 3.60 3.70 4.30   < > 6.30   LYMPHS ABS 0.50* 0.40* 0.40* 0.40* 0.40*   < > 0.50*   MONOS ABS 0.40 0.40 0.40 0.50 0.50   < > 0.40   EOS ABS 0.10 0.10 0.10 0.00 0.10   < > 0.10   MCV 88.8 87.3 87.3 87.9 89.5   < > 90.0   PROTIME  --   --   --   --   --   --  11.6    < > = values in this interval not displayed.         Lab 09/19/23  0003 09/17/23  2336 09/16/23  2305 09/15/23  2303 09/14/23  2312 09/13/23  2310   SODIUM 134* 136 133* 131* 134* 138   POTASSIUM 3.2* 3.8 3.8 3.6 4.0 4.5   CHLORIDE 100 101 99 100 101 102   CO2 27.0 27.0 26.0 25.0 27.0 29.0   ANION GAP 7.0 8.0 8.0 6.0 6.0 7.0   BUN 12 16 15 13 17 16   CREATININE 0.77 0.80 0.80 0.68* 0.80 0.89   EGFR 92.8 91.7 91.7 96.3 91.7 88.8   GLUCOSE 128* 92 117* 123* 122* 201*   CALCIUM 8.3* 8.2* 8.4* 8.3* 8.2* 8.5*   HEMOGLOBIN A1C  --   --   --   --   --  5.00         Lab 09/12/23  1740   TOTAL PROTEIN 5.4*   ALBUMIN 3.1*   GLOBULIN 2.3   ALT (SGPT) 8   AST (SGOT) 9   BILIRUBIN 0.5   ALK PHOS 132*         Lab 09/12/23  1740   PROTIME 11.6   INR 1.09             Lab 09/12/23  1411   ABO TYPING B   RH TYPING Positive   ANTIBODY SCREEN Negative         Brief Urine Lab Results  (Last result in the past 365 days)        Color   Clarity   Blood   Leuk Est   Nitrite   Protein   CREAT   Urine HCG        08/27/23 1145 Yellow   Clear   Negative    Negative   Negative   Negative                 Microbiology Results (last 10 days)       ** No results found for the last 240 hours. **            XR Spine Lumbar 2 or 3 View    Result Date: 9/17/2023  Impression: Impression: Postoperative changes are present from prior L3-L5 posterior fusion. There is no evidence of hardware displacement, fracture or other immediate complication. There is no new malalignment. Extensive multilevel spondylosis is present, similar to prior. Electronically Signed: Miguel Mclaughlin MD  9/17/2023 5:14 PM EDT  Workstation ID: [id number]    CT Lumbar Spine Without Contrast    Result Date: 9/12/2023  Impression: Impression: 1. Ankylosing spondylitis with fracture through the L3-4 disc space which involves the inferior endplate of L3 and extends into the posterior elements at L3 with comminuted posterior spinous process fracture, unchanged from recent lumbar spine CT on 9/10/2023. 2. Widening of the L3-4 disc space and endplate irregularity unchanged related to edema and sequela of recent trauma, better assessed on recent MRI from 9/8/2023. 3. Multilevel degenerative findings, see prior MRI and recent lumbar spine CT for detailed findings. Electronically Signed: Bronson Sosa MD  9/12/2023 4:06 PM EDT  Workstation ID: [id number]    CT Lumbar Spine Without Contrast    Result Date: 9/10/2023  Impression: Impression: There is a transversely oriented fracture which extends through the L3-L4 disc space, the posteroinferior corner of the L3 vertebral body, and the L3 posterior elements. No significant bony retropulsion or bony encroachment upon the spinal canal noted.  Osteopenia with lumbar spondylosis, multilevel syndesmophyte formation, and fusion across posterior elements which may be seen in ankylosing spondylitis. Please see above for additional details. Electronically Signed: Dennys Yip MD  9/10/2023 2:56 PM EDT  Workstation ID: [id number]    MRI Lumbar Spine Without Contrast    Result Date:  9/8/2023  Impression: 1. Features of advanced degenerative change and ankylosing spondylitis in the lumbar spine, with fracture through the L3-4 disc space extending to the posterior elements, is again noted. Mild widening of the anterior L3-4 disc space with fluid and edema,  is unchanged, and no subluxation is evident. 2. Moderate canal stenosis at L1-2 and moderate to severe canal stenosis at L3-4, unchanged. Multilevel moderate to moderate to severe neural foraminal stenosis age of changes are unchanged from 8/28/2023. 3. No new finding since 8/28/2023. Electronically Signed: Deann Damon MD  9/8/2023 2:13 PM EDT  Workstation ID: [id number]    MRI Lumbar Spine Without Contrast    Result Date: 8/28/2023  Impression: Impression: There is pathologic disc space fracture present spanning L3-4 involving bridging anterior syndesmophytes and extending to likely involve bilateral articulating facets, with edema and hemorrhage noted through the disc space and concern for likely anterior longitudinal ligament injury and interspinous ligament injury, with questionable additional edema involving the posterior longitudinal ligament. There is minimal widening of the disc space, otherwise without evidence of traumatic malalignment. Edema and possible trace hemorrhage are noted at the level of the fracture within the spinal canal combining with typical discogenic changes to result in moderate to severe canal narrowing. There is no definite focal space-occupying epidural hematoma. Additional moderate to severe multilevel spondylosis change is present as above. There is no evidence of additional high-grade spinal canal narrowing Electronically Signed: Miguel Mclaughlin MD  8/28/2023 2:40 PM EDT  Workstation ID: [id number]    XR Hip With or Without Pelvis 2 - 3 View Left    Result Date: 9/8/2023  Impression: Impression: 1. No acute left hip or pelvic finding. 2. Moderately advanced degenerative changes of the hips. 3. Advanced  diminished disc height in the lower lumbar spine. Electronically Signed: Deann Damon MD  9/8/2023 11:56 AM EDT  Workstation ID: [id number]     Results for orders placed during the hospital encounter of 11/16/22    Bilateral Carotid Duplex    Interpretation Summary    Proximal right internal carotid artery is normal.    Mid right internal carotid artery is normal.    Proximal left internal carotid artery mild stenosis.      Results for orders placed during the hospital encounter of 11/16/22    Bilateral Carotid Duplex    Interpretation Summary    Proximal right internal carotid artery is normal.    Mid right internal carotid artery is normal.    Proximal left internal carotid artery mild stenosis.      Results for orders placed during the hospital encounter of 09/08/23    Adult Transthoracic Echo Complete W/ Cont if Necessary Per Protocol    Interpretation Summary    Left ventricular ejection fraction appears to be 56 - 60%.    Indications  Valvular function  Status post mitral valve repair.    Technically satisfactory study.  Mitral valve is structurally normal.  Status post mitral valve repair.  No mitral regurgitation is present.  Tricuspid valve is structurally normal.  Aortic valve is thickened with adequate opening motion.  Pulmonic valve could not be well visualized.  No evidence for mitral tricuspid or aortic regurgitation is seen by Doppler study.  Left atrium is enlarged.  Right atrium is normal in size.  Left ventricle is normal in size and contractility with ejection fraction of 60%.  Right ventricle is normal in size.  Atrial septum is intact.  Aorta is normal.  No pericardial effusion or intracardiac thrombus is seen.    Impression  Status post mitral valve repair.  No mitral regurgitation is present.  Aortic valve is thickened with adequate opening motion.  Left atrial enlargement.  Left ventricular size and contractility is normal with ejection fraction of 60%.      Procedures  Performed  Procedure(s):  POSTERIOR LUMBAR FUSION WITH ROBOT         Consults:   Consults       Date and Time Order Name Status Description    9/9/2023 10:53 AM Inpatient Neurosurgery Consult Completed     8/27/2023  2:58 PM Inpatient Neurosurgery Consult Completed               Discharge Details        Discharge Medications        New Medications        Instructions Start Date   cyclobenzaprine 10 MG tablet  Commonly known as: FLEXERIL   10 mg, Oral, 3 Times Daily      oxyCODONE 10 MG tablet  Commonly known as: ROXICODONE   10 mg, Oral, Every 4 Hours PRN             Changes to Medications        Instructions Start Date   zolpidem 5 MG tablet  Commonly known as: AMBIEN  What changed:   medication strength  how much to take   5 mg, Oral, Nightly PRN             Continue These Medications        Instructions Start Date   acetaminophen 500 MG tablet  Commonly known as: TYLENOL   1 tablet, Oral, Every 6 Hours PRN      allopurinol 100 MG tablet  Commonly known as: ZYLOPRIM   100 mg, Oral, Daily      atorvastatin 40 MG tablet  Commonly known as: LIPITOR   40 mg, Oral, Nightly      CAMCEVI SC   Subcutaneous, Every 6 months injection, due in December      DULoxetine 60 MG capsule  Commonly known as: CYMBALTA   60 mg, Oral, Daily      famotidine 40 MG tablet  Commonly known as: PEPCID   40 mg, Oral, Nightly      fexofenadine 180 MG tablet  Commonly known as: ALLEGRA   180 mg, Oral, 2 Times Daily PRN      fluticasone 50 MCG/ACT nasal spray  Commonly known as: FLONASE   2 sprays, Nasal, Daily PRN      metoprolol tartrate 25 MG tablet  Commonly known as: LOPRESSOR   Take 1 tablet by mouth Daily.             Stop These Medications      APPLE CIDER VINEGAR PLUS PO     melatonin 5 MG tablet tablet              Allergies   Allergen Reactions    Morphine Hallucinations    Beta Adrenergic Blockers Cough    Ace Inhibitors Cough     cough         Discharge Disposition:   Skilled Nursing Facility ([place] -  [place])    Diet:  Hospital:  Diet Order   Procedures    Diet: Regular/House Diet; Texture: Regular Texture (IDDSI 7); Fluid Consistency: Thin (IDDSI 0)         Discharge Activity:   Activity Instructions       Activity as Tolerated                CODE STATUS:  Code Status and Medical Interventions:   Ordered at: 09/08/23 1816     Code Status (Patient has no pulse and is not breathing):    CPR (Attempt to Resuscitate)     Medical Interventions (Patient has pulse or is breathing):    Full Support         Future Appointments   Date Time Provider Department [place]   11/6/2023  3:30 PM Jaret Castellanos MD [facility]   1/22/2024  9:40 AM Felipe Garcia MD [facility]           Time spent on Discharge including face to face service:  45 minutes    Signature: Electronically signed by Rita Carl MD, 09/19/23, 09:52 EDT.  [facility] Hospitalist Team

## 2023-09-19 NOTE — PLAN OF CARE
Goal Outcome Evaluation:              Outcome Evaluation: Pt has had minimal c/o pain this shift, call light in reach, vss, plan of care ongoing

## 2023-09-19 NOTE — PROGRESS NOTES
Referring Provider: Rita Carl MD    Reason for follow-up:  Status post mitral valve repair.  Preoperative cardiovascular valuation  Status post back surgery     Patient Care Team:  Jaret Castellanos MD as PCP - General (Family Medicine)  Felipe Garcia MD as Consulting Physician (Cardiology)  Golden Serna, SHANEL as Ambulatory  (Population Health)    Subjective .      ROS      Since I have last seen him yesterday, the patient has been without any chest discomfort ,shortness of breath, palpitations, dizziness or syncope.  Denies having any headache ,abdominal pain ,nausea, vomiting , diarrhea constipation, loss of weight or loss of appetite.  Denies having any excessive bruising ,hematuria or blood in the stool.    Review of all systems negative except as indicated    History  Past Medical History:   Diagnosis Date    Abnormal ECG June 2022    Allergic 01/01/1954    Nasal alergies    Arrhythmia 2004    Dr Youngblood examined me and said i was ok.  skipped beats    Asthma     no sx    Benign prostatic hyperplasia 11/14/2018    Cataract 01/01/2014    Surgery. Caused detached retina of right eye 20/60 repair    Colon polyp 01/01/2018    Found and removed last colon eca.q    Depression     Gastroesophageal reflux disease 03/20/2014    Glaucoma 2003    Normal pressures. Have low pressure glaucoma    Gout     Headache 01/01/2015    Have shimmering in both eyes intermittantly. 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    Prostate Cancer Jan22 January 2022    Spinal stenosis 12/02/2011    Visual impairment 01/01/1955    Nearsighted corrected glasses    Vitamin  D deficiency 05/23/2018       Past Surgical History:   Procedure Laterality Date    ADENOIDECTOMY  1954    As child    APPENDECTOMY  1956    Surgery    CARDIAC CATHETERIZATION N/A 09/30/2022    Procedure: Right and Left Heart Cath with Coronar Angiography;  Surgeon: Felipe Garcia MD;  Location: [facility] CATH INVASIVE LOCATION;  Service: Cardiovascular;  Laterality: N/A;    CARDIAC VALVE REPLACEMENT N/A 11/17/2022    Procedure: TRICUSPID VALVE REPAIR/REPLACEMENT;  Surgeon: Femi Henderson MD;  Location: [facility] CVOR;  Service: Cardiothoracic;  Laterality: N/A;  tricuspid valve repaired with 32mm  groves physio tricuspid annuloplasty ring    CARDIAC VALVE REPLACEMENT  11/17/2022    Repaired mitral and tricuspid    CATARACT EXTRACTION Bilateral     COLONOSCOPY  01/01/1997    Regularly scheduled    COLONOSCOPY N/A 2/22/2023    Procedure: COLONOSCOPY with cold snare polypectomy x 1;  Surgeon: Manfred Keating MD;  Location: [facility] ENDOSCOPY;  Service: Gastroenterology;  Laterality: N/A;    ENDOSCOPY N/A 2/22/2023    Procedure: ESOPHAGOGASTRODUODENOSCOPY with esophageal dilation using 48 Fr. Bougie Dilator;  Surgeon: Manfred Keating MD;  Location: [facility] ENDOSCOPY;  Service: Gastroenterology;  Laterality: N/A;  erosive esophagitis    EYE SURGERY  01/01/2010    Reattached right retina    LUMBAR FUSION N/A 9/13/2023    Procedure: POSTERIOR LUMBAR FUSION WITH ROBOT;  Surgeon: Jayme Strauss IV, MD;  Location: [facility] MAIN OR;  Service: Robotics - Neuro;  Laterality: N/A;    MITRAL VALVE REPAIR/REPLACEMENT N/A 11/17/2022    Procedure: MITRAL VALVE REPAIR/REPLACEMENT with left atrial appendage closure;  Surgeon: Femi Henderson MD;  Location: [facility];  Service: Cardiothoracic;  Laterality: N/A;  mitral valve repaired iwth 40mm medtronic annuloplasty band  with intraop TERRA    SPINE SURGERY  01/01/1971    2 lumbar 1 cervical    TONSILLECTOMY  01/01/1950    Childhood       Family History   Problem Relation Age  of Onset    Arthritis Father     Cancer Father         Prostate    Arrhythmia Father         Congentive heart failure    Diabetes Mother         Adult diabetes    Early death Mother         Kidneys failed after giving birth-diabetes complications    Heart disease Mother         Adult Diabetes       Social History     Tobacco Use    Smoking status: Former     Packs/day: 0.00     Years: 0.50     Pack years: 0.00     Types: Cigarettes     Passive exposure: Past    Smokeless tobacco: Never    Tobacco comments:     Tried tobacco as child   Vaping Use    Vaping Use: Never used   Substance Use Topics    Alcohol use: Not Currently     Comment: Not a regular drinker. Occasionly have a glass of wine or be    Drug use: Never        Medications Prior to Admission   Medication Sig Dispense Refill Last Dose    acetaminophen (TYLENOL) 500 MG tablet Take 1 tablet by mouth Every 6 (Six) Hours As Needed for Mild Pain. Indications: Pain       allopurinol (ZYLOPRIM) 100 MG tablet Take 1 tablet by mouth Daily.       Apple Erik Vn-Grn Tea-Bit Or-Cr (APPLE CIDER VINEGAR PLUS PO) Take 1 Piece by mouth Daily As Needed. Indications: Dietary supplement       atorvastatin (LIPITOR) 40 MG tablet Take 1 tablet by mouth Every Night. Indications: High Amount of Fats in the Blood 90 tablet 1     DULoxetine (CYMBALTA) 60 MG capsule Take 1 capsule by mouth Daily. 90 capsule 0     famotidine (PEPCID) 40 MG tablet Take 1 tablet by mouth Every Night.       fexofenadine (ALLEGRA) 180 MG tablet Take 1 tablet by mouth 2 (Two) Times a Day As Needed. Indications: Hayfever       fluticasone (FLONASE) 50 MCG/ACT nasal spray 2 sprays into the nostril(s) as directed by provider Daily As Needed. Indications: Allergic Rhinitis       Leuprolide Mesylate, 6 Month, (CAMCEVI SC) Inject  under the skin into the appropriate area as directed. Every 6 months injection, due in December  Indications: prostate cancer (to decrease testosterone).       melatonin 5 MG tablet  "tablet Take 1 tablet by mouth At Night As Needed (sleep). Indications: Trouble Sleeping       metoprolol tartrate (LOPRESSOR) 25 MG tablet Take 1 tablet by mouth Daily.       zolpidem (Ambien) 10 MG tablet Take 1 tablet by mouth At Night As Needed for Sleep.          Allergies  Morphine, Beta adrenergic blockers, and Ace inhibitors    Scheduled Meds:allopurinol, 100 mg, Oral, Daily  atorvastatin, 40 mg, Oral, Nightly  cyclobenzaprine, 10 mg, Oral, TID  DULoxetine, 60 mg, Oral, Daily  enoxaparin, 40 mg, Subcutaneous, Q24H  famotidine, 40 mg, Oral, Nightly  metoprolol tartrate, 25 mg, Oral, Daily  sodium chloride, 10 mL, Intravenous, Q12H  zolpidem, 5 mg, Oral, Nightly      Continuous Infusions:sodium chloride, 9 mL/hr    PRN Meds:.  acetaminophen    melatonin    ondansetron    oxyCODONE    [MAR Hold] sodium chloride    sodium chloride    sodium chloride    Objective     VITAL SIGNS  Vitals:    09/18/23 0500 09/18/23 1228 09/18/23 2100 09/19/23 0500   BP: 124/77 129/76 120/71 137/56   BP Location: Left arm Left arm Left arm Left arm   Patient Position: Lying Lying Lying Lying   Pulse: 69 78 81 80   Resp: 15 17 18 20   Temp: 97.7 °F (36.5 °C) 97.9 °F (36.6 °C) 98.9 °F (37.2 °C) 98.7 °F (37.1 °C)   TempSrc: Oral Oral Oral Oral   SpO2: 100% 99% 98% 91%   Weight:   101 kg (222 lb 10.6 oz)    Height:           Flowsheet Rows      Flowsheet Row First Filed Value   Admission Height 185.4 cm (73\") Documented at 09/08/2023 0957   Admission Weight 102 kg (225 lb) Documented at 09/08/2023 0957              Intake/Output Summary (Last 24 hours) at 9/19/2023 0638  Last data filed at 9/18/2023 1100  Gross per 24 hour   Intake 240 ml   Output --   Net 240 ml          TELEMETRY: Sinus rhythm    Physical Exam:  The patient is alert, oriented and in no distress.  Vital signs as noted above.  Head and neck revealed no carotid bruits or jugular venous distention.  No thyromegaly or lymphadenopathy is present  Lungs clear.  No wheezing.  " Breath sounds are normal bilaterally.  Heart normal first and second heart sounds.  No murmur. No precordial rub is present.  No gallop is present.  Abdomen soft and nontender.  No organomegaly is present.  Extremities with good peripheral pulses without any pedal edema.  Skin warm and dry.  Musculoskeletal system is grossly normal  CNS grossly normal    Reviewed and updated.  Results Review:   I reviewed the patient's new clinical results.  Lab Results (last 24 hours)       Procedure Component Value Units Date/Time    Basic Metabolic Panel [[id number]]  (Abnormal) Collected: 09/19/23 0003    Specimen: Blood Updated: 09/19/23 0045     Glucose 128 mg/dL      BUN 12 mg/dL      Creatinine 0.77 mg/dL      Sodium 134 mmol/L      Potassium 3.2 mmol/L      Chloride 100 mmol/L      CO2 27.0 mmol/L      Calcium 8.3 mg/dL      BUN/Creatinine Ratio 15.6     Anion Gap 7.0 mmol/L      eGFR 92.8 mL/min/1.73     Narrative:      GFR Normal >60  Chronic Kidney Disease <60  Kidney Failure <15    The GFR formula is only valid for adults with stable renal function between ages 18 and 70.    CBC & Differential [[id number]]  (Abnormal) Collected: 09/19/23 0003    Specimen: Blood Updated: 09/19/23 0024    Narrative:      The following orders were created for panel order CBC & Differential.  Procedure                               Abnormality         Status                     ---------                               -----------         ------                     CBC Auto Differential[[id number]]        Abnormal            Final result                 Please view results for these tests on the individual orders.    CBC Auto Differential [[id number]]  (Abnormal) Collected: 09/19/23 0003    Specimen: Blood Updated: 09/19/23 0024     WBC 4.60 10*3/mm3      RBC 3.18 10*6/mm3      Hemoglobin 9.8 g/dL      Hematocrit 28.2 %      MCV 88.8 fL      MCH 30.8 pg      MCHC 34.7 g/dL      RDW 13.6 %      RDW-SD 42.4 fl      MPV 6.4 fL      Platelets 117  10*3/mm3      Neutrophil % 77.6 %      Lymphocyte % 10.7 %      Monocyte % 9.3 %      Eosinophil % 2.2 %      Basophil % 0.2 %      Neutrophils, Absolute 3.60 10*3/mm3      Lymphocytes, Absolute 0.50 10*3/mm3      Monocytes, Absolute 0.40 10*3/mm3      Eosinophils, Absolute 0.10 10*3/mm3      Basophils, Absolute 0.00 10*3/mm3      nRBC 0.1 /100 WBC             Imaging Results (Last 24 Hours)       ** No results found for the last 24 hours. **        LAB RESULTS (LAST 7 DAYS)    CBC  Results from last 7 days   Lab Units 09/19/23  0003 09/17/23  2336 09/16/23  2305 09/15/23  2303 09/14/23  2312 09/13/23  2310 09/13/23  1132   WBC 10*3/mm3 4.60 4.20 4.40 4.60 5.30 7.10 7.80   RBC 10*6/mm3 3.18* 3.32* 3.21* 3.12* 3.25* 3.73* 4.38   HEMOGLOBIN g/dL 9.8* 10.2* 9.8* 9.4* 10.0* 11.4* 13.1   HEMATOCRIT % 28.2* 29.0* 28.0* 27.5* 29.1* 32.8* 38.9   MCV fL 88.8 87.3 87.3 87.9 89.5 87.9 88.8   PLATELETS 10*3/mm3 117* 123* 94* 76* 79* 98* 84*         BMP  Results from last 7 days   Lab Units 09/19/23  0003 09/17/23  2336 09/16/23  2305 09/15/23  2303 09/14/23  2312 09/13/23  2310 09/13/23  1132   SODIUM mmol/L 134* 136 133* 131* 134* 138 137   POTASSIUM mmol/L 3.2* 3.8 3.8 3.6 4.0 4.5 4.2   CHLORIDE mmol/L 100 101 99 100 101 102 101   CO2 mmol/L 27.0 27.0 26.0 25.0 27.0 29.0 27.0   BUN mg/dL 12 16 15 13 17 16 16   CREATININE mg/dL 0.77 0.80 0.80 0.68* 0.80 0.89 0.78   GLUCOSE mg/dL 128* 92 117* 123* 122* 201* 121*         CMP   Results from last 7 days   Lab Units 09/19/23  0003 09/17/23  2336 09/16/23  2305 09/15/23  2303 09/14/23  2312 09/13/23  2310 09/13/23  1132 09/12/23  1740   SODIUM mmol/L 134* 136 133* 131* 134* 138 137 135*   POTASSIUM mmol/L 3.2* 3.8 3.8 3.6 4.0 4.5 4.2 4.6   CHLORIDE mmol/L 100 101 99 100 101 102 101 101   CO2 mmol/L 27.0 27.0 26.0 25.0 27.0 29.0 27.0 25.0   BUN mg/dL 12 16 15 13 17 16 16 17   CREATININE mg/dL 0.77 0.80 0.80 0.68* 0.80 0.89 0.78 0.77   GLUCOSE mg/dL 128* 92 117* 123* 122* 201* 121*  149*   ALBUMIN g/dL  --   --   --   --   --   --   --  3.1*   BILIRUBIN mg/dL  --   --   --   --   --   --   --  0.5   ALK PHOS U/L  --   --   --   --   --   --   --  132*   AST (SGOT) U/L  --   --   --   --   --   --   --  9   ALT (SGPT) U/L  --   --   --   --   --   --   --  8           BNP        TROPONIN        CoAg  Results from last 7 days   Lab Units 09/12/23  1740   INR  1.09         Creatinine Clearance  Estimated Creatinine Clearance: 101.9 mL/min (by C-G formula based on SCr of 0.77 mg/dL).    ABG        Radiology  XR Spine Lumbar 2 or 3 View    Result Date: 9/17/2023  Impression: Postoperative changes are present from prior L3-L5 posterior fusion. There is no evidence of hardware displacement, fracture or other immediate complication. There is no new malalignment. Extensive multilevel spondylosis is present, similar to prior. Electronically Signed: Miguel Mclaughlin MD  9/17/2023 5:14 PM EDT  Workstation ID: [id number]         EKG      I personally viewed and interpreted the patient's EKG/Telemetry data: Sinus rhythm nonspecific ST-T wave change    ECHOCARDIOGRAM:    Results for orders placed during the hospital encounter of 09/08/23    Adult Transthoracic Echo Complete W/ Cont if Necessary Per Protocol    Interpretation Summary    Left ventricular ejection fraction appears to be 56 - 60%.    Indications  Valvular function  Status post mitral valve repair.    Technically satisfactory study.  Mitral valve is structurally normal.  Status post mitral valve repair.  No mitral regurgitation is present.  Tricuspid valve is structurally normal.  Aortic valve is thickened with adequate opening motion.  Pulmonic valve could not be well visualized.  No evidence for mitral tricuspid or aortic regurgitation is seen by Doppler study.  Left atrium is enlarged.  Right atrium is normal in size.  Left ventricle is normal in size and contractility with ejection fraction of 60%.  Right ventricle is normal in size.  Atrial  septum is intact.  Aorta is normal.  No pericardial effusion or intracardiac thrombus is seen.    Impression  Status post mitral valve repair.  No mitral regurgitation is present.  Aortic valve is thickened with adequate opening motion.  Left atrial enlargement.  Left ventricular size and contractility is normal with ejection fraction of 60%.          STRESS TEST        Cardiolite (Tc-99m sestamibi) stress test    CARDIAC CATHETERIZATION  Results for orders placed during the hospital encounter of 09/30/22    Cardiac Catheterization/Vascular Study    Narrative  CARDIAC CATHETERIZATION REPORT    DATE OF PROCEDURE:  9/30/2022    INDICATION FOR PROCEDURE:  Shortness of breath  Mitral regurgitation    PROCEDURE PERFORMED:  Right heart catheterization left heart catheterization, coronary angiography, left ventriculography.    @@  Moderate conscious sedation was utilized with intravenous Versed and fentanyl administered by registered nurse with continuous ECG, pulse oximetry and hemodynamic monitoring supervised by myself throughout the entire procedure.  Conscious sedation time   45 minutes    I was present with the patient for the duration of moderate sedation and supervised staff who had no other duties and monitored the patient for the entire procedure.    @@  PROCEDURE COMMENTS:    Under usual sterile precautions and 1% lidocaine filtration right femoral vein and femoral artery were percutaneously punctured and a 7 and 5 Bengali vascular sheaths were respectively inserted.  [place] catheter was used to measure right-sided pressures pulmonary capital wedge pressure and cardiac output using thermodilution technique.  [place] catheter was removed and subsequently left and right coronary angiography followed by left ventricular angiogram was performed.  Hemostasis was obtained and patient was returned to the room with intact pulses.  No complications were noted.  FINDINGS:    1. HEMODYNAMICS:  Left ventricle  end-diastolic pressure is elevated.  Mean right atrial pressure is 4 right ventricle 47/7 pulmonary artery 40/16 mean pulmonary artery 26 pulmonary capital wedge pressure is 17.  No gradient was noted across aortic valve.    2. LEFT VENTRICULOGRAPHY:  Left ventricular size and contractility is normal with ejection fraction of 60%.  Left atrial enlargement is present with severe mitral valve prolapse and severe mitral regurgitation.    3. CORONARY ANGIOGRAPHY:  Left main coronary artery normal.  Left anterior descending artery normal.  Circumflex coronary artery normal.  Right coronary artery is a large and dominant vessel and is normal.    SUMMARY:  Moderate to significant pulmonary hypertension.  Severe mitral valve prolapse and mitral regurgitation and probable ruptured chordae (TERRA and cardiac cath)  Tricuspid valve prolapse.  Normal left ventricular function.  Normal coronary arteries.    RECOMMENDATIONS:  Cardiovascular surgery consultation initiated for possible mitral valve and tricuspid valve repair soon possible.  Have discussed with Dr. Henderson cardiovascular surgeon for coordination of care.        ]]]]]]]]]]]]]]]]]]]]]  Date of study  9/30/2022    Procedure performed  Transesophageal echocardiogram and Doppler study.    Indications  Significant mitral regurgitation  Shortness of breath    Procedure  Anesthesia was provided by anesthesiologist with intravenous Diprivan.  TERRA probe could be passed without difficulty.  Patient tolerated the procedure well.  No complications were noted.    Results  Technically satisfactory study.  Mitral valve has severe myxomatous degeneration with multiple areas of severe prolapse and known coaptation of the mitral leaflet as well as probable ruptured chordae.  Severe mitral regurgitation is seen with multiple jets with mitral regurgitation jet occupying the entire left atrium.  Tricuspid valve is also showing myxomatous degeneration with moderate tricuspid  regurgitation.  Calculated pulmonary artery pressure is 60 mmHg.  Aortic valve is thickened with adequate opening motion.  Left atrium is enlarged.  Left atrial appendage is enlarged without clot.  Left ventricle is normal in size and contractility with ejection fraction of 60%.  Right ventricle is normal in size.  Right atrium is normal in size.  Atrial septum is intact without PFO.  Aortic intimal thickening is present without clot.  No pericardial effusion is seen.    Impression  Myxomatous mitral and tricuspid valve degeneration.  Severe mitral valve prolapse involving both anterior and posterior mitral leaflets with severe mitral regurgitation with multiple jets.  Probable ruptured chordae.  In some views coaptation of the mitral valve is present.  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  Significant pulmonary hypertension.  Left atrial and left atrial appendage enlargement without clot.  Normal left ventricular size and contractility with ejection fraction of 60%.  ]]]]]]]]]]]]]]]]]]]]                OTHER:         Assessment & Plan     Principal Problem:    Back pain      ]]]]]]]]]]]]]]]]]]]]  History  ===========  - Significant back discomfort  Patient has unstable L3-L4 fracture  Patient has continued symptoms despite being on conservative treatment.  Failed bracing trial and physical therapy.  Status post lumbar laminectomy 9/13/2023     -Status post mitral and tricuspid valve repair and left atrial appendage endocardial closure--Dr. Henderson 11/17/2022    Echocardiogram 9/8/2023 revealed  Status post mitral valve repair.  No mitral regurgitation is present.  Aortic valve is thickened with adequate opening motion.  Left atrial enlargement.  Left ventricular size and contractility is normal with ejection fraction of 60%.     -   Preoperative increased shortness of breath and significantly increased fatigue.   Preoperative significant mitral and tricuspid regurgitation.  Prominent  posterior mitral leaflet prolapse     TERRA 9/30/2022  Myxomatous mitral and tricuspid valve degeneration.  Severe mitral valve prolapse involving both anterior and posterior mitral leaflets with severe mitral regurgitation with multiple jets.  Probable ruptured chordae.  In some views coaptation of the mitral valve is present.  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  Significant pulmonary hypertension.  Left atrial and left atrial appendage enlargement without clot.  Normal left ventricular size and contractility with ejection fraction of 60%.     Cardiac catheterization 9/30/2022   Moderate to significant pulmonary hypertension.  Severe mitral valve prolapse and mitral regurgitation and probable ruptured chordae (TERRA and cardiac cath)  Tricuspid valve prolapse.  Normal left ventricular function.  Normal coronary arteries.     Echocardiogram 9/28/2022.  Mild mitral regurgitation (regurgitation jet may be not in the field)      - Premature ventricular contractions.  Occasional palpitations     - Hypertension vitamin D deficiency GERD ocular migraine.     - History of prostate cancer.  Status post radiation therapy.  Bone scan was negative for any spread.     - Status post appendectomy spine surgery adenoidectomy tonsillectomy     - Family history of prostate cancer     - Former smoker     - Allergic to morphine.  ============  Plan  ===========  Significant back discomfort  Patient has unstable L3-L4 fracture  Patient has continued symptoms despite being on conservative treatment.  Failed bracing trial and physical therapy.  Patient to have surgery for his back today.  Status post lumbar laminectomy 9/13/2023.  Patient did not have any perioperative cardiovascular problems.     Status post mitral and tricuspid valve repair and left atrial appendage endocardial closure--Dr. Henderson 11/17/2022  Status post mitral valve repair tricuspid valve repair and left atrial appendage closure--  Pagni-11/17/2022     Patient does not have any manic symptoms at this time.    EKG-normal-9/11/2023.    Echocardiogram.  9/12/2023  Status post mitral valve repair.  No mitral regurgitation is present.  Aortic valve is thickened with adequate opening motion.  Left atrial enlargement.  Left ventricular size and contractility is normal with ejection fraction of 60%.    Thrombocytopenia  No bleeding problems.  Platelet count 41758-69/ 8//2023  60102-99 /9//2023  89105-79 /11//2023  06228-9 13 2023  26927-6/15/2023  46086-29/16/2023  27961-9 17 2023  302304-5 18 2023  903869-8 19 2023    Hypokalemia  K 3.2-new problem  Potassium supplements.     History of resting tachycardia-improved  Patient is taking Metroprolol tartrate 25 mg twice daily.     Patient had postoperative bradycardia.  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  Bradycardia has improved.  No need for pacemaker at this time.     Hypertension-stable.  137/56     Medications were reviewed and updated.  Patient is on allopurinol metoprolol 25 mg a day allopurinol atorvastatin cyclobenzaprine subcu Lovenox (DVT) famotidine.    Okay with transfer plans to rehab.       Further plan will depend on patient's progress.    Reviewed and updated-9/19/2023  ]]]]]]]]]]]]]]]]           Felipe Garcia MD  09/19/23  06:38 EDT

## 2023-09-23 LAB
QT INTERVAL: 402 MS
QTC INTERVAL: 444 MS

## 2023-09-25 NOTE — PROGRESS NOTES
Subjective     Chief Complaint   Patient presents with    Post-op     Follow up          Previous Treatment: L2-5 instrumented fusion on 9/13/23.    HPI: Clint Cee is a 78 y.o. male being seen for 2-week postoperative follow-up after undergoing L2-5 posterior fusion with Dr. Strauss for treatment of a 3 column fracture at L3.  Patient reports moderate improvement in his previous low back pain secondary to this fracture.  He does still have some low-level surgical pain but this is well tolerated.  He has been at [facility] for acute inpatient rehab and will begin home health PT next week.  He feels he is progressing well with them regarding his mobility.  He does still have some difficulty with going from sitting to standing.  He has been wearing his TLSO brace as instructed.    Patient denies fever, chills, bowel/bladder dysfunction, saddle anesthesia, and new lower extremity weakness.        PMH:  Past Medical History:   Diagnosis Date    Abnormal ECG June 2022    Allergic 01/01/1954    Arrhythmia 2004    Asthma     Benign prostatic hyperplasia 11/14/2018    Cataract 01/01/2014    Colon polyp 01/01/2018    Depression     Gastroesophageal reflux disease 03/20/2014    Glaucoma 2003    Gout     Headache 01/01/2015    Heart murmur May 2022    Heart valve disease July 2022    HL (hearing loss) 01/01/2012    Hyperlipidemia     Hypertension 12/02/2011    Infectious viral hepatitis 1954    Insomnia 12/02/2011    Mitral valve prolapse June 2022    Mood disorder 09/19/2013    Polyosteoarthritis 12/02/2011    Prostate Cancer Jan22    Spinal stenosis 12/02/2011    Visual impairment 01/01/1955    Vitamin D deficiency 05/23/2018         Current Outpatient Medications:     acetaminophen (TYLENOL) 500 MG tablet, Take 1 tablet by mouth Every 6 (Six) Hours As Needed for Mild Pain. Indications: Pain, Disp: , Rfl:     allopurinol (ZYLOPRIM) 100 MG tablet, Take 1 tablet by mouth Daily., Disp: , Rfl:     atorvastatin (LIPITOR) 40 MG  tablet, Take 1 tablet by mouth Every Night. Indications: High Amount of Fats in the Blood, Disp: 90 tablet, Rfl: 1    cyclobenzaprine (FLEXERIL) 10 MG tablet, Take 1 tablet by mouth 3 (Three) Times a Day for 30 days., Disp: 90 tablet, Rfl: 0    DULoxetine (CYMBALTA) 60 MG capsule, Take 1 capsule by mouth Daily., Disp: 90 capsule, Rfl: 0    famotidine (PEPCID) 40 MG tablet, Take 1 tablet by mouth Every Night., Disp: , Rfl:     fexofenadine (ALLEGRA) 180 MG tablet, Take 1 tablet by mouth 2 (Two) Times a Day As Needed. Indications: Hayfever, Disp: , Rfl:     fluticasone (FLONASE) 50 MCG/ACT nasal spray, 2 sprays into the nostril(s) as directed by provider Daily As Needed. Indications: Allergic Rhinitis, Disp: , Rfl:     Leuprolide Mesylate, 6 Month, (CAMCEVI SC), Inject  under the skin into the appropriate area as directed. Every 6 months injection, due in December  Indications: prostate cancer (to decrease testosterone)., Disp: , Rfl:     metoprolol tartrate (LOPRESSOR) 25 MG tablet, Take 1 tablet by mouth Daily., Disp: , Rfl:     oxyCODONE (oxyCONTIN) 10 MG 12 hr tablet, Take 1 tablet by mouth Every 12 (Twelve) Hours., Disp: , Rfl:     zolpidem (AMBIEN) 5 MG tablet, Take 1 tablet by mouth At Night As Needed for Sleep for up to 30 days., Disp: 30 tablet, Rfl: 0     Allergies   Allergen Reactions    Morphine Hallucinations    Beta Adrenergic Blockers Cough    Ace Inhibitors Cough     cough        Past Surgical History:   Procedure Laterality Date    ADENOIDECTOMY  1954    As child    APPENDECTOMY  1956    Surgery    CARDIAC CATHETERIZATION N/A 09/30/2022    Procedure: Right and Left Heart Cath with Coronar Angiography;  Surgeon: Felipe Garcia MD;  Location: [facility] CATH INVASIVE LOCATION;  Service: Cardiovascular;  Laterality: N/A;    CARDIAC VALVE REPLACEMENT N/A 11/17/2022    Procedure: TRICUSPID VALVE REPAIR/REPLACEMENT;  Surgeon: Femi Henderson MD;  Location: [facility] CVOR;  Service: Cardiothoracic;   Laterality: N/A;  tricuspid valve repaired with 32mm  groves physio tricuspid annuloplasty ring    CARDIAC VALVE REPLACEMENT  11/17/2022    Repaired mitral and tricuspid    CATARACT EXTRACTION Bilateral     COLONOSCOPY  01/01/1997    Regularly scheduled    COLONOSCOPY N/A 2/22/2023    Procedure: COLONOSCOPY with cold snare polypectomy x 1;  Surgeon: Manfred Keating MD;  Location: [facility] ENDOSCOPY;  Service: Gastroenterology;  Laterality: N/A;    ENDOSCOPY N/A 2/22/2023    Procedure: ESOPHAGOGASTRODUODENOSCOPY with esophageal dilation using 48 Fr. Bougie Dilator;  Surgeon: Manfred Keating MD;  Location: [facility] ENDOSCOPY;  Service: Gastroenterology;  Laterality: N/A;  erosive esophagitis    EYE SURGERY  01/01/2010    Reattached right retina    LUMBAR FUSION N/A 9/13/2023    Procedure: POSTERIOR LUMBAR FUSION WITH ROBOT;  Surgeon: Jayme Strauss IV, MD;  Location: [facility] MAIN OR;  Service: Robotics - Neuro;  Laterality: N/A;    MITRAL VALVE REPAIR/REPLACEMENT N/A 11/17/2022    Procedure: MITRAL VALVE REPAIR/REPLACEMENT with left atrial appendage closure;  Surgeon: Femi Henderson MD;  Location: [facility] CVOR;  Service: Cardiothoracic;  Laterality: N/A;  mitral valve repaired iwth 40mm medtronic annuloplasty band  with intraop TERRA    SPINE SURGERY  01/01/1971    2 lumbar 1 cervical    TONSILLECTOMY  01/01/1950    Childhood        Family History   Problem Relation Age of Onset    Arthritis Father     Cancer Father         Prostate    Arrhythmia Father         Congentive heart failure    Diabetes Mother         Adult diabetes    Early death Mother         Kidneys failed after giving birth-diabetes complications    Heart disease Mother         Adult Diabetes         Social Hx:  Social History     Tobacco Use   Smoking Status Former    Packs/day: 0.00    Years: 0.50    Pack years: 0.00    Types: Cigarettes    Passive exposure: Past   Smokeless Tobacco Never   Tobacco Comments    Tried tobacco as child     "  Alcohol Use: Not At Risk    Frequency of Alcohol Consumption: Never    Average Number of Drinks: Patient does not drink    Frequency of Binge Drinking: Never      Social History     Substance and Sexual Activity   Drug Use Never          Review of Systems   Constitutional:  Positive for activity change. Negative for fever.   HENT: Negative.     Eyes: Negative.    Respiratory: Negative.     Cardiovascular:  Negative for chest pain.   Gastrointestinal:  Negative for abdominal pain.   Endocrine: Negative.    Genitourinary:  Negative for dysuria.   Musculoskeletal:  Positive for arthralgias, back pain and myalgias.   Skin: Negative.    Allergic/Immunologic: Negative.    Neurological:  Positive for weakness and numbness. Negative for headaches.   Psychiatric/Behavioral:  Positive for sleep disturbance.        Objective     /78   Pulse 90   Ht 185.4 cm (73\")   Wt 101 kg (222 lb) Comment: patient stated  BMI 29.29 kg/m²    Body mass index is 29.29 kg/m².      Physical Exam  Vitals reviewed.   Constitutional:       General: He is not in acute distress.     Appearance: Normal appearance.   Cardiovascular:      Rate and Rhythm: Normal rate and regular rhythm.      Pulses: Normal pulses.   Pulmonary:      Effort: Pulmonary effort is normal. No respiratory distress.   Musculoskeletal:         General: No swelling or tenderness. Normal range of motion.      Right lower leg: No edema.      Left lower leg: No edema.   Skin:     General: Skin is warm and dry.      Findings: No bruising, erythema or rash.      Comments: Bilateral Willy incisions CDI w/ no swelling redness or drainage   Neurological:      General: No focal deficit present.      Mental Status: He is alert and oriented to person, place, and time.      Sensory: Sensation is intact.      Motor: Motor function is intact. No weakness.                 Assessment & Plan     MDM: Clint Cee is a 78 y.o. male 2 weeks status post L2-5 posterior " instrumented fusion with Dr. Strauss for treatment of a 3 column fracture at L3.  Patient doing well postoperatively with improvement in his presurgical pain.  He has no new focal neurologic deficits on exam.  He does have some general mobility issues that he has been working through with PT and is progressing nicely.  Surgical incisions are healing well without any signs of infection or wound breakdown.    Moving forward patient should continue to advance their level of activity w/ PT guidance.  They are reportedly already set up for HHPT next week.  No lifting >10 lbs. Continue to utilize TLSO brace when upright.  They may shower but should refrain from submerging the incision underwater for the next 4 weeks. I will have them follow-up with Dr. Strauss 3 months postoperatively with a new CT at that time.  Patient is agreeable to this plan.         Diagnosis Plan   1. S/P lumbar spinal fusion  CT Lumbar Spine Without Contrast      2. 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 Strauss.          This patient was examined wearing appropriate personal protective equipment.            Pato Segal PA-C    09/27/23  12:47 EDT      Part of this note may be an electronic transcription/translation of spoken language to printed text using the Dragon Dictation System.

## 2023-09-27 ENCOUNTER — OFFICE VISIT (OUTPATIENT)
Dept: NEUROSURGERY | Facility: CLINIC | Age: 78
End: 2023-09-27
Payer: MEDICARE

## 2023-09-27 VITALS
SYSTOLIC BLOOD PRESSURE: 121 MMHG | HEIGHT: 73 IN | WEIGHT: 222 LBS | HEART RATE: 90 BPM | BODY MASS INDEX: 29.42 KG/M2 | DIASTOLIC BLOOD PRESSURE: 78 MMHG

## 2023-09-27 DIAGNOSIS — S32.032K: ICD-10-CM

## 2023-09-27 DIAGNOSIS — Z98.1 S/P LUMBAR SPINAL FUSION: Primary | ICD-10-CM

## 2023-09-27 PROCEDURE — 1159F MED LIST DOCD IN RCRD: CPT

## 2023-09-27 PROCEDURE — 3074F SYST BP LT 130 MM HG: CPT

## 2023-09-27 PROCEDURE — 99024 POSTOP FOLLOW-UP VISIT: CPT

## 2023-09-27 PROCEDURE — 3078F DIAST BP <80 MM HG: CPT

## 2023-09-27 PROCEDURE — 1160F RVW MEDS BY RX/DR IN RCRD: CPT

## 2023-09-27 RX ORDER — OXYCODONE HCL 10 MG/1
10 TABLET, FILM COATED, EXTENDED RELEASE ORAL EVERY 12 HOURS
COMMUNITY

## 2023-09-28 ENCOUNTER — PATIENT OUTREACH (OUTPATIENT)
Dept: CASE MANAGEMENT | Facility: OTHER | Age: 78
End: 2023-09-28
Payer: MEDICARE

## 2023-09-28 NOTE — OUTREACH NOTE
AMBULATORY CASE MANAGEMENT NOTE    Name and Relationship of Patient/Support Person: [facility] -     [facility] Follow-up    Questions/Answers      Flowsheet Row Responses   Acute Facility Discharged From [facility]   Acute Discharge Date 09/19/23   Name of the Skilled Nursing Facility? [facility]   Purpose of SNF Admission PT, SN, OT   Estimated length of stay for the patient? TBD   Who is the insurance provider or payor of patient stay? Medicare   Progression of Patient? daily therapies continue          RN-ACM outreach call made to [facility]. Staff reports pt is still there.  dept unavailable for additional information. RN-ACM will continue to monitor for discharge.       Golden VILLAR  Ambulatory Case Management    9/28/2023, 13:08 EDT

## 2023-10-05 ENCOUNTER — PATIENT OUTREACH (OUTPATIENT)
Dept: CASE MANAGEMENT | Facility: OTHER | Age: 78
End: 2023-10-05
Payer: MEDICARE

## 2023-10-05 NOTE — OUTREACH NOTE
AMBULATORY CASE MANAGEMENT NOTE    Name and Relationship of Patient/Support Person: [facility] -     SNF Follow-up    Questions/Answers      Flowsheet Row Responses   Acute Facility Discharged From [facility]   Acute Discharge Date 09/19/23   Name of the Skilled Nursing Facility? [facility]   Purpose of SNF Admission PT, OT, SN   Who is the insurance provider or payor of patient stay? Medicare   Skilled Nursing Discharge Date? 09/30/23   Home Health Agency at discharge? No          RN-ACM outreach call made to [facility], spoke with staff Kimberly. Kimberly reports pt was discharged on 9/30/23, HH was not ordered, outpatient therapy ordered and pt to receive at [place]. RN-ACM outreach call to pt scheduled.       Golden VILLAR  Ambulatory Case Management    10/5/2023, 12:38 EDT

## 2023-10-12 ENCOUNTER — TELEPHONE (OUTPATIENT)
Dept: NEUROSURGERY | Facility: CLINIC | Age: 78
End: 2023-10-12
Payer: MEDICARE

## 2023-10-12 NOTE — TELEPHONE ENCOUNTER
PATIENT: MEGHAN HALL    PROVIDER: ERASMO MALDONADO    REASON: CAN PATIENT RESUME DRIVING?    CALL BACK # [phone]     CALL BACK ANYTIME, MAY LVM    PATIENT CALLED ASKING WHEN HE CAN RESUME DRIVING. PLEASE ADVISE. THANKS.

## 2023-10-13 NOTE — TELEPHONE ENCOUNTER
Called patient and left VM.     Patient okay to drive if he is comfortable with and not taking any narcotics.

## 2023-10-16 NOTE — TELEPHONE ENCOUNTER
Called patient and left VM.     Patient okay to drive if he is comfortable and not taking any narcotics.     [employer] message sent.

## 2023-10-29 RX ORDER — DULOXETIN HYDROCHLORIDE 60 MG/1
60 CAPSULE, DELAYED RELEASE ORAL DAILY
Qty: 90 CAPSULE | Refills: 0 | Status: SHIPPED | OUTPATIENT
Start: 2023-10-29

## 2023-11-02 ENCOUNTER — TELEPHONE (OUTPATIENT)
Dept: FAMILY MEDICINE CLINIC | Facility: CLINIC | Age: 78
End: 2023-11-02
Payer: MEDICARE

## 2023-11-02 DIAGNOSIS — E55.9 VITAMIN D DEFICIENCY: ICD-10-CM

## 2023-11-02 DIAGNOSIS — E78.5 HYPERLIPIDEMIA, UNSPECIFIED HYPERLIPIDEMIA TYPE: ICD-10-CM

## 2023-11-02 DIAGNOSIS — I10 PRIMARY HYPERTENSION: Primary | ICD-10-CM

## 2023-11-02 DIAGNOSIS — R73.09 ELEVATED GLUCOSE: ICD-10-CM

## 2023-11-02 DIAGNOSIS — Z12.5 SCREENING PSA (PROSTATE SPECIFIC ANTIGEN): ICD-10-CM

## 2023-11-02 DIAGNOSIS — R97.20 ELEVATED PSA: ICD-10-CM

## 2023-11-02 NOTE — TELEPHONE ENCOUNTER
Gave message to patient's wife at 2:47pm.  She voiced understanding that patient could go at his convenience to [facility] for the labs.

## 2023-11-02 NOTE — TELEPHONE ENCOUNTER
"Caller: Clint Cee \"Dennis\"    Relationship: Self    Best call back number: [phone]    What orders are you requesting (i.e. lab or imaging): LABS BEFORE MEDICARE WELLNESS    In what timeframe would the patient need to come in: ASAP    Where will you receive your lab/imaging services: [facility]    Additional notes: PATIENT CALLED AND SAID HE WOULD LIKE TO HAVE LABS DONE BEFORE BEFORE HIS UPCOMING MEDICARE WELLNESS VISIT      "

## 2023-11-06 ENCOUNTER — LAB (OUTPATIENT)
Dept: FAMILY MEDICINE CLINIC | Facility: CLINIC | Age: 78
End: 2023-11-06
Payer: MEDICARE

## 2023-11-06 ENCOUNTER — OFFICE VISIT (OUTPATIENT)
Dept: FAMILY MEDICINE CLINIC | Facility: CLINIC | Age: 78
End: 2023-11-06
Payer: MEDICARE

## 2023-11-06 VITALS
DIASTOLIC BLOOD PRESSURE: 88 MMHG | BODY MASS INDEX: 28.89 KG/M2 | OXYGEN SATURATION: 99 % | RESPIRATION RATE: 16 BRPM | WEIGHT: 218 LBS | HEART RATE: 70 BPM | HEIGHT: 73 IN | SYSTOLIC BLOOD PRESSURE: 134 MMHG
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
## 2023-11-06 DIAGNOSIS — M54.41 CHRONIC BILATERAL LOW BACK PAIN WITH BILATERAL SCIATICA: ICD-10-CM

## 2023-11-06 DIAGNOSIS — E55.9 VITAMIN D DEFICIENCY: ICD-10-CM

## 2023-11-06 DIAGNOSIS — C61 PROSTATE CANCER: ICD-10-CM

## 2023-11-06 DIAGNOSIS — I10 PRIMARY HYPERTENSION: ICD-10-CM

## 2023-11-06 DIAGNOSIS — M54.42 CHRONIC BILATERAL LOW BACK PAIN WITH BILATERAL SCIATICA: ICD-10-CM

## 2023-11-06 DIAGNOSIS — M1A.09X0 IDIOPATHIC CHRONIC GOUT OF MULTIPLE SITES WITHOUT TOPHUS: ICD-10-CM

## 2023-11-06 DIAGNOSIS — Z12.5 SCREENING PSA (PROSTATE SPECIFIC ANTIGEN): ICD-10-CM

## 2023-11-06 DIAGNOSIS — T07.XXXA MULTIPLE CONTUSIONS: ICD-10-CM

## 2023-11-06 DIAGNOSIS — R73.09 ELEVATED GLUCOSE: ICD-10-CM

## 2023-11-06 DIAGNOSIS — G89.29 CHRONIC BILATERAL LOW BACK PAIN WITH BILATERAL SCIATICA: ICD-10-CM

## 2023-11-06 DIAGNOSIS — M48.061 SPINAL STENOSIS OF LUMBAR REGION WITHOUT NEUROGENIC CLAUDICATION: ICD-10-CM

## 2023-11-06 DIAGNOSIS — Z00.00 MEDICARE ANNUAL WELLNESS VISIT, SUBSEQUENT: Primary | ICD-10-CM

## 2023-11-06 DIAGNOSIS — E78.5 HYPERLIPIDEMIA, UNSPECIFIED HYPERLIPIDEMIA TYPE: ICD-10-CM

## 2023-11-06 DIAGNOSIS — H90.6 MIXED CONDUCTIVE AND SENSORINEURAL HEARING LOSS OF BOTH EARS: ICD-10-CM

## 2023-11-06 LAB — TSH SERPL DL<=0.05 MIU/L-ACNC: 1.38 UIU/ML (ref 0.27–4.2)

## 2023-11-06 PROCEDURE — 80061 LIPID PANEL: CPT | Performed by: FAMILY MEDICINE

## 2023-11-06 PROCEDURE — 83036 HEMOGLOBIN GLYCOSYLATED A1C: CPT | Performed by: FAMILY MEDICINE

## 2023-11-06 PROCEDURE — 85025 COMPLETE CBC W/AUTO DIFF WBC: CPT | Performed by: FAMILY MEDICINE

## 2023-11-06 PROCEDURE — 85045 AUTOMATED RETICULOCYTE COUNT: CPT | Performed by: FAMILY MEDICINE

## 2023-11-06 PROCEDURE — 36415 COLL VENOUS BLD VENIPUNCTURE: CPT

## 2023-11-06 PROCEDURE — 82306 VITAMIN D 25 HYDROXY: CPT | Performed by: FAMILY MEDICINE

## 2023-11-06 PROCEDURE — 82607 VITAMIN B-12: CPT | Performed by: FAMILY MEDICINE

## 2023-11-06 PROCEDURE — G0103 PSA SCREENING: HCPCS | Performed by: FAMILY MEDICINE

## 2023-11-06 PROCEDURE — 84550 ASSAY OF BLOOD/URIC ACID: CPT | Performed by: FAMILY MEDICINE

## 2023-11-06 PROCEDURE — 80053 COMPREHEN METABOLIC PANEL: CPT | Performed by: FAMILY MEDICINE

## 2023-11-06 PROCEDURE — 84443 ASSAY THYROID STIM HORMONE: CPT | Performed by: FAMILY MEDICINE

## 2023-11-06 NOTE — PROGRESS NOTES
The ABCs of the Annual Wellness Visit  Subsequent Medicare Wellness Visit    Subjective    Clint Cee is a 78 y.o. male who presents for a Subsequent Medicare Wellness Visit.    The following portions of the patient's history were reviewed and   updated as appropriate: allergies, current medications, past family history, past medical history, past social history, past surgical history, and problem list.    Compared to one year ago, the patient feels his physical   health is better.    Compared to one year ago, the patient feels his mental   health is the same.    Recent Hospitalizations:  This patient has had a [facility] admission record on file within the last 365 days.    Current Medical Providers:  Patient Care Team:  Jaret Castellanos MD as PCP - General (Family Medicine)  Felipe Garcia MD as Consulting Physician (Cardiology)    Outpatient Medications Prior to Visit   Medication Sig Dispense Refill    acetaminophen (TYLENOL) 500 MG tablet Take 1 tablet by mouth Every 6 (Six) Hours As Needed for Mild Pain. Indications: Pain      allopurinol (ZYLOPRIM) 100 MG tablet Take 1 tablet by mouth Daily.      atorvastatin (LIPITOR) 40 MG tablet Take 1 tablet by mouth Every Night. Indications: High Amount of Fats in the Blood 90 tablet 1    DULoxetine (CYMBALTA) 60 MG capsule TAKE 1 CAPSULE BY MOUTH DAILY 90 capsule 0    famotidine (PEPCID) 40 MG tablet Take 1 tablet by mouth Every Night.      fexofenadine (ALLEGRA) 180 MG tablet Take 1 tablet by mouth 2 (Two) Times a Day As Needed. Indications: Hayfever      fluticasone (FLONASE) 50 MCG/ACT nasal spray 2 sprays into the nostril(s) as directed by provider Daily As Needed. Indications: Allergic Rhinitis      Leuprolide Mesylate, 6 Month, (CAMCEVI SC) Inject  under the skin into the appropriate area as directed. Every 6 months injection, due in December  Indications: prostate cancer (to decrease testosterone).      metoprolol tartrate (LOPRESSOR) 25 MG  "tablet Take 1 tablet by mouth Daily.      oxyCODONE (oxyCONTIN) 10 MG 12 hr tablet Take 1 tablet by mouth Every 12 (Twelve) Hours As Needed.       No facility-administered medications prior to visit.       Opioid medication/s are on active medication list.  and I have evaluated his active treatment plan and pain score trends (see table).  There were no vitals filed for this visit.  I have reviewed the chart for potential of high risk medication and harmful drug interactions in the elderly.          Aspirin is not on active medication list.  Aspirin use is not indicated based on review of current medical condition/s. Risk of harm outweighs potential benefits.  .    Patient Active Problem List   Diagnosis    Benign prostatic hyperplasia    Gastroesophageal reflux disease    Mood disorder    Scoliosis    Spinal stenosis    Vitamin D deficiency    Polyosteoarthritis    Insomnia    Retinal detachment of right eye with single retinal tear    Osteoarthritis of knee    Cellulitis of face    Prostate cancer    Ocular migraine    Headache    Gout of multiple sites    Medicare annual wellness visit, subsequent    Allergic    HL (hearing loss)    Visual impairment    Cataract    Colon polyp    Primary hypertension    Dyspnea on exertion    S/P mitral valve repair per Dr. Henderson 11/17/2022    S/P tricuspid valve repair per Dr. Henderson 11/17/2022    Back pain     Advance Care Planning   Advance Care Planning     Advance Directive is not on file.  ACP discussion was held with the patient during this visit. Patient has an advance directive (not in EMR), copy requested.     Objective    Vitals:    11/06/23 1522   BP: 134/88   Pulse: 70   Resp: 16   SpO2: 99%   Weight: 98.9 kg (218 lb)   Height: 185.4 cm (73\")     Estimated body mass index is 28.76 kg/m² as calculated from the following:    Height as of this encounter: 185.4 cm (73\").    Weight as of this encounter: 98.9 kg (218 lb).           Does the patient have evidence of cognitive " impairment? No    Lab Results   Component Value Date    HGBA1C 5.00 2023        HEALTH RISK ASSESSMENT    Smoking Status:  Social History     Tobacco Use   Smoking Status Former    Packs/day: 0.00    Years: 0.50    Additional pack years: 0.00    Total pack years: 0.00    Types: Cigarettes    Passive exposure: Past   Smokeless Tobacco Never   Tobacco Comments    Tried tobacco as child     Alcohol Consumption:  Social History     Substance and Sexual Activity   Alcohol Use Not Currently    Comment: Not a regular drinker. Occasionly have a glass of wine or be     Fall Risk Screen:    YOLANDAJAYNA Fall Risk Assessment has not been completed.    Depression Screenin/3/2023     8:20 PM   PHQ-2/PHQ-9 Depression Screening   Little Interest or Pleasure in Doing Things 1-->several days   Trouble Falling or Staying Asleep, or Sleeping Too Much 3-->nearly every day   Feeling Tired or Having Little Energy 1-->several days   Poor Appetite or Overeating 1-->several days   Feeling Bad about Yourself - or that You are a Failure or Have Let Yourself or Your Family Down 0-->not at all   Trouble Concentrating on Things, Such as Reading the Newspaper or Watching Television 0-->not at all   Moving or Speaking So Slowly that Other People Could Have Noticed? Or the Opposite - Being So Fidgety 0-->not at all   Thoughts that You Would be Better Off Dead or of Hurting Yourself in Some Way 0-->not at all   PHQ-9: Brief Depression Severity Measure Score 6   If You Checked Off Any Problems, How Difficult Have These Problems Made It For You to Do Your Work, Take Care of Things at Home, or Get Along with Other People? not difficult at all   Little Interest or Pleasure in Doing Things 1-->several days   Trouble Falling or Staying Asleep, or Sleeping Too Much 3-->nearly every day   Feeling Tired or Having Little Energy 1-->several days   Poor Appetite or Overeating 1-->several days   Feeling Bad about Yourself - or that You are a Failure or  Have Let Yourself or Your Family Down 0-->not at all   Trouble Concentrating on Things, Such as Reading the Newspaper or Watching Television 0-->not at all   Moving or Speaking So Slowly that Other People Could Have Noticed? Or the Opposite - Being So Fidgety 0-->not at all   Thoughts that You Would be Better Off Dead or of Hurting Yourself in Some Way 0-->not at all   If You Checked Off Any Problems, How Difficult Have These Problems Made It For You to Do Your Work, Take Care of Things at Home, or Get Along with Other People? not difficult at all       Health Habits and Functional and Cognitive Screenin/3/2023     8:16 PM   Functional & Cognitive Status   Do you have difficulty preparing food and eating? No   Do you have difficulty bathing yourself, getting dressed or grooming yourself? No   Do you have difficulty using the toilet? No   Do you have difficulty moving around from place to place? No   Do you have trouble with steps or getting out of a bed or a chair? No   Current Diet Well Balanced Diet   Dental Exam Up to date   Eye Exam Up to date   Exercise (times per week) 4 times per week   Current Exercises Include Light Weights;Other;Swimming;Treadmill;Walking   Do you need help using the phone?  No   Are you deaf or do you have serious difficulty hearing?  Yes   Do you need help to go to places out of walking distance? No   Do you need help shopping? No   Do you need help preparing meals?  No   Do you need help with housework?  No   Do you need help with laundry? No   Do you need help taking your medications? No   Do you need help managing money? No   Do you ever drive or ride in a car without wearing a seat belt? No   Have you felt unusual stress, anger or loneliness in the last month? No   Who do you live with? Spouse   If you need help, do you have trouble finding someone available to you? No   Have you been bothered in the last four weeks by sexual problems? Yes   Do you have difficulty  concentrating, remembering or making decisions? Yes       Age-appropriate Screening Schedule:  Refer to the list below for future screening recommendations based on patient's age, sex and/or medical conditions. Orders for these recommended tests are listed in the plan section. The patient has been provided with a written plan.    Health Maintenance   Topic Date Due    ANNUAL WELLNESS VISIT  05/13/2023    INFLUENZA VACCINE  08/01/2023    COVID-19 Vaccine (3 - 2023-24 season) 09/01/2023    LIPID PANEL  11/16/2023    BMI FOLLOWUP  12/19/2023    TDAP/TD VACCINES (2 - Td or Tdap) 12/01/2030    HEPATITIS C SCREENING  Completed    Pneumococcal Vaccine 65+  Completed    ZOSTER VACCINE  Completed    COLORECTAL CANCER SCREENING  Discontinued                  CMS Preventative Services Quick Reference  Risk Factors Identified During Encounter  None Identified  The above risks/problems have been discussed with the patient.  Pertinent information has been shared with the patient in the After Visit Summary.  An After Visit Summary and PPPS were made available to the patient.    Follow Up:   Next Medicare Wellness visit to be scheduled in 1 year.       Additional E&M Note during same encounter follows:  Patient has multiple medical problems which are significant and separately identifiable that require additional work above and beyond the Medicare Wellness Visit.      Chief Complaint  Medicare Wellness-subsequent    Subjective        HPI  Clint Cee is also being seen today for a Medicare wellness visit.    The patient states that he had a fusion between L2 and L5 due to a fracture between L3 and L4. He states that he is improving and is attending outpatient rehabilitation. The patient has started to walk. He fell over a concrete curb yesterday, 11/05/2023, and fell on his shoulder. The patient states that he has full range of motion. He was sent home with oxycodone. The patient took a quarter of a tablet last night. He  "states that he does not like to keep taking Tylenol.    The patient states that he went to the urologist and was told that his PSA was 0. He states that he still has to take injections for 6 months. The patient states that he was told that he might get his libido back.    The patient is up to date with his colon studies. He had a polyp removed. The patient is up to date with his eye exam. He does not need to see a podiatrist for his feet. The patient sees his dentist twice per year. He sees a dermatologist routinely. The patient has an appointment with his cardiologist in 12/2023. He has lost 15 to 25 pounds. The patient is trying to get his blood pressure down to where he is supposed to be.    The patient has not had any gout attacks. He has been taking allopurinol.       Objective   Vital Signs:  /88   Pulse 70   Resp 16   Ht 185.4 cm (73\")   Wt 98.9 kg (218 lb)   SpO2 99%   BMI 28.76 kg/m²     Physical Exam  Constitutional:       Appearance: Normal appearance. He is well-developed and normal weight.   HENT:      Head: Normocephalic and atraumatic.      Right Ear: Tympanic membrane, ear canal and external ear normal.      Left Ear: Tympanic membrane, ear canal and external ear normal.      Nose: Nose normal.      Mouth/Throat:      Mouth: Mucous membranes are moist.      Pharynx: Oropharynx is clear. No oropharyngeal exudate.   Eyes:      Extraocular Movements: Extraocular movements intact.      Conjunctiva/sclera: Conjunctivae normal.      Pupils: Pupils are equal, round, and reactive to light.   Cardiovascular:      Rate and Rhythm: Normal rate and regular rhythm.      Pulses: Normal pulses.      Heart sounds: Normal heart sounds.   Pulmonary:      Effort: Pulmonary effort is normal.      Breath sounds: Normal breath sounds.   Abdominal:      General: Bowel sounds are normal.      Palpations: Abdomen is soft.   Musculoskeletal:         General: Normal range of motion.      Cervical back: Normal range " of motion and neck supple.   Skin:     General: Skin is warm and dry.   Neurological:      General: No focal deficit present.      Mental Status: He is alert and oriented to person, place, and time. Mental status is at baseline.   Psychiatric:         Mood and Affect: Mood normal.         Behavior: Behavior normal.         Thought Content: Thought content normal.         Judgment: Judgment normal.                         Assessment and Plan     1. Medicare annual wellness visit, subsequent year  -Upon arrival to the room the patient underwent the Medicare health risk assessment.  Neither the questions themselves or the answers that were given prompted any major concern on the part of the patient or by the medical staff that gave the assessment.  As far as the preventative care examinations and the preventative care immunizations that this patient requires they are as listed below.   Screening tests recommended:    Colonoscopy-  utd  eye exam -utd  foot exam- not needed  PSA- utd  Dentist- 2x / year  Derm- yearly  Cardiologist- utd  Spinal surgeon- utd    Immunization:  Influenza- utd  Prevnar- utd  Pneumovax-utd  Tetanus utd  Shingles vaccine-- utd  Hepatitis - utd  Covid    -utd       RSV- utd           2. Vitamin D deficiency  - The patient has had a low vitamin D in the past. We are going to recheck it and replace his vitamin D if needed.    3. Bilateral low back pain with previous sciatica  - The patient has had spinal stenosis with back pain and just recently underwent a major surgery on his back where there was removal of some bone and old compression fracture was repaired. The patient's surgery went well. He is wearing a brace and seems to be stiff, but getting some improvement in his lower extremity strength.    4. Idiopathic gout  - We will check his uric acid level and adjust his allopurinol.    5. Primary hypertension  - The patient's blood pressure is well controlled at this time. He will continue his  current medication regimen.    6. Mixed conductive and sensorineural hearing loss of both ears  - The patient wears hearing aids and they are very helpful as far as he is concerned.    7. Prostate cancer  - The patient underwent about 45 radiation treatments in the past and has done well since then. He takes a leuprolide injection subcutaneously every 6 months for chemical castration.    8. Spinal stenosis of the lumbar region  - Currently without neurogenic claudication, but previously he did have left leg weakness. That is improving since his surgery recently.             Follow Up   Return in about 1 year (around 11/6/2024), or if symptoms worsen or fail to improve, for Medicare Wellness.  Patient was given instructions and counseling regarding his condition or for health maintenance advice. Please see specific information pulled into the AVS if appropriate.     Transcribed from ambient dictation for Jaret Castellanos MD by Dany Bustillos.  11/06/23   18:15 EST    Patient or patient representative verbalized consent to the visit recording.  I have personally performed the services described in this document as transcribed by the above individual, and it is both accurate and complete.  Jaret Castellanos MD  11/12/2023  16:10 EST

## 2023-11-07 LAB
25(OH)D3 SERPL-MCNC: 49.5 NG/ML (ref 30–100)
ALBUMIN SERPL-MCNC: 4.2 G/DL (ref 3.5–5.2)
ALBUMIN/GLOB SERPL: 1.6 G/DL
ALP SERPL-CCNC: 96 U/L (ref 39–117)
ALT SERPL W P-5'-P-CCNC: 12 U/L (ref 1–41)
ANION GAP SERPL CALCULATED.3IONS-SCNC: 9.3 MMOL/L (ref 5–15)
AST SERPL-CCNC: 17 U/L (ref 1–40)
BASOPHILS # BLD AUTO: 0.02 10*3/MM3 (ref 0–0.2)
BASOPHILS NFR BLD AUTO: 0.4 % (ref 0–1.5)
BILIRUB SERPL-MCNC: 0.6 MG/DL (ref 0–1.2)
BUN SERPL-MCNC: 18 MG/DL (ref 8–23)
BUN/CREAT SERPL: 17.6 (ref 7–25)
CALCIUM SPEC-SCNC: 9.3 MG/DL (ref 8.6–10.5)
CHLORIDE SERPL-SCNC: 101 MMOL/L (ref 98–107)
CHOLEST SERPL-MCNC: 117 MG/DL (ref 0–200)
CO2 SERPL-SCNC: 25.7 MMOL/L (ref 22–29)
CREAT SERPL-MCNC: 1.02 MG/DL (ref 0.76–1.27)
DEPRECATED RDW RBC AUTO: 46.4 FL (ref 37–54)
EGFRCR SERPLBLD CKD-EPI 2021: 76.2 ML/MIN/1.73
EOSINOPHIL # BLD AUTO: 0.12 10*3/MM3 (ref 0–0.4)
EOSINOPHIL NFR BLD AUTO: 2.1 % (ref 0.3–6.2)
ERYTHROCYTE [DISTWIDTH] IN BLOOD BY AUTOMATED COUNT: 14.1 % (ref 12.3–15.4)
GLOBULIN UR ELPH-MCNC: 2.6 GM/DL
GLUCOSE SERPL-MCNC: 116 MG/DL (ref 65–99)
HBA1C MFR BLD: 5.1 % (ref 4.8–5.6)
HCT VFR BLD AUTO: 38.6 % (ref 37.5–51)
HDLC SERPL-MCNC: 45 MG/DL (ref 40–60)
HGB BLD-MCNC: 12.6 G/DL (ref 13–17.7)
IMM GRANULOCYTES # BLD AUTO: 0.02 10*3/MM3 (ref 0–0.05)
IMM GRANULOCYTES NFR BLD AUTO: 0.4 % (ref 0–0.5)
LDLC SERPL CALC-MCNC: 55 MG/DL (ref 0–100)
LDLC/HDLC SERPL: 1.22 {RATIO}
LYMPHOCYTES # BLD AUTO: 0.69 10*3/MM3 (ref 0.7–3.1)
LYMPHOCYTES NFR BLD AUTO: 12.2 % (ref 19.6–45.3)
MCH RBC QN AUTO: 29.8 PG (ref 26.6–33)
MCHC RBC AUTO-ENTMCNC: 32.6 G/DL (ref 31.5–35.7)
MCV RBC AUTO: 91.3 FL (ref 79–97)
MONOCYTES # BLD AUTO: 0.46 10*3/MM3 (ref 0.1–0.9)
MONOCYTES NFR BLD AUTO: 8.2 % (ref 5–12)
NEUTROPHILS NFR BLD AUTO: 4.33 10*3/MM3 (ref 1.7–7)
NEUTROPHILS NFR BLD AUTO: 76.7 % (ref 42.7–76)
NRBC BLD AUTO-RTO: 0.2 /100 WBC (ref 0–0.2)
PLATELET # BLD AUTO: 101 10*3/MM3 (ref 140–450)
PMV BLD AUTO: 9.2 FL (ref 6–12)
POTASSIUM SERPL-SCNC: 4.8 MMOL/L (ref 3.5–5.2)
PROT SERPL-MCNC: 6.8 G/DL (ref 6–8.5)
PSA SERPL-MCNC: <0.014 NG/ML (ref 0–4)
RBC # BLD AUTO: 4.23 10*6/MM3 (ref 4.14–5.8)
RETICS # AUTO: 0.1 10*6/MM3 (ref 0.02–0.13)
RETICS/RBC NFR AUTO: 2.38 % (ref 0.7–1.9)
SODIUM SERPL-SCNC: 136 MMOL/L (ref 136–145)
TRIGL SERPL-MCNC: 86 MG/DL (ref 0–150)
URATE SERPL-MCNC: 4.8 MG/DL (ref 3.4–7)
VIT B12 BLD-MCNC: 455 PG/ML (ref 211–946)
VLDLC SERPL-MCNC: 17 MG/DL (ref 5–40)
WBC NRBC COR # BLD: 5.64 10*3/MM3 (ref 3.4–10.8)

## 2023-11-07 NOTE — PROGRESS NOTES
Tell Dennis that his numbers are all improving.  He is still a little anemic but he is making new red blood cells and he is just a point or 2 away from being normal now.  His average blood sugar is excellent his PSA is normal and his cholesterol panel is excellent.

## 2023-11-20 RX ORDER — ALLOPURINOL 100 MG/1
100 TABLET ORAL DAILY
Qty: 90 TABLET | Refills: 1 | Status: SHIPPED | OUTPATIENT
Start: 2023-11-20

## 2023-11-29 ENCOUNTER — HOSPITAL ENCOUNTER (OUTPATIENT)
Dept: CT IMAGING | Facility: HOSPITAL | Age: 78
Discharge: HOME | End: 2023-11-29
Payer: MEDICARE

## 2023-11-29 DIAGNOSIS — S32.032K: ICD-10-CM

## 2023-11-29 DIAGNOSIS — Z98.1 S/P LUMBAR SPINAL FUSION: ICD-10-CM

## 2023-11-29 PROCEDURE — 72131 CT LUMBAR SPINE W/O DYE: CPT

## 2023-12-01 NOTE — PROGRESS NOTES
"Subjective   History of Present Illness: Clint Cee is a 78 y.o. male is here today for follow-up from surgery on 9/13/23. Today patient has no new symptoms to reports.  Back pain has improved over time    Chief Complaint   Patient presents with    Follow-up     Surgery on 9/13/23          Previous treatment: TLSO Brace,Oxycodone,Duloxetine,Tylenol    Previous neurosurgery:  L2-5 instrumented fusion on 9/13/23.    Previous injections:     The following portions of the patient's history were reviewed and updated as appropriate: allergies, current medications, past family history, past medical history, past social history, past surgical history, and problem list.    Review of Systems   Constitutional:  Positive for activity change.   HENT: Negative.     Eyes: Negative.    Respiratory: Negative.     Cardiovascular: Negative.    Gastrointestinal: Negative.    Endocrine: Negative.    Genitourinary: Negative.    Musculoskeletal: Negative.    Skin: Negative.    Allergic/Immunologic: Negative.    Neurological: Negative.    Hematological: Negative.    Psychiatric/Behavioral:  Positive for sleep disturbance.        Objective      /75   Pulse 65   Ht 185.4 cm (73\")   Wt 101 kg (222 lb)   BMI 29.29 kg/m²    Body mass index is 29.29 kg/m².  Vitals:    12/04/23 1332   PainSc: 0-No pain           Neurologic Exam    Assessment & Plan   Independent Review of Radiographic Studies:      I personally reviewed and interpreted the images from the following studies.    CT lumbar spine: Redemonstration of L3 fracture.  Hardware is intact and in good positioning.  There is developing fusion posterior laterally    Medical Decision Making:      Clint Cee is a 78 y.o. male status post lumbar fusion for unstable fracture overall doing well at 3 months postop.  He should continue to limit bending lifting and twisting.  He can continue with physical therapy.  I will see him back in 3 months to evaluate his " progress.  CT is reassuring      Diagnoses and all orders for this visit:    1. S/P lumbar spinal fusion (Primary)      No follow-ups on file.    This patient was examined wearing appropriate personal protective equipment.                      Dr. Jayme Strauss IV    12/04/23  14:33 EST

## 2023-12-04 ENCOUNTER — OFFICE VISIT (OUTPATIENT)
Dept: NEUROSURGERY | Facility: CLINIC | Age: 78
End: 2023-12-04
Payer: MEDICARE

## 2023-12-04 VITALS
SYSTOLIC BLOOD PRESSURE: 144 MMHG | WEIGHT: 222 LBS | HEART RATE: 65 BPM | DIASTOLIC BLOOD PRESSURE: 75 MMHG | HEIGHT: 73 IN | BODY MASS INDEX: 29.42 KG/M2

## 2023-12-04 DIAGNOSIS — Z98.1 S/P LUMBAR SPINAL FUSION: Primary | ICD-10-CM

## 2023-12-04 PROCEDURE — 3078F DIAST BP <80 MM HG: CPT | Performed by: NEUROLOGICAL SURGERY

## 2023-12-04 PROCEDURE — 99024 POSTOP FOLLOW-UP VISIT: CPT | Performed by: NEUROLOGICAL SURGERY

## 2023-12-04 PROCEDURE — 1159F MED LIST DOCD IN RCRD: CPT | Performed by: NEUROLOGICAL SURGERY

## 2023-12-04 PROCEDURE — 3077F SYST BP >= 140 MM HG: CPT | Performed by: NEUROLOGICAL SURGERY

## 2023-12-04 PROCEDURE — 1160F RVW MEDS BY RX/DR IN RCRD: CPT | Performed by: NEUROLOGICAL SURGERY

## 2024-01-08 NOTE — PROGRESS NOTES
Encounter Date:01/22/2024  Last seen 9/9/2023      Patient ID: Clint Cee is a 79 y.o. male.  Chief complaint  Status post mitral valve.  Status post tricuspid valve repair  Hypertension    History of present illness  Since I have last seen, the patient has been without any chest discomfort ,shortness of breath, palpitations, dizziness or syncope.  Denies having any headache ,abdominal pain ,nausea, vomiting , diarrhea constipation, loss of weight or loss of appetite.  Denies having any excessive bruising ,hematuria or blood in the stool.    Review of all systems negative except as indicated.    Reviewed ROS.      Assessment and plan  ]]]]]]]]]]]]]]]]]]]]  History  ===========    -Status post mitral and tricuspid valve repair and left atrial appendage endocardial closure--Dr. Henderson 11/17/2022     Echocardiogram 9/8/2023 revealed  Status post mitral valve repair.  No mitral regurgitation is present.  Aortic valve is thickened with adequate opening motion.  Left atrial enlargement.  Left ventricular size and contractility is normal with ejection fraction of 60%.     -   Preoperative increased shortness of breath and significantly increased fatigue.   Preoperative significant mitral and tricuspid regurgitation.  Prominent posterior mitral leaflet prolapse     TERRA 9/30/2022  Myxomatous mitral and tricuspid valve degeneration.  Severe mitral valve prolapse involving both anterior and posterior mitral leaflets with severe mitral regurgitation with multiple jets.  Probable ruptured chordae.  In some views coaptation of the mitral valve is present.  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  Significant pulmonary hypertension.  Left atrial and left atrial appendage enlargement without clot.  Normal left ventricular size and contractility with ejection fraction of 60%.     Cardiac catheterization 9/30/2022   Moderate to significant pulmonary hypertension.  Severe mitral valve prolapse and  mitral regurgitation and probable ruptured chordae (TERRA and cardiac cath)  Tricuspid valve prolapse.  Normal left ventricular function.  Normal coronary arteries.     Echocardiogram 9/28/2022.  Mild mitral regurgitation (regurgitation jet may be not in the field)      - Premature ventricular contractions.  Occasional palpitations     - Hypertension vitamin D deficiency GERD ocular migraine.     - History of prostate cancer.  Status post radiation therapy.  Bone scan was negative for any spread.     - Status post appendectomy spine surgery adenoidectomy tonsillectomy.  Status post lumbar laminectomy 9/13/2023     - Family history of prostate cancer     - Former smoker     - Allergic to morphine.  ============  Plan  ===========  Status post lumbar laminectomy 9/13/2023  Patient did not have any perioperative cardiovascular problems.     Status post mitral and tricuspid valve repair and left atrial appendage endocardial closure--Dr. Henderson 11/17/2022  Status post mitral valve repair tricuspid valve repair and left atrial appendage closure--Dr. Henderson-11/17/2022     EKG-normal-9/11/2023.  EKG was not performed today.     Echocardiogram.  9/12/2023-as above     Thrombocytopenia  No bleeding problems.  [id number] 2023     Hypokalemia-improved    History of resting tachycardia-improved  Patient is taking Metroprolol tartrate 25 mg twice daily.     Patient had postoperative bradycardia.  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  Bradycardia has improved.  No need for pacemaker at this time.     Hypertension-stable.  125/66     Medications were reviewed and updated.    Follow-up in the office in 6 months.    Further plan will depend on patient's progress.     Reviewed and updated-1/22/2024.  ]]]]]]]]]]]]]]]]               Diagnosis Plan   1. Nonrheumatic mitral valve regurgitation        2. Essential hypertension        3. Nonrheumatic tricuspid valve regurgitation        4. Heart murmur on physical examination         5. S/P tricuspid valve repair        6. Primary hypertension        7. Premature ventricular contractions        8. Mitral valve prolapse        9. H/O mitral valve repair        LAB RESULTS (LAST 7 DAYS)    CBC        BMP        CMP         BNP        TROPONIN        CoAg        Creatinine Clearance  CrCl cannot be calculated (Patient's most recent lab result is older than the maximum 30 days allowed.).    ABG        Radiology  No radiology results for the last day                The following portions of the patient's history were reviewed and updated as appropriate: allergies, current medications, past family history, past medical history, past social history, past surgical history, and problem list.    Review of Systems   Constitutional: Negative for malaise/fatigue.   Cardiovascular:  Negative for chest pain, dyspnea on exertion, leg swelling and palpitations.   Respiratory:  Negative for cough and shortness of breath.    Gastrointestinal:  Negative for abdominal pain, nausea and vomiting.   Neurological:  Negative for dizziness, focal weakness, headaches, light-headedness and numbness.   All other systems reviewed and are negative.      Current Outpatient Medications:     acetaminophen (TYLENOL) 500 MG tablet, Take 1 tablet by mouth Every 6 (Six) Hours As Needed for Mild Pain. Indications: Pain, Disp: , Rfl:     allopurinol (ZYLOPRIM) 100 MG tablet, TAKE 1 TABLET BY MOUTH DAILY, Disp: 90 tablet, Rfl: 1    atorvastatin (LIPITOR) 40 MG tablet, Take 1 tablet by mouth Every Night. Indications: High Amount of Fats in the Blood, Disp: 90 tablet, Rfl: 1    DULoxetine (CYMBALTA) 60 MG capsule, TAKE 1 CAPSULE BY MOUTH DAILY, Disp: 90 capsule, Rfl: 0    famotidine (PEPCID) 40 MG tablet, Take 1 tablet by mouth Every Night., Disp: , Rfl:     fexofenadine (ALLEGRA) 180 MG tablet, Take 1 tablet by mouth 2 (Two) Times a Day As Needed. Indications: Hayfever, Disp: , Rfl:     fluticasone (FLONASE) 50 MCG/ACT nasal spray, 2  sprays into the nostril(s) as directed by provider Daily As Needed. Indications: Allergic Rhinitis, Disp: , Rfl:     Leuprolide Mesylate, 6 Month, (CAMCEVI SC), Inject  under the skin into the appropriate area as directed. Every 6 months injection, due in December  Indications: prostate cancer (to decrease testosterone)., Disp: , Rfl:     metoprolol tartrate (LOPRESSOR) 25 MG tablet, Take 1 tablet by mouth Daily., Disp: , Rfl:     oxyCODONE (oxyCONTIN) 10 MG 12 hr tablet, Take 1 tablet by mouth Every 12 (Twelve) Hours As Needed., Disp: , Rfl:     Allergies   Allergen Reactions    Morphine Hallucinations    Beta Adrenergic Blockers Cough    Ace Inhibitors Cough     cough       Family History   Problem Relation Age of Onset    Arthritis Father     Cancer Father         Prostate    Arrhythmia Father         Congentive heart failure    Diabetes Mother         Adult diabetes    Early death Mother         Kidneys failed after giving birth-diabetes complications    Heart disease Mother         Adult Diabetes    Cancer Mother         Diabetes       Past Surgical History:   Procedure Laterality Date    ADENOIDECTOMY  1954    As child    APPENDECTOMY  1956    Surgery    CARDIAC CATHETERIZATION N/A 09/30/2022    Procedure: Right and Left Heart Cath with Coronar Angiography;  Surgeon: Felipe Garcia MD;  Location: [facility] CATH INVASIVE LOCATION;  Service: Cardiovascular;  Laterality: N/A;    CARDIAC VALVE REPLACEMENT N/A 11/17/2022    Procedure: TRICUSPID VALVE REPAIR/REPLACEMENT;  Surgeon: Femi Henderson MD;  Location: [facility] CVOR;  Service: Cardiothoracic;  Laterality: N/A;  tricuspid valve repaired with 32mm  groves physio tricuspid annuloplasty ring    CARDIAC VALVE REPLACEMENT  11/17/2022    Repaired mitral and tricuspid    CATARACT EXTRACTION Bilateral     COLONOSCOPY  01/01/1997    Regularly scheduled    COLONOSCOPY N/A 02/22/2023    Procedure: COLONOSCOPY with cold snare polypectomy x 1;  Surgeon: Manfred Keating  MD Andriy;  Location: [facility] ENDOSCOPY;  Service: Gastroenterology;  Laterality: N/A;    CORONARY ARTERY BYPASS GRAFT  11/22    During open heart valves repair.    ENDOSCOPY N/A 02/22/2023    Procedure: ESOPHAGOGASTRODUODENOSCOPY with esophageal dilation using 48 Fr. Bougie Dilator;  Surgeon: Manfred Keating MD;  Location: [facility] ENDOSCOPY;  Service: Gastroenterology;  Laterality: N/A;  erosive esophagitis    EYE SURGERY  01/01/2010    Reattached right retina    LUMBAR FUSION N/A 09/13/2023    Procedure: POSTERIOR LUMBAR FUSION WITH ROBOT;  Surgeon: Jayme Strauss IV, MD;  Location: [facility] MAIN OR;  Service: Robotics - Neuro;  Laterality: N/A;    MITRAL VALVE REPAIR/REPLACEMENT N/A 11/17/2022    Procedure: MITRAL VALVE REPAIR/REPLACEMENT with left atrial appendage closure;  Surgeon: Femi Henderson MD;  Location: [facility] CVOR;  Service: Cardiothoracic;  Laterality: N/A;  mitral valve repaired iwth 40mm medtronic annuloplasty band  with intraop TERRA    SPINE SURGERY  01/01/1971    2 lumbar 1 cervical    TONSILLECTOMY  01/01/1950    Childhood       Past Medical History:   Diagnosis Date    Abnormal ECG June 2022    Allergic 01/01/1954    Nasal alergies    Arrhythmia 2004    Dr Youngblood examined me and said i was ok.  skipped beats    Asthma     no sx    Benign prostatic hyperplasia 11/14/2018    Cataract 01/01/2014    Surgery. Caused detached retina of right eye 20/60 repair    Clotting disorder 2019    Low platelets    Colon polyp 01/01/2018    Found and removed last colon eca.q    Depression     Gastroesophageal reflux disease 03/20/2014    Glaucoma 2003    Normal pressures. Have low pressure glaucoma    Gout     Headache 01/01/2015    Have shimmering in both eyes intermittantly. No headaches    Heart murmur May 2022    During wellness exam    Heart valve disease July 2022    During Echo    HL (hearing loss) 01/01/2012    Have hearing aids    Hyperlipidemia     Hypertension 12/02/2011    Infectious viral  hepatitis 1954    Yellow jaundis as child    Insomnia 12/02/2011    Low back pain 1966    Mitral valve prolapse June 2022    Mood disorder 09/19/2013    Polyosteoarthritis 12/02/2011    Prostate Cancer Jan22 January 2022    Spinal stenosis 12/02/2011    Visual impairment 01/01/1955    Nearsighted corrected glasses    Vitamin D deficiency 05/23/2018       Family History   Problem Relation Age of Onset    Arthritis Father     Cancer Father         Prostate    Arrhythmia Father         Congentive heart failure    Diabetes Mother         Adult diabetes    Early death Mother         Kidneys failed after giving birth-diabetes complications    Heart disease Mother         Adult Diabetes    Cancer Mother         Diabetes       Social History     Socioeconomic History    Marital status:    Tobacco Use    Smoking status: Former     Packs/day: 0.00     Years: 0.50     Additional pack years: 0.00     Total pack years: 0.00     Types: Cigarettes     Passive exposure: Past    Smokeless tobacco: Never    Tobacco comments:     Tried tobacco as child   Vaping Use    Vaping Use: Never used   Substance and Sexual Activity    Alcohol use: Not Currently     Comment: Not a regular drinker. Occasionly have a glass of wine or be    Drug use: Never    Sexual activity: Not Currently     Partners: Female     Birth control/protection: Condom, I.U.D., Other, Post-menopausal, None, Birth control pill, Pill     Comment: Chemically castrated. Prostate cancer         Procedures      Objective:       Physical Exam    There were no vitals taken for this visit.  The patient is alert, oriented and in no distress.    Vital signs as noted above.    Head and neck revealed no carotid bruits or jugular venous distension.  No thyromegaly or lymphadenopathy is present.    Lungs clear.  No wheezing.  Breath sounds are normal bilaterally.    Heart normal first and second heart sounds.  No murmur..  No pericardial rub is present.  No gallop is  present.    Abdomen soft and nontender.  No organomegaly is present.    Extremities revealed good peripheral pulses without any pedal edema.    Skin warm and dry.    Musculoskeletal system is grossly normal.    CNS grossly normal.    Reviewed and updated.

## 2024-01-17 ENCOUNTER — TELEPHONE (OUTPATIENT)
Dept: FAMILY MEDICINE CLINIC | Facility: CLINIC | Age: 79
End: 2024-01-17

## 2024-01-22 ENCOUNTER — OFFICE VISIT (OUTPATIENT)
Dept: CARDIOLOGY | Facility: CLINIC | Age: 79
End: 2024-01-22
Payer: MEDICARE

## 2024-01-22 VITALS
HEIGHT: 73 IN | BODY MASS INDEX: 29.03 KG/M2 | SYSTOLIC BLOOD PRESSURE: 125 MMHG | OXYGEN SATURATION: 97 % | WEIGHT: 219 LBS | HEART RATE: 54 BPM | DIASTOLIC BLOOD PRESSURE: 66 MMHG

## 2024-01-22 DIAGNOSIS — I36.1 NONRHEUMATIC TRICUSPID VALVE REGURGITATION: ICD-10-CM

## 2024-01-22 DIAGNOSIS — Z98.890 S/P TRICUSPID VALVE REPAIR: ICD-10-CM

## 2024-01-22 DIAGNOSIS — I49.3 PREMATURE VENTRICULAR CONTRACTIONS: ICD-10-CM

## 2024-01-22 DIAGNOSIS — I34.1 MITRAL VALVE PROLAPSE: ICD-10-CM

## 2024-01-22 DIAGNOSIS — I34.0 NONRHEUMATIC MITRAL VALVE REGURGITATION: Primary | ICD-10-CM

## 2024-01-22 DIAGNOSIS — R01.1 HEART MURMUR ON PHYSICAL EXAMINATION: ICD-10-CM

## 2024-01-22 DIAGNOSIS — I10 ESSENTIAL HYPERTENSION: ICD-10-CM

## 2024-01-22 DIAGNOSIS — Z98.890 H/O MITRAL VALVE REPAIR: ICD-10-CM

## 2024-01-22 DIAGNOSIS — I10 PRIMARY HYPERTENSION: ICD-10-CM

## 2024-01-22 PROCEDURE — 99214 OFFICE O/P EST MOD 30 MIN: CPT | Performed by: INTERNAL MEDICINE

## 2024-01-22 PROCEDURE — 1160F RVW MEDS BY RX/DR IN RCRD: CPT | Performed by: INTERNAL MEDICINE

## 2024-01-22 PROCEDURE — 3078F DIAST BP <80 MM HG: CPT | Performed by: INTERNAL MEDICINE

## 2024-01-22 PROCEDURE — 1159F MED LIST DOCD IN RCRD: CPT | Performed by: INTERNAL MEDICINE

## 2024-01-22 PROCEDURE — 3074F SYST BP LT 130 MM HG: CPT | Performed by: INTERNAL MEDICINE

## 2024-01-22 RX ORDER — ZOLPIDEM TARTRATE 10 MG/1
10 TABLET ORAL
COMMUNITY
Start: 2023-12-30

## 2024-02-01 ENCOUNTER — APPOINTMENT (OUTPATIENT)
Dept: GENERAL RADIOLOGY | Facility: HOSPITAL | Age: 79
End: 2024-02-01
Payer: MEDICARE

## 2024-02-01 ENCOUNTER — HOSPITAL ENCOUNTER (EMERGENCY)
Facility: HOSPITAL | Age: 79
Discharge: HOME | End: 2024-02-01
Attending: EMERGENCY MEDICINE | Admitting: EMERGENCY MEDICINE
Payer: MEDICARE

## 2024-02-01 VITALS
HEIGHT: 73 IN | TEMPERATURE: 98 F | BODY MASS INDEX: 28.49 KG/M2 | HEART RATE: 67 BPM | RESPIRATION RATE: 16 BRPM | OXYGEN SATURATION: 98 % | SYSTOLIC BLOOD PRESSURE: 142 MMHG | WEIGHT: 215 LBS | DIASTOLIC BLOOD PRESSURE: 80 MMHG

## 2024-02-01 DIAGNOSIS — M25.511 ACUTE PAIN OF RIGHT SHOULDER: Primary | ICD-10-CM

## 2024-02-01 DIAGNOSIS — M79.641 PAIN OF RIGHT HAND: ICD-10-CM
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
## 2024-02-01 PROCEDURE — 73070 X-RAY EXAM OF ELBOW: CPT

## 2024-02-01 PROCEDURE — 73130 X-RAY EXAM OF HAND: CPT

## 2024-02-01 PROCEDURE — 72050 X-RAY EXAM NECK SPINE 4/5VWS: CPT

## 2024-02-01 PROCEDURE — 73030 X-RAY EXAM OF SHOULDER: CPT

## 2024-02-01 PROCEDURE — 99283 EMERGENCY DEPT VISIT LOW MDM: CPT

## 2024-02-01 RX ORDER — DICLOFENAC SODIUM 75 MG/1
75 TABLET, DELAYED RELEASE ORAL 2 TIMES DAILY
Qty: 10 TABLET | Refills: 0 | Status: SHIPPED | OUTPATIENT
Start: 2024-02-01 | End: 2024-02-06

## 2024-02-01 NOTE — ED PROVIDER NOTES
Subjective     Provider in Triage Note  Due to significant overcrowding in the emergency department, this patient was initially seen and evaluated in triage.  Provider in triage recommended patient be placed in treatment area to initiate therapy with movement to an ER bed as soon as possible.    Patient is 79-year-old  male who presents with complaints of pain to his right arm and the right side of his neck after falling today.  He was at the dog park walking his dog when it started running from him, causing it to pull on his right arm with a sudden movement.  This also caused him to fall onto the same arm.  Hematoma noted to the dorsal aspect of his right hand but range of motion does not seem affected.  He did not hit his head or lose consciousness.  Takes baby aspirin.  He took ibuprofen prior to ER arrival.      History of Present Illness  Patient received assessment in triage.  I attest that the above is accurate for patient's HPI.      Review of Systems   Constitutional:  Negative for appetite change and fever.   HENT:  Negative for congestion and rhinorrhea.    Respiratory:  Negative for shortness of breath.    Cardiovascular:  Negative for chest pain.   Gastrointestinal:  Negative for abdominal pain, nausea and vomiting.   Genitourinary:  Negative for dysuria.   Musculoskeletal:  Positive for arthralgias and myalgias. Negative for joint swelling.   Neurological:  Negative for syncope and headaches.   Psychiatric/Behavioral:  The patient is not nervous/anxious.    All other systems reviewed and are negative.      Past Medical History:   Diagnosis Date    Abnormal ECG June 2022    Allergic 01/01/1954    Nasal alergies    Arrhythmia 2004    Dr Youngblood examined me and said i was ok.  skipped beats    Asthma     no sx    Benign prostatic hyperplasia 11/14/2018    Cataract 01/01/2014    Surgery. Caused detached retina of right eye 20/60 repair    Clotting disorder 2019    Low platelets    Colon polyp  01/01/2018    Found and removed last colon eca.q    Deep vein thrombosis     Depression     Gastroesophageal reflux disease 03/20/2014    Glaucoma 2003    Normal pressures. Have low pressure glaucoma    Gout     Headache 01/01/2015    Have shimmering in both eyes intermittantly. 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/02/2011    Prostate Cancer Jan22 January 2022    Spinal stenosis 12/02/2011    Visual impairment 01/01/1955    Nearsighted corrected glasses    Vitamin D deficiency 05/23/2018       Allergies   Allergen Reactions    Morphine Hallucinations    Beta Adrenergic Blockers Cough    Ace Inhibitors Cough     cough       Past Surgical History:   Procedure Laterality Date    ADENOIDECTOMY  1954    As child    APPENDECTOMY  1956    Surgery    CARDIAC CATHETERIZATION N/A 09/30/2022    Procedure: Right and Left Heart Cath with Coronar Angiography;  Surgeon: Felipe Garcia MD;  Location: [facility] CATH INVASIVE LOCATION;  Service: Cardiovascular;  Laterality: N/A;    CARDIAC VALVE REPLACEMENT N/A 11/17/2022    Procedure: TRICUSPID VALVE REPAIR/REPLACEMENT;  Surgeon: Femi Henderson MD;  Location: [facility] CVOR;  Service: Cardiothoracic;  Laterality: N/A;  tricuspid valve repaired with 32mm  groves physio tricuspid annuloplasty ring    CARDIAC VALVE REPLACEMENT  11/17/2022    Repaired mitral and tricuspid    CATARACT EXTRACTION Bilateral     COLONOSCOPY  01/01/1997    Regularly scheduled    COLONOSCOPY N/A 02/22/2023    Procedure: COLONOSCOPY with cold snare polypectomy x 1;  Surgeon: Manfred Keating MD;  Location: [facility] ENDOSCOPY;  Service:  Gastroenterology;  Laterality: N/A;    CORONARY ARTERY BYPASS GRAFT      During open heart valves repair.    ENDOSCOPY N/A 2023    Procedure: ESOPHAGOGASTRODUODENOSCOPY with esophageal dilation using 48 Fr. Bougie Dilator;  Surgeon: Manfred Keating MD;  Location: [facility] ENDOSCOPY;  Service: Gastroenterology;  Laterality: N/A;  erosive esophagitis    EYE SURGERY  2010    Reattached right retina    LUMBAR FUSION N/A 2023    Procedure: POSTERIOR LUMBAR FUSION WITH ROBOT;  Surgeon: Jayme Strauss IV, MD;  Location: [facility] MAIN OR;  Service: Robotics - Neuro;  Laterality: N/A;    MITRAL VALVE REPAIR/REPLACEMENT N/A 2022    Procedure: MITRAL VALVE REPAIR/REPLACEMENT with left atrial appendage closure;  Surgeon: Femi Henderson MD;  Location: [facility] CVOR;  Service: Cardiothoracic;  Laterality: N/A;  mitral valve repaired iwth 40mm medtronic annuloplasty band  with intraop TERRA    MITRAL VALVE REPAIR/REPLACEMENT      SPINE SURGERY  1971    2 lumbar 1 cervical    TONSILLECTOMY  1950    Childhood    TRICUSPID VALVE SURGERY         Family History   Problem Relation Age of Onset    Arthritis Father     Cancer Father         Prostate    Arrhythmia Father         Congentive heart failure    Diabetes Mother         Adult diabetes    Early death Mother         Kidneys failed after giving birth-diabetes complications    Heart disease Mother         Adult Diabetes    Cancer Mother         Diabetes    Arrhythmia Mother         Adult diabetes.  after child birth. Kidneys shut down. 9       Social History     Socioeconomic History    Marital status:    Tobacco Use    Smoking status: Former     Packs/day: 0.00     Years: 0.50     Additional pack years: 0.00     Total pack years: 0.00     Types: Cigarettes     Passive exposure: Past    Smokeless tobacco: Never    Tobacco comments:     Tried tobacco as child   Vaping Use    Vaping Use: Never used   Substance and Sexual  "Activity    Alcohol use: Not Currently     Comment: Not a regular drinker. Occasionly have a glass of wine or be    Drug use: Never    Sexual activity: Not Currently     Partners: Female     Birth control/protection: Condom, I.U.D., Spermicide, Other, Birth control pill, Pill     Comment: Chemically castrated. Prostate cancer           Objective   Physical Exam  Vitals and nursing note reviewed.   Constitutional:       General: He is not in acute distress.     Appearance: Normal appearance. He is not ill-appearing.   HENT:      Head: Normocephalic and atraumatic.   Cardiovascular:      Rate and Rhythm: Normal rate and regular rhythm.      Heart sounds: Normal heart sounds. No murmur heard.  Pulmonary:      Effort: No respiratory distress.      Breath sounds: Normal breath sounds.   Abdominal:      General: Bowel sounds are normal.   Musculoskeletal:         General: Tenderness present. No swelling. Normal range of motion.      Comments: Some pain on movement of right arm, especially in the right shoulder.  No crepitus in shoulder, elbow or wrist.  Contusion noted to the dorsal aspect of patient's right hand.  No unilateral weakness.   strength strong and equal bilaterally.  Peripheral pulses +2.   Neurological:      Mental Status: He is alert and oriented to person, place, and time.         Procedures           ED Course      /72 (BP Location: Left arm, Patient Position: Sitting)   Pulse 64   Temp 97.6 °F (36.4 °C) (Oral)   Resp 18   Ht 185.4 cm (73\")   Wt 97.5 kg (215 lb)   SpO2 100%   BMI 28.37 kg/m²   .edlabs  Medications - No data to display  XR Spine Cervical Complete 4 or 5 View    Result Date: 2/1/2024  Impression: 1.No evidence for displaced fracture or malalignment. Electronically Signed: Geovani Victoria MD  2/1/2024 7:38 PM EST  Workstation ID: [id number]    XR Shoulder 2+ View Right    Result Date: 2/1/2024  1.No evidence for displaced fracture or dislocation. 2.Moderate osteoarthritic " degenerative changes of the glenohumeral joint. 3.Moderate hypertrophic age-related changes of the acromioclavicular joint. Electronically Signed: Geovani Victoria MD  2/1/2024 7:37 PM EST  Workstation ID: [id number]    XR Elbow 2 View Right    Result Date: 2/1/2024  No displaced fracture or dislocation. There is no evidence for joint effusion. Electronically Signed: Geovani Victoria MD  2/1/2024 7:36 PM EST  Workstation ID: [id number]    XR Hand 3+ View Right    Result Date: 2/1/2024  1.No evidence for displaced fracture or dislocation. Electronically Signed: Geovani Victoria MD  2/1/2024 7:23 PM EST  Workstation ID: [id number]                                          Medical Decision Making  Problems Addressed:  Acute pain of right shoulder: complicated acute illness or injury  Fall, initial encounter: complicated acute illness or injury  Pain of right hand: complicated acute illness or injury    Amount and/or Complexity of Data Reviewed  Radiology: ordered.    Risk  Prescription drug management.    Patient is a pleasant 79-year-old  male who presents with complaints of right arm pain after being pulled to the ground by his dog at the dog park.  On exam, patient is able to move his right arm but reports pain when doing so.  He has no crepitus within the shoulder, elbow or wrist.   is strong and equal bilaterally and he has no unilateral weakness.  He does have a contusion noted to the dorsal aspect of his right hand but is able to move all fingers.  Exam is otherwise unremarkable with normal S1-S2.  No clicks murmurs.  No JVD.  Lungs are clear to auscultation all fields.  Initial differentials include rotator cuff injury, muscle strain, dislocation.  This is not a complete list.    Due to overwhelming hospital census and boarding inpatients in the emergency room, patient received above examination in [facility] bed.  Examination was made to the best of provider's ability with respect to patient privacy and  "confidentiality.  Patient was offered pain medication but has declined at this time.  X-ray images were obtained and found to be unremarkable for acute fracture or dislocation.  Upon reassessment, patient has not reported any new or worsening pain.  Results were discussed and he was placed in a sling for comfort and support.  At this time, patient is stable for discharge and will be advised to follow-up to orthopedist for further evaluation.  Physical therapy consult was placed as well for treatment of shoulder injury.  Patient will be discharged with Voltaren and states that he already has \"a lot of baclofen\" at home that he will take for muscle spasms as well.  RICE protocol discussed with patient.  He is ambulatory upright, steadily and without assistance upon discharge.  No acute distress noted.    Final diagnoses:   Fall, initial encounter   Acute pain of right shoulder   Pain of right hand       ED Disposition  ED Disposition       ED Disposition   Discharge    Condition   Stable    Comment   --               Jaret Castellanos MD  [address] [facility] [address]   [facility] [address]  [place] [address] [phone]          Mat Harrison MD  [address] [place] [address] [phone]               Medication List        New Prescriptions      diclofenac 75 MG EC tablet  Commonly known as: VOLTAREN  Take 1 tablet by mouth 2 (Two) Times a Day for 5 days.               Where to Get Your Medications        These medications were sent to [id number] - [place], [place] - [address] AT [address] - [phone]  - [phone]   [address] [place] [address]      Phone: [phone]   diclofenac 75 MG EC tablet            Griselda Fournier, STUART  02/01/24 2216    "

## 2024-02-02 NOTE — DISCHARGE INSTRUCTIONS
Take Voltaren as needed for pain and discomfort.  Take previously prescribed baclofen as needed for muscle spasms.  Alternate use of ice and heat for 20 minutes at a time several times a day and painful areas.  Wear sling as needed for comfort and support.  Rest.  Avoid heavy lifting or straining.    Follow-up with orthopedist for further evaluation of shoulder pain.  Follow-up with primary care provider as needed.    Return to the ER for new or worsening symptoms.

## 2024-02-05 ENCOUNTER — PATIENT OUTREACH (OUTPATIENT)
Dept: CASE MANAGEMENT | Facility: OTHER | Age: 79
End: 2024-02-05
Payer: MEDICARE

## 2024-02-05 NOTE — OUTREACH NOTE
"AMBULATORY CASE MANAGEMENT NOTE    Name and Relationship of Patient/Support Person: Clint Cee \"Dennis\" - Self  Self    Patient Outreach    Pt discharged from [facility] ED on 2/1/24, seen for fall, right shoulder and hand pain. RN-ACM outreach call made to pt. Explained role of RN-ACM and reason for call. Pt reports improvement in pain. Reviewed ED AVS with pt. Education provided. Pt reports he plans to follow up with orthopedic doctor and PT. Reviewed SDOH. Pt denies any needs. Pt reports he plans to schedule next routine PCP appt when due. No questions or needs per pt, advised him to call with any. Follow up outreach prn.     Send Education  Questions/Answers      Flowsheet Row Most Recent Value   Annual Wellness Visit:  Patient Has Completed   Other Patient Education/Resources  24/7 [facility] Nurse Call Line   24/7 Nurse Call Line Education Method Verbal   Advanced Directives: --  [resources provided]          SDOH updated and reviewed with the patient during this program:  Financial Resource Strain: Low Risk  (2/5/2024)    Overall Financial Resource Strain (CARDIA)     Difficulty of Paying Living Expenses: Not very hard      --     Food Insecurity: No Food Insecurity (2/5/2024)    Hunger Vital Sign     Worried About Running Out of Food in the Last Year: Never true     Ran Out of Food in the Last Year: Never true      --     Transportation Needs: No Transportation Needs (2/5/2024)    PRAPARE - Transportation     Lack of Transportation (Medical): No     Lack of Transportation (Non-Medical): No         Golden VILLAR  Ambulatory Case Management    2/5/2024, 12:44 EST  "

## 2024-02-13 ENCOUNTER — TELEPHONE (OUTPATIENT)
Dept: FAMILY MEDICINE CLINIC | Facility: CLINIC | Age: 79
End: 2024-02-13
Payer: MEDICARE

## 2024-02-16 ENCOUNTER — OFFICE VISIT (OUTPATIENT)
Dept: FAMILY MEDICINE CLINIC | Facility: CLINIC | Age: 79
End: 2024-02-16
Payer: MEDICARE

## 2024-02-16 VITALS
HEART RATE: 60 BPM | OXYGEN SATURATION: 97 % | SYSTOLIC BLOOD PRESSURE: 142 MMHG | BODY MASS INDEX: 30.66 KG/M2 | WEIGHT: 219 LBS | RESPIRATION RATE: 20 BRPM | DIASTOLIC BLOOD PRESSURE: 86 MMHG | HEIGHT: 71 IN

## 2024-02-16 DIAGNOSIS — Z78.9 IMPAIRED DECISION MAKING: ICD-10-CM

## 2024-02-16 DIAGNOSIS — M75.51 ACUTE SHOULDER BURSITIS, RIGHT: ICD-10-CM

## 2024-02-16 DIAGNOSIS — S43.421A SPRAIN OF RIGHT ROTATOR CUFF CAPSULE, INITIAL ENCOUNTER: Primary | ICD-10-CM

## 2024-02-16 PROBLEM — S43.429A ROTATOR CUFF (CAPSULE) SPRAIN: Status: ACTIVE | Noted: 2024-02-16

## 2024-02-18 RX ORDER — METHYLPREDNISOLONE ACETATE 80 MG/ML
80 INJECTION, SUSPENSION INTRA-ARTICULAR; INTRALESIONAL; INTRAMUSCULAR; SOFT TISSUE
Status: COMPLETED | OUTPATIENT
Start: 2024-02-18 | End: 2024-02-18

## 2024-02-18 RX ORDER — BUPIVACAINE HYDROCHLORIDE 2.5 MG/ML
2 INJECTION, SOLUTION INFILTRATION; PERINEURAL
Status: COMPLETED | OUTPATIENT
Start: 2024-02-18 | End: 2024-02-18

## 2024-02-18 RX ADMIN — METHYLPREDNISOLONE ACETATE 80 MG: 80 INJECTION, SUSPENSION INTRA-ARTICULAR; INTRALESIONAL; INTRAMUSCULAR; SOFT TISSUE at 20:49

## 2024-02-18 RX ADMIN — BUPIVACAINE HYDROCHLORIDE 2 ML: 2.5 INJECTION, SOLUTION INFILTRATION; PERINEURAL at 20:49

## 2024-02-19 RX ORDER — DULOXETIN HYDROCHLORIDE 60 MG/1
60 CAPSULE, DELAYED RELEASE ORAL DAILY
Qty: 90 CAPSULE | Refills: 0 | Status: SHIPPED | OUTPATIENT
Start: 2024-02-19

## 2024-02-27 ENCOUNTER — TREATMENT (OUTPATIENT)
Dept: PHYSICAL THERAPY | Facility: CLINIC | Age: 79
End: 2024-02-27
Payer: MEDICARE

## 2024-02-27 DIAGNOSIS — M25.511 ACUTE PAIN OF RIGHT SHOULDER: Primary | ICD-10-CM

## 2024-02-27 PROCEDURE — G0283 ELEC STIM OTHER THAN WOUND: HCPCS | Performed by: PHYSICAL THERAPIST

## 2024-02-27 PROCEDURE — 97535 SELF CARE MNGMENT TRAINING: CPT | Performed by: PHYSICAL THERAPIST

## 2024-02-27 PROCEDURE — 97110 THERAPEUTIC EXERCISES: CPT | Performed by: PHYSICAL THERAPIST

## 2024-02-27 PROCEDURE — 97162 PT EVAL MOD COMPLEX 30 MIN: CPT | Performed by: PHYSICAL THERAPIST

## 2024-02-27 NOTE — PROGRESS NOTES
Physical Therapy Initial Evaluation and Plan of Care    Patient: Clint Cee   : 1945  Diagnosis/ICD-10 Code:  Acute pain of right shoulder [M25.511]  Referring practitioner: STUART Javier  Date of Initial Visit: 2024  Today's Date: 2024  Patient seen for 1 sessions           Subjective Questionnaire: QuickDASH: 32%      Subjective Evaluation    History of Present Illness  Mechanism of injury: Pt is referred to therapy due to pain in R shoulder after a fall..  his dog pulled him down and he went down on his R arm.   He went to the hospital that day, x-rays were normal, didn't have any fx.  They told him to move it.  Dr Castellanos gave him a cortisone shot and it seems to have helped.  It is a lot better but still not right.     Onset of symptoms : 2-3 weeks ago    Aggravating factors: movement , lifting, reaching up    Relieving factors: rest, not move it, pain meds    Functional limitation: lifting, reaching up to top cabinets, his normal/ daily activities without pain    PMH: several back surgeries, neck surgery, back fusion, prostate ca.           Patient Occupation: retired Quality of life: good    Pain  Current pain ratin  At best pain ratin  At worst pain ratin  Progression: improved    Social Support  Lives with: spouse    Treatments  Previous treatment: injection treatment and medication  Patient Goals  Patient goals for therapy: decreased pain, increased motion and increased strength         Precautions: several back and neck surgeries    Objective          Postural Observations  Seated posture: fair  Standing posture: fair    Additional Postural Observation Details  Rounded shoulders / FHP/ slightly flexed posture.  Limited cervical ROM due to previous surgeries.     Tenderness     Right Shoulder  Tenderness in the AC joint, acromion and supraspinatus tendon.     Active Range of Motion     Right Shoulder   Flexion: WFL and with pain  Adduction: WFL and with  pain  External rotation 0°: WFL and with pain  Internal rotation BTB: sacrum with pain    Additional Active Range of Motion Details  Increase pain at end range with all movements    L shoulder ROM is wfl, pt has hx of L RCT no surgery    Strength/Myotome Testing     Right Shoulder     Planes of Motion   Flexion: 4   Abduction: 4   External rotation at 0°: 3+   Internal rotation at 0°: 4     Isolated Muscles   Biceps: 4+   Triceps: 4+     Tests     Right Shoulder   Positive empty can, Hawkin's and lift-off.           Assessment & Plan       Assessment  Impairments: abnormal or restricted ROM, activity intolerance, lacks appropriate home exercise program and pain with function   Functional limitations: carrying objects, lifting, sleeping, pulling, pushing, uncomfortable because of pain, reaching behind back, reaching overhead and unable to perform repetitive tasks   Assessment details: Pt is a 79 y.o. male referred to therapy due to pain in R shoulder after a fall a few weeks ago. Pt presents with impaired strength and functional mobility in R shoulder.  He may have a rotator cuff tear .   Upon initial evaluation pt exhibits the above impairments and functional limitations. Impairments affect sleeping, and functional use of R UE without pain.   Pt is a good candidate for rehab and will benefit from skilled physical therapy to address impairments, reduce  pain and maximize function.    Prognosis: good    Goals  Plan Goals: STGs: in 2 weeks   1- Pt will report at least 25 % improvement and pain reduction   2- Pt will be I with initial HEP and progression of his program     LTGs: by DC  1- Pt will report at least 75 % improvement and pain reduction  2- Pt will be I with final HEP   3- Pt will be able to perform light activities without inc pain  4- Strength will be WFL to be able to perform light daily activities without inc pain  5- Pt will report improved quality of sleep   6- Pt will be able to reach overhead and into  top kitchen cabinets with min pain    7- Pt will be able to reach behind back with min pain   8- Pt will voice readiness for DC with independent program   9-  Pt will have improved DASH score indicating improved function and pain reduction    Plan  Therapy options: will be seen for skilled therapy services  Planned modality interventions: high voltage pulsed current (pain management) and ultrasound  Planned therapy interventions: functional ROM exercises, home exercise program, joint mobilization, manual therapy, neuromuscular re-education, postural training, strengthening, stretching and therapeutic activities  Frequency: 2x week  Duration in weeks: 12  Treatment plan discussed with: patient        See flow sheet for treatment detail    History # of Personal Factors and/or Comorbidities: MODERATE (1-2)  Examination of Body System(s): # of elements: MODERATE (3)  Clinical Presentation: EVOLVING  Clinical Decision Making: MODERATE          Timed:         Manual Therapy:         mins  09710;     Therapeutic Exercise:  15       mins  93481;     Neuromuscular Aleksandr:        mins  86881;    Therapeutic Activity:          mins  66949;     Gait Training:           mins  71632;     Ultrasound:          mins  78381;    Ionto                                   mins   36912  Self Care                     8       mins   82661        Un-Timed:  Electrical Stimulation:   15      mins  58501 ( );  Dry Needling          mins self-pay  Traction          mins 79680  Canal repositioning           mins    95438  Low Eval          Mins  64997  Mod Eval    20      Mins  43308  High Eval                            Mins  54199  Re-Eval                               mins  00599        Timed Treatment: 23     mins   Total Treatment:    58    mins    PT SIGNATURE: Mitch Johnson PT, CLT  [place] License: # [id number]     DATE TREATMENT INITIATED: 2/27/2024    Initial Certification  Certification Period: 2/27/2024 thru 5/26/2024  I certify  that the therapy services are furnished while this patient is under my care.  The services outlined above are required by this patient, and will be reviewed every 90 days.     PHYSICIAN: Griselda Fournier APRN   NPI: [id number]                                      DATE:     Please sign and return via fax to [phone].. Thank you, [facility].

## 2024-03-04 RX ORDER — ATORVASTATIN CALCIUM 40 MG/1
TABLET, FILM COATED ORAL
Qty: 90 TABLET | Refills: 1 | Status: SHIPPED | OUTPATIENT
Start: 2024-03-04

## 2024-03-04 NOTE — TELEPHONE ENCOUNTER
Rx Refill Note  Requested Prescriptions     Pending Prescriptions Disp Refills    atorvastatin (LIPITOR) 40 MG tablet [Pharmacy Med Name: ATORVASTATIN 40MG TABLETS] 90 tablet 1     Sig: TAKE 1 TABLET BY MOUTH EVERY NIGHT FOR HIGH AMOUNT OF FATS IN THE BLOOD      Last office visit with prescribing clinician: 1/22/2024   Last telemedicine visit with prescribing clinician: Visit date not found   Next office visit with prescribing clinician: 7/25/2024                         Would you like a call back once the refill request has been completed: [] Yes [] No    If the office needs to give you a call back, can they leave a voicemail: [] Yes [] No    Thania Le MA  03/04/24, 16:06 EST

## 2024-03-05 ENCOUNTER — TREATMENT (OUTPATIENT)
Dept: PHYSICAL THERAPY | Facility: CLINIC | Age: 79
End: 2024-03-05
Payer: MEDICARE

## 2024-03-05 DIAGNOSIS — M25.511 ACUTE PAIN OF RIGHT SHOULDER: Primary | ICD-10-CM

## 2024-03-05 PROCEDURE — 97112 NEUROMUSCULAR REEDUCATION: CPT | Performed by: PHYSICAL THERAPIST

## 2024-03-05 PROCEDURE — 97110 THERAPEUTIC EXERCISES: CPT | Performed by: PHYSICAL THERAPIST

## 2024-03-05 PROCEDURE — G0283 ELEC STIM OTHER THAN WOUND: HCPCS | Performed by: PHYSICAL THERAPIST

## 2024-03-05 NOTE — PROGRESS NOTES
Physical Therapy Daily Treatment Note    [facility] [facility], [address]  [place] [address]  [phone]    Patient: Clint Cee  : 1945  Referring practitioner: STUART Javier  Diagnosis/ ICD10 code: Acute pain of right shoulder [M25.511]  Today's Date: 3/5/2024    VISIT#: 2    Subjective   Pt reports: has been very busy and hasn't done his exercises since last visit.       Objective     See Exercise, Manual, and Modality Logs for complete treatment. Review of previous HEP, added punches with RTB.  Concluded with ES for pain management.     Patient Education:    Assessment & Plan       Assessment  Assessment details: Pt tolerated today's rx session well. Still requires vcs for technique with his HEP. Good initial respond to ES .           Progress per Plan of Care/  pt will  be out of town for a couple of weeks.  Next apt on 3/21.             Timed:         Manual Therapy:         mins  53878;     Therapeutic Exercise:   15      mins  51384;     Neuromuscular Aleksandr:  10      mins  13639;    Therapeutic Activity:          mins  91547;     Gait Training:           mins  39518;     Ultrasound:          mins  89314;    Ionto                                   mins   05222  Self Care                            mins   31493      Un-Timed:  Electrical Stimulation:   20      mins  51099 ( );  Traction          mins 86099  Canal repositioning           mins    47599        Timed Treatment:  25    mins   Total Treatment:     45   mins    Mitch Johnson PT, CLT  Physical Therapist  [place] License:  # [id number]

## 2024-03-18 NOTE — TELEPHONE ENCOUNTER
Rx Refill Note  Requested Prescriptions     Pending Prescriptions Disp Refills    metoprolol tartrate (LOPRESSOR) 25 MG tablet [Pharmacy Med Name: METOPROLOL TARTRATE 25MG TABLETS] 45 tablet      Sig: TAKE 1/2 TABLET BY MOUTH DAILY AS NEEDED      Last office visit with prescribing clinician: 1/22/2024   Last telemedicine visit with prescribing clinician: Visit date not found   Next office visit with prescribing clinician: 7/25/2024                         Would you like a call back once the refill request has been completed: [] Yes [] No    If the office needs to give you a call back, can they leave a voicemail: [] Yes [] No    Thania Le MA  03/18/24, 10:37 EDT

## 2024-03-20 NOTE — PROGRESS NOTES
"Subjective   History of Present Illness: Clint Cee is a 79 y.o. male is here today for follow-up. Today patient reports he is doing well.      Chief Complaint   Patient presents with    Follow-up          Previous treatment: TLSO Brace, Oxycodone, Duloxetine,Tylenol     Previous neurosurgery:   L2-5 instrumented fusion on 9/13/23.     Previous injections:     The following portions of the patient's history were reviewed and updated as appropriate: allergies, current medications, past family history, past medical history, past social history, past surgical history, and problem list.    Review of Systems   Constitutional:  Positive for activity change.   Respiratory:  Negative for chest tightness and shortness of breath.    Cardiovascular:  Negative for chest pain.   Musculoskeletal:  Negative for back pain and myalgias.       Objective      /82   Pulse 67   Resp 18   Ht 180.3 cm (71\")   Wt 101 kg (222 lb)   SpO2 98%   BMI 30.96 kg/m²    Body mass index is 30.96 kg/m².  There were no vitals filed for this visit.        Neurologic Exam    Assessment & Plan   Independent Review of Radiographic Studies:      I personally reviewed and interpreted the images from the following studies.    No new imaging      Medical Decision Making:      Clint Cee is a 79 y.o. male status post lumbar fusion for unstable fracture doing well at 6 months postop.  Patient may slowly ramp up to normal activity.  I will see him back as needed      Diagnoses and all orders for this visit:    1. S/P lumbar spinal fusion (Primary)      No follow-ups on file.    This patient was examined wearing appropriate personal protective equipment.                      Dr. Jayme Strauss IV    03/25/24  12:47 EDT  "

## 2024-03-21 ENCOUNTER — TREATMENT (OUTPATIENT)
Dept: PHYSICAL THERAPY | Facility: CLINIC | Age: 79
End: 2024-03-21
Payer: MEDICARE

## 2024-03-21 DIAGNOSIS — M25.511 ACUTE PAIN OF RIGHT SHOULDER: Primary | ICD-10-CM

## 2024-03-21 PROCEDURE — 97112 NEUROMUSCULAR REEDUCATION: CPT | Performed by: PHYSICAL THERAPIST

## 2024-03-21 PROCEDURE — 97110 THERAPEUTIC EXERCISES: CPT | Performed by: PHYSICAL THERAPIST

## 2024-03-21 PROCEDURE — G0283 ELEC STIM OTHER THAN WOUND: HCPCS | Performed by: PHYSICAL THERAPIST

## 2024-03-25 ENCOUNTER — OFFICE VISIT (OUTPATIENT)
Dept: NEUROSURGERY | Facility: CLINIC | Age: 79
End: 2024-03-25
Payer: MEDICARE

## 2024-03-25 VITALS
WEIGHT: 222 LBS | SYSTOLIC BLOOD PRESSURE: 125 MMHG | BODY MASS INDEX: 31.08 KG/M2 | HEART RATE: 67 BPM | HEIGHT: 71 IN | RESPIRATION RATE: 18 BRPM | DIASTOLIC BLOOD PRESSURE: 82 MMHG | OXYGEN SATURATION: 98 %

## 2024-03-25 DIAGNOSIS — Z98.1 S/P LUMBAR SPINAL FUSION: Primary | ICD-10-CM

## 2024-03-25 PROCEDURE — 99212 OFFICE O/P EST SF 10 MIN: CPT | Performed by: NEUROLOGICAL SURGERY

## 2024-03-25 PROCEDURE — 3074F SYST BP LT 130 MM HG: CPT | Performed by: NEUROLOGICAL SURGERY

## 2024-03-25 PROCEDURE — 1160F RVW MEDS BY RX/DR IN RCRD: CPT | Performed by: NEUROLOGICAL SURGERY

## 2024-03-25 PROCEDURE — 3079F DIAST BP 80-89 MM HG: CPT | Performed by: NEUROLOGICAL SURGERY

## 2024-03-25 PROCEDURE — 1159F MED LIST DOCD IN RCRD: CPT | Performed by: NEUROLOGICAL SURGERY

## 2024-03-26 ENCOUNTER — TREATMENT (OUTPATIENT)
Dept: PHYSICAL THERAPY | Facility: CLINIC | Age: 79
End: 2024-03-26
Payer: MEDICARE

## 2024-03-26 DIAGNOSIS — M25.511 ACUTE PAIN OF RIGHT SHOULDER: Primary | ICD-10-CM

## 2024-03-26 PROCEDURE — 97110 THERAPEUTIC EXERCISES: CPT | Performed by: PHYSICAL THERAPIST

## 2024-03-26 PROCEDURE — 97112 NEUROMUSCULAR REEDUCATION: CPT | Performed by: PHYSICAL THERAPIST

## 2024-03-26 PROCEDURE — G0283 ELEC STIM OTHER THAN WOUND: HCPCS | Performed by: PHYSICAL THERAPIST

## 2024-03-26 NOTE — PROGRESS NOTES
Physical Therapy Daily Treatment Note    [address] [facility], [address]  [place] [address]  [phone]    Patient: Clint Cee  : 1945  Referring practitioner: STUART Javier  Diagnosis/ ICD10 code: Acute pain of right shoulder [M25.511]  Today's Date: 3/26/2024    VISIT#: 4    Subjective   Pt reports: needs to review his exercises again today. The punches with TB is difficult for him to do. The ES and heat felt really good last time.       Objective     See Exercise, Manual, and Modality Logs for complete treatment.     Patient Education:    Assessment & Plan       Assessment  Assessment details: Pt tolerated today's rx session well. Still requires vcs for technique.           Progress per Plan of Care            Timed:         Manual Therapy:         mins  63930;     Therapeutic Exercise:   15      mins  39107;     Neuromuscular Aleksandr:  10      mins  13264;    Therapeutic Activity:          mins  97477;     Gait Training:           mins  78776;     Ultrasound:          mins  07640;    Ionto                                   mins   66080  Self Care                            mins   55866      Un-Timed:  Electrical Stimulation:   20      mins  76222 ( );  Traction          mins 49252  Canal repositioning           mins    67727        Timed Treatment:  25    mins   Total Treatment:    45    mins    Mitch Johnson PT, CLT  Physical Therapist  [place] License:  # [id number]

## 2024-03-29 ENCOUNTER — TREATMENT (OUTPATIENT)
Dept: PHYSICAL THERAPY | Facility: CLINIC | Age: 79
End: 2024-03-29
Payer: MEDICARE

## 2024-03-29 DIAGNOSIS — M25.511 ACUTE PAIN OF RIGHT SHOULDER: Primary | ICD-10-CM

## 2024-03-29 PROCEDURE — 97110 THERAPEUTIC EXERCISES: CPT | Performed by: PHYSICAL THERAPIST

## 2024-03-29 PROCEDURE — 97112 NEUROMUSCULAR REEDUCATION: CPT | Performed by: PHYSICAL THERAPIST

## 2024-03-29 PROCEDURE — G0283 ELEC STIM OTHER THAN WOUND: HCPCS | Performed by: PHYSICAL THERAPIST

## 2024-03-29 NOTE — PROGRESS NOTES
Physical Therapy Daily Treatment Note    [address] [facility], [address]  [place] [address]  [phone]    Patient: Clint Cee  : 1945  Referring practitioner: STUART Javier  Diagnosis/ ICD10 code: Acute pain of right shoulder [M25.511]  Today's Date: 3/29/2024    VISIT#: 5    Subjective   Pt reports: his shoulder is still sore.       Objective     See Exercise, Manual, and Modality Logs for complete treatment.     Patient Education:    Assessment & Plan       Assessment  Assessment details: Pt tolerated today's rx session well. Still requires mod vcs for form and technique performing the exercises. ER still very weak.           Progress per Plan of Care            Timed:         Manual Therapy:         mins  28798;     Therapeutic Exercise:   15      mins  75133;     Neuromuscular Aleksandr:   10    mins  41099;    Therapeutic Activity:          mins  29539;     Gait Training:           mins  43209;     Ultrasound:          mins  26125;    Ionto                                   mins   88072  Self Care                            mins   46491      Un-Timed:  Electrical Stimulation:   20      mins  16616 ( );  Traction          mins 58212  Canal repositioning           mins    52616        Timed Treatment:  25    mins   Total Treatment:    45    mins    Mitch Johnson, PT, CLT  Physical Therapist  [place] License:  # [id number]

## 2024-04-02 ENCOUNTER — TREATMENT (OUTPATIENT)
Dept: PHYSICAL THERAPY | Facility: CLINIC | Age: 79
End: 2024-04-02
Payer: MEDICARE

## 2024-04-02 DIAGNOSIS — M25.511 ACUTE PAIN OF RIGHT SHOULDER: Primary | ICD-10-CM

## 2024-04-02 PROCEDURE — 97110 THERAPEUTIC EXERCISES: CPT | Performed by: PHYSICAL THERAPIST

## 2024-04-02 PROCEDURE — 97112 NEUROMUSCULAR REEDUCATION: CPT | Performed by: PHYSICAL THERAPIST

## 2024-04-02 PROCEDURE — G0283 ELEC STIM OTHER THAN WOUND: HCPCS | Performed by: PHYSICAL THERAPIST

## 2024-04-02 NOTE — PROGRESS NOTES
Physical Therapy Daily Treatment Note     [facility], [address]  [place] [address]  [phone]    Patient: Clint Cee  : 1945  Referring practitioner: STUART Javier  Diagnosis/ ICD10 code: Acute pain of right shoulder [M25.511]  Today's Date: 2024    VISIT#: 6    Subjective   Pt reports: doing ok today. His shoulder was a little more sore after last visit. Feels therapy has been beneficial so far. Still can't sleep on R side but feels it is getting a little stronger.       Objective     See Exercise, Manual, and Modality Logs for complete treatment.     Patient Education:    Assessment & Plan       Assessment  Assessment details: Pt tolerated today's rx session well. Seems to be responding well to therapy and tolerating progression of his exercise program. He gets relief from the ES and heat.           Progress per Plan of Care            Timed:         Manual Therapy:         mins  48484;     Therapeutic Exercise:   15      mins  86981;     Neuromuscular Aleksandr:   15     mins  17632;    Therapeutic Activity:          mins  76247;     Gait Training:           mins  92879;     Ultrasound:          mins  04695;    Ionto                                   mins   05152  Self Care                            mins   51251      Un-Timed:  Electrical Stimulation:  20       mins  48354 ( );  Traction          mins 94608  Canal repositioning           mins    33925        Timed Treatment:  30    mins   Total Treatment:    50    mins    Mitch Johnson PT, CLT  Physical Therapist  [place] License:  # [id number]

## 2024-04-04 ENCOUNTER — TREATMENT (OUTPATIENT)
Dept: PHYSICAL THERAPY | Facility: CLINIC | Age: 79
End: 2024-04-04
Payer: MEDICARE

## 2024-04-04 DIAGNOSIS — M25.511 ACUTE PAIN OF RIGHT SHOULDER: Primary | ICD-10-CM

## 2024-04-04 PROCEDURE — 97110 THERAPEUTIC EXERCISES: CPT | Performed by: PHYSICAL THERAPIST

## 2024-04-04 PROCEDURE — G0283 ELEC STIM OTHER THAN WOUND: HCPCS | Performed by: PHYSICAL THERAPIST

## 2024-04-04 PROCEDURE — 97112 NEUROMUSCULAR REEDUCATION: CPT | Performed by: PHYSICAL THERAPIST

## 2024-04-04 NOTE — PROGRESS NOTES
Physical Therapy Daily Treatment Note    [facility] [facility], [address]  [place] [address]  [phone]    Patient: Clint Cee  : 1945  Referring practitioner: STUART Javier  Diagnosis/ ICD10 code: Acute pain of right shoulder [M25.511]  Today's Date: 2024    VISIT#: 7    Subjective   Pt reports: doing ok today. It felt pretty good last night, was able to sleep better.       Objective     See Exercise, Manual, and Modality Logs for complete treatment.     Patient Education:    Assessment & Plan       Assessment  Assessment details: Pt tolerated today's rx session well.  Subjective improvement is reported. Wants to continue with therapy .            Progress per Plan of Care            Timed:         Manual Therapy:         mins  24172;     Therapeutic Exercise:   15      mins  07691;     Neuromuscular Aleksandr:  15      mins  29485;    Therapeutic Activity:          mins  30507;     Gait Training:           mins  87814;     Ultrasound:          mins  07357;    Ionto                                   mins   85970  Self Care                            mins   80970      Un-Timed:  Electrical Stimulation:   20      mins  88189 ( );  Traction          mins 81177  Canal repositioning           mins    39746        Timed Treatment: 30     mins   Total Treatment:    50    mins    Mitch Johnson, PT, CLT  Physical Therapist  [place] License:  # [id number]

## 2024-04-09 ENCOUNTER — TREATMENT (OUTPATIENT)
Dept: PHYSICAL THERAPY | Facility: CLINIC | Age: 79
End: 2024-04-09
Payer: MEDICARE

## 2024-04-09 DIAGNOSIS — M25.511 ACUTE PAIN OF RIGHT SHOULDER: Primary | ICD-10-CM

## 2024-04-09 DIAGNOSIS — F51.01 PRIMARY INSOMNIA: Primary | ICD-10-CM

## 2024-04-09 PROCEDURE — G0283 ELEC STIM OTHER THAN WOUND: HCPCS | Performed by: PHYSICAL THERAPIST

## 2024-04-09 PROCEDURE — 97110 THERAPEUTIC EXERCISES: CPT | Performed by: PHYSICAL THERAPIST

## 2024-04-09 PROCEDURE — 97112 NEUROMUSCULAR REEDUCATION: CPT | Performed by: PHYSICAL THERAPIST

## 2024-04-09 RX ORDER — ZOLPIDEM TARTRATE 10 MG/1
10 TABLET ORAL
Qty: 90 TABLET | Refills: 0 | Status: SHIPPED | OUTPATIENT
Start: 2024-04-09

## 2024-04-09 NOTE — PROGRESS NOTES
Physical Therapy Daily Treatment Note    [address] [facility], [address]  [place] [address]  [phone]    Patient: Clint Cee  : 1945  Referring practitioner: STUART Javier  Diagnosis/ ICD10 code: Acute pain of right shoulder [M25.511]  Today's Date: 2024    VISIT#: 8    Subjective   Pt reports: he is doing pretty well today, his shoulder is getting better. Was able to get the milk jog out of the refrigerator today.       Objective     See Exercise, Manual, and Modality Logs for complete treatment. Progressed to GTB , issued GTB to progress with HEP.     Patient Education:    Assessment & Plan       Assessment  Assessment details: Tolerated today's rx session and progression of his exercise program well. He had a hard time with supine punches today.           Progress per Plan of Care            Timed:         Manual Therapy:         mins  99858;     Therapeutic Exercise:   15      mins  25299;     Neuromuscular Aleksandr:   15     mins  65509;    Therapeutic Activity:          mins  31696;     Gait Training:           mins  94954;     Ultrasound:          mins  16841;    Ionto                                   mins   32203  Self Care                            mins   80724      Un-Timed:  Electrical Stimulation:   20      mins  69217 ( );  Traction          mins 67716  Canal repositioning           mins    81713        Timed Treatment:  30    mins   Total Treatment:    50    mins    Mitch Johnson PT, CLT  Physical Therapist  [place] License:  # [id number]

## 2024-04-11 ENCOUNTER — TREATMENT (OUTPATIENT)
Dept: PHYSICAL THERAPY | Facility: CLINIC | Age: 79
End: 2024-04-11
Payer: MEDICARE

## 2024-04-11 DIAGNOSIS — M25.511 ACUTE PAIN OF RIGHT SHOULDER: Primary | ICD-10-CM

## 2024-04-11 PROCEDURE — 97110 THERAPEUTIC EXERCISES: CPT | Performed by: PHYSICAL THERAPIST

## 2024-04-11 PROCEDURE — G0283 ELEC STIM OTHER THAN WOUND: HCPCS | Performed by: PHYSICAL THERAPIST

## 2024-04-11 PROCEDURE — 97112 NEUROMUSCULAR REEDUCATION: CPT | Performed by: PHYSICAL THERAPIST

## 2024-04-11 NOTE — PROGRESS NOTES
Physical Therapy Daily Treatment Note     [facility], [address]  [place] [address]  [phone]    Patient: Clint Cee  : 1945  Referring practitioner: STUART Javier  Diagnosis/ ICD10 code: Acute pain of right shoulder [M25.511]  Today's Date: 2024    VISIT#: 9    Subjective   Pt reports: doing ok today, his shoulder is a little more sore today due to the rain.       Objective     See Exercise, Manual, and Modality Logs for complete treatment. Trial of UBC today. Continued with there ex as noted concluded with ES.     Patient Education:    Assessment & Plan       Assessment  Assessment details: Pt tolerated today's rx session well.           Progress per Plan of Care            Timed:         Manual Therapy:         mins  74512;     Therapeutic Exercise:   15      mins  18824;     Neuromuscular Aleksandr:   15     mins  74816;    Therapeutic Activity:          mins  28332;     Gait Training:           mins  14275;     Ultrasound:          mins  10730;    Ionto                                   mins   01875  Self Care                            mins   00409      Un-Timed:  Electrical Stimulation:  20       mins  48316 ( );  Traction          mins 57786  Canal repositioning           mins    11643        Timed Treatment:  30    mins   Total Treatment:     50   mins    Mitch Johnson PT, CLT  Physical Therapist  [place] License:  # [id number]

## 2024-04-17 ENCOUNTER — TELEPHONE (OUTPATIENT)
Dept: PHYSICAL THERAPY | Facility: CLINIC | Age: 79
End: 2024-04-17

## 2024-04-19 ENCOUNTER — TREATMENT (OUTPATIENT)
Dept: PHYSICAL THERAPY | Facility: CLINIC | Age: 79
End: 2024-04-19
Payer: MEDICARE

## 2024-04-19 DIAGNOSIS — M25.511 ACUTE PAIN OF RIGHT SHOULDER: Primary | ICD-10-CM

## 2024-04-19 PROCEDURE — 97112 NEUROMUSCULAR REEDUCATION: CPT | Performed by: PHYSICAL THERAPIST

## 2024-04-19 PROCEDURE — G0283 ELEC STIM OTHER THAN WOUND: HCPCS | Performed by: PHYSICAL THERAPIST

## 2024-04-19 PROCEDURE — 97110 THERAPEUTIC EXERCISES: CPT | Performed by: PHYSICAL THERAPIST

## 2024-04-19 NOTE — PROGRESS NOTES
Physical Therapy Progress Note     [facility], [address]  [place] [address]  [phone]    Patient: Clint Cee  : 1945  Referring practitioner: STUART Javier  Diagnosis/ ICD10 code: Acute pain of right shoulder [M25.511]  Today's Date: 2024    VISIT#: 10    Subjective Evaluation    Pain  Current pain rating: 3  At best pain ratin  At worst pain ratin         Pt reports: feels therapy has been very beneficial and has helped a lot. Still has some limitations in his shoulder mobility but it is getting stronger. Exercises and certain activities getting easier.       Objective          Active Range of Motion     Right Shoulder   Flexion: WFL and with pain  Adduction: WFL and with pain  External rotation 0°: WFL and with pain  Internal rotation BTB: sacrum with pain    Additional Active Range of Motion Details  Increase pain at end range with all movements      Strength/Myotome Testing     Right Shoulder     Planes of Motion   Flexion: 4   Abduction: 4   External rotation at 0°: 3+   Internal rotation at 0°: 4     Isolated Muscles   Biceps: 5   Triceps: 4+         See Exercise, Manual, and Modality Logs for complete treatment.     Patient Education:    Assessment & Plan       Assessment  Assessment details: Pt has been seen for 10 visits for pain in R shoulder after a fall.  Pt presents with impaired strength and functional mobility in R shoulder.  Possible rotator cuff tear . He is responding to therapy. He still has significant strength deficit and functional limitation in R shoulder. He is independent with his HEP.   Has met all STGs, making progress towards LTGs.   DASH score: 14% ( was 32% at eval )     Goals  Plan Goals:  STGs: in 2 weeks   1- Pt will report at least 25 % improvement and pain reduction - MET  2- Pt will be I with initial HEP and progression of his program - MET     LTGs: by DC  1- Pt will report at least 75 % improvement and pain reduction  2- Pt  will be I with final HEP   3- Pt will be able to perform light activities without inc pain  4- Strength will be WFL to be able to perform light daily activities without inc pain  5- Pt will report improved quality of sleep   6- Pt will be able to reach overhead and into top kitchen cabinets with min pain    7- Pt will be able to reach behind back with min pain   8- Pt will voice readiness for DC with independent program   9-  Pt will have improved DASH score indicating improved function and pain reduction          Plan  Plan details: Current POC is still indicated , will continue therapy and progress as tolerated.           Progress per Plan of Care            Timed:         Manual Therapy:         mins  82733;     Therapeutic Exercise:    15     mins  92066;     Neuromuscular Aleksandr:   15     mins  57907;    Therapeutic Activity:          mins  09163;     Gait Training:           mins  64310;     Ultrasound:          mins  12984;    Ionto                                  mins   01718  Self Care                            mins   64254      Un-Timed:  Electrical Stimulation:  20       mins  31967 ( );  Traction          mins 65119  Canal repositioning           mins    95124  Low Eval          Mins  39511  Mod Eval          Mins  02340  High Eval                            Mins  98283  Re-Eval                               mins  00310    Timed Treatment:  30    mins   Total Treatment:    50    mins    Mitch Johnson, PT, CLT  Physical Therapist  [place] License:  # [id number]

## 2024-04-23 ENCOUNTER — TREATMENT (OUTPATIENT)
Dept: PHYSICAL THERAPY | Facility: CLINIC | Age: 79
End: 2024-04-23
Payer: MEDICARE

## 2024-04-23 DIAGNOSIS — M25.511 ACUTE PAIN OF RIGHT SHOULDER: Primary | ICD-10-CM

## 2024-04-23 PROCEDURE — 97110 THERAPEUTIC EXERCISES: CPT | Performed by: PHYSICAL THERAPIST

## 2024-04-23 PROCEDURE — 97112 NEUROMUSCULAR REEDUCATION: CPT | Performed by: PHYSICAL THERAPIST

## 2024-04-23 PROCEDURE — G0283 ELEC STIM OTHER THAN WOUND: HCPCS | Performed by: PHYSICAL THERAPIST

## 2024-04-23 NOTE — PROGRESS NOTES
Physical Therapy Daily Treatment Note    [address] [facility], [address]  [place] [address]  [phone]    Patient: Clint Cee  : 1945  Referring practitioner: STUART Javier  Diagnosis/ ICD10 code: Acute pain of right shoulder [M25.511]  Today's Date: 2024    VISIT#: 11    Subjective   Pt reports: doing ok this morning. It was ok after last visit. Feels it is getting stronger but still has a hard time lifting a gallon of milk out of the fridge.       Objective     See Exercise, Manual, and Modality Logs for complete treatment.     Patient Education:    Assessment & Plan       Assessment  Assessment details: Pt tolerated today's rx session.           Progress per Plan of Care            Timed:         Manual Therapy:         mins  74537;     Therapeutic Exercise:  15       mins  43446;     Neuromuscular Aleksandr:   15     mins  79409;    Therapeutic Activity:          mins  82403;     Gait Training:           mins  69208;     Ultrasound:          mins  02044;    Ionto                                   mins   82856  Self Care                            mins   32910      Un-Timed:  Electrical Stimulation:   20      mins  24472 ( );  Traction          mins 37153  Canal repositioning           mins    33831        Timed Treatment:   30   mins   Total Treatment:     50   mins    Mitch Johnson PT, CLT  Physical Therapist  [place] License:  # [id number]

## 2024-04-25 ENCOUNTER — TREATMENT (OUTPATIENT)
Dept: PHYSICAL THERAPY | Facility: CLINIC | Age: 79
End: 2024-04-25
Payer: MEDICARE

## 2024-04-25 DIAGNOSIS — M25.511 ACUTE PAIN OF RIGHT SHOULDER: Primary | ICD-10-CM

## 2024-04-25 PROCEDURE — 97110 THERAPEUTIC EXERCISES: CPT | Performed by: PHYSICAL THERAPIST

## 2024-04-25 PROCEDURE — G0283 ELEC STIM OTHER THAN WOUND: HCPCS | Performed by: PHYSICAL THERAPIST

## 2024-04-25 PROCEDURE — 97112 NEUROMUSCULAR REEDUCATION: CPT | Performed by: PHYSICAL THERAPIST

## 2024-04-25 NOTE — PROGRESS NOTES
Physical Therapy Daily Treatment Note    [address] [facility], [address]  [place] [address]  [phone]    Patient: Clint Cee  : 1945  Referring practitioner: STUART Javier  Diagnosis/ ICD10 code: Acute pain of right shoulder [M25.511]  Today's Date: 2024    VISIT#: 12    Subjective   Pt reports: doing ok today, did his exercises last night before going to bed and it felt pretty good.       Objective     See Exercise, Manual, and Modality Logs for complete treatment.     Patient Education:    Assessment & Plan       Assessment  Assessment details: Pt tolerated today's rx session well. Subjective improvement is reported.           Progress per Plan of Care            Timed:         Manual Therapy:         mins  92630;     Therapeutic Exercise:  15       mins  97293;     Neuromuscular Aleksandr:  13      mins  32133;    Therapeutic Activity:          mins  44057;     Gait Training:           mins  73366;     Ultrasound:          mins  64183;    Ionto                                   mins   44336  Self Care                            mins   58782      Un-Timed:  Electrical Stimulation:  20       mins  37178 ( );  Traction          mins 38069  Canal repositioning           mins    85650        Timed Treatment:  28    mins   Total Treatment:    48    mins    Mitch Johnson, PT, CLT  Physical Therapist  [place] License:  # [id number]

## 2024-05-01 ENCOUNTER — TREATMENT (OUTPATIENT)
Dept: PHYSICAL THERAPY | Facility: CLINIC | Age: 79
End: 2024-05-01
Payer: MEDICARE

## 2024-05-01 DIAGNOSIS — M25.511 ACUTE PAIN OF RIGHT SHOULDER: Primary | ICD-10-CM

## 2024-05-01 PROCEDURE — G0283 ELEC STIM OTHER THAN WOUND: HCPCS | Performed by: PHYSICAL THERAPIST

## 2024-05-01 PROCEDURE — 97110 THERAPEUTIC EXERCISES: CPT | Performed by: PHYSICAL THERAPIST

## 2024-05-01 PROCEDURE — 97112 NEUROMUSCULAR REEDUCATION: CPT | Performed by: PHYSICAL THERAPIST

## 2024-05-01 NOTE — PROGRESS NOTES
Physical Therapy Daily Treatment Note    [address] [facility], [address]  [place] [address]  [phone]    Patient: Clint Cee  : 1945  Referring practitioner: STUART Javier  Diagnosis/ ICD10 code: Acute pain of right shoulder [M25.511]  Today's Date: 2024    VISIT#: 13    Subjective   Pt reports: doing ok , R shoulder is still sore and it hurts with certain movements.       Objective     See Exercise, Manual, and Modality Logs for complete treatment.     Patient Education:    Assessment & Plan       Assessment  Assessment details: Pt tolerated today's rx session well without any issues. Tolerating progression of his ex program.           Progress per Plan of Care            Timed:         Manual Therapy:         mins  99254;     Therapeutic Exercise:  15       mins  82048;     Neuromuscular Aleksandr:  13     mins  24543;    Therapeutic Activity:          mins  46689;     Gait Training:           mins  91484;     Ultrasound:          mins  89465;    Ionto                                   mins   86276  Self Care                            mins   50389      Un-Timed:  Electrical Stimulation:   20      mins  75253 ( );  Traction          mins 08824  Canal repositioning           mins    68715        Timed Treatment:  28    mins   Total Treatment:    48    mins    Mitch Johnson, PT, CLT  Physical Therapist  [place] License:  # [id number]

## 2024-05-03 ENCOUNTER — TREATMENT (OUTPATIENT)
Dept: PHYSICAL THERAPY | Facility: CLINIC | Age: 79
End: 2024-05-03
Payer: MEDICARE

## 2024-05-03 DIAGNOSIS — M25.511 ACUTE PAIN OF RIGHT SHOULDER: Primary | ICD-10-CM

## 2024-05-03 PROCEDURE — 97110 THERAPEUTIC EXERCISES: CPT | Performed by: PHYSICAL THERAPIST

## 2024-05-03 PROCEDURE — 97112 NEUROMUSCULAR REEDUCATION: CPT | Performed by: PHYSICAL THERAPIST

## 2024-05-03 PROCEDURE — G0283 ELEC STIM OTHER THAN WOUND: HCPCS | Performed by: PHYSICAL THERAPIST

## 2024-05-03 NOTE — PROGRESS NOTES
Physical Therapy Daily Treatment Note    [address] [facility], [address]  [place] [address]  [phone]    Patient: Clint Cee  : 1945  Referring practitioner: STUART Javier  Diagnosis/ ICD10 code: Acute pain of right shoulder [M25.511]  Today's Date: 5/3/2024    VISIT#: 14    Subjective   Pt reports: doing ok this morning. His R shoulder still hurts but it is getting stronger.       Objective     See Exercise, Manual, and Modality Logs for complete treatment.     Patient Education:    Assessment & Plan       Assessment  Assessment details: Good tolerance to today's rx session.            Progress per Plan of Care            Timed:         Manual Therapy:         mins  28023;     Therapeutic Exercise:   15      mins  99922;     Neuromuscular Aleksandr:   13     mins  00105;    Therapeutic Activity:          mins  42324;     Gait Training:           mins  82395;     Ultrasound:          mins  70111;    Ionto                                   mins   42747  Self Care                            mins   08025      Un-Timed:  Electrical Stimulation:  20       mins  71336 ( );  Traction          mins 70103  Canal repositioning           mins    66383        Timed Treatment: 28     mins   Total Treatment:    48    mins    Mitch Johnson, PT, CLT  Physical Therapist  [place] License:  # [id number]

## 2024-05-07 ENCOUNTER — OFFICE VISIT (OUTPATIENT)
Dept: FAMILY MEDICINE CLINIC | Facility: CLINIC | Age: 79
End: 2024-05-07
Payer: MEDICARE

## 2024-05-07 VITALS
SYSTOLIC BLOOD PRESSURE: 132 MMHG | DIASTOLIC BLOOD PRESSURE: 78 MMHG | OXYGEN SATURATION: 95 % | HEIGHT: 71 IN | BODY MASS INDEX: 31.78 KG/M2 | WEIGHT: 227 LBS | RESPIRATION RATE: 18 BRPM | HEART RATE: 61 BPM

## 2024-05-07 DIAGNOSIS — B07.0 PLANTAR WART OF RIGHT FOOT: ICD-10-CM

## 2024-05-07 DIAGNOSIS — R22.32 MASS OF LEFT FOREARM: Primary | ICD-10-CM

## 2024-05-07 DIAGNOSIS — M25.511 CHRONIC RIGHT SHOULDER PAIN: ICD-10-CM

## 2024-05-07 DIAGNOSIS — G89.29 CHRONIC RIGHT SHOULDER PAIN: ICD-10-CM

## 2024-05-07 PROCEDURE — 1126F AMNT PAIN NOTED NONE PRSNT: CPT | Performed by: NURSE PRACTITIONER

## 2024-05-07 PROCEDURE — 3078F DIAST BP <80 MM HG: CPT | Performed by: NURSE PRACTITIONER

## 2024-05-07 PROCEDURE — 1160F RVW MEDS BY RX/DR IN RCRD: CPT | Performed by: NURSE PRACTITIONER

## 2024-05-07 PROCEDURE — 1159F MED LIST DOCD IN RCRD: CPT | Performed by: NURSE PRACTITIONER

## 2024-05-07 PROCEDURE — 3075F SYST BP GE 130 - 139MM HG: CPT | Performed by: NURSE PRACTITIONER

## 2024-05-07 PROCEDURE — 99214 OFFICE O/P EST MOD 30 MIN: CPT | Performed by: NURSE PRACTITIONER

## 2024-05-07 RX ORDER — MELOXICAM 15 MG/1
15 TABLET ORAL DAILY
Qty: 30 TABLET | Refills: 1 | Status: SHIPPED | OUTPATIENT
Start: 2024-05-07

## 2024-05-09 ENCOUNTER — TREATMENT (OUTPATIENT)
Dept: PHYSICAL THERAPY | Facility: CLINIC | Age: 79
End: 2024-05-09
Payer: MEDICARE

## 2024-05-09 DIAGNOSIS — M25.511 ACUTE PAIN OF RIGHT SHOULDER: Primary | ICD-10-CM

## 2024-05-09 PROCEDURE — 97110 THERAPEUTIC EXERCISES: CPT | Performed by: PHYSICAL THERAPIST

## 2024-05-09 PROCEDURE — 97112 NEUROMUSCULAR REEDUCATION: CPT | Performed by: PHYSICAL THERAPIST

## 2024-05-09 PROCEDURE — G0283 ELEC STIM OTHER THAN WOUND: HCPCS | Performed by: PHYSICAL THERAPIST

## 2024-05-13 RX ORDER — ARIPIPRAZOLE 2 MG/1
TABLET ORAL
Qty: 42 TABLET | Refills: 0 | Status: SHIPPED | OUTPATIENT
Start: 2024-05-13 | End: 2024-06-10

## 2024-05-14 ENCOUNTER — TREATMENT (OUTPATIENT)
Dept: PHYSICAL THERAPY | Facility: CLINIC | Age: 79
End: 2024-05-14
Payer: MEDICARE

## 2024-05-14 DIAGNOSIS — M25.511 ACUTE PAIN OF RIGHT SHOULDER: Primary | ICD-10-CM

## 2024-05-14 PROCEDURE — G0283 ELEC STIM OTHER THAN WOUND: HCPCS | Performed by: PHYSICAL THERAPIST

## 2024-05-14 PROCEDURE — 97110 THERAPEUTIC EXERCISES: CPT | Performed by: PHYSICAL THERAPIST

## 2024-05-14 PROCEDURE — 97112 NEUROMUSCULAR REEDUCATION: CPT | Performed by: PHYSICAL THERAPIST

## 2024-05-14 NOTE — PROGRESS NOTES
Physical Therapy Daily Treatment Note    [address] [facility], [address]  [place] [address]  [phone]    Patient: Clint Cee  : 1945  Referring practitioner: STUART Javier  Diagnosis/ ICD10 code: Acute pain of right shoulder [M25.511]  Today's Date: 2024    VISIT#: 16    Subjective   Pt reports: doing ok this morning. His shoulder has been feeling better in the past couple of days. Started a new pain pill.       Objective     See Exercise, Manual, and Modality Logs for complete treatment. Continued with strengthening and ROM exercises.     Patient Education:    Assessment & Plan       Assessment  Assessment details: Pt tolerated today's rx session well. He has gained some strength in R shoulder but still very limited in certain movements .           Progress per Plan of Care            Timed:         Manual Therapy:         mins  19642;     Therapeutic Exercise:  15       mins  92002;     Neuromuscular Aleksandr:  13      mins  78021;    Therapeutic Activity:          mins  67706;     Gait Training:           mins  78897;     Ultrasound:          mins  75981;    Ionto                                   mins   47115  Self Care                            mins   31380      Un-Timed:  Electrical Stimulation:   20      mins  28903 ( );  Traction          mins 90100  Canal repositioning           mins    05382        Timed Treatment:   28   mins   Total Treatment:     48   mins    Mitch Johnson PT, CLT  Physical Therapist  [place] License:  # [id number]

## 2024-05-15 ENCOUNTER — TELEPHONE (OUTPATIENT)
Dept: FAMILY MEDICINE CLINIC | Facility: CLINIC | Age: 79
End: 2024-05-15
Payer: MEDICARE

## 2024-05-15 ENCOUNTER — HOSPITAL ENCOUNTER (OUTPATIENT)
Dept: ULTRASOUND IMAGING | Facility: HOSPITAL | Age: 79
Discharge: HOME | End: 2024-05-15
Admitting: NURSE PRACTITIONER
Payer: MEDICARE

## 2024-05-15 DIAGNOSIS — R22.32 MASS OF LEFT FOREARM: ICD-10-CM

## 2024-05-15 PROCEDURE — 76999 ECHO EXAMINATION PROCEDURE: CPT

## 2024-05-15 NOTE — TELEPHONE ENCOUNTER
Dr Garcia is the prescriber on this medication, please have patient reach out to his office for clarification.

## 2024-05-15 NOTE — TELEPHONE ENCOUNTER
"  Caller: Clint Cee \"Dennis\"    Relationship: Self    Best call back number: [phone]     Which medication are you concerned about: METOPROLOL    Who prescribed you this medication: REHAB DOCTOR    When did you start taking this medication: WHEN IN REHAB    What are your concerns:   THE BOTTLE IS LABELED TO TAKE HALF A PILL/DAY BUT PATIENT STATES HAS BEEN TAKING A HALF A PILL/TWICE A DAY. PLEASE CALL TO INSTRUCT PATIENT ON DOSAGE      "

## 2024-05-16 ENCOUNTER — TREATMENT (OUTPATIENT)
Dept: PHYSICAL THERAPY | Facility: CLINIC | Age: 79
End: 2024-05-16
Payer: MEDICARE

## 2024-05-16 DIAGNOSIS — M25.511 ACUTE PAIN OF RIGHT SHOULDER: Primary | ICD-10-CM

## 2024-05-16 PROCEDURE — 97112 NEUROMUSCULAR REEDUCATION: CPT | Performed by: PHYSICAL THERAPIST

## 2024-05-16 PROCEDURE — 97110 THERAPEUTIC EXERCISES: CPT | Performed by: PHYSICAL THERAPIST

## 2024-05-16 PROCEDURE — G0283 ELEC STIM OTHER THAN WOUND: HCPCS | Performed by: PHYSICAL THERAPIST

## 2024-05-16 NOTE — PROGRESS NOTES
Physical Therapy Daily Treatment Note/ DC summary     [facility], [address]  [place] [address]  [phone]    Patient: Clint Cee  : 1945  Referring practitioner: STUART Javier  Diagnosis/ ICD10 code: Acute pain of right shoulder [M25.511]  Today's Date: 2024    VISIT#: 17    Subjective   Pt reports: therapy has been beneficial. Feels at least 60-70% improvement. Still has some limitations and can't lift anything with R arm but overall is happy with his progress. Wants to continue with his HEP for now till he sees Dr Goldberg in July.       Objective          Active Range of Motion     Right Shoulder   Flexion: WFL  Adduction: WFL  External rotation 0°: WFL    Strength/Myotome Testing     Right Shoulder     Planes of Motion   Flexion: 4+   Abduction: 4   External rotation at 0°: 3+   Internal rotation at 0°: 4+     Isolated Muscles   Biceps: 5   Triceps: 5         See Exercise, Manual, and Modality Logs for complete treatment.     Patient Education:    Assessment & Plan       Assessment  Assessment details: Pt has been seen for 17 visits for pain in R shoulder after a fall.  Pt presents with impaired strength and functional mobility in R shoulder with a possible rotator cuff tear . He has responded to therapy but still has significant strength deficit and functional limitation in R shoulder. Reports at least 60-70% improvement .  He is independent with his HEP. Pt wants to continue with HEP for now . He has an apt with Dr Goldberg in July.   Has met all STGs, 5/9 LTGs.   DASH score: 16% ( was 32% at eval )     Goals  Plan Goals:  STGs: in 2 weeks   1- Pt will report at least 25 % improvement and pain reduction - MET  2- Pt will be I with initial HEP and progression of his program - MET     LTGs: by DC  1- Pt will report at least 75 % improvement and pain reduction  2- Pt will be I with final HEP - MET  3- Pt will be able to perform light activities without inc pain- MET  4-  Strength will be WFL to be able to perform light daily activities without inc pain  5- Pt will report improved quality of sleep - MET  6- Pt will be able to reach overhead and into top kitchen cabinets with min pain    7- Pt will be able to reach behind back with min pain   8- Pt will voice readiness for DC with independent program - MET  9-  Pt will have improved DASH score indicating improved function and pain reduction- MET          Plan  Plan details: Pt will be DC to continue with his HEP.                     Timed:         Manual Therapy:         mins  04262;     Therapeutic Exercise:  15       mins  60813;     Neuromuscular Aleksandr:  10     mins  30119;    Therapeutic Activity:          mins  75635;     Gait Training:           mins  71917;     Ultrasound:          mins  52937;    Ionto                                   mins   67213  Self Care                            mins   70962      Un-Timed:  Electrical Stimulation:   20      mins  60342 ( );  Traction          mins 56835  Canal repositioning           mins    10977        Timed Treatment:  25   mins   Total Treatment:    45    mins    Mitch Johnson PT, CLT  Physical Therapist  [place] License:  # [id number]

## 2024-05-20 RX ORDER — AMLODIPINE BESYLATE 10 MG/1
TABLET ORAL
Qty: 90 TABLET | Refills: 0 | Status: SHIPPED | OUTPATIENT
Start: 2024-05-20

## 2024-06-03 RX ORDER — DULOXETIN HYDROCHLORIDE 60 MG/1
60 CAPSULE, DELAYED RELEASE ORAL DAILY
Qty: 90 CAPSULE | Refills: 0 | Status: SHIPPED | OUTPATIENT
Start: 2024-06-03

## 2024-06-06 ENCOUNTER — LAB (OUTPATIENT)
Dept: FAMILY MEDICINE CLINIC | Facility: CLINIC | Age: 79
End: 2024-06-06
Payer: MEDICARE

## 2024-06-06 ENCOUNTER — OFFICE VISIT (OUTPATIENT)
Dept: ORTHOPEDIC SURGERY | Facility: CLINIC | Age: 79
End: 2024-06-06
Payer: MEDICARE

## 2024-06-06 ENCOUNTER — OFFICE VISIT (OUTPATIENT)
Dept: FAMILY MEDICINE CLINIC | Facility: CLINIC | Age: 79
End: 2024-06-06
Payer: MEDICARE

## 2024-06-06 VITALS — HEIGHT: 71 IN | WEIGHT: 232 LBS | OXYGEN SATURATION: 96 % | HEART RATE: 63 BPM | BODY MASS INDEX: 32.48 KG/M2

## 2024-06-06 VITALS
BODY MASS INDEX: 32.59 KG/M2 | DIASTOLIC BLOOD PRESSURE: 72 MMHG | HEART RATE: 83 BPM | RESPIRATION RATE: 18 BRPM | WEIGHT: 232.8 LBS | HEIGHT: 71 IN | SYSTOLIC BLOOD PRESSURE: 130 MMHG | OXYGEN SATURATION: 97 %

## 2024-06-06 DIAGNOSIS — M25.511 CHRONIC RIGHT SHOULDER PAIN: Primary | ICD-10-CM

## 2024-06-06 DIAGNOSIS — N30.01 ACUTE CYSTITIS WITH HEMATURIA: Primary | ICD-10-CM

## 2024-06-06 DIAGNOSIS — N30.01 ACUTE CYSTITIS WITH HEMATURIA: ICD-10-CM

## 2024-06-06 DIAGNOSIS — S46.811A TRAUMATIC RUPTURE OF SUPRASPINATUS TENDON OF RIGHT SHOULDER, INITIAL ENCOUNTER: ICD-10-CM

## 2024-06-06 DIAGNOSIS — G89.29 CHRONIC RIGHT SHOULDER PAIN: Primary | ICD-10-CM

## 2024-06-06 DIAGNOSIS — R41.3 MEMORY CHANGES: ICD-10-CM

## 2024-06-06 LAB
BILIRUB BLD-MCNC: NEGATIVE MG/DL
CLARITY, POC: ABNORMAL
COLOR UR: YELLOW
EXPIRATION DATE: ABNORMAL
GLUCOSE UR STRIP-MCNC: NEGATIVE MG/DL
KETONES UR QL: NEGATIVE
LEUKOCYTE EST, POC: ABNORMAL
Lab: ABNORMAL
NITRITE UR-MCNC: NEGATIVE MG/ML
PH UR: 6 [PH] (ref 5–8)
PROT UR STRIP-MCNC: ABNORMAL MG/DL
RBC # UR STRIP: ABNORMAL /UL
SP GR UR: 1.02 (ref 1–1.03)
UROBILINOGEN UR QL: ABNORMAL

## 2024-06-06 PROCEDURE — 87186 SC STD MICRODIL/AGAR DIL: CPT | Performed by: NURSE PRACTITIONER

## 2024-06-06 PROCEDURE — 3078F DIAST BP <80 MM HG: CPT | Performed by: NURSE PRACTITIONER

## 2024-06-06 PROCEDURE — 1126F AMNT PAIN NOTED NONE PRSNT: CPT | Performed by: NURSE PRACTITIONER

## 2024-06-06 PROCEDURE — 87086 URINE CULTURE/COLONY COUNT: CPT | Performed by: NURSE PRACTITIONER

## 2024-06-06 PROCEDURE — 99214 OFFICE O/P EST MOD 30 MIN: CPT | Performed by: NURSE PRACTITIONER

## 2024-06-06 PROCEDURE — 81003 URINALYSIS AUTO W/O SCOPE: CPT | Performed by: NURSE PRACTITIONER

## 2024-06-06 PROCEDURE — 3075F SYST BP GE 130 - 139MM HG: CPT | Performed by: NURSE PRACTITIONER

## 2024-06-06 PROCEDURE — 87077 CULTURE AEROBIC IDENTIFY: CPT | Performed by: NURSE PRACTITIONER

## 2024-06-06 PROCEDURE — 1159F MED LIST DOCD IN RCRD: CPT | Performed by: NURSE PRACTITIONER

## 2024-06-06 PROCEDURE — 1160F RVW MEDS BY RX/DR IN RCRD: CPT | Performed by: NURSE PRACTITIONER

## 2024-06-06 RX ORDER — SULFAMETHOXAZOLE AND TRIMETHOPRIM 800; 160 MG/1; MG/1
1 TABLET ORAL 2 TIMES DAILY
Qty: 28 TABLET | Refills: 0 | Status: SHIPPED | OUTPATIENT
Start: 2024-06-06

## 2024-06-06 NOTE — PROGRESS NOTES
"Chief Complaint  Urinary Frequency (Burning when urinating )  Subjective        Clint Cee presents to [facility]  History of Present Illness  Pt comes in today with c/o dysuria, and urinary frequency.  No fever or chills.  No hematuria.  No flank pain.   Pt is also worried about his memory and would like referral to neuro.  States he has trouble finding words and forgetting things.   Urinary Frequency   Associated symptoms include frequency.     Review of Systems   Genitourinary:  Positive for frequency.     Objective     Vital Signs:   /72   Pulse 83   Resp 18   Ht 180.3 cm (70.98\")   Wt 106 kg (232 lb 12.8 oz)   SpO2 97%   BMI 32.48 kg/m²       BP Readings from Last 3 Encounters:   06/06/24 130/72   05/07/24 132/78   03/25/24 125/82       Wt Readings from Last 3 Encounters:   06/06/24 106 kg (232 lb 12.8 oz)   05/07/24 103 kg (227 lb)   03/25/24 101 kg (222 lb)     Physical Exam  Constitutional:       Appearance: He is well-developed.   Eyes:      Pupils: Pupils are equal, round, and reactive to light.   Cardiovascular:      Rate and Rhythm: Normal rate and regular rhythm.   Pulmonary:      Effort: Pulmonary effort is normal.      Breath sounds: Normal breath sounds.   Neurological:      Mental Status: He is alert and oriented to person, place, and time.        Result Review :                 Assessment and Plan    Diagnoses and all orders for this visit:    1. Acute cystitis with hematuria (Primary)  -     sulfamethoxazole-trimethoprim (Bactrim DS) 800-160 MG per tablet; Take 1 tablet by mouth 2 (Two) Times a Day.  Dispense: 28 tablet; Refill: 0  -     Urine Culture - Urine, Urine, Clean Catch; Future  -     POCT urinalysis dipstick, automated    2. Memory changes  -     Ambulatory Referral to Neurology    Will start bactrim  Culture urine  Push water intake  Referral to neuro  During this office visit, we discussed the pertinent aspects of the visit and " treatment recommendations. Pt verbalizes understanding. Follow up was discussed. Patient was given the opportunity to ask questions and discuss other concerns.         Follow Up   Return if symptoms worsen or fail to improve.  Patient was given instructions and counseling regarding his condition or for health maintenance advice. Please see specific information pulled into the AVS if appropriate.

## 2024-06-06 NOTE — PROGRESS NOTES
"Clint is a 79 y.o. year old male presents to [facility]    Chief Complaint   Patient presents with    Right Shoulder - Pain, Initial Evaluation     2/2024 patient states he was holding his dog on a leash and it pulled him and he hit shoulder on the ground        History of Present Illness  Clint Cee is a right handed 79 y.o. male presents to clinic for evaluation of chronic right shoulder pain since his dog had pulled him in February and he landed on his shoulder. Qualifies pain as ache, weakness, sharp occasionally, with stiffness. Provocative: sleeping on affected side, reaching in back car seat, abduction and external rotation. Treatment: activity modification, tristen-seltzer, Mobic 15mg, rest, 500mg Tylenol, greater than 7 weeks of physical therapy and [facility].     I have reviewed the patient's medical, family, and social history in detail and updated the computerized patient record.    Objective:  Pulse 63   Ht 180.3 cm (70.98\")   Wt 105 kg (232 lb)   SpO2 96%   BMI 32.38 kg/m²      Physical Exam    Vital signs reviewed.   General: No acute distress.  Eyes: conjunctiva clear  ENT: external ears atraumatic  CV: no peripheral edema  Resp: normal respiratory effort  Skin: no rashes or wounds; normal turgor  Psych: mood and affect appropriate; recent and remote memory intact  Neuro: sensation to light touch intact    MSK Exam      Right shoulder:  Intact skin. Atrophy. No effusion    Tenderness to palpation about the anterior joint line  Passive forward flexion 140+, abduction 115+, ER 15 with crepitation  Active IR L4  Crepitation with passive motion  Mild pain but no weakness with [place] and supraspinatus/Annalisa testing  Positive Speed  Negative Neer  Negative Antunez; positive scarf  Belly press 5/5; lift off 5/5  Near full range of motion at the elbow wrist and digits  Radial pulse palpable capillary refill less than 2 seconds all digits    Imaging:  XR Shoulder " 2+ View Right (02/01/2024 19:18)   FINDINGS:  There is no displaced fracture or dislocation. The glenohumeral articulation is intact. 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 The acromioclavicular joint is intact. Moderate hypertrophic age-related changes of   the AC joint are noted. No other significant focal osseous abnormalities are seen. The soft tissues are unremarkable.     IMPRESSION:  1.No evidence for displaced fracture or dislocation.  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  3.Moderate hypertrophic age-related changes of the acromioclavicular joint.    Assessment:  Diagnoses and all orders for this visit:    Chronic right shoulder pain    Nontraumatic tear of right supraspinatus tendon    Plan: Recommend MRI of the right shoulder for evaluation of rotator cuff integrity. Follow up with Dr Goldberg for review.       Follow Up   Return for MRI f/u deepak Goldberg.  Patient was given instructions and counseling regarding his condition or for health maintenance advice. Please see specific information pulled into the AVS if appropriate.     EMR Dragon/Transcription disclaimer:    Much of this encounter note is an electronic transcription/translation of spoken language to printed text.  The electronic translation of spoken language may permit erroneous, or at times, nonsensical words or phrases to be inadvertently transcribed.  Although I have reviewed the note for such errors some may still exist.

## 2024-06-07 DIAGNOSIS — Z78.9 IMPAIRED DECISION MAKING: Primary | ICD-10-CM

## 2024-06-08 LAB — BACTERIA SPEC AEROBE CULT: ABNORMAL

## 2024-06-10 RX ORDER — ALLOPURINOL 100 MG/1
100 TABLET ORAL DAILY
Qty: 90 TABLET | Refills: 1 | Status: SHIPPED | OUTPATIENT
Start: 2024-06-10

## 2024-06-11 RX ORDER — CEPHALEXIN 500 MG/1
500 CAPSULE ORAL 3 TIMES DAILY
Qty: 30 CAPSULE | Refills: 0 | Status: SHIPPED | OUTPATIENT
Start: 2024-06-11

## 2024-06-11 NOTE — PROGRESS NOTES
Please let pt know that the bactrim anbx is resistant to his UTI. I have sent in new rx for different anbx.

## 2024-06-13 ENCOUNTER — OFFICE VISIT (OUTPATIENT)
Dept: PODIATRY | Facility: CLINIC | Age: 79
End: 2024-06-13
Payer: MEDICARE

## 2024-06-13 VITALS
BODY MASS INDEX: 33.21 KG/M2 | HEIGHT: 70 IN | HEART RATE: 88 BPM | RESPIRATION RATE: 20 BRPM | OXYGEN SATURATION: 96 % | WEIGHT: 232 LBS

## 2024-06-13 DIAGNOSIS — M79.671 RIGHT FOOT PAIN: Primary | ICD-10-CM

## 2024-06-13 DIAGNOSIS — B07.0 PLANTAR WART OF RIGHT FOOT: ICD-10-CM

## 2024-06-16 NOTE — PROGRESS NOTES
06/13/2024  Foot and Ankle Surgery - Established Patient/Follow-up  Provider: Dr. Arvind Robles DPM  Location: [facility]    Subjective:  Clint Cee is a 79 y.o. male.     Chief Complaint   Patient presents with    Right Foot - Follow-up, Plantar Warts    Follow-up     LUIS ALBERTO Castellanos md  06/02/2024       History of Present Illness  The patient presents for evaluation of a plantar wart on his right foot.    The patient was last evaluated in 2022. He has previously undergone cryotherapy for a plantar wart on his right foot under the care of a dermatologist, however, the wart has persisted. The dermatologist did not perform a biopsy of the wart and instead performed cryotherapy with liquid nitrogen. The patient sought advice from Emanuel, a nurse, who suggested that the plantar wart be excised. The wart is occasionally painful and has scabbed over itself, leading to a formation of a light callus.      Allergies   Allergen Reactions    Morphine Hallucinations    Beta Adrenergic Blockers Cough    Ace Inhibitors Cough     cough       Current Outpatient Medications on File Prior to Visit   Medication Sig Dispense Refill    allopurinol (ZYLOPRIM) 100 MG tablet TAKE 1 TABLET BY MOUTH DAILY 90 tablet 1    amLODIPine (NORVASC) 10 MG tablet TAKE 1 TABLET BY MOUTH EVERY DAY 90 tablet 0    atorvastatin (LIPITOR) 40 MG tablet TAKE 1 TABLET BY MOUTH EVERY NIGHT FOR HIGH AMOUNT OF FATS IN THE BLOOD 90 tablet 1    cephalexin (Keflex) 500 MG capsule Take 1 capsule by mouth 3 (Three) Times a Day. 30 capsule 0    DULoxetine (CYMBALTA) 60 MG capsule Take 1 capsule by mouth Daily. 90 capsule 0    famotidine (PEPCID) 40 MG tablet Take 1 tablet by mouth Every Night.      fexofenadine (ALLEGRA) 180 MG tablet Take 1 tablet by mouth 2 (Two) Times a Day As Needed. Indications: Hayfever      fluticasone (FLONASE) 50 MCG/ACT nasal spray 2 sprays into the nostril(s) as directed by provider Daily As Needed. Indications: Allergic  "Rhinitis      Leuprolide Mesylate, 6 Month, (CAMCEVI SC) Inject  under the skin into the appropriate area as directed. Every 6 months injection, due in December  Indications: prostate cancer (to decrease testosterone).      meloxicam (Mobic) 15 MG tablet Take 1 tablet by mouth Daily. 30 tablet 1    metoprolol tartrate (LOPRESSOR) 25 MG tablet TAKE 1/2 TABLET BY MOUTH DAILY AS NEEDED 45 tablet 3    sulfamethoxazole-trimethoprim (Bactrim DS) 800-160 MG per tablet Take 1 tablet by mouth 2 (Two) Times a Day. 28 tablet 0    zolpidem (AMBIEN) 10 MG tablet TAKE 1 TABLET BY MOUTH EVERY NIGHT AT BEDTIME 90 tablet 0     No current facility-administered medications on file prior to visit.       Objective   Pulse 88   Resp 20   Ht 177.8 cm (70\")   Wt 105 kg (232 lb)   SpO2 96%   BMI 33.29 kg/m²     Foot/Ankle Exam    GENERAL  Appearance:  appears stated age  Orientation:  AAOx3  Affect:  appropriate    VASCULAR     Right Foot Vascularity   Normal vascular exam    Dorsalis pedis:  2+  Posterior tibial:  2+  Skin temperature:  warm  Edema grading:  None  CFT:  < 3 seconds  Pedal hair growth:  Present  Varicosities:  none     Left Foot Vascularity   Normal vascular exam    Dorsalis pedis:  2+  Posterior tibial:  2+  Skin temperature:  warm  Edema grading:  None  CFT:  < 3 seconds  Pedal hair growth:  Present  Varicosities:  none     NEUROLOGIC     Right Foot Neurologic   Light touch sensation: normal  Hot/Cold sensation: normal  Achilles reflex:  2+     Left Foot Neurologic   Light touch sensation: normal  Hot/Cold sensation:  normal  Achilles reflex:  2+    MUSCULOSKELETAL     Right Foot Musculoskeletal   Ecchymosis:  none  Tenderness:  callous right foot    Arch:  Normal     Left Foot Musculoskeletal   Arch:  Normal    MUSCLE STRENGTH     Right Foot Muscle Strength   Normal strength    Foot dorsiflexion:  5  Foot plantar flexion:  5  Foot inversion:  5  Foot eversion:  5     Left Foot Muscle Strength   Normal strength  "   Foot dorsiflexion:  5  Foot plantar flexion:  5  Foot inversion:  5  Foot eversion:  5    DERMATOLOGIC      Right Foot Dermatologic   Skin  Positive for warts.   Nails comment:  Nails 1-5     Left Foot Dermatologic   Skin  Left foot skin is intact.   Nails comment:  Nails 1-5    TESTS     Right Foot Tests   Anterior drawer: negative  Varus tilt: negative     Left Foot Tests   Anterior drawer: negative  Varus tilt: negative     Right foot additional comments: Hyperkeratotic lesion noted to the plantar aspect of the first metatarsal phalangeal joint region.  Pinpoint bleeding noted with gentle debridement consistent with verruca.  Lesion measures approximately 1 cm in diameter.    Physical Exam        Results      Assessment & Plan   Diagnoses and all orders for this visit:    1. Right foot pain (Primary)    2. Plantar wart of right foot      Assessment & Plan    Patient presents with discomfort involving the plantar aspect of the right first metatarsal phalangeal joint region which is consistent with a wart.  I discussed the diagnosis and treatment options.  Patient states that he is tired of dealing with the pain and issues and would like to consider definitive treatment.  We did discuss definitive treatment option of laser ablation.  I will proceed with biopsy at the same time.  I reviewed the procedure risks, goals, and recovery at length.  Patient understands that he will require decreased overall activity after surgery.  Patient is to wear appropriate shoes during the meantime.  I did review OTC treatment options.  Patient is to call with any additional issues or concerns.  Greater than 30 minutes was spent before, during, and after evaluation for patient care.         Patient or patient representative verbalized consent for the use of Ambient Listening during the visit with  DUNCAN Robles DPM for chart documentation. 6/16/2024  12:24 EDT    DUNCAN Robles DPM

## 2024-06-16 NOTE — H&P (VIEW-ONLY)
06/13/2024  Foot and Ankle Surgery - Established Patient/Follow-up  Provider: Dr. Arvind Robles DPM  Location: [facility]    Subjective:  Clint Cee is a 79 y.o. male.     Chief Complaint   Patient presents with    Right Foot - Follow-up, Plantar Warts    Follow-up     LUIS ALBERTO Castellanos md  06/02/2024       History of Present Illness  The patient presents for evaluation of a plantar wart on his right foot.    The patient was last evaluated in 2022. He has previously undergone cryotherapy for a plantar wart on his right foot under the care of a dermatologist, however, the wart has persisted. The dermatologist did not perform a biopsy of the wart and instead performed cryotherapy with liquid nitrogen. The patient sought advice from Emanuel, a nurse, who suggested that the plantar wart be excised. The wart is occasionally painful and has scabbed over itself, leading to a formation of a light callus.      Allergies   Allergen Reactions    Morphine Hallucinations    Beta Adrenergic Blockers Cough    Ace Inhibitors Cough     cough       Current Outpatient Medications on File Prior to Visit   Medication Sig Dispense Refill    allopurinol (ZYLOPRIM) 100 MG tablet TAKE 1 TABLET BY MOUTH DAILY 90 tablet 1    amLODIPine (NORVASC) 10 MG tablet TAKE 1 TABLET BY MOUTH EVERY DAY 90 tablet 0    atorvastatin (LIPITOR) 40 MG tablet TAKE 1 TABLET BY MOUTH EVERY NIGHT FOR HIGH AMOUNT OF FATS IN THE BLOOD 90 tablet 1    cephalexin (Keflex) 500 MG capsule Take 1 capsule by mouth 3 (Three) Times a Day. 30 capsule 0    DULoxetine (CYMBALTA) 60 MG capsule Take 1 capsule by mouth Daily. 90 capsule 0    famotidine (PEPCID) 40 MG tablet Take 1 tablet by mouth Every Night.      fexofenadine (ALLEGRA) 180 MG tablet Take 1 tablet by mouth 2 (Two) Times a Day As Needed. Indications: Hayfever      fluticasone (FLONASE) 50 MCG/ACT nasal spray 2 sprays into the nostril(s) as directed by provider Daily As Needed. Indications: Allergic  "Rhinitis      Leuprolide Mesylate, 6 Month, (CAMCEVI SC) Inject  under the skin into the appropriate area as directed. Every 6 months injection, due in December  Indications: prostate cancer (to decrease testosterone).      meloxicam (Mobic) 15 MG tablet Take 1 tablet by mouth Daily. 30 tablet 1    metoprolol tartrate (LOPRESSOR) 25 MG tablet TAKE 1/2 TABLET BY MOUTH DAILY AS NEEDED 45 tablet 3    sulfamethoxazole-trimethoprim (Bactrim DS) 800-160 MG per tablet Take 1 tablet by mouth 2 (Two) Times a Day. 28 tablet 0    zolpidem (AMBIEN) 10 MG tablet TAKE 1 TABLET BY MOUTH EVERY NIGHT AT BEDTIME 90 tablet 0     No current facility-administered medications on file prior to visit.       Objective   Pulse 88   Resp 20   Ht 177.8 cm (70\")   Wt 105 kg (232 lb)   SpO2 96%   BMI 33.29 kg/m²     Foot/Ankle Exam    GENERAL  Appearance:  appears stated age  Orientation:  AAOx3  Affect:  appropriate    VASCULAR     Right Foot Vascularity   Normal vascular exam    Dorsalis pedis:  2+  Posterior tibial:  2+  Skin temperature:  warm  Edema grading:  None  CFT:  < 3 seconds  Pedal hair growth:  Present  Varicosities:  none     Left Foot Vascularity   Normal vascular exam    Dorsalis pedis:  2+  Posterior tibial:  2+  Skin temperature:  warm  Edema grading:  None  CFT:  < 3 seconds  Pedal hair growth:  Present  Varicosities:  none     NEUROLOGIC     Right Foot Neurologic   Light touch sensation: normal  Hot/Cold sensation: normal  Achilles reflex:  2+     Left Foot Neurologic   Light touch sensation: normal  Hot/Cold sensation:  normal  Achilles reflex:  2+    MUSCULOSKELETAL     Right Foot Musculoskeletal   Ecchymosis:  none  Tenderness:  callous right foot    Arch:  Normal     Left Foot Musculoskeletal   Arch:  Normal    MUSCLE STRENGTH     Right Foot Muscle Strength   Normal strength    Foot dorsiflexion:  5  Foot plantar flexion:  5  Foot inversion:  5  Foot eversion:  5     Left Foot Muscle Strength   Normal strength  "   Foot dorsiflexion:  5  Foot plantar flexion:  5  Foot inversion:  5  Foot eversion:  5    DERMATOLOGIC      Right Foot Dermatologic   Skin  Positive for warts.   Nails comment:  Nails 1-5     Left Foot Dermatologic   Skin  Left foot skin is intact.   Nails comment:  Nails 1-5    TESTS     Right Foot Tests   Anterior drawer: negative  Varus tilt: negative     Left Foot Tests   Anterior drawer: negative  Varus tilt: negative     Right foot additional comments: Hyperkeratotic lesion noted to the plantar aspect of the first metatarsal phalangeal joint region.  Pinpoint bleeding noted with gentle debridement consistent with verruca.  Lesion measures approximately 1 cm in diameter.    Physical Exam        Results      Assessment & Plan   Diagnoses and all orders for this visit:    1. Right foot pain (Primary)    2. Plantar wart of right foot      Assessment & Plan    Patient presents with discomfort involving the plantar aspect of the right first metatarsal phalangeal joint region which is consistent with a wart.  I discussed the diagnosis and treatment options.  Patient states that he is tired of dealing with the pain and issues and would like to consider definitive treatment.  We did discuss definitive treatment option of laser ablation.  I will proceed with biopsy at the same time.  I reviewed the procedure risks, goals, and recovery at length.  Patient understands that he will require decreased overall activity after surgery.  Patient is to wear appropriate shoes during the meantime.  I did review OTC treatment options.  Patient is to call with any additional issues or concerns.  Greater than 30 minutes was spent before, during, and after evaluation for patient care.         Patient or patient representative verbalized consent for the use of Ambient Listening during the visit with  DUNCAN Robles DPM for chart documentation. 6/16/2024  12:24 EDT    DUNCAN Robles DPM

## 2024-06-17 ENCOUNTER — LAB (OUTPATIENT)
Dept: FAMILY MEDICINE CLINIC | Facility: CLINIC | Age: 79
End: 2024-06-17
Payer: MEDICARE

## 2024-06-17 ENCOUNTER — OFFICE VISIT (OUTPATIENT)
Dept: FAMILY MEDICINE CLINIC | Facility: CLINIC | Age: 79
End: 2024-06-17
Payer: MEDICARE

## 2024-06-17 VITALS
WEIGHT: 233 LBS | SYSTOLIC BLOOD PRESSURE: 120 MMHG | OXYGEN SATURATION: 97 % | HEIGHT: 70 IN | BODY MASS INDEX: 33.36 KG/M2 | HEART RATE: 73 BPM | RESPIRATION RATE: 16 BRPM | DIASTOLIC BLOOD PRESSURE: 62 MMHG

## 2024-06-17 DIAGNOSIS — G31.84 MILD COGNITIVE IMPAIRMENT: ICD-10-CM

## 2024-06-17 DIAGNOSIS — Z78.9 IMPAIRED DECISION MAKING: ICD-10-CM

## 2024-06-17 DIAGNOSIS — Z78.9 IMPAIRED DECISION MAKING: Primary | ICD-10-CM
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
## 2024-06-17 DIAGNOSIS — D50.0 IRON DEFICIENCY ANEMIA DUE TO CHRONIC BLOOD LOSS: ICD-10-CM

## 2024-06-17 LAB
ALBUMIN SERPL-MCNC: 3.6 G/DL (ref 3.5–5.2)
ALBUMIN/GLOB SERPL: 1.3 G/DL
ALP SERPL-CCNC: 73 U/L (ref 39–117)
ALT SERPL W P-5'-P-CCNC: 21 U/L (ref 1–41)
ANION GAP SERPL CALCULATED.3IONS-SCNC: 9.9 MMOL/L (ref 5–15)
AST SERPL-CCNC: 17 U/L (ref 1–40)
BACTERIA UR QL AUTO: ABNORMAL /HPF
BASOPHILS # BLD AUTO: 0.03 10*3/MM3 (ref 0–0.2)
BASOPHILS NFR BLD AUTO: 0.6 % (ref 0–1.5)
BILIRUB SERPL-MCNC: 0.3 MG/DL (ref 0–1.2)
BILIRUB UR QL STRIP: NEGATIVE
BUN SERPL-MCNC: 31 MG/DL (ref 8–23)
BUN/CREAT SERPL: 24.4 (ref 7–25)
CALCIUM SPEC-SCNC: 8.6 MG/DL (ref 8.6–10.5)
CHLORIDE SERPL-SCNC: 105 MMOL/L (ref 98–107)
CLARITY UR: CLEAR
CO2 SERPL-SCNC: 21.1 MMOL/L (ref 22–29)
COLOR UR: ABNORMAL
CREAT SERPL-MCNC: 1.27 MG/DL (ref 0.76–1.27)
DEPRECATED RDW RBC AUTO: 43.5 FL (ref 37–54)
EGFRCR SERPLBLD CKD-EPI 2021: 58.2 ML/MIN/1.73
EOSINOPHIL # BLD AUTO: 0.2 10*3/MM3 (ref 0–0.4)
EOSINOPHIL NFR BLD AUTO: 4 % (ref 0.3–6.2)
ERYTHROCYTE [DISTWIDTH] IN BLOOD BY AUTOMATED COUNT: 13.1 % (ref 12.3–15.4)
FERRITIN SERPL-MCNC: 148 NG/ML (ref 30–400)
GLOBULIN UR ELPH-MCNC: 2.7 GM/DL
GLUCOSE SERPL-MCNC: 103 MG/DL (ref 65–99)
GLUCOSE UR STRIP-MCNC: NEGATIVE MG/DL
HCT VFR BLD AUTO: 34.6 % (ref 37.5–51)
HGB BLD-MCNC: 11.3 G/DL (ref 13–17.7)
HGB UR QL STRIP.AUTO: NEGATIVE
HOLD SPECIMEN: NORMAL
HYALINE CASTS UR QL AUTO: ABNORMAL /LPF
IMM GRANULOCYTES # BLD AUTO: 0.07 10*3/MM3 (ref 0–0.05)
IMM GRANULOCYTES NFR BLD AUTO: 1.4 % (ref 0–0.5)
IRON 24H UR-MRATE: 59 MCG/DL (ref 59–158)
IRON SATN MFR SERPL: 21 % (ref 20–50)
KETONES UR QL STRIP: NEGATIVE
LEUKOCYTE ESTERASE UR QL STRIP.AUTO: ABNORMAL
LYMPHOCYTES # BLD AUTO: 0.52 10*3/MM3 (ref 0.7–3.1)
LYMPHOCYTES NFR BLD AUTO: 10.3 % (ref 19.6–45.3)
MCH RBC QN AUTO: 29.8 PG (ref 26.6–33)
MCHC RBC AUTO-ENTMCNC: 32.7 G/DL (ref 31.5–35.7)
MCV RBC AUTO: 91.3 FL (ref 79–97)
MONOCYTES # BLD AUTO: 0.32 10*3/MM3 (ref 0.1–0.9)
MONOCYTES NFR BLD AUTO: 6.3 % (ref 5–12)
NEUTROPHILS NFR BLD AUTO: 3.92 10*3/MM3 (ref 1.7–7)
NEUTROPHILS NFR BLD AUTO: 77.4 % (ref 42.7–76)
NITRITE UR QL STRIP: NEGATIVE
NRBC BLD AUTO-RTO: 0 /100 WBC (ref 0–0.2)
PH UR STRIP.AUTO: 6 [PH] (ref 5–8)
PLATELET # BLD AUTO: 101 10*3/MM3 (ref 140–450)
PMV BLD AUTO: 8.7 FL (ref 6–12)
POTASSIUM SERPL-SCNC: 4.5 MMOL/L (ref 3.5–5.2)
PROT SERPL-MCNC: 6.3 G/DL (ref 6–8.5)
PROT UR QL STRIP: NEGATIVE
RBC # BLD AUTO: 3.79 10*6/MM3 (ref 4.14–5.8)
RBC # UR STRIP: ABNORMAL /HPF
REF LAB TEST METHOD: ABNORMAL
RETICS # AUTO: 0.07 10*6/MM3 (ref 0.02–0.13)
RETICS/RBC NFR AUTO: 1.86 % (ref 0.7–1.9)
SODIUM SERPL-SCNC: 136 MMOL/L (ref 136–145)
SP GR UR STRIP: 1.02 (ref 1–1.03)
SQUAMOUS #/AREA URNS HPF: ABNORMAL /HPF
TIBC SERPL-MCNC: 276 MCG/DL (ref 298–536)
TRANSFERRIN SERPL-MCNC: 185 MG/DL (ref 200–360)
URATE SERPL-MCNC: 5.4 MG/DL (ref 3.4–7)
UROBILINOGEN UR QL STRIP: ABNORMAL
WBC # UR STRIP: ABNORMAL /HPF
WBC NRBC COR # BLD AUTO: 5.06 10*3/MM3 (ref 3.4–10.8)

## 2024-06-17 PROCEDURE — 85025 COMPLETE CBC W/AUTO DIFF WBC: CPT | Performed by: FAMILY MEDICINE

## 2024-06-17 PROCEDURE — 80053 COMPREHEN METABOLIC PANEL: CPT | Performed by: FAMILY MEDICINE

## 2024-06-17 PROCEDURE — 84466 ASSAY OF TRANSFERRIN: CPT | Performed by: FAMILY MEDICINE

## 2024-06-17 PROCEDURE — 1125F AMNT PAIN NOTED PAIN PRSNT: CPT | Performed by: FAMILY MEDICINE

## 2024-06-17 PROCEDURE — 81001 URINALYSIS AUTO W/SCOPE: CPT | Performed by: FAMILY MEDICINE

## 2024-06-17 PROCEDURE — 36415 COLL VENOUS BLD VENIPUNCTURE: CPT | Performed by: FAMILY MEDICINE

## 2024-06-17 PROCEDURE — 1159F MED LIST DOCD IN RCRD: CPT | Performed by: FAMILY MEDICINE

## 2024-06-17 PROCEDURE — 84550 ASSAY OF BLOOD/URIC ACID: CPT | Performed by: FAMILY MEDICINE

## 2024-06-17 PROCEDURE — G2211 COMPLEX E/M VISIT ADD ON: HCPCS | Performed by: FAMILY MEDICINE

## 2024-06-17 PROCEDURE — 3078F DIAST BP <80 MM HG: CPT | Performed by: FAMILY MEDICINE

## 2024-06-17 PROCEDURE — 99214 OFFICE O/P EST MOD 30 MIN: CPT | Performed by: FAMILY MEDICINE

## 2024-06-17 PROCEDURE — 82728 ASSAY OF FERRITIN: CPT | Performed by: FAMILY MEDICINE

## 2024-06-17 PROCEDURE — 85045 AUTOMATED RETICULOCYTE COUNT: CPT | Performed by: FAMILY MEDICINE

## 2024-06-17 PROCEDURE — 1160F RVW MEDS BY RX/DR IN RCRD: CPT | Performed by: FAMILY MEDICINE

## 2024-06-17 PROCEDURE — 83540 ASSAY OF IRON: CPT | Performed by: FAMILY MEDICINE

## 2024-06-17 PROCEDURE — 87086 URINE CULTURE/COLONY COUNT: CPT | Performed by: FAMILY MEDICINE

## 2024-06-17 PROCEDURE — 3074F SYST BP LT 130 MM HG: CPT | Performed by: FAMILY MEDICINE

## 2024-06-17 RX ORDER — LOSARTAN POTASSIUM 50 MG/1
50 TABLET ORAL DAILY
COMMUNITY

## 2024-06-17 NOTE — PROGRESS NOTES
"Chief Complaint  Shoulder Pain (Right shoulder pain, has upcoming appt and imaging scheduled) and Fall (Slipped in the bathroom, had muscle spasms and back pain)    Subjective        Clint Cee presents to [facility]  History of Present Illness  The patient is a 79-year-old white male who presents for evaluation of multiple medical concerns.    The patient has discontinued his prescribed antihypertensive medication, specifically oriprazole 2 mg, due to perceived lack of efficacy. He is currently on a regimen of metoprolol. He does not monitor his blood pressure at home, but reports a recent reading of 120/64.    The patient experienced a fall approximately 10 days ago, resulting in a minor back injury. Despite not sustaining any injuries to his shoulder, he experienced muscle spasms, which appear to be improving. He sought medical attention and was prescribed baclofen and pain medication. He denies any hematuria.    The patient is scheduled for an MRI of his shoulder with Dr. Ward.    The patient is scheduled to consult a neurologist due to cognitive difficulties. He reports difficulty recalling words, which appears to be improving.    The patient reports right shoulder pain, which he attributes to a recent fall in the bathroom. He has been undergoing physical therapy, which has improved his shoulder pain. However, he continues to experience frequent popping and snapping sounds. He has difficulty holding a gallon of milk from the refrigerator, necessitating the use of both hands. He has not yet consulted with Dr. Luna, but plans to see him post-MRI.    The patient is currently suffering from a bladder infection.       Objective   Vital Signs:  /62   Pulse 73   Resp 16   Ht 177.8 cm (70\")   Wt 106 kg (233 lb)   SpO2 97%   BMI 33.43 kg/m²   Estimated body mass index is 33.43 kg/m² as calculated from the following:    Height as of this encounter: 177.8 cm " "(70\").    Weight as of this encounter: 106 kg (233 lb).               Physical Exam  Constitutional:       Appearance: Normal appearance. He is well-developed and normal weight.   HENT:      Head: Normocephalic and atraumatic.      Right Ear: Tympanic membrane, ear canal and external ear normal.      Left Ear: Tympanic membrane, ear canal and external ear normal.      Nose: Nose normal.      Mouth/Throat:      Mouth: Mucous membranes are moist.      Pharynx: Oropharynx is clear. No oropharyngeal exudate.   Eyes:      Extraocular Movements: Extraocular movements intact.      Conjunctiva/sclera: Conjunctivae normal.      Pupils: Pupils are equal, round, and reactive to light.   Cardiovascular:      Rate and Rhythm: Normal rate and regular rhythm.      Pulses: Normal pulses.      Heart sounds: Normal heart sounds.   Pulmonary:      Effort: Pulmonary effort is normal.      Breath sounds: Normal breath sounds.   Abdominal:      General: Bowel sounds are normal.      Palpations: Abdomen is soft.   Musculoskeletal:         General: Normal range of motion.      Cervical back: Normal range of motion and neck supple.      Comments: Very limited range of motion his lumbar spine.  Walks very stiff.   Skin:     General: Skin is warm and dry.      Findings: Bruising present.      Comments: Large area of bruising on his back and right flank from his fall.  He has a midline lower spine incision from previous surgery and he has 2 incisions on both sides of his spine over the lower thoracic thoracolumbar area from where the rods were placed.   Neurological:      General: No focal deficit present.      Mental Status: He is alert and oriented to person, place, and time. Mental status is at baseline.   Psychiatric:         Mood and Affect: Mood normal.         Behavior: Behavior normal.         Thought Content: Thought content normal.         Judgment: Judgment normal.        Result Review :    Results      Assessment & Plan  1. " Impaired decision making.  The patient, a 79-year-old, experienced a recent fall in the bathroom and is currently under evaluation due to impaired decision making. He has experienced several instances of online and phone schemes that people are stealing money from him, including donating money for various causes and providing access to his financial information. His wife has successfully managed the situation and arranged for counseling. Despite these challenges, the patient's financial situation is currently unsafe. Despite this, he lacks insight into what happened or what he is doing. Continuation of counseling is undertaken to enhance his financial situation.    2. Mild cognitive impairment.  The patient has experienced mild cognitive impairment for several years, characterized by forgetfulness, word-finding difficulties, and daily memory lapses. Despite being very pleasant, he was able to maintain a good conversation today and seemed to ask appropriate questions. His cognitive impairment becomes more obvious when he is tired. He is also a delightful man, often taking off his shirt and reaching out to individuals who require assistance. His family is currently attempting to protect him.    3. Fall in the shower or bathroom.  The patient fell and landed on his back and contused his back on the right side. Despite having multiple low back surgeries, his injuries appear to be satisfactory. His contusions, which cause him significant soreness and stiffness, raises concerns about potential internal damage. He appears pale today. Blood work will be conducted to ascertain if the liver and kidney on the right side are satisfactory.    4. Iron deficiency anemia due to chronic blood loss.  The patient has a history of anemia, and his appearance appears pale. Iron storage and reticulocyte count will be checked to ascertain if he is actively bleeding. Depending on the results, further treatment will be determined.             Follow Up     Return in about 5 months (around 11/17/2024), or if symptoms worsen or fail to improve, for Medicare Wellness- in Nov.       , Recheck-year.  Patient was given instructions and counseling regarding his condition or for health maintenance advice. Please see specific information pulled into the AVS if appropriate.     Patient or patient representative verbalized consent for the use of Ambient Listening during the visit with  Jaret Castellanos MD for chart documentation. 6/17/2024  09:50 EDT

## 2024-06-19 LAB — BACTERIA SPEC AEROBE CULT: NO GROWTH

## 2024-06-21 PROBLEM — B07.0 PLANTAR WART OF RIGHT FOOT: Status: ACTIVE | Noted: 2024-06-13

## 2024-06-24 ENCOUNTER — HOSPITAL ENCOUNTER (OUTPATIENT)
Dept: MRI IMAGING | Facility: HOSPITAL | Age: 79
Discharge: HOME | End: 2024-06-24
Admitting: PHYSICIAN ASSISTANT
Payer: MEDICARE

## 2024-06-24 DIAGNOSIS — S46.811A TRAUMATIC RUPTURE OF SUPRASPINATUS TENDON OF RIGHT SHOULDER, INITIAL ENCOUNTER: ICD-10-CM

## 2024-06-24 DIAGNOSIS — G89.29 CHRONIC RIGHT SHOULDER PAIN: ICD-10-CM

## 2024-06-24 DIAGNOSIS — M25.511 CHRONIC RIGHT SHOULDER PAIN: ICD-10-CM

## 2024-06-24 PROCEDURE — 73221 MRI JOINT UPR EXTREM W/O DYE: CPT

## 2024-06-24 RX ORDER — ARIPIPRAZOLE 2 MG/1
TABLET ORAL
Qty: 42 TABLET | Refills: 1 | Status: SHIPPED | OUTPATIENT
Start: 2024-06-24

## 2024-06-26 ENCOUNTER — HOSPITAL ENCOUNTER (OUTPATIENT)
Dept: CARDIOLOGY | Facility: HOSPITAL | Age: 79
Discharge: HOME | End: 2024-06-26
Payer: MEDICARE

## 2024-06-26 ENCOUNTER — HOSPITAL ENCOUNTER (OUTPATIENT)
Dept: GENERAL RADIOLOGY | Facility: HOSPITAL | Age: 79
Discharge: HOME | End: 2024-06-26
Payer: MEDICARE

## 2024-06-26 ENCOUNTER — LAB (OUTPATIENT)
Dept: LAB | Facility: HOSPITAL | Age: 79
End: 2024-06-26
Payer: MEDICARE

## 2024-06-26 DIAGNOSIS — B07.0 PLANTAR WART OF RIGHT FOOT: ICD-10-CM

## 2024-06-26 LAB
ANION GAP SERPL CALCULATED.3IONS-SCNC: 8.4 MMOL/L (ref 5–15)
BUN SERPL-MCNC: 16 MG/DL (ref 8–23)
BUN/CREAT SERPL: 14.8 (ref 7–25)
CALCIUM SPEC-SCNC: 9.1 MG/DL (ref 8.6–10.5)
CHLORIDE SERPL-SCNC: 106 MMOL/L (ref 98–107)
CO2 SERPL-SCNC: 24.6 MMOL/L (ref 22–29)
CREAT SERPL-MCNC: 1.08 MG/DL (ref 0.76–1.27)
DEPRECATED RDW RBC AUTO: 44.9 FL (ref 37–54)
EGFRCR SERPLBLD CKD-EPI 2021: 70.7 ML/MIN/1.73
ERYTHROCYTE [DISTWIDTH] IN BLOOD BY AUTOMATED COUNT: 13.7 % (ref 12.3–15.4)
GLUCOSE SERPL-MCNC: 100 MG/DL (ref 65–99)
HCT VFR BLD AUTO: 38.6 % (ref 37.5–51)
HGB BLD-MCNC: 13 G/DL (ref 13–17.7)
MCH RBC QN AUTO: 30.7 PG (ref 26.6–33)
MCHC RBC AUTO-ENTMCNC: 33.7 G/DL (ref 31.5–35.7)
MCV RBC AUTO: 91 FL (ref 79–97)
PLATELET # BLD AUTO: 108 10*3/MM3 (ref 140–450)
PMV BLD AUTO: 8.7 FL (ref 6–12)
POTASSIUM SERPL-SCNC: 5.3 MMOL/L (ref 3.5–5.2)
QT INTERVAL: 399 MS
QTC INTERVAL: 440 MS
RBC # BLD AUTO: 4.24 10*6/MM3 (ref 4.14–5.8)
SODIUM SERPL-SCNC: 139 MMOL/L (ref 136–145)
WBC NRBC COR # BLD AUTO: 7.1 10*3/MM3 (ref 3.4–10.8)

## 2024-06-26 PROCEDURE — 71046 X-RAY EXAM CHEST 2 VIEWS: CPT

## 2024-06-26 PROCEDURE — 93010 ELECTROCARDIOGRAM REPORT: CPT | Performed by: INTERNAL MEDICINE

## 2024-06-26 PROCEDURE — 85027 COMPLETE CBC AUTOMATED: CPT

## 2024-06-26 PROCEDURE — 80048 BASIC METABOLIC PNL TOTAL CA: CPT

## 2024-06-26 PROCEDURE — 93005 ELECTROCARDIOGRAM TRACING: CPT | Performed by: PODIATRIST

## 2024-06-26 PROCEDURE — 36415 COLL VENOUS BLD VENIPUNCTURE: CPT

## 2024-06-27 ENCOUNTER — ANESTHESIA EVENT (OUTPATIENT)
Dept: PERIOP | Facility: HOSPITAL | Age: 79
End: 2024-06-27
Payer: MEDICARE

## 2024-06-28 ENCOUNTER — ANESTHESIA (OUTPATIENT)
Dept: PERIOP | Facility: HOSPITAL | Age: 79
End: 2024-06-28
Payer: MEDICARE

## 2024-06-28 ENCOUNTER — HOSPITAL ENCOUNTER (OUTPATIENT)
Facility: HOSPITAL | Age: 79
Setting detail: HOSPITAL OUTPATIENT SURGERY
Discharge: HOME | End: 2024-06-28
Attending: PODIATRIST | Admitting: PODIATRIST
Payer: MEDICARE

## 2024-06-28 VITALS
DIASTOLIC BLOOD PRESSURE: 63 MMHG | RESPIRATION RATE: 19 BRPM | OXYGEN SATURATION: 97 % | BODY MASS INDEX: 30.64 KG/M2 | TEMPERATURE: 97.6 F | SYSTOLIC BLOOD PRESSURE: 129 MMHG | WEIGHT: 226.2 LBS | HEIGHT: 72 IN | HEART RATE: 68 BPM

## 2024-06-28 DIAGNOSIS — B07.0 PLANTAR WART OF RIGHT FOOT: ICD-10-CM

## 2024-06-28 PROCEDURE — 25010000002 PROPOFOL 200 MG/20ML EMULSION: Performed by: NURSE ANESTHETIST, CERTIFIED REGISTERED

## 2024-06-28 PROCEDURE — 25810000003 LACTATED RINGERS PER 1000 ML: Performed by: NURSE ANESTHETIST, CERTIFIED REGISTERED

## 2024-06-28 PROCEDURE — 25010000002 BUPIVACAINE 0.5 % SOLUTION: Performed by: PODIATRIST

## 2024-06-28 PROCEDURE — 25010000002 DEXAMETHASONE SODIUM PHOSPHATE 20 MG/5ML SOLUTION: Performed by: NURSE ANESTHETIST, CERTIFIED REGISTERED

## 2024-06-28 PROCEDURE — 25810000003 SODIUM CHLORIDE 0.9 % SOLUTION: Performed by: ANESTHESIOLOGY

## 2024-06-28 PROCEDURE — 17110 DESTRUCTION B9 LES UP TO 14: CPT | Performed by: PODIATRIST

## 2024-06-28 PROCEDURE — 25010000002 FENTANYL CITRATE (PF) 100 MCG/2ML SOLUTION: Performed by: NURSE ANESTHETIST, CERTIFIED REGISTERED

## 2024-06-28 PROCEDURE — 25010000002 CEFAZOLIN PER 500 MG: Performed by: PODIATRIST

## 2024-06-28 PROCEDURE — 25010000002 MIDAZOLAM PER 1 MG: Performed by: NURSE ANESTHETIST, CERTIFIED REGISTERED

## 2024-06-28 PROCEDURE — 25010000002 ONDANSETRON PER 1 MG: Performed by: NURSE ANESTHETIST, CERTIFIED REGISTERED

## 2024-06-28 RX ORDER — PROPOFOL 10 MG/ML
INJECTION, EMULSION INTRAVENOUS AS NEEDED
Status: DISCONTINUED | OUTPATIENT
Start: 2024-06-28 | End: 2024-06-28 | Stop reason: SURG

## 2024-06-28 RX ORDER — BUPIVACAINE HYDROCHLORIDE 5 MG/ML
INJECTION, SOLUTION PERINEURAL AS NEEDED
Status: DISCONTINUED | OUTPATIENT
Start: 2024-06-28 | End: 2024-06-28 | Stop reason: HOSPADM

## 2024-06-28 RX ORDER — FENTANYL CITRATE 50 UG/ML
INJECTION, SOLUTION INTRAMUSCULAR; INTRAVENOUS AS NEEDED
Status: DISCONTINUED | OUTPATIENT
Start: 2024-06-28 | End: 2024-06-28 | Stop reason: SURG

## 2024-06-28 RX ORDER — SODIUM CHLORIDE, SODIUM LACTATE, POTASSIUM CHLORIDE, CALCIUM CHLORIDE 600; 310; 30; 20 MG/100ML; MG/100ML; MG/100ML; MG/100ML
1000 INJECTION, SOLUTION INTRAVENOUS CONTINUOUS
Status: DISCONTINUED | OUTPATIENT
Start: 2024-06-28 | End: 2024-06-28

## 2024-06-28 RX ORDER — DROPERIDOL 2.5 MG/ML
0.62 INJECTION, SOLUTION INTRAMUSCULAR; INTRAVENOUS
Status: DISCONTINUED | OUTPATIENT
Start: 2024-06-28 | End: 2024-06-28 | Stop reason: HOSPADM

## 2024-06-28 RX ORDER — PROMETHAZINE HYDROCHLORIDE 25 MG/1
25 TABLET ORAL ONCE AS NEEDED
Status: DISCONTINUED | OUTPATIENT
Start: 2024-06-28 | End: 2024-06-28 | Stop reason: HOSPADM

## 2024-06-28 RX ORDER — SODIUM CHLORIDE 0.9 % (FLUSH) 0.9 %
10 SYRINGE (ML) INJECTION AS NEEDED
Status: DISCONTINUED | OUTPATIENT
Start: 2024-06-28 | End: 2024-06-28 | Stop reason: HOSPADM

## 2024-06-28 RX ORDER — DIPHENHYDRAMINE HYDROCHLORIDE 50 MG/ML
12.5 INJECTION INTRAMUSCULAR; INTRAVENOUS
Status: DISCONTINUED | OUTPATIENT
Start: 2024-06-28 | End: 2024-06-28 | Stop reason: HOSPADM

## 2024-06-28 RX ORDER — FENTANYL CITRATE 50 UG/ML
50 INJECTION, SOLUTION INTRAMUSCULAR; INTRAVENOUS
Status: DISCONTINUED | OUTPATIENT
Start: 2024-06-28 | End: 2024-06-28 | Stop reason: HOSPADM

## 2024-06-28 RX ORDER — DEXAMETHASONE SODIUM PHOSPHATE 4 MG/ML
INJECTION, SOLUTION INTRA-ARTICULAR; INTRALESIONAL; INTRAMUSCULAR; INTRAVENOUS; SOFT TISSUE AS NEEDED
Status: DISCONTINUED | OUTPATIENT
Start: 2024-06-28 | End: 2024-06-28 | Stop reason: SURG

## 2024-06-28 RX ORDER — PROMETHAZINE HYDROCHLORIDE 25 MG/1
25 SUPPOSITORY RECTAL ONCE AS NEEDED
Status: DISCONTINUED | OUTPATIENT
Start: 2024-06-28 | End: 2024-06-28 | Stop reason: HOSPADM

## 2024-06-28 RX ORDER — SODIUM CHLORIDE 9 MG/ML
50 INJECTION, SOLUTION INTRAVENOUS CONTINUOUS
Status: DISCONTINUED | OUTPATIENT
Start: 2024-06-28 | End: 2024-06-28 | Stop reason: HOSPADM

## 2024-06-28 RX ORDER — IPRATROPIUM BROMIDE AND ALBUTEROL SULFATE 2.5; .5 MG/3ML; MG/3ML
3 SOLUTION RESPIRATORY (INHALATION) ONCE AS NEEDED
Status: DISCONTINUED | OUTPATIENT
Start: 2024-06-28 | End: 2024-06-28 | Stop reason: HOSPADM

## 2024-06-28 RX ORDER — FLUMAZENIL 0.1 MG/ML
0.2 INJECTION INTRAVENOUS AS NEEDED
Status: DISCONTINUED | OUTPATIENT
Start: 2024-06-28 | End: 2024-06-28 | Stop reason: HOSPADM

## 2024-06-28 RX ORDER — SODIUM CHLORIDE, SODIUM LACTATE, POTASSIUM CHLORIDE, CALCIUM CHLORIDE 600; 310; 30; 20 MG/100ML; MG/100ML; MG/100ML; MG/100ML
INJECTION, SOLUTION INTRAVENOUS CONTINUOUS PRN
Status: DISCONTINUED | OUTPATIENT
Start: 2024-06-28 | End: 2024-06-28 | Stop reason: SURG

## 2024-06-28 RX ORDER — HYDROCODONE BITARTRATE AND ACETAMINOPHEN 7.5; 325 MG/1; MG/1
1 TABLET ORAL EVERY 6 HOURS PRN
Qty: 12 TABLET | Refills: 0 | Status: SHIPPED | OUTPATIENT
Start: 2024-06-28

## 2024-06-28 RX ORDER — NALOXONE HCL 0.4 MG/ML
0.2 VIAL (ML) INJECTION AS NEEDED
Status: DISCONTINUED | OUTPATIENT
Start: 2024-06-28 | End: 2024-06-28 | Stop reason: HOSPADM

## 2024-06-28 RX ORDER — MIDAZOLAM HYDROCHLORIDE 1 MG/ML
INJECTION INTRAMUSCULAR; INTRAVENOUS AS NEEDED
Status: DISCONTINUED | OUTPATIENT
Start: 2024-06-28 | End: 2024-06-28 | Stop reason: SURG

## 2024-06-28 RX ORDER — ONDANSETRON 2 MG/ML
INJECTION INTRAMUSCULAR; INTRAVENOUS AS NEEDED
Status: DISCONTINUED | OUTPATIENT
Start: 2024-06-28 | End: 2024-06-28 | Stop reason: SURG

## 2024-06-28 RX ORDER — ONDANSETRON 2 MG/ML
4 INJECTION INTRAMUSCULAR; INTRAVENOUS ONCE AS NEEDED
Status: DISCONTINUED | OUTPATIENT
Start: 2024-06-28 | End: 2024-06-28 | Stop reason: HOSPADM

## 2024-06-28 RX ORDER — LIDOCAINE HYDROCHLORIDE 10 MG/ML
0.5 INJECTION, SOLUTION INFILTRATION; PERINEURAL ONCE AS NEEDED
Status: DISCONTINUED | OUTPATIENT
Start: 2024-06-28 | End: 2024-06-28 | Stop reason: HOSPADM

## 2024-06-28 RX ADMIN — MIDAZOLAM 2 MG: 1 INJECTION INTRAMUSCULAR; INTRAVENOUS at 08:26

## 2024-06-28 RX ADMIN — SODIUM CHLORIDE 50 ML/HR: 9 INJECTION, SOLUTION INTRAVENOUS at 08:20

## 2024-06-28 RX ADMIN — ONDANSETRON 4 MG: 2 INJECTION INTRAMUSCULAR; INTRAVENOUS at 08:40

## 2024-06-28 RX ADMIN — PROPOFOL 200 MG: 10 INJECTION, EMULSION INTRAVENOUS at 08:29

## 2024-06-28 RX ADMIN — DEXAMETHASONE SODIUM PHOSPHATE 8 MG: 4 INJECTION, SOLUTION INTRAMUSCULAR; INTRAVENOUS at 08:40

## 2024-06-28 RX ADMIN — FENTANYL CITRATE 50 MCG: 50 INJECTION, SOLUTION INTRAMUSCULAR; INTRAVENOUS at 08:40

## 2024-06-28 RX ADMIN — SODIUM CHLORIDE, SODIUM LACTATE, POTASSIUM CHLORIDE, AND CALCIUM CHLORIDE: .6; .31; .03; .02 INJECTION, SOLUTION INTRAVENOUS at 08:24

## 2024-06-28 RX ADMIN — FENTANYL CITRATE 50 MCG: 50 INJECTION, SOLUTION INTRAMUSCULAR; INTRAVENOUS at 08:29

## 2024-06-28 RX ADMIN — SODIUM CHLORIDE 2000 MG: 900 INJECTION INTRAVENOUS at 08:20

## 2024-06-28 NOTE — ANESTHESIA POSTPROCEDURE EVALUATION
Patient: Clint Cee    Procedure Summary       Date: 06/28/24 Room / Location: [facility] OR [address] [place] [address] MAIN OR    Anesthesia Start: 0824 Anesthesia Stop: 0902    Procedure: WART REMOVAL WITH LASER (Right: Foot) Diagnosis:       Plantar wart of right foot      (Plantar wart of right foot [B07.0])    Surgeons: DUNCAN Robles DPM Provider: Isaiah Hunt MD    Anesthesia Type: general ASA Status: 3            Anesthesia Type: general    Vitals  Vitals Value Taken Time   /72 06/28/24 0950   Temp 97.6 °F (36.4 °C) 06/28/24 0950   Pulse 85 06/28/24 0950   Resp 13 06/28/24 0950   SpO2 96 % 06/28/24 0950           Post Anesthesia Care and Evaluation    Patient location during evaluation: PACU  Patient participation: complete - patient participated  Level of consciousness: awake  Pain scale: See nurse's notes for pain score.  Pain management: adequate    Airway patency: patent  Anesthetic complications: No anesthetic complications  PONV Status: none  Cardiovascular status: acceptable  Respiratory status: acceptable and spontaneous ventilation  Hydration status: acceptable    Comments: Patient seen and examined postoperatively; vital signs stable; SpO2 greater than or equal to 90%; cardiopulmonary status stable; nausea/vomiting adequately controlled; pain adequately controlled; no apparent anesthesia complications; patient discharged from anesthesia care when discharge criteria were met

## 2024-06-28 NOTE — ANESTHESIA PROCEDURE NOTES
Airway  Urgency: elective    Date/Time: 6/28/2024 8:31 AM  Airway not difficult    General Information and Staff    Patient location during procedure: OR  CRNA/CAA: Dakota Rowe CRNA    Indications and Patient Condition    Preoxygenated: yes      Final Airway Details  Final airway type: supraglottic airway      Successful airway: classic  Size 5     Number of attempts at approach: 2  Assessment: lips, teeth, and gum same as pre-op

## 2024-06-28 NOTE — OP NOTE
Operative Note   Foot and Ankle Surgery   Provider: Dr. Arvind Robles   Location: [facility]      Procedure:  1.  Laser ablation of skin lesion x 1, right foot    Pre-operative Diagnosis:   1.  Plantar wart, right foot    Post-operative Diagnosis: Same    Surgeon: Arvind Robles    Assistant: None    Anesthesia: General    Implants: None    Findings: No unexpected findings    Specimen: None    Blood Loss: Less than 5cc    Complications: None    Post Op Plan: Discharge home.  Weightbearing activity as needed in postop shoe.  Patient is to change dressing and 1 to 2 days with bacitracin and bandage.  Follow-up in my office in 1 to 2 weeks    Summary:    Patient has been seen in office for painful skin lesion involving the plantar aspect of the right foot.  Clinically, the lesion is very consistent with a verruca.  I did review the diagnosis and treatment options.  Patient has tried conservative options without any significant improvement.  We did discuss definitive options of laser ablation.  Patient understands and agrees and would like to proceed.  Did review risks of recurrence.    Procedure, risks, complications, and goals were discussed with the patient at bedside.  Risks include but are not limited to infection, complications from anesthesia (including death), chronic pain or numbness, hematoma/seroma, deep vein thrombosis, wound complications, and potential for additional surgical procedures.  Patient understands and elects to proceed with surgery at this time. Informed consent was obtained before proceeding to the operating suite.  All questions were answered to the patient's satisfaction. No guarantees or assurances were given or implied.    Procedure:    Patient was brought to the operating room placed on the operative table in the supine position.  Once adequate general anesthesia was administered, the operative foot was scrubbed with Betadine.  A formal timeout was conducted prior skin  incision.    Prior to the procedure, the right foot lesions were anesthetized utilizing a total of 6 cc of 1% lidocaine plain.  Ablation of the lesions was performed with 7 W.  The necrotic eschar over the lesions was obtained and then curetted allowing for a healthy appearing base.  Hemostasis was achieved and a final pass of the laser at 5 W was performed.  No residual abnormal tissue remained.  The wounds were dressed with Silvadene and sterile compressive dressings.  Patient tolerated the procedure and anesthesia well.  She was transferred from the operating room to the recovery room with vital signs stable and neurovascular is unchanged to the operative extremity.      Dr. Arvind Robles DPM  [facility]  [phone]    Note is dictated utilizing voice recognition software. Unfortunately this leads to occasional typographical errors. I apologize in advance if the situation occurs. If questions occur please do not hesitate to call our office.

## 2024-06-28 NOTE — ANESTHESIA PREPROCEDURE EVALUATION
Anesthesia Evaluation     Patient summary reviewed and Nursing notes reviewed   NPO Solid Status: > 8 hours  NPO Liquid Status: > 8 hours           Airway   Mallampati: II  TM distance: >3 FB  Neck ROM: full  No difficulty expected  Dental - normal exam     Pulmonary    (+) asthma,  Cardiovascular     (+) hypertension, valvular problems/murmurs, CABG, DVT, hyperlipidemia      Neuro/Psych  GI/Hepatic/Renal/Endo    (+) GERD, hepatitis, liver disease    Musculoskeletal     (+) back pain  Abdominal    Substance History      OB/GYN          Other   arthritis,   history of cancer    ROS/Med Hx Other: Technically satisfactory study.  Mitral valve is structurally normal.  Status post mitral valve repair.  No mitral regurgitation is present.  Tricuspid valve is structurally normal.  Aortic valve is thickened with adequate opening motion.  Pulmonic valve could not be well visualized.  No evidence for mitral tricuspid or aortic regurgitation is seen by Doppler study.  Left atrium is enlarged.  Right atrium is normal in size.  Left ventricle is normal in size and contractility with ejection fraction of 60%.  Right ventricle is normal in size.  Atrial septum is intact.  Aorta is normal.  No pericardial effusion or intracardiac thrombus is seen.     Impression  Status post mitral valve repair.  No mitral regurgitation is present.  Aortic valve is thickened with adequate opening motion.  Left atrial enlargement.  Left ventricular size and contractility is normal with ejection fraction of 60%.                      Anesthesia Plan    ASA 3     general     intravenous induction     Anesthetic plan, risks, benefits, and alternatives have been provided, discussed and informed consent has been obtained with: patient.    Plan discussed with CRNA.        CODE STATUS:

## 2024-07-05 ENCOUNTER — LAB (OUTPATIENT)
Dept: FAMILY MEDICINE CLINIC | Facility: CLINIC | Age: 79
End: 2024-07-05
Payer: MEDICARE

## 2024-07-05 ENCOUNTER — TELEPHONE (OUTPATIENT)
Dept: FAMILY MEDICINE CLINIC | Facility: CLINIC | Age: 79
End: 2024-07-05
Payer: MEDICARE

## 2024-07-05 DIAGNOSIS — R30.0 BURNING WITH URINATION: Primary | ICD-10-CM

## 2024-07-05 LAB
BACTERIA UR QL AUTO: ABNORMAL /HPF
BILIRUB UR QL STRIP: NEGATIVE
CLARITY UR: CLEAR
COLOR UR: YELLOW
GLUCOSE UR STRIP-MCNC: NEGATIVE MG/DL
HGB UR QL STRIP.AUTO: NEGATIVE
HOLD SPECIMEN: NORMAL
HYALINE CASTS UR QL AUTO: ABNORMAL /LPF
KETONES UR QL STRIP: NEGATIVE
LEUKOCYTE ESTERASE UR QL STRIP.AUTO: ABNORMAL
NITRITE UR QL STRIP: NEGATIVE
PH UR STRIP.AUTO: 6.5 [PH] (ref 5–8)
PROT UR QL STRIP: NEGATIVE
RBC # UR STRIP: ABNORMAL /HPF
REF LAB TEST METHOD: ABNORMAL
SP GR UR STRIP: 1.01 (ref 1–1.03)
SQUAMOUS #/AREA URNS HPF: ABNORMAL /HPF
UROBILINOGEN UR QL STRIP: ABNORMAL
WBC # UR STRIP: ABNORMAL /HPF

## 2024-07-05 PROCEDURE — 87086 URINE CULTURE/COLONY COUNT: CPT | Performed by: FAMILY MEDICINE

## 2024-07-05 PROCEDURE — 81001 URINALYSIS AUTO W/SCOPE: CPT | Performed by: FAMILY MEDICINE

## 2024-07-05 NOTE — TELEPHONE ENCOUNTER
Alison - look at the lmbang message dated 6/27/2024. Delia told patient to bring in a urine specimen. He called this morning and I put him on today at 10:30am. Can you get an order in for this?

## 2024-07-06 LAB — BACTERIA SPEC AEROBE CULT: ABNORMAL

## 2024-07-08 ENCOUNTER — TELEPHONE (OUTPATIENT)
Dept: FAMILY MEDICINE CLINIC | Facility: CLINIC | Age: 79
End: 2024-07-08
Payer: MEDICARE

## 2024-07-08 NOTE — TELEPHONE ENCOUNTER
----- Message from Amanda VILLAR sent at 7/8/2024  8:21 AM EDT -----    ----- Message -----  From: Jaret Castellanos MD  Sent: 7/5/2024   8:27 PM EDT  To: Amanda Wallace RN    Please make sure that this urine gets cultured.

## 2024-07-11 DIAGNOSIS — R30.0 BURNING WITH URINATION: Primary | ICD-10-CM

## 2024-07-12 ENCOUNTER — LAB (OUTPATIENT)
Dept: FAMILY MEDICINE CLINIC | Facility: CLINIC | Age: 79
End: 2024-07-12
Payer: MEDICARE

## 2024-07-12 DIAGNOSIS — R30.0 BURNING WITH URINATION: ICD-10-CM

## 2024-07-12 LAB — HOLD SPECIMEN: NORMAL

## 2024-07-12 PROCEDURE — 81001 URINALYSIS AUTO W/SCOPE: CPT | Performed by: PHYSICIAN ASSISTANT

## 2024-07-12 PROCEDURE — 87077 CULTURE AEROBIC IDENTIFY: CPT | Performed by: PHYSICIAN ASSISTANT

## 2024-07-12 PROCEDURE — 87186 SC STD MICRODIL/AGAR DIL: CPT | Performed by: PHYSICIAN ASSISTANT

## 2024-07-12 PROCEDURE — 87086 URINE CULTURE/COLONY COUNT: CPT | Performed by: PHYSICIAN ASSISTANT

## 2024-07-13 LAB
BACTERIA UR QL AUTO: ABNORMAL /HPF
BILIRUB UR QL STRIP: NEGATIVE
CLARITY UR: CLEAR
COLOR UR: YELLOW
GLUCOSE UR STRIP-MCNC: NEGATIVE MG/DL
HGB UR QL STRIP.AUTO: NEGATIVE
HYALINE CASTS UR QL AUTO: ABNORMAL /LPF
KETONES UR QL STRIP: NEGATIVE
LEUKOCYTE ESTERASE UR QL STRIP.AUTO: ABNORMAL
NITRITE UR QL STRIP: POSITIVE
PH UR STRIP.AUTO: 6 [PH] (ref 5–8)
PROT UR QL STRIP: NEGATIVE
RBC # UR STRIP: ABNORMAL /HPF
REF LAB TEST METHOD: ABNORMAL
SP GR UR STRIP: 1.02 (ref 1–1.03)
SQUAMOUS #/AREA URNS HPF: ABNORMAL /HPF
UROBILINOGEN UR QL STRIP: ABNORMAL
WBC # UR STRIP: ABNORMAL /HPF

## 2024-07-14 LAB — BACTERIA SPEC AEROBE CULT: ABNORMAL

## 2024-07-15 ENCOUNTER — OFFICE VISIT (OUTPATIENT)
Dept: ORTHOPEDIC SURGERY | Facility: CLINIC | Age: 79
End: 2024-07-15
Payer: MEDICARE

## 2024-07-15 VITALS — HEIGHT: 72 IN | BODY MASS INDEX: 30.61 KG/M2 | HEART RATE: 70 BPM | WEIGHT: 226 LBS

## 2024-07-15 DIAGNOSIS — M19.011 OSTEOARTHRITIS OF RIGHT GLENOHUMERAL JOINT: Primary | ICD-10-CM

## 2024-07-15 PROCEDURE — 99214 OFFICE O/P EST MOD 30 MIN: CPT | Performed by: ORTHOPAEDIC SURGERY

## 2024-07-15 NOTE — PROGRESS NOTES
Patient ID: Clint Cee is a 79 y.o. male.    Chief Complaint:    Chief Complaint   Patient presents with    Right Shoulder - Pain, Follow-up     Pain 1        HPI:  This is a 79-year-old gentleman here with longstanding right shoulder pain began after a fall he has had treatment with physical therapy and medication since that time pain is improving however he has significant weakness with overhead activity  Past Medical History:   Diagnosis Date    Abnormal ECG June 2022    Allergic 01/01/1954    Nasal alergies    Arrhythmia 2004    Dr Youngblood examined me and said i was ok.  skipped beats    Asthma     no sx    Benign prostatic hyperplasia 11/14/2018    Callus 2022    Toe spreaders used    Cataract 01/01/2014    Surgery. Caused detached retina of right eye 20/60 repair    Clotting disorder 2019    Low platelets    Colon polyp 01/01/2018    Found and removed last colon eca.q    Deep vein thrombosis     Depression     Gastroesophageal reflux disease 03/20/2014    Glaucoma 2003    Normal pressures. Have low pressure glaucoma    Gout     Headache 01/01/2015    Have shimmering in both eyes intermittantly. 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/02/2011    Prostate Cancer Jan22 January 2022    Spinal stenosis 12/02/2011    Visual impairment 01/01/1955    Nearsighted corrected glasses    Vitamin D deficiency 05/23/2018       Past Surgical History:   Procedure Laterality Date    ADENOIDECTOMY  1954    As child    APPENDECTOMY  1956    Surgery    CARDIAC CATHETERIZATION N/A 09/30/2022    Procedure: Right and Left Heart Cath with Coronar Angiography;   Surgeon: Felipe Garcia MD;  Location: [facility] CATH INVASIVE LOCATION;  Service: Cardiovascular;  Laterality: N/A;    CARDIAC VALVE REPLACEMENT N/A 11/17/2022    Procedure: TRICUSPID VALVE REPAIR/REPLACEMENT;  Surgeon: Femi Henderson MD;  Location: [facility] CVOR;  Service: Cardiothoracic;  Laterality: N/A;  tricuspid valve repaired with 32mm  groves physio tricuspid annuloplasty ring    CARDIAC VALVE REPLACEMENT  11/17/2022    Repaired mitral and tricuspid    CATARACT EXTRACTION Bilateral     COLONOSCOPY  01/01/1997    Regularly scheduled    COLONOSCOPY N/A 02/22/2023    Procedure: COLONOSCOPY with cold snare polypectomy x 1;  Surgeon: Manfred Keating MD;  Location: [facility] ENDOSCOPY;  Service: Gastroenterology;  Laterality: N/A;    CORONARY ARTERY BYPASS GRAFT  11/22    During open heart valves repair.    ENDOSCOPY N/A 02/22/2023    Procedure: ESOPHAGOGASTRODUODENOSCOPY with esophageal dilation using 48 Fr. Bougie Dilator;  Surgeon: Manfred Keating MD;  Location: [facility] ENDOSCOPY;  Service: Gastroenterology;  Laterality: N/A;  erosive esophagitis    EYE SURGERY  01/01/2010    Reattached right retina    LUMBAR FUSION N/A 09/13/2023    Procedure: POSTERIOR LUMBAR FUSION WITH ROBOT;  Surgeon: Jayme Strauss IV, MD;  Location: [facility] MAIN OR;  Service: Robotics - Neuro;  Laterality: N/A;    MITRAL VALVE REPAIR/REPLACEMENT N/A 11/17/2022    Procedure: MITRAL VALVE REPAIR/REPLACEMENT with left atrial appendage closure;  Surgeon: Femi Henderson MD;  Location: [facility];  Service: Cardiothoracic;  Laterality: N/A;  mitral valve repaired iwth 40mm medtronic annuloplasty band  with intraop TERRA    MITRAL VALVE REPAIR/REPLACEMENT  11/22    SPINE SURGERY  01/01/1971    2 lumbar 1 cervical    TONSILLECTOMY  01/01/1950    Childhood    TRICUSPID VALVE SURGERY  11/22    WART REMOVAL Right 6/28/2024    Procedure: WART REMOVAL WITH LASER;  Surgeon: DUNCAN Robles DPM;  Location: [facility] MAIN OR;  Service:  "Podiatry;  Laterality: Right;       Family History   Problem Relation Age of Onset    Arthritis Father     Cancer Father         Prostate    Arrhythmia Father         Congentive heart failure    Diabetes Mother         Adult diabetes    Early death Mother         Kidneys failed after giving birth-diabetes complications    Heart disease Mother         Adult Diabetes    Cancer Mother         Diabetes    Arrhythmia Mother         Adult diabetes.  after child birth. Kidneys shut down. 1947          Social History     Occupational History    Not on file   Tobacco Use    Smoking status: Former     Current packs/day: 0.00     Types: Cigarettes     Passive exposure: Past    Smokeless tobacco: Never    Tobacco comments:     Tried tobacco as child   Vaping Use    Vaping status: Never Used   Substance and Sexual Activity    Alcohol use: Not Currently     Comment: Not a regular drinker. Occasionly have a glass of wine or be    Drug use: Never    Sexual activity: Not Currently     Partners: Female     Birth control/protection: Post-menopausal, None     Comment: Chemically castrated. Prostate cancer      Review of Systems   Cardiovascular:  Negative for chest pain.   Musculoskeletal:  Positive for arthralgias.       Objective:    Pulse 70   Ht 182.9 cm (72\")   Wt 103 kg (226 lb)   BMI 30.65 kg/m²     Physical Examination:  Right shoulder demonstrates intact skin he has mild supraspinatus atrophy passive elevation 170 abduction 140 external rotation 30 active elevation 150 degrees he has pain and weakness on [place] supraspinatus testing, belly press 4/5 left 3/5  Sensory and motor exam are intact all distributions. Radial pulse is palpable and capillary refill is less than two seconds to all digits.    Imaging:  Prior x-ray and MRI reviewed demonstrate mild to moderate joint space narrowing near complete loss of the acromiohumeral distance massive supraspinatus and infraspinatus tears    Assessment:  Right shoulder " degenerative disease with massive cuff tear    Plan:  Conservative or surgical options discussed surgery involve right reverse total shoulder arthroplasty discussed the risks including bleeding, scar, infection, stiffness, nerve, artery, vein and tendon damage, instability, fracture, DVT, loss of life or limb. Issues of scapular notching and component revision were discussed. Questions were answered and addressed.        Procedures         Disclaimer: Part of this note may be an electronic transcription/translation of spoken language to printed text using the Dragon Dictation System

## 2024-07-15 NOTE — PROGRESS NOTES
Encounter Date:07/25/2024  Last seen 1/22/2024      Patient ID: Clint Cee is a 79 y.o. male.    Chief complaint  Status post mitral valve repair.  Status post tricuspid valve repair  Hypertension     History of present illness  Since I have last seen, the patient has been without any chest discomfort ,shortness of breath, palpitations, dizziness or syncope.  Denies having any headache ,abdominal pain ,nausea, vomiting , diarrhea constipation, loss of weight or loss of appetite.  Denies having any excessive bruising ,hematuria or blood in the stool.    Review of all systems negative except as indicated.    Reviewed ROS.     Assessment and plan  ]]]]]]]]]]]]]]]]]]]]  History  ===========  -Status post mitral and tricuspid valve repair and left atrial appendage endocardial closure--Dr. Henderson 11/17/2022     Echocardiogram 9/8/2023 revealed  Status post mitral valve repair.  No mitral regurgitation is present.  Aortic valve is thickened with adequate opening motion.  Left atrial enlargement.  Left ventricular size and contractility is normal with ejection fraction of 60%.     -   Preoperative increased shortness of breath and significantly increased fatigue.   Preoperative significant mitral and tricuspid regurgitation.  Prominent posterior mitral leaflet prolapse     TERRA 9/30/2022  Myxomatous mitral and tricuspid valve degeneration.  Severe mitral valve prolapse involving both anterior and posterior mitral leaflets with severe mitral regurgitation with multiple jets.  Probable ruptured chordae.  In some views coaptation of the mitral valve is present.  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  Significant pulmonary hypertension.  Left atrial and left atrial appendage enlargement without clot.  Normal left ventricular size and contractility with ejection fraction of 60%.     Cardiac catheterization 9/30/2022   Moderate to significant pulmonary hypertension.  Severe mitral valve prolapse  and mitral regurgitation and probable ruptured chordae (TERRA and cardiac cath)  Tricuspid valve prolapse.  Normal left ventricular function.  Normal coronary arteries.     Echocardiogram 9/28/2022.  Mild mitral regurgitation (regurgitation jet may be not in the field)      - Premature ventricular contractions.  Occasional palpitations     - Hypertension vitamin D deficiency GERD ocular migraine.     - History of prostate cancer.  Status post radiation therapy.  Bone scan was negative for any spread.     - Status post appendectomy spine surgery adenoidectomy tonsillectomy.  Status post lumbar laminectomy 9/13/2023     - Family history of prostate cancer     - Former smoker     - Allergic to morphine.  ============  Plan  ===========   Status post mitral and tricuspid valve repair and left atrial appendage endocardial closure--Dr. Henderson 11/17/2022  Status post mitral valve repair tricuspid valve repair and left atrial appendage closure--Dr. Henderson-11/17/2022     EKG-normal-9/11/2023.  EKG was not performed today.-7/25/2024.     History of thrombocytopenia  No bleeding problems.  [id number] 19 2023     Hypokalemia-improved    Possible tachybradycardia syndrome  History of resting tachycardia-improved  Patient is taking Metroprolol tartrate 25 mg twice daily.     Patient had postoperative bradycardia.  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  Bradycardia has improved.  No need for pacemaker at this time.     Hypertension-stable.  134/72     Medications were reviewed and updated.     Follow-up in the office in 6 months.     Further plan will depend on patient's progress.     Reviewed and updated-7/25/2024.  ]]]]]]]]]]]]]]]]               Diagnosis Plan   1. Nonrheumatic mitral valve regurgitation        2. Essential hypertension        3. Nonrheumatic tricuspid valve regurgitation        4. Heart murmur on physical examination        5. H/O mitral valve repair        6. Mitral valve prolapse        7. Premature  ventricular contractions        8. Primary hypertension        LAB RESULTS (LAST 7 DAYS)    CBC        BMP        CMP         BNP        TROPONIN        CoAg        Creatinine Clearance  Estimated Creatinine Clearance: 71.1 mL/min (by C-G formula based on SCr of 1.08 mg/dL).    ABG        Radiology  No radiology results for the last day                The following portions of the patient's history were reviewed and updated as appropriate: allergies, current medications, past family history, past medical history, past social history, past surgical history, and problem list.    Review of Systems   Constitutional: Negative for malaise/fatigue.   Cardiovascular:  Negative for chest pain, dyspnea on exertion, leg swelling and palpitations.   Respiratory:  Negative for cough and shortness of breath.    Gastrointestinal:  Negative for abdominal pain, nausea and vomiting.   Neurological:  Negative for dizziness, focal weakness, headaches, light-headedness and numbness.   All other systems reviewed and are negative.      Current Outpatient Medications:     allopurinol (ZYLOPRIM) 100 MG tablet, TAKE 1 TABLET BY MOUTH DAILY, Disp: 90 tablet, Rfl: 1    ARIPiprazole (ABILIFY) 2 MG tablet, TAKE 1 TABLET BY MOUTH DAILY FOR 14 DAYS. THEN TAKE 2 TABLETS BY MOUTH DAILY FOR 14 DAYS. CALL DISCARD REMAINDERJhony BRITO AFTER 3 WEEKS OF, Disp: 42 tablet, Rfl: 1    atorvastatin (LIPITOR) 40 MG tablet, TAKE 1 TABLET BY MOUTH EVERY NIGHT FOR HIGH AMOUNT OF FATS IN THE BLOOD (Patient taking differently: Take 1 tablet by mouth Daily.), Disp: 90 tablet, Rfl: 1    cephalexin (Keflex) 500 MG capsule, Take 1 capsule by mouth 3 (Three) Times a Day., Disp: 30 capsule, Rfl: 0    DULoxetine (CYMBALTA) 60 MG capsule, Take 1 capsule by mouth Daily., Disp: 90 capsule, Rfl: 0    famotidine (PEPCID) 40 MG tablet, Take 1 tablet by mouth Every Night., Disp: , Rfl:     fexofenadine (ALLEGRA) 180 MG tablet, Take 1 tablet by mouth 2 (Two) Times a Day As Needed.  Indications: Hayfever, Disp: , Rfl:     fluticasone (FLONASE) 50 MCG/ACT nasal spray, 2 sprays into the nostril(s) as directed by provider Daily As Needed. Indications: Allergic Rhinitis, Disp: , Rfl:     HYDROcodone-acetaminophen (NORCO) 7.5-325 MG per tablet, Take 1 tablet by mouth Every 6 (Six) Hours As Needed for Moderate Pain (Pain)., Disp: 12 tablet, Rfl: 0    losartan (COZAAR) 50 MG tablet, Take 1 tablet by mouth Daily., Disp: , Rfl:     meloxicam (Mobic) 15 MG tablet, Take 1 tablet by mouth Daily., Disp: 30 tablet, Rfl: 1    metoprolol tartrate (LOPRESSOR) 25 MG tablet, TAKE 1/2 TABLET BY MOUTH DAILY AS NEEDED (Patient taking differently: Take 0.5 tablets by mouth 2 (Two) Times a Day.), Disp: 45 tablet, Rfl: 3    sulfamethoxazole-trimethoprim (Bactrim DS) 800-160 MG per tablet, Take 1 tablet by mouth 2 (Two) Times a Day., Disp: 28 tablet, Rfl: 0    zolpidem (AMBIEN) 10 MG tablet, TAKE 1 TABLET BY MOUTH EVERY NIGHT AT BEDTIME, Disp: 90 tablet, Rfl: 0    Allergies   Allergen Reactions    Morphine Hallucinations    Beta Adrenergic Blockers Cough    Ace Inhibitors Cough     cough       Family History   Problem Relation Age of Onset    Arthritis Father     Cancer Father         Prostate    Arrhythmia Father         Congentive heart failure    Diabetes Mother         Adult diabetes    Early death Mother         Kidneys failed after giving birth-diabetes complications    Heart disease Mother         Adult Diabetes    Cancer Mother         Diabetes    Arrhythmia Mother         Adult diabetes.  after child birth. Kidneys shut down.        Past Surgical History:   Procedure Laterality Date    ADENOIDECTOMY      As child    APPENDECTOMY      Surgery    CARDIAC CATHETERIZATION N/A 2022    Procedure: Right and Left Heart Cath with Coronar Angiography;  Surgeon: Felipe Garcia MD;  Location: [facility] CATH INVASIVE LOCATION;  Service: Cardiovascular;  Laterality: N/A;    CARDIAC VALVE REPLACEMENT N/A  11/17/2022    Procedure: TRICUSPID VALVE REPAIR/REPLACEMENT;  Surgeon: Femi Henderson MD;  Location: [facility] CVOR;  Service: Cardiothoracic;  Laterality: N/A;  tricuspid valve repaired with 32mm  groves physio tricuspid annuloplasty ring    CARDIAC VALVE REPLACEMENT  11/17/2022    Repaired mitral and tricuspid    CATARACT EXTRACTION Bilateral     COLONOSCOPY  01/01/1997    Regularly scheduled    COLONOSCOPY N/A 02/22/2023    Procedure: COLONOSCOPY with cold snare polypectomy x 1;  Surgeon: Manfred Keating MD;  Location: [facility] ENDOSCOPY;  Service: Gastroenterology;  Laterality: N/A;    CORONARY ARTERY BYPASS GRAFT  11/22    During open heart valves repair.    ENDOSCOPY N/A 02/22/2023    Procedure: ESOPHAGOGASTRODUODENOSCOPY with esophageal dilation using 48 Fr. Bougie Dilator;  Surgeon: Manfred Keating MD;  Location: [facility] ENDOSCOPY;  Service: Gastroenterology;  Laterality: N/A;  erosive esophagitis    EYE SURGERY  01/01/2010    Reattached right retina    LUMBAR FUSION N/A 09/13/2023    Procedure: POSTERIOR LUMBAR FUSION WITH ROBOT;  Surgeon: Jayme Strauss IV, MD;  Location: [facility] MAIN OR;  Service: Robotics - Neuro;  Laterality: N/A;    MITRAL VALVE REPAIR/REPLACEMENT N/A 11/17/2022    Procedure: MITRAL VALVE REPAIR/REPLACEMENT with left atrial appendage closure;  Surgeon: Femi Henderson MD;  Location: [facility];  Service: Cardiothoracic;  Laterality: N/A;  mitral valve repaired iwth 40mm medtronic annuloplasty band  with intraop TERRA    MITRAL VALVE REPAIR/REPLACEMENT  11/22    SPINE SURGERY  01/01/1971    2 lumbar 1 cervical    TONSILLECTOMY  01/01/1950    Childhood    TRICUSPID VALVE SURGERY  11/22    WART REMOVAL Right 6/28/2024    Procedure: WART REMOVAL WITH LASER;  Surgeon: DUNCAN Robles DPM;  Location: [facility] MAIN OR;  Service: Podiatry;  Laterality: Right;       Past Medical History:   Diagnosis Date    Abnormal ECG June 2022    Allergic 01/01/1954    Nasal alergies    Arrhythmia      Dr Youngblood examined me and said i was ok.  skipped beats    Asthma     no sx    Benign prostatic hyperplasia 2018    Callus     Toe spreaders used    Cataract 2014    Surgery. Caused detached retina of right eye 20/60 repair    Clotting disorder 2019    Low platelets    Colon polyp 2018    Found and removed last colon eca.q    Deep vein thrombosis     Depression     Gastroesophageal reflux disease 2014    Glaucoma 2003    Normal pressures. Have low pressure glaucoma    Gout     Headache 2015    Have shimmering in both eyes intermittantly. No headaches    Heart murmur May 2022    During wellness exam    Heart valve disease 2022    During Echo    HL (hearing loss) 2012    Have hearing aids    Hyperlipidemia     Hypertension 2011    Infectious viral hepatitis     Yellow jaundis as child    Insomnia 2011    Low back pain 1966    Mitral valve prolapse 2022    Mood disorder 2013    Polyosteoarthritis 2011    Prostate Cancer     Spinal stenosis 2011    Visual impairment 1955    Nearsighted corrected glasses    Vitamin D deficiency 2018       Family History   Problem Relation Age of Onset    Arthritis Father     Cancer Father         Prostate    Arrhythmia Father         Congentive heart failure    Diabetes Mother         Adult diabetes    Early death Mother         Kidneys failed after giving birth-diabetes complications    Heart disease Mother         Adult Diabetes    Cancer Mother         Diabetes    Arrhythmia Mother         Adult diabetes.  after child birth. Kidneys shut down. 1947       Social History     Socioeconomic History    Marital status:    Tobacco Use    Smoking status: Former     Current packs/day: 0.00     Types: Cigarettes     Passive exposure: Past    Smokeless tobacco: Never    Tobacco comments:     Tried tobacco as child   Vaping Use    Vaping status: Never Used    Substance and Sexual Activity    Alcohol use: Not Currently     Comment: Not a regular drinker. Occasionly have a glass of wine or be    Drug use: Never    Sexual activity: Not Currently     Partners: Female     Birth control/protection: Post-menopausal, None     Comment: Chemically castrated. Prostate cancer         Procedures      Objective:       Physical Exam    There were no vitals taken for this visit.  The patient is alert, oriented and in no distress.    Vital signs as noted above.    Head and neck revealed no carotid bruits or jugular venous distension.  No thyromegaly or lymphadenopathy is present.    Lungs clear.  No wheezing.  Breath sounds are normal bilaterally.    Heart normal first and second heart sounds.  No murmur..  No pericardial rub is present.  No gallop is present.    Abdomen soft and nontender.  No organomegaly is present.    Extremities revealed good peripheral pulses without any pedal edema.    Skin warm and dry.    Musculoskeletal system is grossly normal.    CNS grossly normal.    Reviewed and updated.

## 2024-07-16 RX ORDER — LEVOFLOXACIN 500 MG/1
500 TABLET, FILM COATED ORAL DAILY
Qty: 10 TABLET | Refills: 0 | Status: SHIPPED | OUTPATIENT
Start: 2024-07-16 | End: 2024-07-26

## 2024-07-17 ENCOUNTER — OFFICE VISIT (OUTPATIENT)
Dept: PODIATRY | Facility: CLINIC | Age: 79
End: 2024-07-17
Payer: MEDICARE

## 2024-07-17 VITALS — BODY MASS INDEX: 30.48 KG/M2 | OXYGEN SATURATION: 96 % | HEART RATE: 55 BPM | WEIGHT: 225 LBS | HEIGHT: 72 IN

## 2024-07-17 DIAGNOSIS — B07.0 PLANTAR WART OF RIGHT FOOT: ICD-10-CM

## 2024-07-17 DIAGNOSIS — M79.671 RIGHT FOOT PAIN: Primary | ICD-10-CM

## 2024-07-18 NOTE — PROGRESS NOTES
07/17/2024  Foot and Ankle Surgery - Established Patient/Follow-up  Provider: Dr. Arvind Robles, AMBAR  Location: [facility]    Subjective:  Clint Cee is a 79 y.o. male.     Chief Complaint   Patient presents with    Right Foot - Plantar Warts, Post-op     Procedure: 06/28/24 Dr. Robles  1.  Laser ablation of skin lesion x 1, right foot      Post-op     Pcp 06/17/24       History of Present Illness  The patient presents for evaluation of a wart on the sole of his foot.    He reports an improvement in his mobility.      Allergies   Allergen Reactions    Morphine Hallucinations    Beta Adrenergic Blockers Cough    Ace Inhibitors Cough     cough       Current Outpatient Medications on File Prior to Visit   Medication Sig Dispense Refill    allopurinol (ZYLOPRIM) 100 MG tablet TAKE 1 TABLET BY MOUTH DAILY 90 tablet 1    atorvastatin (LIPITOR) 40 MG tablet TAKE 1 TABLET BY MOUTH EVERY NIGHT FOR HIGH AMOUNT OF FATS IN THE BLOOD (Patient taking differently: Take 1 tablet by mouth Daily.) 90 tablet 1    DULoxetine (CYMBALTA) 60 MG capsule Take 1 capsule by mouth Daily. 90 capsule 0    famotidine (PEPCID) 40 MG tablet Take 1 tablet by mouth Every Night.      fexofenadine (ALLEGRA) 180 MG tablet Take 1 tablet by mouth 2 (Two) Times a Day As Needed. Indications: Hayfever      fluticasone (FLONASE) 50 MCG/ACT nasal spray 2 sprays into the nostril(s) as directed by provider Daily As Needed. Indications: Allergic Rhinitis      HYDROcodone-acetaminophen (NORCO) 7.5-325 MG per tablet Take 1 tablet by mouth Every 6 (Six) Hours As Needed for Moderate Pain (Pain). 12 tablet 0    levoFLOXacin (Levaquin) 500 MG tablet Take 1 tablet by mouth Daily for 10 days. 10 tablet 0    losartan (COZAAR) 50 MG tablet Take 1 tablet by mouth Daily.      meloxicam (Mobic) 15 MG tablet Take 1 tablet by mouth Daily. 30 tablet 1    metoprolol tartrate (LOPRESSOR) 25 MG tablet TAKE 1/2 TABLET BY MOUTH DAILY AS NEEDED (Patient taking  "differently: Take 0.5 tablets by mouth 2 (Two) Times a Day.) 45 tablet 3    zolpidem (AMBIEN) 10 MG tablet TAKE 1 TABLET BY MOUTH EVERY NIGHT AT BEDTIME 90 tablet 0    ARIPiprazole (ABILIFY) 2 MG tablet TAKE 1 TABLET BY MOUTH DAILY FOR 14 DAYS. THEN TAKE 2 TABLETS BY MOUTH DAILY FOR 14 DAYS. CALL DISCARD BARBJhony BRITO AFTER 3 WEEKS OF (Patient not taking: Reported on 7/15/2024) 42 tablet 1     No current facility-administered medications on file prior to visit.       Objective   Pulse 55   Ht 182.9 cm (72\")   Wt 102 kg (225 lb)   SpO2 96%   BMI 30.52 kg/m²     Foot/Ankle Exam  Physical Exam  GENERAL  Appearance:  appears stated age  Orientation:  AAOx3  Affect:  appropriate     VASCULAR      Right Foot Vascularity   Normal vascular exam    Dorsalis pedis:  2+  Posterior tibial:  2+  Skin temperature:  warm  Edema grading:  None  CFT:  < 3 seconds  Pedal hair growth:  Present  Varicosities:  none      Left Foot Vascularity   Normal vascular exam    Dorsalis pedis:  2+  Posterior tibial:  2+  Skin temperature:  warm  Edema grading:  None  CFT:  < 3 seconds  Pedal hair growth:  Present  Varicosities:  none     NEUROLOGIC      Right Foot Neurologic   Light touch sensation: normal  Hot/Cold sensation: normal  Achilles reflex:  2+      Left Foot Neurologic   Light touch sensation: normal  Hot/Cold sensation:  normal  Achilles reflex:  2+     MUSCULOSKELETAL      Right Foot Musculoskeletal   Ecchymosis:  none  Tenderness:  callous right foot    Arch:  Normal      Left Foot Musculoskeletal   Arch:  Normal     MUSCLE STRENGTH      Right Foot Muscle Strength   Normal strength    Foot dorsiflexion:  5  Foot plantar flexion:  5  Foot inversion:  5  Foot eversion:  5      Left Foot Muscle Strength   Normal strength    Foot dorsiflexion:  5  Foot plantar flexion:  5  Foot inversion:  5  Foot eversion:  5     DERMATOLOGIC       Right Foot Dermatologic   Skin  Positive for warts.   Nails comment:  Nails 1-5      Left Foot " Dermatologic   Skin  Left foot skin is intact.   Nails comment:  Nails 1-5     TESTS      Right Foot Tests   Anterior drawer: negative  Varus tilt: negative      Left Foot Tests   Anterior drawer: negative  Varus tilt: negative     Right foot additional comments: Hyperkeratotic lesion noted to the plantar aspect of the first metatarsal phalangeal joint region.  Pinpoint bleeding noted with gentle debridement consistent with verruca.  Lesion measures approximately 1 cm in diameter.    7/17/24: Lesion to the plantar aspect of the right foot is much improved.  No other complicating features.  No residual verruca or other complicating features.      Results      Assessment & Plan   Diagnoses and all orders for this visit:    1. Right foot pain (Primary)    2. Plantar wart of right foot      Assessment & Plan    The wart is attempting to resolve independently, indicating a consistent presence of a wart. The wound was previously treated with a laser procedure, which now has a small scab and is attempting to heal. He was advised to monitor the wound, apply a Band-Aid, and wear supportive shoes. Moisturizing the area with a pumice stone was recommended, along with a routine pedicure to ensure complete healing.  Patient is welcome to see me on an as-needed basis at this time.  He is to call with any additional issues or concerns.             Patient or patient representative verbalized consent for the use of Ambient Listening during the visit with  DUNCAN Robles DPM for chart documentation. 7/18/2024  07:07 EDT    DUNCAN Robles DPM

## 2024-07-24 ENCOUNTER — TELEPHONE (OUTPATIENT)
Dept: ORTHOPEDIC SURGERY | Facility: CLINIC | Age: 79
End: 2024-07-24
Payer: MEDICARE

## 2024-07-24 NOTE — TELEPHONE ENCOUNTER
"Caller: Clint Cee \"Dennis\"    Relationship to patient: Self    Best call back number: [phone]      Chief complaint: RIGHT SHOULDER     Type of visit: SURGERY          "

## 2024-07-25 ENCOUNTER — PREP FOR SURGERY (OUTPATIENT)
Dept: OTHER | Facility: HOSPITAL | Age: 79
End: 2024-07-25
Payer: MEDICARE

## 2024-07-25 ENCOUNTER — OFFICE VISIT (OUTPATIENT)
Dept: CARDIOLOGY | Facility: CLINIC | Age: 79
End: 2024-07-25
Payer: MEDICARE

## 2024-07-25 VITALS
SYSTOLIC BLOOD PRESSURE: 134 MMHG | DIASTOLIC BLOOD PRESSURE: 72 MMHG | BODY MASS INDEX: 33.79 KG/M2 | WEIGHT: 236 LBS | HEIGHT: 70 IN | OXYGEN SATURATION: 97 % | HEART RATE: 62 BPM

## 2024-07-25 DIAGNOSIS — I49.3 PREMATURE VENTRICULAR CONTRACTIONS: ICD-10-CM

## 2024-07-25 DIAGNOSIS — I10 PRIMARY HYPERTENSION: ICD-10-CM

## 2024-07-25 DIAGNOSIS — R01.1 HEART MURMUR ON PHYSICAL EXAMINATION: ICD-10-CM

## 2024-07-25 DIAGNOSIS — M19.011 OSTEOARTHRITIS OF RIGHT GLENOHUMERAL JOINT: Primary | ICD-10-CM

## 2024-07-25 DIAGNOSIS — I10 ESSENTIAL HYPERTENSION: ICD-10-CM

## 2024-07-25 DIAGNOSIS — I34.1 MITRAL VALVE PROLAPSE: ICD-10-CM

## 2024-07-25 DIAGNOSIS — R79.9 ABNORMAL FINDING OF BLOOD CHEMISTRY, UNSPECIFIED: ICD-10-CM

## 2024-07-25 DIAGNOSIS — I34.0 NONRHEUMATIC MITRAL VALVE REGURGITATION: Primary | ICD-10-CM

## 2024-07-25 DIAGNOSIS — R94.5 ABNORMAL RESULTS OF LIVER FUNCTION STUDIES: ICD-10-CM

## 2024-07-25 DIAGNOSIS — N30.01 ACUTE CYSTITIS WITH HEMATURIA: Primary | ICD-10-CM

## 2024-07-25 DIAGNOSIS — R94.120 ABNORMAL AUDITORY FUNCTION STUDY: ICD-10-CM

## 2024-07-25 DIAGNOSIS — Z98.890 H/O MITRAL VALVE REPAIR: ICD-10-CM

## 2024-07-25 DIAGNOSIS — R79.1 ABNORMAL COAGULATION PROFILE: ICD-10-CM

## 2024-07-25 DIAGNOSIS — I36.1 NONRHEUMATIC TRICUSPID VALVE REGURGITATION: ICD-10-CM

## 2024-07-25 PROCEDURE — 3075F SYST BP GE 130 - 139MM HG: CPT | Performed by: INTERNAL MEDICINE

## 2024-07-25 PROCEDURE — 1159F MED LIST DOCD IN RCRD: CPT | Performed by: INTERNAL MEDICINE

## 2024-07-25 PROCEDURE — 3078F DIAST BP <80 MM HG: CPT | Performed by: INTERNAL MEDICINE

## 2024-07-25 PROCEDURE — 99214 OFFICE O/P EST MOD 30 MIN: CPT | Performed by: INTERNAL MEDICINE

## 2024-07-25 PROCEDURE — 1160F RVW MEDS BY RX/DR IN RCRD: CPT | Performed by: INTERNAL MEDICINE

## 2024-07-25 RX ORDER — TRANEXAMIC ACID 10 MG/ML
1000 INJECTION, SOLUTION INTRAVENOUS ONCE
OUTPATIENT
Start: 2024-07-25 | End: 2024-07-25

## 2024-07-25 RX ORDER — OXYCODONE HCL 10 MG/1
10 TABLET, FILM COATED, EXTENDED RELEASE ORAL ONCE
OUTPATIENT
Start: 2024-07-25 | End: 2024-07-25

## 2024-07-25 RX ORDER — PREGABALIN 75 MG/1
150 CAPSULE ORAL ONCE
OUTPATIENT
Start: 2024-07-25 | End: 2024-07-25

## 2024-07-25 RX ORDER — MELOXICAM 15 MG/1
15 TABLET ORAL ONCE
OUTPATIENT
Start: 2024-07-25 | End: 2024-07-25

## 2024-07-26 ENCOUNTER — LAB (OUTPATIENT)
Dept: FAMILY MEDICINE CLINIC | Facility: CLINIC | Age: 79
End: 2024-07-26
Payer: MEDICARE

## 2024-07-26 PROCEDURE — 81003 URINALYSIS AUTO W/O SCOPE: CPT | Performed by: FAMILY MEDICINE

## 2024-07-27 LAB
BILIRUB UR QL STRIP: NEGATIVE
CLARITY UR: CLEAR
COLOR UR: YELLOW
GLUCOSE UR STRIP-MCNC: NEGATIVE MG/DL
HGB UR QL STRIP.AUTO: NEGATIVE
HOLD SPECIMEN: NORMAL
KETONES UR QL STRIP: NEGATIVE
LEUKOCYTE ESTERASE UR QL STRIP.AUTO: NEGATIVE
NITRITE UR QL STRIP: NEGATIVE
PH UR STRIP.AUTO: 7 [PH] (ref 5–8)
PROT UR QL STRIP: NEGATIVE
SP GR UR STRIP: 1.02 (ref 1–1.03)
UROBILINOGEN UR QL STRIP: NORMAL

## 2024-07-29 NOTE — TELEPHONE ENCOUNTER
CALLED PATIENT TO SCHEDULE SURGERY. PATIENT WAS CURRENTLY AT A DRS APPOINTMENT. PATIENT WILL CALL BACK TO SCHEDULE.

## 2024-07-30 PROBLEM — M19.011 OSTEOARTHRITIS OF RIGHT GLENOHUMERAL JOINT: Status: ACTIVE | Noted: 2024-07-25

## 2024-08-05 RX ORDER — ATORVASTATIN CALCIUM 40 MG/1
TABLET, FILM COATED ORAL
Qty: 90 TABLET | Refills: 3 | Status: SHIPPED | OUTPATIENT
Start: 2024-08-05

## 2024-08-05 NOTE — TELEPHONE ENCOUNTER
Rx Refill Note  Requested Prescriptions     Pending Prescriptions Disp Refills    atorvastatin (LIPITOR) 40 MG tablet [Pharmacy Med Name: ATORVASTATIN 40MG TABLETS] 90 tablet 3     Sig: TAKE 1 TABLET BY MOUTH EVERY NIGHT FOR HIGH AMOUNT OF FATS IN THE BLOOD      Last office visit with prescribing clinician: 7/25/2024   Last telemedicine visit with prescribing clinician: Visit date not found   Next office visit with prescribing clinician: 1/30/2025                         Would you like a call back once the refill request has been completed: [] Yes [] No    If the office needs to give you a call back, can they leave a voicemail: [] Yes [] No    Thania Le MA  08/05/24, 08:19 EDT

## 2024-08-16 ENCOUNTER — PRE-ADMISSION TESTING (OUTPATIENT)
Dept: PREADMISSION TESTING | Facility: HOSPITAL | Age: 79
End: 2024-08-16
Payer: MEDICARE

## 2024-08-16 ENCOUNTER — HOSPITAL ENCOUNTER (OUTPATIENT)
Dept: CARDIOLOGY | Facility: HOSPITAL | Age: 79
Discharge: HOME | End: 2024-08-16
Payer: MEDICARE

## 2024-08-16 ENCOUNTER — LAB (OUTPATIENT)
Dept: LAB | Facility: HOSPITAL | Age: 79
End: 2024-08-16
Payer: MEDICARE

## 2024-08-16 VITALS
HEART RATE: 70 BPM | TEMPERATURE: 98.2 F | RESPIRATION RATE: 16 BRPM | DIASTOLIC BLOOD PRESSURE: 80 MMHG | BODY MASS INDEX: 32.79 KG/M2 | WEIGHT: 234.2 LBS | OXYGEN SATURATION: 96 % | HEIGHT: 71 IN | SYSTOLIC BLOOD PRESSURE: 150 MMHG

## 2024-08-16 DIAGNOSIS — R79.1 ABNORMAL COAGULATION PROFILE: ICD-10-CM

## 2024-08-16 DIAGNOSIS — M19.011 OSTEOARTHRITIS OF RIGHT GLENOHUMERAL JOINT: ICD-10-CM

## 2024-08-16 DIAGNOSIS — R94.5 ABNORMAL RESULTS OF LIVER FUNCTION STUDIES: ICD-10-CM

## 2024-08-16 DIAGNOSIS — R79.9 ABNORMAL FINDING OF BLOOD CHEMISTRY, UNSPECIFIED: ICD-10-CM

## 2024-08-16 DIAGNOSIS — R94.120 ABNORMAL AUDITORY FUNCTION STUDY: ICD-10-CM

## 2024-08-16 LAB
ABO GROUP BLD: NORMAL
ANION GAP SERPL CALCULATED.3IONS-SCNC: 8.5 MMOL/L (ref 5–15)
APTT PPP: 26.4 SECONDS (ref 24–31)
BASOPHILS # BLD AUTO: 0.02 10*3/MM3 (ref 0–0.2)
BASOPHILS NFR BLD AUTO: 0.4 % (ref 0–1.5)
BLD GP AB SCN SERPL QL: NEGATIVE
BUN SERPL-MCNC: 19 MG/DL (ref 8–23)
BUN/CREAT SERPL: 17.4 (ref 7–25)
CALCIUM SPEC-SCNC: 9.5 MG/DL (ref 8.6–10.5)
CHLORIDE SERPL-SCNC: 106 MMOL/L (ref 98–107)
CO2 SERPL-SCNC: 25.5 MMOL/L (ref 22–29)
CREAT SERPL-MCNC: 1.09 MG/DL (ref 0.76–1.27)
DEPRECATED RDW RBC AUTO: 46 FL (ref 37–54)
EGFRCR SERPLBLD CKD-EPI 2021: 69.9 ML/MIN/1.73
EOSINOPHIL # BLD AUTO: 0.26 10*3/MM3 (ref 0–0.4)
EOSINOPHIL NFR BLD AUTO: 4.7 % (ref 0.3–6.2)
ERYTHROCYTE [DISTWIDTH] IN BLOOD BY AUTOMATED COUNT: 13.3 % (ref 12.3–15.4)
GLUCOSE SERPL-MCNC: 135 MG/DL (ref 65–99)
HBA1C MFR BLD: 5.3 % (ref 4.8–5.6)
HCT VFR BLD AUTO: 40 % (ref 37.5–51)
HGB BLD-MCNC: 13.2 G/DL (ref 13–17.7)
IMM GRANULOCYTES # BLD AUTO: 0.01 10*3/MM3 (ref 0–0.05)
IMM GRANULOCYTES NFR BLD AUTO: 0.2 % (ref 0–0.5)
INR PPP: 1.01 (ref 0.93–1.1)
LYMPHOCYTES # BLD AUTO: 0.68 10*3/MM3 (ref 0.7–3.1)
LYMPHOCYTES NFR BLD AUTO: 12.4 % (ref 19.6–45.3)
MCH RBC QN AUTO: 30.5 PG (ref 26.6–33)
MCHC RBC AUTO-ENTMCNC: 33 G/DL (ref 31.5–35.7)
MCV RBC AUTO: 92.4 FL (ref 79–97)
MONOCYTES # BLD AUTO: 0.4 10*3/MM3 (ref 0.1–0.9)
MONOCYTES NFR BLD AUTO: 7.3 % (ref 5–12)
MRSA DNA SPEC QL NAA+PROBE: NORMAL
NEUTROPHILS NFR BLD AUTO: 4.13 10*3/MM3 (ref 1.7–7)
NEUTROPHILS NFR BLD AUTO: 75 % (ref 42.7–76)
NRBC BLD AUTO-RTO: 0 /100 WBC (ref 0–0.2)
PLATELET # BLD AUTO: 90 10*3/MM3 (ref 140–450)
PMV BLD AUTO: 9 FL (ref 6–12)
POTASSIUM SERPL-SCNC: 5 MMOL/L (ref 3.5–5.2)
PROTHROMBIN TIME: 11 SECONDS (ref 9.6–11.7)
QT INTERVAL: 429 MS
QTC INTERVAL: 449 MS
RBC # BLD AUTO: 4.33 10*6/MM3 (ref 4.14–5.8)
RH BLD: POSITIVE
SODIUM SERPL-SCNC: 140 MMOL/L (ref 136–145)
T&S EXPIRATION DATE: NORMAL
WBC NRBC COR # BLD AUTO: 5.5 10*3/MM3 (ref 3.4–10.8)

## 2024-08-16 PROCEDURE — 85730 THROMBOPLASTIN TIME PARTIAL: CPT

## 2024-08-16 PROCEDURE — 86900 BLOOD TYPING SEROLOGIC ABO: CPT

## 2024-08-16 PROCEDURE — 36415 COLL VENOUS BLD VENIPUNCTURE: CPT

## 2024-08-16 PROCEDURE — 80048 BASIC METABOLIC PNL TOTAL CA: CPT

## 2024-08-16 PROCEDURE — 87641 MR-STAPH DNA AMP PROBE: CPT | Performed by: ORTHOPAEDIC SURGERY

## 2024-08-16 PROCEDURE — 83036 HEMOGLOBIN GLYCOSYLATED A1C: CPT

## 2024-08-16 PROCEDURE — 85025 COMPLETE CBC W/AUTO DIFF WBC: CPT

## 2024-08-16 PROCEDURE — 85610 PROTHROMBIN TIME: CPT

## 2024-08-16 PROCEDURE — 86850 RBC ANTIBODY SCREEN: CPT

## 2024-08-16 PROCEDURE — 93005 ELECTROCARDIOGRAM TRACING: CPT | Performed by: ORTHOPAEDIC SURGERY

## 2024-08-16 PROCEDURE — 86901 BLOOD TYPING SEROLOGIC RH(D): CPT

## 2024-08-19 NOTE — PAT
Message sent to Dr. Goldberg about low Plts.  Awaiting response.    He said ok to proceed with surgery on 8/28/2024.

## 2024-08-22 LAB
QT INTERVAL: 429 MS
QTC INTERVAL: 449 MS

## 2024-08-26 NOTE — H&P
[facility], [facility]   HISTORY AND PHYSICAL    Patient Name: Clint Cee  : 1945  MRN: [id number]  Primary Care Physician:  Jaret Castellanos MD  Date of admission: (Not on file)    Subjective   Subjective     Chief Complaint: Right shoulder pain    This is a 79-year-old gentleman with longstanding right shoulder pain presenting for shoulder arthroplasty        Review of Systems   Cardiovascular:  Negative for chest pain.   Musculoskeletal:  Positive for arthralgias.        Personal History     Past Medical History:   Diagnosis Date    Abnormal ECG 2022    Allergic 1954    Nasal alergies    Arrhythmia     Dr Youngblood examined me and said i was ok.  skipped beats    Asthma     no sx    Benign prostatic hyperplasia 2018    Callus     Toe spreaders used    Cataract 2014    Surgery. Caused detached retina of right eye  repair    Clotting disorder     Low platelets    Colon polyp 2018    Found and removed last colon eca.q    Deep vein thrombosis     Depression     Gastroesophageal reflux disease 2014    Glaucoma 2003    Normal pressures. Have low pressure glaucoma    Gout     Headache 2015    Have shimmering in both eyes intermittantly. No headaches    Heart murmur May 2022    During wellness exam    Heart valve disease 2022    During Echo    HL (hearing loss) 2012    Have hearing aids    Hyperlipidemia     Hypertension 2011    Infectious viral hepatitis     Yellow jaundis as child    Insomnia 2011    Low back pain 1966    Mitral valve prolapse 2022    Mood disorder 2013    Polyosteoarthritis 2011    Prostate Cancer     Spinal stenosis 2011    Visual impairment 1955    Nearsighted corrected glasses    Vitamin D deficiency 2018       Past Surgical History:   Procedure Laterality Date    ADENOIDECTOMY      As child    APPENDECTOMY      Surgery    CARDIAC  CATHETERIZATION N/A 09/30/2022    Procedure: Right and Left Heart Cath with Coronar Angiography;  Surgeon: Felipe Garcia MD;  Location: [facility] CATH INVASIVE LOCATION;  Service: Cardiovascular;  Laterality: N/A;    CARDIAC VALVE REPLACEMENT N/A 11/17/2022    Procedure: TRICUSPID VALVE REPAIR/REPLACEMENT;  Surgeon: Femi Henderson MD;  Location: [facility] CVOR;  Service: Cardiothoracic;  Laterality: N/A;  tricuspid valve repaired with 32mm  groves physio tricuspid annuloplasty ring    CARDIAC VALVE REPLACEMENT  11/17/2022    Repaired mitral and tricuspid    CATARACT EXTRACTION Bilateral     COLONOSCOPY  01/01/1997    Regularly scheduled    COLONOSCOPY N/A 02/22/2023    Procedure: COLONOSCOPY with cold snare polypectomy x 1;  Surgeon: Manfred Keating MD;  Location: [facility] ENDOSCOPY;  Service: Gastroenterology;  Laterality: N/A;    CORONARY ARTERY BYPASS GRAFT  11/22    During open heart valves repair.    ENDOSCOPY N/A 02/22/2023    Procedure: ESOPHAGOGASTRODUODENOSCOPY with esophageal dilation using 48 Fr. Bougie Dilator;  Surgeon: Manfred Keating MD;  Location: [facility] ENDOSCOPY;  Service: Gastroenterology;  Laterality: N/A;  erosive esophagitis    EYE SURGERY  01/01/2010    Reattached right retina    LUMBAR FUSION N/A 09/13/2023    Procedure: POSTERIOR LUMBAR FUSION WITH ROBOT;  Surgeon: Jayme Strauss IV, MD;  Location: [facility] MAIN OR;  Service: Robotics - Neuro;  Laterality: N/A;    MITRAL VALVE REPAIR/REPLACEMENT N/A 11/17/2022    Procedure: MITRAL VALVE REPAIR/REPLACEMENT with left atrial appendage closure;  Surgeon: Femi Henderson MD;  Location: [facility];  Service: Cardiothoracic;  Laterality: N/A;  mitral valve repaired iwth 40mm medtronic annuloplasty band  with intraop TERRA    MITRAL VALVE REPAIR/REPLACEMENT  11/22    SPINE SURGERY  01/01/1971    2 lumbar 1 cervical    TONSILLECTOMY  01/01/1950    Childhood    TRICUSPID VALVE SURGERY  11/22    WART REMOVAL Right 6/28/2024    Procedure: WART  REMOVAL WITH LASER;  Surgeon: DUNCAN Robles DPM;  Location: [facility] MAIN OR;  Service: Podiatry;  Laterality: Right;       Family History: family history includes Arrhythmia in his father and mother; Arthritis in his father; Cancer in his father and mother; Diabetes in his mother; Early death in his mother; Heart disease in his mother. Otherwise pertinent FHx was reviewed and not pertinent to current issue.    Social History:  reports that he has quit smoking. His smoking use included cigarettes. He has been exposed to tobacco smoke. He has never used smokeless tobacco. He reports that he does not currently use alcohol. He reports that he does not use drugs.    Home Medications:  DULoxetine, HYDROcodone-acetaminophen, allopurinol, atorvastatin, famotidine, fexofenadine, fluticasone, losartan, meloxicam, metoprolol tartrate, and zolpidem    Allergies:  Allergies   Allergen Reactions    Morphine Hallucinations    Beta Adrenergic Blockers Cough    Ace Inhibitors Cough     cough       Objective    Objective     Right shoulder demonstrates intact skin he has mild supraspinatus atrophy passive elevation 170 abduction 140 external rotation 30 active elevation 150 degrees he has pain and weakness on Speed [place] supraspinatus testing, belly press 4/5 left 3/5  Sensory and motor exam are intact all distributions. Radial pulse is palpable and capillary refill is less than two seconds to all digits.     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 Issues of scapular notching and component revision were discussed. Questions were answered and addressed.    Андрей Goldberg MD

## 2024-08-28 ENCOUNTER — APPOINTMENT (OUTPATIENT)
Dept: GENERAL RADIOLOGY | Facility: HOSPITAL | Age: 79
End: 2024-08-28
Payer: MEDICARE

## 2024-08-28 ENCOUNTER — ANESTHESIA (OUTPATIENT)
Dept: PERIOP | Facility: HOSPITAL | Age: 79
End: 2024-08-28
Payer: MEDICARE

## 2024-08-28 ENCOUNTER — ANESTHESIA EVENT (OUTPATIENT)
Dept: PERIOP | Facility: HOSPITAL | Age: 79
End: 2024-08-28
Payer: MEDICARE

## 2024-08-28 ENCOUNTER — HOSPITAL ENCOUNTER (OUTPATIENT)
Facility: HOSPITAL | Age: 79
Setting detail: OBSERVATION
Discharge: HOME | End: 2024-08-29
Attending: ORTHOPAEDIC SURGERY | Admitting: ORTHOPAEDIC SURGERY
Payer: MEDICARE

## 2024-08-28 DIAGNOSIS — M19.011 OSTEOARTHRITIS OF RIGHT GLENOHUMERAL JOINT: ICD-10-CM

## 2024-08-28 DIAGNOSIS — M19.011 PRIMARY OSTEOARTHRITIS OF RIGHT SHOULDER: Primary | ICD-10-CM

## 2024-08-28 LAB
ABO GROUP BLD: NORMAL
ANION GAP SERPL CALCULATED.3IONS-SCNC: 8.4 MMOL/L (ref 5–15)
BLD GP AB SCN SERPL QL: NEGATIVE
BUN SERPL-MCNC: 23 MG/DL (ref 8–23)
BUN/CREAT SERPL: 22.3 (ref 7–25)
CALCIUM SPEC-SCNC: 8.6 MG/DL (ref 8.6–10.5)
CHLORIDE SERPL-SCNC: 104 MMOL/L (ref 98–107)
CO2 SERPL-SCNC: 24.6 MMOL/L (ref 22–29)
CREAT SERPL-MCNC: 1.03 MG/DL (ref 0.76–1.27)
EGFRCR SERPLBLD CKD-EPI 2021: 74.8 ML/MIN/1.73
GLUCOSE SERPL-MCNC: 175 MG/DL (ref 65–99)
HCT VFR BLD AUTO: 34.7 % (ref 37.5–51)
HGB BLD-MCNC: 11.4 G/DL (ref 13–17.7)
POTASSIUM SERPL-SCNC: 4.1 MMOL/L (ref 3.5–5.2)
POTASSIUM SERPL-SCNC: 4.9 MMOL/L (ref 3.5–5.2)
RH BLD: POSITIVE
SODIUM SERPL-SCNC: 137 MMOL/L (ref 136–145)
T&S EXPIRATION DATE: NORMAL

## 2024-08-28 PROCEDURE — 63710000001 MELOXICAM 15 MG TABLET: Performed by: ORTHOPAEDIC SURGERY

## 2024-08-28 PROCEDURE — A9270 NON-COVERED ITEM OR SERVICE: HCPCS | Performed by: ORTHOPAEDIC SURGERY

## 2024-08-28 PROCEDURE — 25810000003 LACTATED RINGERS PER 1000 ML: Performed by: ANESTHESIOLOGIST ASSISTANT

## 2024-08-28 PROCEDURE — C1776 JOINT DEVICE (IMPLANTABLE): HCPCS | Performed by: ORTHOPAEDIC SURGERY

## 2024-08-28 PROCEDURE — 63710000001 PREGABALIN 75 MG CAPSULE: Performed by: ORTHOPAEDIC SURGERY

## 2024-08-28 PROCEDURE — G0378 HOSPITAL OBSERVATION PER HR: HCPCS

## 2024-08-28 PROCEDURE — 63710000001: Performed by: ORTHOPAEDIC SURGERY

## 2024-08-28 PROCEDURE — 86901 BLOOD TYPING SEROLOGIC RH(D): CPT | Performed by: ORTHOPAEDIC SURGERY

## 2024-08-28 PROCEDURE — 63710000001 OXYCODONE 10 MG TABLET EXTENDED-RELEASE 12 HOUR: Performed by: ORTHOPAEDIC SURGERY

## 2024-08-28 PROCEDURE — 25010000002 BUPIVACAINE (PF) 0.5 % SOLUTION: Performed by: ANESTHESIOLOGY

## 2024-08-28 PROCEDURE — 25010000002 PROPOFOL 200 MG/20ML EMULSION: Performed by: ANESTHESIOLOGIST ASSISTANT

## 2024-08-28 PROCEDURE — 25010000002 BUPIVACAINE (PF) 0.25 % SOLUTION 30 ML VIAL: Performed by: ORTHOPAEDIC SURGERY

## 2024-08-28 PROCEDURE — 80048 BASIC METABOLIC PNL TOTAL CA: CPT | Performed by: ORTHOPAEDIC SURGERY

## 2024-08-28 PROCEDURE — 84132 ASSAY OF SERUM POTASSIUM: CPT | Performed by: ORTHOPAEDIC SURGERY

## 2024-08-28 PROCEDURE — 63710000001 DULOXETINE 30 MG CAPSULE DELAYED-RELEASE PARTICLES: Performed by: ORTHOPAEDIC SURGERY

## 2024-08-28 PROCEDURE — 63710000001 ATORVASTATIN 40 MG TABLET: Performed by: ORTHOPAEDIC SURGERY

## 2024-08-28 PROCEDURE — 73030 X-RAY EXAM OF SHOULDER: CPT

## 2024-08-28 PROCEDURE — C1713 ANCHOR/SCREW BN/BN,TIS/BN: HCPCS | Performed by: ORTHOPAEDIC SURGERY

## 2024-08-28 PROCEDURE — 63710000001 CETIRIZINE 10 MG TABLET: Performed by: ORTHOPAEDIC SURGERY

## 2024-08-28 PROCEDURE — 63710000001 ASPIRIN 325 MG TABLET DELAYED-RELEASE: Performed by: ORTHOPAEDIC SURGERY

## 2024-08-28 PROCEDURE — 25010000002 FENTANYL CITRATE (PF) 50 MCG/ML SOLUTION: Performed by: ANESTHESIOLOGY

## 2024-08-28 PROCEDURE — 25010000002 CEFAZOLIN PER 500 MG: Performed by: ORTHOPAEDIC SURGERY

## 2024-08-28 PROCEDURE — 63710000001 METOPROLOL TARTRATE 12.5 MG TABLET: Performed by: ORTHOPAEDIC SURGERY

## 2024-08-28 PROCEDURE — 25010000002 CEFTRIAXONE PER 250 MG: Performed by: ORTHOPAEDIC SURGERY

## 2024-08-28 PROCEDURE — 63710000001 ALLOPURINOL 100 MG TABLET: Performed by: ORTHOPAEDIC SURGERY

## 2024-08-28 PROCEDURE — 97166 OT EVAL MOD COMPLEX 45 MIN: CPT | Performed by: OCCUPATIONAL THERAPIST

## 2024-08-28 PROCEDURE — 85018 HEMOGLOBIN: CPT | Performed by: ORTHOPAEDIC SURGERY

## 2024-08-28 PROCEDURE — 86900 BLOOD TYPING SEROLOGIC ABO: CPT | Performed by: ORTHOPAEDIC SURGERY

## 2024-08-28 PROCEDURE — 86850 RBC ANTIBODY SCREEN: CPT | Performed by: ORTHOPAEDIC SURGERY

## 2024-08-28 PROCEDURE — 25810000003 SODIUM CHLORIDE 0.9 % SOLUTION 250 ML FLEX CONT: Performed by: ANESTHESIOLOGIST ASSISTANT

## 2024-08-28 PROCEDURE — 85014 HEMATOCRIT: CPT | Performed by: ORTHOPAEDIC SURGERY

## 2024-08-28 PROCEDURE — 25010000002 DEXAMETHASONE PER 1 MG: Performed by: ANESTHESIOLOGY

## 2024-08-28 PROCEDURE — 25010000002 PHENYLEPHRINE 10 MG/ML SOLUTION 5 ML VIAL: Performed by: ANESTHESIOLOGIST ASSISTANT

## 2024-08-28 PROCEDURE — 25010000002 VANCOMYCIN 1 G RECONSTITUTED SOLUTION 1 EACH VIAL: Performed by: ORTHOPAEDIC SURGERY

## 2024-08-28 PROCEDURE — C9290 INJ, BUPIVACAINE LIPOSOME: HCPCS | Performed by: ANESTHESIOLOGY

## 2024-08-28 PROCEDURE — 23472 RECONSTRUCT SHOULDER JOINT: CPT | Performed by: PHYSICIAN ASSISTANT

## 2024-08-28 PROCEDURE — 23472 RECONSTRUCT SHOULDER JOINT: CPT | Performed by: ORTHOPAEDIC SURGERY

## 2024-08-28 PROCEDURE — 25810000003 LACTATED RINGERS PER 1000 ML: Performed by: ORTHOPAEDIC SURGERY

## 2024-08-28 PROCEDURE — 0 BUPIVACAINE LIPOSOME 1.3 % SUSPENSION: Performed by: ANESTHESIOLOGY

## 2024-08-28 DEVICE — TOTL SHLDER REV: Type: IMPLANTABLE DEVICE | Site: SHOULDER | Status: FUNCTIONAL

## 2024-08-28 DEVICE — GLENSPHR TRABECULARMETAL REV 40MM: Type: IMPLANTABLE DEVICE | Site: SHOULDER | Status: FUNCTIONAL

## 2024-08-28 DEVICE — INVERSE/REVERSE SCREW SYSTEM, 4.5-42
Type: IMPLANTABLE DEVICE | Site: SHOULDER | Status: FUNCTIONAL
Brand: INVERSE/REVERSE

## 2024-08-28 DEVICE — LINER HUM/SHLDR TRABECULARMETAL REV POLY 60DEG 40MM PLS3: Type: IMPLANTABLE DEVICE | Site: SHOULDER | Status: FUNCTIONAL

## 2024-08-28 DEVICE — SUT NONABS MAXBRAID/PE NMBR2 HC5 38IN BLU 900334: Type: IMPLANTABLE DEVICE | Site: SHOULDER | Status: FUNCTIONAL

## 2024-08-28 DEVICE — SPACR HUM TRABECULAR REV PLS12MM: Type: IMPLANTABLE DEVICE | Site: SHOULDER | Status: FUNCTIONAL

## 2024-08-28 DEVICE — STEM HUM NONPOR REV 15X130MM: Type: IMPLANTABLE DEVICE | Site: SHOULDER | Status: FUNCTIONAL

## 2024-08-28 DEVICE — BASEPLT GLEN TRABECULARMETAL REV 15MM: Type: IMPLANTABLE DEVICE | Site: SHOULDER | Status: FUNCTIONAL

## 2024-08-28 RX ORDER — SODIUM CHLORIDE 0.9 % (FLUSH) 0.9 %
10 SYRINGE (ML) INJECTION AS NEEDED
Status: DISCONTINUED | OUTPATIENT
Start: 2024-08-28 | End: 2024-08-28 | Stop reason: HOSPADM

## 2024-08-28 RX ORDER — FLUMAZENIL 0.1 MG/ML
0.5 INJECTION INTRAVENOUS AS NEEDED
Status: DISCONTINUED | OUTPATIENT
Start: 2024-08-28 | End: 2024-08-28 | Stop reason: HOSPADM

## 2024-08-28 RX ORDER — EPHEDRINE SULFATE 5 MG/ML
5 INJECTION INTRAVENOUS ONCE AS NEEDED
Status: DISCONTINUED | OUTPATIENT
Start: 2024-08-28 | End: 2024-08-28 | Stop reason: HOSPADM

## 2024-08-28 RX ORDER — FENTANYL CITRATE 50 UG/ML
100 INJECTION, SOLUTION INTRAMUSCULAR; INTRAVENOUS
Status: DISCONTINUED | OUTPATIENT
Start: 2024-08-28 | End: 2024-08-28 | Stop reason: HOSPADM

## 2024-08-28 RX ORDER — DIPHENHYDRAMINE HCL 25 MG
25 CAPSULE ORAL EVERY 6 HOURS PRN
Status: DISCONTINUED | OUTPATIENT
Start: 2024-08-28 | End: 2024-08-29 | Stop reason: HOSPADM

## 2024-08-28 RX ORDER — ALLOPURINOL 100 MG/1
100 TABLET ORAL DAILY
Status: DISCONTINUED | OUTPATIENT
Start: 2024-08-28 | End: 2024-08-29 | Stop reason: HOSPADM

## 2024-08-28 RX ORDER — ASPIRIN 325 MG
325 TABLET, DELAYED RELEASE (ENTERIC COATED) ORAL EVERY 12 HOURS SCHEDULED
Status: DISCONTINUED | OUTPATIENT
Start: 2024-08-28 | End: 2024-08-29 | Stop reason: HOSPADM

## 2024-08-28 RX ORDER — OXYCODONE HYDROCHLORIDE 5 MG/1
10 TABLET ORAL ONCE AS NEEDED
Status: DISCONTINUED | OUTPATIENT
Start: 2024-08-28 | End: 2024-08-28 | Stop reason: HOSPADM

## 2024-08-28 RX ORDER — PROPOFOL 10 MG/ML
INJECTION, EMULSION INTRAVENOUS AS NEEDED
Status: DISCONTINUED | OUTPATIENT
Start: 2024-08-28 | End: 2024-08-28 | Stop reason: SURG

## 2024-08-28 RX ORDER — DIPHENHYDRAMINE HYDROCHLORIDE 50 MG/ML
25 INJECTION INTRAMUSCULAR; INTRAVENOUS NIGHTLY PRN
Status: DISCONTINUED | OUTPATIENT
Start: 2024-08-28 | End: 2024-08-29 | Stop reason: HOSPADM

## 2024-08-28 RX ORDER — ONDANSETRON 2 MG/ML
4 INJECTION INTRAMUSCULAR; INTRAVENOUS ONCE AS NEEDED
Status: DISCONTINUED | OUTPATIENT
Start: 2024-08-28 | End: 2024-08-28 | Stop reason: HOSPADM

## 2024-08-28 RX ORDER — SODIUM CHLORIDE 0.9 % (FLUSH) 0.9 %
10 SYRINGE (ML) INJECTION EVERY 12 HOURS SCHEDULED
Status: DISCONTINUED | OUTPATIENT
Start: 2024-08-28 | End: 2024-08-28 | Stop reason: HOSPADM

## 2024-08-28 RX ORDER — PROCHLORPERAZINE EDISYLATE 5 MG/ML
10 INJECTION INTRAMUSCULAR; INTRAVENOUS ONCE AS NEEDED
Status: DISCONTINUED | OUTPATIENT
Start: 2024-08-28 | End: 2024-08-28 | Stop reason: HOSPADM

## 2024-08-28 RX ORDER — LORAZEPAM 2 MG/ML
0.25 INJECTION INTRAMUSCULAR
Status: DISCONTINUED | OUTPATIENT
Start: 2024-08-28 | End: 2024-08-28 | Stop reason: HOSPADM

## 2024-08-28 RX ORDER — PREGABALIN 75 MG/1
150 CAPSULE ORAL ONCE
Status: COMPLETED | OUTPATIENT
Start: 2024-08-28 | End: 2024-08-28

## 2024-08-28 RX ORDER — SODIUM CHLORIDE 9 MG/ML
40 INJECTION, SOLUTION INTRAVENOUS AS NEEDED
Status: DISCONTINUED | OUTPATIENT
Start: 2024-08-28 | End: 2024-08-28 | Stop reason: HOSPADM

## 2024-08-28 RX ORDER — ONDANSETRON 2 MG/ML
4 INJECTION INTRAMUSCULAR; INTRAVENOUS EVERY 6 HOURS PRN
Status: DISCONTINUED | OUTPATIENT
Start: 2024-08-28 | End: 2024-08-29 | Stop reason: HOSPADM

## 2024-08-28 RX ORDER — IBUPROFEN 600 MG/1
600 TABLET, FILM COATED ORAL ONCE AS NEEDED
Status: DISCONTINUED | OUTPATIENT
Start: 2024-08-28 | End: 2024-08-28 | Stop reason: HOSPADM

## 2024-08-28 RX ORDER — SODIUM CHLORIDE, SODIUM LACTATE, POTASSIUM CHLORIDE, CALCIUM CHLORIDE 600; 310; 30; 20 MG/100ML; MG/100ML; MG/100ML; MG/100ML
INJECTION, SOLUTION INTRAVENOUS CONTINUOUS PRN
Status: DISCONTINUED | OUTPATIENT
Start: 2024-08-28 | End: 2024-08-28 | Stop reason: SURG

## 2024-08-28 RX ORDER — HYDRALAZINE HYDROCHLORIDE 20 MG/ML
5 INJECTION INTRAMUSCULAR; INTRAVENOUS
Status: DISCONTINUED | OUTPATIENT
Start: 2024-08-28 | End: 2024-08-28 | Stop reason: HOSPADM

## 2024-08-28 RX ORDER — NALOXONE HCL 0.4 MG/ML
0.4 VIAL (ML) INJECTION AS NEEDED
Status: DISCONTINUED | OUTPATIENT
Start: 2024-08-28 | End: 2024-08-28 | Stop reason: HOSPADM

## 2024-08-28 RX ORDER — TRANEXAMIC ACID 100 MG/ML
INJECTION, SOLUTION INTRAVENOUS AS NEEDED
Status: DISCONTINUED | OUTPATIENT
Start: 2024-08-28 | End: 2024-08-28 | Stop reason: SURG

## 2024-08-28 RX ORDER — FENTANYL CITRATE 50 UG/ML
INJECTION, SOLUTION INTRAMUSCULAR; INTRAVENOUS
Status: COMPLETED | OUTPATIENT
Start: 2024-08-28 | End: 2024-08-28

## 2024-08-28 RX ORDER — ACETAMINOPHEN 650 MG/1
650 SUPPOSITORY RECTAL EVERY 4 HOURS PRN
Status: DISCONTINUED | OUTPATIENT
Start: 2024-08-28 | End: 2024-08-29 | Stop reason: HOSPADM

## 2024-08-28 RX ORDER — CETIRIZINE HYDROCHLORIDE 10 MG/1
10 TABLET ORAL DAILY
Status: DISCONTINUED | OUTPATIENT
Start: 2024-08-28 | End: 2024-08-29 | Stop reason: HOSPADM

## 2024-08-28 RX ORDER — SODIUM CHLORIDE 9 MG/ML
75 INJECTION, SOLUTION INTRAVENOUS CONTINUOUS
Status: DISCONTINUED | OUTPATIENT
Start: 2024-08-28 | End: 2024-08-29 | Stop reason: HOSPADM

## 2024-08-28 RX ORDER — OXYCODONE HCL 10 MG/1
10 TABLET, FILM COATED, EXTENDED RELEASE ORAL ONCE
Status: COMPLETED | OUTPATIENT
Start: 2024-08-28 | End: 2024-08-28

## 2024-08-28 RX ORDER — LIDOCAINE HYDROCHLORIDE 10 MG/ML
0.5 INJECTION, SOLUTION INFILTRATION; PERINEURAL ONCE AS NEEDED
Status: DISCONTINUED | OUTPATIENT
Start: 2024-08-28 | End: 2024-08-28 | Stop reason: HOSPADM

## 2024-08-28 RX ORDER — BISACODYL 10 MG
10 SUPPOSITORY, RECTAL RECTAL DAILY PRN
Status: DISCONTINUED | OUTPATIENT
Start: 2024-08-28 | End: 2024-08-29 | Stop reason: HOSPADM

## 2024-08-28 RX ORDER — LOSARTAN POTASSIUM 50 MG/1
50 TABLET ORAL DAILY
Status: DISCONTINUED | OUTPATIENT
Start: 2024-08-28 | End: 2024-08-28

## 2024-08-28 RX ORDER — BUPIVACAINE HYDROCHLORIDE 5 MG/ML
INJECTION, SOLUTION EPIDURAL; INTRACAUDAL
Status: COMPLETED | OUTPATIENT
Start: 2024-08-28 | End: 2024-08-28

## 2024-08-28 RX ORDER — NALOXONE HCL 0.4 MG/ML
0.4 VIAL (ML) INJECTION
Status: DISCONTINUED | OUTPATIENT
Start: 2024-08-28 | End: 2024-08-29 | Stop reason: HOSPADM

## 2024-08-28 RX ORDER — OXYCODONE HYDROCHLORIDE 5 MG/1
10 TABLET ORAL EVERY 4 HOURS PRN
Status: DISCONTINUED | OUTPATIENT
Start: 2024-08-28 | End: 2024-08-29 | Stop reason: HOSPADM

## 2024-08-28 RX ORDER — IPRATROPIUM BROMIDE AND ALBUTEROL SULFATE 2.5; .5 MG/3ML; MG/3ML
3 SOLUTION RESPIRATORY (INHALATION) ONCE AS NEEDED
Status: DISCONTINUED | OUTPATIENT
Start: 2024-08-28 | End: 2024-08-28 | Stop reason: HOSPADM

## 2024-08-28 RX ORDER — DEXAMETHASONE SODIUM PHOSPHATE 4 MG/ML
8 INJECTION, SOLUTION INTRA-ARTICULAR; INTRALESIONAL; INTRAMUSCULAR; INTRAVENOUS; SOFT TISSUE ONCE AS NEEDED
Status: DISCONTINUED | OUTPATIENT
Start: 2024-08-28 | End: 2024-08-28 | Stop reason: HOSPADM

## 2024-08-28 RX ORDER — ATORVASTATIN CALCIUM 40 MG/1
40 TABLET, FILM COATED ORAL NIGHTLY
Status: DISCONTINUED | OUTPATIENT
Start: 2024-08-28 | End: 2024-08-29 | Stop reason: HOSPADM

## 2024-08-28 RX ORDER — ONDANSETRON 4 MG/1
4 TABLET, ORALLY DISINTEGRATING ORAL EVERY 6 HOURS PRN
Status: DISCONTINUED | OUTPATIENT
Start: 2024-08-28 | End: 2024-08-29 | Stop reason: HOSPADM

## 2024-08-28 RX ORDER — LOSARTAN POTASSIUM 50 MG/1
50 TABLET ORAL DAILY
Status: DISCONTINUED | OUTPATIENT
Start: 2024-08-29 | End: 2024-08-29 | Stop reason: HOSPADM

## 2024-08-28 RX ORDER — PHENYLEPHRINE HCL IN 0.9% NACL 1 MG/10 ML
SYRINGE (ML) INTRAVENOUS AS NEEDED
Status: DISCONTINUED | OUTPATIENT
Start: 2024-08-28 | End: 2024-08-28 | Stop reason: SURG

## 2024-08-28 RX ORDER — ZOLPIDEM TARTRATE 5 MG/1
5 TABLET ORAL NIGHTLY PRN
Status: DISCONTINUED | OUTPATIENT
Start: 2024-08-28 | End: 2024-08-29 | Stop reason: HOSPADM

## 2024-08-28 RX ORDER — ROCURONIUM BROMIDE 10 MG/ML
INJECTION, SOLUTION INTRAVENOUS AS NEEDED
Status: DISCONTINUED | OUTPATIENT
Start: 2024-08-28 | End: 2024-08-28 | Stop reason: SURG

## 2024-08-28 RX ORDER — SODIUM CHLORIDE, SODIUM LACTATE, POTASSIUM CHLORIDE, CALCIUM CHLORIDE 600; 310; 30; 20 MG/100ML; MG/100ML; MG/100ML; MG/100ML
1000 INJECTION, SOLUTION INTRAVENOUS CONTINUOUS
Status: DISCONTINUED | OUTPATIENT
Start: 2024-08-28 | End: 2024-08-28

## 2024-08-28 RX ORDER — DIPHENHYDRAMINE HCL 25 MG
25 CAPSULE ORAL
Status: DISCONTINUED | OUTPATIENT
Start: 2024-08-28 | End: 2024-08-28 | Stop reason: HOSPADM

## 2024-08-28 RX ORDER — MELOXICAM 15 MG/1
15 TABLET ORAL DAILY
Status: DISCONTINUED | OUTPATIENT
Start: 2024-08-29 | End: 2024-08-29 | Stop reason: HOSPADM

## 2024-08-28 RX ORDER — METOCLOPRAMIDE HYDROCHLORIDE 5 MG/ML
10 INJECTION INTRAMUSCULAR; INTRAVENOUS ONCE AS NEEDED
Status: DISCONTINUED | OUTPATIENT
Start: 2024-08-28 | End: 2024-08-28 | Stop reason: HOSPADM

## 2024-08-28 RX ORDER — DIPHENHYDRAMINE HYDROCHLORIDE 50 MG/ML
12.5 INJECTION INTRAMUSCULAR; INTRAVENOUS
Status: DISCONTINUED | OUTPATIENT
Start: 2024-08-28 | End: 2024-08-28 | Stop reason: HOSPADM

## 2024-08-28 RX ORDER — PROMETHAZINE HYDROCHLORIDE 25 MG/1
25 TABLET ORAL ONCE AS NEEDED
Status: DISCONTINUED | OUTPATIENT
Start: 2024-08-28 | End: 2024-08-28 | Stop reason: HOSPADM

## 2024-08-28 RX ORDER — DULOXETIN HYDROCHLORIDE 30 MG/1
60 CAPSULE, DELAYED RELEASE ORAL DAILY
Status: DISCONTINUED | OUTPATIENT
Start: 2024-08-28 | End: 2024-08-29 | Stop reason: HOSPADM

## 2024-08-28 RX ORDER — PROMETHAZINE HYDROCHLORIDE 25 MG/1
25 SUPPOSITORY RECTAL ONCE AS NEEDED
Status: DISCONTINUED | OUTPATIENT
Start: 2024-08-28 | End: 2024-08-28 | Stop reason: HOSPADM

## 2024-08-28 RX ORDER — DIPHENHYDRAMINE HCL 25 MG
25 CAPSULE ORAL NIGHTLY PRN
Status: DISCONTINUED | OUTPATIENT
Start: 2024-08-28 | End: 2024-08-29 | Stop reason: HOSPADM

## 2024-08-28 RX ORDER — DIPHENHYDRAMINE HYDROCHLORIDE 50 MG/ML
12.5 INJECTION INTRAMUSCULAR; INTRAVENOUS ONCE AS NEEDED
Status: DISCONTINUED | OUTPATIENT
Start: 2024-08-28 | End: 2024-08-28 | Stop reason: HOSPADM

## 2024-08-28 RX ORDER — MELOXICAM 15 MG/1
15 TABLET ORAL ONCE
Status: COMPLETED | OUTPATIENT
Start: 2024-08-28 | End: 2024-08-28

## 2024-08-28 RX ORDER — TRANEXAMIC ACID 10 MG/ML
1000 INJECTION, SOLUTION INTRAVENOUS ONCE
Status: DISCONTINUED | OUTPATIENT
Start: 2024-08-28 | End: 2024-08-28 | Stop reason: HOSPADM

## 2024-08-28 RX ORDER — ACETAMINOPHEN 325 MG/1
650 TABLET ORAL ONCE AS NEEDED
Status: DISCONTINUED | OUTPATIENT
Start: 2024-08-28 | End: 2024-08-29 | Stop reason: HOSPADM

## 2024-08-28 RX ORDER — TRAMADOL HYDROCHLORIDE 50 MG/1
50 TABLET ORAL EVERY 6 HOURS PRN
Status: DISCONTINUED | OUTPATIENT
Start: 2024-08-28 | End: 2024-08-29 | Stop reason: HOSPADM

## 2024-08-28 RX ORDER — MIDAZOLAM HYDROCHLORIDE 1 MG/ML
1 INJECTION INTRAMUSCULAR; INTRAVENOUS
Status: DISCONTINUED | OUTPATIENT
Start: 2024-08-28 | End: 2024-08-28 | Stop reason: HOSPADM

## 2024-08-28 RX ORDER — ACETAMINOPHEN 325 MG/1
650 TABLET ORAL EVERY 4 HOURS PRN
Status: DISCONTINUED | OUTPATIENT
Start: 2024-08-28 | End: 2024-08-29 | Stop reason: HOSPADM

## 2024-08-28 RX ORDER — DEXAMETHASONE SODIUM PHOSPHATE 4 MG/ML
INJECTION, SOLUTION INTRA-ARTICULAR; INTRALESIONAL; INTRAMUSCULAR; INTRAVENOUS; SOFT TISSUE
Status: COMPLETED | OUTPATIENT
Start: 2024-08-28 | End: 2024-08-28

## 2024-08-28 RX ADMIN — ATORVASTATIN CALCIUM 40 MG: 40 TABLET, FILM COATED ORAL at 20:33

## 2024-08-28 RX ADMIN — Medication 12.5 MG: at 20:33

## 2024-08-28 RX ADMIN — SODIUM CHLORIDE 2 G: 900 INJECTION INTRAVENOUS at 17:43

## 2024-08-28 RX ADMIN — SODIUM CHLORIDE 2000 MG: 900 INJECTION INTRAVENOUS at 10:16

## 2024-08-28 RX ADMIN — Medication 200 MCG: at 11:15

## 2024-08-28 RX ADMIN — FENTANYL CITRATE 100 MCG: 50 INJECTION, SOLUTION INTRAMUSCULAR; INTRAVENOUS at 09:45

## 2024-08-28 RX ADMIN — FENTANYL CITRATE 100 MCG: 50 INJECTION, SOLUTION INTRAMUSCULAR; INTRAVENOUS at 10:21

## 2024-08-28 RX ADMIN — TRANEXAMIC ACID 1000 MG: 100 INJECTION, SOLUTION INTRAVENOUS at 10:27

## 2024-08-28 RX ADMIN — LIDOCAINE HYDROCHLORIDE 50 MG: 20 INJECTION, SOLUTION EPIDURAL; INFILTRATION; INTRACAUDAL; PERINEURAL at 10:21

## 2024-08-28 RX ADMIN — CETIRIZINE HYDROCHLORIDE 10 MG: 10 TABLET, FILM COATED ORAL at 16:03

## 2024-08-28 RX ADMIN — Medication 100 MCG: at 11:05

## 2024-08-28 RX ADMIN — BUPIVACAINE 10 ML: 13.3 INJECTION, SUSPENSION, LIPOSOMAL INFILTRATION at 09:45

## 2024-08-28 RX ADMIN — BUPIVACAINE HYDROCHLORIDE 10 ML: 5 INJECTION, SOLUTION EPIDURAL; INTRACAUDAL; PERINEURAL at 09:45

## 2024-08-28 RX ADMIN — SODIUM CHLORIDE, POTASSIUM CHLORIDE, SODIUM LACTATE AND CALCIUM CHLORIDE 1000 ML: 600; 310; 30; 20 INJECTION, SOLUTION INTRAVENOUS at 09:00

## 2024-08-28 RX ADMIN — ASPIRIN 325 MG: 325 TABLET, COATED ORAL at 20:33

## 2024-08-28 RX ADMIN — DEXAMETHASONE SODIUM PHOSPHATE 4 MG: 4 INJECTION, SOLUTION INTRAMUSCULAR; INTRAVENOUS at 09:45

## 2024-08-28 RX ADMIN — Medication 100 MCG: at 10:54

## 2024-08-28 RX ADMIN — SODIUM CHLORIDE, POTASSIUM CHLORIDE, SODIUM LACTATE AND CALCIUM CHLORIDE 1000 ML: 600; 310; 30; 20 INJECTION, SOLUTION INTRAVENOUS at 10:16

## 2024-08-28 RX ADMIN — PHENYLEPHRINE HYDROCHLORIDE 0.5 MCG/KG/MIN: 10 INJECTION INTRAVENOUS at 10:26

## 2024-08-28 RX ADMIN — ROCURONIUM BROMIDE 50 MG: 10 INJECTION, SOLUTION INTRAVENOUS at 10:23

## 2024-08-28 RX ADMIN — PROPOFOL 200 MG: 10 INJECTION, EMULSION INTRAVENOUS at 10:23

## 2024-08-28 RX ADMIN — ALLOPURINOL 100 MG: 100 TABLET ORAL at 16:03

## 2024-08-28 RX ADMIN — OXYCODONE HYDROCHLORIDE 10 MG: 10 TABLET, FILM COATED, EXTENDED RELEASE ORAL at 09:00

## 2024-08-28 RX ADMIN — BENZOYL PEROXIDE 1 APPLICATION: 5 LIQUID TOPICAL at 09:01

## 2024-08-28 RX ADMIN — MELOXICAM 15 MG: 15 TABLET ORAL at 09:00

## 2024-08-28 RX ADMIN — SODIUM CHLORIDE, SODIUM LACTATE, POTASSIUM CHLORIDE, AND CALCIUM CHLORIDE: .6; .31; .03; .02 INJECTION, SOLUTION INTRAVENOUS at 10:22

## 2024-08-28 RX ADMIN — Medication 100 MCG: at 10:46

## 2024-08-28 RX ADMIN — DULOXETINE HYDROCHLORIDE 60 MG: 30 CAPSULE, DELAYED RELEASE ORAL at 16:03

## 2024-08-28 RX ADMIN — PREGABALIN 150 MG: 75 CAPSULE ORAL at 09:00

## 2024-08-28 RX ADMIN — PROPOFOL 150 MCG/KG/MIN: 10 INJECTION, EMULSION INTRAVENOUS at 10:26

## 2024-08-28 NOTE — OP NOTE
TOTAL SHOULDER REVERSE ARTHROPLASTY  Procedure Report    Patient Name:  Clint Cee  YOB: 1945    Date of Surgery:  8/28/2024     Indications: This is a 79 y.o. male with right shoulder pain.  Imaging demonstrated degenerative joint disease.  Treatment options were discussed.  They desired to proceed with reverse shoulder arthroplasty after discussing the risks including bleeding, scarring, infection, stiffness, nerve damage, tendon damage, artery damage, continued  pain, DVT, loss of life or limb, fracture, dislocation, and a need for further surgery.      Pre-op Diagnosis:   Osteoarthritis of right glenohumeral joint [M19.011]       Post-op Diagnosis:    Post-Op Diagnosis Codes:     * Osteoarthritis of right glenohumeral joint [M19.011]    Procedure/CPT® Codes: 08335    Procedure(s): Right  TOTAL SHOULDER REVERSE ARTHROPLASTY    Staff: Clint Fountain physician assistant    was responsible for performing the following activities: Retraction, Suction, Irrigation, Suturing, Closing, and Placing Dressing and their skilled assistance was necessary for the success of this case.       Anesthesia: General with Block    Estimated Blood Loss: 200ml    Implants:    Implant Name Type Inv. Item Serial No.  Lot No. LRB No. Used Action   SUT NONABS MAXBRAID/PE NMBR2 HC5 38IN AYALA [id number] - [id number] Implant SUT NONABS MAXBRAID/PE NMBR2 HC5 38IN AYALA [id number]  [employer] [id number] Right 3 Implanted   GLENSPHR TRABECULARMETAL REV 40MM - [id number] Implant GLENSPHR TRABECULARMETAL REV 40MM  [employer] [id number] Right 1 Implanted   BASEPLT JOVI TRABECULARMETAL REV 15MM - [id number] Implant BASEPLT JOVI TRABECULARMETAL REV 15MM  [employer] [id number] Right 1 Implanted   SCRW CANC INV/REV 4.5X42MM - [id number] Implant SCRW CANC INV/REV 4.5X42MM  [employer] [id number] Right 2 Implanted   STEM HUM NONPOR REV [id number] - [id number] Implant STEM HUM NONPOR REV [id number]  [employer] [id number] Right  1 Implanted   LINER HUM/SHLDR TRABECULARMETAL REV POLY 60DEG 40MM PLS3 - [id number] Implant LINER HUM/SHLDR TRABECULARMETAL REV POLY 60DEG 40MM PLS3  [employer] [id number] Right 1 Implanted   SPACR HUM TRABECULAR REV [id number] - [id number] Implant SPACR HUM TRABECULAR REV [id number]  [employer] [id number] Right 1 Implanted       IVF: see anesthesia      Complications: None    Specimens:none    Description of Procedure: The patient's operative site was marked in the  preoperative holding area.  They received regional anesthesia and were brought to  the operating room and placed supine on a well-padded operating room table.  General anesthesia was administered.  Antibiotics were dosed.  A timeout was  taken, confirming the correct operative site and procedure.  They were placed in  the beach chair position.  The right shoulder was prepped and draped in the  standard surgical fashion.  SCDs were placed. A deltopectoral incision was marked and injected with local anesthetic.  The skin was opened.  Flaps were raised.  The interval was opened and the cephalic vein was protected.  Adhesions were released from the deltoid.  The circumflex vessels were identified and ligated with suture and cautery.  The rotator interval was opened and a retractor was placed.  The subscapularis was  Tenotomized and the capsule was released down to the 6 o'clock position on the  humeral head protecting the axillary nerve.  A Fukuda retractor was placed and an inferior and anterior capsular release was performed palpating and protecting the axillary  nerve with my finger at all times.  The shoulder was dislocated.  The biceps  was tenodesed to the upper biceps groove and circumferential osteophytes  were removed to identify the native head-neck junction.  Reaming was started  behind the biceps groove up to a size 15.  The cut was made in 20  degrees of retroversion and the final two reamers were used to prepare the  proximal humerus.  A trial was  placed.  The glenoid was exposed after placing retractors.  The soft tissue was removed circumferentially.  The center point was marked and the glenoid was prepared in standard fashion.  The base plate was placed with good press-fit.  Two screws were placed with good purchase.  The locking caps were  placed.  The glenosphere was placed with good purchase.  The shoulder was  dislocated and the trial was removed from the canal and irrigated.  The true  stem was placed with good press-fit and trialing demonstrated 12/3 thickness to offer the best restoration of joint stability, muscle tension and range of motion.  The true polyethylene was placed with similar range of motion and stability.  A 3-minute Betadine wash performed.  The wound was closed in layers with absorbable suture and skin glue after placing a drain.  A sterile dressing and sling were applied.  They were awakened and taken to the recovery room.  There were no complications.  I was present for all portions.  All counts were correct.   Good capillary refill was noted to the hand.      Андрей Goldberg MD     Date: 8/28/2024  Time: 12:11 EDT

## 2024-08-28 NOTE — PLAN OF CARE
Goal Outcome Evaluation:  Plan of Care Reviewed With: spouse           Outcome Evaluation: New admission from surgery

## 2024-08-28 NOTE — ANESTHESIA POSTPROCEDURE EVALUATION
Patient: Clint Cee    Procedure Summary       Date: 08/28/24 Room / Location: [facility] OR 11 / [facility] MAIN OR    Anesthesia Start: 1020 Anesthesia Stop: 1206    Procedure: TOTAL SHOULDER REVERSE ARTHROPLASTY (Right: Shoulder) Diagnosis:       Osteoarthritis of right glenohumeral joint      (Osteoarthritis of right glenohumeral joint [M19.011])    Surgeons: Андрей Goldberg MD Provider: Vira Ziegler MD    Anesthesia Type: general with block, ERAS Protocol ASA Status: 3            Anesthesia Type: general with block, ERAS Protocol    Vitals  Vitals Value Taken Time   /54 08/28/24 1310   Temp 98.7 °F (37.1 °C) 08/28/24 1309   Pulse 78 08/28/24 1311   Resp 16 08/28/24 1309   SpO2 92 % 08/28/24 1311   Vitals shown include unfiled device data.        Post Anesthesia Care and Evaluation    Patient location during evaluation: PACU  Patient participation: complete - patient participated  Level of consciousness: awake and alert  Pain management: satisfactory to patient    Airway patency: patent  Anesthetic complications: No anesthetic complications  PONV Status: none  Cardiovascular status: acceptable  Respiratory status: acceptable  Hydration status: acceptable

## 2024-08-28 NOTE — PLAN OF CARE
Goal Outcome Evaluation:  Plan of Care Reviewed With: patient, spouse           Outcome Evaluation: Pt is a 78 y/o M admitted to [facility] on 08/28/24 for elective right rTSA due to OA of right glenohumeral joint. PMH: :L3-L4 fx, asthma, HTN, mitral valve prolapse mood disorder, scoliosis, OA, hx prostate cancer, hearing loss, visual impairment, and s/p MVR (2022).   At baseline pt resides with spouse in multi level home with 0 YOLANDA. His main bedroom is on the 2nd floor; however, they do have a bedroom & full bath with shower on main floor he can use PRN. Pt typically independent with ADLs, mobility & still drives. Upon eval, pt is still lethargic from anesthesia. He does arouse to verbal requests, but falls back asleep within a few minutes requiring frequent cuing to stay awake. Pt not safe for OOB activity this date. Pt able to assist using LUE to pull himself into long sitting using siderail to allow for donning of cold pack, repositioning of right shoulder brace & gown & to position pt will pillows for comfort & support of RUE. Initiated instruction of use of cold pack, correct positioning of brace, post-op instructions/precautions & HEP. Pt & spouse verbalizing understanding. Pt  will require reinforcement due to lethargy. OT will continue to follow for tx & education/training with goal of pt returning home with spouse & attending outpt therapy. Due to lethargy this date from anesthesia, pt will need to be assessed for safe mobility tomorrow as medically appropriate.      Anticipated Discharge Disposition (OT): home with assist, home with outpatient therapy services

## 2024-08-28 NOTE — ANESTHESIA PROCEDURE NOTES
Peripheral Block      Patient reassessed immediately prior to procedure    Patient location during procedure: pre-op  Start time: 8/28/2024 9:40 AM  Stop time: 8/28/2024 9:45 AM  Reason for block: procedure for pain, at surgeon's request, post-op pain management and secondary anesthetic  Performed by  Anesthesiologist: Tomer See MD  Preanesthetic Checklist  Completed: patient identified, IV checked, site marked, risks and benefits discussed, surgical consent, monitors and equipment checked, pre-op evaluation and timeout performed  Prep:  Sterile barriers:cap, gloves, mask and washed/disinfected hands  Prep: ChloraPrep  Patient monitoring: blood pressure monitoring, continuous pulse oximetry and EKG  Procedure    Sedation: yes  Performed under: local infiltration  Guidance:ultrasound guided and landmark technique    ULTRASOUND INTERPRETATION.  Using ultrasound guidance a 22 G gauge needle was placed in close proximity to the nerve, at which point, under ultrasound guidance anesthetic was injected in the area of the nerve and spread of the anesthesia was seen on ultrasound in close proximity thereto.  There were no abnormalities seen on ultrasound; a digital image was taken; and the patient tolerated the procedure with no complications. Images:still images obtained, printed/placed on chart    Laterality:right  Block Type:supraclavicular  Injection Technique:single-shot  Needle Type:echogenic  Needle Gauge:22 G  Resistance on Injection: less than 15 psi  Sedation medications used: fentaNYL citrate (PF) (SUBLIMAZE) injection - Intravenous   100 mcg - 8/28/2024 9:45:00 AM  Medications Used: dexamethasone (DECADRON) injection - Injection   4 mg - 8/28/2024 9:45:00 AM  bupivacaine liposome (EXPAREL) injection 1.3% - Perineural   10 mL - 8/28/2024 9:45:00 AM  bupivacaine PF (MARCAINE) injection 0.5% - Perineural   10 mL - 8/28/2024 9:45:00 AM      Post Assessment  Injection Assessment: negative aspiration for heme,  no paresthesia on injection and incremental injection  Patient Tolerance:comfortable throughout block  Complications:no  Additional Notes  Pre-procedure:  Peripheral nerve block performed preoperatively prior to the start of anesthesia time at the request of the patient and the surgeon for the management of postoperative acute surgical pain as well as for a secondary intraoperative anesthetic (general anesthesia is the primary intraoperative anesthetic); patient identified; pre-procedure vital signs, all relevant labs/studies, complete medical/surgical/anesthetic history, full medication list, full allergy list, and NPO status obtained/reviewed; physical assessment performed; anesthetic options, side effects, potential complications, risks, and benefits discussed; questions answered; patient wishes to proceed with the procedure; written anesthesia procedure consent obtained; patient cleared for procedure; time out performed; IV access in situ    Procedure:  ASA monitor placed; supplemental oxygen provided; patient positioned; hand hygiene performed; sterile technique maintained throughout the procedure; sterile prep applied; insertion site determined by anatomical landmarks, palpation, and ultrasound imaging; live ultrasound guidance throughout the procedure; target nerves/landmarks identified on live ultrasound; skin and subcutaneous tissues numbed by injection of 1% lidocaine; 2 inch 22G StimupStarChase Ultra 360 Insulated Echogenic Needle used; realtime needle advancement and placement near the target nerves witnessed on live ultrasound; negative aspiration prior to injection; correct needle placement confirmed on live ultrasound by local anesthetic spread around the target nerves; local anesthetic mixture injected with negative aspiration prior to each injection and after each 1-5 mL injected; needle withdrawn; dressing applied; ultrasound image printed and placed in the patient's permanent medical  record    Post-procedure:  Peripheral nerve block placed successfully; good block; no apparent complications; minimal estimated blood loss; vital signs stable throughout; see nurse's notes for vitals; transported to the OR, general anesthesia induced, and surgery started  Performed by: Tomer See MD

## 2024-08-28 NOTE — THERAPY EVALUATION
Patient Name: Clint Cee  : 1945    MRN: [id number]                              Today's Date: 2024       Admit Date: 2024    Visit Dx:     ICD-10-CM ICD-9-CM   1. Osteoarthritis of right glenohumeral joint  M19.011 715.91     Patient Active Problem List   Diagnosis    Benign prostatic hyperplasia    Gastroesophageal reflux disease    Mood disorder    Scoliosis    Spinal stenosis    Vitamin D deficiency    Polyosteoarthritis    Insomnia    Retinal detachment of right eye with single retinal tear    Osteoarthritis of knee    Cellulitis of face    Prostate cancer    Ocular migraine    Headache    Gout of multiple sites    Medicare annual wellness visit, subsequent    Allergic    HL (hearing loss)    Visual impairment    Cataract    Colon polyp    Primary hypertension    Dyspnea on exertion    S/P mitral valve repair per Dr. Henderson 2022    S/P tricuspid valve repair per Dr. Henderson 2022    Back pain    Acute shoulder bursitis, right    Rotator cuff (capsule) sprain    Impaired decision making    Mild cognitive impairment    Fall in (into) shower or empty bathtub, sequela    Plantar wart of right foot    DJD of right shoulder    Osteoarthritis of right glenohumeral joint     Past Medical History:   Diagnosis Date    Abnormal ECG 2022    Allergic 1954    Nasal alergies    Arrhythmia     Dr Youngblood examined me and said i was ok.  skipped beats    Asthma     no sx    Benign prostatic hyperplasia 2018    Callus     Toe spreaders used    Cataract 2014    Surgery. Caused detached retina of right eye  repair    Clotting disorder     Low platelets    Colon polyp 2018    Found and removed last colon eca.q    Deep vein thrombosis     Depression     Gastroesophageal reflux disease 2014    Glaucoma 2003    Normal pressures. Have low pressure glaucoma    Gout     Headache 2015    Have shimmering in both eyes intermittantly. No headaches     Heart murmur May 2022    During wellness exam    Heart valve disease July 2022    During Echo    HL (hearing loss) 01/01/2012    Have hearing aids    Hyperlipidemia     Hypertension 12/02/2011    Infectious viral hepatitis 1954    Yellow jaundis as child    Insomnia 12/02/2011    Low back pain 1966    Mitral valve prolapse June 2022    Mood disorder 09/19/2013    Polyosteoarthritis 12/02/2011    Prostate Cancer Jan22 January 2022    Spinal stenosis 12/02/2011    Visual impairment 01/01/1955    Nearsighted corrected glasses    Vitamin D deficiency 05/23/2018     Past Surgical History:   Procedure Laterality Date    ADENOIDECTOMY  1954    As child    APPENDECTOMY  1956    Surgery    CARDIAC CATHETERIZATION N/A 09/30/2022    Procedure: Right and Left Heart Cath with Coronar Angiography;  Surgeon: Felipe Garcia MD;  Location: [facility] CATH INVASIVE LOCATION;  Service: Cardiovascular;  Laterality: N/A;    CARDIAC VALVE REPLACEMENT N/A 11/17/2022    Procedure: TRICUSPID VALVE REPAIR/REPLACEMENT;  Surgeon: Femi Henderson MD;  Location: [facility] CVOR;  Service: Cardiothoracic;  Laterality: N/A;  tricuspid valve repaired with 32mm  groves physio tricuspid annuloplasty ring    CARDIAC VALVE REPLACEMENT  11/17/2022    Repaired mitral and tricuspid    CATARACT EXTRACTION Bilateral     COLONOSCOPY  01/01/1997    Regularly scheduled    COLONOSCOPY N/A 02/22/2023    Procedure: COLONOSCOPY with cold snare polypectomy x 1;  Surgeon: Manfred Keating MD;  Location: [facility] ENDOSCOPY;  Service: Gastroenterology;  Laterality: N/A;    CORONARY ARTERY BYPASS GRAFT  11/22    During open heart valves repair.    ENDOSCOPY N/A 02/22/2023    Procedure: ESOPHAGOGASTRODUODENOSCOPY with esophageal dilation using 48 Fr. Bougie Dilator;  Surgeon: Manfred Keating MD;  Location: [facility] ENDOSCOPY;  Service: Gastroenterology;  Laterality: N/A;  erosive esophagitis    EYE SURGERY  01/01/2010    Reattached right retina    LUMBAR  FUSION N/A 09/13/2023    Procedure: POSTERIOR LUMBAR FUSION WITH ROBOT;  Surgeon: Jayme Strauss IV, MD;  Location: [facility] MAIN OR;  Service: Robotics - Neuro;  Laterality: N/A;    MITRAL VALVE REPAIR/REPLACEMENT N/A 11/17/2022    Procedure: MITRAL VALVE REPAIR/REPLACEMENT with left atrial appendage closure;  Surgeon: Femi Henderson MD;  Location: [facility] CVOR;  Service: Cardiothoracic;  Laterality: N/A;  mitral valve repaired iwth 40mm medtronic annuloplasty band  with intraop TERRA    MITRAL VALVE REPAIR/REPLACEMENT  11/22    SPINE SURGERY  01/01/1971    2 lumbar 1 cervical    TONSILLECTOMY  01/01/1950    Childhood    TRICUSPID VALVE SURGERY  11/22    WART REMOVAL Right 6/28/2024    Procedure: WART REMOVAL WITH LASER;  Surgeon: DUNCAN Robles DPM;  Location: [facility] MAIN OR;  Service: Podiatry;  Laterality: Right;      General Information       Row Name 08/28/24 1516          OT Time and Intention    Document Type evaluation  -DT     Mode of Treatment occupational therapy  -DT       Row Name 08/28/24 1556 08/28/24 1516       General Information    Patient Profile Reviewed -- yes  -DT    Prior Level of Function -- independent:;all household mobility;driving;transfer;ADL's  -DT    Existing Precautions/Restrictions oxygen therapy device and L/min  -DT right;shoulder  -DT    Barriers to Rehab -- none identified  -DT      Row Name 08/28/24 1516          Living Environment    People in Home spouse  -DT       Row Name 08/28/24 1516          Home Main Entrance    Number of Stairs, Main Entrance none  -DT       Row Name 08/28/24 1516          Stairs Within Home, Primary    Stairs, Within Home, Primary Flight of steps to 2nd story main bedroom. They do have a bed & bathroom on main floor to use PRN  -DT     Number of Stairs, Within Home, Primary twelve  -DT       Row Name 08/28/24 1516          Cognition    Orientation Status (Cognition) oriented to;person;situation;place;verbal cues/prompts needed for orientation  Pt  required min v.c. for month & year. Pt is still lethargic from his anesthesia  -DT       Row Name 08/28/24 1516          Safety Issues, Functional Mobility    Impairments Affecting Function (Mobility) range of motion (ROM);strength  -DT     Comment, Safety Issues/Impairments (Mobility) alertness level - pt still lethargic from anesthesia for OOB activity  -DT               User Key  (r) = Recorded By, (t) = Taken By, (c) = Cosigned By      Initials Name Provider Type    DT Caroline Rubi, OT Occupational Therapist                     Mobility/ADL's       Row Name 08/28/24 1528          Bed Mobility    Bed Mobility other (see comments)  -DT     Comment, (Bed Mobility) Pt able to use left siderail to pull self into long sitting for short periods of time to allow for donning of cold pack & repositioning of Right shoulder brace & to place pillows for comfort & proper positioning.  -DT       Row Name 08/28/24 1528          Transfers    Comment, (Transfers) NT as pt too lethargic still from anesthesia to safely assess this date.  -DT       Row Name 08/28/24 1528          Functional Mobility    Patient was able to Ambulate no, other medical factors prevent ambulation  -DT     Reason Patient was unable to Ambulate Excessive Sedation/Somnolence;Other (Comment)  NT as pt too lethargic still from anesthesia to safely assess this date.  -DT       Row Name 08/28/24 1528          Activities of Daily Living    BADL Assessment/Intervention upper body dressing;feeding  -DT       Row Name 08/28/24 1528          Upper Body Dressing Assessment/Training    [place] Level (Upper Body Dressing) upper body dressing skills;maximum assist (25% patient effort)  -DT     Position (Upper Body Dressing) long sitting  -DT       Row Name 08/28/24 1528          Self-Feeding Assessment/Training    [place] Level (Feeding) feeding skills;moderate assist (50% patient effort)  -DT     Position (Self-Feeding) sitting up in bed  Pt  positioned upright in bed to allow for safe swallowing.  -DT     Comment, (Feeding) Pt is right hand dominant. Spouse assisting pt with drinking from cup during times when pt has increased alertness. Informed pt & spouse to delay feeding task if pt continues to fall back asleep to prevent risk of choking/aspiration. Spouse & pt expressed verbal understanding.  -DT               User Key  (r) = Recorded By, (t) = Taken By, (c) = Cosigned By      Initials Name Provider Type    DT Caroline Rubi OT Occupational Therapist                   Obj/Interventions       Row Name 08/28/24 1534          Sensory Assessment (Somatosensory)    Sensory Assessment (Somatosensory) right UE  -DT     Sensory Subjective Reports tingling;numbness  -DT     Sensory Assessment Pt's RUE still mostly numb with reports of min tingling in right hand.  -DT       Row Name 08/28/24 1534          Range of Motion Comprehensive    Comment, General Range of Motion LUE= WFL; RUE PROM of elbow, wrist & hand = WFL. Pt has trace digit movement. Right shoulder flex PROM to approx 110.  -DT       Row Name 08/28/24 1534          Strength Comprehensive (MMT)    Comment, General Manual Muscle Testing (MMT) Assessment LUE = grossly 4/5; RUE= 0/5 except Trace digit motion at time eval due to nerve block.  -DT       Row Name 08/28/24 1534          Balance    Balance Assessment sitting static balance  -DT     Static Sitting Balance not tested  -DT     Dynamic Sitting Balance not tested  -DT     Comment, Balance Pt too lethargic to safely assess  -DT               User Key  (r) = Recorded By, (t) = Taken By, (c) = Cosigned By      Initials Name Provider Type    Caroline Mendoza OT Occupational Therapist                   Goals/Plan       Row Name 08/28/24 5520          Transfer Goal 1 (OT)    Activity/Assistive Device (Transfer Goal 1, OT) transfers, all  -DT     [place] Level/Cues Needed (Transfer Goal 1, OT) minimum assist (75% or more  patient effort)  -DT     Time Frame (Transfer Goal 1, OT) 3 days  -DT       Row Name 08/28/24 1557          Dressing Goal 1 (OT)    Activity/Device (Dressing Goal 1, OT) dressing skills, all  -DT     [place]/Cues Needed (Dressing Goal 1, OT) moderate assist (50-74% patient effort)  -DT     Time Frame (Dressing Goal 1, OT) 3 days  -DT       Row Name 08/28/24 1557          Toileting Goal 1 (OT)    Activity/Device (Toileting Goal 1, OT) toileting skills, all  -DT     [place] Level/Cues Needed (Toileting Goal 1, OT) minimum assist (75% or more patient effort)  -DT     Time Frame (Toileting Goal 1, OT) 3 days  -DT       Row Name 08/28/24 1557          Self-Feeding Goal 1 (OT)    Activity/Device (Self-Feeding Goal 1, OT) self-feeding skills, all  -DT     [place] Level/Cues Needed (Self-Feeding Goal 1, OT) set-up required;minimum assist (75% or more patient effort)  -DT     Time Frame (Self-Feeding Goal 1, OT) 3 days  -DT       Row Name 08/28/24 7107          Therapy Assessment/Plan (OT)    Planned Therapy Interventions (OT) activity tolerance training;BADL retraining;functional balance retraining;neuromuscular control/coordination retraining;occupation/activity based interventions;patient/caregiver education/training;strengthening exercise;ROM/therapeutic exercise;transfer/mobility retraining;passive ROM/stretching  -DT               User Key  (r) = Recorded By, (t) = Taken By, (c) = Cosigned By      Initials Name Provider Type    DT Caroline Rubi, OT Occupational Therapist                   Clinical Impression       Row Name 08/28/24 1531          Pain Assessment    Pretreatment Pain Rating 0/10 - no pain  -DT     Posttreatment Pain Rating 0/10 - no pain  -DT       Row Name 08/28/24 1530          Plan of Care Review    Plan of Care Reviewed With patient;spouse  -DT     Outcome Evaluation Pt is a 80 y/o M admitted to [facility] on 08/28/24 for elective right rTSA due to OA of right glenohumeral joint.  PMH: :L3-L4 fx, asthma, HTN, mitral valve prolapse mood disorder, scoliosis, OA, hx prostate cancer, hearing loss, visual impairment, and s/p MVR (2022).   At baseline pt resides with spouse in multi level home with 0 YOLANDA. His main bedroom is on the 2nd floor; however, they do have a bedroom & full bath with shower on main floor he can use PRN. Pt typically independent with ADLs, mobility & still drives. Upon eval, pt is still lethargic from anesthesia. He does arouse to verbal requests, but falls back asleep within a few minutes requiring frequent cuing to stay awake. Pt not safe for OOB activity this date. Pt able to assist using LUE to pull himself into long sitting using siderail to allow for donning of cold pack, repositioning of right shoulder brace & gown & to position pt will pillows for comfort & support of RUE. Initiated instruction of use of cold pack, correct positioning of brace, post-op instructions/precautions & HEP. Pt & spouse verbalizing understanding. Pt  will require reinforcement due to lethargy. OT will continue to follow for tx & education/training with goal of pt returning home with spouse & attending outpt therapy. Due to lethargy this date from anesthesia, pt will need to be assessed for safe mobility tomorrow as medically appropriate.  -DT       Row Name 08/28/24 1537          Therapy Assessment/Plan (OT)    Rehab Potential (OT) good, to achieve stated therapy goals  -DT     Criteria for Skilled Therapeutic Interventions Met (OT) yes;skilled treatment is necessary;meets criteria  -DT     Therapy Frequency (OT) 5 times/wk  -DT     Predicted Duration of Therapy Intervention (OT) until d/c  -DT       Row Name 08/28/24 4637          Therapy Plan Review/Discharge Plan (OT)    Anticipated Discharge Disposition (OT) home with assist;home with outpatient therapy services  -DT       Row Name 08/28/24 6336 08/28/24 3277       Vital Signs    Pre Systolic BP Rehab -- 130  -DT    Pre Treatment  Diastolic BP -- 70  -DT    Pre SpO2 (%) -- 96  -DT    O2 Delivery Pre Treatment nasal cannula  -DT supplemental O2  2L  -DT      Row Name 08/28/24 1556          Positioning and Restraints    Pre-Treatment Position in bed  -DT     Post Treatment Position bed  -DT     In Bed notified nsg;fowlers;call light within reach;encouraged to call for assist;exit alarm on;with family/caregiver;side rails up x3  -DT               User Key  (r) = Recorded By, (t) = Taken By, (c) = Cosigned By      Initials Name Provider Type    DT Caroline Rubi, OT Occupational Therapist                   Outcome Measures       Row Name 08/28/24 1336          How much help from another person do you currently need...    Turning from your back to your side while in flat bed without using bedrails? 4  -LB     Moving from lying on back to sitting on the side of a flat bed without bedrails? 3  -LB     Moving to and from a bed to a chair (including a wheelchair)? 3  -LB     Standing up from a chair using your arms (e.g., wheelchair, bedside chair)? 3  -LB     Climbing 3-5 steps with a railing? 3  -LB     To walk in hospital room? 3  -LB     AM-PAC 6 Clicks Score (PT) 19  -LB     Highest Level of Mobility Goal 6 --> Walk 10 steps or more  -LB               User Key  (r) = Recorded By, (t) = Taken By, (c) = Cosigned By      Initials Name Provider Type    LB Carol Ann Locke RN Registered Nurse                    Occupational Therapy Education       Title: PT OT SLP Therapies (Done)       Topic: Occupational Therapy (Done)       Point: ADL training (Done)       Description:   Instruct learner(s) on proper safety adaptation and remediation techniques during self care or transfers.   Instruct in proper use of assistive devices.                  Learning Progress Summary             Patient Acceptance, E,TB,D,H, VU,NR by DT at 8/28/2024 2603    Comment: Role of OT, goals & POC, post op precautions, compensatory tech trng, HEP   Family  Acceptance, E,TB,D,H, VU,NR by DT at 8/28/2024 1558    Comment: Role of OT, goals & POC, post op precautions, compensatory tech trng, HEP                         Point: Home exercise program (Done)       Description:   Instruct learner(s) on appropriate technique for monitoring, assisting and/or progressing therapeutic exercises/activities.                  Learning Progress Summary             Patient Acceptance, E,TB,D,H, VU,NR by DT at 8/28/2024 1558    Comment: Role of OT, goals & POC, post op precautions, compensatory tech trng, HEP   Family Acceptance, E,TB,D,H, VU,NR by DT at 8/28/2024 1558    Comment: Role of OT, goals & POC, post op precautions, compensatory tech trng, HEP                         Point: Precautions (Done)       Description:   Instruct learner(s) on prescribed precautions during self-care and functional transfers.                  Learning Progress Summary             Patient Acceptance, E,TB,D,H, VU,NR by DT at 8/28/2024 1558    Comment: Role of OT, goals & POC, post op precautions, compensatory tech trng, HEP   Family Acceptance, E,TB,D,H, VU,NR by DT at 8/28/2024 1558    Comment: Role of OT, goals & POC, post op precautions, compensatory tech trng, HEP                         Point: Body mechanics (Done)       Description:   Instruct learner(s) on proper positioning and spine alignment during self-care, functional mobility activities and/or exercises.                  Learning Progress Summary             Patient Acceptance, E,TB,D,H, VU,NR by DT at 8/28/2024 1558    Comment: Role of OT, goals & POC, post op precautions, compensatory tech trng, HEP   Family Acceptance, E,TB,D,H, VU,NR by DT at 8/28/2024 1558    Comment: Role of OT, goals & POC, post op precautions, compensatory tech trng, HEP                                         User Key       Initials Effective Dates Name Provider Type Discipline    DT 07/11/23 -  Caroline Rbui, OT Occupational Therapist OT                   OT Recommendation and Plan  Planned Therapy Interventions (OT): activity tolerance training, BADL retraining, functional balance retraining, neuromuscular control/coordination retraining, occupation/activity based interventions, patient/caregiver education/training, strengthening exercise, ROM/therapeutic exercise, transfer/mobility retraining, passive ROM/stretching  Therapy Frequency (OT): 5 times/wk  Plan of Care Review  Plan of Care Reviewed With: patient, spouse  Outcome Evaluation: Pt is a 80 y/o M admitted to [facility] on 08/28/24 for elective right rTSA due to OA of right glenohumeral joint. PMH: :L3-L4 fx, asthma, HTN, mitral valve prolapse mood disorder, scoliosis, OA, hx prostate cancer, hearing loss, visual impairment, and s/p MVR (2022).   At baseline pt resides with spouse in multi level home with 0 YOLANDA. His main bedroom is on the 2nd floor; however, they do have a bedroom & full bath with shower on main floor he can use PRN. Pt typically independent with ADLs, mobility & still drives. Upon eval, pt is still lethargic from anesthesia. He does arouse to verbal requests, but falls back asleep within a few minutes requiring frequent cuing to stay awake. Pt not safe for OOB activity this date. Pt able to assist using LUE to pull himself into long sitting using siderail to allow for donning of cold pack, repositioning of right shoulder brace & gown & to position pt will pillows for comfort & support of RUE. Initiated instruction of use of cold pack, correct positioning of brace, post-op instructions/precautions & HEP. Pt & spouse verbalizing understanding. Pt  will require reinforcement due to lethargy. OT will continue to follow for tx & education/training with goal of pt returning home with spouse & attending outpt therapy. Due to lethargy this date from anesthesia, pt will need to be assessed for safe mobility tomorrow as medically appropriate.     Time Calculation:         Time Calculation- OT       Row  Name 08/28/24 1600             Time Calculation- OT    OT Start Time 1430  -DT      OT Stop Time 1510  -DT      OT Time Calculation (min) 40 min  -DT      OT Received On 08/28/24  -DT      OT - Next Appointment 08/29/24  -DT      OT Goal Re-Cert Due Date 09/11/24  -DT                User Key  (r) = Recorded By, (t) = Taken By, (c) = Cosigned By      Initials Name Provider Type    DT Caroline Rubi, OT Occupational Therapist                           Caroline Rubi, OT  8/28/2024

## 2024-08-28 NOTE — ANESTHESIA PROCEDURE NOTES
Airway  Date/Time: 8/28/2024 10:24 AM    Indications and Patient Condition  Indications for airway management: airway protection    Preoxygenated: yes  MILS maintained throughout  Mask difficulty assessment: 0 - not attempted    Final Airway Details  Final airway type: endotracheal airway      Successful airway: ETT     Successful intubation technique: video laryngoscopy  Facilitating devices/methods: intubating stylet  Endotracheal tube insertion site: oral  Blade: Samaniego  ETT size (mm): 7.5  Cormack-Lehane Classification: grade I - full view of glottis  Measured from: teeth  ETT/EBT  to teeth (cm): 22  Number of attempts at approach: 1  Assessment: lips, teeth, and gum same as pre-op and atraumatic intubation

## 2024-08-28 NOTE — ANESTHESIA PREPROCEDURE EVALUATION
Anesthesia Evaluation     Patient summary reviewed and Nursing notes reviewed   NPO Solid Status: > 8 hours  NPO Liquid Status: > 8 hours           Airway   Mallampati: II  TM distance: >3 FB  Neck ROM: full  No difficulty expected  Dental - normal exam     Pulmonary - normal exam   (+) asthma,shortness of breath  Cardiovascular - normal exam    ECG reviewed    (+) hypertension, valvular problems/murmurs, CABG, DVT, hyperlipidemia      Neuro/Psych  (+) headaches, psychiatric history  GI/Hepatic/Renal/Endo    (+) GERD, liver disease    Musculoskeletal     Abdominal  - normal exam    Bowel sounds: normal.   Substance History      OB/GYN          Other   arthritis, blood dyscrasia thrombocytopenia,   history of cancer    ROS/Med Hx Other: MVR TVR 22      Sinus rhythm  Ventricular premature complex      23  Impression  Status post mitral valve repair.  No mitral regurgitation is present.  Aortic valve is thickened with adequate opening motion.  Left atrial enlargement.  Left ventricular size and contractility is normal with ejection fraction of 60%.       22 prior to sx   SUMMARY:  Moderate to significant pulmonary hypertension.  Severe mitral valve prolapse and mitral regurgitation and probable ruptured chordae (TERRA and cardiac cath)  Tricuspid valve prolapse.  Normal left ventricular function.  Normal coronary arteries.    Findings:  Status post cardiac valve replacements. Heart size and pulmonary vasculature are within normal limits. Lungs clear. Blunting of the right costophrenic angle. Sharp left costophrenic angle     IMPRESSION:  Impression:  Small right pleural effusion                          Anesthesia Plan    ASA 3     general with block and ERAS Protocol   total IV anesthesia  intravenous induction     Anesthetic plan, risks, benefits, and alternatives have been provided, discussed and informed consent has been obtained with: patient.    Plan discussed with CRNA.        CODE STATUS:

## 2024-08-29 ENCOUNTER — READMISSION MANAGEMENT (OUTPATIENT)
Dept: CALL CENTER | Facility: HOSPITAL | Age: 79
End: 2024-08-29
Payer: MEDICARE

## 2024-08-29 VITALS
BODY MASS INDEX: 32.51 KG/M2 | HEART RATE: 77 BPM | WEIGHT: 240 LBS | TEMPERATURE: 98.2 F | RESPIRATION RATE: 18 BRPM | HEIGHT: 72 IN | DIASTOLIC BLOOD PRESSURE: 69 MMHG | SYSTOLIC BLOOD PRESSURE: 128 MMHG | OXYGEN SATURATION: 96 %

## 2024-08-29 PROCEDURE — 63710000001 METOPROLOL TARTRATE 12.5 MG TABLET: Performed by: ORTHOPAEDIC SURGERY

## 2024-08-29 PROCEDURE — 63710000001 ZOLPIDEM 5 MG TABLET: Performed by: ORTHOPAEDIC SURGERY

## 2024-08-29 PROCEDURE — A9270 NON-COVERED ITEM OR SERVICE: HCPCS | Performed by: ORTHOPAEDIC SURGERY

## 2024-08-29 PROCEDURE — 63710000001 MELOXICAM 15 MG TABLET: Performed by: ORTHOPAEDIC SURGERY

## 2024-08-29 PROCEDURE — 63710000001 CETIRIZINE 10 MG TABLET: Performed by: ORTHOPAEDIC SURGERY

## 2024-08-29 PROCEDURE — 25010000002 CEFAZOLIN PER 500 MG: Performed by: ORTHOPAEDIC SURGERY

## 2024-08-29 PROCEDURE — 97161 PT EVAL LOW COMPLEX 20 MIN: CPT

## 2024-08-29 PROCEDURE — 97110 THERAPEUTIC EXERCISES: CPT | Performed by: OCCUPATIONAL THERAPIST

## 2024-08-29 PROCEDURE — 63710000001 ALLOPURINOL 100 MG TABLET: Performed by: ORTHOPAEDIC SURGERY

## 2024-08-29 PROCEDURE — G0378 HOSPITAL OBSERVATION PER HR: HCPCS

## 2024-08-29 PROCEDURE — 97530 THERAPEUTIC ACTIVITIES: CPT | Performed by: OCCUPATIONAL THERAPIST

## 2024-08-29 PROCEDURE — 63710000001 ASPIRIN 325 MG TABLET DELAYED-RELEASE: Performed by: ORTHOPAEDIC SURGERY

## 2024-08-29 PROCEDURE — 63710000001 LOSARTAN 50 MG TABLET: Performed by: ORTHOPAEDIC SURGERY

## 2024-08-29 PROCEDURE — 97535 SELF CARE MNGMENT TRAINING: CPT | Performed by: OCCUPATIONAL THERAPIST

## 2024-08-29 RX ORDER — OXYCODONE AND ACETAMINOPHEN 5; 325 MG/1; MG/1
1 TABLET ORAL EVERY 6 HOURS PRN
Qty: 28 TABLET | Refills: 0 | Status: SHIPPED | OUTPATIENT
Start: 2024-08-29

## 2024-08-29 RX ORDER — PROMETHAZINE HYDROCHLORIDE 12.5 MG/1
12.5 TABLET ORAL EVERY 6 HOURS PRN
Qty: 21 TABLET | Refills: 1 | Status: SHIPPED | OUTPATIENT
Start: 2024-08-29

## 2024-08-29 RX ADMIN — Medication 12.5 MG: at 08:26

## 2024-08-29 RX ADMIN — ASPIRIN 325 MG: 325 TABLET, COATED ORAL at 08:26

## 2024-08-29 RX ADMIN — LOSARTAN POTASSIUM 50 MG: 50 TABLET, FILM COATED ORAL at 08:26

## 2024-08-29 RX ADMIN — ZOLPIDEM TARTRATE 5 MG: 5 TABLET ORAL at 00:01

## 2024-08-29 RX ADMIN — ALLOPURINOL 100 MG: 100 TABLET ORAL at 08:26

## 2024-08-29 RX ADMIN — SODIUM CHLORIDE 2 G: 900 INJECTION INTRAVENOUS at 01:32

## 2024-08-29 RX ADMIN — SODIUM CHLORIDE 2 G: 900 INJECTION INTRAVENOUS at 10:14

## 2024-08-29 RX ADMIN — MELOXICAM 15 MG: 15 TABLET ORAL at 08:26

## 2024-08-29 RX ADMIN — CETIRIZINE HYDROCHLORIDE 10 MG: 10 TABLET, FILM COATED ORAL at 08:26

## 2024-08-29 NOTE — PLAN OF CARE
"Goal Outcome Evaluation:   Assessment: Clint Cee presents with ADL impairments affecting function including coordination, grasp, range of motion (ROM), and sensation / sensory awareness. Pt & spouse demo ability to work together to complete dressing & feeding tasks, donning of cold pack & brace to RUE & HEP. They both verbalized understanding of post-op precautions & education. Demonstrated functioning below baseline abilities indicate the need for continued skilled intervention while inpatient. Tolerating session today without incident. Will continue to follow and progress as tolerated.     Plan/Recommendations:   Low Intensity Therapy recommended post-acute care - This is recommended as therapy feels this patient would require 2-3 visits per week. OP or HH would be the best option depending on patient's home bound status. Consider, if the patient has other  \"skilled\" needs such as wounds, IV antibiotics, etc. Combined with \"low intensity\" could also equate to a SNF. If patient is medically complex, consider LTAC.. Pt requires no DME at discharge.     Pt desires Home with family assist and Outpatient therapy at discharge. Pt cooperative; agreeable to therapeutic recommendations and plan of care.                "

## 2024-08-29 NOTE — CASE MANAGEMENT/SOCIAL WORK
Discharge Planning Assessment   Bryan     Patient Name: Clint Cee  MRN: [id number]  Today's Date: 8/29/2024    Admit Date: 8/28/2024    Plan: Home with wife.  Wife will transport at discharge.   Discharge Needs Assessment       Row Name 08/29/24 1637       Living Environment    People in Home spouse    Name(s) of People in Home VIKA    Current Living Arrangements home    Potentially Unsafe Housing Conditions none    In the past 12 months has the electric, gas, oil, or water company threatened to shut off services in your home? No    Primary Care Provided by self    Provides Primary Care For no one    Family Caregiver if Needed spouse    Family Caregiver Names VIKA    Quality of Family Relationships helpful;involved;supportive    Able to Return to Prior Arrangements yes       Resource/Environmental Concerns    Resource/Environmental Concerns none    Transportation Concerns none       Transportation Needs    In the past 12 months, has lack of transportation kept you from medical appointments or from getting medications? no    In the past 12 months, has lack of transportation kept you from meetings, work, or from getting things needed for daily living? No       Food Insecurity    Within the past 12 months, you worried that your food would run out before you got the money to buy more. Never true    Within the past 12 months, the food you bought just didn't last and you didn't have money to get more. Never true       Transition Planning    Patient/Family Anticipates Transition to home with family    Patient/Family Anticipated Services at Transition none    Transportation Anticipated family or friend will provide       Discharge Needs Assessment    Readmission Within the Last 30 Days no previous admission in last 30 days    Equipment Currently Used at Home walker, rolling    Concerns to be Addressed care coordination/care conferences;discharge planning    Anticipated Changes Related to Illness none     Equipment Needed After Discharge none    Outpatient/Agency/Support Group Needs outpatient therapy    Discharge Facility/Level of Care Needs outpatient therapy    Provided Post Acute Provider List? Yes    Post Acute Provider List Outpatient Therapy    Delivered To Patient    Method of Delivery In person    Patient's Choice of Community Agency(s) [facility]                   Discharge Plan       Row Name 08/29/24 1928       Plan    Plan Comments From home with wife,  Patient refers to outpatient physical therapy.  Ortho navigator assisting patient with getting appointment set up .      Row Name 08/29/24 1639       Plan    Plan Home with wife.  Wife will transport at discharge.                  Continued Care and Services - Discharged on 8/29/2024 Admission date: 8/28/2024 - Discharge disposition: Home or Self Care      Therapy       Service Provider Request Status Selected Services Address Phone Fax Patient Preferred    [facility] PHYSICAL THERAPY [facility] Pending - Request Sent N/A [address] [place] [id number] [phone] --                  Expected Discharge Date and Time       Expected Discharge Date Expected Discharge Time    Aug 29, 2024            Demographic Summary       Row Name 08/29/24 1637       General Information    Admission Type other (see comments)  OUTPATIENT    Arrived From home;PACU/recovery room    Referral Source admission list    Reason for Consult discharge planning    Preferred Language English       Contact Information    Permission Granted to Share Info With                    Functional Status       Row Name 08/29/24 1637       Functional Status    Usual Activity Tolerance good    Current Activity Tolerance good       Functional Status, IADL    Medications independent    Meal Preparation independent    Housekeeping independent    Laundry independent    Shopping independent       Mental Status    General Appearance WDL WDL        Mental Status Summary    Recent Changes in Mental Status/Cognitive Functioning no changes                    Celena Lerner RN    SIPS 1  [email or web address]  Office [phone]  Cell [phone]

## 2024-08-29 NOTE — THERAPY TREATMENT NOTE
Subjective: Pt agreeable to therapeutic plan of care.  Cognition: oriented to Person, Place, Time, and Situation. Pt much more alert this date.     Objective:     Precautions - TSA restrictions    Bed Mobility: N/A or Not attempted.   Functional Transfers: SBA     Balance: Sitting = ind; standing = SBA-CGA.     Functional Ambulation: Defer to PT evaluation.     Upper Body Dressing & LB dressing: Pt & spouse working together =  Min A & min v.c's  to tayla button down shirt, brace & cold pack. Both pt & spouse asking appropriate questions to ensure proper fitting. Spouse took pictures along the way to help ensure correct brace positioning.   ADL Position: unsupported sitting and unsupported standing       Feeding: Min-A  ADL Position: supported sitting  ADL Comments: Pt requiring assist for s/u.     Therapeutic Exercise - 10 Reps R Upper Extremity PROM supported sitting / chair. Pt only currently has min AROM of right hand & wrist flex & absent digit & wrist extension & elbow AROM due to nerve block. He describes only tingling sensation in hand. Pt and spouse demo ability to perform PROM of RUE (shoulder only to approx 90 degree flexion this date) with good technique.      Vitals: WNL    Pain: 0   Education: Provided education on the importance of mobility in the acute care setting, Verbal/Tactile Cues, ADL training, and Post-Op Precautions, UE HEP, safety prec, one-handed compensatory tech trng for ADLs.       Assessment: Clint Cee presents with ADL impairments affecting function including coordination, grasp, range of motion (ROM), and sensation / sensory awareness. Pt & spouse demo ability to work together to complete dressing & feeding tasks, donning of cold pack & brace to RUE & HEP. They both verbalized understanding of post-op precautions & education. Demonstrated functioning below baseline abilities indicate the need for continued skilled intervention while inpatient. Tolerating session today  "without incident. Will continue to follow and progress as tolerated.     Plan/Recommendations:   Low Intensity Therapy recommended post-acute care - This is recommended as therapy feels this patient would require 2-3 visits per week. OP or HH would be the best option depending on patient's home bound status. Consider, if the patient has other  \"skilled\" needs such as wounds, IV antibiotics, etc. Combined with \"low intensity\" could also equate to a SNF. If patient is medically complex, consider LTAC.. Pt requires no DME at discharge.     Pt desires Home with family assist and Outpatient therapy at discharge. Pt cooperative; agreeable to therapeutic recommendations and plan of care.     Modified Pender: N/A = No pre-op stroke/TIA    Post-Tx Position: Up in Chair and Call light and personal items within reach  PPE: gloves    "

## 2024-08-29 NOTE — DISCHARGE SUMMARY
Date of Discharge:  8/29/2024    Discharge Diagnosis: Osteoarthritis of right glenohumeral joint    Presenting Problem/History of Present Illness  Active Hospital Problems    Diagnosis  POA    **Osteoarthritis of right glenohumeral joint [M19.011]  Unknown    DJD of right shoulder [M19.011]  Yes      Resolved Hospital Problems   No resolved problems to display.        Hospital Course  Patient is a 79 y.o. male presented with right shoulder degenerative joint disease.  They underwent shoulder arthroplasty during admission and postop day 1 were tolerating diet and oral medication and pain was controlled.  -Upon entering the room the patient was sitting upright in his bed with his sling in place.  No acute distress.  Patient mentions that he cannot move his fingers due to the block from yesterday.    Procedures Performed: Right reverse total shoulder      Consults:   Consults       No orders found for last 30 day(s).              Condition on Discharge:  Improved    Vital Signs  Vitals:    08/28/24 1336 08/28/24 2031 08/29/24 0057 08/29/24 0435   BP: 115/51 121/66 117/68 123/69   BP Location:  Left arm Right arm Right arm   Patient Position:  Lying Lying Lying   Pulse: 63 84 72 69   Resp: 14 15 16 17   Temp: 97.7 °F (36.5 °C)  98.4 °F (36.9 °C) 97.5 °F (36.4 °C)   TempSrc:   Oral Oral   SpO2: 92% 96% 94% 98%   Weight:       Height:           Physical Exam:  Dry dressing, Sensory and motor exam are intact all distributions, obviating decree sensation to all digits and hand secondary to yesterday's limb block.  Cannot flex or extend the digits due to the block.. Radial pulse is palpable and capillary refill is less than two seconds to all digits       Discharge Disposition  Home    Discharge Medications     Discharge Medications        New Medications        Instructions Start Date   oxyCODONE-acetaminophen 5-325 MG per tablet  Commonly known as: Percocet   1 tablet, Oral, Every 6 Hours PRN      promethazine 12.5 MG  tablet  Commonly known as: PHENERGAN   12.5 mg, Oral, Every 6 Hours PRN             Changes to Medications        Instructions Start Date   metoprolol tartrate 25 MG tablet  Commonly known as: LOPRESSOR  What changed: See the new instructions.   TAKE 1/2 TABLET BY MOUTH DAILY AS NEEDED             Continue These Medications        Instructions Start Date   allopurinol 100 MG tablet  Commonly known as: ZYLOPRIM   100 mg, Oral, Daily      atorvastatin 40 MG tablet  Commonly known as: LIPITOR   TAKE 1 TABLET BY MOUTH EVERY NIGHT FOR HIGH AMOUNT OF FATS IN THE BLOOD      DULoxetine 60 MG capsule  Commonly known as: CYMBALTA   60 mg, Oral, Daily      famotidine 40 MG tablet  Commonly known as: PEPCID   40 mg, Oral, Nightly      fexofenadine 180 MG tablet  Commonly known as: ALLEGRA   180 mg, Oral, 2 Times Daily PRN      fluticasone 50 MCG/ACT nasal spray  Commonly known as: FLONASE   2 sprays, Nasal, Daily PRN      losartan 50 MG tablet  Commonly known as: COZAAR   50 mg, Oral, Daily      meloxicam 15 MG tablet  Commonly known as: Mobic   15 mg, Oral, Daily      zolpidem 10 MG tablet  Commonly known as: AMBIEN   10 mg, Oral, Every Night at Bedtime             Discharge instructions: DC the Hemovac drain prior to discharge.  Continue wearing sling.  Keep dressing on.  Okay to shower and perform home exercises.  Continue polar care.  Perform range of motion at the elbow, wrist and digits 3-5 times daily.    Follow-up Appointments  Future Appointments   Date Time Provider Department Center   9/12/2024 12:45 PM Андрей Goldberg MD [facility]   10/14/2024 10:00 AM Amira Chapman APRN [facility]   11/18/2024 10:00 AM Jaret Castellanos MD [facility]   1/30/2025 12:30 PM Felipe Garcia MD [facility]   6/18/2025 10:00 AM Jaret Castellanos MD [facility]   11/19/2025 10:30 AM Jaret Castellanos MD [facility]       Clint ALDO Fountain PA-C  08/29/24  08:08  EDT    Disclaimer: Part of this note may be an electronic transcription/translation of spoken language to printed text using the Dragon Dictation System

## 2024-08-29 NOTE — DISCHARGE PLACEMENT REQUEST
"Clint Cee \"Dennis\" (79 y.o. Male)       Date of Birth   1945    Social Security Number       Address   [address] [place] [address] [place] [address]    Home Phone   [phone]    MRN   [id number]       Restorationism   None    Marital Status                               Admission Date   8/28/24    Admission Type   Elective    Admitting Provider   Андрей Goldberg MD    Attending Provider   Андрей Goldberg MD    Department, Room/Bed   [facility] SURGICAL INPATIENT, 4117/1       Discharge Date       Discharge Disposition   Home or Self Care    Discharge Destination                                 Attending Provider: Андрей Goldberg MD    Allergies: Morphine, Beta Adrenergic Blockers, Ace Inhibitors    Isolation: None   Infection: None   Code Status: CPR    Ht: 182.9 cm (72\")   Wt: 109 kg (240 lb)    Admission Cmt: None   Principal Problem: Osteoarthritis of right glenohumeral joint [M19.011]                   Active Insurance as of 8/28/2024       Primary Coverage       Payor Plan Insurance Group Employer/Plan Group    MEDICARE MEDICARE A & B        Payor Plan Address Payor Plan Phone Number Payor Plan Fax Number Effective Dates    [address] [phone]  1/1/2012 - None Entered    [facility] [id number]         Subscriber Name Subscriber Birth Date Member ID       CLINT CEE 1945 [id number]               Secondary Coverage       Payor Plan Insurance Group Employer/Plan Group    AARP  SUP AARP HEALTH CARE OPTIONS PLAN F       Payor Plan Address Payor Plan Phone Number Payor Plan Fax Number Effective Dates    [facility] [phone]  1/1/2020 - None Entered    [address]       [facility] [id number]         Subscriber Name Subscriber Birth Date Member ID       CLINT CEE 1945 [id number]                     Emergency Contacts        (Rel.) Home Phone Work Phone Mobile Phone    VIKA CEE (Spouse) " [phone]    Ghassan Cee (Son) -- -- [phone]    Jessy Cee (Son) -- -- [phone]

## 2024-08-29 NOTE — PLAN OF CARE
Goal Outcome Evaluation:         Patient is doing very well. Has been up and walked around the whole unit with stand by assist. Has had some drainage from his hemovac site, reinforced. Care continuing.

## 2024-08-29 NOTE — THERAPY EVALUATION
Patient Name: Clint Cee  : 1945    MRN: [id number]                              Today's Date: 2024       Admit Date: 2024    Visit Dx:     ICD-10-CM ICD-9-CM   1. Primary osteoarthritis of right shoulder  M19.011 715.11   2. Osteoarthritis of right glenohumeral joint  M19.011 715.91     Patient Active Problem List   Diagnosis    Benign prostatic hyperplasia    Gastroesophageal reflux disease    Mood disorder    Scoliosis    Spinal stenosis    Vitamin D deficiency    Polyosteoarthritis    Insomnia    Retinal detachment of right eye with single retinal tear    Osteoarthritis of knee    Cellulitis of face    Prostate cancer    Ocular migraine    Headache    Gout of multiple sites    Medicare annual wellness visit, subsequent    Allergic    HL (hearing loss)    Visual impairment    Cataract    Colon polyp    Primary hypertension    Dyspnea on exertion    S/P mitral valve repair per Dr. Henderson 2022    S/P tricuspid valve repair per Dr. Henderson 2022    Back pain    Acute shoulder bursitis, right    Rotator cuff (capsule) sprain    Impaired decision making    Mild cognitive impairment    Fall in (into) shower or empty bathtub, sequela    Plantar wart of right foot    DJD of right shoulder    Osteoarthritis of right glenohumeral joint     Past Medical History:   Diagnosis Date    Abnormal ECG 2022    Allergic 1954    Nasal alergies    Arrhythmia     Dr Youngblood examined me and said i was ok.  skipped beats    Asthma     no sx    Benign prostatic hyperplasia 2018    Callus     Toe spreaders used    Cataract 2014    Surgery. Caused detached retina of right eye 60 repair    Clotting disorder     Low platelets    Colon polyp 2018    Found and removed last colon eca.q    Deep vein thrombosis     Depression     Gastroesophageal reflux disease 2014    Glaucoma 2003    Normal pressures. Have low pressure glaucoma    Gout     Headache 2015     Have shimmering in both eyes intermittantly. 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/02/2011    Prostate Cancer Jan22 January 2022    Spinal stenosis 12/02/2011    Visual impairment 01/01/1955    Nearsighted corrected glasses    Vitamin D deficiency 05/23/2018     Past Surgical History:   Procedure Laterality Date    ADENOIDECTOMY  1954    As child    APPENDECTOMY  1956    Surgery    CARDIAC CATHETERIZATION N/A 09/30/2022    Procedure: Right and Left Heart Cath with Coronar Angiography;  Surgeon: Felipe Garcia MD;  Location: [facility] CATH INVASIVE LOCATION;  Service: Cardiovascular;  Laterality: N/A;    CARDIAC VALVE REPLACEMENT N/A 11/17/2022    Procedure: TRICUSPID VALVE REPAIR/REPLACEMENT;  Surgeon: Femi Henderson MD;  Location: [facility] CVOR;  Service: Cardiothoracic;  Laterality: N/A;  tricuspid valve repaired with 32mm  groves physio tricuspid annuloplasty ring    CARDIAC VALVE REPLACEMENT  11/17/2022    Repaired mitral and tricuspid    CATARACT EXTRACTION Bilateral     COLONOSCOPY  01/01/1997    Regularly scheduled    COLONOSCOPY N/A 02/22/2023    Procedure: COLONOSCOPY with cold snare polypectomy x 1;  Surgeon: Manfred Keating MD;  Location: [facility] ENDOSCOPY;  Service: Gastroenterology;  Laterality: N/A;    CORONARY ARTERY BYPASS GRAFT  11/22    During open heart valves repair.    ENDOSCOPY N/A 02/22/2023    Procedure: ESOPHAGOGASTRODUODENOSCOPY with esophageal dilation using 48 Fr. Bougie Dilator;  Surgeon: Manfred Keating MD;  Location: [facility] ENDOSCOPY;  Service: Gastroenterology;  Laterality: N/A;  erosive esophagitis    EYE  SURGERY  01/01/2010    Reattached right retina    LUMBAR FUSION N/A 09/13/2023    Procedure: POSTERIOR LUMBAR FUSION WITH ROBOT;  Surgeon: Jayme Strauss IV, MD;  Location: [facility] MAIN OR;  Service: Robotics - Neuro;  Laterality: N/A;    MITRAL VALVE REPAIR/REPLACEMENT N/A 11/17/2022    Procedure: MITRAL VALVE REPAIR/REPLACEMENT with left atrial appendage closure;  Surgeon: Femi Henderson MD;  Location: [facility] CVOR;  Service: Cardiothoracic;  Laterality: N/A;  mitral valve repaired iwth 40mm medtronic annuloplasty band  with intraop TERRA    MITRAL VALVE REPAIR/REPLACEMENT  11/22    SPINE SURGERY  01/01/1971    2 lumbar 1 cervical    TONSILLECTOMY  01/01/1950    Childhood    TRICUSPID VALVE SURGERY  11/22    WART REMOVAL Right 6/28/2024    Procedure: WART REMOVAL WITH LASER;  Surgeon: DUNCAN Robles DPM;  Location: [facility] MAIN OR;  Service: Podiatry;  Laterality: Right;      General Information       [facility] Name 08/29/24 1313          Physical Therapy Time and Intention    Document Type evaluation  -     Mode of Treatment physical therapy  -[facility] Name 08/29/24 1313          General Information    Patient Profile Reviewed yes  -     Prior Level of Function independent:;all household mobility;community mobility;driving  no AD use, but has canes. one recent fall.  -     Existing Precautions/Restrictions right;shoulder;brace on at all times  -     Barriers to Rehab none identified  -[facility] Name 08/29/24 1313          Living Environment    People in Home spouse  -[facility] Name 08/29/24 1313          Home Main Entrance    Number of Stairs, Main Entrance none  -[facility] Name 08/29/24 1313          Stairs Within Home, Primary    Stairs, Within Home, Primary ~18 steps to 2nd floor where his bedroom is. Able to sleep on 1st floor if needed.  -     Stair Railings, Within Home, Primary railings safe and in good condition;railing on right side (ascending)  -[facility] Name 08/29/24 1313           Cognition    Orientation Status (Cognition) oriented to;person;situation;place;verbal cues/prompts needed for orientation  -       Row Name 08/29/24 1313          Safety Issues, Functional Mobility    Impairments Affecting Function (Mobility) range of motion (ROM);strength;shortness of breath;balance;endurance/activity tolerance  -               User Key  (r) = Recorded By, (t) = Taken By, (c) = Cosigned By      Initials Name Provider Type     Carrie John, PT Physical Therapist                   Mobility       Row Name 08/29/24 1314          Bed Mobility    Bed Mobility bed mobility (all) activities  -     All Activities, [place] (Bed Mobility) supervision  -     Comment, (Bed Mobility) supine to sit and seated scooting. pt requires increased time to complete due to RUE in sling. with increased time, pt is able to complete unassisted.  -[facility] Name 08/29/24 1314          Sit-Stand Transfer    Sit-Stand [place] (Transfers) supervision  -     Comment, (Sit-Stand Transfer) no AD, anterior/posterior sway noted with initial stand  -[facility] Name 08/29/24 1314          Gait/Stairs (Locomotion)    [place] Level (Gait) supervision  -     Patient was able to Ambulate yes  -     Distance in Feet (Gait) 150  150 without AD with wide VIKA, small step length, and deviation from line of progression. PT recommends cane for increased safety. 300' with STC with improved straight line of progression noted.  -     [place] Level (Stairs) contact guard  -     Handrail Location (Stairs) right side (ascending)  -     Number of Steps (Stairs) 8  -     Ascending Technique (Stairs) step-to-step  sidestep techinque to maintain LUE support on the rail.  -     Descending Technique (Stairs) step-to-step  -     Comment, (Gait/Stairs) PT cues pt to slow down and consider sequencing prior to stepping.  -               User Key  (r) = Recorded By, (t) = Taken By, (c) = Cosigned By       Initials Name Provider Type     Carrie John PT Physical Therapist                   Obj/Interventions       [facility] Name 08/29/24 1319          Range of Motion Comprehensive    Comment, General Range of Motion BLE WFL.  -[facility] Name 08/29/24 1319          Strength Comprehensive (MMT)    Comment, General Manual Muscle Testing (MMT) Assessment BLE WFL  -[facility] Name 08/29/24 1319          Balance    Balance Assessment sitting static balance;sitting dynamic balance;standing static balance;standing dynamic balance  -     Static Sitting Balance standby assist  -     Dynamic Sitting Balance standby assist  -     Position, Sitting Balance sitting edge of bed  -     Static Standing Balance standby assist  -     Dynamic Standing Balance contact guard  -     Position/Device Used, Standing Balance unsupported  -[facility] Name 08/29/24 1319          Sensory Assessment (Somatosensory)    Sensory Assessment (Somatosensory) --  RUE sensation still impaired from the nerve block.  -               User Key  (r) = Recorded By, (t) = Taken By, (c) = Cosigned By      Initials Name Provider Type     Carrie John PT Physical Therapist                   Goals/Plan    No documentation.                  Clinical Impression       [facility] Name 08/29/24 1320          Pain    Pretreatment Pain Rating 0/10 - no pain  -     Posttreatment Pain Rating 0/10 - no pain  -AH       Row Name 08/29/24 1320          Plan of Care Review    Outcome Evaluation Pt is a 78 y/o M admitted to [facility] on 08/28/24 for elective right rTSA due to OA of right glenohumeral joint. PMH: :L3-L4 fx, asthma, HTN, mitral valve prolapse mood disorder, scoliosis, OA, hx prostate cancer, hearing loss, visual impairment, and s/p MVR (2022). At baseline pt resides with spouse in multi level home with 0 YOLANDA. His main bedroom is on the 2nd floor with 18 steps to access it; however, they do have a bedroom & full bath with shower on main floor he can use  PRN. Pt typically independent with ADLs, mobility & still drive. Pt presents for PT evaluation with RUE in sling. PT reviews post-op instructions/precautions. Pt ambulates 150' with slight instability, quality improved with use of a STC and pt complete an additional 300'. He completes 8 stairs today with CGA and R Handrail, pt with HARKINS after trial. PT recommends STC use at home and spouse to assist with ADLs. Pt is  safe to DC home with spouse. Outpatient PT recommended.  -       Row Name 08/29/24 1320          Therapy Assessment/Plan (PT)    Criteria for Skilled Interventions Met (PT) no;other (see comments)  DC home today.  -     Therapy Frequency (PT) evaluation only  -       Row Name 08/29/24 1320          Vital Signs    Pre SpO2 (%) 92  -AH     O2 Delivery Pre Treatment room air  -AH     Intra SpO2 (%) 90  -AH     O2 Delivery Intra Treatment room air  -       Row Name 08/29/24 1320          Positioning and Restraints    Post Treatment Position chair  -     In Bed notified nsg;call light within reach;encouraged to call for assist;exit alarm on;with family/caregiver  -               User Key  (r) = Recorded By, (t) = Taken By, (c) = Cosigned By      Initials Name Provider Type     Carrie John, PT Physical Therapist                   Outcome Measures       Row Name 08/29/24 1332 08/29/24 0809       How much help from another person do you currently need...    Turning from your back to your side while in flat bed without using bedrails? 4  -AH 3  -AS    Moving from lying on back to sitting on the side of a flat bed without bedrails? 4  -AH 3  -AS    Moving to and from a bed to a chair (including a wheelchair)? 4  -AH 3  -AS    Standing up from a chair using your arms (e.g., wheelchair, bedside chair)? 4  -AH 3  -AS    Climbing 3-5 steps with a railing? 3  -AH 3  -AS    To walk in hospital room? 4  -AH 3  -AS    AM-PAC 6 Clicks Score (PT) 23  -AH 18  -AS    Highest Level of Mobility Goal 7 --> Walk  25 feet or more  - 6 --> Walk 10 steps or more  -AS      Row Name 08/29/24 1332          Functional Assessment    Outcome Measure Options AM-PAC 6 Clicks Basic Mobility (PT)  -               User Key  (r) = Recorded By, (t) = Taken By, (c) = Cosigned By      Initials Name Provider Type    AS Leatha Soares, RN Registered Nurse     Carrie John, PT Physical Therapist                                 Physical Therapy Education       Title: PT OT SLP Therapies (Done)       Topic: Physical Therapy (Done)       Point: Mobility training (Done)       Learning Progress Summary             Patient Eager, E, VU by  at 8/29/2024 1333                         Point: Home exercise program (Done)       Learning Progress Summary             Patient Eager, E, VU by  at 8/29/2024 1333                         Point: Body mechanics (Done)       Learning Progress Summary             Patient Eager, E, VU by  at 8/29/2024 1333                         Point: Precautions (Done)       Learning Progress Summary             Patient Eager, E, VU by  at 8/29/2024 1333                                         User Key       Initials Effective Dates Name Provider Type Discipline     12/04/23 -  Carrie John, EJ Physical Therapist PT                  PT Recommendation and Plan     Outcome Evaluation: Pt is a 80 y/o M admitted to [facility] on 08/28/24 for elective right rTSA due to OA of right glenohumeral joint. PMH: :L3-L4 fx, asthma, HTN, mitral valve prolapse mood disorder, scoliosis, OA, hx prostate cancer, hearing loss, visual impairment, and s/p MVR (2022). At baseline pt resides with spouse in multi level home with 0 YOLANDA. His main bedroom is on the 2nd floor with 18 steps to access it; however, they do have a bedroom & full bath with shower on main floor he can use PRN. Pt typically independent with ADLs, mobility & still drive. Pt presents for PT evaluation with RUE in sling. PT reviews post-op instructions/precautions.  Pt ambulates 150' with slight instability, quality improved with use of a STC and pt complete an additional 300'. He completes 8 stairs today with CGA and R Handalba, pt with HARKINS after trial. PT recommends STC use at home and spouse to assist with ADLs. Pt is  safe to DC home with spouse. Outpatient PT recommended.     Time Calculation:         PT Charges       Row Name 08/29/24 1334             Time Calculation    Start Time 0915  -      Stop Time 0938  -      Time Calculation (min) 23 min  -      PT Non-Billable Time (min) 0 min  -      PT Received On 08/29/24  -         Time Calculation- PT    Total Timed Code Minutes- PT 0 minute(s)  -                User Key  (r) = Recorded By, (t) = Taken By, (c) = Cosigned By      Initials Name Provider Type    Carrie Velarde, EJ Physical Therapist                  Therapy Charges for Today       Code Description Service Date Service Provider Modifiers Qty    [id number] HC PT EVAL LOW COMPLEXITY 4 8/29/2024 Carrie John, PT GP 1            PT G-Codes  Outcome Measure Options: AM-PAC 6 Clicks Basic Mobility (PT)  AM-PAC 6 Clicks Score (PT): 23  PT Discharge Summary  Anticipated Discharge Disposition (PT): home with assist, home with home health    Carrie John PT  8/29/2024

## 2024-08-29 NOTE — PLAN OF CARE
Goal Outcome Evaluation:              Outcome Evaluation: Pt is a 80 y/o M admitted to [facility] on 08/28/24 for elective right rTSA due to OA of right glenohumeral joint. PMH: :L3-L4 fx, asthma, HTN, mitral valve prolapse mood disorder, scoliosis, OA, hx prostate cancer, hearing loss, visual impairment, and s/p MVR (2022). At baseline pt resides with spouse in multi level home with 0 YOLANDA. His main bedroom is on the 2nd floor with 18 steps to access it; however, they do have a bedroom & full bath with shower on main floor he can use PRN. Pt typically independent with ADLs, mobility & still drive. Pt presents for PT evaluation with RUE in sling. PT reviews post-op instructions/precautions. Pt ambulates 150' with slight instability, quality improved with use of a STC and pt complete an additional 300'. He completes 8 stairs today with CGA and R Handrail, pt with HARKINS after trial. PT recommends STC use at home and spouse to assist with ADLs. Pt is  safe to DC home with spouse. Outpatient PT recommended.      Anticipated Discharge Disposition (PT): home with assist, home with home health

## 2024-08-29 NOTE — OUTREACH NOTE
Prep Survey      Flowsheet Row Responses   [facility] patient discharged from? [place]   Is LACE score < 7 ? Yes   Eligibility [facility]   Date of Admission 08/28/24   Date of Discharge 08/29/24   Discharge Disposition Home or Self Care   Discharge diagnosis Osteoarthritis of right glenohumeral joint, TOTAL SHOULDER REVERSE ARTHROPLASTY   Does the patient have one of the following disease processes/diagnoses(primary or secondary)? Total Joint Replacement   Does the patient have Home health ordered? No   Is there a DME ordered? No   Prep survey completed? Yes            Yumi MAGALLON - Registered Nurse

## 2024-08-30 ENCOUNTER — TRANSITIONAL CARE MANAGEMENT TELEPHONE ENCOUNTER (OUTPATIENT)
Dept: CALL CENTER | Facility: HOSPITAL | Age: 79
End: 2024-08-30
Payer: MEDICARE

## 2024-08-30 NOTE — OUTREACH NOTE
Call Center TCM Note      Flowsheet Row Responses   [facility] patient discharged from? [place]   Does the patient have one of the following disease processes/diagnoses(primary or secondary)? Total Joint Replacement   Joint surgery performed? Shoulder   TCM attempt successful? Yes   Call start time 1252   Call end time 1258   Discharge diagnosis Osteoarthritis of right glenohumeral joint, TOTAL SHOULDER REVERSE ARTHROPLASTY   Person spoke with today (if not patient) and relationship Patient   Does the patient have all medications related to this admission filled (includes all antibiotics, pain medications, etc.) Yes   Is the patient taking all medications as directed (includes completed medication regime)? Yes   Is the patient able to teach back alternate methods of pain control? Ice, Shoulder-elevate above heart/ keep in sling as advised   Medication comments Patient requesting PCP to change his sleep medication from ambien to trazodone. Message routed to PCP office.   Comments PCP Dr Castellanos. Patient declined need for PCP HFU appt with call today   Does the patient have an appointment with their PCP within 7-14 days of discharge? No   Nursing Interventions Patient declined scheduling/rescheduling appointment at this time, Patient desires to follow up with specialty only, Routed TCM call to PCP office   Has home health visited the patient within 72 hours of discharge? N/A   Psychosocial issues? No   Has the patient began therapy sessions (either in the home or as an out patient)? No   Does the patient have a wound vac in place? N/A   Has the patient fallen since discharge? No   Comments Using ice to shoulder as instructed and wearing sling.   Did the patient receive a copy of their discharge instructions? Yes   Nursing interventions Reviewed instructions with patient   What is the patient's perception of their functional status since discharge? Improving   If the patient is a current smoker, are they able to  teach back resources for cessation? Not a smoker   Is the patient/caregiver able to teach back the hierarchy of who to call/visit for symptoms/problems? PCP, Specialist, Home health nurse, Urgent Care, ED, 911 Yes   TCM call completed? Yes   Wrap up additional comments Post-Op with Андрей Goldberg 9/12  1245pm   Call end time 1258   Would this patient benefit from a Referral to [facility] Social Work? No            Samantha Portillo RN    8/30/2024, 13:01 EDT

## 2024-08-30 NOTE — CASE MANAGEMENT/SOCIAL WORK
Case Management Discharge Note      Final Note: home with outpt PT- ortho navigator assisting with set up.    Provided Post Acute Provider List?: Yes  Post Acute Provider List: Outpatient Therapy  Delivered To: Patient  Method of Delivery: In person        Transportation Services  Private: Car    Final Discharge Disposition Code: 01 - home or self-care

## 2024-09-04 DIAGNOSIS — M25.511 CHRONIC RIGHT SHOULDER PAIN: ICD-10-CM

## 2024-09-04 DIAGNOSIS — G89.29 CHRONIC RIGHT SHOULDER PAIN: ICD-10-CM

## 2024-09-04 RX ORDER — DULOXETIN HYDROCHLORIDE 60 MG/1
60 CAPSULE, DELAYED RELEASE ORAL DAILY
Qty: 90 CAPSULE | Refills: 0 | Status: SHIPPED | OUTPATIENT
Start: 2024-09-04

## 2024-09-04 RX ORDER — MELOXICAM 15 MG/1
15 TABLET ORAL DAILY
Qty: 30 TABLET | Refills: 1 | Status: SHIPPED | OUTPATIENT
Start: 2024-09-04

## 2024-09-12 ENCOUNTER — OFFICE VISIT (OUTPATIENT)
Dept: ORTHOPEDIC SURGERY | Facility: CLINIC | Age: 79
End: 2024-09-12
Payer: MEDICARE

## 2024-09-12 VITALS — RESPIRATION RATE: 20 BRPM | WEIGHT: 240 LBS | BODY MASS INDEX: 32.51 KG/M2 | HEIGHT: 72 IN | OXYGEN SATURATION: 97 %

## 2024-09-12 DIAGNOSIS — Z47.89 ORTHOPEDIC AFTERCARE: Primary | ICD-10-CM

## 2024-09-12 PROCEDURE — 99024 POSTOP FOLLOW-UP VISIT: CPT | Performed by: ORTHOPAEDIC SURGERY

## 2024-09-12 NOTE — PATIENT INSTRUCTIONS
Shoulder Arthroplasty: Post- Operative Visit Objectives    Review the operative findings, procedures and photos.  Make sure medications are effective and not causing problems.  Pain: Oxycodone or hydrocodone is for pain. You may take 1 tablet every 6 hours as necessary.  Some patients don’t require the use of these…in those circumstances just use Tylenol extra-strength 1 or 2 tablets every 4-6 hours.   NSAIDs. For pain and inflammation.  You can take an over the counter anti-inflammatory of choice such as Aleve, Ibuprofen, Motrin or Advil during this time.  If you have had any problems stop taking these medicines and please tell us!  Wound Care:  Today we will remove your dressings.  There may be some residual glue on your incision.  It is now ok to shower without covering it. Please avoid submerging the incision for another two weeks.  Continue ice pack as needed.  Exercises and Physical Therapy   Continue your physical therapy and daily home exercises focusing especially on overhead motion.  Continue the sling and remove for activities such as showering and bringing you hand to your face or hair, no motion behind your back for the next 4 weeks.  Some shoulder replacements with a rotator cuff repair will have more restrictions and we will go over those.   Follow Up appointments Schedule Follow up visits as directed usually in 4 weeks..  Notes  Make sure you have all necessary notes and documentation for school or work.  Issues: Remember our goal is to make this process smooth and easy if there is any thing you need please ask us or call back [phone]

## 2024-09-12 NOTE — PROGRESS NOTES
"     Patient ID: Clint Cee is a 79 y.o. male.    8/28/24 right reverse total shoulder  Has not started therapy  Objective:    Resp 20   Ht 182.9 cm (72\")   Wt 109 kg (240 lb)   SpO2 97%   BMI 32.55 kg/m²     Physical Examination:    Incision healing well no sign of infection  Sensory and motor exam are intact all distributions. Radial pulse is palpable and capillary refill is less than two seconds to all digits.    Imaging:  right Shoulder X-Ray  Indication: postop total shoulder  AP Y and Lateral views  Findings: reverse total shoulder in position  no bony lesion  Soft tissues normal  normal joint spaces  Hardware appropriately positioned yes      yes prior studies available for comparison.    Assessment:  Doing well after shoulder replacement    Plan:  Continue sling and therapy see me in a month  "

## 2024-09-15 RX ORDER — TRAZODONE HYDROCHLORIDE 50 MG/1
TABLET, FILM COATED ORAL
Qty: 60 TABLET | Refills: 3 | Status: SHIPPED | OUTPATIENT
Start: 2024-09-15

## 2024-09-16 ENCOUNTER — TREATMENT (OUTPATIENT)
Dept: PHYSICAL THERAPY | Facility: CLINIC | Age: 79
End: 2024-09-16
Payer: MEDICARE

## 2024-09-16 DIAGNOSIS — Z96.611 S/P REVERSE TOTAL SHOULDER ARTHROPLASTY, RIGHT: Primary | ICD-10-CM

## 2024-09-16 DIAGNOSIS — M25.511 CHRONIC RIGHT SHOULDER PAIN: ICD-10-CM

## 2024-09-16 DIAGNOSIS — G89.29 CHRONIC RIGHT SHOULDER PAIN: ICD-10-CM

## 2024-09-16 DIAGNOSIS — M25.611 DECREASED ROM OF RIGHT SHOULDER: ICD-10-CM

## 2024-09-16 PROCEDURE — 97161 PT EVAL LOW COMPLEX 20 MIN: CPT | Performed by: PHYSICAL THERAPIST

## 2024-09-16 PROCEDURE — G0283 ELEC STIM OTHER THAN WOUND: HCPCS | Performed by: PHYSICAL THERAPIST

## 2024-09-16 PROCEDURE — 97535 SELF CARE MNGMENT TRAINING: CPT | Performed by: PHYSICAL THERAPIST

## 2024-09-16 PROCEDURE — 97110 THERAPEUTIC EXERCISES: CPT | Performed by: PHYSICAL THERAPIST

## 2024-09-18 ENCOUNTER — TREATMENT (OUTPATIENT)
Dept: PHYSICAL THERAPY | Facility: CLINIC | Age: 79
End: 2024-09-18
Payer: MEDICARE

## 2024-09-18 DIAGNOSIS — Z96.611 S/P REVERSE TOTAL SHOULDER ARTHROPLASTY, RIGHT: Primary | ICD-10-CM

## 2024-09-18 DIAGNOSIS — G89.29 CHRONIC RIGHT SHOULDER PAIN: ICD-10-CM

## 2024-09-18 DIAGNOSIS — M25.611 DECREASED ROM OF RIGHT SHOULDER: ICD-10-CM

## 2024-09-18 DIAGNOSIS — M25.511 CHRONIC RIGHT SHOULDER PAIN: ICD-10-CM

## 2024-09-24 ENCOUNTER — TREATMENT (OUTPATIENT)
Dept: PHYSICAL THERAPY | Facility: CLINIC | Age: 79
End: 2024-09-24
Payer: MEDICARE

## 2024-09-24 DIAGNOSIS — Z96.611 S/P REVERSE TOTAL SHOULDER ARTHROPLASTY, RIGHT: Primary | ICD-10-CM

## 2024-09-24 DIAGNOSIS — M25.611 DECREASED ROM OF RIGHT SHOULDER: ICD-10-CM

## 2024-09-24 DIAGNOSIS — G89.29 CHRONIC RIGHT SHOULDER PAIN: ICD-10-CM

## 2024-09-24 DIAGNOSIS — M25.511 CHRONIC RIGHT SHOULDER PAIN: ICD-10-CM

## 2024-09-24 PROCEDURE — G0283 ELEC STIM OTHER THAN WOUND: HCPCS | Performed by: PHYSICAL THERAPIST

## 2024-09-24 PROCEDURE — 97110 THERAPEUTIC EXERCISES: CPT | Performed by: PHYSICAL THERAPIST

## 2024-09-24 PROCEDURE — 97112 NEUROMUSCULAR REEDUCATION: CPT | Performed by: PHYSICAL THERAPIST

## 2024-09-26 ENCOUNTER — TREATMENT (OUTPATIENT)
Dept: PHYSICAL THERAPY | Facility: CLINIC | Age: 79
End: 2024-09-26
Payer: MEDICARE

## 2024-09-26 DIAGNOSIS — G89.29 CHRONIC RIGHT SHOULDER PAIN: ICD-10-CM

## 2024-09-26 DIAGNOSIS — Z96.611 S/P REVERSE TOTAL SHOULDER ARTHROPLASTY, RIGHT: Primary | ICD-10-CM

## 2024-09-26 DIAGNOSIS — M25.611 DECREASED ROM OF RIGHT SHOULDER: ICD-10-CM

## 2024-09-26 DIAGNOSIS — M25.511 CHRONIC RIGHT SHOULDER PAIN: ICD-10-CM

## 2024-09-30 ENCOUNTER — TREATMENT (OUTPATIENT)
Dept: PHYSICAL THERAPY | Facility: CLINIC | Age: 79
End: 2024-09-30
Payer: MEDICARE

## 2024-09-30 DIAGNOSIS — Z96.611 S/P REVERSE TOTAL SHOULDER ARTHROPLASTY, RIGHT: Primary | ICD-10-CM

## 2024-09-30 DIAGNOSIS — M25.511 CHRONIC RIGHT SHOULDER PAIN: ICD-10-CM

## 2024-09-30 DIAGNOSIS — M25.611 DECREASED ROM OF RIGHT SHOULDER: ICD-10-CM

## 2024-09-30 DIAGNOSIS — G89.29 CHRONIC RIGHT SHOULDER PAIN: ICD-10-CM

## 2024-09-30 PROCEDURE — G0283 ELEC STIM OTHER THAN WOUND: HCPCS | Performed by: PHYSICAL THERAPIST

## 2024-09-30 PROCEDURE — 97140 MANUAL THERAPY 1/> REGIONS: CPT | Performed by: PHYSICAL THERAPIST

## 2024-09-30 PROCEDURE — 97112 NEUROMUSCULAR REEDUCATION: CPT | Performed by: PHYSICAL THERAPIST

## 2024-09-30 NOTE — PROGRESS NOTES
Physical Therapy Daily Treatment Note  [address], [address] [place] [address]     Patient: Clint Cee   : 1945  Referring practitioner: Андрей Goldberg, *  Diagnosis:      ICD-10-CM ICD-9-CM   1. S/P reverse total shoulder arthroplasty, right  Z96.611 V43.61   2. Chronic right shoulder pain  M25.511 719.41    G89.29 338.29   3. Decreased ROM of right shoulder  M25.611 719.51     Date of Initial Visit: Type: THERAPY  Noted: 2024  Today's Date: 2024    VISIT#: 5          Subjective   Clint Cee reports: no pain today.     Objective   See Exercise, Manual, and Modality Logs for complete treatment.       Assessment & Plan       Assessment  Assessment details: Pt is 5 weeks post op today and progressing according to protocol.  Estim and ice continue to provide relief.            Progress per Plan of Care and Progress strengthening /stabilization /functional activity           Timed:  Manual Therapy:    12     mins  95290;  Therapeutic Exercise:    8     mins  93404;     Neuromuscular Aleksandr:    10    mins  80663;    Therapeutic Activity:          mins  71449;     Gait Training:           mins  19607;     Ultrasound:          mins  43711;    Electrical Stimulation:         mins  82735 ( );    Untimed:  Electrical Stimulation:    20     mins  10576 ( );  Mechanical Traction:         mins  01655;   Dry needling:       Self pay    Timed Treatment:   30   mins   Total Treatment:     50   mins  Yanelis Deal, PT, DPT, CLT, CIDN  Physical Therapist

## 2024-10-03 ENCOUNTER — TREATMENT (OUTPATIENT)
Dept: PHYSICAL THERAPY | Facility: CLINIC | Age: 79
End: 2024-10-03
Payer: MEDICARE

## 2024-10-03 DIAGNOSIS — M25.611 DECREASED ROM OF RIGHT SHOULDER: ICD-10-CM

## 2024-10-03 DIAGNOSIS — G89.29 CHRONIC RIGHT SHOULDER PAIN: ICD-10-CM

## 2024-10-03 DIAGNOSIS — Z96.611 S/P REVERSE TOTAL SHOULDER ARTHROPLASTY, RIGHT: Primary | ICD-10-CM

## 2024-10-03 DIAGNOSIS — M25.511 CHRONIC RIGHT SHOULDER PAIN: ICD-10-CM

## 2024-10-11 DIAGNOSIS — G89.29 CHRONIC RIGHT SHOULDER PAIN: ICD-10-CM

## 2024-10-11 DIAGNOSIS — M25.511 CHRONIC RIGHT SHOULDER PAIN: ICD-10-CM

## 2024-10-11 RX ORDER — MELOXICAM 15 MG/1
15 TABLET ORAL DAILY
Qty: 30 TABLET | Refills: 1 | Status: SHIPPED | OUTPATIENT
Start: 2024-10-11

## 2024-10-14 ENCOUNTER — TREATMENT (OUTPATIENT)
Dept: PHYSICAL THERAPY | Facility: CLINIC | Age: 79
End: 2024-10-14
Payer: MEDICARE

## 2024-10-14 DIAGNOSIS — G89.29 CHRONIC RIGHT SHOULDER PAIN: ICD-10-CM

## 2024-10-14 DIAGNOSIS — M25.511 CHRONIC RIGHT SHOULDER PAIN: ICD-10-CM

## 2024-10-14 DIAGNOSIS — Z96.611 S/P REVERSE TOTAL SHOULDER ARTHROPLASTY, RIGHT: Primary | ICD-10-CM

## 2024-10-14 DIAGNOSIS — M25.611 DECREASED ROM OF RIGHT SHOULDER: ICD-10-CM

## 2024-10-14 NOTE — PROGRESS NOTES
Physical Therapy Daily Treatment Note  [facility], [address] [place] [address]     Patient: Clint Cee   : 1945  Referring practitioner: Андрей Goldberg, *  Diagnosis:      ICD-10-CM ICD-9-CM   1. S/P reverse total shoulder arthroplasty, right  Z96.611 V43.61   2. Chronic right shoulder pain  M25.511 719.41    G89.29 338.29   3. Decreased ROM of right shoulder  M25.611 719.51     Date of Initial Visit: Type: THERAPY  Noted: 2024  Today's Date: 10/14/2024    VISIT#: 7          Subjective   Clint Cee reports: 0/10 pain level in the right shoulder.      Objective   See Exercise, Manual, and Modality Logs for complete treatment.       Assessment & Plan       Assessment  Assessment details: Pt will be 7 weeks post op on Wednesday and is progressing according to protocol.            Progress per Plan of Care and Progress strengthening /stabilization /functional activity           Timed:  Manual Therapy:         mins  10936;  Therapeutic Exercise:    8     mins  31870;     Neuromuscular Aleksandr:    15    mins  54909;    Therapeutic Activity:          mins  92790;     Gait Training:           mins  80629;     Ultrasound:          mins  76793;    Electrical Stimulation:         mins  44432 ( );    Untimed:  Electrical Stimulation:   20      mins  37591 ( );  Mechanical Traction:         mins  24325;   Dry needling:       Self pay    Timed Treatment:   23   mins   Total Treatment:     43   mins  Yanelis Deal, PT, DPT, CLT, CIDN  Physical Therapist

## 2024-10-15 ENCOUNTER — TELEPHONE (OUTPATIENT)
Dept: NEUROLOGY | Facility: CLINIC | Age: 79
End: 2024-10-15
Payer: MEDICARE

## 2024-10-15 NOTE — TELEPHONE ENCOUNTER
MS. LI WOULD LIKE TO TALK TO YOU BEFORE YOU SEE MEGHAN. IF THIS IS NOT POSSIBLE PLEASE CALL RIGHT AFTER HIS VISIT.  [phone].

## 2024-10-16 ENCOUNTER — OFFICE VISIT (OUTPATIENT)
Dept: NEUROLOGY | Facility: CLINIC | Age: 79
End: 2024-10-16
Payer: MEDICARE

## 2024-10-16 ENCOUNTER — TREATMENT (OUTPATIENT)
Dept: PHYSICAL THERAPY | Facility: CLINIC | Age: 79
End: 2024-10-16
Payer: MEDICARE

## 2024-10-16 ENCOUNTER — LAB (OUTPATIENT)
Dept: LAB | Facility: HOSPITAL | Age: 79
End: 2024-10-16
Payer: MEDICARE

## 2024-10-16 VITALS
DIASTOLIC BLOOD PRESSURE: 73 MMHG | HEIGHT: 72 IN | HEART RATE: 57 BPM | WEIGHT: 228 LBS | SYSTOLIC BLOOD PRESSURE: 143 MMHG | BODY MASS INDEX: 30.88 KG/M2

## 2024-10-16 DIAGNOSIS — Z96.611 S/P REVERSE TOTAL SHOULDER ARTHROPLASTY, RIGHT: Primary | ICD-10-CM

## 2024-10-16 DIAGNOSIS — G47.10 HYPERSOMNIA, UNSPECIFIED: ICD-10-CM

## 2024-10-16 DIAGNOSIS — M25.611 DECREASED ROM OF RIGHT SHOULDER: ICD-10-CM

## 2024-10-16 DIAGNOSIS — R47.89 WORD FINDING DIFFICULTY: ICD-10-CM

## 2024-10-16 DIAGNOSIS — R41.3 MEMORY LOSS: Primary | ICD-10-CM

## 2024-10-16 DIAGNOSIS — R40.0 DAYTIME SOMNOLENCE: ICD-10-CM

## 2024-10-16 DIAGNOSIS — G89.29 CHRONIC RIGHT SHOULDER PAIN: ICD-10-CM

## 2024-10-16 DIAGNOSIS — M25.511 CHRONIC RIGHT SHOULDER PAIN: ICD-10-CM

## 2024-10-16 LAB
FOLATE SERPL-MCNC: 16.4 NG/ML (ref 4.78–24.2)
T4 FREE SERPL-MCNC: 1.4 NG/DL (ref 0.93–1.7)
TSH SERPL DL<=0.05 MIU/L-ACNC: 1.75 UIU/ML (ref 0.27–4.2)
VIT B12 BLD-MCNC: 785 PG/ML (ref 211–946)

## 2024-10-16 PROCEDURE — 84443 ASSAY THYROID STIM HORMONE: CPT | Performed by: NURSE PRACTITIONER

## 2024-10-16 PROCEDURE — 82607 VITAMIN B-12: CPT | Performed by: NURSE PRACTITIONER

## 2024-10-16 PROCEDURE — 82746 ASSAY OF FOLIC ACID SERUM: CPT | Performed by: NURSE PRACTITIONER

## 2024-10-16 PROCEDURE — 36415 COLL VENOUS BLD VENIPUNCTURE: CPT | Performed by: NURSE PRACTITIONER

## 2024-10-16 PROCEDURE — 84439 ASSAY OF FREE THYROXINE: CPT | Performed by: NURSE PRACTITIONER

## 2024-10-16 NOTE — PROGRESS NOTES
Physical Therapy Daily Treatment Note  [address] [facility] [address], [address] [place] [address]     Patient: Clint Cee   : 1945  Referring practitioner: Андрей Goldberg, *  Diagnosis:      ICD-10-CM ICD-9-CM   1. S/P reverse total shoulder arthroplasty, right  Z96.611 V43.61   2. Chronic right shoulder pain  M25.511 719.41    G89.29 338.29   3. Decreased ROM of right shoulder  M25.611 719.51     Date of Initial Visit: Type: THERAPY  Noted: 2024  Today's Date: 10/16/2024    VISIT#: 8          Subjective   Clint Cee reports: some stiffness today in the right shoulder.     Objective   See Exercise, Manual, and Modality Logs for complete treatment.       Assessment & Plan       Assessment  Assessment details: Added shoulder arc today for ROM, UBE and active shoulder ROM today with cues for proper form and execution.            Progress per Plan of Care and Progress strengthening /stabilization /functional activity           Timed:  Manual Therapy:         mins  72899;  Therapeutic Exercise:    10     mins  92961;     Neuromuscular Aleksandr:    10    mins  60464;    Therapeutic Activity:     10     mins  45258;     Gait Training:           mins  02626;     Ultrasound:          mins  42939;    Electrical Stimulation:         mins  44779 ( );    Untimed:  Electrical Stimulation:    20     mins  11032 ( );  Mechanical Traction:         mins  51047;   Dry needling:       Self pay    Timed Treatment:   30   mins   Total Treatment:     50   mins  Yanelis Deal, PT, DPT, CLT, CIDN  Physical Therapist

## 2024-10-16 NOTE — PROGRESS NOTES
"Chief Complaint  Memory Loss    Subjective            Clint Cee presents to [facility] for Memory.  History of Present Illness    Dennis Cee is a 79-year-old male seen today as a new patient for complaints of word finding difficulty and short-term memory decline.  He states symptoms started 1-2 years ago.  He states that his wife also notices he has some difficulty.  He denies having any visual or auditory hallucinations.  He is currently seeing a therapist because he has been \"scammed\" a couple of times over the last year due to AI.     He denies having a family history of dementia.  His mother  very young (when he was 6 days old).  His dad and other family members  in their 90s and had some memory difficulty towards the end.  He is not high risk for sexually transmitted diseases and does not participate in any high risk hobbies, though he is going to be starting a stain glass course in the next couple weeks.  He has history of prostate cancer and received radiation in .  He also received testosterone blocking shots.  He did not notice an improvement in his memory once he stopped the injections.  He had open heart surgery with valve repair, but no stent or bypass.  He also has a history of melanoma on the neck.    Last imaging completed was an MRI with and without contrast in  and a CT head in 2023 (unremarkable).    He states he wakes up 1-2 times at night to urinate.  He has trouble falling asleep.  He snores and never wakes up feeling refreshed.  He has daytime hypersomnolence.              Maximum   Score Patient's   Score Questions   5 5 \"What is the year?Season?Date?Day of the week?Month?\"   5 5 \"Where are we now: State?County?Town/city?Hospital?Floor?\"   3 3 3 Unrelated objects Number of trials:___   5 5 Count backward from 100 by sevens or spell WORLD backwards   3 3 Name 3 things from above   2 2 Identify 2 objects   1 1 Repeat the phrase: No " "ifs, ands,or buts.   3 3 Take paper in right hand, fold it in half, and put it on the floor.   1 1 Please read this and do what it says. \"Close your eyes\"   1 1 Make up and write a sentence about anything. Noun and verb   1 1 Copy this picture 10 angles must be present.   30 30 Total MMSE        Family History   Problem Relation Age of Onset    Arthritis Father     Cancer Father         Prostate cancer    Arrhythmia Father         Congentive heart failure    Diabetes Mother         Adult diabetes    Early death Mother         Kidneys failed after giving birth-diabetes complications    Heart disease Mother         Adult Diabetes    Cancer Mother          after my birth. Kidneys shut down    Arrhythmia Mother         Adult diabetes.  after child birth. Kidneys shut down. 194    Diabetes Maternal Grandmother         Back problems. Wore a brace       Past Medical History:   Diagnosis Date    Abnormal ECG 2022    Allergic 1954    Nasal alergies    Arrhythmia     Dr Youngblood examined me and said i was ok.  skipped beats    Asthma     no sx    Benign prostatic hyperplasia 2018    Callus     Toe spreaders used    Cataract 2014    Surgery. Caused detached retina of right eye 20/60 repair    Cervical disc disorder     6 plates. Rods/screws lower back    Clotting disorder 2019    Low platelets    Colon polyp 2018    Found and removed last colon eca.q    Deep vein thrombosis     Depression     Fracture, clavicle 1980    Left    Gastroesophageal reflux disease 2014    Glaucoma 2003    Normal pressures. Have low pressure glaucoma    Gout     Headache 2015    Have shimmering in both eyes intermittantly. No headaches    Heart murmur May 2022    During wellness exam    Heart valve disease 2022    During Echo    HL (hearing loss) 2012    Have hearing aids    Hyperlipidemia     Hypertension 2011    Infectious viral hepatitis     Yellow jaundis as child    " Insomnia 12/02/2011    Knee swelling 2015    Right knee worse    Low back pain 1966    Memory loss 2021?    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    Prostate Cancer Jan22 January 2022    Rotator cuff syndrome 2024    RIGHT    Spinal stenosis 12/02/2011    Visual impairment 01/01/1955    Nearsighted corrected glasses    Vitamin D deficiency 05/23/2018       Social History     Socioeconomic History    Marital status:    Tobacco Use    Smoking status: Former     Current packs/day: 0.00     Types: Cigarettes     Passive exposure: Past    Smokeless tobacco: Never    Tobacco comments:     Tried tobacco as child   Vaping Use    Vaping status: Never Used   Substance and Sexual Activity    Alcohol use: Not Currently     Comment: Not a regular drinker. Occasionly have a glass of wine or be    Drug use: Never    Sexual activity: Not Currently     Partners: Female     Birth control/protection: Condom, Diaphragm, I.U.D., Spermicide, Other, Birth control pill, Pill     Comment: Chemically castrated. Prostate cancer         Current Outpatient Medications:     allopurinol (ZYLOPRIM) 100 MG tablet, TAKE 1 TABLET BY MOUTH DAILY, Disp: 90 tablet, Rfl: 1    atorvastatin (LIPITOR) 40 MG tablet, TAKE 1 TABLET BY MOUTH EVERY NIGHT FOR HIGH AMOUNT OF FATS IN THE BLOOD, Disp: 90 tablet, Rfl: 3    DULoxetine (CYMBALTA) 60 MG capsule, TAKE 1 CAPSULE BY MOUTH DAILY, Disp: 90 capsule, Rfl: 0    famotidine (PEPCID) 40 MG tablet, Take 1 tablet by mouth Every Night., Disp: , Rfl:     fexofenadine (ALLEGRA) 180 MG tablet, Take 1 tablet by mouth 2 (Two) Times a Day As Needed. Indications: Hayfever, Disp: , Rfl:     fluticasone (FLONASE) 50 MCG/ACT nasal spray, Administer 2 sprays into the nostril(s) as directed by provider Daily As Needed. Indications: Allergic Rhinitis, Disp: , Rfl:     meloxicam (MOBIC) 15 MG tablet, TAKE 1 TABLET  "BY MOUTH DAILY, Disp: 30 tablet, Rfl: 1    metoprolol tartrate (LOPRESSOR) 25 MG tablet, TAKE 1/2 TABLET BY MOUTH DAILY AS NEEDED (Patient taking differently: Take 0.5 tablets by mouth 2 (Two) Times a Day.), Disp: 45 tablet, Rfl: 3    traZODone (DESYREL) 50 MG tablet, Start with one about 1 hour before sleep.  If not helping after 3-4 nights increase to two at HS.   Call after 2 weeks if not helping, Disp: 60 tablet, Rfl: 3    Review of Systems   HENT:  Positive for hearing loss.    Respiratory:  Positive for cough.    Allergic/Immunologic: Positive for environmental allergies.   Psychiatric/Behavioral:  Positive for behavioral problems.    All other systems reviewed and are negative.           Objective   Vital Signs:   /73   Pulse 57   Ht 182.9 cm (72\")   Wt 103 kg (228 lb)   BMI 30.92 kg/m²     Physical Exam  Vitals reviewed.   Constitutional:       Appearance: He is well-developed.   HENT:      Head: Normocephalic and atraumatic.      Comments: MMP 3     Right Ear: Decreased hearing noted.      Left Ear: Decreased hearing noted.      Ears:      Comments: Bilateral hearing aids in place  Eyes:      General: Lids are normal.      Extraocular Movements: Extraocular movements intact.      Pupils: Pupils are equal, round, and reactive to light.   Neck:      Vascular: No carotid bruit.   Cardiovascular:      Rate and Rhythm: Normal rate.      Heart sounds: No murmur heard.  Pulmonary:      Effort: Pulmonary effort is normal.   Musculoskeletal:      Cervical back: Normal range of motion and neck supple.   Skin:     General: Skin is warm and dry.      Findings: No rash.   Neurological:      Mental Status: He is alert and oriented to person, place, and time.      Cranial Nerves: Cranial nerve deficit present.      Sensory: No sensory deficit.      Motor: Motor function is intact. No weakness, tremor, atrophy or abnormal muscle tone.      Coordination: Coordination is intact. Romberg sign positive. " Finger-Nose-Finger Test and Heel to Massey Test normal.      Gait: Gait abnormal.      Deep Tendon Reflexes: Reflexes abnormal.      Reflex Scores:       Tricep reflexes are 2+ on the right side and 1+ on the left side.       Bicep reflexes are 2+ on the right side and 1+ on the left side.       Brachioradialis reflexes are 2+ on the right side and 1+ on the left side.       Patellar reflexes are 2+ on the right side and 0 on the left side.       Achilles reflexes are 1+ on the right side and 1+ on the left side.  Psychiatric:         Attention and Perception: Attention normal.         Mood and Affect: Mood normal.         Speech: Speech normal.         Behavior: Behavior normal.         Cognition and Memory: Cognition and memory normal.         Judgment: Judgment normal.        Result Review :   The following data was reviewed by: STUART Caraballo on 10/16/2024:  CMP          6/26/2024    09:53 8/16/2024    10:07 8/28/2024    08:15 8/28/2024    23:04   CMP   Glucose 100  135   175    BUN 16  19   23    Creatinine 1.08  1.09   1.03    EGFR 70.7  69.9   74.8    Sodium 139  140   137    Potassium 5.3  5.0  4.1  4.9    Chloride 106  106   104    Calcium 9.1  9.5   8.6    BUN/Creatinine Ratio 14.8  17.4   22.3    Anion Gap 8.4  8.5   8.4      CBC          6/26/2024    09:53 8/16/2024    10:07 8/28/2024    23:04   CBC   WBC 7.10  5.50     RBC 4.24  4.33     Hemoglobin 13.0  13.2  11.4    Hematocrit 38.6  40.0  34.7    MCV 91.0  92.4     MCH 30.7  30.5     MCHC 33.7  33.0     RDW 13.7  13.3     Platelets 108  90       TSH          11/6/2023    16:41   TSH   TSH 1.380      Most Recent A1C          8/16/2024    10:07   HGBA1C Most Recent   Hemoglobin A1C 5.30      B12 11/20/2023: 455             MRI Brain 10/10/2022  IMPRESSION:  There is no evidence of acute infarction, mass or metastatic  disease. Mild small vessel ischemic disease is noted as well as a  moderate amount of fluid within the mastoid air cells on the  left.    Doppler carotid ultrasound 11/16/2022   Proximal right internal carotid artery is normal.    Mid right internal carotid artery is normal.    Proximal left internal carotid artery mild stenosis.      CT head without contrast 8/15/2023  No acute intercranial process    Neurological Exam  Mental Status  Alert. Oriented to person, place and time. Oriented to person, place, and time. Memory is normal. Recent and remote memory are intact. Speech is normal. Language is fluent with no aphasia. Attention and concentration are normal. Fund of knowledge is appropriate for level of education. MMSE score: 30.    Cranial Nerves  CN II: Visual acuity is normal. Visual fields full to confrontation.  CN III, IV, VI: Extraocular movements intact bilaterally. Normal lids and orbits bilaterally. Pupils equal round and reactive to light bilaterally.  CN V: Facial sensation is normal.  CN VII: Full and symmetric facial movement.  CN VIII: Bilateral hearing aid in place for hearing loss.  CN IX, X: Palate elevates symmetrically. Normal gag reflex.  CN XI: Shoulder shrug strength is normal.  CN XII: Tongue midline without atrophy or fasciculations.    Motor  Normal muscle bulk throughout. No pronator drift.                                             Right                     Left   Shoulder abduction               5                          5  Elbow flexion                         5                          5  Elbow extension                    5                          5  Wrist flexion                           5                          5  Wrist extension                      5                          5  Hip flexion                              5                          5  Knee flexion                           5                          5  Knee extension                      5                          5  Plantarflexion                         5                          5  Dorsiflexion                            5                           5    Sensory  Normal light touch, temperature, and vibration in the upper extremity.    Reflexes                                            Right                      Left  Brachioradialis                    2+                         1+  Biceps                                 2+                         1+  Triceps                                2+                         1+  Patellar                                2+                         0  Achilles                                1+                         1+    Coordination    Finger-to-nose, rapid alternating movements and heel-to-shin normal bilaterally without dysmetria.No tremor    Gait   Abnormal gait.Casual gait: Wide stance. Normal stride length. Antalgic gait. Normal right arm swing. Normal left arm swing. Romberg is present. Able to rise from chair without using arms.              Assessment and Plan    Diagnoses and all orders for this visit:    1. Memory loss (Primary)  -     TSH+Free T4  -     Vitamin B12 & Folate  -     Home Sleep Study; Future    2. Word finding difficulty  -     TSH+Free T4  -     Vitamin B12 & Folate  -     Home Sleep Study; Future    3. Daytime somnolence  -     Home Sleep Study; Future    4. Hypersomnia, unspecified  -     Home Sleep Study; Future    Other orders  -     COGNITIVE ASSESSMENT SCAN      Dennis Cee is seen today as a new patient for memory and word finding difficulties.  Symptoms started 1 to 2 years ago and are noticed by his wife as well.  He has no family history of Alzheimer's or vascular dementia, though his family had some memory difficulties in extreme age (late 90s).  He is not having any visual or auditory hallucinations and is still driving and performing ADLs without difficulty.  MMSE 30/30 today.    Will check B12, folate, TSH    Due to daytime hypersomnolence, frequent nocturnal waking, and waking up feeling unrefreshed, will order home sleep study to evaluate for possible MARIBELL.    We did  talk about starting donepezil and potential side effects.  We will discuss starting this at next visit.    Will hold off on MRI as he recently had CT scan of the head in August 2023 and MRI brain in 2022.  We did review the images of his MRI brain from 2022.  He did not have significant cerebral atrophy in the temporoparietal or hippocampal lobes.  Only mild small vessel ischemic disease seen.    Encourage physical activity, ideally 5 days a week and approximately 150 minutes/week.  Also recommend regular dental care.  Encourage social engagements.    He will follow-up in 3 months or sooner if needed.  He is encouraged to contact the office in the meantime with questions or concerns.        Follow Up   Return in about 3 months (around 1/16/2025).  Patient was given instructions and counseling regarding his condition or for health maintenance advice. Please see specific information pulled into the AVS if appropriate.         This document has been electronically signed by STUART Edmonds on October 16, 2024 09:09 EDT

## 2024-10-18 ENCOUNTER — PATIENT ROUNDING (BHMG ONLY) (OUTPATIENT)
Dept: NEUROLOGY | Facility: CLINIC | Age: 79
End: 2024-10-18
Payer: MEDICARE

## 2024-10-22 ENCOUNTER — TREATMENT (OUTPATIENT)
Dept: PHYSICAL THERAPY | Facility: CLINIC | Age: 79
End: 2024-10-22
Payer: MEDICARE

## 2024-10-22 DIAGNOSIS — Z78.9 IMPAIRED DECISION MAKING: Primary | ICD-10-CM

## 2024-10-22 DIAGNOSIS — G89.29 CHRONIC RIGHT SHOULDER PAIN: ICD-10-CM

## 2024-10-22 DIAGNOSIS — M25.611 DECREASED ROM OF RIGHT SHOULDER: ICD-10-CM

## 2024-10-22 DIAGNOSIS — M25.511 CHRONIC RIGHT SHOULDER PAIN: ICD-10-CM

## 2024-10-22 DIAGNOSIS — Z96.611 S/P REVERSE TOTAL SHOULDER ARTHROPLASTY, RIGHT: Primary | ICD-10-CM

## 2024-10-22 PROCEDURE — G0283 ELEC STIM OTHER THAN WOUND: HCPCS | Performed by: PHYSICAL THERAPIST

## 2024-10-22 PROCEDURE — 97110 THERAPEUTIC EXERCISES: CPT | Performed by: PHYSICAL THERAPIST

## 2024-10-22 PROCEDURE — 97112 NEUROMUSCULAR REEDUCATION: CPT | Performed by: PHYSICAL THERAPIST

## 2024-10-22 NOTE — PROGRESS NOTES
"Physical Therapy Daily Treatment Note  AND Progress Note     [address]  [place] [address]    Patient:  Clint Cee  :  1945  Referring practitioner:  Андрей Goldberg, *  Date of Initial Visit:  Type: THERAPY  Noted: 2024  Today's Date:  10/22/2024      Visit Diagnoses:    ICD-10-CM ICD-9-CM   1. S/P reverse total shoulder arthroplasty, right  Z96.611 V43.61   2. Chronic right shoulder pain  M25.511 719.41    G89.29 338.29   3. Decreased ROM of right shoulder  M25.611 719.51       VISIT#:  9 in POC.      Subjective Questionnaire: QuickDASH: 52% limited (eval); today = 14% disability.      Subjective   Clint Cee reports no complications after last visit.  \"Not really / Some\" when asked about completion of his HEP.  Stiffness noted when raising RUE.  Reports he has made progress since starting PT.      Objective   See exercise, manual, and modality logs for complete treatment.       Passive Range of Motion      Right Shoulder   Flexion: 135 degrees (eval); today = 150 deg  Abduction: 130 degrees (eval); today = 160 deg  External rotation 0°: 30 degrees (eval); today = 51 deg  Internal rotation 0°: Right shoulder passive internal rotation at 0 degrees: to waist. (Not reassessed today).  Measured in supine.      Goals (LTGs not assessed today)  Plan Goals: LTG 1: 12 weeks:  The patient will demonstrate 165 degrees of R shoulder flexion, 165 degrees of shoulder abduction, 80 degrees of shoulder external rotation, and 80 degrees of shoulder internal rotation to allow the patient to reach into upper kitchen cabinets and manipulate clothing behind the back with greater ease.                          STATUS:  New  STG 1a: 6 weeks:  The patient will demonstrate 125 degrees of R shoulder flexion, 125 degrees of shoulder abduction, 70 degrees of shoulder external rotation, and 70 degrees of shoulder internal rotation (PARTIALLY MET)                          " STATUS:  New     LTG 2: 12 weeks:  The patient will demonstrate 4+/5 strength for R shoulder flexion, abduction, external rotation, and internal rotation in order to demonstrate improved shoulder stability.                          STATUS:  New  STG 2a: 8 weeks:  The patient will demonstrate 3+/5 strength for R shoulder flexion, abduction, external rotation, and internal rotation.  (All 4-/5 today measured in sitting; MET)                          STATUS:  New  STG2b:  2 weeks:  The patient will be independent with home exercises.  (PARTIALLY MET)                          STATUS:  New                LTG 3: 12 weeks:  The patient will report a pain rating of 1/10 or better in order to improve sleep quality and tolerance to performance of activities of daily living.                          STATUS:  New  STG 3a: 8 weeks:  The patient will report a pain rating of 3/10 or better.  (MET)                          STATUS:  New     LTG 4: 12 weeks:  The patient will demonstrate 20 % limitation on the QuickDASH.                          STATUS:  New  STG 4a: 6 weeks:  The patient will demonstrate 40 % limitation on the QuickDASH.  (MET)                          STATUS:  New       ASSESSMENT  Pt is making steady progress towards completion of goals.  Sig reduction in disability score on Quick DASH today vs initial eval.  Improved R shld global strength and PROM measures.  Pt tolerated new and progression of exercises / activities without problems.  Cues as noted.  Held selected exercises per time.  No complications today.      PLAN  Current POC remains appropriate for pt at this time.  Continue skilled PT services and progress as tolerated per PT evaluation.          Timed:  Therapeutic Exercise:    17     mins  16650;     Neuromuscular Aleksandr:    13    mins  78646;        Un-Timed:  Electrical Stimulation:    20     mins  66221 ( );      Timed Treatment:   30   mins   Total Treatment:     50   mins      Joseph  Olivia, PT  [place] License # [id number]  Physical Therapist

## 2024-10-24 ENCOUNTER — TREATMENT (OUTPATIENT)
Dept: PHYSICAL THERAPY | Facility: CLINIC | Age: 79
End: 2024-10-24
Payer: MEDICARE

## 2024-10-24 DIAGNOSIS — M25.611 DECREASED ROM OF RIGHT SHOULDER: ICD-10-CM

## 2024-10-24 DIAGNOSIS — G89.29 CHRONIC RIGHT SHOULDER PAIN: ICD-10-CM

## 2024-10-24 DIAGNOSIS — M25.511 CHRONIC RIGHT SHOULDER PAIN: ICD-10-CM

## 2024-10-24 DIAGNOSIS — Z96.611 S/P REVERSE TOTAL SHOULDER ARTHROPLASTY, RIGHT: Primary | ICD-10-CM

## 2024-10-24 NOTE — PROGRESS NOTES
Physical Therapy Daily Treatment Note       [facility]   [address] [facility]  [place] [address]    Patient:  Clint Cee  :  1945  Referring practitioner:  Андрей Goldberg, *  Date of Initial Visit:  Type: THERAPY  Noted: 2024  Today's Date:  10/24/2024      Visit Diagnoses:    ICD-10-CM ICD-9-CM   1. S/P reverse total shoulder arthroplasty, right  Z96.611 V43.61   2. Chronic right shoulder pain  M25.511 719.41    G89.29 338.29   3. Decreased ROM of right shoulder  M25.611 719.51         VISIT#:  10 in POC.      Subjective Questionnaire: QuickDASH: 52% limited (eval); today = 14% disability.      Subjective   Clint Cee reports no complications after last visit.  A little better after each day - slow improvement.      Objective   See exercise, manual, and modality logs for complete treatment.       ASSESSMENT  - cues for exercise form.   - tends to abd shoulder with isometric flexion and requires verbal and tactile cues.  - had 1 hour treatment time with patient.  - e-stim for pain control.        PLAN  Current POC remains appropriate for pt at this time.  Continue skilled PT services and progress as tolerated per PT evaluation.          Timed:  Therapeutic Exercise:    25     mins  96759;     Neuromuscular Aleksandr:    15    mins  22251;        Un-Timed:  Electrical Stimulation:    15     mins  69051 ( );      Timed Treatment:   45   mins   Total Treatment:     55  mins      Manfred Sun PT  [place] License # [id number]  Physical Therapist

## 2024-10-30 ENCOUNTER — TREATMENT (OUTPATIENT)
Dept: PHYSICAL THERAPY | Facility: CLINIC | Age: 79
End: 2024-10-30
Payer: MEDICARE

## 2024-10-30 DIAGNOSIS — M25.511 CHRONIC RIGHT SHOULDER PAIN: ICD-10-CM

## 2024-10-30 DIAGNOSIS — Z96.611 S/P REVERSE TOTAL SHOULDER ARTHROPLASTY, RIGHT: Primary | ICD-10-CM

## 2024-10-30 DIAGNOSIS — G89.29 CHRONIC RIGHT SHOULDER PAIN: ICD-10-CM

## 2024-10-30 DIAGNOSIS — M25.611 DECREASED ROM OF RIGHT SHOULDER: ICD-10-CM

## 2024-10-30 NOTE — PROGRESS NOTES
Physical Therapy Daily Treatment Note  [facility] [facility], [address] [place] [address]     Patient: Clint Cee   : 1945  Referring practitioner: Андрей Goldberg, *  Diagnosis:      ICD-10-CM ICD-9-CM   1. S/P reverse total shoulder arthroplasty, right  Z96.611 V43.61   2. Chronic right shoulder pain  M25.511 719.41    G89.29 338.29   3. Decreased ROM of right shoulder  M25.611 719.51     Date of Initial Visit: Type: THERAPY  Noted: 2024  Today's Date: 10/30/2024    VISIT#: 11          Subjective   Clint Cee reports: he has not been doing his HEP this past week.      Objective   See Exercise, Manual, and Modality Logs for complete treatment.       Assessment & Plan       Assessment  Assessment details: Pt is 9 weeks post op today and progressing according to protocol.  Progressed strengthening for scapular stabilization and R shoulder with good tolerance.            Progress per Plan of Care and Progress strengthening /stabilization /functional activity           Timed:  Manual Therapy:         mins  33017;  Therapeutic Exercise:    15     mins  16191;     Neuromuscular Aleksandr:    10    mins  48227;    Therapeutic Activity:     13     mins  20516;     Gait Training:           mins  43994;     Ultrasound:          mins  11927;    Electrical Stimulation:         mins  21582 ( );    Untimed:  Electrical Stimulation:   20      mins  37243 ( );  Mechanical Traction:         mins  21189;   Dry needling:       Self pay    Timed Treatment:   38   mins   Total Treatment:     58   mins  Yanelis Deal PT, DPT, CLT, CIDN  Physical Therapist

## 2024-11-01 ENCOUNTER — TREATMENT (OUTPATIENT)
Dept: PHYSICAL THERAPY | Facility: CLINIC | Age: 79
End: 2024-11-01
Payer: MEDICARE

## 2024-11-01 DIAGNOSIS — Z96.611 S/P REVERSE TOTAL SHOULDER ARTHROPLASTY, RIGHT: Primary | ICD-10-CM

## 2024-11-01 DIAGNOSIS — G89.29 CHRONIC RIGHT SHOULDER PAIN: ICD-10-CM

## 2024-11-01 DIAGNOSIS — M25.611 DECREASED ROM OF RIGHT SHOULDER: ICD-10-CM

## 2024-11-01 DIAGNOSIS — M25.511 CHRONIC RIGHT SHOULDER PAIN: ICD-10-CM

## 2024-11-01 NOTE — PROGRESS NOTES
Physical Therapy Daily Treatment Note  [address] [facility] [address], [address] [place] [address]     Patient: Clint Cee   : 1945  Referring practitioner: Андрей Goldberg, *  Diagnosis:      ICD-10-CM ICD-9-CM   1. S/P reverse total shoulder arthroplasty, right  Z96.611 V43.61   2. Chronic right shoulder pain  M25.511 719.41    G89.29 338.29   3. Decreased ROM of right shoulder  M25.611 719.51     Date of Initial Visit: Type: THERAPY  Noted: 2024  Today's Date: 2024    VISIT#: 12          Subjective   Clint Cee reports: doing isometric exercise at home.    Objective   See Exercise, Manual, and Modality Logs for complete treatment.       Assessment & Plan       Assessment  Assessment details: Pt is 9 weeks post op today and progressing according to protocol.  Continued to focus on strengthening for scapular stabilization and R shoulder with good tolerance.  Verbal cues for form and has some confusion regarding HEP, but clarified verbally.deferred some exercises due to time constraints. Discontinued IFC and transitioned to CP.          Progress per Plan of Care and Progress strengthening /stabilization /functional activity           Timed:  Manual Therapy:         mins  32353;  Therapeutic Exercise:    17    mins  40195;     Neuromuscular Aleksandr:      mins  42955;    Therapeutic Activity:     13     mins  77224;     Gait Training:           mins  99610;     Ultrasound:          mins  06768;    Electrical Stimulation:         mins  40039 ( );    Untimed:  Electrical Stimulation:         mins  79870 ( );  Mechanical Traction:         mins  53599;   Dry needling:       Self pay    Timed Treatment:   30   mins   Total Treatment:    30   mins      Manfred Sun PT, DPT  [place] License # [id number]  Physical Therapist

## 2024-11-04 RX ORDER — METOPROLOL TARTRATE 25 MG/1
12.5 TABLET, FILM COATED ORAL 2 TIMES DAILY
Qty: 90 TABLET | Refills: 3 | Status: SHIPPED | OUTPATIENT
Start: 2024-11-04

## 2024-11-04 NOTE — TELEPHONE ENCOUNTER
Rx Refill Note  Requested Prescriptions     Pending Prescriptions Disp Refills    metoprolol tartrate (LOPRESSOR) 25 MG tablet [Pharmacy Med Name: METOPROLOL TARTRATE 25MG TABLETS] 90 tablet 3     Sig: Take 0.5 tablets by mouth 2 (Two) Times a Day.      Last office visit with prescribing clinician: 7/25/2024   Last telemedicine visit with prescribing clinician: Visit date not found   Next office visit with prescribing clinician: 1/30/2025                         Would you like a call back once the refill request has been completed: [] Yes [] No    If the office needs to give you a call back, can they leave a voicemail: [] Yes [] No    Thania Le MA  11/04/24, 07:27 EST

## 2024-11-06 ENCOUNTER — TREATMENT (OUTPATIENT)
Dept: PHYSICAL THERAPY | Facility: CLINIC | Age: 79
End: 2024-11-06
Payer: MEDICARE

## 2024-11-06 DIAGNOSIS — Z96.611 S/P REVERSE TOTAL SHOULDER ARTHROPLASTY, RIGHT: Primary | ICD-10-CM

## 2024-11-06 DIAGNOSIS — M25.611 DECREASED ROM OF RIGHT SHOULDER: ICD-10-CM

## 2024-11-06 DIAGNOSIS — G89.29 CHRONIC RIGHT SHOULDER PAIN: ICD-10-CM

## 2024-11-06 DIAGNOSIS — M25.511 CHRONIC RIGHT SHOULDER PAIN: ICD-10-CM

## 2024-11-06 NOTE — PROGRESS NOTES
Physical Therapy Daily Treatment Note   [facility], [address] [place] [address]     Patient: Clint Cee   : 1945  Referring practitioner: Андрей Goldberg, *  Diagnosis:      ICD-10-CM ICD-9-CM   1. S/P reverse total shoulder arthroplasty, right  Z96.611 V43.61   2. Chronic right shoulder pain  M25.511 719.41    G89.29 338.29   3. Decreased ROM of right shoulder  M25.611 719.51     Date of Initial Visit: Type: THERAPY  Noted: 2024  Today's Date: 2024    VISIT#: 13          Subjective   Clint Cee reports: shoulder doing well: coming along pain minimal to none.    Objective   See Exercise, Manual, and Modality Logs for complete treatment.     Access Code: [email or web address]  URL: [email or web address]  Date: 2024  Prepared by: Manfred Sun    Exercises  - Standing Shoulder Row with Anchored Resistance  - 1 x daily - 3-5 x weekly - 2 sets - 15 reps - 3 sec hold  - Supine Shoulder Circles with Weight  - 1 x daily - 3-5 x weekly - 2 sets - 10 reps - 1 lb soup can or 1 lb weight  - Supine Shoulder Alphabet  - 1 x daily - 3-5 x weekly - 1 sets - 1 reps - 1 lb weight lb or soup can  - Supine Shoulder Flexion with Free Weight  - 1 x daily - 3-5 x weekly - 2 sets - 10 reps - 1 lb 1 lb weight or soup can    Assessment & Plan       Assessment  Assessment details: Pt is 10 weeks post op today and progressing according to protocol.  Updated HEP with emphasis on stabilization and improving neuromuscular control of UE to facilitate functional activities. Will cancel next visit and f/u at 12 week juan to progress.   Initiated therapy earlier than appt time.          Progress per Plan of Care and Progress strengthening /stabilization /functional activity           Timed:  Manual Therapy:         mins  12102;  Therapeutic Exercise:   25    mins  98505;     Neuromuscular Aleksandr:      mins  22090;    Therapeutic Activity:     13     mins  75212;     Gait Training:           mins  70782;      Ultrasound:          mins  98238;    Electrical Stimulation:         mins  02698 ( );    Untimed:  Electrical Stimulation:         mins  69808 ( );  Mechanical Traction:         mins  95491;   Dry needling:       Self pay    Timed Treatment:   38   mins   Total Treatment:    38   mins      Manfred Sun PT, DPT  [place] License # [id number]  Physical Therapist

## 2024-11-13 ENCOUNTER — TREATMENT (OUTPATIENT)
Dept: PHYSICAL THERAPY | Facility: CLINIC | Age: 79
End: 2024-11-13
Payer: MEDICARE

## 2024-11-13 DIAGNOSIS — M25.611 DECREASED ROM OF RIGHT SHOULDER: ICD-10-CM

## 2024-11-13 DIAGNOSIS — Z96.611 S/P REVERSE TOTAL SHOULDER ARTHROPLASTY, RIGHT: Primary | ICD-10-CM

## 2024-11-13 DIAGNOSIS — G89.29 CHRONIC RIGHT SHOULDER PAIN: ICD-10-CM

## 2024-11-13 DIAGNOSIS — M25.511 CHRONIC RIGHT SHOULDER PAIN: ICD-10-CM

## 2024-11-13 NOTE — PROGRESS NOTES
Physical Therapy Daily Treatment Note  [address] [facility], [address] [place] [address]     Patient: Clint Cee   : 1945  Referring practitioner: Андрей Goldberg, *  Diagnosis:      ICD-10-CM ICD-9-CM   1. S/P reverse total shoulder arthroplasty, right  Z96.611 V43.61   2. Decreased ROM of right shoulder  M25.611 719.51   3. Chronic right shoulder pain  M25.511 719.41    G89.29 338.29     Date of Initial Visit: Type: THERAPY  Noted: 2024  Today's Date: 2024    VISIT#: 14          Subjective   Clint Cee reports: no complications with HEP and shoulder doing well; states he may need a TKR and is process of discussing with MD    Objective     s/p 24 R reverse total shoulder arthroplasty. 11 week juan     See Exercise, Manual, and Modality Logs for complete treatment.     Access Code: [email or web address]  URL: [email or web address]  Date: 2024  Prepared by: Manfred Sun    Exercises  - Standing Shoulder Row with Anchored Resistance  - 1 x daily - 3-5 x weekly - 2 sets - 15 reps - 3 sec hold  - Supine Shoulder Circles with Weight  - 1 x daily - 3-5 x weekly - 2 sets - 10 reps - 1 lb soup can or 1 lb weight  - Supine Shoulder Alphabet  - 1 x daily - 3-5 x weekly - 1 sets - 1 reps - 1 lb weight lb or soup can  - Supine Shoulder Flexion with Free Weight  - 1 x daily - 3-5 x weekly - 2 sets - 10 reps - 1 lb 1 lb weight or soup can    Assessment & Plan       Assessment  Assessment details: Pt is 11 weeks post op today and progressing according to protocol.  Updated HEP with emphasis on stabilization and improving neuromuscular control of UE to facilitate functional activities. Will cancel next two visits. Will begin transitioning to independent HEP once patient stable and able to per guidelines/clinical presentation - discussed with patient.    Requires verbal and tactile cues for exercise intermittently.             Progress per Plan of Care and Progress strengthening  /stabilization /functional activity  F/u in 2 weeks and progress as able, transitioning to [facility] and discharge.         Timed:  Manual Therapy:         mins  12951;  Therapeutic Exercise:   20    mins  81625;     Neuromuscular Aleksandr:      mins  40635;    Therapeutic Activity:     13     mins  89570;     Gait Training:           mins  52036;     Ultrasound:          mins  74170;    Electrical Stimulation:         mins  00494 ( );    Untimed:  Electrical Stimulation:         mins  80924 ( );  Mechanical Traction:         mins  09910;   Dry needling:       Self pay    Timed Treatment:   33   mins   Total Treatment:    33   mins      Manfred Sun PT, DPT  [place] License # [id number]  Physical Therapist

## 2024-11-18 ENCOUNTER — LAB (OUTPATIENT)
Dept: FAMILY MEDICINE CLINIC | Facility: CLINIC | Age: 79
End: 2024-11-18
Payer: MEDICARE

## 2024-11-18 ENCOUNTER — OFFICE VISIT (OUTPATIENT)
Dept: FAMILY MEDICINE CLINIC | Facility: CLINIC | Age: 79
End: 2024-11-18
Payer: MEDICARE

## 2024-11-18 VITALS
OXYGEN SATURATION: 100 % | WEIGHT: 228 LBS | RESPIRATION RATE: 16 BRPM | DIASTOLIC BLOOD PRESSURE: 74 MMHG | HEIGHT: 72 IN | BODY MASS INDEX: 30.88 KG/M2 | SYSTOLIC BLOOD PRESSURE: 150 MMHG | HEART RATE: 63 BPM

## 2024-11-18 DIAGNOSIS — Z98.890 S/P TVR (TRICUSPID VALVE REPAIR): ICD-10-CM

## 2024-11-18 DIAGNOSIS — Z00.00 MEDICARE ANNUAL WELLNESS VISIT, SUBSEQUENT: Primary | ICD-10-CM

## 2024-11-18 DIAGNOSIS — F32.A DEPRESSION, UNSPECIFIED DEPRESSION TYPE: ICD-10-CM

## 2024-11-18 DIAGNOSIS — Z12.5 ENCOUNTER FOR SCREENING FOR MALIGNANT NEOPLASM OF PROSTATE: ICD-10-CM

## 2024-11-18 DIAGNOSIS — Z98.890 S/P MVR (MITRAL VALVE REPAIR): ICD-10-CM

## 2024-11-18 DIAGNOSIS — C61 PROSTATE CANCER: ICD-10-CM

## 2024-11-18 DIAGNOSIS — M17.0 PRIMARY OSTEOARTHRITIS OF BOTH KNEES: ICD-10-CM

## 2024-11-18 DIAGNOSIS — I10 PRIMARY HYPERTENSION: ICD-10-CM

## 2024-11-18 DIAGNOSIS — E55.9 VITAMIN D DEFICIENCY, UNSPECIFIED: ICD-10-CM

## 2024-11-18 DIAGNOSIS — Z78.9 IMPAIRED DECISION MAKING: ICD-10-CM

## 2024-11-18 DIAGNOSIS — R79.9 ABNORMAL FINDING OF BLOOD CHEMISTRY, UNSPECIFIED: ICD-10-CM
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
## 2024-11-18 LAB
25(OH)D3 SERPL-MCNC: 47.3 NG/ML (ref 30–100)
ALBUMIN SERPL-MCNC: 4.1 G/DL (ref 3.5–5.2)
ALBUMIN/GLOB SERPL: 1.7 G/DL
ALP SERPL-CCNC: 83 U/L (ref 39–117)
ALT SERPL W P-5'-P-CCNC: 18 U/L (ref 1–41)
ANION GAP SERPL CALCULATED.3IONS-SCNC: 7.9 MMOL/L (ref 5–15)
AST SERPL-CCNC: 18 U/L (ref 1–40)
BACTERIA UR QL AUTO: ABNORMAL /HPF
BASOPHILS # BLD AUTO: 0.02 10*3/MM3 (ref 0–0.2)
BASOPHILS NFR BLD AUTO: 0.4 % (ref 0–1.5)
BILIRUB SERPL-MCNC: 0.3 MG/DL (ref 0–1.2)
BILIRUB UR QL STRIP: NEGATIVE
BUN SERPL-MCNC: 18 MG/DL (ref 8–23)
BUN/CREAT SERPL: 17.6 (ref 7–25)
CALCIUM SPEC-SCNC: 9 MG/DL (ref 8.6–10.5)
CHLORIDE SERPL-SCNC: 105 MMOL/L (ref 98–107)
CHOLEST SERPL-MCNC: 111 MG/DL (ref 0–200)
CLARITY UR: CLEAR
CO2 SERPL-SCNC: 25.1 MMOL/L (ref 22–29)
COLOR UR: ABNORMAL
CREAT SERPL-MCNC: 1.02 MG/DL (ref 0.76–1.27)
DEPRECATED RDW RBC AUTO: 46.1 FL (ref 37–54)
EGFRCR SERPLBLD CKD-EPI 2021: 75.7 ML/MIN/1.73
EOSINOPHIL # BLD AUTO: 0.17 10*3/MM3 (ref 0–0.4)
EOSINOPHIL NFR BLD AUTO: 3.3 % (ref 0.3–6.2)
ERYTHROCYTE [DISTWIDTH] IN BLOOD BY AUTOMATED COUNT: 14.1 % (ref 12.3–15.4)
GLOBULIN UR ELPH-MCNC: 2.4 GM/DL
GLUCOSE SERPL-MCNC: 125 MG/DL (ref 65–99)
GLUCOSE UR STRIP-MCNC: NEGATIVE MG/DL
HBA1C MFR BLD: 5.2 % (ref 4.8–5.6)
HCT VFR BLD AUTO: 42.4 % (ref 37.5–51)
HDLC SERPL-MCNC: 48 MG/DL (ref 40–60)
HGB BLD-MCNC: 13.6 G/DL (ref 13–17.7)
HGB UR QL STRIP.AUTO: NEGATIVE
HOLD SPECIMEN: NORMAL
HYALINE CASTS UR QL AUTO: ABNORMAL /LPF
IMM GRANULOCYTES # BLD AUTO: 0.01 10*3/MM3 (ref 0–0.05)
IMM GRANULOCYTES NFR BLD AUTO: 0.2 % (ref 0–0.5)
KETONES UR QL STRIP: ABNORMAL
LDLC SERPL CALC-MCNC: 50 MG/DL (ref 0–100)
LDLC/HDLC SERPL: 1.08 {RATIO}
LEUKOCYTE ESTERASE UR QL STRIP.AUTO: ABNORMAL
LYMPHOCYTES # BLD AUTO: 0.61 10*3/MM3 (ref 0.7–3.1)
LYMPHOCYTES NFR BLD AUTO: 11.8 % (ref 19.6–45.3)
MCH RBC QN AUTO: 29.1 PG (ref 26.6–33)
MCHC RBC AUTO-ENTMCNC: 32.1 G/DL (ref 31.5–35.7)
MCV RBC AUTO: 90.8 FL (ref 79–97)
MONOCYTES # BLD AUTO: 0.4 10*3/MM3 (ref 0.1–0.9)
MONOCYTES NFR BLD AUTO: 7.7 % (ref 5–12)
NEUTROPHILS NFR BLD AUTO: 3.97 10*3/MM3 (ref 1.7–7)
NEUTROPHILS NFR BLD AUTO: 76.6 % (ref 42.7–76)
NITRITE UR QL STRIP: NEGATIVE
NRBC BLD AUTO-RTO: 0 /100 WBC (ref 0–0.2)
PH UR STRIP.AUTO: 5.5 [PH] (ref 5–8)
PLATELET # BLD AUTO: 76 10*3/MM3 (ref 140–450)
PMV BLD AUTO: 9.9 FL (ref 6–12)
POTASSIUM SERPL-SCNC: 4.7 MMOL/L (ref 3.5–5.2)
PROT SERPL-MCNC: 6.5 G/DL (ref 6–8.5)
PROT UR QL STRIP: ABNORMAL
PSA SERPL-MCNC: <0.014 NG/ML (ref 0–4)
RBC # BLD AUTO: 4.67 10*6/MM3 (ref 4.14–5.8)
RBC # UR STRIP: ABNORMAL /HPF
REF LAB TEST METHOD: ABNORMAL
SODIUM SERPL-SCNC: 138 MMOL/L (ref 136–145)
SP GR UR STRIP: 1.03 (ref 1–1.03)
SQUAMOUS #/AREA URNS HPF: ABNORMAL /HPF
T4 FREE SERPL-MCNC: 1.41 NG/DL (ref 0.92–1.68)
TRIGL SERPL-MCNC: 57 MG/DL (ref 0–150)
TSH SERPL DL<=0.05 MIU/L-ACNC: 1.4 UIU/ML (ref 0.27–4.2)
UROBILINOGEN UR QL STRIP: ABNORMAL
VIT B12 BLD-MCNC: 433 PG/ML (ref 211–946)
VLDLC SERPL-MCNC: 13 MG/DL (ref 5–40)
WBC # UR STRIP: ABNORMAL /HPF
WBC NRBC COR # BLD AUTO: 5.18 10*3/MM3 (ref 3.4–10.8)

## 2024-11-18 PROCEDURE — 82306 VITAMIN D 25 HYDROXY: CPT | Performed by: FAMILY MEDICINE

## 2024-11-18 PROCEDURE — 1159F MED LIST DOCD IN RCRD: CPT | Performed by: FAMILY MEDICINE

## 2024-11-18 PROCEDURE — 1126F AMNT PAIN NOTED NONE PRSNT: CPT | Performed by: FAMILY MEDICINE

## 2024-11-18 PROCEDURE — 87086 URINE CULTURE/COLONY COUNT: CPT | Performed by: FAMILY MEDICINE

## 2024-11-18 PROCEDURE — 82607 VITAMIN B-12: CPT | Performed by: FAMILY MEDICINE

## 2024-11-18 PROCEDURE — G0103 PSA SCREENING: HCPCS | Performed by: FAMILY MEDICINE

## 2024-11-18 PROCEDURE — 80061 LIPID PANEL: CPT | Performed by: FAMILY MEDICINE

## 2024-11-18 PROCEDURE — 3077F SYST BP >= 140 MM HG: CPT | Performed by: FAMILY MEDICINE

## 2024-11-18 PROCEDURE — 36415 COLL VENOUS BLD VENIPUNCTURE: CPT | Performed by: FAMILY MEDICINE

## 2024-11-18 PROCEDURE — 3078F DIAST BP <80 MM HG: CPT | Performed by: FAMILY MEDICINE

## 2024-11-18 PROCEDURE — 85025 COMPLETE CBC W/AUTO DIFF WBC: CPT | Performed by: FAMILY MEDICINE

## 2024-11-18 PROCEDURE — 83036 HEMOGLOBIN GLYCOSYLATED A1C: CPT | Performed by: FAMILY MEDICINE

## 2024-11-18 PROCEDURE — G0439 PPPS, SUBSEQ VISIT: HCPCS | Performed by: FAMILY MEDICINE

## 2024-11-18 PROCEDURE — 84439 ASSAY OF FREE THYROXINE: CPT | Performed by: FAMILY MEDICINE

## 2024-11-18 PROCEDURE — 84443 ASSAY THYROID STIM HORMONE: CPT | Performed by: FAMILY MEDICINE

## 2024-11-18 PROCEDURE — 81001 URINALYSIS AUTO W/SCOPE: CPT | Performed by: FAMILY MEDICINE

## 2024-11-18 PROCEDURE — 1160F RVW MEDS BY RX/DR IN RCRD: CPT | Performed by: FAMILY MEDICINE

## 2024-11-18 PROCEDURE — 80053 COMPREHEN METABOLIC PANEL: CPT | Performed by: FAMILY MEDICINE

## 2024-11-18 NOTE — PROGRESS NOTES
Subjective   The ABCs of the Annual Wellness Visit  Medicare Wellness Visit      Clint Cee is a 79 y.o. patient who presents for a Medicare Wellness Visit.    The following portions of the patient's history were reviewed and   updated as appropriate: allergies, current medications, past family history, past medical history, past social history, past surgical history, and problem list.    Compared to one year ago, the patient's physical   health is the same.  Compared to one year ago, the patient's mental   health is worse.    Recent Hospitalizations:  He was not admitted to the hospital during the last year.     Current Medical Providers:  Patient Care Team:  Jaret Castellanos MD as PCP - General (Family Medicine)  Felipe Garcia MD as Consulting Physician (Cardiology)  Dr. Rea (Dental General Practice)  Dr. [facility] (Ophthalmology)  Marnie Gudino (Behavioral Health)  Erickson Ortiz II, MD as Consulting Physician (Orthopedic Surgery)    Outpatient Medications Prior to Visit   Medication Sig Dispense Refill    allopurinol (ZYLOPRIM) 100 MG tablet TAKE 1 TABLET BY MOUTH DAILY 90 tablet 1    atorvastatin (LIPITOR) 40 MG tablet TAKE 1 TABLET BY MOUTH EVERY NIGHT FOR HIGH AMOUNT OF FATS IN THE BLOOD 90 tablet 3    DULoxetine (CYMBALTA) 60 MG capsule TAKE 1 CAPSULE BY MOUTH DAILY 90 capsule 0    famotidine (PEPCID) 40 MG tablet Take 1 tablet by mouth Every Night.      fexofenadine (ALLEGRA) 180 MG tablet Take 1 tablet by mouth 2 (Two) Times a Day As Needed. Indications: Hayfever      fluticasone (FLONASE) 50 MCG/ACT nasal spray Administer 2 sprays into the nostril(s) as directed by provider Daily As Needed. Indications: Allergic Rhinitis      meloxicam (MOBIC) 15 MG tablet TAKE 1 TABLET BY MOUTH DAILY 30 tablet 1    metoprolol tartrate (LOPRESSOR) 25 MG tablet Take 0.5 tablets by mouth 2 (Two) Times a Day. 90 tablet 3    traZODone (DESYREL) 50 MG tablet Start with one about 1 hour before  "sleep.  If not helping after 3-4 nights increase to two at HS.   Call after 2 weeks if not helping 60 tablet 3     No facility-administered medications prior to visit.     No opioid medication identified on active medication list. I have reviewed chart for other potential  high risk medication/s and harmful drug interactions in the elderly.      Aspirin is not on active medication list.  Aspirin use is not indicated based on review of current medical condition/s. Risk of harm outweighs potential benefits.  .    Patient Active Problem List   Diagnosis    Benign prostatic hyperplasia    Gastroesophageal reflux disease    Mood disorder    Scoliosis    Spinal stenosis    Vitamin D deficiency    Polyosteoarthritis    Insomnia    Retinal detachment of right eye with single retinal tear    Osteoarthritis of knee    Cellulitis of face    Prostate cancer    Ocular migraine    Headache    Gout of multiple sites    Medicare annual wellness visit, subsequent    Allergic    HL (hearing loss)    Visual impairment    Cataract    Colon polyp    Primary hypertension    Dyspnea on exertion    S/P mitral valve repair per Dr. Henderson 11/17/2022    S/P tricuspid valve repair per Dr. Henderson 11/17/2022    Back pain    Acute shoulder bursitis, right    Rotator cuff (capsule) sprain    Impaired decision making    Mild cognitive impairment    Plantar wart of right foot    DJD of right shoulder    Osteoarthritis of right glenohumeral joint     Advance Care Planning Advance Directive is not on file.  ACP discussion was held with the patient during this visit. Patient has an advance directive (not in EMR), copy requested.            Objective   Vitals:    11/18/24 1008   BP: 150/74   Pulse: 63   Resp: 16   SpO2: 100%   Weight: 103 kg (228 lb)   Height: 182.9 cm (72\")   PainSc: 0-No pain       Estimated body mass index is 30.92 kg/m² as calculated from the following:    Height as of this encounter: 182.9 cm (72\").    Weight as of this encounter: " 103 kg (228 lb).            Does the patient have evidence of cognitive impairment? No                                                                                                Health  Risk Assessment    Smoking Status:  Social History     Tobacco Use   Smoking Status Former    Current packs/day: 0.00    Types: Cigarettes, Pipe, Cigars    Quit date: 1971    Years since quittin.4    Passive exposure: Past   Smokeless Tobacco Never   Tobacco Comments    Tried tobacco as child     Alcohol Consumption:  Social History     Substance and Sexual Activity   Alcohol Use Not Currently    Comment: Not a regular drinker. Occasionly have a glass of wine or be       Fall Risk Screen  YOLANDAADI Fall Risk Assessment was completed, and patient is at LOW risk for falls.Assessment completed on:2024    Depression Screening   Little interest or pleasure in doing things? Not at all   Feeling down, depressed, or hopeless? Over half   PHQ-2 Total Score 2   Trouble falling or staying asleep, or sleeping too much? Almost all   Feeling tired or having little energy? Not at all   Poor appetite or overeating? Not at all   Feeling bad about yourself - or that you are a failure or have let yourself or your family down? Not at all   Trouble concentrating on things, such as reading the newspaper or watching television? Over half   Moving or speaking so slowly that other people could have noticed? Or the opposite - being so fidgety or restless that you have been moving around a lot more than usual? Not at all   Thoughts that you would be better off dead, or of hurting yourself in some way? Several days   PHQ-9 Total Score 8   If you checked off any problems, how difficult have these problems made it for you to do your work, take care of things at home, or get along with other people? Somewhat difficult      Health Habits and Functional and Cognitive Screenin/15/2024     4:58 PM   Functional & Cognitive Status   Do you  have difficulty preparing food and eating? No    Do you have difficulty bathing yourself, getting dressed or grooming yourself? No    Do you have difficulty using the toilet? No    Do you have difficulty moving around from place to place? No    Do you have trouble with steps or getting out of a bed or a chair? Yes    Current Diet Well Balanced Diet    Dental Exam Up to date    Eye Exam Up to date    Exercise (times per week) 3 times per week    Current Exercises Include Light Weights;Swimming;[place];Walking    Do you need help using the phone?  No    Are you deaf or do you have serious difficulty hearing?  Yes    Do you need help to go to places out of walking distance? Yes    Do you need help shopping? No    Do you need help preparing meals?  No    Do you need help with housework?  No    Do you need help with laundry? No    Do you need help taking your medications? No    Do you need help managing money? Yes    Do you ever drive or ride in a car without wearing a seat belt? No    Have you felt unusual stress, anger or loneliness in the last month? No    Who do you live with? Spouse    If you need help, do you have trouble finding someone available to you? No    Have you been bothered in the last four weeks by sexual problems? Yes    Do you have difficulty concentrating, remembering or making decisions? Yes        Patient-reported           Age-appropriate Screening Schedule:  Refer to the list below for future screening recommendations based on patient's age, sex and/or medical conditions. Orders for these recommended tests are listed in the plan section. The patient has been provided with a written plan.    Health Maintenance List  Health Maintenance   Topic Date Due    RSV Vaccine - Adults (1 - 1-dose 75+ series) Never done    COVID-19 Vaccine (3 - 2024-25 season) 09/01/2024    ANNUAL WELLNESS VISIT  11/06/2024    LIPID PANEL  11/06/2024    BMI FOLLOWUP  09/12/2025    TDAP/TD VACCINES (2 - Td or Tdap) 12/01/2030     HEPATITIS C SCREENING  Completed    INFLUENZA VACCINE  Completed    Pneumococcal Vaccine 65+  Completed    ZOSTER VACCINE  Completed    COLORECTAL CANCER SCREENING  Discontinued                                                                                                                                                CMS Preventative Services Quick Reference  Risk Factors Identified During Encounter      The above risks/problems have been discussed with the patient.  Pertinent information has been shared with the patient in the After Visit Summary.  An After Visit Summary and PPPS were made available to the patient.    Follow Up:   Next Medicare Wellness visit to be scheduled in 1 year.         Additional E&M Note during same encounter follows:  Patient has additional, significant, and separately identifiable condition(s)/problem(s) that require work above and beyond the Medicare Wellness Visit     Chief Complaint  Medicare Wellness-subsequent    Subjective    HPI         The patient is a 69-year-old male who presents for a Medicare wellness visit.    He reports feeling generally well but has been experiencing some depression due to a recent financial loss of $1000 to a [employer]. He is currently under the care of a counselor and has an upcoming appointment with a medical psychiatrist on 12/12/2024. He believes that medication may be beneficial for his condition. He takes Cymbalta for his depression, which he finds helpful. He also mentions that he no longer has a cell phone and is using a regular phone instead.    He has a history of knee issues, which have limited his ability to walk and exercise. He recently received a cortisone injection in his knee, which provided some relief. He is considering knee replacement surgery in the future. He enjoys swimming but has been unable to do so due to his knee issues.    He has been using vitamin K cream for bruising, which seems to help. He experiences occasional  "tingling and numbness in his fingers, which he attributes to a nerve block from a previous shoulder replacement surgery.    He has a history of colon polyps and low tension glaucoma, for which he sees an eye doctor every 6 months. He also had a plantar wart removed with laser treatment. He sees a dermatologist and dentist regularly.    He has a history of prostate cancer, for which he received 45 radiation treatments. He also had mitral and tricuspid valve repair surgery. He has a history of back surgery due to a fall.    He is interested in losing weight and has been trying to reduce his food intake and exercise more. He recently had an upper respiratory infection, which he believes was bronchitis, and still has a slight cough.    He reports no history of diabetes. He is up to date on his influenza, COVID-19, tetanus, shingles, and RSV vaccines.          Objective   Vital Signs:  /74   Pulse 63   Resp 16   Ht 182.9 cm (72\")   Wt 103 kg (228 lb)   SpO2 100%   BMI 30.92 kg/m²   Physical Exam  Constitutional:       Appearance: Normal appearance. He is well-developed and normal weight.   HENT:      Head: Normocephalic and atraumatic.      Right Ear: Tympanic membrane, ear canal and external ear normal.      Left Ear: Tympanic membrane, ear canal and external ear normal.      Nose: Nose normal.      Mouth/Throat:      Mouth: Mucous membranes are moist.      Pharynx: Oropharynx is clear. No oropharyngeal exudate.   Eyes:      Extraocular Movements: Extraocular movements intact.      Conjunctiva/sclera: Conjunctivae normal.      Pupils: Pupils are equal, round, and reactive to light.   Cardiovascular:      Rate and Rhythm: Normal rate and regular rhythm.      Pulses: Normal pulses.      Heart sounds: Normal heart sounds.   Pulmonary:      Effort: Pulmonary effort is normal.      Breath sounds: Normal breath sounds.   Abdominal:      General: Bowel sounds are normal.      Palpations: Abdomen is soft. "   Musculoskeletal:         General: Normal range of motion.      Cervical back: Normal range of motion and neck supple.   Skin:     General: Skin is warm and dry.   Neurological:      General: No focal deficit present.      Mental Status: He is alert and oriented to person, place, and time. Mental status is at baseline.   Psychiatric:         Mood and Affect: Mood normal.         Behavior: Behavior normal.         Thought Content: Thought content normal.         Judgment: Judgment normal.           Vital Signs  Blood pressure reading today was 150/74.                Assessment and Plan           1. Medicare annual wellness visit, subsequent year.  Upon arrival to the room the patient underwent the Medicare health risk assessment.  Neither the questions themselves or the answers that were given prompted any major concern on the part of the patient or by the medical staff that gave the assessment.  As far as the preventative care examinations and the preventative care immunizations that this patient requires they are as listed below.   Screening tests recommended:    Colonoscopy -utd   eye exam- utd  foot exam- utd  PSA-utd  Dentist -utd  Derm -utd  Urology-utd  Card- [place]  Immunization:  Influenza- utd  Prevnar-utd  Pneumovax-utd  Tetanus-utd  Shingles vaccine-utd  Hepatitis -utd  Covid -utd  RSV     -utd              2. Depression.  He has intermittent depression and is currently on Cymbalta. Despite this, he has been falling victim to scams, resulting in significant financial loss. His wife has intervened by taking away his phone and charge cards, which has caused him embarrassment and frustration. He is unsure of the reasons behind his actions. He has been receiving counseling for the past few years to address this issue. A referral to a psychiatrist is recommended for a more definitive diagnosis and potentially different psychotherapy. He has an appointment with a geriatric psychiatrist on December 12.    3.  Prostate cancer.  He has undergone approximately 45 radiation treatments and antiandrogen therapy for prostate cancer. His PSA levels have consistently been below 1. He will continue to have his PSA checked annually.    4. Status post tricuspid valve repair in 2022.    5. Status post mitral valve repair in November 2022.    6. Primary hypertension.  His blood pressure reading today was 150/74, which is higher than his usual home readings. His current medication regimen, including metoprolol tartrate 25 mg twice daily, will be continued.    7. Impaired decision making.  He exhibits some short-term memory gaps and has been scammed out of significant amounts of money. His family has intervened by taking away his cards and phone. A psychiatric evaluation is scheduled to better understand the underlying cause of his gullibility.    8. Osteoarthritis of both knees.  He suffers from arthritis in both knees and may require total knee replacement. An orthopedic physician is currently seeking approval for this procedure. He has an appointment with his orthopedic physician on Wednesday to discuss scheduling the surgery, possibly after the New Year.    9. Abnormal finding on blood chemistry.  He has had isolated instances of elevated blood sugar levels. An A1c test will be conducted today to determine if he is prediabetic.    10. Screening for malignant neoplasia of the prostate.  His annual PSA test will be conducted, although it has consistently been under 1 for many years.    11. Vitamin D deficiency.  He has a history of vitamin D deficiency. His vitamin D level will be rechecked today and adjustments to his supplementation will be made as necessary.    Follow-up  Return in 1 year for another Medicare wellness visit.    Orders Placed This Encounter   Procedures    Comprehensive Metabolic Panel     Order Specific Question:   Release to patient     Answer:   Routine Release [[id number]]    Hemoglobin A1c     Order Specific  Question:   Release to patient     Answer:   Routine Release [[id number]]    Lipid Panel     Order Specific Question:   Release to patient     Answer:   Routine Release [[id number]]    PSA Screen     Order Specific Question:   Release to patient     Answer:   Routine Release [[id number]]    T4, Free     Order Specific Question:   Release to patient     Answer:   Routine Release [[id number]]    TSH     Order Specific Question:   Release to patient     Answer:   Routine Release [[id number]]    Urinalysis With Culture If Indicated - Urine, Clean Catch     Order Specific Question:   Release to patient     Answer:   Routine Release [[id number]]    Vitamin B12     Order Specific Question:   Release to patient     Answer:   Routine Release [[id number]]    Vitamin D,25-Hydroxy     Order Specific Question:   Release to patient     Answer:   Routine Release [[id number]]    Ambulatory Referral to Psychiatry     Referral Priority:   Routine     Referral Type:   Behavorial Health/Psych     Referral Reason:   Specialty Services Required     Requested Specialty:   Psychiatry     Number of Visits Requested:   1    CBC & Differential     Order Specific Question:   Manual Differential     Answer:   No     Order Specific Question:   Release to patient     Answer:   Routine Release [[id number]]             Follow Up   Return in about 1 year (around 11/18/2025), or if symptoms worsen or fail to improve, for Medicare Wellness.  Patient was given instructions and counseling regarding his condition or for health maintenance advice. Please see specific information pulled into the AVS if appropriate.  Patient or patient representative verbalized consent for the use of Ambient Listening during the visit with  Jaret Castellanos MD for chart documentation. 11/18/2024  10:35 EST

## 2024-11-20 LAB — BACTERIA SPEC AEROBE CULT: NO GROWTH

## 2024-11-22 ENCOUNTER — TREATMENT (OUTPATIENT)
Dept: PHYSICAL THERAPY | Facility: CLINIC | Age: 79
End: 2024-11-22
Payer: MEDICARE

## 2024-11-22 DIAGNOSIS — M25.611 DECREASED ROM OF RIGHT SHOULDER: ICD-10-CM

## 2024-11-22 DIAGNOSIS — G89.29 CHRONIC RIGHT SHOULDER PAIN: ICD-10-CM

## 2024-11-22 DIAGNOSIS — Z96.611 S/P REVERSE TOTAL SHOULDER ARTHROPLASTY, RIGHT: Primary | ICD-10-CM

## 2024-11-22 DIAGNOSIS — M25.511 CHRONIC RIGHT SHOULDER PAIN: ICD-10-CM

## 2024-11-22 NOTE — PROGRESS NOTES
Physical Therapy Daily Progress Note  [address], [address] [place] [address]     Patient: Clint Cee   : 1945  Referring practitioner: Андрей Goldberg, *  Diagnosis:      ICD-10-CM ICD-9-CM   1. S/P reverse total shoulder arthroplasty, right  Z96.611 V43.61   2. Decreased ROM of right shoulder  M25.611 719.51   3. Chronic right shoulder pain  M25.511 719.41    G89.29 338.29     Date of Initial Visit: Type: THERAPY  Noted: 2024  Today's Date: 2024    VISIT#: 15          Subjective   Clint Cee reports: has been doing HEP without issue. Pain minimal; primary complaint is weakness and inability to get arm behind back.       Objective     s/p 24 R reverse total shoulder arthroplasty. 12 week juan     See Exercise, Manual, and Modality Logs for complete treatment.   Updated Access Code: [email or web address]  URL: [email or web address]  Date: 2024  Prepared by: Manfred Sun    Exercises  - Standing Shoulder Internal Rotation Stretch with Towel  - 1 x daily - 7 x weekly - 1 sets - 3 reps - 20 sec hold      AROM standing   Flexion: 130  ABD: 125 with some compensation into flexion  ER (neutral): 25 degrees  IR (neutral): to belly; BHB: to lower buttock    PROM   Passive Range of Motion      Right Shoulder   Flexion: 135 degrees (eval); today = 150 deg  Abduction: 130 degrees (eval); today = 160 deg  External rotation in plane of scapula: 60 deg  Internal rotation in plane of scapula: 58 deg  Measured in supine. Criteria met for transition to phase III      Assessment & Plan       Assessment  Assessment details: Pt is 12 weeks post op and progressing according to protocol, but exhibits weakness in shoulder, especially ER,  and ROM limitations with BHB movements.     Updated HEP with emphasis on stabilization and improving neuromuscular control of UE to facilitate functional activities.     Patient had confused HEP progression with HEP from prior rehab stint prior to  surgery: Exhibits mild confusion with HEP and progression that has to be corrected at times.    Will begin transitioning to independent HEP once patient stable and able to per guidelines/clinical presentation - discussed with patient.    Requires verbal and tactile cues for exercise intermittently.         Medicare requires progress note be sent to the referring MD every 30 days or 10 visits, whichever comes first.      Progress per Plan of Care and Progress strengthening /stabilization /functional activity    F/u in 2 weeks and progress as able, transitioning to HEP and discharge as appropriate. .         Timed:  Manual Therapy:         mins  40593;  Therapeutic Exercise:   20    mins  74115;     Neuromuscular Aleksandr:      mins  33626;    Therapeutic Activity:     13     mins  40516;     Gait Training:           mins  78830;     Ultrasound:          mins  91038;    Electrical Stimulation:         mins  52540 ( );    Untimed:  Electrical Stimulation:         mins  69279 ( );  Mechanical Traction:         mins  82190;   Dry needling:       Self pay    Timed Treatment:   33   mins   Total Treatment:    33   mins      Manfred Sun PT, DPT  [place] License # [id number]  Physical Therapist

## 2024-11-22 NOTE — LETTER
Physical Therapy Daily Progress Note  [address], [address] [place] [address]     Patient: Clint Cee   : 1945  Referring practitioner: Андрей Goldberg, *  Diagnosis:      ICD-10-CM ICD-9-CM   1. S/P reverse total shoulder arthroplasty, right  Z96.611 V43.61   2. Decreased ROM of right shoulder  M25.611 719.51   3. Chronic right shoulder pain  M25.511 719.41    G89.29 338.29     Date of Initial Visit: Type: THERAPY  Noted: 2024  Today's Date: 2024    VISIT#: 15          Subjective   Clint Cee reports: has been doing HEP without issue. Pain minimal; primary complaint is weakness and inability to get arm behind back.       Objective     s/p 24 R reverse total shoulder arthroplasty. 12 week juan     See Exercise, Manual, and Modality Logs for complete treatment.   Updated Access Code: [email or web address]  URL: [email or web address]  Date: 2024  Prepared by: Manfred Sun    Exercises  - Standing Shoulder Internal Rotation Stretch with Towel  - 1 x daily - 7 x weekly - 1 sets - 3 reps - 20 sec hold      AROM standing   Flexion: 130  ABD: 125 with some compensation into flexion  ER (neutral): 25 degrees  IR (neutral): to belly; BHB: to lower buttock    PROM   Passive Range of Motion      Right Shoulder   Flexion: 135 degrees (eval); today = 150 deg  Abduction: 130 degrees (eval); today = 160 deg  External rotation in plane of scapula: 60 deg  Internal rotation in plane of scapula: 58 deg  Measured in supine. Criteria met for transition to phase III      Assessment & Plan       Assessment  Assessment details: Pt is 12 weeks post op and progressing according to protocol, but exhibits weakness in shoulder, especially ER,  and ROM limitations with BHB movements.     Updated HEP with emphasis on stabilization and improving neuromuscular control of UE to facilitate functional activities.     Patient had confused HEP progression with HEP from prior rehab stint prior to  surgery: Exhibits mild confusion with HEP and progression that has to be corrected at times.    Will begin transitioning to independent HEP once patient stable and able to per guidelines/clinical presentation - discussed with patient.    Requires verbal and tactile cues for exercise intermittently.         Medicare requires progress note be sent to the referring MD every 30 days or 10 visits, whichever comes first.      Progress per Plan of Care and Progress strengthening /stabilization /functional activity    F/u in 2 weeks and progress as able, transitioning to HEP and discharge as appropriate. .         Timed:  Manual Therapy:         mins  54250;  Therapeutic Exercise:   20    mins  75446;     Neuromuscular Aleksandr:      mins  13224;    Therapeutic Activity:     13     mins  28718;     Gait Training:           mins  39996;     Ultrasound:          mins  35254;    Electrical Stimulation:         mins  47106 ( );    Untimed:  Electrical Stimulation:         mins  44637 ( );  Mechanical Traction:         mins  71382;   Dry needling:       Self pay    Timed Treatment:   33   mins   Total Treatment:    33   mins      Manfred Sun PT, DPT  [place] License # [id number]  Physical Therapist

## 2024-12-02 RX ORDER — ALLOPURINOL 100 MG/1
100 TABLET ORAL DAILY
Qty: 90 TABLET | Refills: 1 | Status: SHIPPED | OUTPATIENT
Start: 2024-12-02

## 2024-12-02 RX ORDER — DULOXETIN HYDROCHLORIDE 60 MG/1
60 CAPSULE, DELAYED RELEASE ORAL DAILY
Qty: 90 CAPSULE | Refills: 0 | Status: SHIPPED | OUTPATIENT
Start: 2024-12-02

## 2024-12-04 ENCOUNTER — TREATMENT (OUTPATIENT)
Dept: PHYSICAL THERAPY | Facility: CLINIC | Age: 79
End: 2024-12-04
Payer: MEDICARE

## 2024-12-04 DIAGNOSIS — M25.511 CHRONIC RIGHT SHOULDER PAIN: ICD-10-CM

## 2024-12-04 DIAGNOSIS — G89.29 CHRONIC RIGHT SHOULDER PAIN: ICD-10-CM

## 2024-12-04 DIAGNOSIS — M25.611 DECREASED ROM OF RIGHT SHOULDER: ICD-10-CM

## 2024-12-04 DIAGNOSIS — Z96.611 S/P REVERSE TOTAL SHOULDER ARTHROPLASTY, RIGHT: Primary | ICD-10-CM

## 2024-12-04 PROCEDURE — 97530 THERAPEUTIC ACTIVITIES: CPT | Performed by: PHYSICAL THERAPIST

## 2024-12-04 PROCEDURE — 97110 THERAPEUTIC EXERCISES: CPT | Performed by: PHYSICAL THERAPIST

## 2024-12-04 PROCEDURE — 97112 NEUROMUSCULAR REEDUCATION: CPT | Performed by: PHYSICAL THERAPIST

## 2024-12-04 NOTE — PROGRESS NOTES
Physical Therapy Daily Treatment Note    [address] [facility], [address]  [place] [address]  [phone]    Patient: Clint Cee  : 1945  Referring practitioner: Андрей Goldberg, *  Diagnosis/ ICD10 code: S/P reverse total shoulder arthroplasty, right [Z96.611]  Today's Date: 2024    VISIT#: 16    Subjective   Pt reports: doing pretty well but still can't reach behind his back. Doesn't have pain at rest just if he reach behind his back or reach up to high.       Objective     See Exercise, Manual, and Modality Logs for complete treatment.     Patient Education:    Assessment & Plan       Assessment  Assessment details: Pt tolerated today's rx session well . Requires vcs for technique and speed.           Progress per Plan of Care            Timed:         Manual Therapy:         mins  77025;     Therapeutic Exercise:   15     mins  14597;     Neuromuscular Aleksandr: 13       mins  22332;    Therapeutic Activity:     10     mins  35726;     Gait Training:           mins  67911;     Ultrasound:          mins  11301;    Ionto                                   mins   52266  Self Care                            mins   26424      Un-Timed:  Electrical Stimulation:         mins  26986 ( );  Traction          mins 87553  Canal repositioning           mins    82361        Timed Treatment:  38    mins   Total Treatment:    38    mins    Mitch Johnson PT, CLT  Physical Therapist  [place] License:  # [id number]

## 2024-12-13 ENCOUNTER — TREATMENT (OUTPATIENT)
Dept: PHYSICAL THERAPY | Facility: CLINIC | Age: 79
End: 2024-12-13
Payer: MEDICARE

## 2024-12-13 DIAGNOSIS — Z96.611 S/P REVERSE TOTAL SHOULDER ARTHROPLASTY, RIGHT: Primary | ICD-10-CM

## 2024-12-13 DIAGNOSIS — M25.611 DECREASED ROM OF RIGHT SHOULDER: ICD-10-CM

## 2024-12-13 DIAGNOSIS — G89.29 CHRONIC RIGHT SHOULDER PAIN: ICD-10-CM

## 2024-12-13 DIAGNOSIS — M25.511 CHRONIC RIGHT SHOULDER PAIN: ICD-10-CM

## 2024-12-13 NOTE — PROGRESS NOTES
Physical Therapy Daily Treatment Note    [facility] [address] [facility], [address]  [place] [address]  [phone]    Patient: Clint Cee  : 1945  Referring practitioner: Андрей Goldberg, *  Diagnosis/ ICD10 code: S/P reverse total shoulder arthroplasty, right [Z96.611]  Today's Date: 2024    VISIT#: 17  s/p 24 R reverse total shoulder arthroplasty. 15 weeks  P.O as of 24    Subjective   Pt reports: doing ok, denies any pain . Reports his R shoulder pops every once in a while. Reports he has been using the blue TB at home.     Pt was instructed to use RTB for IR/ER and GTB for rows .       Objective     See Exercise, Manual, and Modality Logs for complete treatment.     Patient Education:    Assessment & Plan       Assessment  Assessment details: Pt tolerated today's rx session well. Still seems to be a little confused about his HEP.           Progress per Plan of Care            Timed:         Manual Therapy:         mins  50528;     Therapeutic Exercise:   16      mins  50415;     Neuromuscular Aleksandr:   14     mins  03084;    Therapeutic Activity:          mins  97633;     Gait Training:           mins  20306;     Ultrasound:          mins  92357;    Ionto                                   mins   18286  Self Care                            mins   17737      Un-Timed:  Electrical Stimulation:         mins  14390 ( );  Traction          mins 26048  Canal repositioning           mins    57977        Timed Treatment:  30    mins   Total Treatment:    30    mins    Mitch Johnson PT, CLT  Physical Therapist  [place] License:  # [id number]

## 2024-12-17 ENCOUNTER — TREATMENT (OUTPATIENT)
Dept: PHYSICAL THERAPY | Facility: CLINIC | Age: 79
End: 2024-12-17
Payer: MEDICARE

## 2024-12-17 ENCOUNTER — OFFICE VISIT (OUTPATIENT)
Dept: ORTHOPEDIC SURGERY | Facility: CLINIC | Age: 79
End: 2024-12-17
Payer: MEDICARE

## 2024-12-17 VITALS — WEIGHT: 228 LBS | HEIGHT: 72 IN | BODY MASS INDEX: 30.88 KG/M2 | OXYGEN SATURATION: 98 %

## 2024-12-17 DIAGNOSIS — Z96.611 S/P REVERSE TOTAL SHOULDER ARTHROPLASTY, RIGHT: Primary | ICD-10-CM

## 2024-12-17 DIAGNOSIS — M25.611 DECREASED ROM OF RIGHT SHOULDER: ICD-10-CM

## 2024-12-17 DIAGNOSIS — G89.29 CHRONIC RIGHT SHOULDER PAIN: ICD-10-CM

## 2024-12-17 DIAGNOSIS — M25.511 CHRONIC RIGHT SHOULDER PAIN: ICD-10-CM

## 2024-12-17 DIAGNOSIS — Z47.89 ORTHOPEDIC AFTERCARE: Primary | ICD-10-CM

## 2024-12-17 PROCEDURE — 97110 THERAPEUTIC EXERCISES: CPT | Performed by: PHYSICAL THERAPIST

## 2024-12-17 PROCEDURE — 97112 NEUROMUSCULAR REEDUCATION: CPT | Performed by: PHYSICAL THERAPIST

## 2024-12-17 NOTE — PROGRESS NOTES
Re-Assessment / Re-Certification        Patient: Clint Cee   : 1945  Diagnosis/ICD-10 Code:  S/P reverse total shoulder arthroplasty, right [Z96.611]  Referring practitioner: Андрей Goldberg, *  Date of Initial Visit: Type: THERAPY  Noted: 2024  Today's Date: 2024  Patient seen for 18 sessions      Subjective:   Clint Cee reports:   Subjective Questionnaire: QuickDASH: 5%  Clinical Progress: improved  Home Program Compliance: Yes  Treatment has included: therapeutic exercise, neuromuscular re-education, manual therapy, and therapeutic activity    Subjective Evaluation    History of Present Illness  Mechanism of injury: He is doing well, denies any pain on regular basis just if he does something or use his arm in wrong way.  Doing his HEP.      Pain  Current pain ratin  At best pain ratin  At worst pain rating: 3         Objective          Active Range of Motion     Additional Active Range of Motion Details  AROM standing   Flexion: 130  ABD: 125 with some compensation into flexion  ER (neutral): 25 degrees  IR (neutral): to belly; BHB: to lower buttock     PROM   Passive Range of Motion      Right Shoulder   Flexion: 135 degrees (eval); today = 150 deg  Abduction: 130 degrees (eval); today = 160 deg  External rotation in plane of scapula: 60 deg  Internal rotation in plane of scapula: 58 deg        Assessment & Plan       Assessment  Assessment details: Pt is s/p R reverse total shoulder arthroplasty on 24, he is  15 weeks  P.O as of 24. He is making progress with improved ROM and functional use of R UE. Goals have been partially met.   DASH score: 5% disability      Goals  Plan Goals: Plan Goals: LTG 1: 12 weeks:  The patient will demonstrate 165 degrees of R shoulder flexion, 165 degrees of shoulder abduction, 80 degrees of shoulder external rotation, and 80 degrees of shoulder internal rotation to allow the patient to reach into upper kitchen cabinets and  Returned patient call, expressed understanding. Mailed out lab orders    manipulate clothing behind the back with greater ease.                          STATUS:  New  STG 1a: 6 weeks:  The patient will demonstrate 125 degrees of R shoulder flexion, 125 degrees of shoulder abduction, 70 degrees of shoulder external rotation, and 70 degrees of shoulder internal rotation (PARTIALLY MET)                          STATUS:  New     LTG 2: 12 weeks:  The patient will demonstrate 4+/5 strength for R shoulder flexion, abduction, external rotation, and internal rotation in order to demonstrate improved shoulder stability.                          STATUS:  New  STG 2a: 8 weeks:  The patient will demonstrate 3+/5 strength for R shoulder flexion, abduction, external rotation, and internal rotation.  (MET)                          STATUS:  New  STG2b:  2 weeks:  The patient will be independent with home exercises.  (MET)                          STATUS:  New                LTG 3: 12 weeks:  The patient will report a pain rating of 1/10 or better in order to improve sleep quality and tolerance to performance of activities of daily living.                          STATUS:  New  STG 3a: 8 weeks:  The patient will report a pain rating of 3/10 or better.  (MET)                          STATUS:  New     LTG 4: 12 weeks:  The patient will demonstrate 20 % limitation on the QuickDASH. (MET)                          STATUS:  New  STG 4a: 6 weeks:  The patient will demonstrate 40 % limitation on the QuickDASH.  (MET)                          STATUS:  New          Plan  Frequency: 2x week  Duration in weeks: 6      Progress toward previous goals: Partially Met        Recommendations: Continue as planned  Certification Period: 3/16/2025  Prognosis to achieve goals: good    PT Signature: Mitch Johnson PT, CLT  [place] License number:  [id number]    Based upon review of the patient's progress and continued therapy plan, it is my medical opinion that Clint Cee should continue physical therapy treatment at [facility]  [address] [facility] PHYSICAL THERAPY  [address] [facility] [place] [address] [phone].    Signature: __________________________________  Андрей Goldberg MD  Please sign and return via fax to [phone].. Thank you, [facility].    Timed:         Manual Therapy:         mins  75559;     Therapeutic Exercise:   16      mins  38581;     Neuromuscular Aleksandr:    14    mins  08454;    Therapeutic Activity:          mins  91761;     Gait Training:           mins  24311;     Ultrasound:          mins  35028;    Ionto                                   mins   77814  Self Care                            mins   64803      Un-Timed:  Electrical Stimulation:         mins  73407 ( );  Dry Needling          mins self-pay  Traction          mins 88774  Low Eval          Mins  42346  Mod Eval          Mins  04800  High Eval                            Mins  16204  Re-Eval                               mins  36480      Timed Treatment:   30   mins   Total Treatment:     30   mins

## 2024-12-17 NOTE — PROGRESS NOTES
"   Patient ID: Clint Cee is a 79 y.o. male presents for further follow up on 8/28/24 right reverse total shoulder. Reports doing well with PT and having 2 sessions remaining but has been progressing well. States he has been performing a HEP, ie dumbbell curls, resistance bands, light-short swimming sessions.     Objective:  Ht 182.9 cm (72\")   Wt 103 kg (228 lb)   SpO2 98%   BMI 30.92 kg/m²     Physical Examination:     Right shoulder:  Intact skin. Atrophy. No signs of infection  No areas of tenderness  Active IR right iliac crest  Belly press 4/5; lift off 3/5 pain-free  Range of motion at the elbow, wrist and digits grossly intact   strength 5/5 and equal bilaterally    Imaging:   No new imaging.     Assessment: Doing well after right reverse TSA  Diagnoses and all orders for this visit:    1. Orthopedic aftercare (Primary)    Plan: Finish out scheduled formal PT. Then proceed with HEP to be performed 4-6 days a week until next follow up in 4 months.  All questions answered.    Disclaimer: Part of this note may be an electronic transcription/translation of spoken language to printed text using the Dragon Dictation System  "

## 2024-12-18 NOTE — PROGRESS NOTES
Physical Therapy Daily Treatment Note    [address] [facility], [address]  [place] [address]  [phone]    Patient: Clint Cee  : 1945  Referring practitioner: STUART Javier  Diagnosis/ ICD10 code: Acute pain of right shoulder [M25.511]  Today's Date: 3/21/2024    VISIT#: 3    Subjective   Pt reports: was on a trip for the past 2 weeks and didn't do any exercises ..  It feels a little better but still hurts and has a hard time lifting anything.       Objective     See Exercise, Manual, and Modality Logs for complete treatment.     Patient Education:    Assessment & Plan       Assessment  Assessment details: Pt tolerated today's rx session well. Requires vcs for his HEP. Responding well to ES for pain management.           Progress per Plan of Care            Timed:         Manual Therapy:         mins  73268;     Therapeutic Exercise:    15     mins  77398;     Neuromuscular Aleksandr:  10      mins  80124;    Therapeutic Activity:          mins  51968;     Gait Training:           mins  48378;     Ultrasound:          mins  19297;    Ionto                                   mins   44479  Self Care                            mins   11138      Un-Timed:  Electrical Stimulation:  20       mins  66293 ( );  Traction          mins 89567  Canal repositioning           mins    53400        Timed Treatment:  25    mins   Total Treatment:    45    mins    Mitch Johnson PT, CLT  Physical Therapist  [place] License:  # [id number]  
no

## 2024-12-20 ENCOUNTER — TREATMENT (OUTPATIENT)
Dept: PHYSICAL THERAPY | Facility: CLINIC | Age: 79
End: 2024-12-20
Payer: MEDICARE

## 2024-12-20 DIAGNOSIS — Z96.611 S/P REVERSE TOTAL SHOULDER ARTHROPLASTY, RIGHT: Primary | ICD-10-CM

## 2024-12-20 DIAGNOSIS — M25.511 CHRONIC RIGHT SHOULDER PAIN: ICD-10-CM

## 2024-12-20 DIAGNOSIS — G89.29 CHRONIC RIGHT SHOULDER PAIN: ICD-10-CM

## 2024-12-20 DIAGNOSIS — M25.611 DECREASED ROM OF RIGHT SHOULDER: ICD-10-CM

## 2024-12-20 NOTE — PROGRESS NOTES
Physical Therapy Daily Treatment Note    [address] [facility], [address]  [place] [address]  [phone]    Patient: Clint Cee  : 1945  Referring practitioner: Андрей Goldberg, *  Diagnosis/ ICD10 code: S/P reverse total shoulder arthroplasty, right [Z96.611]  Today's Date: 2024    VISIT#: 19    Subjective   Pt reports: doing ok. His R shoulder feels pretty good most of the time but there is a popping sensation every once in a while.         Objective     See Exercise, Manual, and Modality Logs for complete treatment.     Patient Education:    Assessment & Plan       Assessment  Assessment details: Good tolerance to today's rx session.           Progress per Plan of Care            Timed:         Manual Therapy:         mins  57319;     Therapeutic Exercise:   16      mins  10740;     Neuromuscular Aleksandr:   14     mins  78782;    Therapeutic Activity:          mins  48842;     Gait Training:           mins  06429;     Ultrasound:          mins  05562;    Ionto                                   mins   92826  Self Care                            mins   62392      Un-Timed:  Electrical Stimulation:         mins  55044 ( );  Traction          mins 21723  Canal repositioning           mins    71383        Timed Treatment:  30    mins   Total Treatment:     30   mins    Mitch Johnson PT, CLT  Physical Therapist  [place] License:  # [id number]

## 2024-12-27 ENCOUNTER — TREATMENT (OUTPATIENT)
Dept: PHYSICAL THERAPY | Facility: CLINIC | Age: 79
End: 2024-12-27
Payer: MEDICARE

## 2024-12-27 DIAGNOSIS — Z96.611 S/P REVERSE TOTAL SHOULDER ARTHROPLASTY, RIGHT: Primary | ICD-10-CM

## 2024-12-27 DIAGNOSIS — M25.511 CHRONIC RIGHT SHOULDER PAIN: ICD-10-CM

## 2024-12-27 DIAGNOSIS — M25.611 DECREASED ROM OF RIGHT SHOULDER: ICD-10-CM

## 2024-12-27 DIAGNOSIS — G89.29 CHRONIC RIGHT SHOULDER PAIN: ICD-10-CM

## 2024-12-27 NOTE — PROGRESS NOTES
Physical Therapy Daily Treatment Note/ DC summary    [facility] [facility], [address]  [place] [address]  [phone]    Patient: Clint Cee  : 1945  Referring practitioner: Андрей Goldberg, *  Diagnosis/ ICD10 code: S/P reverse total shoulder arthroplasty, right [Z96.611]  Today's Date: 2024    VISIT#: 20    Subjective   Pt reports: he is doing pretty well, hasn't done his exercises during the holidays.  Feels he is ready for DC to continue with his HEP.       Objective     See Exercise, Manual, and Modality Logs for complete treatment. Review of his HEP. Provided copies of HEP again.  Discussed proper progression of his HEP.     Patient Education:    Assessment & Plan       Assessment  Assessment details: Pt is s/p R reverse total shoulder arthroplasty on 24, he is  17 weeks  P.O as of 24. He is doing well and making progress with improved ROM and functional use of R UE. Goals have been partially met. Voices readiness for DC with independent program.   DASH score: 5% disability         Plan  Plan details: Pt will be DC to continue with HEP and self management of his condition.           Progress per Plan of Care            Timed:         Manual Therapy:         mins  82098;     Therapeutic Exercise:   16      mins  45066;     Neuromuscular Aleksandr:    14    mins  95465;    Therapeutic Activity:          mins  66554;     Gait Training:           mins  57416;     Ultrasound:          mins  60812;    Ionto                                   mins   12292  Self Care                            mins   83574      Un-Timed:  Electrical Stimulation:         mins  25134 (MC );  Traction          mins 38256  Canal repositioning           mins    16471        Timed Treatment:   30   mins   Total Treatment:    30    mins    Mitch Johnson PT, CLT  Physical Therapist  [place] License:  # [id number]

## 2025-01-19 NOTE — PROGRESS NOTES
Encounter Date:01/30/2025    Last seen-7/25/2024      Patient ID: Clint Cee is a 80 y.o. male.      Chief complaint  Status post mitral valve repair  Status post tricuspid valve.  Hypertension      History of present illness    Since I have last seen, the patient has been without any chest discomfort ,shortness of breath, palpitations, dizziness or syncope.  Denies having any headache ,abdominal pain ,nausea, vomiting , diarrhea constipation, loss of weight or loss of appetite.  Denies having any excessive bruising ,hematuria or blood in the stool.    Review of all systems negative except as indicated.    Reviewed ROS.  Assessment and plan  ]]]]]]]]]]]]]]]]]]]]  History  ===========  -Status post mitral and tricuspid valve repair and left atrial appendage endocardial closure--Dr. Henderson 11/17/2022     Echocardiogram 9/8/2023 revealed  Status post mitral valve repair.  No mitral regurgitation is present.  Aortic valve is thickened with adequate opening motion.  Left atrial enlargement.  Left ventricular size and contractility is normal with ejection fraction of 60%.     -   Preoperative increased shortness of breath and significantly increased fatigue.   Preoperative significant mitral and tricuspid regurgitation.  Prominent posterior mitral leaflet prolapse     TERRA 9/30/2022  Myxomatous mitral and tricuspid valve degeneration.  Severe mitral valve prolapse involving both anterior and posterior mitral leaflets with severe mitral regurgitation with multiple jets.  Probable ruptured chordae.  In some views coaptation of the mitral valve is present.  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  Significant pulmonary hypertension.  Left atrial and left atrial appendage enlargement without clot.  Normal left ventricular size and contractility with ejection fraction of 60%.     Cardiac catheterization 9/30/2022   Moderate to significant pulmonary hypertension.  Severe mitral valve prolapse and  mitral regurgitation and probable ruptured chordae (TERRA and cardiac cath)  Tricuspid valve prolapse.  Normal left ventricular function.  Normal coronary arteries.     Echocardiogram 9/28/2022.  Mild mitral regurgitation (regurgitation jet may be not in the field)      - Premature ventricular contractions.  Occasional palpitations     - Hypertension vitamin D deficiency GERD ocular migraine.     - History of prostate cancer.  Status post radiation therapy.  Bone scan was negative for any spread.     - Status post appendectomy spine surgery adenoidectomy tonsillectomy.  Status post lumbar laminectomy 9/13/2023     - Family history of prostate cancer     - Former smoker     - Allergic to morphine.  ============  Plan  ===========  Status post mitral and tricuspid valve repair and left atrial appendage endocardial closure.  11/17/2022.  Patient is not having any angina pectoris or congestive heart failure.    EKG-normal-9/11/2023.  EKG was not performed.-7/25/2024.  1/30/2025     History of thrombocytopenia  No bleeding problems.  [id number] 2023     Hypokalemia-improved    Hypertension-well-controlled  124/67.    Tachybradycardia syndrome  Patient is on Metroprolol tartrate 25 mg twice daily.     Postoperative bradycardia  Bradycardia has improved.  No need for pacemaker implantation.     Medications were reviewed and updated.     Follow-up in the office in 6 months with an echocardiogram.     Further plan will depend on patient's progress.     Reviewed and updated 1/30/2025  ]]]]]]]]]]]]]]]]              Diagnosis Plan   1. Mitral valve prolapse        2. Nonrheumatic tricuspid valve regurgitation        3. Heart murmur on physical examination        4. Nonrheumatic mitral valve regurgitation        5. Premature ventricular contractions        6. S/P tricuspid valve repair        7. Essential hypertension        8. H/O mitral valve repair        9. Primary hypertension        LAB RESULTS (LAST 7 DAYS)    CBC         BMP        CMP         BNP        TROPONIN        CoAg        Creatinine Clearance  CrCl cannot be calculated (Patient's most recent lab result is older than the maximum 30 days allowed.).    ABG        Radiology  No radiology results for the last day                The following portions of the patient's history were reviewed and updated as appropriate: allergies, current medications, past family history, past medical history, past social history, past surgical history, and problem list.    Review of Systems   Constitutional: Negative for malaise/fatigue.   Cardiovascular:  Negative for chest pain, dyspnea on exertion, leg swelling and palpitations.   Respiratory:  Negative for cough and shortness of breath.    Gastrointestinal:  Negative for abdominal pain, nausea and vomiting.   Neurological:  Negative for dizziness, focal weakness, headaches, light-headedness and numbness.   All other systems reviewed and are negative.      Current Outpatient Medications:     allopurinol (ZYLOPRIM) 100 MG tablet, TAKE 1 TABLET BY MOUTH DAILY, Disp: 90 tablet, Rfl: 1    atorvastatin (LIPITOR) 40 MG tablet, TAKE 1 TABLET BY MOUTH EVERY NIGHT FOR HIGH AMOUNT OF FATS IN THE BLOOD, Disp: 90 tablet, Rfl: 3    DULoxetine (CYMBALTA) 60 MG capsule, TAKE 1 CAPSULE BY MOUTH DAILY, Disp: 90 capsule, Rfl: 0    famotidine (PEPCID) 40 MG tablet, Take 1 tablet by mouth Every Night., Disp: , Rfl:     fexofenadine (ALLEGRA) 180 MG tablet, Take 1 tablet by mouth 2 (Two) Times a Day As Needed. Indications: Hayfever, Disp: , Rfl:     fluticasone (FLONASE) 50 MCG/ACT nasal spray, Administer 2 sprays into the nostril(s) as directed by provider Daily As Needed. Indications: Allergic Rhinitis, Disp: , Rfl:     meloxicam (MOBIC) 15 MG tablet, TAKE 1 TABLET BY MOUTH DAILY, Disp: 30 tablet, Rfl: 1    metoprolol tartrate (LOPRESSOR) 25 MG tablet, Take 0.5 tablets by mouth 2 (Two) Times a Day., Disp: 90 tablet, Rfl: 3    traZODone (DESYREL) 50 MG tablet,  Start with one about 1 hour before sleep.  If not helping after 3-4 nights increase to two at HS.   Call after 2 weeks if not helping, Disp: 60 tablet, Rfl: 3    Allergies   Allergen Reactions    Morphine Hallucinations    Beta Adrenergic Blockers Cough    Ace Inhibitors Cough     cough       Family History   Problem Relation Age of Onset    Arthritis Father         Hearing loss, prostate cancer, ì    Cancer Father         Prostate cancer    Arrhythmia Father         Congentive heart failure    Diabetes Mother         Adult diabetes    Early death Mother         Kidney failure after child. birth    Heart disease Mother         Adult Diabetes    Cancer Mother          after my birth. Kidneys shut down    Arrhythmia Mother         Adult diabetes.  after child birth. Kidneys shut down.     Diabetes Maternal Grandmother         Back problems. Wore a brace       Past Surgical History:   Procedure Laterality Date    ADENOIDECTOMY      As child    APPENDECTOMY      Surgery    BACK SURGERY      Lumbar and cervival    CARDIAC CATHETERIZATION N/A 2022    Procedure: Right and Left Heart Cath with Coronar Angiography;  Surgeon: Felipe Garcia MD;  Location: [facility] CATH INVASIVE LOCATION;  Service: Cardiovascular;  Laterality: N/A;    CARDIAC VALVE REPLACEMENT N/A 2022    Procedure: TRICUSPID VALVE REPAIR/REPLACEMENT;  Surgeon: Femi Henderson MD;  Location: [facility] CVOR;  Service: Cardiothoracic;  Laterality: N/A;  tricuspid valve repaired with 32mm  groves physio tricuspid annuloplasty ring    CARDIAC VALVE REPLACEMENT  2022    Repaired mitral and tricuspid    CATARACT EXTRACTION Bilateral     COLONOSCOPY  1997    Regularly scheduled    COLONOSCOPY N/A 2023    Procedure: COLONOSCOPY with cold snare polypectomy x 1;  Surgeon: Manfred Keating MD;  Location: [facility] ENDOSCOPY;  Service: Gastroenterology;  Laterality: N/A;    CORONARY ARTERY BYPASS GRAFT       During open heart valves repair.    ENDOSCOPY N/A 02/22/2023    Procedure: ESOPHAGOGASTRODUODENOSCOPY with esophageal dilation using 48 Fr. Bougie Dilator;  Surgeon: Manfred Keating MD;  Location: [facility] ENDOSCOPY;  Service: Gastroenterology;  Laterality: N/A;  erosive esophagitis    EYE SURGERY  01/01/2010    Reattached right retina    HAND SURGERY  1998    Numerous trigger fingers    JOINT REPLACEMENT  2024    KNEE SURGERY  2009    Minor trimming. Found bone on bone    LAMINECTOMY  1965    LUMBAR FUSION N/A 09/13/2023    Procedure: POSTERIOR LUMBAR FUSION WITH ROBOT;  Surgeon: Jayme Strauss IV, MD;  Location: [facility] MAIN OR;  Service: Robotics - Neuro;  Laterality: N/A;    MITRAL VALVE REPAIR/REPLACEMENT N/A 11/17/2022    Procedure: MITRAL VALVE REPAIR/REPLACEMENT with left atrial appendage closure;  Surgeon: Femi Henderson MD;  Location: [facility] CVOR;  Service: Cardiothoracic;  Laterality: N/A;  mitral valve repaired iwth 40mm medtronic annuloplasty band  with intraop TERRA    MITRAL VALVE REPAIR/REPLACEMENT  11/22    NECK SURGERY  2000    SPINE SURGERY  01/01/1971    2 lumbar 1 cervical    TONSILLECTOMY  01/01/1950    Childhood    TOTAL SHOULDER ARTHROPLASTY W/ DISTAL CLAVICLE EXCISION Right 08/28/2024    Procedure: TOTAL SHOULDER REVERSE ARTHROPLASTY;  Surgeon: Андрей Goldberg MD;  Location: [facility] MAIN OR;  Service: Orthopedics;  Laterality: Right;    TRICUSPID VALVE SURGERY  11/22    TRIGGER POINT INJECTION  2004    Both hands-fingers    WART REMOVAL Right 06/28/2024    Procedure: WART REMOVAL WITH LASER;  Surgeon: DUNCAN Robles DPM;  Location: [facility] MAIN OR;  Service: Podiatry;  Laterality: Right;       Past Medical History:   Diagnosis Date    Abnormal ECG June 2022    Allergic 01/01/1954    Nasal alergies    Arrhythmia 2004    Dr Youngblood examined me and said i was ok.  skipped beats    Asthma     no sx    Benign prostatic hyperplasia 11/14/2018    Callus 2022    Toe spreaders used     Cataract 2014    Surgery. Caused detached retina of right eye 20/60 repair    Cervical disc disorder     6 plates. Rods/screws lower back    Clotting disorder 2019    Low platelets    Colon polyp 2018    Found and removed last colon eca.q    Deep vein thrombosis     Depression     Fracture, clavicle 1980    Left    Gastroesophageal reflux disease 2014    Glaucoma 2003    Normal pressures. Have low pressure glaucoma    Gout     Headache 2015    Have shimmering in both eyes intermittantly. No headaches    Heart murmur May 2022    During wellness exam    Heart valve disease 2022    During Echo    HL (hearing loss) 2012    Have hearing aids    Hyperlipidemia     Hypertension 2011    Infectious viral hepatitis     Yellow jaundis as child    Insomnia 2011    Knee swelling     Right knee worse    Low back pain 1966    Memory loss ?    Can't remember    Mitral valve prolapse 2022    Mood disorder 2013    Obesity 2024    Periarthritis of shoulder     Polyosteoarthritis 2011    Prostate Cancer     Rotator cuff syndrome     RIGHT    Scoliosis 2024    Spinal stenosis 2011    Visual impairment 1955    Nearsighted corrected glasses    Vitamin D deficiency 2018       Family History   Problem Relation Age of Onset    Arthritis Father         Hearing loss, prostate cancer, ì    Cancer Father         Prostate cancer    Arrhythmia Father         Congentive heart failure    Diabetes Mother         Adult diabetes    Early death Mother         Kidney failure after child. birth    Heart disease Mother         Adult Diabetes    Cancer Mother          after my birth. Kidneys shut down    Arrhythmia Mother         Adult diabetes.  after child birth. Kidneys shut down. 1947    Diabetes Maternal Grandmother         Back problems. Wore a brace       Social History     Socioeconomic History    Marital  status:    Tobacco Use    Smoking status: Former     Current packs/day: 0.00     Types: Cigarettes, Pipe, Cigars     Quit date: 1971     Years since quittin.6     Passive exposure: Past    Smokeless tobacco: Never    Tobacco comments:     Tried tobacco as child   Vaping Use    Vaping status: Never Used   Substance and Sexual Activity    Alcohol use: Not Currently     Comment: Not a regular drinker. Occasionly have a glass of wine or be    Drug use: Never    Sexual activity: Not Currently     Partners: Female     Birth control/protection: Condom, Diaphragm, I.U.D., Spermicide, Other, Birth control pill, Pill     Comment: Chemically castrated. Prostate cancer         Procedures      Objective:       Physical Exam    There were no vitals taken for this visit.  The patient is alert, oriented and in no distress.    Vital signs as noted above.    Head and neck revealed no carotid bruits or jugular venous distension.  No thyromegaly or lymphadenopathy is present.    Lungs clear.  No wheezing.  Breath sounds are normal bilaterally.    Heart normal first and second heart sounds.  No murmur..  No pericardial rub is present.  No gallop is present.    Abdomen soft and nontender.  No organomegaly is present.    Extremities revealed good peripheral pulses without any pedal edema.    Skin warm and dry.    Musculoskeletal system is grossly normal.    CNS grossly normal.    Reviewed and updated.

## 2025-01-30 ENCOUNTER — OFFICE VISIT (OUTPATIENT)
Dept: CARDIOLOGY | Facility: CLINIC | Age: 80
End: 2025-01-30
Payer: MEDICARE

## 2025-01-30 VITALS
HEIGHT: 72 IN | SYSTOLIC BLOOD PRESSURE: 124 MMHG | WEIGHT: 238 LBS | BODY MASS INDEX: 32.23 KG/M2 | OXYGEN SATURATION: 98 % | DIASTOLIC BLOOD PRESSURE: 67 MMHG | HEART RATE: 65 BPM

## 2025-01-30 DIAGNOSIS — I49.3 PREMATURE VENTRICULAR CONTRACTIONS: ICD-10-CM

## 2025-01-30 DIAGNOSIS — I34.1 MITRAL VALVE PROLAPSE: Primary | ICD-10-CM

## 2025-01-30 DIAGNOSIS — R01.1 HEART MURMUR ON PHYSICAL EXAMINATION: ICD-10-CM

## 2025-01-30 DIAGNOSIS — I34.0 NONRHEUMATIC MITRAL VALVE REGURGITATION: ICD-10-CM

## 2025-01-30 DIAGNOSIS — I10 PRIMARY HYPERTENSION: ICD-10-CM

## 2025-01-30 DIAGNOSIS — Z98.890 H/O MITRAL VALVE REPAIR: ICD-10-CM

## 2025-01-30 DIAGNOSIS — Z98.890 S/P TRICUSPID VALVE REPAIR: ICD-10-CM

## 2025-01-30 DIAGNOSIS — I10 ESSENTIAL HYPERTENSION: ICD-10-CM

## 2025-01-30 DIAGNOSIS — I36.1 NONRHEUMATIC TRICUSPID VALVE REGURGITATION: ICD-10-CM

## 2025-01-30 PROCEDURE — 99214 OFFICE O/P EST MOD 30 MIN: CPT | Performed by: INTERNAL MEDICINE

## 2025-01-30 PROCEDURE — 1159F MED LIST DOCD IN RCRD: CPT | Performed by: INTERNAL MEDICINE

## 2025-01-30 PROCEDURE — 3074F SYST BP LT 130 MM HG: CPT | Performed by: INTERNAL MEDICINE

## 2025-01-30 PROCEDURE — 3078F DIAST BP <80 MM HG: CPT | Performed by: INTERNAL MEDICINE

## 2025-01-30 PROCEDURE — 1160F RVW MEDS BY RX/DR IN RCRD: CPT | Performed by: INTERNAL MEDICINE

## 2025-01-30 RX ORDER — METOPROLOL TARTRATE 25 MG/1
12.5 TABLET, FILM COATED ORAL 2 TIMES DAILY
Qty: 90 TABLET | Refills: 3 | Status: SHIPPED | OUTPATIENT
Start: 2025-01-30

## 2025-02-28 ENCOUNTER — HOSPITAL ENCOUNTER (OUTPATIENT)
Dept: CARDIOLOGY | Facility: HOSPITAL | Age: 80
Discharge: HOME | End: 2025-02-28
Payer: MEDICARE

## 2025-02-28 VITALS
HEART RATE: 79 BPM | BODY MASS INDEX: 32.23 KG/M2 | HEIGHT: 72 IN | SYSTOLIC BLOOD PRESSURE: 133 MMHG | WEIGHT: 238 LBS | DIASTOLIC BLOOD PRESSURE: 78 MMHG

## 2025-02-28 DIAGNOSIS — R01.1 HEART MURMUR ON PHYSICAL EXAMINATION: ICD-10-CM

## 2025-02-28 DIAGNOSIS — I10 ESSENTIAL HYPERTENSION: ICD-10-CM

## 2025-02-28 DIAGNOSIS — Z98.890 H/O MITRAL VALVE REPAIR: ICD-10-CM

## 2025-02-28 DIAGNOSIS — I34.0 NONRHEUMATIC MITRAL VALVE REGURGITATION: ICD-10-CM

## 2025-02-28 DIAGNOSIS — I49.3 PREMATURE VENTRICULAR CONTRACTIONS: ICD-10-CM

## 2025-02-28 DIAGNOSIS — I10 PRIMARY HYPERTENSION: ICD-10-CM

## 2025-02-28 DIAGNOSIS — Z98.890 S/P TRICUSPID VALVE REPAIR: ICD-10-CM

## 2025-02-28 DIAGNOSIS — I34.1 MITRAL VALVE PROLAPSE: ICD-10-CM

## 2025-02-28 DIAGNOSIS — I36.1 NONRHEUMATIC TRICUSPID VALVE REGURGITATION: ICD-10-CM

## 2025-02-28 LAB
AV MEAN PRESS GRAD SYS DOP V1V2: 2.6 MMHG
AV VMAX SYS DOP: 104.6 CM/SEC
BH CV ECHO MEAS - AO MAX PG: 4.4 MMHG
BH CV ECHO MEAS - AO ROOT DIAM: 4.4 CM
BH CV ECHO MEAS - AO V2 VTI: 23 CM
BH CV ECHO MEAS - AVA(I,D): 3.9 CM2
BH CV ECHO MEAS - EDV(CUBED): 111.7 ML
BH CV ECHO MEAS - EDV(MOD-SP4): 121.1 ML
BH CV ECHO MEAS - EF(MOD-SP4): 42.3 %
BH CV ECHO MEAS - ESV(CUBED): 54.9 ML
BH CV ECHO MEAS - ESV(MOD-SP4): 69.9 ML
BH CV ECHO MEAS - FS: 21.1 %
BH CV ECHO MEAS - IVS/LVPW: 1.14 CM
BH CV ECHO MEAS - IVSD: 1.3 CM
BH CV ECHO MEAS - LA DIMENSION: 4.3 CM
BH CV ECHO MEAS - LAT PEAK E' VEL: 9.7 CM/SEC
BH CV ECHO MEAS - LV MASS(C)D: 223.9 GRAMS
BH CV ECHO MEAS - LV MAX PG: 2.9 MMHG
BH CV ECHO MEAS - LV MEAN PG: 1.63 MMHG
BH CV ECHO MEAS - LV V1 MAX: 84.5 CM/SEC
BH CV ECHO MEAS - LV V1 VTI: 20.7 CM
BH CV ECHO MEAS - LVIDD: 4.8 CM
BH CV ECHO MEAS - LVIDS: 3.8 CM
BH CV ECHO MEAS - LVOT AREA: 4.3 CM2
BH CV ECHO MEAS - LVOT DIAM: 2.34 CM
BH CV ECHO MEAS - LVPWD: 1.13 CM
BH CV ECHO MEAS - MED PEAK E' VEL: 5.5 CM/SEC
BH CV ECHO MEAS - MR MAX PG: 122.5 MMHG
BH CV ECHO MEAS - MR MAX VEL: 553.2 CM/SEC
BH CV ECHO MEAS - MV A MAX VEL: 91 CM/SEC
BH CV ECHO MEAS - MV DEC SLOPE: 315.2 CM/SEC2
BH CV ECHO MEAS - MV DEC TIME: 0.34 SEC
BH CV ECHO MEAS - MV E MAX VEL: 108.2 CM/SEC
BH CV ECHO MEAS - MV E/A: 1.19
BH CV ECHO MEAS - MV MAX PG: 7.4 MMHG
BH CV ECHO MEAS - MV MEAN PG: 2.25 MMHG
BH CV ECHO MEAS - MV V2 VTI: 46.2 CM
BH CV ECHO MEAS - MVA(VTI): 1.93 CM2
BH CV ECHO MEAS - PA V2 MAX: 83.3 CM/SEC
BH CV ECHO MEAS - PI END-D VEL: 81.7 CM/SEC
BH CV ECHO MEAS - RAP SYSTOLE: 3 MMHG
BH CV ECHO MEAS - RV MAX PG: 1.33 MMHG
BH CV ECHO MEAS - RV V1 MAX: 57.6 CM/SEC
BH CV ECHO MEAS - RV V1 VTI: 11 CM
BH CV ECHO MEAS - SV(LVOT): 89.1 ML
BH CV ECHO MEAS - SV(MOD-SP4): 51.2 ML
BH CV ECHO MEAS - TAPSE (>1.6): 1.04 CM
BH CV ECHO MEASUREMENTS AVERAGE E/E' RATIO: 14.24
LV EF BIPLANE MOD: 43 %

## 2025-02-28 PROCEDURE — 93306 TTE W/DOPPLER COMPLETE: CPT

## 2025-02-28 PROCEDURE — 93306 TTE W/DOPPLER COMPLETE: CPT | Performed by: INTERNAL MEDICINE

## 2025-03-03 RX ORDER — TRAZODONE HYDROCHLORIDE 50 MG/1
TABLET ORAL
Qty: 60 TABLET | Refills: 3 | Status: SHIPPED | OUTPATIENT
Start: 2025-03-03

## 2025-03-05 RX ORDER — DULOXETIN HYDROCHLORIDE 60 MG/1
60 CAPSULE, DELAYED RELEASE ORAL DAILY
Qty: 90 CAPSULE | Refills: 0 | Status: SHIPPED | OUTPATIENT
Start: 2025-03-05

## 2025-03-25 ENCOUNTER — TELEPHONE (OUTPATIENT)
Dept: ORTHOPEDIC SURGERY | Facility: CLINIC | Age: 80
End: 2025-03-25
Payer: MEDICARE

## 2025-03-25 NOTE — TELEPHONE ENCOUNTER
"Caller: Clint Cee \"Dennis\"    Relationship to patient: Self    Best call back number: [phone]*    Patient is needing: PT IS CALLING TO SEE IF HE CAN BE WORKED IN ASAP, HE STATES SOMETHING HAS HAPPENED TO HIS RIGHT SHOULDER AND HE CANNOT MOVE IT.. PT IS IN A LOT OF PAIN.. PLEASE ADVISE..        "

## 2025-03-31 ENCOUNTER — OFFICE VISIT (OUTPATIENT)
Dept: ORTHOPEDIC SURGERY | Facility: CLINIC | Age: 80
End: 2025-03-31
Payer: MEDICARE

## 2025-03-31 VITALS — HEIGHT: 72 IN | WEIGHT: 238 LBS | BODY MASS INDEX: 32.23 KG/M2 | OXYGEN SATURATION: 90 %

## 2025-03-31 DIAGNOSIS — Z47.89 ORTHOPEDIC AFTERCARE: Primary | ICD-10-CM

## 2025-03-31 PROCEDURE — 99213 OFFICE O/P EST LOW 20 MIN: CPT | Performed by: PHYSICIAN ASSISTANT

## 2025-03-31 PROCEDURE — 1159F MED LIST DOCD IN RCRD: CPT | Performed by: PHYSICIAN ASSISTANT

## 2025-03-31 PROCEDURE — 1160F RVW MEDS BY RX/DR IN RCRD: CPT | Performed by: PHYSICIAN ASSISTANT

## 2025-03-31 NOTE — PROGRESS NOTES
"   Patient ID: Clint Cee is a 80 y.o. male  History of Present Illness  The patient presents for evaluation of right shoulder pain following reverse total shoulder arthroplasty on 8/28/2024.    He reports experiencing an unusual sensation in his right shoulder, akin to a separation, which first occurred during sleep. The pain was severe enough to awaken him from his slumber. This incident was not isolated, as it recurred on another occasion. He refutes any alterations in his physical activity or movements that could have precipitated this condition. His preferred sleeping position is on his right side, and he speculates that he may have inadvertently dislocated his arm due to a fence installed to prevent him from falling out of bed. He describes a popping sensation in his shoulder but currently reports no significant discomfort. He has not been adhering to his prescribed exercise regimen. He underwent a shoulder replacement procedure performed by Dr. Fry on 08/28/2024. He has been receiving physical therapy at our facility, with his last appointment being a telephonic consultation six days ago. His most recent in-person visit was on 12/17/2024, during which he was reported to be progressing well. At that time, he had two remaining physical therapy sessions and was engaging in dumbbell exercises and resistance band training. He has not yet resumed swimming. He expresses a preference for non-surgical interventions.    Objective:  Ht 182.9 cm (72\")   Wt 108 kg (238 lb)   SpO2 90%   BMI 32.28 kg/m²     Physical Examination:       Physical Exam  Right radial pulse in the upper extremity is palpable. Capillary refill is less than 1 second to all digits.  strength is 5/5 bilaterally. There is slightly decreased sensation to light touch in the fourth digit of the right hand, otherwise sensory neuro is intact in all distributions. Range of motion at the right wrist and right elbow are grossly intact, " although the elbow might be lacking 4 degrees of extension. Excision incision site of the right shoulder appears to be well healed. Normal atrophy. No tenderness to palpation. There is a little crepitation at the joint, but passive forward flexion is to 140+. Passive abduction is to 130 with mild crepitation over the AC. Passive external rotation is probably 40, pain free. Active internal rotation is S1.      Imaging:   XR Shoulder 2+ View Right (03/31/2025 14:33)       Assessment:    Diagnoses and all orders for this visit:    1. Orthopedic aftercare (Primary)  -     XR Shoulder 2+ View Right  -     Ambulatory Referral to Physical Therapy for Evaluation & Treatment    Plan:   Assessment & Plan  1. Right shoulder pain.  The patient reports experiencing sharp shoulder pain that wakes him up from sleep, which resolves after approximately 12 hours. He has a history of shoulder replacement surgery performed by Dr. Lyons on 08/28/2024. Physical examination reveals a well-healed incision site, normal atrophy, and no tenderness to palpation. There is slight crepitation at the joint with passive forward flexion to 140+ degrees and passive abduction to 130 degrees with mild crepitation over the AC joint. X-rays taken today show no signs of loosening or other abnormalities compared to previous x-rays from 09/12/2024. To reduce the chances of recurrence, a new order for physical therapy will be placed to focus on stretching and strengthening the shoulder.    Follow-up  The patient will follow up in July.    PROCEDURE  The patient underwent a shoulder replacement procedure performed by Dr. Lyons on 08/28/2024.     Patient or patient representative verbalized consent for the use of Ambient Listening during the visit with  Clint Fountain PA-C for chart documentation. 3/31/2025  14:39 EDT  Disclaimer: Part of this note may be an electronic transcription/translation of spoken language to printed text using the Dragon  Dictation System

## 2025-04-01 ENCOUNTER — OFFICE VISIT (OUTPATIENT)
Age: 80
End: 2025-04-01
Payer: MEDICARE

## 2025-04-01 VITALS — BODY MASS INDEX: 32.23 KG/M2 | HEIGHT: 72 IN | WEIGHT: 238 LBS | OXYGEN SATURATION: 95 % | HEART RATE: 72 BPM

## 2025-04-01 DIAGNOSIS — B07.0 PLANTAR WARTS: ICD-10-CM

## 2025-04-01 DIAGNOSIS — M21.862 ACQUIRED POSTERIOR EQUINUS, LEFT: ICD-10-CM

## 2025-04-01 DIAGNOSIS — M20.42 HAMMER TOE OF LEFT FOOT: ICD-10-CM

## 2025-04-01 DIAGNOSIS — L84 HELOMA DURUM: ICD-10-CM

## 2025-04-01 DIAGNOSIS — M79.672 LEFT FOOT PAIN: Primary | ICD-10-CM

## 2025-04-01 RX ORDER — IPRATROPIUM BROMIDE 42 UG/1
SPRAY, METERED NASAL
COMMUNITY
Start: 2025-02-18

## 2025-04-02 NOTE — PROGRESS NOTES
04/01/2025  Foot and Ankle Surgery - Established Patient/Follow-up  Provider: Dr. Arvind Robles DPM  Location: [facility]    Subjective:  Clint Cee is a 80 y.o. male.     Chief Complaint   Patient presents with    Left Foot - Follow-up    NEW PROBLEM     PCP: Jaret Castellanos MD  Last PCP Visit: 11/18/24         History of Present Illness  The patient presents for evaluation of a burning pain in his left little toe.    He reports a persistent, mild burning sensation in the area of his left little toe. He has previously attempted to alleviate the discomfort with a plastic toe spacer but found it ineffective. He also mentions that he engages in regular pedicures, with the most recent one being a few months ago. He typically waits until his toenails have grown significantly before scheduling another pedicure. He expresses a desire to incorporate walking into his routine.      Allergies   Allergen Reactions    Morphine Hallucinations    Beta Adrenergic Blockers Cough    Ace Inhibitors Cough     cough       Current Outpatient Medications on File Prior to Visit   Medication Sig Dispense Refill    allopurinol (ZYLOPRIM) 100 MG tablet TAKE 1 TABLET BY MOUTH DAILY 90 tablet 1    atorvastatin (LIPITOR) 40 MG tablet TAKE 1 TABLET BY MOUTH EVERY NIGHT FOR HIGH AMOUNT OF FATS IN THE BLOOD 90 tablet 3    DULoxetine (CYMBALTA) 60 MG capsule TAKE 1 CAPSULE BY MOUTH DAILY 90 capsule 0    famotidine (PEPCID) 40 MG tablet Take 1 tablet by mouth Every Night.      fexofenadine (ALLEGRA) 180 MG tablet Take 1 tablet by mouth 2 (Two) Times a Day As Needed. Indications: Hayfever      fluticasone (FLONASE) 50 MCG/ACT nasal spray Administer 2 sprays into the nostril(s) as directed by provider Daily As Needed. Indications: Allergic Rhinitis      ipratropium (ATROVENT) 0.06 % nasal spray USE 1 TO 2 SPRAYS IN EACH NOSTRIL UP TO FOUR TIMES DAILY      meloxicam (MOBIC) 15 MG tablet TAKE 1 TABLET BY MOUTH DAILY 30 tablet  "1    metoprolol tartrate (LOPRESSOR) 25 MG tablet Take 0.5 tablets by mouth 2 (Two) Times a Day. 90 tablet 3    traZODone (DESYREL) 50 MG tablet TAKE 1 TABLET BY MOUTH WITH 1 HOUR BEFORE SLEEP. IF NOT HELPING 3-4 NIGHTS, THEN INCREASE TO 2 AT AT BEDTIME. CALL AFTER 2 WEEKS IF NOT HELPING. 60 tablet 3     No current facility-administered medications on file prior to visit.       Objective   Pulse 72   Ht 182.9 cm (72\")   Wt 108 kg (238 lb)   SpO2 95%   BMI 32.28 kg/m²     Foot/Ankle Exam  Physical Exam  GENERAL  Appearance:  appears stated age  Orientation:  AAOx3  Affect:  appropriate     VASCULAR      Right Foot Vascularity   Normal vascular exam    Dorsalis pedis:  2+  Posterior tibial:  2+  Skin temperature:  warm  Edema grading:  None  CFT:  < 3 seconds  Pedal hair growth:  Present  Varicosities:  none      Left Foot Vascularity   Normal vascular exam    Dorsalis pedis:  2+  Posterior tibial:  2+  Skin temperature:  warm  Edema grading:  None  CFT:  < 3 seconds  Pedal hair growth:  Present  Varicosities:  none     NEUROLOGIC      Right Foot Neurologic   Light touch sensation: normal  Hot/Cold sensation: normal  Achilles reflex:  2+      Left Foot Neurologic   Light touch sensation: normal  Hot/Cold sensation:  normal  Achilles reflex:  2+     MUSCULOSKELETAL      Right Foot Musculoskeletal   Ecchymosis:  none  Tenderness:  callous right foot    Arch:  Normal      Left Foot Musculoskeletal   Arch:  Normal     MUSCLE STRENGTH      Right Foot Muscle Strength   Normal strength    Foot dorsiflexion:  5  Foot plantar flexion:  5  Foot inversion:  5  Foot eversion:  5      Left Foot Muscle Strength   Normal strength    Foot dorsiflexion:  5  Foot plantar flexion:  5  Foot inversion:  5  Foot eversion:  5     DERMATOLOGIC       Right Foot Dermatologic   Skin  Positive for warts.   Nails comment:  Nails 1-5      Left Foot Dermatologic   Skin  Left foot skin is intact.   Nails comment:  Nails 1-5     TESTS      Right " Foot Tests   Anterior drawer: negative  Varus tilt: negative      Left Foot Tests   Anterior drawer: negative  Varus tilt: negative     Right foot additional comments: Hyperkeratotic lesion noted to the plantar aspect of the first metatarsal phalangeal joint region.  Pinpoint bleeding noted with gentle debridement consistent with verruca.  Lesion measures approximately 1 cm in diameter.     7/17/24: Lesion to the plantar aspect of the right foot is much improved.  No other complicating features.  No residual verruca or other complicating features.    4/1/25: Macular papular lesion involving the plantar aspect of the second toe.  No pinpoint bleeding or further concerns.  No significant discomfort.  Prominent level [place] durum involving the fourth interspace with callusing involving the lateral aspect of the fourth digit and medial aspect of the fifth toe.  No underlying open wound or signs of infection.  Moderate rigidity involving the digits.  Equinus contracture with knee extended and flexed.      Results      Assessment & Plan   Diagnoses and all orders for this visit:    1. Left foot pain (Primary)    2. Plantar warts    3. [place] durum    4. Hammer toe of left foot    5. Acquired posterior equinus, left      Assessment & Plan    The pain is likely due to callus formation from friction between the toes, exacerbated by age-related tendon stiffness. He was advised to use a toe spacer to alleviate pressure and reduce pain. The use of mesh-toe box shoes was recommended to provide additional space for the toes. Regular pedicures were suggested for overall foot care. If there is significant improvement in 2 weeks, walking will be considered.    He was informed that warts are common and can sometimes resolve on their own. An over-the-counter wart remover was recommended for daily application, followed by covering the wart. At the end of each week, he should use an emery board to buff the wart and repeat the process for  another week.Reviewed proper basic stretching and manual therapy exercises along with appropriate shoes and activity.  Discussed proper use and/or avoidance of OTC anti-inflammatories.  Patient is to call with any additional issues or concerns.  Greater than 20 minutes was spent before, during, and after evaluation for patient care.    The encounter note is created with the use of AI technology.  I do apologize if there are typos and/or confusion within the note.  Please feel free to contact me or my office with any questions or concerns.    Follow-up  The patient will follow up in 2 weeks.             Patient or patient representative verbalized consent for the use of Ambient Listening during the visit with  DUNCAN Robles DPM for chart documentation. 4/2/2025  07:12 EDT    DUNCAN Robles DPM

## 2025-04-10 ENCOUNTER — TREATMENT (OUTPATIENT)
Dept: PHYSICAL THERAPY | Facility: CLINIC | Age: 80
End: 2025-04-10
Payer: MEDICARE

## 2025-04-10 DIAGNOSIS — M25.511 CHRONIC RIGHT SHOULDER PAIN: ICD-10-CM

## 2025-04-10 DIAGNOSIS — Z96.611 STATUS POST REVERSE TOTAL REPLACEMENT OF RIGHT SHOULDER: Primary | ICD-10-CM

## 2025-04-10 DIAGNOSIS — G89.29 CHRONIC RIGHT SHOULDER PAIN: ICD-10-CM

## 2025-04-10 NOTE — PROGRESS NOTES
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  Pt had a reverse TSR on 24.  Reports he was sleeping when he woke up due to severe pain in R shoulder, it felt like it was dislocated.  He got up and was not able to raise it up , gradually it got better after a few hrs.  It happened again the next night.   It hasn't happened again since.  Feels he has lost some ROM .   He was in therapy for several months after his TSR and was DC in Dec.  Reports he did his HEP for a while but hasn't done them for the past couple of months.     Onset of symptoms : about 3 weeks ago    What makes the symptoms worse: pulling/ pushing/ lifting    What makes it better: rest     Functional limitation: he is not limited in any activities involving the R shoulder.  He had limitations of ROM when it happened.     PMH: see Epic          Patient Occupation: retired Quality of life: good    Pain  Current pain ratin  At best pain ratin  At worst pain ratin    Social Support  Lives with: spouse    Patient Goals  Patient goals for therapy: decreased pain, increased motion and increased strength           Precautions: hx of R reverse TSR    Objective          Postural Observations  Seated posture: fair  Standing posture: fair    Additional Postural Observation Details  RS/FHP    Palpation     Additional Palpation Details  Presently no TTP    Active Range of Motion     Right Shoulder   Flexion: 145 degrees   Abduction: 115 degrees   External rotation 45°: 30 degrees    Internal rotation BTB: sacrum     Additional Active Range of Motion Details  In sitting     Strength/Myotome Testing     Left Shoulder   Normal muscle strength    Right Shoulder     Planes of Motion   Flexion: 4   Abduction: 4   External rotation at 0°: 4-   External rotation at 45°: 4     Isolated Muscles   Biceps: 5   Triceps: 5           Assessment & Plan       Assessment  Impairments: abnormal or restricted ROM, activity intolerance, lacks appropriate home exercise program and pain with function   Functional limitations: carrying objects, lifting, sleeping, pulling, pushing, uncomfortable because of pain, reaching behind back, reaching overhead and unable to perform repetitive tasks   Assessment details: Pt is a 80 y.o. male referred to therapy due to pain in R shoulder s/p reverse TSR on 8/28/25. Pt presents with limited ROM and pain in R shoulder.   Upon initial evaluation pt exhibits the above impairments and functional limitations. Impairments affect performing his normal/ daily activities without increase pain and limitation in R shoulder. Pt is a good candidate for rehab and will benefit from skilled physical therapy to address impairments, resolve pain and maximize function.    Prognosis: good    Goals  Plan Goals: STGs: in 4 weeks   1- Pt will report at least 25 % improvement and pain reduction   2- Pt will be I with initial HEP and progression of his program   3- R shoulder AROM will improve by 10 deg in all directions      LTGs: by DC  1- Pt will report at least 80 % improvement and pain reduction  2- Pt will be I with final HEP   3- Pt will be able to reach behind back with min pain and improved ROM   4- Pt will voice readiness for DC with independent program   5-  Pt will have improved DASH score indicating improved function and pain reduction    Plan  Therapy options: will be seen for skilled therapy services  Planned modality interventions: high voltage pulsed current (pain management),  ultrasound, cryotherapy and thermotherapy (hydrocollator packs)  Planned therapy interventions: functional ROM exercises, home exercise program, joint mobilization, manual therapy, neuromuscular re-education, postural training, strengthening, stretching and therapeutic activities  Frequency: 2x week  Duration in weeks: 12  Treatment plan discussed with: patient        See flow sheet for treatment detail    History # of Personal Factors and/or Comorbidities: LOW (0)  Examination of Body System(s): # of elements: LOW (1-2)  Clinical Presentation: STABLE   Clinical Decision Making: LOW           Timed:         Manual Therapy:         mins  61028;     Therapeutic Exercise:    15     mins  32173;     Neuromuscular Aleksandr:        mins  00104;    Therapeutic Activity:          mins  50844;     Gait Training:           mins  34547;     Ultrasound:          mins  11598;    Ionto                                   mins   48117  Self Care                       8     mins   16267        Un-Timed:  Electrical Stimulation:         mins  81730 ( );  Dry Needling          mins self-pay  Traction          mins 26807  Canal repositioning           mins    66516  Low Eval    30      Mins  35893  Mod Eval          Mins  10344  High Eval                            Mins  90780  Re-Eval                               mins  35086        Timed Treatment:  23    mins   Total Treatment:     53   mins    PT SIGNATURE: Mitch Johnson PT, CLT  [place] License: # [id number]     DATE TREATMENT INITIATED: 4/10/2025    Initial Certification  Certification Period: 4/10/2025 thru 7/8/2025  I certify that the therapy services are furnished while this patient is under my care.  The services outlined above are required by this patient, and will be reviewed every 90 days.     PHYSICIAN: Clint Fountain PA-C   NPI: [id number]                                      DATE:     Please sign and return via fax to [phone].. Thank you, [facility]  Physical Therapy.

## 2025-04-15 ENCOUNTER — TREATMENT (OUTPATIENT)
Dept: PHYSICAL THERAPY | Facility: CLINIC | Age: 80
End: 2025-04-15
Payer: MEDICARE

## 2025-04-15 ENCOUNTER — OFFICE VISIT (OUTPATIENT)
Age: 80
End: 2025-04-15
Payer: MEDICARE

## 2025-04-15 VITALS — HEIGHT: 72 IN | BODY MASS INDEX: 32.23 KG/M2 | OXYGEN SATURATION: 96 % | WEIGHT: 238 LBS | HEART RATE: 96 BPM

## 2025-04-15 DIAGNOSIS — M25.511 CHRONIC RIGHT SHOULDER PAIN: ICD-10-CM

## 2025-04-15 DIAGNOSIS — B07.0 PLANTAR WARTS: ICD-10-CM

## 2025-04-15 DIAGNOSIS — Z96.611 STATUS POST REVERSE TOTAL REPLACEMENT OF RIGHT SHOULDER: Primary | ICD-10-CM

## 2025-04-15 DIAGNOSIS — M20.42 HAMMER TOE OF LEFT FOOT: ICD-10-CM

## 2025-04-15 DIAGNOSIS — G89.29 CHRONIC RIGHT SHOULDER PAIN: ICD-10-CM

## 2025-04-15 DIAGNOSIS — L84 HELOMA DURUM: ICD-10-CM

## 2025-04-15 DIAGNOSIS — M79.672 LEFT FOOT PAIN: Primary | ICD-10-CM

## 2025-04-15 DIAGNOSIS — M21.862 ACQUIRED POSTERIOR EQUINUS, LEFT: ICD-10-CM

## 2025-04-15 NOTE — PROGRESS NOTES
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        Objective     See Exercise, Manual, and Modality Logs for complete treatment.     Patient Education:    Assessment & Plan       Assessment  Assessment details: Tolerated today's rx session well.           Progress per Plan of Care            Timed:         Manual Therapy:         mins  23232;     Therapeutic Exercise:  18      mins  61413;     Neuromuscular Aleksandr:  12      mins  15040;    Therapeutic Activity:          mins  04091;     Gait Training:           mins  09808;     Ultrasound:          mins  84894;    Ionto                                   mins   40269  Self Care                            mins   37651      Un-Timed:  Electrical Stimulation:         mins  96851 ( );  Traction          mins 17755  Canal repositioning           mins    62952        Timed Treatment:   30   mins   Total Treatment:     30   mins    Mitch Johnson, PT, CLT  Physical Therapist  [place] License:  # [id number]

## 2025-04-16 NOTE — PROGRESS NOTES
04/15/2025  Foot and Ankle Surgery - Established Patient/Follow-up  Provider: Dr. Arvind Robles DPM  Location: [facility]    Subjective:  Clint Cee is a 80 y.o. male.     Chief Complaint   Patient presents with    Left Foot - Follow-up, Hammer Toe, Callouses    Follow-up     PCP: Jaret Castellanos MD  Last PCP Visit: 11/18/24         History of Present Illness  The patient presents for evaluation of his toe.    He reports an improvement in the condition of his toe, with increased mobility. He has been utilizing a spacer and is seeking advice on its continued use. He also mentions that he typically wears dress shoes twice annually.      Allergies   Allergen Reactions    Morphine Hallucinations    Beta Adrenergic Blockers Cough    Ace Inhibitors Cough     cough       Current Outpatient Medications on File Prior to Visit   Medication Sig Dispense Refill    allopurinol (ZYLOPRIM) 100 MG tablet TAKE 1 TABLET BY MOUTH DAILY 90 tablet 1    atorvastatin (LIPITOR) 40 MG tablet TAKE 1 TABLET BY MOUTH EVERY NIGHT FOR HIGH AMOUNT OF FATS IN THE BLOOD 90 tablet 3    DULoxetine (CYMBALTA) 60 MG capsule TAKE 1 CAPSULE BY MOUTH DAILY 90 capsule 0    famotidine (PEPCID) 40 MG tablet Take 1 tablet by mouth Every Night.      fexofenadine (ALLEGRA) 180 MG tablet Take 1 tablet by mouth 2 (Two) Times a Day As Needed. Indications: Hayfever      fluticasone (FLONASE) 50 MCG/ACT nasal spray Administer 2 sprays into the nostril(s) as directed by provider Daily As Needed. Indications: Allergic Rhinitis      ipratropium (ATROVENT) 0.06 % nasal spray USE 1 TO 2 SPRAYS IN EACH NOSTRIL UP TO FOUR TIMES DAILY      meloxicam (MOBIC) 15 MG tablet TAKE 1 TABLET BY MOUTH DAILY 30 tablet 1    metoprolol tartrate (LOPRESSOR) 25 MG tablet Take 0.5 tablets by mouth 2 (Two) Times a Day. 90 tablet 3    traZODone (DESYREL) 50 MG tablet TAKE 1 TABLET BY MOUTH WITH 1 HOUR BEFORE SLEEP. IF NOT HELPING 3-4 NIGHTS, THEN INCREASE TO 2 AT  "AT BEDTIME. CALL AFTER 2 WEEKS IF NOT HELPING. 60 tablet 3     No current facility-administered medications on file prior to visit.       Objective   Pulse 96   Ht 182.9 cm (72\")   Wt 108 kg (238 lb)   SpO2 96%   BMI 32.28 kg/m²     Foot/Ankle Exam  Physical Exam  GENERAL  Appearance:  appears stated age  Orientation:  AAOx3  Affect:  appropriate     VASCULAR      Right Foot Vascularity   Normal vascular exam    Dorsalis pedis:  2+  Posterior tibial:  2+  Skin temperature:  warm  Edema grading:  None  CFT:  < 3 seconds  Pedal hair growth:  Present  Varicosities:  none      Left Foot Vascularity   Normal vascular exam    Dorsalis pedis:  2+  Posterior tibial:  2+  Skin temperature:  warm  Edema grading:  None  CFT:  < 3 seconds  Pedal hair growth:  Present  Varicosities:  none     NEUROLOGIC      Right Foot Neurologic   Light touch sensation: normal  Hot/Cold sensation: normal  Achilles reflex:  2+      Left Foot Neurologic   Light touch sensation: normal  Hot/Cold sensation:  normal  Achilles reflex:  2+     MUSCULOSKELETAL      Right Foot Musculoskeletal   Ecchymosis:  none  Tenderness:  callous right foot    Arch:  Normal      Left Foot Musculoskeletal   Arch:  Normal     MUSCLE STRENGTH      Right Foot Muscle Strength   Normal strength    Foot dorsiflexion:  5  Foot plantar flexion:  5  Foot inversion:  5  Foot eversion:  5      Left Foot Muscle Strength   Normal strength    Foot dorsiflexion:  5  Foot plantar flexion:  5  Foot inversion:  5  Foot eversion:  5     DERMATOLOGIC       Right Foot Dermatologic   Skin  Positive for warts.   Nails comment:  Nails 1-5      Left Foot Dermatologic   Skin  Left foot skin is intact.   Nails comment:  Nails 1-5     TESTS      Right Foot Tests   Anterior drawer: negative  Varus tilt: negative      Left Foot Tests   Anterior drawer: negative  Varus tilt: negative     Right foot additional comments: Hyperkeratotic lesion noted to the plantar aspect of the first metatarsal " phalangeal joint region.  Pinpoint bleeding noted with gentle debridement consistent with verruca.  Lesion measures approximately 1 cm in diameter.     7/17/24: Lesion to the plantar aspect of the right foot is much improved.  No other complicating features.  No residual verruca or other complicating features.     4/1/25: Macular papular lesion involving the plantar aspect of the second toe.  No pinpoint bleeding or further concerns.  No significant discomfort.  Prominent level [place] durum involving the fourth interspace with callusing involving the lateral aspect of the fourth digit and medial aspect of the fifth toe.  No underlying open wound or signs of infection.  Moderate rigidity involving the digits.  Equinus contracture with knee extended and flexed.    4/15/25: Significant improvement to the fourth interspace of the left foot with resolving skin lesion.  No open wounds or signs of infection.  Improved mobility involving the toes today.  Continued soft tissue rigidity involving the forefoot.      Results      Assessment & Plan   Diagnoses and all orders for this visit:    1. Left foot pain (Primary)    2. Plantar warts    3. [place] durum    4. Hammer toe of left foot    5. Acquired posterior equinus, left      Assessment & Plan    The patient's toe condition has shown significant improvement, with the healing process progressing well. He was advised to persist in the use of the spacer until he feels comfortable discontinuing it. Caution was given regarding the use of dress shoes, as they may exacerbate the issue by causing the toes to converge. The application of moisturizer on his feet was recommended. For the management of calluses on the exterior of the fourth toe, the use of an emery board was suggested. Should he encounter any further complications, he is to contact the office immediately.  Patient to see me on an as-needed basis.Reviewed proper basic stretching and manual therapy exercises along with  appropriate shoes and activity.  Discussed proper use and/or avoidance of OTC anti-inflammatories.  Patient is to call with any additional issues or concerns.  Greater than 20 minutes was spent before, during, and after evaluation for patient care.    The encounter note is created with the use of AI technology.  I do apologize if there are typos and/or confusion within the note.  Please feel free to contact me or my office with any questions or concerns.             Patient or patient representative verbalized consent for the use of Ambient Listening during the visit with  DUNCAN Robles DPM for chart documentation. 4/16/2025  06:48 EDT    DUNCAN Robles DPM

## 2025-04-17 ENCOUNTER — TREATMENT (OUTPATIENT)
Dept: PHYSICAL THERAPY | Facility: CLINIC | Age: 80
End: 2025-04-17
Payer: MEDICARE

## 2025-04-17 DIAGNOSIS — Z96.611 STATUS POST REVERSE TOTAL REPLACEMENT OF RIGHT SHOULDER: Primary | ICD-10-CM

## 2025-04-17 DIAGNOSIS — M25.511 CHRONIC RIGHT SHOULDER PAIN: ICD-10-CM

## 2025-04-17 DIAGNOSIS — G89.29 CHRONIC RIGHT SHOULDER PAIN: ICD-10-CM

## 2025-04-17 NOTE — PROGRESS NOTES
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       Objective     See Exercise, Manual, and Modality Logs for complete treatment.     Patient Education:    Assessment & Plan       Assessment  Assessment details: Progressed with there ex as noted. Good logan to today's session.           Progress per Plan of Care            Timed:         Manual Therapy:         mins  73585;     Therapeutic Exercise:   20      mins  53320;     Neuromuscular Aleksandr:  12      mins  99360;    Therapeutic Activity:          mins  91259;     Gait Training:           mins  77532;     Ultrasound:          mins  59403;    Ionto                                   mins   77270  Self Care                            mins   60726      Un-Timed:  Electrical Stimulation:         mins  07880 ( );  Traction          mins 53652  Canal repositioning           mins    65243        Timed Treatment:   32   mins   Total Treatment:    32    mins    Mitch Johnson, PT, CLT  Physical Therapist  [place] License:  # [id number]

## 2025-04-22 ENCOUNTER — TREATMENT (OUTPATIENT)
Dept: PHYSICAL THERAPY | Facility: CLINIC | Age: 80
End: 2025-04-22
Payer: MEDICARE

## 2025-04-22 DIAGNOSIS — Z96.611 STATUS POST REVERSE TOTAL REPLACEMENT OF RIGHT SHOULDER: Primary | ICD-10-CM

## 2025-04-22 DIAGNOSIS — M25.511 CHRONIC RIGHT SHOULDER PAIN: ICD-10-CM

## 2025-04-22 DIAGNOSIS — G89.29 CHRONIC RIGHT SHOULDER PAIN: ICD-10-CM

## 2025-04-22 PROCEDURE — 97112 NEUROMUSCULAR REEDUCATION: CPT | Performed by: PHYSICAL THERAPIST

## 2025-04-22 PROCEDURE — 97110 THERAPEUTIC EXERCISES: CPT | Performed by: PHYSICAL THERAPIST

## 2025-04-22 PROCEDURE — 97530 THERAPEUTIC ACTIVITIES: CPT | Performed by: PHYSICAL THERAPIST

## 2025-04-22 NOTE — PROGRESS NOTES
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       Objective     See Exercise, Manual, and Modality Logs for complete treatment.     Patient Education:    Assessment & Plan       Assessment  Assessment details: Good tolerance to today's rx session.  Co pain in L shoulder since his fall a few days ago.  Pt has bruising on both arms and L lower leg.           Progress per Plan of Care            Timed:         Manual Therapy:         mins  32216;     Therapeutic Exercise:   20      mins  73667;     Neuromuscular Aleksandr:  10      mins  10042;    Therapeutic Activity:     8     mins  40884;     Gait Training:           mins  21368;     Ultrasound:          mins  65523;    Ionto                                   mins   16253  Self Care                            mins   51580      Un-Timed:  Electrical Stimulation:         mins  57596 (MC );  Traction          mins 95510  Canal repositioning           mins    67366        Timed Treatment: 38     mins   Total Treatment:    46    mins    Mitch Johnson PT, CLT  Physical Therapist  [place] License:  # [id number]

## 2025-04-24 ENCOUNTER — TREATMENT (OUTPATIENT)
Dept: PHYSICAL THERAPY | Facility: CLINIC | Age: 80
End: 2025-04-24
Payer: MEDICARE

## 2025-04-24 DIAGNOSIS — G89.29 CHRONIC RIGHT SHOULDER PAIN: ICD-10-CM

## 2025-04-24 DIAGNOSIS — M25.511 CHRONIC RIGHT SHOULDER PAIN: ICD-10-CM

## 2025-04-24 DIAGNOSIS — Z96.611 STATUS POST REVERSE TOTAL REPLACEMENT OF RIGHT SHOULDER: Primary | ICD-10-CM

## 2025-04-24 NOTE — PROGRESS NOTES
Physical Therapy Daily Treatment Note    [facility] 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         Objective     See Exercise, Manual, and Modality Logs for complete treatment.     Patient Education:    Assessment/Plan      Progress per Plan of Care            Timed:         Manual Therapy:         mins  56001;     Therapeutic Exercise:   20      mins  86014;     Neuromuscular Aleksandr:  10      mins  88875;    Therapeutic Activity:          mins  13362;     Gait Training:           mins  97003;     Ultrasound:          mins  05772;    Ionto                                   mins   88585  Self Care                            mins   70787      Un-Timed:  Electrical Stimulation:         mins  74926 ( );  Traction          mins 84961  Canal repositioning           mins    95434        Timed Treatment:  30    mins   Total Treatment:    40    mins    Mitch Johnson PT, CLT  Physical Therapist  [place] License:  # [id number]

## 2025-04-30 ENCOUNTER — TREATMENT (OUTPATIENT)
Dept: PHYSICAL THERAPY | Facility: CLINIC | Age: 80
End: 2025-04-30
Payer: MEDICARE

## 2025-04-30 DIAGNOSIS — M25.511 CHRONIC RIGHT SHOULDER PAIN: ICD-10-CM

## 2025-04-30 DIAGNOSIS — Z96.611 STATUS POST REVERSE TOTAL REPLACEMENT OF RIGHT SHOULDER: Primary | ICD-10-CM

## 2025-04-30 DIAGNOSIS — G89.29 CHRONIC RIGHT SHOULDER PAIN: ICD-10-CM

## 2025-04-30 PROCEDURE — 97112 NEUROMUSCULAR REEDUCATION: CPT | Performed by: PHYSICAL THERAPIST

## 2025-04-30 PROCEDURE — 97110 THERAPEUTIC EXERCISES: CPT | Performed by: PHYSICAL THERAPIST

## 2025-04-30 NOTE — PROGRESS NOTES
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       Objective     See Exercise, Manual, and Modality Logs for complete treatment.     Patient Education:    Assessment & Plan       Assessment  Assessment details: Doing well, tolerating progression of his exercise program. Subjective improvement is reported.           Progress per Plan of Care            Timed:         Manual Therapy:         mins  20306;     Therapeutic Exercise:   20      mins  11274;     Neuromuscular Aleksandr:  10      mins  48560;    Therapeutic Activity:          mins  89398;     Gait Training:           mins  95299;     Ultrasound:          mins  91617;    Ionto                                   mins   26305  Self Care                            mins   18369      Un-Timed:  Electrical Stimulation:         mins  30810 ( );  Traction          mins 13592  Canal repositioning           mins    34805        Timed Treatment:  30    mins   Total Treatment:    30    mins    Mitch Johnson PT, CLT  Physical Therapist  [place] License:  # [id number]

## 2025-05-06 ENCOUNTER — TREATMENT (OUTPATIENT)
Dept: PHYSICAL THERAPY | Facility: CLINIC | Age: 80
End: 2025-05-06
Payer: MEDICARE

## 2025-05-06 DIAGNOSIS — M25.511 CHRONIC RIGHT SHOULDER PAIN: ICD-10-CM

## 2025-05-06 DIAGNOSIS — G89.29 CHRONIC RIGHT SHOULDER PAIN: ICD-10-CM

## 2025-05-06 DIAGNOSIS — Z96.611 STATUS POST REVERSE TOTAL REPLACEMENT OF RIGHT SHOULDER: Primary | ICD-10-CM

## 2025-05-06 PROCEDURE — 97112 NEUROMUSCULAR REEDUCATION: CPT | Performed by: PHYSICAL THERAPIST

## 2025-05-06 PROCEDURE — 97110 THERAPEUTIC EXERCISES: CPT | Performed by: PHYSICAL THERAPIST

## 2025-05-06 NOTE — PROGRESS NOTES
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  Feels he is ready for DC .        Objective          Active Range of Motion     Right Shoulder   Flexion: 154 degrees   Abduction: 144 degrees   External rotation 45°: 50 degrees         Flexion: 4+  Abduction: 4   External rotation at 0°: 4     See Exercise, Manual, and Modality Logs for complete treatment.     Patient Education:    Assessment & Plan       Assessment  Assessment details: Pt has responded well to therapy with improved R shoulder ROM/ strength and pain reduction.  Voices readiness for DC with HEP.  All STGs and LTGs met.     Goals  Plan Goals: STGs: in 4 weeks ( all MET)   1- Pt will report at least 25 % improvement and pain reduction   2- Pt will be I with initial HEP and progression of his program   3- R shoulder AROM will improve by 10 deg in all directions        LTGs: by DC ( all MET)   1- Pt will report at least 80 % improvement and pain reduction  2- Pt will be I with final HEP   3- Pt will be able to reach behind back with min pain and improved ROM   4- Pt will voice readiness for DC with independent program   5-  Pt will have improved DASH score indicating improved function and pain reduction         Plan  Plan details: Pt will be DC                     Timed:         Manual Therapy:         mins  83937;     Therapeutic Exercise:   20      mins  81356;     Neuromuscular Aleksandr:  10     mins  38847;    Therapeutic Activity:          mins  24569;     Gait Training:           mins  75793;     Ultrasound:          mins  19933;    Ionto                                    mins   38325  Self Care                            mins   90326      Un-Timed:  Electrical Stimulation:         mins  95012 ( );  Traction          mins 53304  Canal repositioning           mins    50520        Timed Treatment:  30    mins   Total Treatment:   40     mins    Mitch Johnson PT, CLT  Physical Therapist  [place] License:  # [id number]

## 2025-06-03 ENCOUNTER — TELEPHONE (OUTPATIENT)
Dept: FAMILY MEDICINE CLINIC | Facility: CLINIC | Age: 80
End: 2025-06-03
Payer: MEDICARE

## 2025-06-03 DIAGNOSIS — C61 PROSTATE CANCER: ICD-10-CM

## 2025-06-03 DIAGNOSIS — Z91.09 ENVIRONMENTAL ALLERGIES: ICD-10-CM

## 2025-06-03 DIAGNOSIS — R79.9 ABNORMAL FINDING OF BLOOD CHEMISTRY, UNSPECIFIED: ICD-10-CM

## 2025-06-03 DIAGNOSIS — F34.1 PERSISTENT DEPRESSIVE DISORDER: ICD-10-CM

## 2025-06-03 DIAGNOSIS — M15.0 PRIMARY OSTEOARTHRITIS INVOLVING MULTIPLE JOINTS: Primary | ICD-10-CM

## 2025-06-03 DIAGNOSIS — E78.2 MIXED HYPERLIPIDEMIA: ICD-10-CM

## 2025-06-03 DIAGNOSIS — M1A.09X0 IDIOPATHIC CHRONIC GOUT OF MULTIPLE SITES WITHOUT TOPHUS: ICD-10-CM

## 2025-06-03 DIAGNOSIS — I10 PRIMARY HYPERTENSION: ICD-10-CM

## 2025-06-03 NOTE — TELEPHONE ENCOUNTER
Patient is scheduled for a 6 month follow up, can you place the orders you'd like to have done? Full lab panel was last done in November.

## 2025-06-03 NOTE — TELEPHONE ENCOUNTER
"  Caller: Clint Cee \"Dennis\"    Relationship: Self    Best call back number: [phone]     What orders are you requesting (i.e. lab or imaging): LABS    In what timeframe would the patient need to come in: WEEK PRIOR TO 6/18/25    Where will you receive your lab/imaging services: IN OFFICE    Additional notes: PLEASE CALL TO SCHEDULE.        "

## 2025-06-03 NOTE — TELEPHONE ENCOUNTER
Spoke with patient and scheduled a [facility] lab appt on 06/09/2025 at 9am. Orders are in chart.

## 2025-06-04 RX ORDER — DULOXETIN HYDROCHLORIDE 60 MG/1
60 CAPSULE, DELAYED RELEASE ORAL DAILY
Qty: 90 CAPSULE | Refills: 0 | Status: SHIPPED | OUTPATIENT
Start: 2025-06-04

## 2025-06-09 ENCOUNTER — LAB (OUTPATIENT)
Dept: FAMILY MEDICINE CLINIC | Facility: CLINIC | Age: 80
End: 2025-06-09
Payer: MEDICARE

## 2025-06-09 LAB
ALBUMIN SERPL-MCNC: 3.9 G/DL (ref 3.5–5.2)
ALBUMIN/GLOB SERPL: 1.4 G/DL
ALP SERPL-CCNC: 81 U/L (ref 39–117)
ALT SERPL W P-5'-P-CCNC: 19 U/L (ref 1–41)
ANION GAP SERPL CALCULATED.3IONS-SCNC: 12.1 MMOL/L (ref 5–15)
AST SERPL-CCNC: 20 U/L (ref 1–40)
BASOPHILS # BLD AUTO: 0.01 10*3/MM3 (ref 0–0.2)
BASOPHILS NFR BLD AUTO: 0.2 % (ref 0–1.5)
BILIRUB SERPL-MCNC: 0.5 MG/DL (ref 0–1.2)
BUN SERPL-MCNC: 21 MG/DL (ref 8–23)
BUN/CREAT SERPL: 19.6 (ref 7–25)
CALCIUM SPEC-SCNC: 7.7 MG/DL (ref 8.6–10.5)
CHLORIDE SERPL-SCNC: 102 MMOL/L (ref 98–107)
CHOLEST SERPL-MCNC: 105 MG/DL (ref 0–200)
CO2 SERPL-SCNC: 22.9 MMOL/L (ref 22–29)
CREAT SERPL-MCNC: 1.07 MG/DL (ref 0.76–1.27)
DEPRECATED RDW RBC AUTO: 45.6 FL (ref 37–54)
EGFRCR SERPLBLD CKD-EPI 2021: 71 ML/MIN/1.73
EOSINOPHIL # BLD AUTO: 0.18 10*3/MM3 (ref 0–0.4)
EOSINOPHIL NFR BLD AUTO: 3.4 % (ref 0.3–6.2)
ERYTHROCYTE [DISTWIDTH] IN BLOOD BY AUTOMATED COUNT: 13.5 % (ref 12.3–15.4)
GLOBULIN UR ELPH-MCNC: 2.7 GM/DL
GLUCOSE SERPL-MCNC: 119 MG/DL (ref 65–99)
HBA1C MFR BLD: 5.6 % (ref 4.8–5.6)
HCT VFR BLD AUTO: 40.7 % (ref 37.5–51)
HDLC SERPL-MCNC: 41 MG/DL (ref 40–60)
HGB BLD-MCNC: 13.7 G/DL (ref 13–17.7)
IMM GRANULOCYTES # BLD AUTO: 0.01 10*3/MM3 (ref 0–0.05)
IMM GRANULOCYTES NFR BLD AUTO: 0.2 % (ref 0–0.5)
LDLC SERPL CALC-MCNC: 44 MG/DL (ref 0–100)
LDLC/HDLC SERPL: 1.04 {RATIO}
LYMPHOCYTES # BLD AUTO: 0.98 10*3/MM3 (ref 0.7–3.1)
LYMPHOCYTES NFR BLD AUTO: 18.7 % (ref 19.6–45.3)
MCH RBC QN AUTO: 31.1 PG (ref 26.6–33)
MCHC RBC AUTO-ENTMCNC: 33.7 G/DL (ref 31.5–35.7)
MCV RBC AUTO: 92.3 FL (ref 79–97)
MONOCYTES # BLD AUTO: 0.43 10*3/MM3 (ref 0.1–0.9)
MONOCYTES NFR BLD AUTO: 8.2 % (ref 5–12)
NEUTROPHILS NFR BLD AUTO: 3.64 10*3/MM3 (ref 1.7–7)
NEUTROPHILS NFR BLD AUTO: 69.3 % (ref 42.7–76)
NRBC BLD AUTO-RTO: 0 /100 WBC (ref 0–0.2)
PLATELET # BLD AUTO: 80 10*3/MM3 (ref 140–450)
PMV BLD AUTO: 9.8 FL (ref 6–12)
POTASSIUM SERPL-SCNC: 4.5 MMOL/L (ref 3.5–5.2)
PROT SERPL-MCNC: 6.6 G/DL (ref 6–8.5)
RBC # BLD AUTO: 4.41 10*6/MM3 (ref 4.14–5.8)
SODIUM SERPL-SCNC: 137 MMOL/L (ref 136–145)
TRIGL SERPL-MCNC: 106 MG/DL (ref 0–150)
TSH SERPL DL<=0.05 MIU/L-ACNC: 1.97 UIU/ML (ref 0.27–4.2)
URATE SERPL-MCNC: 5.2 MG/DL (ref 3.4–7)
VLDLC SERPL-MCNC: 20 MG/DL (ref 5–40)
WBC NRBC COR # BLD AUTO: 5.25 10*3/MM3 (ref 3.4–10.8)

## 2025-06-09 PROCEDURE — 80053 COMPREHEN METABOLIC PANEL: CPT | Performed by: FAMILY MEDICINE

## 2025-06-09 PROCEDURE — 83036 HEMOGLOBIN GLYCOSYLATED A1C: CPT | Performed by: FAMILY MEDICINE

## 2025-06-09 PROCEDURE — 84443 ASSAY THYROID STIM HORMONE: CPT | Performed by: FAMILY MEDICINE

## 2025-06-09 PROCEDURE — 84550 ASSAY OF BLOOD/URIC ACID: CPT | Performed by: FAMILY MEDICINE

## 2025-06-09 PROCEDURE — 80061 LIPID PANEL: CPT | Performed by: FAMILY MEDICINE

## 2025-06-09 PROCEDURE — 85025 COMPLETE CBC W/AUTO DIFF WBC: CPT | Performed by: FAMILY MEDICINE

## 2025-06-12 ENCOUNTER — TELEPHONE (OUTPATIENT)
Dept: FAMILY MEDICINE CLINIC | Facility: CLINIC | Age: 80
End: 2025-06-12
Payer: MEDICARE

## 2025-06-12 ENCOUNTER — OFFICE VISIT (OUTPATIENT)
Dept: ORTHOPEDIC SURGERY | Facility: CLINIC | Age: 80
End: 2025-06-12
Payer: MEDICARE

## 2025-06-12 VITALS — WEIGHT: 238 LBS | OXYGEN SATURATION: 98 % | HEIGHT: 72 IN | BODY MASS INDEX: 32.23 KG/M2

## 2025-06-12 DIAGNOSIS — M96.89 FAILURE OF PREVIOUS ROTATOR CUFF REPAIR: ICD-10-CM

## 2025-06-12 DIAGNOSIS — M25.512 ACUTE PAIN OF LEFT SHOULDER: Primary | ICD-10-CM

## 2025-06-12 DIAGNOSIS — Z47.89 ORTHOPEDIC AFTERCARE: ICD-10-CM

## 2025-06-12 RX ADMIN — TRIAMCINOLONE ACETONIDE 80 MG: 40 INJECTION, SUSPENSION INTRA-ARTICULAR; INTRAMUSCULAR at 09:33

## 2025-06-12 RX ADMIN — LIDOCAINE HYDROCHLORIDE 2 ML: 10 INJECTION, SOLUTION EPIDURAL; INFILTRATION; INTRACAUDAL; PERINEURAL at 09:33

## 2025-06-12 NOTE — TELEPHONE ENCOUNTER
Caller: [facility]    Relationship:     Best call back number: [phone]     What was the call regarding:   FAXED REQUEST FOR COMPREHENSIVE IMNUDEFIENCY SCREENING   NEED DOCTORS SIGNATURE IN ORDER FOR [facility] TO SEND OUT   A KIT SO THE PATIENT CAN DO THIS AT HOME  VIA CHEEK SWAB.    PLEASE  ADVISE

## 2025-06-12 NOTE — PROGRESS NOTES
"     Patient ID: Clint Cee is a 80 y.o. male.  History of Present Illness  The patient presents for follow-up on his right shoulder after additional physical therapy and evaluation of left shoulder pain.    He reports a fall approximately 6 to 8 weeks ago, which resulted in an injury to his left shoulder. Although the condition has improved, it remains bothersome. He experiences pain when raising his left arm overhead, but maintains a good range of motion. He also reports frequent cracking sounds from the shoulder. He has not applied any topical treatments such as creams, ice, or heat to the area. He does not experience any numbness or tingling sensations radiating down his arms. He has been managing the pain with Tylenol and ibuprofen, particularly at night.    The patient is right-handed and reports no changes in his medication regimen since the last visit. He continues to take meloxicam as needed.    PAST SURGICAL HISTORY:  Total reverse shoulder surgery on 08/28/2024.  Rotator cuff repair with metal anchor (date unspecified).      Objective:    Ht 182.9 cm (72\")   Wt 108 kg (238 lb)   SpO2 98%   BMI 32.28 kg/m²     Physical Examination:  Physical Exam  Musculoskeletal:  Right upper extremity: Palpable radial pulses, capillary refill less than 1 second to all digits,  strength 5/5 bilaterally, range of motion of the right digits and right wrist grossly intact, well-healed incision along the anterior aspect with some mild atrophy, passive forward flexion 150+, abduction 140+ with some mild crepitation, passive external rotation 55+, active internal rotation S1  Left upper extremity: Palpable radial pulse, capillary refill less than 1 second to all digits, range of motion of the left digits, left wrist and left elbow grossly intact, 3 cm area of ecchymosis just proximal to the antecubital fossa, intact skin, no apparent atrophy, no ecchymosis, no signs of infection, no warmth, tenderness over the " anterior joint line, passive forward flexion 150+ with crepitation and pain, passive abduction 150+, passive external rotation 40, active internal rotation L2, belly press 5/5 pain free, belly lift off 2/5 with pain, negative Speed's test, negative Antunez' test, negative Scarf test, pain and trace weakness with Wibaux's test, mild weakness and moderate pain with supraspinatus/Annalisa's test     Imaging:  XR Shoulder 2+ View Left (06/12/2025 10:27)     Assessment:    Diagnoses and all orders for this visit:    1. Acute pain of left shoulder (Primary)  -     XR Shoulder 2+ View Left  -     Large Joint Arthrocentesis    2. Orthopedic aftercare    3. Failure of previous rotator cuff repair    Plan:  Assessment & Plan  1. Post-operative status following total reverse shoulder arthroplasty:    Progress post-surgery is satisfactory with improved mobility compared to the pre-operative state.    2. Left shoulder pain:    The left shoulder exhibits signs of arthritis and bone spurs, contributing to discomfort. Evidence of a previous rotator cuff repair with a metal anchor is noted. The humeral head appears slightly misaligned and elevated, indicating underlying arthritis and other complications. These issues likely existed prior to the fall 6 to 8 weeks ago, which may have exacerbated them. Pain management includes Tylenol and ibuprofen as needed. A steroid injection was discussed and agreed upon as a potential treatment option, pending availability due to a national backlog.    Follow-up: Radiologist review expected in approximately 10 days.    Patient or patient representative verbalized consent for the use of Ambient Listening during the visit with  Clint Fountain PA-C for chart documentation. 6/13/2025  10:34 EDT  Large Joint Arthrocentesis: L subacromial bursa  Date/Time: 6/12/2025 9:33 AM  Consent given by: patient  Site marked: site marked  Timeout: Immediately prior to procedure a time out was called to verify the  correct patient, procedure, equipment, support staff and site/side marked as required   Supporting Documentation  Indications: pain   Procedure Details  Location: shoulder - L subacromial bursa  Preparation: Patient was prepped and draped in the usual sterile fashion  Needle size: 25 G  Approach: posterior  Medications administered: 80 mg triamcinolone acetonide 40 MG/ML; 2 mL lidocaine PF 1% 1 %  Patient tolerance: patient tolerated the procedure well with no immediate complications

## 2025-06-12 NOTE — TELEPHONE ENCOUNTER
Ladies I do not know what I am supposed to do with this.  I have not seen any faxed form to sign.  Alison has been out of the office this afternoon and will be gone for a week so if somebody knows where this form is at please put it on my desk and I will get to it tomorrow

## 2025-06-13 RX ORDER — TRIAMCINOLONE ACETONIDE 40 MG/ML
80 INJECTION, SUSPENSION INTRA-ARTICULAR; INTRAMUSCULAR
Status: COMPLETED | OUTPATIENT
Start: 2025-06-12 | End: 2025-06-12

## 2025-06-13 RX ORDER — LIDOCAINE HYDROCHLORIDE 10 MG/ML
2 INJECTION, SOLUTION EPIDURAL; INFILTRATION; INTRACAUDAL; PERINEURAL
Status: COMPLETED | OUTPATIENT
Start: 2025-06-12 | End: 2025-06-12

## 2025-06-17 NOTE — TELEPHONE ENCOUNTER
Spoke to Mary Beth, patient's wife.  She states they did not request any such testing and thinks it's some type of scam?  No faxes have been received, but if something does come thru, we can disregard.

## 2025-06-20 ENCOUNTER — TELEPHONE (OUTPATIENT)
Dept: FAMILY MEDICINE CLINIC | Facility: CLINIC | Age: 80
End: 2025-06-20

## 2025-06-20 NOTE — TELEPHONE ENCOUNTER
Called Pasquale back and was told that pt inquired about getting genetic testing done. Tried to call pt to confirm and no answer and unable to leave a voicemail. Googled [employer] and looks like this company is a scam.

## 2025-06-20 NOTE — TELEPHONE ENCOUNTER
Caller: CLAUDIA    Relationship to patient: Other    Best call back number: [phone]     Patient is needing:   CLAUDIA WITH [facility] CALLED WANTING TO KNOW IF YOU RECEIVED THE REQUISITION FORMS FOR THIS PATIENT.    PLEASE CALL TO DISCUSS.

## 2025-06-20 NOTE — TELEPHONE ENCOUNTER
"  Hub staff attempted to follow warm transfer process and was unsuccessful     Caller: Clint Cee \"Dennis\"    Relationship to patient: Self    Best call back number: [phone]    Patient is needing: PATIENT IS WANTING THE OFFICE TO CALL HIM BACK, HE WAS TRYING TO RETURN THE OFFICE CALL HE MISSED        "

## 2025-06-23 ENCOUNTER — TELEPHONE (OUTPATIENT)
Dept: FAMILY MEDICINE CLINIC | Facility: CLINIC | Age: 80
End: 2025-06-23
Payer: MEDICARE

## 2025-06-23 NOTE — TELEPHONE ENCOUNTER
Please let him know he will not have to have labs re-drawn, however his appointment does need to be rescheduled again, if you can please go ahead and r/s with other provider or October with [facility].

## 2025-06-23 NOTE — TELEPHONE ENCOUNTER
"  Caller: Clint Cee \"Dennis\"    Relationship: Self    Best call back number:     [phone] (Mobile)       What was the call regarding  PATIENT IS WANTING TO KNOW IF HE NEEDS TO HAVE ANOTHER LAB DRAW/ DUE TO HIS APPT WITH PCP BEING RESCHEDULED?     PLEASE ADVISE     Is it okay if the provider responds through MyChart:  "

## 2025-06-24 NOTE — TELEPHONE ENCOUNTER
Tried calling patient at 9:28am and got no answer and voice mail is full.    HUB TO SHARE    Please let him know he will not have to have labs re-drawn, however his appointment does need to be rescheduled again, if you can please go ahead and r/s with other provider or October with [facility].

## 2025-06-24 NOTE — TELEPHONE ENCOUNTER
"Name: Clint Cee \"Dennis\"      Relationship: Self      Best Callback Number: [phone] (Mobile)       HUB PROVIDED THE RELAY MESSAGE FROM THE OFFICE      PATIENT: VOICED UNDERSTANDING AND HAS NO FURTHER QUESTIONS AT THIS TIME    ADDITIONAL INFORMATION:      PATIENT HAS MWV IN NOVEMBER WITH PCP     "

## 2025-07-24 ENCOUNTER — TELEPHONE (OUTPATIENT)
Dept: FAMILY MEDICINE CLINIC | Facility: CLINIC | Age: 80
End: 2025-07-24
Payer: MEDICARE

## 2025-07-24 NOTE — TELEPHONE ENCOUNTER
"  Caller: Clint Cee \"Dennis\"    Relationship to patient: Self    Best call back number:   Telephone Information:   Mobile [phone]       Type of visit: MEDICARE AND LABS       Additional notes: PATIENT IS NEEDING AN APPOINTMENT FOR HIS MEDICARE AND LABS SCHEDULING         "

## 2025-07-24 NOTE — TELEPHONE ENCOUNTER
Spoke with patient at 2:08pm and told him he already has a Medicare Wellness Exam scheduled with Dr Castellanos on 11/19/2025 at 10:30am. I did scheduled a [facility] lab appt on 11/12/2025 at 10:30am.

## 2025-07-29 RX ORDER — ATORVASTATIN CALCIUM 40 MG/1
TABLET, FILM COATED ORAL
Qty: 90 TABLET | Refills: 3 | Status: SHIPPED | OUTPATIENT
Start: 2025-07-29

## 2025-07-31 ENCOUNTER — OFFICE VISIT (OUTPATIENT)
Dept: CARDIOLOGY | Facility: CLINIC | Age: 80
End: 2025-07-31
Payer: MEDICARE

## 2025-07-31 VITALS
WEIGHT: 236 LBS | HEIGHT: 72 IN | DIASTOLIC BLOOD PRESSURE: 76 MMHG | HEART RATE: 56 BPM | OXYGEN SATURATION: 98 % | SYSTOLIC BLOOD PRESSURE: 138 MMHG | BODY MASS INDEX: 31.97 KG/M2

## 2025-07-31 DIAGNOSIS — I49.3 PREMATURE VENTRICULAR CONTRACTIONS: ICD-10-CM

## 2025-07-31 DIAGNOSIS — I10 ESSENTIAL HYPERTENSION: ICD-10-CM

## 2025-07-31 DIAGNOSIS — Z98.890 H/O MITRAL VALVE REPAIR: ICD-10-CM

## 2025-07-31 DIAGNOSIS — Z98.890 S/P TRICUSPID VALVE REPAIR: ICD-10-CM

## 2025-07-31 DIAGNOSIS — I10 PRIMARY HYPERTENSION: ICD-10-CM

## 2025-07-31 DIAGNOSIS — I34.1 MITRAL VALVE PROLAPSE: Primary | ICD-10-CM

## 2025-07-31 DIAGNOSIS — I34.0 NONRHEUMATIC MITRAL VALVE REGURGITATION: ICD-10-CM

## 2025-07-31 DIAGNOSIS — R01.1 HEART MURMUR ON PHYSICAL EXAMINATION: ICD-10-CM

## 2025-07-31 DIAGNOSIS — I36.1 NONRHEUMATIC TRICUSPID VALVE REGURGITATION: ICD-10-CM

## 2025-07-31 RX ORDER — EPINEPHRINE 0.3 MG/.3ML
INJECTION SUBCUTANEOUS
COMMUNITY
Start: 2025-07-28

## 2025-07-31 NOTE — PROGRESS NOTES
Encounter Date:    Patient ID: Clint Cee is a 80 y.o. male.    Chief complaint  Status post mitral valve repair.  Status post tricuspid valve repair.  Hypertension    History of present illness     Since I have last seen, the patient has been without any chest discomfort ,shortness of breath, palpitations, dizziness or syncope.  Denies having any headache ,abdominal pain ,nausea, vomiting , diarrhea constipation, loss of weight or loss of appetite.  Denies having any excessive bruising ,hematuria or blood in the stool.    Review of all systems negative except as indicated.    Reviewed ROS.    Assessment and plan  ]]]]]]]]]]]]]]]]]]]]  History  ===========  -Status post mitral and tricuspid valve repair and left atrial appendage endocardial closure--Dr. Henderson 11/17/2022    Echocardiogram 2/28/2025  Mitral valve is intact with mitral annular calcification.  Status post mitral valve repair.  Mild aortic regurgitation.  Mild pulmonic regurgitation.  Left atrium is enlarged.  Left ventricle is normal in size and mild hypocontractility with ejection fraction of 45 to 50%.  Concentric left ventricular hypertrophy is present.  Right ventricle is normal in size hypocontractile with TAPSE of 1.04 cm.  Grade 2 left ventricular diastolic dysfunction is present...  Aortic root dilatation at 4.4 cm.     Echocardiogram 9/8/2023 revealed  Status post mitral valve repair.  No mitral regurgitation is present.  Aortic valve is thickened with adequate opening motion.  Left atrial enlargement.  Left ventricular size and contractility is normal with ejection fraction of 60%.     -   Preoperative increased shortness of breath and significantly increased fatigue.   Preoperative significant mitral and tricuspid regurgitation.  Prominent posterior mitral leaflet prolapse     TERRA 9/30/2022  Myxomatous mitral and tricuspid valve degeneration.  Severe mitral valve prolapse involving both anterior and posterior mitral leaflets with  severe mitral regurgitation with multiple jets.  Probable ruptured chordae.  In some views coaptation of the mitral valve is present.  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  Significant pulmonary hypertension.  Left atrial and left atrial appendage enlargement without clot.  Normal left ventricular size and contractility with ejection fraction of 60%.     Cardiac catheterization 9/30/2022   Moderate to significant pulmonary hypertension.  Severe mitral valve prolapse and mitral regurgitation and probable ruptured chordae (TERRA and cardiac cath)  Tricuspid valve prolapse.  Normal left ventricular function.  Normal coronary arteries.     Echocardiogram 9/28/2022.  Mild mitral regurgitation (regurgitation jet may be not in the field)      - Premature ventricular contractions.  Occasional palpitations     - Hypertension vitamin D deficiency GERD ocular migraine.     - History of prostate cancer.  Status post radiation therapy.  Bone scan was negative for any spread.     - Status post appendectomy spine surgery adenoidectomy tonsillectomy.  Status post lumbar laminectomy 9/13/2023     - Family history of prostate cancer     - Former smoker     - Allergic to morphine.  ============  Plan  ===========  Status post mitral and tricuspid valve repair and left atrial appendage endocardial closure.  11/17/2022.  Patient is not having any angina pectoris or congestive heart failure.     EKG-normal-9/11/2023.  EKG was not performed.-7/25/2024.  1/30/2025  EKG 7/31/2025-sinus bradycardia     History of thrombocytopenia  No bleeding problems.  [id number] 2023     Hypokalemia-improved     Hypertension-well-controlled  138/76.     Tachycardia-bradycardia syndrome-stable.  Patient is on Metroprolol tartrate 25 mg twice daily.     History of postoperative bradycardia  Bradycardia has improved.  No need for pacemaker implantation.Medications were reviewed and updated.     Follow-up in office in 6  months.    Further plan will depend on patient's progress.    Reviewed and updated.  ]]]]]]]]]]]]]]]]              Diagnosis Plan   1. Mitral valve prolapse  ECG 12 Lead      2. Nonrheumatic mitral valve regurgitation  ECG 12 Lead      3. Essential hypertension  ECG 12 Lead      4. Nonrheumatic tricuspid valve regurgitation        5. Premature ventricular contractions        6. Heart murmur on physical examination        7. H/O mitral valve repair        8. S/P tricuspid valve repair        9. Primary hypertension        LAB RESULTS (LAST 7 DAYS)    CBC        BMP        CMP         BNP        TROPONIN        CoAg        Creatinine Clearance  CrCl cannot be calculated (Patient's most recent lab result is older than the maximum 30 days allowed.).    ABG        Radiology  No radiology results for the last day                The following portions of the patient's history were reviewed and updated as appropriate: allergies, current medications, past family history, past medical history, past social history, past surgical history, and problem list.    Review of Systems   Constitutional: Negative for malaise/fatigue.   Cardiovascular:  Negative for chest pain, leg swelling, palpitations and syncope.   Respiratory:  Negative for shortness of breath.    Skin:  Negative for rash.   Gastrointestinal:  Negative for nausea and vomiting.   Neurological:  Negative for dizziness, light-headedness and numbness.   All other systems reviewed and are negative.        Current Outpatient Medications:     allopurinol (ZYLOPRIM) 100 MG tablet, TAKE 1 TABLET BY MOUTH DAILY, Disp: 90 tablet, Rfl: 1    atorvastatin (LIPITOR) 40 MG tablet, TAKE 1 TABLET BY MOUTH EVERY NIGHT FOR HIGH AMOUNT OF FATS IN THE BLOOD, Disp: 90 tablet, Rfl: 3    DULoxetine (CYMBALTA) 60 MG capsule, TAKE 1 CAPSULE BY MOUTH DAILY, Disp: 90 capsule, Rfl: 0    EPINEPHrine (EPIPEN) 0.3 MG/0.3ML solution auto-injector injection, INJECT 1 PEN IN THIGH WHEN EXPERIENCING  ANAPHYLAXIS. PROCEED TO ER AFTER INJECTION, Disp: , Rfl:     famotidine (PEPCID) 40 MG tablet, Take 1 tablet by mouth Every Night., Disp: , Rfl:     fexofenadine (ALLEGRA) 180 MG tablet, Take 1 tablet by mouth 2 (Two) Times a Day As Needed. Indications: Hayfever, Disp: , Rfl:     fluticasone (FLONASE) 50 MCG/ACT nasal spray, Administer 2 sprays into the nostril(s) as directed by provider Daily As Needed. Indications: Allergic Rhinitis, Disp: , Rfl:     ipratropium (ATROVENT) 0.06 % nasal spray, USE 1 TO 2 SPRAYS IN EACH NOSTRIL UP TO FOUR TIMES DAILY, Disp: , Rfl:     meloxicam (MOBIC) 15 MG tablet, TAKE 1 TABLET BY MOUTH DAILY, Disp: 30 tablet, Rfl: 1    metoprolol tartrate (LOPRESSOR) 25 MG tablet, Take 0.5 tablets by mouth 2 (Two) Times a Day., Disp: 90 tablet, Rfl: 3    traZODone (DESYREL) 50 MG tablet, TAKE 1 TABLET BY MOUTH WITH 1 HOUR BEFORE SLEEP. IF NOT HELPING 3-4 NIGHTS, THEN INCREASE TO 2 AT AT BEDTIME. CALL AFTER 2 WEEKS IF NOT HELPING., Disp: 60 tablet, Rfl: 3    Allergies   Allergen Reactions    Morphine Hallucinations    Beta Adrenergic Blockers Cough    Ace Inhibitors Cough     cough       Family History   Problem Relation Age of Onset    Arthritis Father         Hearing loss, prostate cancer, ì    Cancer Father         Prostate    Arrhythmia Father         Congentive heart failure    Diabetes Mother         Adult diabetes    Early death Mother         Kidney failure after child. birth    Heart disease Mother         Adult Diabetes    Cancer Mother         Diabetes    Arrhythmia Mother         Adult diabetes.  after child birth. Kidneys shut down.     Diabetes Maternal Grandmother         Back problems. Wore a brace       Past Surgical History:   Procedure Laterality Date    ADENOIDECTOMY      As child    APPENDECTOMY      Surgery    ARTERIAL BYPASS SURGERY      BACK SURGERY      Lumbar and cervival    CARDIAC CATHETERIZATION N/A 2022    Procedure: Right and Left Heart Cath  with Coronar Angiography;  Surgeon: Felipe Garcia MD;  Location: [facility] CATH INVASIVE LOCATION;  Service: Cardiovascular;  Laterality: N/A;    CARDIAC VALVE REPLACEMENT N/A 11/17/2022    Procedure: TRICUSPID VALVE REPAIR/REPLACEMENT;  Surgeon: Femi Henderson MD;  Location: [facility] CVOR;  Service: Cardiothoracic;  Laterality: N/A;  tricuspid valve repaired with 32mm  groves physio tricuspid annuloplasty ring    CARDIAC VALVE REPLACEMENT  11/17/2022    Repaired mitral and tricuspid    CATARACT EXTRACTION Bilateral     COLONOSCOPY  01/01/1997    Regularly scheduled    COLONOSCOPY N/A 02/22/2023    Procedure: COLONOSCOPY with cold snare polypectomy x 1;  Surgeon: Manfred Keating MD;  Location: [facility] ENDOSCOPY;  Service: Gastroenterology;  Laterality: N/A;    CORONARY ARTERY BYPASS GRAFT  11/22    During open heart valves repair.    ENDOSCOPY N/A 02/22/2023    Procedure: ESOPHAGOGASTRODUODENOSCOPY with esophageal dilation using 48 Fr. Bougie Dilator;  Surgeon: Manfred Keating MD;  Location: [facility] ENDOSCOPY;  Service: Gastroenterology;  Laterality: N/A;  erosive esophagitis    EYE SURGERY  01/01/2010    Reattached right retina    HAND SURGERY  1998    Numerous trigger fingers    JOINT REPLACEMENT  2024    KNEE SURGERY  2009    Minor trimming. Found bone on bone    LAMINECTOMY  1965    LUMBAR FUSION N/A 09/13/2023    Procedure: POSTERIOR LUMBAR FUSION WITH ROBOT;  Surgeon: Jayme Strauss IV, MD;  Location: [facility] MAIN OR;  Service: Robotics - Neuro;  Laterality: N/A;    MITRAL VALVE REPAIR/REPLACEMENT N/A 11/17/2022    Procedure: MITRAL VALVE REPAIR/REPLACEMENT with left atrial appendage closure;  Surgeon: Femi Henderson MD;  Location: [facility];  Service: Cardiothoracic;  Laterality: N/A;  mitral valve repaired iwth 40mm medtronic annuloplasty band  with intraop TERRA    MITRAL VALVE REPAIR/REPLACEMENT  11/22    NECK SURGERY  2000    SPINAL FUSION  2023    L2-L5    SPINE SURGERY  01/01/1971    2  lumbar 1 cervical    TONSILLECTOMY  01/01/1950    Childhood    TOTAL SHOULDER ARTHROPLASTY W/ DISTAL CLAVICLE EXCISION Right 08/28/2024    Procedure: TOTAL SHOULDER REVERSE ARTHROPLASTY;  Surgeon: Андрей Goldberg MD;  Location: [facility] MAIN OR;  Service: Orthopedics;  Laterality: Right;    TRICUSPID VALVE SURGERY  11/22    TRIGGER POINT INJECTION  2004    Both hands-fingers    WART REMOVAL Right 06/28/2024    Procedure: WART REMOVAL WITH LASER;  Surgeon: DUNCAN Robles DPM;  Location: [facility] MAIN OR;  Service: Podiatry;  Laterality: Right;       Past Medical History:   Diagnosis Date    Abnormal ECG June 2022    Allergic 01/01/1954    Nasal alergies    Arrhythmia 2004    Dr Youngblood examined me and said i was ok.  skipped beats    Asthma     no sx    Benign prostatic hyperplasia 11/14/2018    Callus 2022    Toe spreaders used    Cataract 01/01/2014    Surgery. Caused detached retina of right eye 20/60 repair    Cervical disc disorder 2000    6 plates. Rods/screws lower back    Clotting disorder 2019    Low platelets    Colon polyp 01/01/2018    Found and removed last colon eca.q    Deep vein thrombosis     Depression     Fracture, clavicle 1980    Left    Gastroesophageal reflux disease 03/20/2014    Glaucoma 2003    Normal pressures. Have low pressure glaucoma    Gout     Headache 01/01/2015    Have shimmering in both eyes intermittantly. No headaches    Heart murmur May 2022    During wellness exam    Heart valve disease July 2022    During Echo    HL (hearing loss) 01/01/2012    Have hearing aids    Hyperlipidemia     Hypertension 12/02/2011    Infectious viral hepatitis 1954    Yellow jaundis as child    Injury of back 2023    Back surgery- L5-L2 rods    Injury of neck 1989    Insomnia 12/02/2011    Knee swelling 2015    Right knee worse    Low back pain 1966    Memory loss 2021?    Can't remember    Mitral valve prolapse June 2022    Mood disorder 09/19/2013    Obesity 01/01/2024    Periarthritis of  shoulder     Polyosteoarthritis 2011    Prostate Cancer     Rotator cuff syndrome     RIGHT    Scoliosis 2024    Spinal stenosis 2011    Visual impairment 1955    Nearsighted corrected glasses    Vitamin D deficiency 2018       Family History   Problem Relation Age of Onset    Arthritis Father         Hearing loss, prostate cancer, ì    Cancer Father         Prostate    Arrhythmia Father         Congentive heart failure    Diabetes Mother         Adult diabetes    Early death Mother         Kidney failure after child. birth    Heart disease Mother         Adult Diabetes    Cancer Mother         Diabetes    Arrhythmia Mother         Adult diabetes.  after child birth. Kidneys shut down.     Diabetes Maternal Grandmother         Back problems. Wore a brace       Social History     Socioeconomic History    Marital status:    Tobacco Use    Smoking status: Former     Current packs/day: 0.00     Types: Cigarettes, Pipe, Cigars     Quit date: 1971     Years since quittin.1     Passive exposure: Past    Smokeless tobacco: Never    Tobacco comments:     Tried tobacco as child   Vaping Use    Vaping status: Never Used   Substance and Sexual Activity    Alcohol use: Not Currently     Comment: Not a regular drinker. Occasionly have a glass of wine or be    Drug use: Never    Sexual activity: Not Currently     Partners: Female     Birth control/protection: Condom, Diaphragm, I.U.D., Spermicide, Other, Birth control pill, Pill     Comment: Chemically castrated. Prostate cancer           ECG 12 Lead    Date/Time: 2025 1:16 PM  Performed by: Felipe Garcia MD    Authorized by: Felipe Garcia MD  Comparison: compared with previous ECG   Similar to previous ECG  Comparison to previous ECG: Sinus bradycardia 56/min nonspecific ST-T wave changes normal axis normal intervals no ectopy no significant change from previous  "EKG.          Objective:       Physical Exam    /76 (BP Location: Left arm, Patient Position: Sitting, Cuff Size: Adult)   Pulse 56   Ht 182.9 cm (72\")   Wt 107 kg (236 lb)   SpO2 98%   BMI 32.01 kg/m²   The patient is alert, oriented and in no distress.    Vital signs as noted above.    Head and neck revealed no carotid bruits or jugular venous distension.  No thyromegaly or lymphadenopathy is present.    Lungs clear.  No wheezing.  Breath sounds are normal bilaterally.    Heart normal first and second heart sounds.  No murmur..  No pericardial rub is present.  No gallop is present.    Abdomen soft and nontender.  No organomegaly is present.    Extremities revealed good peripheral pulses without any pedal edema.    Skin warm and dry.    Musculoskeletal system is grossly normal.    CNS grossly normal.    Reviewed and updated        "

## (undated) DEVICE — PK OPN HEART WHT WRP 50

## (undated) DEVICE — INTENDED FOR TISSUE SEPARATION, AND OTHER PROCEDURES THAT REQUIRE A SHARP SURGICAL BLADE TO PUNCTURE OR CUT.: Brand: BARD-PARKER ® CARBON RIB-BACK BLADES

## (undated) DEVICE — UNDERGLV SURG BIOGEL/PI PF SYNTH SURG SZ8.5 BLU 50/BX

## (undated) DEVICE — GLV SURG BIOGEL M LTX PF 7 1/2

## (undated) DEVICE — ANTIBACTERIAL UNDYED BRAIDED (POLYGLACTIN 910), SYNTHETIC ABSORBABLE SUTURE: Brand: COATED VICRYL

## (undated) DEVICE — PRESSURE TUBING: Brand: TRUWAVE

## (undated) DEVICE — NDL HYPO PRECISIONGLIDE/REG 18G 1IN PNK

## (undated) DEVICE — SOLUTION,WATER,IRRIGATION,1000ML,STERILE: Brand: MEDLINE

## (undated) DEVICE — ELECTRD BLD EZ CLN STD 6.5IN

## (undated) DEVICE — KT DRN EVAC WND PVC PCH WTROC RND 10F400

## (undated) DEVICE — PINNACLE INTRODUCER SHEATH: Brand: PINNACLE

## (undated) DEVICE — SUT SILK 2/0 TIES 18IN A185H

## (undated) DEVICE — GLV SURG SENSICARE PI ORTHO SZ8 LF STRL

## (undated) DEVICE — DRP ARM EXCELSIUS/GPS DISP

## (undated) DEVICE — CONTAINER,SPECIMEN,OR STERILE,4OZ: Brand: MEDLINE

## (undated) DEVICE — PK TOTL SHLDR 50

## (undated) DEVICE — Device

## (undated) DEVICE — SOL NACL 0.9PCT 1000ML

## (undated) DEVICE — BG BLD SYS

## (undated) DEVICE — SUT PROLN 4/0 V7 36IN 8975H

## (undated) DEVICE — MARKR SKIN 2TP W/RULR

## (undated) DEVICE — SENSR CERBRL O2 PK/2

## (undated) DEVICE — DRP MONTR EXCELSIUS/GPS DISP

## (undated) DEVICE — CATH DIAG IMPULSE PIG 5F 100CM

## (undated) DEVICE — UNDERGLV SURG BIOGEL INDICAT PI SZ8 BLU

## (undated) DEVICE — PK PERFUS CUST W/CARDIOPLEGIA

## (undated) DEVICE — MARKR SKIN W/RULR

## (undated) DEVICE — SPONGE,DISSECTOR,ROUND CHERRY,XR,ST,5/PK: Brand: MEDLINE

## (undated) DEVICE — ADAPT ANTEGRADE RETRGR

## (undated) DEVICE — PK ENDO GI 50

## (undated) DEVICE — CATHETER,URETHRAL,REDRUBBER,STERILE,20FR: Brand: MEDLINE

## (undated) DEVICE — BIPOLAR SEALER 23-112-1 AQM 6.0: Brand: AQUAMANTYS™

## (undated) DEVICE — KT SURG TURNOVER 050

## (undated) DEVICE — TEMP PACING WIRE: Brand: MYO/WIRE

## (undated) DEVICE — CREO MIS MODULAR POLYAXIAL TULIP, 30MM REDUCTION
Type: IMPLANTABLE DEVICE | Site: SPINE LUMBAR | Status: NON-FUNCTIONAL
Brand: CREO
Removed: 2023-09-13

## (undated) DEVICE — GAUZE,SPONGE,2"X2",8PLY,STERILE,LF,2'S: Brand: MEDLINE

## (undated) DEVICE — SYS PERFUS SEP PLATLT W TIPS CUST

## (undated) DEVICE — DRSNG WND GZ PAD BORDERED 4X8IN STRL

## (undated) DEVICE — ADHS SKIN PREMIERPRO EXOFIN TOPICAL HI/VISC .5ML

## (undated) DEVICE — CATH DIAG IMPULSE FR4 5F 100CM

## (undated) DEVICE — INTENDED TO SUPPORT AND MAINTAIN THE POSITION OF AN ANESTHETIZED PATIENT DURING SURGERY: Brand: ERIN BEACH CHAIR FACE MASK

## (undated) DEVICE — SUT PROLN 4/0 V5 36IN 8935H

## (undated) DEVICE — CONNECT Y INTERSEPT W/LL 3/8 X 3/8 X 3/8IN

## (undated) DEVICE — TUBING, SUCTION, 1/4" X 12', STRAIGHT: Brand: MEDLINE

## (undated) DEVICE — SUCTION CANISTER, 1500CC,SAFELINER: Brand: DEROYAL

## (undated) DEVICE — SUT SILK 2 SUTUPAK TIE 60IN SA8H 2STRAND

## (undated) DEVICE — SUT SILK 0 CT1 CR8 18IN C021D

## (undated) DEVICE — SOL IRR NACL 0.9PCT ARTHROMATIC 3000ML

## (undated) DEVICE — SUT VIC COAT PLS ANTIBAC TP1 27IN

## (undated) DEVICE — BITEBLOCK ENDO W/STRAP 60F A/ LF DISP

## (undated) DEVICE — GLV SURG BIOGEL LTX PF 7 1/2

## (undated) DEVICE — SUT PDS 0 CT-1 Z340H

## (undated) DEVICE — SOL IRR NACL 0.9PCT BT 1000ML

## (undated) DEVICE — TOWEL,OR,DSP,ST,WHITE,DLX,4/PK,20PK/CS: Brand: MEDLINE

## (undated) DEVICE — IRRIGATOR BULB ASEPTO 60CC STRL

## (undated) DEVICE — 28 FR RIGHT ANGLE – SOFT PVC CATHETER: Brand: PVC THORACIC CATHETERS

## (undated) DEVICE — SUT PROLN 5/0 V5 36IN 8934H

## (undated) DEVICE — DRP C/ARMOR

## (undated) DEVICE — COUNT NDL MAG XLG

## (undated) DEVICE — WRAP SHOULDER COLD THERAPY

## (undated) DEVICE — SUT SILK 2/0 SH CR8 18IN CR8 C012D

## (undated) DEVICE — C-ARM: Brand: UNBRANDED

## (undated) DEVICE — PENCL HND ROCKRSWTCH HOLSTR EZ CLEAN TP CRD 10FT

## (undated) DEVICE — TP UMB COTN 1/8X36 U12T

## (undated) DEVICE — TBG CONN EVAC MEGAVAC SMOKE SPLM

## (undated) DEVICE — GLV SURG BIOGEL LTX PF 8

## (undated) DEVICE — SNAR POLYP HOTSNARE/BRAIDED OVL/MINI 7F 2.8X10MM 230CM 1P/U

## (undated) DEVICE — SUT PROLN 3/0 V7 D/A 36IN 8976H

## (undated) DEVICE — PK BASIC SPINE 50

## (undated) DEVICE — DUAL CUT SAGITTAL BLADE

## (undated) DEVICE — TRAP WIDEEYE POLYP

## (undated) DEVICE — SLV SCD CALF HEMOFORCE DVT THERP REPROC MD

## (undated) DEVICE — 28 FR STRAIGHT – SOFT PVC CATHETER: Brand: PVC THORACIC CATHETERS

## (undated) DEVICE — SINGLE STAGE VENOUS RETURN CANNULA: Brand: THIN-FLEX SINGLE STAGE VENOUS DRAINAGE CANNULA

## (undated) DEVICE — SPONGE,NEURO,1"X1",XR,STRL,LF,10/PK: Brand: MEDLINE

## (undated) DEVICE — HEMOCONCENTRATOR PERFUS LPS06

## (undated) DEVICE — ELECTRD DEFIB M/FUNC PROPADZ STRL 2PK

## (undated) DEVICE — SOL IRRIG NACL 1000ML

## (undated) DEVICE — UNDRGLV SURG BIOGEL PIMICROINDICATOR SYNTH SZ8 LF STRL

## (undated) DEVICE — CABL BIPOL W/ALLGTR CLIP/SM 12FT

## (undated) DEVICE — PK ATS CUST W CARDIOTOMY RESEVOIR

## (undated) DEVICE — SHEET, DRAPE, SPLIT, STERILE: Brand: MEDLINE

## (undated) DEVICE — DECANTER: Brand: UNBRANDED

## (undated) DEVICE — CANN ART EOPA 3D NV W/CONN 20F

## (undated) DEVICE — BLOOD TRANSFUSION FILTER: Brand: HAEMONETICS

## (undated) DEVICE — GOWN,REINFORCE,POLY,SIRUS,BREATH SLV,XLG: Brand: MEDLINE

## (undated) DEVICE — 6.0MM PRECISION ROUND

## (undated) DEVICE — BOOT SUT XRAY DETECT STD YEL/BLU CA/50

## (undated) DEVICE — GAUZE,SPONGE,4"X4",32PLY,XRAY,STRL,LF: Brand: MEDLINE

## (undated) DEVICE — CANN AORT ROOT DLP VNT/8IN 14G 7F

## (undated) DEVICE — SUT MONOCRYL PLS ANTIB UND 3/0  PS1 27IN

## (undated) DEVICE — CANN VENI DLP SGL/STAGE RT/ANGL MTL/TP 28F

## (undated) DEVICE — GW FC J TFE/COAT .025 3MM 180CM

## (undated) DEVICE — SUT ETHIB 2/0 CV V7 30IN 10X72

## (undated) DEVICE — BIT DRL EXCELSIUS/GPS HI/SPD 4.5MM DISP

## (undated) DEVICE — CATH DIAG IMPULSE FL4 5F 100CM

## (undated) DEVICE — PROB SCRW STIM PEDCL DISP

## (undated) DEVICE — PK EXTREM 50

## (undated) DEVICE — PK TRY HEART CATH 50

## (undated) DEVICE — DRAPE SHEET ULTRAGARD: Brand: MEDLINE

## (undated) DEVICE — SWAN-GANZ TRUE SIZE THERMODILUTION "S" TIP: Brand: SWAN-GANZ TRUE SIZE

## (undated) DEVICE — GLV SURG SIGNATURE ESSENTIAL PF LTX SZ8.5

## (undated) DEVICE — SMOKE EVACUATION TUBING WITH 7/8 IN TO 1/4 IN REDUCER: Brand: BUFFALO FILTER

## (undated) DEVICE — SOL IRR H2O BTL 1000ML STRL

## (undated) DEVICE — DRSNG SURESITE WNDW 4X4.5

## (undated) DEVICE — SUT SILK 4/0 TIES 18IN A183H

## (undated) DEVICE — CANN RETRGR STYLET RSCP 15F

## (undated) DEVICE — BIT DRL EXCELSIUS/GPS HI/SPD 3.5MM DISP